# Patient Record
Sex: FEMALE | Race: WHITE | URBAN - METROPOLITAN AREA
[De-identification: names, ages, dates, MRNs, and addresses within clinical notes are randomized per-mention and may not be internally consistent; named-entity substitution may affect disease eponyms.]

---

## 2018-08-07 ENCOUNTER — OUTPATIENT (OUTPATIENT)
Dept: OUTPATIENT SERVICES | Facility: HOSPITAL | Age: 53
LOS: 1 days | Discharge: HOME | End: 2018-08-07

## 2018-08-07 DIAGNOSIS — M06.4 INFLAMMATORY POLYARTHROPATHY: ICD-10-CM

## 2018-08-07 DIAGNOSIS — D50.9 IRON DEFICIENCY ANEMIA, UNSPECIFIED: ICD-10-CM

## 2018-08-07 DIAGNOSIS — M79.1 MYALGIA: ICD-10-CM

## 2019-01-15 ENCOUNTER — OUTPATIENT (OUTPATIENT)
Dept: OUTPATIENT SERVICES | Facility: HOSPITAL | Age: 54
LOS: 1 days | Discharge: HOME | End: 2019-01-15

## 2019-01-15 DIAGNOSIS — M32.9 SYSTEMIC LUPUS ERYTHEMATOSUS, UNSPECIFIED: ICD-10-CM

## 2019-01-15 DIAGNOSIS — M06.9 RHEUMATOID ARTHRITIS, UNSPECIFIED: ICD-10-CM

## 2019-01-15 DIAGNOSIS — M06.4 INFLAMMATORY POLYARTHROPATHY: ICD-10-CM

## 2019-01-15 DIAGNOSIS — D50.8 OTHER IRON DEFICIENCY ANEMIAS: ICD-10-CM

## 2019-01-15 DIAGNOSIS — M32.10 SYSTEMIC LUPUS ERYTHEMATOSUS, ORGAN OR SYSTEM INVOLVEMENT UNSPECIFIED: ICD-10-CM

## 2019-01-15 DIAGNOSIS — E78.2 MIXED HYPERLIPIDEMIA: ICD-10-CM

## 2019-01-15 DIAGNOSIS — M35.00 SJOGREN SYNDROME, UNSPECIFIED: ICD-10-CM

## 2023-01-01 ENCOUNTER — LABORATORY RESULT (OUTPATIENT)
Age: 58
End: 2023-01-01

## 2023-01-01 ENCOUNTER — OUTPATIENT (OUTPATIENT)
Dept: OUTPATIENT SERVICES | Facility: HOSPITAL | Age: 58
LOS: 1 days | End: 2023-01-01
Payer: COMMERCIAL

## 2023-01-01 ENCOUNTER — APPOINTMENT (OUTPATIENT)
Dept: CARDIOLOGY | Facility: CLINIC | Age: 58
End: 2023-01-01

## 2023-01-01 ENCOUNTER — APPOINTMENT (OUTPATIENT)
Dept: RADIATION ONCOLOGY | Facility: HOSPITAL | Age: 58
End: 2023-01-01

## 2023-01-01 ENCOUNTER — NON-APPOINTMENT (OUTPATIENT)
Age: 58
End: 2023-01-01

## 2023-01-01 ENCOUNTER — APPOINTMENT (OUTPATIENT)
Dept: HEMATOLOGY ONCOLOGY | Facility: CLINIC | Age: 58
End: 2023-01-01
Payer: COMMERCIAL

## 2023-01-01 ENCOUNTER — APPOINTMENT (OUTPATIENT)
Dept: INFUSION THERAPY | Facility: CLINIC | Age: 58
End: 2023-01-01
Payer: COMMERCIAL

## 2023-01-01 ENCOUNTER — OUTPATIENT (OUTPATIENT)
Dept: OUTPATIENT SERVICES | Facility: HOSPITAL | Age: 58
LOS: 1 days | End: 2023-01-01

## 2023-01-01 ENCOUNTER — TRANSCRIPTION ENCOUNTER (OUTPATIENT)
Age: 58
End: 2023-01-01

## 2023-01-01 ENCOUNTER — INPATIENT (INPATIENT)
Facility: HOSPITAL | Age: 58
LOS: 28 days | Discharge: NURSING, REHAB OR LONG-TERM CARE | DRG: 640 | End: 2023-08-11
Attending: STUDENT IN AN ORGANIZED HEALTH CARE EDUCATION/TRAINING PROGRAM | Admitting: HOSPITALIST
Payer: COMMERCIAL

## 2023-01-01 ENCOUNTER — APPOINTMENT (OUTPATIENT)
Dept: INFUSION THERAPY | Facility: CLINIC | Age: 58
End: 2023-01-01

## 2023-01-01 ENCOUNTER — APPOINTMENT (OUTPATIENT)
Dept: NEUROSURGERY | Facility: CLINIC | Age: 58
End: 2023-01-01

## 2023-01-01 ENCOUNTER — APPOINTMENT (OUTPATIENT)
Dept: HEMATOLOGY ONCOLOGY | Facility: CLINIC | Age: 58
End: 2023-01-01

## 2023-01-01 VITALS
TEMPERATURE: 97 F | HEART RATE: 128 BPM | SYSTOLIC BLOOD PRESSURE: 122 MMHG | RESPIRATION RATE: 18 BRPM | WEIGHT: 160.06 LBS | DIASTOLIC BLOOD PRESSURE: 93 MMHG | HEIGHT: 64 IN

## 2023-01-01 VITALS
TEMPERATURE: 98 F | DIASTOLIC BLOOD PRESSURE: 78 MMHG | RESPIRATION RATE: 19 BRPM | SYSTOLIC BLOOD PRESSURE: 118 MMHG | HEART RATE: 96 BPM

## 2023-01-01 VITALS
RESPIRATION RATE: 16 BRPM | HEIGHT: 60 IN | SYSTOLIC BLOOD PRESSURE: 127 MMHG | TEMPERATURE: 97.9 F | DIASTOLIC BLOOD PRESSURE: 77 MMHG | HEART RATE: 108 BPM | BODY MASS INDEX: 30.82 KG/M2 | WEIGHT: 157 LBS

## 2023-01-01 VITALS
HEART RATE: 118 BPM | RESPIRATION RATE: 16 BRPM | SYSTOLIC BLOOD PRESSURE: 133 MMHG | OXYGEN SATURATION: 100 % | DIASTOLIC BLOOD PRESSURE: 90 MMHG | TEMPERATURE: 97.9 F

## 2023-01-01 VITALS
TEMPERATURE: 97.9 F | HEART RATE: 100 BPM | OXYGEN SATURATION: 98 % | SYSTOLIC BLOOD PRESSURE: 140 MMHG | RESPIRATION RATE: 18 BRPM | WEIGHT: 164 LBS | BODY MASS INDEX: 29.06 KG/M2 | DIASTOLIC BLOOD PRESSURE: 85 MMHG | HEIGHT: 63 IN

## 2023-01-01 VITALS
HEART RATE: 131 BPM | WEIGHT: 160 LBS | RESPIRATION RATE: 18 BRPM | DIASTOLIC BLOOD PRESSURE: 77 MMHG | SYSTOLIC BLOOD PRESSURE: 128 MMHG | BODY MASS INDEX: 28.35 KG/M2 | HEIGHT: 63 IN | TEMPERATURE: 98.1 F

## 2023-01-01 VITALS
HEART RATE: 112 BPM | SYSTOLIC BLOOD PRESSURE: 136 MMHG | OXYGEN SATURATION: 98 % | TEMPERATURE: 99.1 F | RESPIRATION RATE: 16 BRPM | DIASTOLIC BLOOD PRESSURE: 89 MMHG

## 2023-01-01 VITALS
RESPIRATION RATE: 18 BRPM | OXYGEN SATURATION: 96 % | WEIGHT: 158 LBS | BODY MASS INDEX: 28 KG/M2 | SYSTOLIC BLOOD PRESSURE: 119 MMHG | TEMPERATURE: 98.6 F | DIASTOLIC BLOOD PRESSURE: 80 MMHG | HEART RATE: 120 BPM | HEIGHT: 63 IN

## 2023-01-01 VITALS
HEART RATE: 93 BPM | RESPIRATION RATE: 18 BRPM | TEMPERATURE: 98 F | DIASTOLIC BLOOD PRESSURE: 80 MMHG | SYSTOLIC BLOOD PRESSURE: 123 MMHG

## 2023-01-01 DIAGNOSIS — C79.51 SECONDARY MALIGNANT NEOPLASM OF BONE: ICD-10-CM

## 2023-01-01 DIAGNOSIS — K72.00 ACUTE AND SUBACUTE HEPATIC FAILURE WITHOUT COMA: ICD-10-CM

## 2023-01-01 DIAGNOSIS — D48.5 NEOPLASM OF UNCERTAIN BEHAVIOR OF SKIN: ICD-10-CM

## 2023-01-01 DIAGNOSIS — C50.911 MALIGNANT NEOPLASM OF UNSPECIFIED SITE OF RIGHT FEMALE BREAST: ICD-10-CM

## 2023-01-01 DIAGNOSIS — M84.58XD PATHOLOGICAL FRACTURE IN NEOPLASTIC DISEASE, OTHER SPECIFIED SITE, SUBSEQUENT ENCOUNTER FOR FRACTURE WITH ROUTINE HEALING: ICD-10-CM

## 2023-01-01 DIAGNOSIS — F10.11 ALCOHOL ABUSE, IN REMISSION: ICD-10-CM

## 2023-01-01 DIAGNOSIS — J45.909 UNSPECIFIED ASTHMA, UNCOMPLICATED: ICD-10-CM

## 2023-01-01 DIAGNOSIS — C79.9 SECONDARY MALIGNANT NEOPLASM OF UNSPECIFIED SITE: ICD-10-CM

## 2023-01-01 DIAGNOSIS — K70.30 ALCOHOLIC CIRRHOSIS OF LIVER WITHOUT ASCITES: ICD-10-CM

## 2023-01-01 DIAGNOSIS — G89.3 NEOPLASM RELATED PAIN (ACUTE) (CHRONIC): ICD-10-CM

## 2023-01-01 DIAGNOSIS — E87.1 HYPO-OSMOLALITY AND HYPONATREMIA: ICD-10-CM

## 2023-01-01 DIAGNOSIS — I10 ESSENTIAL (PRIMARY) HYPERTENSION: ICD-10-CM

## 2023-01-01 DIAGNOSIS — K20.80 OTHER ESOPHAGITIS WITHOUT BLEEDING: ICD-10-CM

## 2023-01-01 DIAGNOSIS — B34.8 OTHER VIRAL INFECTIONS OF UNSPECIFIED SITE: ICD-10-CM

## 2023-01-01 DIAGNOSIS — Z48.811 ENCOUNTER FOR SURGICAL AFTERCARE FOLLOWING SURGERY ON THE NERVOUS SYSTEM: ICD-10-CM

## 2023-01-01 DIAGNOSIS — C50.919 MALIGNANT NEOPLASM OF UNSPECIFIED SITE OF UNSPECIFIED FEMALE BREAST: ICD-10-CM

## 2023-01-01 DIAGNOSIS — E87.6 HYPOKALEMIA: ICD-10-CM

## 2023-01-01 DIAGNOSIS — M84.58XA PATHOLOGICAL FRACTURE IN NEOPLASTIC DISEASE, OTHER SPECIFIED SITE, INITIAL ENCOUNTER FOR FRACTURE: ICD-10-CM

## 2023-01-01 DIAGNOSIS — M48.02 SPINAL STENOSIS, CERVICAL REGION: ICD-10-CM

## 2023-01-01 DIAGNOSIS — Z66 DO NOT RESUSCITATE: ICD-10-CM

## 2023-01-01 DIAGNOSIS — R62.7 ADULT FAILURE TO THRIVE: ICD-10-CM

## 2023-01-01 DIAGNOSIS — R13.10 DYSPHAGIA, UNSPECIFIED: ICD-10-CM

## 2023-01-01 DIAGNOSIS — E83.42 HYPOMAGNESEMIA: ICD-10-CM

## 2023-01-01 DIAGNOSIS — E03.9 HYPOTHYROIDISM, UNSPECIFIED: ICD-10-CM

## 2023-01-01 DIAGNOSIS — Z00.00 ENCOUNTER FOR GENERAL ADULT MEDICAL EXAMINATION W/OUT ABNORMAL FINDINGS: ICD-10-CM

## 2023-01-01 DIAGNOSIS — H10.31 UNSPECIFIED ACUTE CONJUNCTIVITIS, RIGHT EYE: ICD-10-CM

## 2023-01-01 DIAGNOSIS — C50.011 MALIGNANT NEOPLASM OF NIPPLE AND AREOLA, RIGHT FEMALE BREAST: ICD-10-CM

## 2023-01-01 DIAGNOSIS — E87.29 OTHER ACIDOSIS: ICD-10-CM

## 2023-01-01 DIAGNOSIS — C79.31 SECONDARY MALIGNANT NEOPLASM OF BRAIN: ICD-10-CM

## 2023-01-01 DIAGNOSIS — E86.0 DEHYDRATION: ICD-10-CM

## 2023-01-01 DIAGNOSIS — J90 PLEURAL EFFUSION, NOT ELSEWHERE CLASSIFIED: ICD-10-CM

## 2023-01-01 DIAGNOSIS — C41.9 SECONDARY MALIGNANT NEOPLASM OF UNSPECIFIED SITE: ICD-10-CM

## 2023-01-01 LAB
25(OH)D3 SERPL-MCNC: 57 NG/ML
A1C WITH ESTIMATED AVERAGE GLUCOSE RESULT: 5.3 % — SIGNIFICANT CHANGE UP (ref 4–5.6)
ALBUMIN SERPL ELPH-MCNC: 2.3 G/DL — LOW (ref 3.5–5.2)
ALBUMIN SERPL ELPH-MCNC: 2.4 G/DL — LOW (ref 3.5–5.2)
ALBUMIN SERPL ELPH-MCNC: 2.5 G/DL — LOW (ref 3.5–5.2)
ALBUMIN SERPL ELPH-MCNC: 2.6 G/DL — LOW (ref 3.5–5.2)
ALBUMIN SERPL ELPH-MCNC: 2.7 G/DL — LOW (ref 3.5–5.2)
ALBUMIN SERPL ELPH-MCNC: 2.8 G/DL — LOW (ref 3.5–5.2)
ALBUMIN SERPL ELPH-MCNC: 2.9 G/DL — LOW (ref 3.5–5.2)
ALBUMIN SERPL ELPH-MCNC: 3 G/DL — LOW (ref 3.5–5.2)
ALBUMIN SERPL ELPH-MCNC: 3.1 G/DL — LOW (ref 3.5–5.2)
ALBUMIN SERPL ELPH-MCNC: 3.4 G/DL — LOW (ref 3.5–5.2)
ALBUMIN SERPL ELPH-MCNC: 3.5 G/DL — SIGNIFICANT CHANGE UP (ref 3.5–5.2)
ALBUMIN SERPL ELPH-MCNC: 3.6 G/DL — SIGNIFICANT CHANGE UP (ref 3.5–5.2)
ALBUMIN SERPL ELPH-MCNC: 4 G/DL
ALBUMIN SERPL ELPH-MCNC: 4.1 G/DL
ALBUMIN SERPL ELPH-MCNC: 4.1 G/DL
ALBUMIN SERPL ELPH-MCNC: 4.2 G/DL
ALBUMIN SERPL ELPH-MCNC: 4.2 G/DL — SIGNIFICANT CHANGE UP (ref 3.5–5.2)
ALBUMIN SERPL ELPH-MCNC: 4.3 G/DL
ALBUMIN SERPL ELPH-MCNC: 4.4 G/DL
ALBUMIN SERPL ELPH-MCNC: 4.6 G/DL
ALP BLD-CCNC: 110 U/L
ALP BLD-CCNC: 248 U/L
ALP BLD-CCNC: 275 U/L
ALP BLD-CCNC: 288 U/L
ALP BLD-CCNC: 72 U/L
ALP BLD-CCNC: 85 U/L
ALP BLD-CCNC: 88 U/L
ALP SERPL-CCNC: 117 U/L — HIGH (ref 30–115)
ALP SERPL-CCNC: 135 U/L — HIGH (ref 30–115)
ALP SERPL-CCNC: 136 U/L — HIGH (ref 30–115)
ALP SERPL-CCNC: 139 U/L — HIGH (ref 30–115)
ALP SERPL-CCNC: 150 U/L — HIGH (ref 30–115)
ALP SERPL-CCNC: 160 U/L — HIGH (ref 30–115)
ALP SERPL-CCNC: 165 U/L — HIGH (ref 30–115)
ALP SERPL-CCNC: 170 U/L — HIGH (ref 30–115)
ALP SERPL-CCNC: 170 U/L — HIGH (ref 30–115)
ALP SERPL-CCNC: 171 U/L — HIGH (ref 30–115)
ALP SERPL-CCNC: 174 U/L — HIGH (ref 30–115)
ALP SERPL-CCNC: 176 U/L — HIGH (ref 30–115)
ALP SERPL-CCNC: 178 U/L — HIGH (ref 30–115)
ALP SERPL-CCNC: 190 U/L — HIGH (ref 30–115)
ALP SERPL-CCNC: 196 U/L — HIGH (ref 30–115)
ALP SERPL-CCNC: 199 U/L — HIGH (ref 30–115)
ALP SERPL-CCNC: 208 U/L — HIGH (ref 30–115)
ALP SERPL-CCNC: 217 U/L — HIGH (ref 30–115)
ALP SERPL-CCNC: 222 U/L — HIGH (ref 30–115)
ALP SERPL-CCNC: 241 U/L — HIGH (ref 30–115)
ALP SERPL-CCNC: 242 U/L — HIGH (ref 30–115)
ALP SERPL-CCNC: 260 U/L — HIGH (ref 30–115)
ALP SERPL-CCNC: 262 U/L — HIGH (ref 30–115)
ALP SERPL-CCNC: 284 U/L — HIGH (ref 30–115)
ALT FLD-CCNC: 1015 U/L — HIGH (ref 0–41)
ALT FLD-CCNC: 104 U/L — HIGH (ref 0–41)
ALT FLD-CCNC: 11 U/L — SIGNIFICANT CHANGE UP (ref 0–41)
ALT FLD-CCNC: 144 U/L — HIGH (ref 0–41)
ALT FLD-CCNC: 18 U/L — SIGNIFICANT CHANGE UP (ref 0–41)
ALT FLD-CCNC: 208 U/L — HIGH (ref 0–41)
ALT FLD-CCNC: 21 U/L — SIGNIFICANT CHANGE UP (ref 0–41)
ALT FLD-CCNC: 214 U/L — HIGH (ref 0–41)
ALT FLD-CCNC: 219 U/L — HIGH (ref 0–41)
ALT FLD-CCNC: 27 U/L — SIGNIFICANT CHANGE UP (ref 0–41)
ALT FLD-CCNC: 311 U/L — HIGH (ref 0–41)
ALT FLD-CCNC: 32 U/L — SIGNIFICANT CHANGE UP (ref 0–41)
ALT FLD-CCNC: 479 U/L — HIGH (ref 0–41)
ALT FLD-CCNC: 495 U/L — HIGH (ref 0–41)
ALT FLD-CCNC: 5 U/L — SIGNIFICANT CHANGE UP (ref 0–41)
ALT FLD-CCNC: 59 U/L — HIGH (ref 0–41)
ALT FLD-CCNC: 652 U/L — HIGH (ref 0–41)
ALT FLD-CCNC: 674 U/L — HIGH (ref 0–41)
ALT FLD-CCNC: 797 U/L — HIGH (ref 0–41)
ALT FLD-CCNC: 99 U/L — HIGH (ref 0–41)
ALT FLD-CCNC: <5 U/L — SIGNIFICANT CHANGE UP (ref 0–41)
ALT SERPL-CCNC: 15 U/L
ALT SERPL-CCNC: 21 U/L
ALT SERPL-CCNC: 26 U/L
ALT SERPL-CCNC: 32 U/L
ALT SERPL-CCNC: 8 U/L
ALT SERPL-CCNC: <5 U/L
ALT SERPL-CCNC: <5 U/L
ANION GAP SERPL CALC-SCNC: 10 MMOL/L — SIGNIFICANT CHANGE UP (ref 7–14)
ANION GAP SERPL CALC-SCNC: 11 MMOL/L
ANION GAP SERPL CALC-SCNC: 11 MMOL/L — SIGNIFICANT CHANGE UP (ref 7–14)
ANION GAP SERPL CALC-SCNC: 12 MMOL/L
ANION GAP SERPL CALC-SCNC: 12 MMOL/L — SIGNIFICANT CHANGE UP (ref 7–14)
ANION GAP SERPL CALC-SCNC: 12 MMOL/L — SIGNIFICANT CHANGE UP (ref 7–14)
ANION GAP SERPL CALC-SCNC: 13 MMOL/L
ANION GAP SERPL CALC-SCNC: 13 MMOL/L
ANION GAP SERPL CALC-SCNC: 13 MMOL/L — SIGNIFICANT CHANGE UP (ref 7–14)
ANION GAP SERPL CALC-SCNC: 13 MMOL/L — SIGNIFICANT CHANGE UP (ref 7–14)
ANION GAP SERPL CALC-SCNC: 14 MMOL/L — SIGNIFICANT CHANGE UP (ref 7–14)
ANION GAP SERPL CALC-SCNC: 14 MMOL/L — SIGNIFICANT CHANGE UP (ref 7–14)
ANION GAP SERPL CALC-SCNC: 15 MMOL/L — HIGH (ref 7–14)
ANION GAP SERPL CALC-SCNC: 15 MMOL/L — HIGH (ref 7–14)
ANION GAP SERPL CALC-SCNC: 17 MMOL/L — HIGH (ref 7–14)
ANION GAP SERPL CALC-SCNC: 17 MMOL/L — HIGH (ref 7–14)
ANION GAP SERPL CALC-SCNC: 18 MMOL/L — HIGH (ref 7–14)
ANION GAP SERPL CALC-SCNC: 18 MMOL/L — HIGH (ref 7–14)
ANION GAP SERPL CALC-SCNC: 25 MMOL/L — HIGH (ref 7–14)
ANION GAP SERPL CALC-SCNC: 26 MMOL/L
ANION GAP SERPL CALC-SCNC: 27 MMOL/L
ANION GAP SERPL CALC-SCNC: 28 MMOL/L — HIGH (ref 7–14)
ANION GAP SERPL CALC-SCNC: 29 MMOL/L
ANION GAP SERPL CALC-SCNC: 8 MMOL/L — SIGNIFICANT CHANGE UP (ref 7–14)
ANION GAP SERPL CALC-SCNC: 9 MMOL/L — SIGNIFICANT CHANGE UP (ref 7–14)
ANISOCYTOSIS BLD QL: SLIGHT — SIGNIFICANT CHANGE UP
APAP SERPL-MCNC: <5 UG/ML — LOW (ref 10–30)
APPEARANCE UR: ABNORMAL
APTT BLD: 33.9 SEC — SIGNIFICANT CHANGE UP (ref 27–39.2)
AST SERPL-CCNC: 105 U/L — HIGH (ref 0–41)
AST SERPL-CCNC: 11 U/L — SIGNIFICANT CHANGE UP (ref 0–41)
AST SERPL-CCNC: 11 U/L — SIGNIFICANT CHANGE UP (ref 0–41)
AST SERPL-CCNC: 12 U/L — SIGNIFICANT CHANGE UP (ref 0–41)
AST SERPL-CCNC: 12 U/L — SIGNIFICANT CHANGE UP (ref 0–41)
AST SERPL-CCNC: 13 U/L
AST SERPL-CCNC: 13 U/L — SIGNIFICANT CHANGE UP (ref 0–41)
AST SERPL-CCNC: 13 U/L — SIGNIFICANT CHANGE UP (ref 0–41)
AST SERPL-CCNC: 14 U/L
AST SERPL-CCNC: 14 U/L — SIGNIFICANT CHANGE UP (ref 0–41)
AST SERPL-CCNC: 1463 U/L — HIGH (ref 0–41)
AST SERPL-CCNC: 16 U/L — SIGNIFICANT CHANGE UP (ref 0–41)
AST SERPL-CCNC: 20 U/L
AST SERPL-CCNC: 23 U/L — SIGNIFICANT CHANGE UP (ref 0–41)
AST SERPL-CCNC: 2405 U/L — HIGH (ref 0–41)
AST SERPL-CCNC: 26 U/L
AST SERPL-CCNC: 27 U/L
AST SERPL-CCNC: 2756 U/L — HIGH (ref 0–41)
AST SERPL-CCNC: 28 U/L
AST SERPL-CCNC: 294 U/L — HIGH (ref 0–41)
AST SERPL-CCNC: 31 U/L — SIGNIFICANT CHANGE UP (ref 0–41)
AST SERPL-CCNC: 336 U/L — HIGH (ref 0–41)
AST SERPL-CCNC: 36 U/L — SIGNIFICANT CHANGE UP (ref 0–41)
AST SERPL-CCNC: 362 U/L — HIGH (ref 0–41)
AST SERPL-CCNC: 47 U/L — HIGH (ref 0–41)
AST SERPL-CCNC: 741 U/L — HIGH (ref 0–41)
AST SERPL-CCNC: 787 U/L — HIGH (ref 0–41)
AST SERPL-CCNC: 788 U/L — HIGH (ref 0–41)
AST SERPL-CCNC: 85 U/L
B-OH-BUTYR SERPL-SCNC: 4.8 MMOL/L — HIGH
B-OH-BUTYR SERPL-SCNC: 8.8 MMOL/L — HIGH
BACTERIA # UR AUTO: NEGATIVE — SIGNIFICANT CHANGE UP
BASE EXCESS BLDV CALC-SCNC: -3.9 MMOL/L — LOW (ref -2–3)
BASOPHILS # BLD AUTO: 0.03 K/UL — SIGNIFICANT CHANGE UP (ref 0–0.2)
BASOPHILS # BLD AUTO: 0.03 K/UL — SIGNIFICANT CHANGE UP (ref 0–0.2)
BASOPHILS # BLD AUTO: 0.04 K/UL — SIGNIFICANT CHANGE UP (ref 0–0.2)
BASOPHILS # BLD AUTO: 0.04 K/UL — SIGNIFICANT CHANGE UP (ref 0–0.2)
BASOPHILS # BLD AUTO: 0.05 K/UL — SIGNIFICANT CHANGE UP (ref 0–0.2)
BASOPHILS # BLD AUTO: 0.06 K/UL — SIGNIFICANT CHANGE UP (ref 0–0.2)
BASOPHILS # BLD AUTO: 0.07 K/UL — SIGNIFICANT CHANGE UP (ref 0–0.2)
BASOPHILS # BLD AUTO: 0.08 K/UL — SIGNIFICANT CHANGE UP (ref 0–0.2)
BASOPHILS # BLD AUTO: 0.09 K/UL — SIGNIFICANT CHANGE UP (ref 0–0.2)
BASOPHILS NFR BLD AUTO: 0.9 % — SIGNIFICANT CHANGE UP (ref 0–1)
BASOPHILS NFR BLD AUTO: 1.1 % — HIGH (ref 0–1)
BASOPHILS NFR BLD AUTO: 1.1 % — HIGH (ref 0–1)
BASOPHILS NFR BLD AUTO: 1.2 % — HIGH (ref 0–1)
BASOPHILS NFR BLD AUTO: 1.2 % — HIGH (ref 0–1)
BASOPHILS NFR BLD AUTO: 1.3 % — HIGH (ref 0–1)
BASOPHILS NFR BLD AUTO: 1.4 % — HIGH (ref 0–1)
BASOPHILS NFR BLD AUTO: 1.5 % — HIGH (ref 0–1)
BASOPHILS NFR BLD AUTO: 1.5 % — HIGH (ref 0–1)
BASOPHILS NFR BLD AUTO: 1.6 % — HIGH (ref 0–1)
BASOPHILS NFR BLD AUTO: 2.1 % — HIGH (ref 0–1)
BILIRUB DIRECT SERPL-MCNC: 0.2 MG/DL — SIGNIFICANT CHANGE UP (ref 0–0.3)
BILIRUB DIRECT SERPL-MCNC: 0.2 MG/DL — SIGNIFICANT CHANGE UP (ref 0–0.3)
BILIRUB INDIRECT FLD-MCNC: 0.2 MG/DL — SIGNIFICANT CHANGE UP (ref 0.2–1.2)
BILIRUB INDIRECT FLD-MCNC: 0.2 MG/DL — SIGNIFICANT CHANGE UP (ref 0.2–1.2)
BILIRUB SERPL-MCNC: 0.3 MG/DL
BILIRUB SERPL-MCNC: 0.3 MG/DL — SIGNIFICANT CHANGE UP (ref 0.2–1.2)
BILIRUB SERPL-MCNC: 0.4 MG/DL
BILIRUB SERPL-MCNC: 0.4 MG/DL — SIGNIFICANT CHANGE UP (ref 0.2–1.2)
BILIRUB SERPL-MCNC: 0.5 MG/DL — SIGNIFICANT CHANGE UP (ref 0.2–1.2)
BILIRUB SERPL-MCNC: 0.6 MG/DL — SIGNIFICANT CHANGE UP (ref 0.2–1.2)
BILIRUB SERPL-MCNC: 0.7 MG/DL
BILIRUB UR-MCNC: NEGATIVE — SIGNIFICANT CHANGE UP
BUN SERPL-MCNC: 11 MG/DL — SIGNIFICANT CHANGE UP (ref 10–20)
BUN SERPL-MCNC: 12 MG/DL
BUN SERPL-MCNC: 12 MG/DL — SIGNIFICANT CHANGE UP (ref 10–20)
BUN SERPL-MCNC: 14 MG/DL
BUN SERPL-MCNC: 16 MG/DL
BUN SERPL-MCNC: 24 MG/DL
BUN SERPL-MCNC: 3 MG/DL — LOW (ref 10–20)
BUN SERPL-MCNC: 4 MG/DL — LOW (ref 10–20)
BUN SERPL-MCNC: 5 MG/DL — LOW (ref 10–20)
BUN SERPL-MCNC: 6 MG/DL — LOW (ref 10–20)
BUN SERPL-MCNC: 7 MG/DL
BUN SERPL-MCNC: 7 MG/DL
BUN SERPL-MCNC: 7 MG/DL — LOW (ref 10–20)
BUN SERPL-MCNC: 8 MG/DL — LOW (ref 10–20)
BUN SERPL-MCNC: <3 MG/DL
BUN SERPL-MCNC: <3 MG/DL — LOW (ref 10–20)
CA-I SERPL-SCNC: 1.07 MMOL/L — LOW (ref 1.15–1.33)
CALCIUM SERPL-MCNC: 7 MG/DL — LOW (ref 8.4–10.5)
CALCIUM SERPL-MCNC: 7.3 MG/DL — LOW (ref 8.4–10.4)
CALCIUM SERPL-MCNC: 7.7 MG/DL — LOW (ref 8.4–10.4)
CALCIUM SERPL-MCNC: 7.7 MG/DL — LOW (ref 8.4–10.5)
CALCIUM SERPL-MCNC: 7.8 MG/DL — LOW (ref 8.4–10.4)
CALCIUM SERPL-MCNC: 7.8 MG/DL — LOW (ref 8.4–10.5)
CALCIUM SERPL-MCNC: 7.9 MG/DL — LOW (ref 8.4–10.5)
CALCIUM SERPL-MCNC: 8 MG/DL — LOW (ref 8.4–10.5)
CALCIUM SERPL-MCNC: 8 MG/DL — LOW (ref 8.4–10.5)
CALCIUM SERPL-MCNC: 8.2 MG/DL — LOW (ref 8.4–10.4)
CALCIUM SERPL-MCNC: 8.2 MG/DL — LOW (ref 8.4–10.5)
CALCIUM SERPL-MCNC: 8.3 MG/DL — LOW (ref 8.4–10.5)
CALCIUM SERPL-MCNC: 8.4 MG/DL — SIGNIFICANT CHANGE UP (ref 8.4–10.4)
CALCIUM SERPL-MCNC: 8.4 MG/DL — SIGNIFICANT CHANGE UP (ref 8.4–10.5)
CALCIUM SERPL-MCNC: 8.5 MG/DL — SIGNIFICANT CHANGE UP (ref 8.4–10.5)
CALCIUM SERPL-MCNC: 8.8 MG/DL — SIGNIFICANT CHANGE UP (ref 8.4–10.5)
CALCIUM SERPL-MCNC: 8.8 MG/DL — SIGNIFICANT CHANGE UP (ref 8.4–10.5)
CALCIUM SERPL-MCNC: 8.9 MG/DL — SIGNIFICANT CHANGE UP (ref 8.4–10.5)
CALCIUM SERPL-MCNC: 9 MG/DL
CALCIUM SERPL-MCNC: 9.2 MG/DL
CALCIUM SERPL-MCNC: 9.3 MG/DL
CALCIUM SERPL-MCNC: 9.4 MG/DL — SIGNIFICANT CHANGE UP (ref 8.4–10.5)
CALCIUM SERPL-MCNC: 9.7 MG/DL
CALCIUM SERPL-MCNC: 9.8 MG/DL
CALCIUM SERPL-MCNC: 9.8 MG/DL
CALCIUM SERPL-MCNC: 9.9 MG/DL
CANCER AG27-29 SERPL-ACNC: 37.2 U/ML
CHLORIDE SERPL-SCNC: 103 MMOL/L — SIGNIFICANT CHANGE UP (ref 98–110)
CHLORIDE SERPL-SCNC: 104 MMOL/L — SIGNIFICANT CHANGE UP (ref 98–110)
CHLORIDE SERPL-SCNC: 104 MMOL/L — SIGNIFICANT CHANGE UP (ref 98–110)
CHLORIDE SERPL-SCNC: 105 MMOL/L
CHLORIDE SERPL-SCNC: 105 MMOL/L
CHLORIDE SERPL-SCNC: 105 MMOL/L — SIGNIFICANT CHANGE UP (ref 98–110)
CHLORIDE SERPL-SCNC: 105 MMOL/L — SIGNIFICANT CHANGE UP (ref 98–110)
CHLORIDE SERPL-SCNC: 106 MMOL/L
CHLORIDE SERPL-SCNC: 106 MMOL/L — SIGNIFICANT CHANGE UP (ref 98–110)
CHLORIDE SERPL-SCNC: 107 MMOL/L
CHLORIDE SERPL-SCNC: 107 MMOL/L — SIGNIFICANT CHANGE UP (ref 98–110)
CHLORIDE SERPL-SCNC: 107 MMOL/L — SIGNIFICANT CHANGE UP (ref 98–110)
CHLORIDE SERPL-SCNC: 108 MMOL/L — SIGNIFICANT CHANGE UP (ref 98–110)
CHLORIDE SERPL-SCNC: 109 MMOL/L — SIGNIFICANT CHANGE UP (ref 98–110)
CHLORIDE SERPL-SCNC: 110 MMOL/L — SIGNIFICANT CHANGE UP (ref 98–110)
CHLORIDE SERPL-SCNC: 111 MMOL/L — HIGH (ref 98–110)
CHLORIDE SERPL-SCNC: 112 MMOL/L — HIGH (ref 98–110)
CHLORIDE SERPL-SCNC: 84 MMOL/L — LOW (ref 98–110)
CHLORIDE SERPL-SCNC: 90 MMOL/L
CHLORIDE SERPL-SCNC: 90 MMOL/L
CHLORIDE SERPL-SCNC: 92 MMOL/L
CHLORIDE SERPL-SCNC: 94 MMOL/L — LOW (ref 98–110)
CHLORIDE SERPL-SCNC: 96 MMOL/L — LOW (ref 98–110)
CK SERPL-CCNC: 28 U/L — SIGNIFICANT CHANGE UP (ref 0–225)
CK SERPL-CCNC: 58 U/L — SIGNIFICANT CHANGE UP (ref 0–225)
CO2 SERPL-SCNC: 15 MMOL/L
CO2 SERPL-SCNC: 15 MMOL/L — LOW (ref 17–32)
CO2 SERPL-SCNC: 16 MMOL/L — LOW (ref 17–32)
CO2 SERPL-SCNC: 16 MMOL/L — LOW (ref 17–32)
CO2 SERPL-SCNC: 17 MMOL/L — SIGNIFICANT CHANGE UP (ref 17–32)
CO2 SERPL-SCNC: 18 MMOL/L
CO2 SERPL-SCNC: 18 MMOL/L
CO2 SERPL-SCNC: 18 MMOL/L — SIGNIFICANT CHANGE UP (ref 17–32)
CO2 SERPL-SCNC: 19 MMOL/L — SIGNIFICANT CHANGE UP (ref 17–32)
CO2 SERPL-SCNC: 20 MMOL/L
CO2 SERPL-SCNC: 20 MMOL/L — SIGNIFICANT CHANGE UP (ref 17–32)
CO2 SERPL-SCNC: 21 MMOL/L
CO2 SERPL-SCNC: 21 MMOL/L — SIGNIFICANT CHANGE UP (ref 17–32)
CO2 SERPL-SCNC: 22 MMOL/L
CO2 SERPL-SCNC: 22 MMOL/L — SIGNIFICANT CHANGE UP (ref 17–32)
CO2 SERPL-SCNC: 23 MMOL/L
CO2 SERPL-SCNC: 23 MMOL/L — SIGNIFICANT CHANGE UP (ref 17–32)
CO2 SERPL-SCNC: 24 MMOL/L — SIGNIFICANT CHANGE UP (ref 17–32)
CO2 SERPL-SCNC: 25 MMOL/L — SIGNIFICANT CHANGE UP (ref 17–32)
CO2 SERPL-SCNC: 26 MMOL/L — SIGNIFICANT CHANGE UP (ref 17–32)
CO2 SERPL-SCNC: 27 MMOL/L — SIGNIFICANT CHANGE UP (ref 17–32)
COLOR SPEC: YELLOW — SIGNIFICANT CHANGE UP
CREAT SERPL-MCNC: 0.5 MG/DL
CREAT SERPL-MCNC: 0.5 MG/DL — LOW (ref 0.7–1.5)
CREAT SERPL-MCNC: 0.6 MG/DL
CREAT SERPL-MCNC: 0.7 MG/DL
CREAT SERPL-MCNC: 0.7 MG/DL
CREAT SERPL-MCNC: 0.7 MG/DL — SIGNIFICANT CHANGE UP (ref 0.7–1.5)
CREAT SERPL-MCNC: 0.7 MG/DL — SIGNIFICANT CHANGE UP (ref 0.7–1.5)
CREAT SERPL-MCNC: 0.8 MG/DL — SIGNIFICANT CHANGE UP (ref 0.7–1.5)
CREAT SERPL-MCNC: 1 MG/DL
CREAT SERPL-MCNC: <0.5 MG/DL — LOW (ref 0.7–1.5)
CULTURE RESULTS: SIGNIFICANT CHANGE UP
D DIMER BLD IA.RAPID-MCNC: 440 NG/ML DDU — HIGH
DIFF PNL FLD: ABNORMAL
EGFR: 100 ML/MIN/1.73M2
EGFR: 100 ML/MIN/1.73M2
EGFR: 100 ML/MIN/1.73M2 — SIGNIFICANT CHANGE UP
EGFR: 100 ML/MIN/1.73M2 — SIGNIFICANT CHANGE UP
EGFR: 103 ML/MIN/1.73M2
EGFR: 108 ML/MIN/1.73M2
EGFR: 109 ML/MIN/1.73M2 — SIGNIFICANT CHANGE UP
EGFR: 115 ML/MIN/1.73M2 — SIGNIFICANT CHANGE UP
EGFR: 123 ML/MIN/1.73M2 — SIGNIFICANT CHANGE UP
EGFR: 65 ML/MIN/1.73M2
EGFR: 85 ML/MIN/1.73M2 — SIGNIFICANT CHANGE UP
EOSINOPHIL # BLD AUTO: 0.04 K/UL — SIGNIFICANT CHANGE UP (ref 0–0.7)
EOSINOPHIL # BLD AUTO: 0.05 K/UL — SIGNIFICANT CHANGE UP (ref 0–0.7)
EOSINOPHIL # BLD AUTO: 0.05 K/UL — SIGNIFICANT CHANGE UP (ref 0–0.7)
EOSINOPHIL # BLD AUTO: 0.06 K/UL — SIGNIFICANT CHANGE UP (ref 0–0.7)
EOSINOPHIL # BLD AUTO: 0.09 K/UL — SIGNIFICANT CHANGE UP (ref 0–0.7)
EOSINOPHIL # BLD AUTO: 0.1 K/UL — SIGNIFICANT CHANGE UP (ref 0–0.7)
EOSINOPHIL # BLD AUTO: 0.1 K/UL — SIGNIFICANT CHANGE UP (ref 0–0.7)
EOSINOPHIL # BLD AUTO: 0.11 K/UL — SIGNIFICANT CHANGE UP (ref 0–0.7)
EOSINOPHIL # BLD AUTO: 0.12 K/UL — SIGNIFICANT CHANGE UP (ref 0–0.7)
EOSINOPHIL # BLD AUTO: 0.14 K/UL — SIGNIFICANT CHANGE UP (ref 0–0.7)
EOSINOPHIL # BLD AUTO: 0.15 K/UL — SIGNIFICANT CHANGE UP (ref 0–0.7)
EOSINOPHIL # BLD AUTO: 0.15 K/UL — SIGNIFICANT CHANGE UP (ref 0–0.7)
EOSINOPHIL # BLD AUTO: 0.34 K/UL — SIGNIFICANT CHANGE UP (ref 0–0.7)
EOSINOPHIL # BLD AUTO: 0.36 K/UL — SIGNIFICANT CHANGE UP (ref 0–0.7)
EOSINOPHIL NFR BLD AUTO: 0.8 % — SIGNIFICANT CHANGE UP (ref 0–8)
EOSINOPHIL NFR BLD AUTO: 1.2 % — SIGNIFICANT CHANGE UP (ref 0–8)
EOSINOPHIL NFR BLD AUTO: 1.3 % — SIGNIFICANT CHANGE UP (ref 0–8)
EOSINOPHIL NFR BLD AUTO: 10.7 % — HIGH (ref 0–8)
EOSINOPHIL NFR BLD AUTO: 11.4 % — HIGH (ref 0–8)
EOSINOPHIL NFR BLD AUTO: 2.1 % — SIGNIFICANT CHANGE UP (ref 0–8)
EOSINOPHIL NFR BLD AUTO: 2.3 % — SIGNIFICANT CHANGE UP (ref 0–8)
EOSINOPHIL NFR BLD AUTO: 2.8 % — SIGNIFICANT CHANGE UP (ref 0–8)
EOSINOPHIL NFR BLD AUTO: 3.7 % — SIGNIFICANT CHANGE UP (ref 0–8)
EOSINOPHIL NFR BLD AUTO: 3.8 % — SIGNIFICANT CHANGE UP (ref 0–8)
EOSINOPHIL NFR BLD AUTO: 3.9 % — SIGNIFICANT CHANGE UP (ref 0–8)
EOSINOPHIL NFR BLD AUTO: 4 % — SIGNIFICANT CHANGE UP (ref 0–8)
EPI CELLS # UR: 3 /HPF — SIGNIFICANT CHANGE UP (ref 0–5)
ESTIMATED AVERAGE GLUCOSE: 105 MG/DL — SIGNIFICANT CHANGE UP (ref 68–114)
FERRITIN SERPL-MCNC: 1048 NG/ML — HIGH (ref 13–330)
FERRITIN SERPL-MCNC: 206 NG/ML
GAS PNL BLDV: 127 MMOL/L — LOW (ref 136–145)
GAS PNL BLDV: SIGNIFICANT CHANGE UP
GIANT PLATELETS BLD QL SMEAR: PRESENT — SIGNIFICANT CHANGE UP
GLUCOSE BLDC GLUCOMTR-MCNC: 101 MG/DL — HIGH (ref 70–99)
GLUCOSE BLDC GLUCOMTR-MCNC: 102 MG/DL — HIGH (ref 70–99)
GLUCOSE BLDC GLUCOMTR-MCNC: 104 MG/DL — HIGH (ref 70–99)
GLUCOSE BLDC GLUCOMTR-MCNC: 105 MG/DL — HIGH (ref 70–99)
GLUCOSE BLDC GLUCOMTR-MCNC: 105 MG/DL — HIGH (ref 70–99)
GLUCOSE BLDC GLUCOMTR-MCNC: 109 MG/DL — HIGH (ref 70–99)
GLUCOSE BLDC GLUCOMTR-MCNC: 111 MG/DL — HIGH (ref 70–99)
GLUCOSE BLDC GLUCOMTR-MCNC: 113 MG/DL — HIGH (ref 70–99)
GLUCOSE BLDC GLUCOMTR-MCNC: 113 MG/DL — HIGH (ref 70–99)
GLUCOSE BLDC GLUCOMTR-MCNC: 114 MG/DL — HIGH (ref 70–99)
GLUCOSE BLDC GLUCOMTR-MCNC: 117 MG/DL — HIGH (ref 70–99)
GLUCOSE BLDC GLUCOMTR-MCNC: 120 MG/DL — HIGH (ref 70–99)
GLUCOSE BLDC GLUCOMTR-MCNC: 120 MG/DL — HIGH (ref 70–99)
GLUCOSE BLDC GLUCOMTR-MCNC: 126 MG/DL — HIGH (ref 70–99)
GLUCOSE BLDC GLUCOMTR-MCNC: 127 MG/DL — HIGH (ref 70–99)
GLUCOSE BLDC GLUCOMTR-MCNC: 127 MG/DL — HIGH (ref 70–99)
GLUCOSE BLDC GLUCOMTR-MCNC: 131 MG/DL — HIGH (ref 70–99)
GLUCOSE BLDC GLUCOMTR-MCNC: 134 MG/DL — HIGH (ref 70–99)
GLUCOSE BLDC GLUCOMTR-MCNC: 151 MG/DL — HIGH (ref 70–99)
GLUCOSE BLDC GLUCOMTR-MCNC: 152 MG/DL — HIGH (ref 70–99)
GLUCOSE BLDC GLUCOMTR-MCNC: 167 MG/DL — HIGH (ref 70–99)
GLUCOSE BLDC GLUCOMTR-MCNC: 77 MG/DL — SIGNIFICANT CHANGE UP (ref 70–99)
GLUCOSE BLDC GLUCOMTR-MCNC: 80 MG/DL — SIGNIFICANT CHANGE UP (ref 70–99)
GLUCOSE BLDC GLUCOMTR-MCNC: 83 MG/DL — SIGNIFICANT CHANGE UP (ref 70–99)
GLUCOSE BLDC GLUCOMTR-MCNC: 85 MG/DL — SIGNIFICANT CHANGE UP (ref 70–99)
GLUCOSE BLDC GLUCOMTR-MCNC: 85 MG/DL — SIGNIFICANT CHANGE UP (ref 70–99)
GLUCOSE BLDC GLUCOMTR-MCNC: 86 MG/DL — SIGNIFICANT CHANGE UP (ref 70–99)
GLUCOSE BLDC GLUCOMTR-MCNC: 87 MG/DL — SIGNIFICANT CHANGE UP (ref 70–99)
GLUCOSE BLDC GLUCOMTR-MCNC: 87 MG/DL — SIGNIFICANT CHANGE UP (ref 70–99)
GLUCOSE BLDC GLUCOMTR-MCNC: 89 MG/DL — SIGNIFICANT CHANGE UP (ref 70–99)
GLUCOSE BLDC GLUCOMTR-MCNC: 90 MG/DL — SIGNIFICANT CHANGE UP (ref 70–99)
GLUCOSE BLDC GLUCOMTR-MCNC: 93 MG/DL — SIGNIFICANT CHANGE UP (ref 70–99)
GLUCOSE BLDC GLUCOMTR-MCNC: 94 MG/DL — SIGNIFICANT CHANGE UP (ref 70–99)
GLUCOSE BLDC GLUCOMTR-MCNC: 94 MG/DL — SIGNIFICANT CHANGE UP (ref 70–99)
GLUCOSE BLDC GLUCOMTR-MCNC: 95 MG/DL — SIGNIFICANT CHANGE UP (ref 70–99)
GLUCOSE BLDC GLUCOMTR-MCNC: 97 MG/DL — SIGNIFICANT CHANGE UP (ref 70–99)
GLUCOSE BLDC GLUCOMTR-MCNC: 98 MG/DL — SIGNIFICANT CHANGE UP (ref 70–99)
GLUCOSE BLDC GLUCOMTR-MCNC: 99 MG/DL — SIGNIFICANT CHANGE UP (ref 70–99)
GLUCOSE BLDC GLUCOMTR-MCNC: 99 MG/DL — SIGNIFICANT CHANGE UP (ref 70–99)
GLUCOSE SERPL-MCNC: 102 MG/DL — HIGH (ref 70–99)
GLUCOSE SERPL-MCNC: 103 MG/DL
GLUCOSE SERPL-MCNC: 103 MG/DL — HIGH (ref 70–99)
GLUCOSE SERPL-MCNC: 104 MG/DL — HIGH (ref 70–99)
GLUCOSE SERPL-MCNC: 105 MG/DL — HIGH (ref 70–99)
GLUCOSE SERPL-MCNC: 106 MG/DL — HIGH (ref 70–99)
GLUCOSE SERPL-MCNC: 107 MG/DL — HIGH (ref 70–99)
GLUCOSE SERPL-MCNC: 109 MG/DL — HIGH (ref 70–99)
GLUCOSE SERPL-MCNC: 112 MG/DL — HIGH (ref 70–99)
GLUCOSE SERPL-MCNC: 112 MG/DL — HIGH (ref 70–99)
GLUCOSE SERPL-MCNC: 113 MG/DL — HIGH (ref 70–99)
GLUCOSE SERPL-MCNC: 113 MG/DL — HIGH (ref 70–99)
GLUCOSE SERPL-MCNC: 114 MG/DL
GLUCOSE SERPL-MCNC: 116 MG/DL — HIGH (ref 70–99)
GLUCOSE SERPL-MCNC: 116 MG/DL — HIGH (ref 70–99)
GLUCOSE SERPL-MCNC: 119 MG/DL — HIGH (ref 70–99)
GLUCOSE SERPL-MCNC: 125 MG/DL
GLUCOSE SERPL-MCNC: 127 MG/DL — HIGH (ref 70–99)
GLUCOSE SERPL-MCNC: 128 MG/DL — HIGH (ref 70–99)
GLUCOSE SERPL-MCNC: 130 MG/DL — HIGH (ref 70–99)
GLUCOSE SERPL-MCNC: 138 MG/DL — HIGH (ref 70–99)
GLUCOSE SERPL-MCNC: 140 MG/DL — HIGH (ref 70–99)
GLUCOSE SERPL-MCNC: 60 MG/DL — LOW (ref 70–99)
GLUCOSE SERPL-MCNC: 75 MG/DL — SIGNIFICANT CHANGE UP (ref 70–99)
GLUCOSE SERPL-MCNC: 79 MG/DL
GLUCOSE SERPL-MCNC: 80 MG/DL — SIGNIFICANT CHANGE UP (ref 70–99)
GLUCOSE SERPL-MCNC: 82 MG/DL
GLUCOSE SERPL-MCNC: 83 MG/DL — SIGNIFICANT CHANGE UP (ref 70–99)
GLUCOSE SERPL-MCNC: 90 MG/DL — SIGNIFICANT CHANGE UP (ref 70–99)
GLUCOSE SERPL-MCNC: 91 MG/DL — SIGNIFICANT CHANGE UP (ref 70–99)
GLUCOSE SERPL-MCNC: 93 MG/DL
GLUCOSE SERPL-MCNC: 93 MG/DL — SIGNIFICANT CHANGE UP (ref 70–99)
GLUCOSE SERPL-MCNC: 98 MG/DL — SIGNIFICANT CHANGE UP (ref 70–99)
GLUCOSE SERPL-MCNC: 99 MG/DL
GLUCOSE SERPL-MCNC: 99 MG/DL — SIGNIFICANT CHANGE UP (ref 70–99)
GLUCOSE SERPL-MCNC: 99 MG/DL — SIGNIFICANT CHANGE UP (ref 70–99)
GLUCOSE UR QL: NEGATIVE — SIGNIFICANT CHANGE UP
HAV IGM SER-ACNC: SIGNIFICANT CHANGE UP
HBV CORE IGM SER-ACNC: SIGNIFICANT CHANGE UP
HBV SURFACE AG SER-ACNC: SIGNIFICANT CHANGE UP
HCG SERPL QL: POSITIVE — SIGNIFICANT CHANGE UP
HCG SERPL-ACNC: 6.3 MIU/ML — SIGNIFICANT CHANGE UP
HCO3 BLDV-SCNC: 19 MMOL/L — LOW (ref 22–29)
HCT VFR BLD CALC: 29.8 % — LOW (ref 37–47)
HCT VFR BLD CALC: 29.9 % — LOW (ref 37–47)
HCT VFR BLD CALC: 30.3 % — LOW (ref 37–47)
HCT VFR BLD CALC: 30.3 % — LOW (ref 37–47)
HCT VFR BLD CALC: 30.7 % — LOW (ref 37–47)
HCT VFR BLD CALC: 30.8 % — LOW (ref 37–47)
HCT VFR BLD CALC: 30.8 % — LOW (ref 37–47)
HCT VFR BLD CALC: 31.3 % — LOW (ref 37–47)
HCT VFR BLD CALC: 31.5 % — LOW (ref 37–47)
HCT VFR BLD CALC: 31.6 % — LOW (ref 37–47)
HCT VFR BLD CALC: 31.7 % — LOW (ref 37–47)
HCT VFR BLD CALC: 32.1 % — LOW (ref 37–47)
HCT VFR BLD CALC: 32.2 %
HCT VFR BLD CALC: 32.4 % — LOW (ref 37–47)
HCT VFR BLD CALC: 32.4 % — LOW (ref 37–47)
HCT VFR BLD CALC: 32.5 % — LOW (ref 37–47)
HCT VFR BLD CALC: 33.2 % — LOW (ref 37–47)
HCT VFR BLD CALC: 33.3 % — LOW (ref 37–47)
HCT VFR BLD CALC: 33.4 % — LOW (ref 37–47)
HCT VFR BLD CALC: 33.4 % — LOW (ref 37–47)
HCT VFR BLD CALC: 33.5 % — LOW (ref 37–47)
HCT VFR BLD CALC: 33.6 % — LOW (ref 37–47)
HCT VFR BLD CALC: 33.9 % — LOW (ref 37–47)
HCT VFR BLD CALC: 34.1 % — LOW (ref 37–47)
HCT VFR BLD CALC: 34.1 % — LOW (ref 37–47)
HCT VFR BLD CALC: 34.2 % — LOW (ref 37–47)
HCT VFR BLD CALC: 34.5 % — LOW (ref 37–47)
HCT VFR BLD CALC: 34.7 %
HCT VFR BLD CALC: 36 % — LOW (ref 37–47)
HCT VFR BLD CALC: 36.1 % — LOW (ref 37–47)
HCT VFR BLD CALC: 40.3 % — SIGNIFICANT CHANGE UP (ref 37–47)
HCT VFR BLD CALC: 40.7 %
HCT VFR BLD CALC: 40.9 %
HCT VFR BLD CALC: 41.1 %
HCT VFR BLD CALC: 42 %
HCT VFR BLDA CALC: 40 % — SIGNIFICANT CHANGE UP (ref 39–51)
HCV AB S/CO SERPL IA: 0.08 S/CO — SIGNIFICANT CHANGE UP (ref 0–0.99)
HCV AB SERPL-IMP: SIGNIFICANT CHANGE UP
HGB BLD CALC-MCNC: 13.2 G/DL — SIGNIFICANT CHANGE UP (ref 12.6–17.4)
HGB BLD-MCNC: 10 G/DL — LOW (ref 12–16)
HGB BLD-MCNC: 10.2 G/DL — LOW (ref 12–16)
HGB BLD-MCNC: 10.3 G/DL — LOW (ref 12–16)
HGB BLD-MCNC: 10.4 G/DL
HGB BLD-MCNC: 10.4 G/DL — LOW (ref 12–16)
HGB BLD-MCNC: 10.4 G/DL — LOW (ref 12–16)
HGB BLD-MCNC: 10.6 G/DL — LOW (ref 12–16)
HGB BLD-MCNC: 10.7 G/DL — LOW (ref 12–16)
HGB BLD-MCNC: 10.8 G/DL — LOW (ref 12–16)
HGB BLD-MCNC: 10.8 G/DL — LOW (ref 12–16)
HGB BLD-MCNC: 10.9 G/DL — LOW (ref 12–16)
HGB BLD-MCNC: 11 G/DL — LOW (ref 12–16)
HGB BLD-MCNC: 11.2 G/DL — LOW (ref 12–16)
HGB BLD-MCNC: 11.2 G/DL — LOW (ref 12–16)
HGB BLD-MCNC: 11.4 G/DL — LOW (ref 12–16)
HGB BLD-MCNC: 11.7 G/DL — LOW (ref 12–16)
HGB BLD-MCNC: 11.8 G/DL — LOW (ref 12–16)
HGB BLD-MCNC: 12.1 G/DL
HGB BLD-MCNC: 13.2 G/DL
HGB BLD-MCNC: 13.2 G/DL
HGB BLD-MCNC: 13.4 G/DL
HGB BLD-MCNC: 13.4 G/DL — SIGNIFICANT CHANGE UP (ref 12–16)
HGB BLD-MCNC: 13.9 G/DL
HGB BLD-MCNC: 9.6 G/DL — LOW (ref 12–16)
HGB BLD-MCNC: 9.7 G/DL — LOW (ref 12–16)
HGB BLD-MCNC: 9.8 G/DL — LOW (ref 12–16)
HPIV3 RNA SPEC QL NAA+PROBE: DETECTED
HYALINE CASTS # UR AUTO: 0 /LPF — SIGNIFICANT CHANGE UP (ref 0–7)
HYPOCHROMIA BLD QL: SLIGHT — SIGNIFICANT CHANGE UP
IMM GRANULOCYTES NFR BLD AUTO: 0.3 % — SIGNIFICANT CHANGE UP (ref 0.1–0.3)
IMM GRANULOCYTES NFR BLD AUTO: 0.5 % — HIGH (ref 0.1–0.3)
IMM GRANULOCYTES NFR BLD AUTO: 0.7 % — HIGH (ref 0.1–0.3)
IMM GRANULOCYTES NFR BLD AUTO: 1.1 % — HIGH (ref 0.1–0.3)
IMM GRANULOCYTES NFR BLD AUTO: 1.2 % — HIGH (ref 0.1–0.3)
IMM GRANULOCYTES NFR BLD AUTO: 1.3 % — HIGH (ref 0.1–0.3)
IMM GRANULOCYTES NFR BLD AUTO: 1.5 % — HIGH (ref 0.1–0.3)
IMM GRANULOCYTES NFR BLD AUTO: 1.8 % — HIGH (ref 0.1–0.3)
IMM GRANULOCYTES NFR BLD AUTO: 1.9 % — HIGH (ref 0.1–0.3)
IMM GRANULOCYTES NFR BLD AUTO: 1.9 % — HIGH (ref 0.1–0.3)
IMM GRANULOCYTES NFR BLD AUTO: 3 % — HIGH (ref 0.1–0.3)
IMM GRANULOCYTES NFR BLD AUTO: 3.1 % — HIGH (ref 0.1–0.3)
IMM GRANULOCYTES NFR BLD AUTO: 3.6 % — HIGH (ref 0.1–0.3)
INR BLD: 1.47 RATIO — HIGH (ref 0.65–1.3)
IRON SATN MFR SERPL: 32 % — SIGNIFICANT CHANGE UP (ref 15–50)
IRON SATN MFR SERPL: 38 UG/DL — SIGNIFICANT CHANGE UP (ref 35–150)
KETONES UR-MCNC: ABNORMAL
LACTATE BLDV-MCNC: 1.7 MMOL/L — SIGNIFICANT CHANGE UP (ref 0.5–2)
LACTATE SERPL-SCNC: 0.9 MMOL/L — SIGNIFICANT CHANGE UP (ref 0.7–2)
LACTATE SERPL-SCNC: 2 MMOL/L — SIGNIFICANT CHANGE UP (ref 0.7–2)
LACTATE SERPL-SCNC: 2.2 MMOL/L — HIGH (ref 0.7–2)
LDH PNL SERPL: 678 IU/L — HIGH (ref 119–226)
LDH1 SERPL-CCNC: 7 % — LOW (ref 17–32)
LDH3 SERPL-CCNC: 12 % — LOW (ref 25–40)
LDH3 SERPL-CCNC: 9 % — LOW (ref 17–27)
LDH4 SERPL-CCNC: 11 % — SIGNIFICANT CHANGE UP (ref 5–13)
LDH5 SERPL-CCNC: 61 % — HIGH (ref 4–20)
LEUKOCYTE ESTERASE UR-ACNC: ABNORMAL
LYMPHOCYTES # BLD AUTO: 0.22 K/UL — LOW (ref 1.2–3.4)
LYMPHOCYTES # BLD AUTO: 0.45 K/UL — LOW (ref 1.2–3.4)
LYMPHOCYTES # BLD AUTO: 0.56 K/UL — LOW (ref 1.2–3.4)
LYMPHOCYTES # BLD AUTO: 0.58 K/UL — LOW (ref 1.2–3.4)
LYMPHOCYTES # BLD AUTO: 0.59 K/UL — LOW (ref 1.2–3.4)
LYMPHOCYTES # BLD AUTO: 0.6 K/UL — LOW (ref 1.2–3.4)
LYMPHOCYTES # BLD AUTO: 0.6 K/UL — LOW (ref 1.2–3.4)
LYMPHOCYTES # BLD AUTO: 0.61 K/UL — LOW (ref 1.2–3.4)
LYMPHOCYTES # BLD AUTO: 0.67 K/UL — LOW (ref 1.2–3.4)
LYMPHOCYTES # BLD AUTO: 0.67 K/UL — LOW (ref 1.2–3.4)
LYMPHOCYTES # BLD AUTO: 0.72 K/UL — LOW (ref 1.2–3.4)
LYMPHOCYTES # BLD AUTO: 0.85 K/UL — LOW (ref 1.2–3.4)
LYMPHOCYTES # BLD AUTO: 12.4 % — LOW (ref 20.5–51.1)
LYMPHOCYTES # BLD AUTO: 13.3 % — LOW (ref 20.5–51.1)
LYMPHOCYTES # BLD AUTO: 13.3 % — LOW (ref 20.5–51.1)
LYMPHOCYTES # BLD AUTO: 13.4 % — LOW (ref 20.5–51.1)
LYMPHOCYTES # BLD AUTO: 14.3 % — LOW (ref 20.5–51.1)
LYMPHOCYTES # BLD AUTO: 14.5 % — LOW (ref 20.5–51.1)
LYMPHOCYTES # BLD AUTO: 15.4 % — LOW (ref 20.5–51.1)
LYMPHOCYTES # BLD AUTO: 16 % — LOW (ref 20.5–51.1)
LYMPHOCYTES # BLD AUTO: 17.7 % — LOW (ref 20.5–51.1)
LYMPHOCYTES # BLD AUTO: 18 % — LOW (ref 20.5–51.1)
LYMPHOCYTES # BLD AUTO: 18.3 % — LOW (ref 20.5–51.1)
LYMPHOCYTES # BLD AUTO: 18.7 % — LOW (ref 20.5–51.1)
LYMPHOCYTES # BLD AUTO: 20.1 % — LOW (ref 20.5–51.1)
LYMPHOCYTES # BLD AUTO: 7.8 % — LOW (ref 20.5–51.1)
MACROCYTES BLD QL: SLIGHT — SIGNIFICANT CHANGE UP
MAGNESIUM SERPL-MCNC: 1.4 MG/DL — LOW (ref 1.8–2.4)
MAGNESIUM SERPL-MCNC: 1.4 MG/DL — LOW (ref 1.8–2.4)
MAGNESIUM SERPL-MCNC: 1.5 MG/DL
MAGNESIUM SERPL-MCNC: 1.5 MG/DL — LOW (ref 1.8–2.4)
MAGNESIUM SERPL-MCNC: 1.6 MG/DL — LOW (ref 1.8–2.4)
MAGNESIUM SERPL-MCNC: 1.7 MG/DL
MAGNESIUM SERPL-MCNC: 1.7 MG/DL — LOW (ref 1.8–2.4)
MAGNESIUM SERPL-MCNC: 1.8 MG/DL — SIGNIFICANT CHANGE UP (ref 1.8–2.4)
MAGNESIUM SERPL-MCNC: 1.8 MG/DL — SIGNIFICANT CHANGE UP (ref 1.8–2.4)
MAGNESIUM SERPL-MCNC: 1.9 MG/DL — SIGNIFICANT CHANGE UP (ref 1.8–2.4)
MAGNESIUM SERPL-MCNC: 2 MG/DL — SIGNIFICANT CHANGE UP (ref 1.8–2.4)
MAGNESIUM SERPL-MCNC: 2.1 MG/DL
MAGNESIUM SERPL-MCNC: 2.1 MG/DL — SIGNIFICANT CHANGE UP (ref 1.8–2.4)
MAGNESIUM SERPL-MCNC: 2.3 MG/DL — SIGNIFICANT CHANGE UP (ref 1.8–2.4)
MAGNESIUM SERPL-MCNC: 2.3 MG/DL — SIGNIFICANT CHANGE UP (ref 1.8–2.4)
MAGNESIUM SERPL-MCNC: 2.4 MG/DL — SIGNIFICANT CHANGE UP (ref 1.8–2.4)
MANUAL SMEAR VERIFICATION: SIGNIFICANT CHANGE UP
MCHC RBC-ENTMCNC: 26.2 PG
MCHC RBC-ENTMCNC: 26.3 PG
MCHC RBC-ENTMCNC: 26.4 PG
MCHC RBC-ENTMCNC: 26.4 PG — LOW (ref 27–31)
MCHC RBC-ENTMCNC: 26.4 PG — LOW (ref 27–31)
MCHC RBC-ENTMCNC: 26.5 PG — LOW (ref 27–31)
MCHC RBC-ENTMCNC: 26.6 PG — LOW (ref 27–31)
MCHC RBC-ENTMCNC: 26.6 PG — LOW (ref 27–31)
MCHC RBC-ENTMCNC: 26.7 PG — LOW (ref 27–31)
MCHC RBC-ENTMCNC: 26.9 PG — LOW (ref 27–31)
MCHC RBC-ENTMCNC: 26.9 PG — LOW (ref 27–31)
MCHC RBC-ENTMCNC: 27.1 PG — SIGNIFICANT CHANGE UP (ref 27–31)
MCHC RBC-ENTMCNC: 27.1 PG — SIGNIFICANT CHANGE UP (ref 27–31)
MCHC RBC-ENTMCNC: 27.2 PG — SIGNIFICANT CHANGE UP (ref 27–31)
MCHC RBC-ENTMCNC: 27.2 PG — SIGNIFICANT CHANGE UP (ref 27–31)
MCHC RBC-ENTMCNC: 27.3 PG — SIGNIFICANT CHANGE UP (ref 27–31)
MCHC RBC-ENTMCNC: 27.4 PG — SIGNIFICANT CHANGE UP (ref 27–31)
MCHC RBC-ENTMCNC: 27.4 PG — SIGNIFICANT CHANGE UP (ref 27–31)
MCHC RBC-ENTMCNC: 27.5 PG — SIGNIFICANT CHANGE UP (ref 27–31)
MCHC RBC-ENTMCNC: 27.6 PG — SIGNIFICANT CHANGE UP (ref 27–31)
MCHC RBC-ENTMCNC: 27.6 PG — SIGNIFICANT CHANGE UP (ref 27–31)
MCHC RBC-ENTMCNC: 27.7 PG — SIGNIFICANT CHANGE UP (ref 27–31)
MCHC RBC-ENTMCNC: 27.8 PG — SIGNIFICANT CHANGE UP (ref 27–31)
MCHC RBC-ENTMCNC: 28.1 PG — SIGNIFICANT CHANGE UP (ref 27–31)
MCHC RBC-ENTMCNC: 28.2 PG — SIGNIFICANT CHANGE UP (ref 27–31)
MCHC RBC-ENTMCNC: 28.3 PG — SIGNIFICANT CHANGE UP (ref 27–31)
MCHC RBC-ENTMCNC: 28.5 PG — SIGNIFICANT CHANGE UP (ref 27–31)
MCHC RBC-ENTMCNC: 28.6 PG — SIGNIFICANT CHANGE UP (ref 27–31)
MCHC RBC-ENTMCNC: 30.5 PG
MCHC RBC-ENTMCNC: 31.7 G/DL — LOW (ref 32–37)
MCHC RBC-ENTMCNC: 31.7 G/DL — LOW (ref 32–37)
MCHC RBC-ENTMCNC: 31.8 G/DL — LOW (ref 32–37)
MCHC RBC-ENTMCNC: 32 G/DL — SIGNIFICANT CHANGE UP (ref 32–37)
MCHC RBC-ENTMCNC: 32.1 G/DL — SIGNIFICANT CHANGE UP (ref 32–37)
MCHC RBC-ENTMCNC: 32.1 G/DL — SIGNIFICANT CHANGE UP (ref 32–37)
MCHC RBC-ENTMCNC: 32.2 G/DL — SIGNIFICANT CHANGE UP (ref 32–37)
MCHC RBC-ENTMCNC: 32.3 G/DL
MCHC RBC-ENTMCNC: 32.3 G/DL
MCHC RBC-ENTMCNC: 32.3 G/DL — SIGNIFICANT CHANGE UP (ref 32–37)
MCHC RBC-ENTMCNC: 32.4 G/DL
MCHC RBC-ENTMCNC: 32.4 G/DL — SIGNIFICANT CHANGE UP (ref 32–37)
MCHC RBC-ENTMCNC: 32.4 G/DL — SIGNIFICANT CHANGE UP (ref 32–37)
MCHC RBC-ENTMCNC: 32.5 G/DL — SIGNIFICANT CHANGE UP (ref 32–37)
MCHC RBC-ENTMCNC: 32.5 PG
MCHC RBC-ENTMCNC: 32.6 G/DL
MCHC RBC-ENTMCNC: 32.6 G/DL — SIGNIFICANT CHANGE UP (ref 32–37)
MCHC RBC-ENTMCNC: 32.7 G/DL — SIGNIFICANT CHANGE UP (ref 32–37)
MCHC RBC-ENTMCNC: 32.7 G/DL — SIGNIFICANT CHANGE UP (ref 32–37)
MCHC RBC-ENTMCNC: 32.8 G/DL — SIGNIFICANT CHANGE UP (ref 32–37)
MCHC RBC-ENTMCNC: 32.9 G/DL — SIGNIFICANT CHANGE UP (ref 32–37)
MCHC RBC-ENTMCNC: 33 G/DL — SIGNIFICANT CHANGE UP (ref 32–37)
MCHC RBC-ENTMCNC: 33 G/DL — SIGNIFICANT CHANGE UP (ref 32–37)
MCHC RBC-ENTMCNC: 33.1 G/DL
MCHC RBC-ENTMCNC: 33.1 G/DL — SIGNIFICANT CHANGE UP (ref 32–37)
MCHC RBC-ENTMCNC: 33.3 G/DL — SIGNIFICANT CHANGE UP (ref 32–37)
MCHC RBC-ENTMCNC: 33.3 G/DL — SIGNIFICANT CHANGE UP (ref 32–37)
MCHC RBC-ENTMCNC: 33.5 G/DL — SIGNIFICANT CHANGE UP (ref 32–37)
MCHC RBC-ENTMCNC: 33.6 PG
MCHC RBC-ENTMCNC: 34.1 G/DL — SIGNIFICANT CHANGE UP (ref 32–37)
MCHC RBC-ENTMCNC: 34.9 G/DL
MCV RBC AUTO: 80.3 FL
MCV RBC AUTO: 80.3 FL — LOW (ref 81–99)
MCV RBC AUTO: 81.3 FL — SIGNIFICANT CHANGE UP (ref 81–99)
MCV RBC AUTO: 81.4 FL
MCV RBC AUTO: 81.5 FL
MCV RBC AUTO: 81.9 FL — SIGNIFICANT CHANGE UP (ref 81–99)
MCV RBC AUTO: 82.1 FL — SIGNIFICANT CHANGE UP (ref 81–99)
MCV RBC AUTO: 82.2 FL — SIGNIFICANT CHANGE UP (ref 81–99)
MCV RBC AUTO: 82.3 FL — SIGNIFICANT CHANGE UP (ref 81–99)
MCV RBC AUTO: 82.4 FL — SIGNIFICANT CHANGE UP (ref 81–99)
MCV RBC AUTO: 82.8 FL — SIGNIFICANT CHANGE UP (ref 81–99)
MCV RBC AUTO: 83.2 FL — SIGNIFICANT CHANGE UP (ref 81–99)
MCV RBC AUTO: 83.3 FL — SIGNIFICANT CHANGE UP (ref 81–99)
MCV RBC AUTO: 83.3 FL — SIGNIFICANT CHANGE UP (ref 81–99)
MCV RBC AUTO: 83.5 FL — SIGNIFICANT CHANGE UP (ref 81–99)
MCV RBC AUTO: 83.7 FL — SIGNIFICANT CHANGE UP (ref 81–99)
MCV RBC AUTO: 83.7 FL — SIGNIFICANT CHANGE UP (ref 81–99)
MCV RBC AUTO: 83.9 FL — SIGNIFICANT CHANGE UP (ref 81–99)
MCV RBC AUTO: 84 FL — SIGNIFICANT CHANGE UP (ref 81–99)
MCV RBC AUTO: 84.2 FL — SIGNIFICANT CHANGE UP (ref 81–99)
MCV RBC AUTO: 84.2 FL — SIGNIFICANT CHANGE UP (ref 81–99)
MCV RBC AUTO: 84.3 FL — SIGNIFICANT CHANGE UP (ref 81–99)
MCV RBC AUTO: 84.3 FL — SIGNIFICANT CHANGE UP (ref 81–99)
MCV RBC AUTO: 84.6 FL — SIGNIFICANT CHANGE UP (ref 81–99)
MCV RBC AUTO: 84.6 FL — SIGNIFICANT CHANGE UP (ref 81–99)
MCV RBC AUTO: 84.7 FL — SIGNIFICANT CHANGE UP (ref 81–99)
MCV RBC AUTO: 84.7 FL — SIGNIFICANT CHANGE UP (ref 81–99)
MCV RBC AUTO: 84.8 FL — SIGNIFICANT CHANGE UP (ref 81–99)
MCV RBC AUTO: 84.9 FL — SIGNIFICANT CHANGE UP (ref 81–99)
MCV RBC AUTO: 84.9 FL — SIGNIFICANT CHANGE UP (ref 81–99)
MCV RBC AUTO: 86.1 FL — SIGNIFICANT CHANGE UP (ref 81–99)
MCV RBC AUTO: 94.4 FL
MCV RBC AUTO: 96.4 FL
MCV RBC AUTO: 98.1 FL
MONOCYTES # BLD AUTO: 0.09 K/UL — LOW (ref 0.1–0.6)
MONOCYTES # BLD AUTO: 0.11 K/UL — SIGNIFICANT CHANGE UP (ref 0.1–0.6)
MONOCYTES # BLD AUTO: 0.25 K/UL — SIGNIFICANT CHANGE UP (ref 0.1–0.6)
MONOCYTES # BLD AUTO: 0.27 K/UL — SIGNIFICANT CHANGE UP (ref 0.1–0.6)
MONOCYTES # BLD AUTO: 0.31 K/UL — SIGNIFICANT CHANGE UP (ref 0.1–0.6)
MONOCYTES # BLD AUTO: 0.36 K/UL — SIGNIFICANT CHANGE UP (ref 0.1–0.6)
MONOCYTES # BLD AUTO: 0.41 K/UL — SIGNIFICANT CHANGE UP (ref 0.1–0.6)
MONOCYTES # BLD AUTO: 0.42 K/UL — SIGNIFICANT CHANGE UP (ref 0.1–0.6)
MONOCYTES # BLD AUTO: 0.44 K/UL — SIGNIFICANT CHANGE UP (ref 0.1–0.6)
MONOCYTES # BLD AUTO: 0.45 K/UL — SIGNIFICANT CHANGE UP (ref 0.1–0.6)
MONOCYTES # BLD AUTO: 0.47 K/UL — SIGNIFICANT CHANGE UP (ref 0.1–0.6)
MONOCYTES # BLD AUTO: 0.49 K/UL — SIGNIFICANT CHANGE UP (ref 0.1–0.6)
MONOCYTES # BLD AUTO: 0.55 K/UL — SIGNIFICANT CHANGE UP (ref 0.1–0.6)
MONOCYTES # BLD AUTO: 0.78 K/UL — HIGH (ref 0.1–0.6)
MONOCYTES NFR BLD AUTO: 10.7 % — HIGH (ref 1.7–9.3)
MONOCYTES NFR BLD AUTO: 11.7 % — HIGH (ref 1.7–9.3)
MONOCYTES NFR BLD AUTO: 11.8 % — HIGH (ref 1.7–9.3)
MONOCYTES NFR BLD AUTO: 12.2 % — HIGH (ref 1.7–9.3)
MONOCYTES NFR BLD AUTO: 12.6 % — HIGH (ref 1.7–9.3)
MONOCYTES NFR BLD AUTO: 12.6 % — HIGH (ref 1.7–9.3)
MONOCYTES NFR BLD AUTO: 14.8 % — HIGH (ref 1.7–9.3)
MONOCYTES NFR BLD AUTO: 2.7 % — SIGNIFICANT CHANGE UP (ref 1.7–9.3)
MONOCYTES NFR BLD AUTO: 3.7 % — SIGNIFICANT CHANGE UP (ref 1.7–9.3)
MONOCYTES NFR BLD AUTO: 7.3 % — SIGNIFICANT CHANGE UP (ref 1.7–9.3)
MONOCYTES NFR BLD AUTO: 7.9 % — SIGNIFICANT CHANGE UP (ref 1.7–9.3)
MONOCYTES NFR BLD AUTO: 8.8 % — SIGNIFICANT CHANGE UP (ref 1.7–9.3)
MONOCYTES NFR BLD AUTO: 8.8 % — SIGNIFICANT CHANGE UP (ref 1.7–9.3)
MONOCYTES NFR BLD AUTO: 9.5 % — HIGH (ref 1.7–9.3)
NEUTROPHILS # BLD AUTO: 1.79 K/UL — SIGNIFICANT CHANGE UP (ref 1.4–6.5)
NEUTROPHILS # BLD AUTO: 1.93 K/UL — SIGNIFICANT CHANGE UP (ref 1.4–6.5)
NEUTROPHILS # BLD AUTO: 2.2 K/UL — SIGNIFICANT CHANGE UP (ref 1.4–6.5)
NEUTROPHILS # BLD AUTO: 2.22 K/UL — SIGNIFICANT CHANGE UP (ref 1.4–6.5)
NEUTROPHILS # BLD AUTO: 2.36 K/UL — SIGNIFICANT CHANGE UP (ref 1.4–6.5)
NEUTROPHILS # BLD AUTO: 2.44 K/UL — SIGNIFICANT CHANGE UP (ref 1.4–6.5)
NEUTROPHILS # BLD AUTO: 2.48 K/UL — SIGNIFICANT CHANGE UP (ref 1.4–6.5)
NEUTROPHILS # BLD AUTO: 2.5 K/UL — SIGNIFICANT CHANGE UP (ref 1.4–6.5)
NEUTROPHILS # BLD AUTO: 2.58 K/UL — SIGNIFICANT CHANGE UP (ref 1.4–6.5)
NEUTROPHILS # BLD AUTO: 2.87 K/UL — SIGNIFICANT CHANGE UP (ref 1.4–6.5)
NEUTROPHILS # BLD AUTO: 3.17 K/UL — SIGNIFICANT CHANGE UP (ref 1.4–6.5)
NEUTROPHILS # BLD AUTO: 3.24 K/UL — SIGNIFICANT CHANGE UP (ref 1.4–6.5)
NEUTROPHILS # BLD AUTO: 3.37 K/UL — SIGNIFICANT CHANGE UP (ref 1.4–6.5)
NEUTROPHILS # BLD AUTO: 4.4 K/UL — SIGNIFICANT CHANGE UP (ref 1.4–6.5)
NEUTROPHILS NFR BLD AUTO: 58.9 % — SIGNIFICANT CHANGE UP (ref 42.2–75.2)
NEUTROPHILS NFR BLD AUTO: 63.2 % — SIGNIFICANT CHANGE UP (ref 42.2–75.2)
NEUTROPHILS NFR BLD AUTO: 63.5 % — SIGNIFICANT CHANGE UP (ref 42.2–75.2)
NEUTROPHILS NFR BLD AUTO: 64.6 % — SIGNIFICANT CHANGE UP (ref 42.2–75.2)
NEUTROPHILS NFR BLD AUTO: 65.7 % — SIGNIFICANT CHANGE UP (ref 42.2–75.2)
NEUTROPHILS NFR BLD AUTO: 66.5 % — SIGNIFICANT CHANGE UP (ref 42.2–75.2)
NEUTROPHILS NFR BLD AUTO: 69.2 % — SIGNIFICANT CHANGE UP (ref 42.2–75.2)
NEUTROPHILS NFR BLD AUTO: 69.4 % — SIGNIFICANT CHANGE UP (ref 42.2–75.2)
NEUTROPHILS NFR BLD AUTO: 69.9 % — SIGNIFICANT CHANGE UP (ref 42.2–75.2)
NEUTROPHILS NFR BLD AUTO: 70.6 % — SIGNIFICANT CHANGE UP (ref 42.2–75.2)
NEUTROPHILS NFR BLD AUTO: 72.2 % — SIGNIFICANT CHANGE UP (ref 42.2–75.2)
NEUTROPHILS NFR BLD AUTO: 72.3 % — SIGNIFICANT CHANGE UP (ref 42.2–75.2)
NEUTROPHILS NFR BLD AUTO: 75.1 % — SIGNIFICANT CHANGE UP (ref 42.2–75.2)
NEUTROPHILS NFR BLD AUTO: 77.7 % — HIGH (ref 42.2–75.2)
NITRITE UR-MCNC: NEGATIVE — SIGNIFICANT CHANGE UP
NRBC # BLD: 0 /100 WBCS — SIGNIFICANT CHANGE UP (ref 0–0)
PCO2 BLDV: 29 MMHG — LOW (ref 39–42)
PH BLDV: 7.43 — SIGNIFICANT CHANGE UP (ref 7.32–7.43)
PH UR: 7 — SIGNIFICANT CHANGE UP (ref 5–8)
PHOSPHATE SERPL-MCNC: 1.6 MG/DL — LOW (ref 2.1–4.9)
PHOSPHATE SERPL-MCNC: 2.1 MG/DL — SIGNIFICANT CHANGE UP (ref 2.1–4.9)
PHOSPHATE SERPL-MCNC: 2.3 MG/DL — SIGNIFICANT CHANGE UP (ref 2.1–4.9)
PHOSPHATE SERPL-MCNC: 2.4 MG/DL — SIGNIFICANT CHANGE UP (ref 2.1–4.9)
PHOSPHATE SERPL-MCNC: 2.8 MG/DL — SIGNIFICANT CHANGE UP (ref 2.1–4.9)
PHOSPHATE SERPL-MCNC: 3 MG/DL — SIGNIFICANT CHANGE UP (ref 2.1–4.9)
PHOSPHATE SERPL-MCNC: 3.2 MG/DL — SIGNIFICANT CHANGE UP (ref 2.1–4.9)
PHOSPHATE SERPL-MCNC: 3.2 MG/DL — SIGNIFICANT CHANGE UP (ref 2.1–4.9)
PHOSPHATE SERPL-MCNC: 3.8 MG/DL — SIGNIFICANT CHANGE UP (ref 2.1–4.9)
PHOSPHATE SERPL-MCNC: 4.1 MG/DL — SIGNIFICANT CHANGE UP (ref 2.1–4.9)
PHOSPHATE SERPL-MCNC: 4.3 MG/DL — SIGNIFICANT CHANGE UP (ref 2.1–4.9)
PHOSPHATE SERPL-MCNC: 4.5 MG/DL — SIGNIFICANT CHANGE UP (ref 2.1–4.9)
PHOSPHATE SERPL-MCNC: 4.6 MG/DL — SIGNIFICANT CHANGE UP (ref 2.1–4.9)
PLAT MORPH BLD: NORMAL — SIGNIFICANT CHANGE UP
PLATELET # BLD AUTO: 113 K/UL — LOW (ref 130–400)
PLATELET # BLD AUTO: 117 K/UL — LOW (ref 130–400)
PLATELET # BLD AUTO: 132 K/UL
PLATELET # BLD AUTO: 138 K/UL
PLATELET # BLD AUTO: 138 K/UL — SIGNIFICANT CHANGE UP (ref 130–400)
PLATELET # BLD AUTO: 139 K/UL — SIGNIFICANT CHANGE UP (ref 130–400)
PLATELET # BLD AUTO: 151 K/UL — SIGNIFICANT CHANGE UP (ref 130–400)
PLATELET # BLD AUTO: 155 K/UL — SIGNIFICANT CHANGE UP (ref 130–400)
PLATELET # BLD AUTO: 157 K/UL — SIGNIFICANT CHANGE UP (ref 130–400)
PLATELET # BLD AUTO: 160 K/UL — SIGNIFICANT CHANGE UP (ref 130–400)
PLATELET # BLD AUTO: 161 K/UL
PLATELET # BLD AUTO: 168 K/UL — SIGNIFICANT CHANGE UP (ref 130–400)
PLATELET # BLD AUTO: 173 K/UL — SIGNIFICANT CHANGE UP (ref 130–400)
PLATELET # BLD AUTO: 174 K/UL — SIGNIFICANT CHANGE UP (ref 130–400)
PLATELET # BLD AUTO: 179 K/UL
PLATELET # BLD AUTO: 180 K/UL — SIGNIFICANT CHANGE UP (ref 130–400)
PLATELET # BLD AUTO: 180 K/UL — SIGNIFICANT CHANGE UP (ref 130–400)
PLATELET # BLD AUTO: 187 K/UL — SIGNIFICANT CHANGE UP (ref 130–400)
PLATELET # BLD AUTO: 188 K/UL — SIGNIFICANT CHANGE UP (ref 130–400)
PLATELET # BLD AUTO: 195 K/UL — SIGNIFICANT CHANGE UP (ref 130–400)
PLATELET # BLD AUTO: 203 K/UL — SIGNIFICANT CHANGE UP (ref 130–400)
PLATELET # BLD AUTO: 209 K/UL — SIGNIFICANT CHANGE UP (ref 130–400)
PLATELET # BLD AUTO: 239 K/UL — SIGNIFICANT CHANGE UP (ref 130–400)
PLATELET # BLD AUTO: 244 K/UL
PLATELET # BLD AUTO: 252 K/UL — SIGNIFICANT CHANGE UP (ref 130–400)
PLATELET # BLD AUTO: 253 K/UL
PLATELET # BLD AUTO: 257 K/UL — SIGNIFICANT CHANGE UP (ref 130–400)
PLATELET # BLD AUTO: 260 K/UL — SIGNIFICANT CHANGE UP (ref 130–400)
PLATELET # BLD AUTO: 261 K/UL — SIGNIFICANT CHANGE UP (ref 130–400)
PLATELET # BLD AUTO: 268 K/UL — SIGNIFICANT CHANGE UP (ref 130–400)
PLATELET # BLD AUTO: 277 K/UL — SIGNIFICANT CHANGE UP (ref 130–400)
PLATELET # BLD AUTO: 278 K/UL — SIGNIFICANT CHANGE UP (ref 130–400)
PLATELET # BLD AUTO: 281 K/UL — SIGNIFICANT CHANGE UP (ref 130–400)
PLATELET # BLD AUTO: 286 K/UL — SIGNIFICANT CHANGE UP (ref 130–400)
PMV BLD: 10 FL — SIGNIFICANT CHANGE UP (ref 7.4–10.4)
PMV BLD: 10.1 FL
PMV BLD: 10.1 FL — SIGNIFICANT CHANGE UP (ref 7.4–10.4)
PMV BLD: 10.2 FL
PMV BLD: 10.2 FL — SIGNIFICANT CHANGE UP (ref 7.4–10.4)
PMV BLD: 10.3 FL — SIGNIFICANT CHANGE UP (ref 7.4–10.4)
PMV BLD: 10.5 FL
PMV BLD: 8.9 FL
PMV BLD: 9.1 FL — SIGNIFICANT CHANGE UP (ref 7.4–10.4)
PMV BLD: 9.2 FL — SIGNIFICANT CHANGE UP (ref 7.4–10.4)
PMV BLD: 9.2 FL — SIGNIFICANT CHANGE UP (ref 7.4–10.4)
PMV BLD: 9.3 FL
PMV BLD: 9.3 FL — SIGNIFICANT CHANGE UP (ref 7.4–10.4)
PMV BLD: 9.3 FL — SIGNIFICANT CHANGE UP (ref 7.4–10.4)
PMV BLD: 9.5 FL — SIGNIFICANT CHANGE UP (ref 7.4–10.4)
PMV BLD: 9.6 FL — SIGNIFICANT CHANGE UP (ref 7.4–10.4)
PMV BLD: 9.6 FL — SIGNIFICANT CHANGE UP (ref 7.4–10.4)
PMV BLD: 9.7 FL — SIGNIFICANT CHANGE UP (ref 7.4–10.4)
PMV BLD: 9.8 FL
PMV BLD: 9.8 FL — SIGNIFICANT CHANGE UP (ref 7.4–10.4)
PMV BLD: 9.9 FL — SIGNIFICANT CHANGE UP (ref 7.4–10.4)
PO2 BLDV: 41 MMHG — SIGNIFICANT CHANGE UP
POLYCHROMASIA BLD QL SMEAR: SIGNIFICANT CHANGE UP
POTASSIUM BLDV-SCNC: 4 MMOL/L — SIGNIFICANT CHANGE UP (ref 3.5–5.1)
POTASSIUM SERPL-MCNC: 2.8 MMOL/L — LOW (ref 3.5–5)
POTASSIUM SERPL-MCNC: 2.8 MMOL/L — LOW (ref 3.5–5)
POTASSIUM SERPL-MCNC: 3 MMOL/L — LOW (ref 3.5–5)
POTASSIUM SERPL-MCNC: 3.2 MMOL/L — LOW (ref 3.5–5)
POTASSIUM SERPL-MCNC: 3.3 MMOL/L — LOW (ref 3.5–5)
POTASSIUM SERPL-MCNC: 3.4 MMOL/L — LOW (ref 3.5–5)
POTASSIUM SERPL-MCNC: 3.5 MMOL/L — SIGNIFICANT CHANGE UP (ref 3.5–5)
POTASSIUM SERPL-MCNC: 3.6 MMOL/L — SIGNIFICANT CHANGE UP (ref 3.5–5)
POTASSIUM SERPL-MCNC: 3.8 MMOL/L — SIGNIFICANT CHANGE UP (ref 3.5–5)
POTASSIUM SERPL-MCNC: 3.9 MMOL/L — SIGNIFICANT CHANGE UP (ref 3.5–5)
POTASSIUM SERPL-MCNC: 4 MMOL/L — SIGNIFICANT CHANGE UP (ref 3.5–5)
POTASSIUM SERPL-MCNC: 4.1 MMOL/L — SIGNIFICANT CHANGE UP (ref 3.5–5)
POTASSIUM SERPL-MCNC: 4.1 MMOL/L — SIGNIFICANT CHANGE UP (ref 3.5–5)
POTASSIUM SERPL-MCNC: 4.2 MMOL/L — SIGNIFICANT CHANGE UP (ref 3.5–5)
POTASSIUM SERPL-SCNC: 2.7 MMOL/L
POTASSIUM SERPL-SCNC: 2.8 MMOL/L — LOW (ref 3.5–5)
POTASSIUM SERPL-SCNC: 2.8 MMOL/L — LOW (ref 3.5–5)
POTASSIUM SERPL-SCNC: 3 MMOL/L — LOW (ref 3.5–5)
POTASSIUM SERPL-SCNC: 3.1 MMOL/L
POTASSIUM SERPL-SCNC: 3.2 MMOL/L — LOW (ref 3.5–5)
POTASSIUM SERPL-SCNC: 3.3 MMOL/L — LOW (ref 3.5–5)
POTASSIUM SERPL-SCNC: 3.4 MMOL/L — LOW (ref 3.5–5)
POTASSIUM SERPL-SCNC: 3.5 MMOL/L
POTASSIUM SERPL-SCNC: 3.5 MMOL/L — SIGNIFICANT CHANGE UP (ref 3.5–5)
POTASSIUM SERPL-SCNC: 3.6 MMOL/L — SIGNIFICANT CHANGE UP (ref 3.5–5)
POTASSIUM SERPL-SCNC: 3.8 MMOL/L — SIGNIFICANT CHANGE UP (ref 3.5–5)
POTASSIUM SERPL-SCNC: 3.9 MMOL/L — SIGNIFICANT CHANGE UP (ref 3.5–5)
POTASSIUM SERPL-SCNC: 4 MMOL/L — SIGNIFICANT CHANGE UP (ref 3.5–5)
POTASSIUM SERPL-SCNC: 4.1 MMOL/L — SIGNIFICANT CHANGE UP (ref 3.5–5)
POTASSIUM SERPL-SCNC: 4.1 MMOL/L — SIGNIFICANT CHANGE UP (ref 3.5–5)
POTASSIUM SERPL-SCNC: 4.2 MMOL/L — SIGNIFICANT CHANGE UP (ref 3.5–5)
POTASSIUM SERPL-SCNC: 4.3 MMOL/L
POTASSIUM SERPL-SCNC: 4.7 MMOL/L
POTASSIUM SERPL-SCNC: 5.2 MMOL/L
POTASSIUM SERPL-SCNC: NORMAL MMOL/L
PROT SERPL-MCNC: 4 G/DL — LOW (ref 6–8)
PROT SERPL-MCNC: 4.1 G/DL — LOW (ref 6–8)
PROT SERPL-MCNC: 4.1 G/DL — LOW (ref 6–8)
PROT SERPL-MCNC: 4.2 G/DL — LOW (ref 6–8)
PROT SERPL-MCNC: 4.3 G/DL — LOW (ref 6–8)
PROT SERPL-MCNC: 4.4 G/DL — LOW (ref 6–8)
PROT SERPL-MCNC: 4.6 G/DL — LOW (ref 6–8)
PROT SERPL-MCNC: 4.7 G/DL — LOW (ref 6–8)
PROT SERPL-MCNC: 4.7 G/DL — LOW (ref 6–8)
PROT SERPL-MCNC: 4.9 G/DL — LOW (ref 6–8)
PROT SERPL-MCNC: 5.1 G/DL — LOW (ref 6–8)
PROT SERPL-MCNC: 5.2 G/DL — LOW (ref 6–8)
PROT SERPL-MCNC: 5.3 G/DL — LOW (ref 6–8)
PROT SERPL-MCNC: 5.5 G/DL — LOW (ref 6–8)
PROT SERPL-MCNC: 5.5 G/DL — LOW (ref 6–8)
PROT SERPL-MCNC: 5.7 G/DL — LOW (ref 6–8)
PROT SERPL-MCNC: 5.9 G/DL
PROT SERPL-MCNC: 6.5 G/DL — SIGNIFICANT CHANGE UP (ref 6–8)
PROT SERPL-MCNC: 6.6 G/DL
PROT SERPL-MCNC: 6.7 G/DL
PROT SERPL-MCNC: 6.9 G/DL
PROT SERPL-MCNC: 7 G/DL
PROT SERPL-MCNC: 7.1 G/DL
PROT SERPL-MCNC: 7.6 G/DL
PROT UR-MCNC: ABNORMAL
PROTHROM AB SERPL-ACNC: 17 SEC — HIGH (ref 9.95–12.87)
RAPID RVP RESULT: DETECTED
RAPID RVP RESULT: SIGNIFICANT CHANGE UP
RBC # BLD: 3.41 M/UL
RBC # BLD: 3.59 M/UL — LOW (ref 4.2–5.4)
RBC # BLD: 3.6 M/UL
RBC # BLD: 3.62 M/UL — LOW (ref 4.2–5.4)
RBC # BLD: 3.63 M/UL — LOW (ref 4.2–5.4)
RBC # BLD: 3.64 M/UL — LOW (ref 4.2–5.4)
RBC # BLD: 3.67 M/UL — LOW (ref 4.2–5.4)
RBC # BLD: 3.69 M/UL — LOW (ref 4.2–5.4)
RBC # BLD: 3.72 M/UL — LOW (ref 4.2–5.4)
RBC # BLD: 3.76 M/UL — LOW (ref 4.2–5.4)
RBC # BLD: 3.76 M/UL — LOW (ref 4.2–5.4)
RBC # BLD: 3.78 M/UL — LOW (ref 4.2–5.4)
RBC # BLD: 3.82 M/UL — LOW (ref 4.2–5.4)
RBC # BLD: 3.83 M/UL — LOW (ref 4.2–5.4)
RBC # BLD: 3.85 M/UL — LOW (ref 4.2–5.4)
RBC # BLD: 3.92 M/UL — LOW (ref 4.2–5.4)
RBC # BLD: 3.92 M/UL — LOW (ref 4.2–5.4)
RBC # BLD: 3.94 M/UL — LOW (ref 4.2–5.4)
RBC # BLD: 3.97 M/UL — LOW (ref 4.2–5.4)
RBC # BLD: 3.98 M/UL — LOW (ref 4.2–5.4)
RBC # BLD: 3.98 M/UL — LOW (ref 4.2–5.4)
RBC # BLD: 3.99 M/UL — LOW (ref 4.2–5.4)
RBC # BLD: 4.02 M/UL — LOW (ref 4.2–5.4)
RBC # BLD: 4.03 M/UL — LOW (ref 4.2–5.4)
RBC # BLD: 4.05 M/UL — LOW (ref 4.2–5.4)
RBC # BLD: 4.15 M/UL — LOW (ref 4.2–5.4)
RBC # BLD: 4.21 M/UL — SIGNIFICANT CHANGE UP (ref 4.2–5.4)
RBC # BLD: 4.26 M/UL — SIGNIFICANT CHANGE UP (ref 4.2–5.4)
RBC # BLD: 4.28 M/UL
RBC # BLD: 4.43 M/UL — SIGNIFICANT CHANGE UP (ref 4.2–5.4)
RBC # BLD: 5 M/UL
RBC # BLD: 5.02 M/UL
RBC # BLD: 5.02 M/UL — SIGNIFICANT CHANGE UP (ref 4.2–5.4)
RBC # BLD: 5.12 M/UL
RBC # FLD: 14.1 %
RBC # FLD: 14.8 %
RBC # FLD: 16 %
RBC # FLD: 16.4 %
RBC # FLD: 17.8 %
RBC # FLD: 18 % — HIGH (ref 11.5–14.5)
RBC # FLD: 18.1 % — HIGH (ref 11.5–14.5)
RBC # FLD: 18.2 % — HIGH (ref 11.5–14.5)
RBC # FLD: 18.3 % — HIGH (ref 11.5–14.5)
RBC # FLD: 18.6 % — HIGH (ref 11.5–14.5)
RBC # FLD: 19 % — HIGH (ref 11.5–14.5)
RBC # FLD: 19.4 % — HIGH (ref 11.5–14.5)
RBC # FLD: 19.8 % — HIGH (ref 11.5–14.5)
RBC # FLD: 20.7 % — HIGH (ref 11.5–14.5)
RBC # FLD: 21.2 % — HIGH (ref 11.5–14.5)
RBC # FLD: 21.3 % — HIGH (ref 11.5–14.5)
RBC # FLD: 21.7 % — HIGH (ref 11.5–14.5)
RBC # FLD: 21.9 % — HIGH (ref 11.5–14.5)
RBC # FLD: 22 % — HIGH (ref 11.5–14.5)
RBC # FLD: 22.6 % — HIGH (ref 11.5–14.5)
RBC # FLD: 23 % — HIGH (ref 11.5–14.5)
RBC # FLD: 23.1 % — HIGH (ref 11.5–14.5)
RBC # FLD: 23.2 % — HIGH (ref 11.5–14.5)
RBC # FLD: 23.2 % — HIGH (ref 11.5–14.5)
RBC # FLD: 23.3 % — HIGH (ref 11.5–14.5)
RBC # FLD: 23.4 % — HIGH (ref 11.5–14.5)
RBC # FLD: 23.6 % — HIGH (ref 11.5–14.5)
RBC # FLD: 23.7 % — HIGH (ref 11.5–14.5)
RBC # FLD: 23.9 % — HIGH (ref 11.5–14.5)
RBC # FLD: 23.9 % — HIGH (ref 11.5–14.5)
RBC # FLD: 24.4 % — HIGH (ref 11.5–14.5)
RBC # FLD: 27.9 %
RBC BLD AUTO: ABNORMAL
RBC CASTS # UR COMP ASSIST: 3 /HPF — SIGNIFICANT CHANGE UP (ref 0–4)
SAO2 % BLDV: 68.8 % — SIGNIFICANT CHANGE UP
SARS-COV-2 RNA SPEC QL NAA+PROBE: SIGNIFICANT CHANGE UP
SMUDGE CELLS # BLD: PRESENT — SIGNIFICANT CHANGE UP
SODIUM SERPL-SCNC: 128 MMOL/L — LOW (ref 135–146)
SODIUM SERPL-SCNC: 134 MMOL/L
SODIUM SERPL-SCNC: 135 MMOL/L
SODIUM SERPL-SCNC: 135 MMOL/L — SIGNIFICANT CHANGE UP (ref 135–146)
SODIUM SERPL-SCNC: 135 MMOL/L — SIGNIFICANT CHANGE UP (ref 135–146)
SODIUM SERPL-SCNC: 136 MMOL/L
SODIUM SERPL-SCNC: 137 MMOL/L — SIGNIFICANT CHANGE UP (ref 135–146)
SODIUM SERPL-SCNC: 138 MMOL/L
SODIUM SERPL-SCNC: 138 MMOL/L — SIGNIFICANT CHANGE UP (ref 135–146)
SODIUM SERPL-SCNC: 139 MMOL/L
SODIUM SERPL-SCNC: 139 MMOL/L — SIGNIFICANT CHANGE UP (ref 135–146)
SODIUM SERPL-SCNC: 140 MMOL/L
SODIUM SERPL-SCNC: 140 MMOL/L — SIGNIFICANT CHANGE UP (ref 135–146)
SODIUM SERPL-SCNC: 141 MMOL/L
SODIUM SERPL-SCNC: 141 MMOL/L — SIGNIFICANT CHANGE UP (ref 135–146)
SODIUM SERPL-SCNC: 142 MMOL/L — SIGNIFICANT CHANGE UP (ref 135–146)
SODIUM SERPL-SCNC: 143 MMOL/L — SIGNIFICANT CHANGE UP (ref 135–146)
SP GR SPEC: 1.01 — SIGNIFICANT CHANGE UP (ref 1.01–1.03)
SPECIMEN SOURCE: SIGNIFICANT CHANGE UP
TIBC SERPL-MCNC: 119 UG/DL — LOW (ref 220–430)
TROPONIN T SERPL-MCNC: <0.01 NG/ML — SIGNIFICANT CHANGE UP
TSH SERPL-MCNC: 1.46 UIU/ML — SIGNIFICANT CHANGE UP (ref 0.27–4.2)
UIBC SERPL-MCNC: 81 UG/DL — LOW (ref 110–370)
UROBILINOGEN FLD QL: SIGNIFICANT CHANGE UP
WBC # BLD: 2.05 K/UL — LOW (ref 4.8–10.8)
WBC # BLD: 2.08 K/UL — LOW (ref 4.8–10.8)
WBC # BLD: 2.24 K/UL — LOW (ref 4.8–10.8)
WBC # BLD: 2.32 K/UL — LOW (ref 4.8–10.8)
WBC # BLD: 2.35 K/UL — LOW (ref 4.8–10.8)
WBC # BLD: 2.68 K/UL — LOW (ref 4.8–10.8)
WBC # BLD: 2.76 K/UL — LOW (ref 4.8–10.8)
WBC # BLD: 2.78 K/UL — LOW (ref 4.8–10.8)
WBC # BLD: 2.81 K/UL — LOW (ref 4.8–10.8)
WBC # BLD: 2.83 K/UL — LOW (ref 4.8–10.8)
WBC # BLD: 2.86 K/UL — LOW (ref 4.8–10.8)
WBC # BLD: 2.99 K/UL — LOW (ref 4.8–10.8)
WBC # BLD: 3.04 K/UL — LOW (ref 4.8–10.8)
WBC # BLD: 3.05 K/UL — LOW (ref 4.8–10.8)
WBC # BLD: 3.17 K/UL — LOW (ref 4.8–10.8)
WBC # BLD: 3.28 K/UL — LOW (ref 4.8–10.8)
WBC # BLD: 3.37 K/UL — LOW (ref 4.8–10.8)
WBC # BLD: 3.73 K/UL — LOW (ref 4.8–10.8)
WBC # BLD: 3.76 K/UL — LOW (ref 4.8–10.8)
WBC # BLD: 3.9 K/UL — LOW (ref 4.8–10.8)
WBC # BLD: 3.93 K/UL — LOW (ref 4.8–10.8)
WBC # BLD: 4.07 K/UL — LOW (ref 4.8–10.8)
WBC # BLD: 4.13 K/UL — LOW (ref 4.8–10.8)
WBC # BLD: 4.13 K/UL — LOW (ref 4.8–10.8)
WBC # BLD: 4.22 K/UL — LOW (ref 4.8–10.8)
WBC # BLD: 4.67 K/UL — LOW (ref 4.8–10.8)
WBC # BLD: 4.67 K/UL — LOW (ref 4.8–10.8)
WBC # BLD: 6.37 K/UL — SIGNIFICANT CHANGE UP (ref 4.8–10.8)
WBC # FLD AUTO: 2.05 K/UL — LOW (ref 4.8–10.8)
WBC # FLD AUTO: 2.08 K/UL — LOW (ref 4.8–10.8)
WBC # FLD AUTO: 2.24 K/UL — LOW (ref 4.8–10.8)
WBC # FLD AUTO: 2.32 K/UL — LOW (ref 4.8–10.8)
WBC # FLD AUTO: 2.35 K/UL — LOW (ref 4.8–10.8)
WBC # FLD AUTO: 2.68 K/UL — LOW (ref 4.8–10.8)
WBC # FLD AUTO: 2.76 K/UL — LOW (ref 4.8–10.8)
WBC # FLD AUTO: 2.78 K/UL — LOW (ref 4.8–10.8)
WBC # FLD AUTO: 2.81 K/UL — LOW (ref 4.8–10.8)
WBC # FLD AUTO: 2.83 K/UL — LOW (ref 4.8–10.8)
WBC # FLD AUTO: 2.86 K/UL — LOW (ref 4.8–10.8)
WBC # FLD AUTO: 2.94 K/UL
WBC # FLD AUTO: 2.97 K/UL
WBC # FLD AUTO: 2.99 K/UL — LOW (ref 4.8–10.8)
WBC # FLD AUTO: 3.04 K/UL — LOW (ref 4.8–10.8)
WBC # FLD AUTO: 3.05 K/UL — LOW (ref 4.8–10.8)
WBC # FLD AUTO: 3.17 K/UL — LOW (ref 4.8–10.8)
WBC # FLD AUTO: 3.28 K/UL — LOW (ref 4.8–10.8)
WBC # FLD AUTO: 3.37 K/UL — LOW (ref 4.8–10.8)
WBC # FLD AUTO: 3.72 K/UL
WBC # FLD AUTO: 3.73 K/UL — LOW (ref 4.8–10.8)
WBC # FLD AUTO: 3.76 K/UL — LOW (ref 4.8–10.8)
WBC # FLD AUTO: 3.9 K/UL — LOW (ref 4.8–10.8)
WBC # FLD AUTO: 3.93 K/UL — LOW (ref 4.8–10.8)
WBC # FLD AUTO: 4 K/UL
WBC # FLD AUTO: 4.07 K/UL — LOW (ref 4.8–10.8)
WBC # FLD AUTO: 4.13 K/UL — LOW (ref 4.8–10.8)
WBC # FLD AUTO: 4.13 K/UL — LOW (ref 4.8–10.8)
WBC # FLD AUTO: 4.22 K/UL — LOW (ref 4.8–10.8)
WBC # FLD AUTO: 4.41 K/UL
WBC # FLD AUTO: 4.67 K/UL — LOW (ref 4.8–10.8)
WBC # FLD AUTO: 4.67 K/UL — LOW (ref 4.8–10.8)
WBC # FLD AUTO: 5.06 K/UL
WBC # FLD AUTO: 6.37 K/UL — SIGNIFICANT CHANGE UP (ref 4.8–10.8)
WBC UR QL: 249 /HPF — HIGH (ref 0–5)

## 2023-01-01 PROCEDURE — 99222 1ST HOSP IP/OBS MODERATE 55: CPT

## 2023-01-01 PROCEDURE — 93010 ELECTROCARDIOGRAM REPORT: CPT

## 2023-01-01 PROCEDURE — 99214 OFFICE O/P EST MOD 30 MIN: CPT

## 2023-01-01 PROCEDURE — 85027 COMPLETE CBC AUTOMATED: CPT

## 2023-01-01 PROCEDURE — 87635 SARS-COV-2 COVID-19 AMP PRB: CPT

## 2023-01-01 PROCEDURE — 77427 RADIATION TX MANAGEMENT X5: CPT

## 2023-01-01 PROCEDURE — 99232 SBSQ HOSP IP/OBS MODERATE 35: CPT

## 2023-01-01 PROCEDURE — 83735 ASSAY OF MAGNESIUM: CPT

## 2023-01-01 PROCEDURE — 93975 VASCULAR STUDY: CPT

## 2023-01-01 PROCEDURE — 80076 HEPATIC FUNCTION PANEL: CPT

## 2023-01-01 PROCEDURE — 99239 HOSP IP/OBS DSCHRG MGMT >30: CPT

## 2023-01-01 PROCEDURE — 93005 ELECTROCARDIOGRAM TRACING: CPT

## 2023-01-01 PROCEDURE — 86300 IMMUNOASSAY TUMOR CA 15-3: CPT

## 2023-01-01 PROCEDURE — 80074 ACUTE HEPATITIS PANEL: CPT

## 2023-01-01 PROCEDURE — 36415 COLL VENOUS BLD VENIPUNCTURE: CPT

## 2023-01-01 PROCEDURE — 99446 NTRPROF PH1/NTRNET/EHR 5-10: CPT

## 2023-01-01 PROCEDURE — 80053 COMPREHEN METABOLIC PANEL: CPT

## 2023-01-01 PROCEDURE — 71275 CT ANGIOGRAPHY CHEST: CPT | Mod: 26

## 2023-01-01 PROCEDURE — 99233 SBSQ HOSP IP/OBS HIGH 50: CPT

## 2023-01-01 PROCEDURE — 77263 THER RADIOLOGY TX PLNG CPLX: CPT

## 2023-01-01 PROCEDURE — 73560 X-RAY EXAM OF KNEE 1 OR 2: CPT | Mod: 26,LT

## 2023-01-01 PROCEDURE — 74176 CT ABD & PELVIS W/O CONTRAST: CPT | Mod: 26

## 2023-01-01 PROCEDURE — 87086 URINE CULTURE/COLONY COUNT: CPT

## 2023-01-01 PROCEDURE — 76705 ECHO EXAM OF ABDOMEN: CPT

## 2023-01-01 PROCEDURE — 82728 ASSAY OF FERRITIN: CPT

## 2023-01-01 PROCEDURE — 77334 RADIATION TREATMENT AID(S): CPT | Mod: 26

## 2023-01-01 PROCEDURE — 85025 COMPLETE CBC W/AUTO DIFF WBC: CPT

## 2023-01-01 PROCEDURE — 77280 THER RAD SIMULAJ FIELD SMPL: CPT

## 2023-01-01 PROCEDURE — 83625 ASSAY OF LDH ENZYMES: CPT

## 2023-01-01 PROCEDURE — 0225U NFCT DS DNA&RNA 21 SARSCOV2: CPT

## 2023-01-01 PROCEDURE — 80048 BASIC METABOLIC PNL TOTAL CA: CPT

## 2023-01-01 PROCEDURE — 85610 PROTHROMBIN TIME: CPT

## 2023-01-01 PROCEDURE — 74176 CT ABD & PELVIS W/O CONTRAST: CPT

## 2023-01-01 PROCEDURE — 99497 ADVNCD CARE PLAN 30 MIN: CPT | Mod: 25

## 2023-01-01 PROCEDURE — 97535 SELF CARE MNGMENT TRAINING: CPT | Mod: GO

## 2023-01-01 PROCEDURE — 82962 GLUCOSE BLOOD TEST: CPT

## 2023-01-01 PROCEDURE — 73560 X-RAY EXAM OF KNEE 1 OR 2: CPT | Mod: LT

## 2023-01-01 PROCEDURE — 83605 ASSAY OF LACTIC ACID: CPT

## 2023-01-01 PROCEDURE — 80307 DRUG TEST PRSMV CHEM ANLYZR: CPT

## 2023-01-01 PROCEDURE — 85379 FIBRIN DEGRADATION QUANT: CPT

## 2023-01-01 PROCEDURE — 83550 IRON BINDING TEST: CPT

## 2023-01-01 PROCEDURE — 85018 HEMOGLOBIN: CPT

## 2023-01-01 PROCEDURE — 77290 THER RAD SIMULAJ FIELD CPLX: CPT | Mod: 26

## 2023-01-01 PROCEDURE — 97116 GAIT TRAINING THERAPY: CPT | Mod: GP

## 2023-01-01 PROCEDURE — 99223 1ST HOSP IP/OBS HIGH 75: CPT

## 2023-01-01 PROCEDURE — 99214 OFFICE O/P EST MOD 30 MIN: CPT | Mod: 25

## 2023-01-01 PROCEDURE — 97530 THERAPEUTIC ACTIVITIES: CPT | Mod: GP

## 2023-01-01 PROCEDURE — 96365 THER/PROPH/DIAG IV INF INIT: CPT

## 2023-01-01 PROCEDURE — 94640 AIRWAY INHALATION TREATMENT: CPT

## 2023-01-01 PROCEDURE — 84484 ASSAY OF TROPONIN QUANT: CPT

## 2023-01-01 PROCEDURE — 70552 MRI BRAIN STEM W/DYE: CPT

## 2023-01-01 PROCEDURE — 82010 KETONE BODYS QUAN: CPT

## 2023-01-01 PROCEDURE — 99231 SBSQ HOSP IP/OBS SF/LOW 25: CPT

## 2023-01-01 PROCEDURE — 77295 3-D RADIOTHERAPY PLAN: CPT | Mod: 26

## 2023-01-01 PROCEDURE — 77280 THER RAD SIMULAJ FIELD SMPL: CPT | Mod: 26

## 2023-01-01 PROCEDURE — 97110 THERAPEUTIC EXERCISES: CPT | Mod: GP

## 2023-01-01 PROCEDURE — 93975 VASCULAR STUDY: CPT | Mod: 26

## 2023-01-01 PROCEDURE — 71045 X-RAY EXAM CHEST 1 VIEW: CPT

## 2023-01-01 PROCEDURE — 70552 MRI BRAIN STEM W/DYE: CPT | Mod: 26

## 2023-01-01 PROCEDURE — 82306 VITAMIN D 25 HYDROXY: CPT

## 2023-01-01 PROCEDURE — 97166 OT EVAL MOD COMPLEX 45 MIN: CPT | Mod: GO

## 2023-01-01 PROCEDURE — 83036 HEMOGLOBIN GLYCOSYLATED A1C: CPT

## 2023-01-01 PROCEDURE — 83615 LACTATE (LD) (LDH) ENZYME: CPT

## 2023-01-01 PROCEDURE — 77334 RADIATION TREATMENT AID(S): CPT

## 2023-01-01 PROCEDURE — 85730 THROMBOPLASTIN TIME PARTIAL: CPT

## 2023-01-01 PROCEDURE — 71045 X-RAY EXAM CHEST 1 VIEW: CPT | Mod: 26

## 2023-01-01 PROCEDURE — 85014 HEMATOCRIT: CPT

## 2023-01-01 PROCEDURE — 84100 ASSAY OF PHOSPHORUS: CPT

## 2023-01-01 PROCEDURE — A9579: CPT

## 2023-01-01 PROCEDURE — 99285 EMERGENCY DEPT VISIT HI MDM: CPT

## 2023-01-01 PROCEDURE — 84443 ASSAY THYROID STIM HORMONE: CPT

## 2023-01-01 PROCEDURE — 93306 TTE W/DOPPLER COMPLETE: CPT | Mod: 26

## 2023-01-01 PROCEDURE — 82550 ASSAY OF CK (CPK): CPT

## 2023-01-01 PROCEDURE — 93306 TTE W/DOPPLER COMPLETE: CPT

## 2023-01-01 PROCEDURE — 83540 ASSAY OF IRON: CPT

## 2023-01-01 PROCEDURE — 76705 ECHO EXAM OF ABDOMEN: CPT | Mod: 26

## 2023-01-01 PROCEDURE — 77412 RADIATION TX DELIVERY LVL 3: CPT

## 2023-01-01 PROCEDURE — 77300 RADIATION THERAPY DOSE PLAN: CPT | Mod: 26

## 2023-01-01 PROCEDURE — 97163 PT EVAL HIGH COMPLEX 45 MIN: CPT | Mod: GP

## 2023-01-01 PROCEDURE — 71275 CT ANGIOGRAPHY CHEST: CPT

## 2023-01-01 RX ORDER — OXYCODONE 5 MG/1
5 TABLET ORAL
Qty: 6 | Refills: 0 | Status: DISCONTINUED | COMMUNITY
Start: 2023-01-01 | End: 2023-01-01

## 2023-01-01 RX ORDER — OXYCODONE 5 MG/1
5 TABLET ORAL
Qty: 100 | Refills: 0 | Status: ACTIVE | COMMUNITY
Start: 2023-01-01 | End: 1900-01-01

## 2023-01-01 RX ORDER — SODIUM CHLORIDE 9 MG/ML
1000 INJECTION, SOLUTION INTRAVENOUS ONCE
Refills: 0 | Status: COMPLETED | OUTPATIENT
Start: 2023-01-01 | End: 2023-01-01

## 2023-01-01 RX ORDER — ZOLEDRONIC ACID 5 MG/100ML
4 INJECTION, SOLUTION INTRAVENOUS ONCE
Refills: 0 | Status: COMPLETED | OUTPATIENT
Start: 2023-01-01 | End: 2023-01-01

## 2023-01-01 RX ORDER — OXYCODONE AND ACETAMINOPHEN 5; 325 MG/1; MG/1
5-325 TABLET ORAL
Qty: 120 | Refills: 0 | Status: ACTIVE | COMMUNITY
Start: 1900-01-01 | End: 1900-01-01

## 2023-01-01 RX ORDER — POTASSIUM CHLORIDE 20 MEQ
20 PACKET (EA) ORAL ONCE
Refills: 0 | Status: COMPLETED | OUTPATIENT
Start: 2023-01-01 | End: 2023-01-01

## 2023-01-01 RX ORDER — POTASSIUM CHLORIDE 20 MEQ
2 PACKET (EA) ORAL
Qty: 14 | Refills: 0
Start: 2023-01-01 | End: 2023-01-01

## 2023-01-01 RX ORDER — DIPHENHYDRAMINE HYDROCHLORIDE AND LIDOCAINE HYDROCHLORIDE AND ALUMINUM HYDROXIDE AND MAGNESIUM HYDRO
KIT 4 TIMES DAILY
Qty: 1 | Refills: 0 | Status: ACTIVE | COMMUNITY
Start: 2023-01-01 | End: 1900-01-01

## 2023-01-01 RX ORDER — POTASSIUM CHLORIDE 20 MEQ
40 PACKET (EA) ORAL ONCE
Refills: 0 | Status: COMPLETED | OUTPATIENT
Start: 2023-01-01 | End: 2023-01-01

## 2023-01-01 RX ORDER — SODIUM CHLORIDE 9 MG/ML
1000 INJECTION, SOLUTION INTRAVENOUS
Refills: 0 | Status: DISCONTINUED | OUTPATIENT
Start: 2023-01-01 | End: 2023-01-01

## 2023-01-01 RX ORDER — CEFTRIAXONE 500 MG/1
1000 INJECTION, POWDER, FOR SOLUTION INTRAMUSCULAR; INTRAVENOUS EVERY 24 HOURS
Refills: 0 | Status: DISCONTINUED | OUTPATIENT
Start: 2023-01-01 | End: 2023-01-01

## 2023-01-01 RX ORDER — MAGNESIUM SULFATE 500 MG/ML
2 VIAL (ML) INJECTION ONCE
Refills: 0 | Status: COMPLETED | OUTPATIENT
Start: 2023-01-01 | End: 2023-01-01

## 2023-01-01 RX ORDER — TRAMADOL HYDROCHLORIDE 50 MG/1
1 TABLET ORAL
Refills: 0 | DISCHARGE

## 2023-01-01 RX ORDER — METHOCARBAMOL 500 MG/1
1000 TABLET, FILM COATED ORAL EVERY 6 HOURS
Refills: 0 | Status: DISCONTINUED | OUTPATIENT
Start: 2023-01-01 | End: 2023-01-01

## 2023-01-01 RX ORDER — THIAMINE MONONITRATE (VIT B1) 100 MG
100 TABLET ORAL ONCE
Refills: 0 | Status: COMPLETED | OUTPATIENT
Start: 2023-01-01 | End: 2023-01-01

## 2023-01-01 RX ORDER — CALCIUM CARBONATE 500(1250)
1 TABLET ORAL
Qty: 0 | Refills: 0 | DISCHARGE
Start: 2023-01-01

## 2023-01-01 RX ORDER — POTASSIUM CHLORIDE 20 MEQ
1 PACKET (EA) ORAL
Qty: 10 | Refills: 0
Start: 2023-01-01 | End: 2023-01-01

## 2023-01-01 RX ORDER — SODIUM CHLORIDE 9 MG/ML
1000 INJECTION, SOLUTION INTRAVENOUS
Refills: 0 | Status: COMPLETED | OUTPATIENT
Start: 2023-01-01 | End: 2023-01-01

## 2023-01-01 RX ORDER — FAMOTIDINE 10 MG/ML
20 INJECTION INTRAVENOUS
Refills: 0 | Status: DISCONTINUED | OUTPATIENT
Start: 2023-01-01 | End: 2023-01-01

## 2023-01-01 RX ORDER — MEGESTROL ACETATE 40 MG/ML
400 SUSPENSION ORAL DAILY
Refills: 0 | Status: DISCONTINUED | OUTPATIENT
Start: 2023-01-01 | End: 2023-01-01

## 2023-01-01 RX ORDER — DEXTROSE 50 % IN WATER 50 %
25 SYRINGE (ML) INTRAVENOUS ONCE
Refills: 0 | Status: DISCONTINUED | OUTPATIENT
Start: 2023-01-01 | End: 2023-01-01

## 2023-01-01 RX ORDER — TRAMADOL HYDROCHLORIDE 50 MG/1
1 TABLET ORAL
Qty: 21 | Refills: 0
Start: 2023-01-01 | End: 2023-01-01

## 2023-01-01 RX ORDER — DEXTROSE 50 % IN WATER 50 %
25 SYRINGE (ML) INTRAVENOUS ONCE
Refills: 0 | Status: COMPLETED | OUTPATIENT
Start: 2023-01-01 | End: 2023-01-01

## 2023-01-01 RX ORDER — LANOLIN ALCOHOL/MO/W.PET/CERES
1 CREAM (GRAM) TOPICAL
Qty: 0 | Refills: 0 | DISCHARGE
Start: 2023-01-01

## 2023-01-01 RX ORDER — PALBOCICLIB 125 MG/1
125 TABLET, FILM COATED ORAL
Qty: 21 | Refills: 2 | Status: DISCONTINUED | COMMUNITY
Start: 2023-01-01 | End: 2023-01-01

## 2023-01-01 RX ORDER — LANOLIN ALCOHOL/MO/W.PET/CERES
5 CREAM (GRAM) TOPICAL ONCE
Refills: 0 | Status: COMPLETED | OUTPATIENT
Start: 2023-01-01 | End: 2023-01-01

## 2023-01-01 RX ORDER — LETROZOLE 2.5 MG/1
2.5 TABLET, FILM COATED ORAL DAILY
Refills: 0 | Status: DISCONTINUED | OUTPATIENT
Start: 2023-01-01 | End: 2023-01-01

## 2023-01-01 RX ORDER — MEGESTROL ACETATE 40 MG/ML
20 SUSPENSION ORAL
Refills: 0 | Status: DISCONTINUED | OUTPATIENT
Start: 2023-01-01 | End: 2023-01-01

## 2023-01-01 RX ORDER — PROCHLORPERAZINE MALEATE 10 MG/1
10 TABLET ORAL
Qty: 40 | Refills: 1 | Status: ACTIVE | COMMUNITY
Start: 2023-01-01

## 2023-01-01 RX ORDER — POTASSIUM CHLORIDE 20 MEQ
20 PACKET (EA) ORAL
Refills: 0 | Status: COMPLETED | OUTPATIENT
Start: 2023-01-01 | End: 2023-01-01

## 2023-01-01 RX ORDER — POTASSIUM CHLORIDE 20 MEQ
40 PACKET (EA) ORAL ONCE
Refills: 0 | Status: DISCONTINUED | OUTPATIENT
Start: 2023-01-01 | End: 2023-01-01

## 2023-01-01 RX ORDER — LEVOTHYROXINE SODIUM 125 MCG
50 TABLET ORAL DAILY
Refills: 0 | Status: DISCONTINUED | OUTPATIENT
Start: 2023-01-01 | End: 2023-01-01

## 2023-01-01 RX ORDER — TIOTROPIUM BROMIDE 18 UG/1
2 CAPSULE ORAL; RESPIRATORY (INHALATION) DAILY
Refills: 0 | Status: DISCONTINUED | OUTPATIENT
Start: 2023-01-01 | End: 2023-01-01

## 2023-01-01 RX ORDER — MAGNESIUM SULFATE 500 MG/ML
2 VIAL (ML) INJECTION
Refills: 0 | Status: COMPLETED | OUTPATIENT
Start: 2023-01-01 | End: 2023-01-01

## 2023-01-01 RX ORDER — MEGESTROL ACETATE 40 MG/ML
20 SUSPENSION ORAL DAILY
Refills: 0 | Status: DISCONTINUED | OUTPATIENT
Start: 2023-01-01 | End: 2023-01-01

## 2023-01-01 RX ORDER — DIPHENHYDRAMINE HCL 50 MG
25 CAPSULE ORAL ONCE
Refills: 0 | Status: COMPLETED | OUTPATIENT
Start: 2023-01-01 | End: 2023-01-01

## 2023-01-01 RX ORDER — METHOCARBAMOL 500 MG/1
2 TABLET, FILM COATED ORAL
Qty: 120 | Refills: 0
Start: 2023-01-01 | End: 2023-01-01

## 2023-01-01 RX ORDER — METHOCARBAMOL 500 MG/1
2 TABLET, FILM COATED ORAL
Qty: 240 | Refills: 0
Start: 2023-01-01 | End: 2023-01-01

## 2023-01-01 RX ORDER — MAGNESIUM OXIDE 400 MG ORAL TABLET 241.3 MG
1 TABLET ORAL
Qty: 10 | Refills: 0
Start: 2023-01-01 | End: 2023-01-01

## 2023-01-01 RX ORDER — POTASSIUM CHLORIDE 1.5 G/1.58G
20 POWDER, FOR SOLUTION ORAL DAILY
Qty: 5 | Refills: 0 | Status: ACTIVE | COMMUNITY
Start: 2023-01-01 | End: 1900-01-01

## 2023-01-01 RX ORDER — MEGESTROL ACETATE 40 MG/ML
40 SUSPENSION ORAL EVERY 6 HOURS
Refills: 0 | Status: DISCONTINUED | OUTPATIENT
Start: 2023-01-01 | End: 2023-01-01

## 2023-01-01 RX ORDER — GABAPENTIN 400 MG/1
1 CAPSULE ORAL
Qty: 90 | Refills: 0
Start: 2023-01-01 | End: 2023-01-01

## 2023-01-01 RX ORDER — SODIUM BICARBONATE 1 MEQ/ML
650 SYRINGE (ML) INTRAVENOUS THREE TIMES A DAY
Refills: 0 | Status: DISCONTINUED | OUTPATIENT
Start: 2023-01-01 | End: 2023-01-01

## 2023-01-01 RX ORDER — IBUPROFEN 200 MG
200 TABLET ORAL EVERY 4 HOURS
Refills: 0 | Status: DISCONTINUED | OUTPATIENT
Start: 2023-01-01 | End: 2023-01-01

## 2023-01-01 RX ORDER — SODIUM CHLORIDE 9 MG/ML
1000 INJECTION INTRAMUSCULAR; INTRAVENOUS; SUBCUTANEOUS ONCE
Refills: 0 | Status: COMPLETED | OUTPATIENT
Start: 2023-01-01 | End: 2023-01-01

## 2023-01-01 RX ORDER — TIOTROPIUM BROMIDE 18 UG/1
2 CAPSULE ORAL; RESPIRATORY (INHALATION)
Qty: 1 | Refills: 0
Start: 2023-01-01

## 2023-01-01 RX ORDER — THIAMINE MONONITRATE (VIT B1) 100 MG
100 TABLET ORAL DAILY
Refills: 0 | Status: DISCONTINUED | OUTPATIENT
Start: 2023-01-01 | End: 2023-01-01

## 2023-01-01 RX ORDER — ONDANSETRON 8 MG/1
8 TABLET ORAL
Qty: 90 | Refills: 1 | Status: ACTIVE | COMMUNITY
Start: 2023-01-01

## 2023-01-01 RX ORDER — IBUPROFEN 200 MG
1 TABLET ORAL
Qty: 0 | Refills: 0 | DISCHARGE
Start: 2023-01-01

## 2023-01-01 RX ORDER — CALCIUM CARBONATE/VITAMIN D3 600MG-5MCG
600-5 TABLET ORAL DAILY
Qty: 90 | Refills: 0 | Status: ACTIVE | COMMUNITY
Start: 2023-01-01 | End: 1900-01-01

## 2023-01-01 RX ORDER — PAMIDRONATE DISODIUM 9 MG/ML
90 INJECTION, SOLUTION INTRAVENOUS ONCE
Refills: 0 | Status: COMPLETED | OUTPATIENT
Start: 2023-01-01 | End: 2023-01-01

## 2023-01-01 RX ORDER — DRONABINOL 2.5 MG
2.5 CAPSULE ORAL
Refills: 0 | Status: DISCONTINUED | OUTPATIENT
Start: 2023-01-01 | End: 2023-01-01

## 2023-01-01 RX ORDER — SUCRALFATE 1 G
1 TABLET ORAL EVERY 6 HOURS
Refills: 0 | Status: DISCONTINUED | OUTPATIENT
Start: 2023-01-01 | End: 2023-01-01

## 2023-01-01 RX ORDER — DEXTROSE 50 % IN WATER 50 %
12.5 SYRINGE (ML) INTRAVENOUS ONCE
Refills: 0 | Status: DISCONTINUED | OUTPATIENT
Start: 2023-01-01 | End: 2023-01-01

## 2023-01-01 RX ORDER — MIRTAZAPINE 45 MG/1
7.5 TABLET, ORALLY DISINTEGRATING ORAL AT BEDTIME
Refills: 0 | Status: DISCONTINUED | OUTPATIENT
Start: 2023-01-01 | End: 2023-01-01

## 2023-01-01 RX ORDER — OXYCODONE AND ACETAMINOPHEN 5; 325 MG/1; MG/1
2 TABLET ORAL
Qty: 120 | Refills: 0
Start: 2023-01-01 | End: 2023-01-01

## 2023-01-01 RX ORDER — OXYCODONE HYDROCHLORIDE 5 MG/1
1 TABLET ORAL
Qty: 60 | Refills: 0
Start: 2023-01-01 | End: 2023-01-01

## 2023-01-01 RX ORDER — CALCIUM CARBONATE 500(1250)
1 TABLET ORAL THREE TIMES A DAY
Refills: 0 | Status: DISCONTINUED | OUTPATIENT
Start: 2023-01-01 | End: 2023-01-01

## 2023-01-01 RX ORDER — MAGNESIUM OXIDE 500 MG
500 TABLET ORAL DAILY
Qty: 7 | Refills: 0 | Status: ACTIVE | COMMUNITY
Start: 2023-01-01 | End: 1900-01-01

## 2023-01-01 RX ORDER — ONDANSETRON 8 MG/1
4 TABLET, FILM COATED ORAL EVERY 8 HOURS
Refills: 0 | Status: DISCONTINUED | OUTPATIENT
Start: 2023-01-01 | End: 2023-01-01

## 2023-01-01 RX ORDER — LETROZOLE 2.5 MG/1
1 TABLET, FILM COATED ORAL
Qty: 30 | Refills: 0
Start: 2023-01-01 | End: 2023-01-01

## 2023-01-01 RX ORDER — GLUCAGON INJECTION, SOLUTION 0.5 MG/.1ML
1 INJECTION, SOLUTION SUBCUTANEOUS ONCE
Refills: 0 | Status: DISCONTINUED | OUTPATIENT
Start: 2023-01-01 | End: 2023-01-01

## 2023-01-01 RX ORDER — RIBOCICLIB 200 MG/1
200 TABLET, FILM COATED ORAL
Qty: 90 | Refills: 0 | Status: DISCONTINUED | COMMUNITY
Start: 2023-01-01 | End: 2023-01-01

## 2023-01-01 RX ORDER — DEXAMETHASONE 0.5 MG/5ML
4 ELIXIR ORAL DAILY
Refills: 0 | Status: COMPLETED | OUTPATIENT
Start: 2023-01-01 | End: 2023-01-01

## 2023-01-01 RX ORDER — LETROZOLE TABLETS 2.5 MG/1
2.5 TABLET, FILM COATED ORAL
Qty: 90 | Refills: 1 | Status: ACTIVE | COMMUNITY
Start: 1900-01-01 | End: 1900-01-01

## 2023-01-01 RX ORDER — FOLIC ACID 0.8 MG
1 TABLET ORAL
Qty: 0 | Refills: 0 | DISCHARGE
Start: 2023-01-01

## 2023-01-01 RX ORDER — OXYCODONE HYDROCHLORIDE 5 MG/1
1 TABLET ORAL
Refills: 0 | DISCHARGE

## 2023-01-01 RX ORDER — LEVOTHYROXINE SODIUM 0.05 MG/1
50 TABLET ORAL
Qty: 30 | Refills: 1 | Status: ACTIVE | COMMUNITY

## 2023-01-01 RX ORDER — ALBUTEROL 90 UG/1
2 AEROSOL, METERED ORAL EVERY 6 HOURS
Refills: 0 | Status: DISCONTINUED | OUTPATIENT
Start: 2023-01-01 | End: 2023-01-01

## 2023-01-01 RX ORDER — LEVOTHYROXINE SODIUM 125 MCG
1 TABLET ORAL
Qty: 30 | Refills: 0
Start: 2023-01-01 | End: 2023-01-01

## 2023-01-01 RX ORDER — LANOLIN ALCOHOL/MO/W.PET/CERES
5 CREAM (GRAM) TOPICAL AT BEDTIME
Refills: 0 | Status: DISCONTINUED | OUTPATIENT
Start: 2023-01-01 | End: 2023-01-01

## 2023-01-01 RX ORDER — SODIUM,POTASSIUM PHOSPHATES 278-250MG
1 POWDER IN PACKET (EA) ORAL THREE TIMES A DAY
Refills: 0 | Status: COMPLETED | OUTPATIENT
Start: 2023-01-01 | End: 2023-01-01

## 2023-01-01 RX ORDER — ENOXAPARIN SODIUM 100 MG/ML
40 INJECTION SUBCUTANEOUS EVERY 24 HOURS
Refills: 0 | Status: DISCONTINUED | OUTPATIENT
Start: 2023-01-01 | End: 2023-01-01

## 2023-01-01 RX ORDER — QUETIAPINE FUMARATE 200 MG/1
25 TABLET, FILM COATED ORAL ONCE
Refills: 0 | Status: COMPLETED | OUTPATIENT
Start: 2023-01-01 | End: 2023-01-01

## 2023-01-01 RX ORDER — FOLIC ACID 0.8 MG
1 TABLET ORAL DAILY
Refills: 0 | Status: DISCONTINUED | OUTPATIENT
Start: 2023-01-01 | End: 2023-01-01

## 2023-01-01 RX ORDER — SUCRALFATE 1 G
1 TABLET ORAL
Qty: 0 | Refills: 0 | DISCHARGE
Start: 2023-01-01

## 2023-01-01 RX ORDER — CHLORHEXIDINE GLUCONATE 213 G/1000ML
1 SOLUTION TOPICAL
Refills: 0 | Status: DISCONTINUED | OUTPATIENT
Start: 2023-01-01 | End: 2023-01-01

## 2023-01-01 RX ORDER — GABAPENTIN 300 MG/1
300 CAPSULE ORAL 3 TIMES DAILY
Qty: 90 | Refills: 1 | Status: ACTIVE | COMMUNITY

## 2023-01-01 RX ORDER — MEGESTROL ACETATE 40 MG/ML
10 SUSPENSION ORAL
Qty: 0 | Refills: 0 | DISCHARGE
Start: 2023-01-01

## 2023-01-01 RX ORDER — DEXTROSE 50 % IN WATER 50 %
15 SYRINGE (ML) INTRAVENOUS ONCE
Refills: 0 | Status: DISCONTINUED | OUTPATIENT
Start: 2023-01-01 | End: 2023-01-01

## 2023-01-01 RX ORDER — MEGESTROL ACETATE 40 MG/ML
200 SUSPENSION ORAL DAILY
Refills: 0 | Status: DISCONTINUED | OUTPATIENT
Start: 2023-01-01 | End: 2023-01-01

## 2023-01-01 RX ORDER — SODIUM CHLORIDE 9 MG/ML
500 INJECTION, SOLUTION INTRAVENOUS ONCE
Refills: 0 | Status: COMPLETED | OUTPATIENT
Start: 2023-01-01 | End: 2023-01-01

## 2023-01-01 RX ORDER — MEGESTROL ACETATE 40 MG/ML
400 SUSPENSION ORAL
Refills: 0 | Status: DISCONTINUED | OUTPATIENT
Start: 2023-01-01 | End: 2023-01-01

## 2023-01-01 RX ORDER — PALBOCICLIB 100 MG/1
1 CAPSULE ORAL
Qty: 0 | Refills: 0 | DISCHARGE
Start: 2023-01-01

## 2023-01-01 RX ORDER — ALBUTEROL 90 UG/1
2 AEROSOL, METERED ORAL
Qty: 1 | Refills: 0
Start: 2023-01-01

## 2023-01-01 RX ORDER — DRONABINOL 2.5 MG
1 CAPSULE ORAL
Qty: 0 | Refills: 0 | DISCHARGE
Start: 2023-01-01

## 2023-01-01 RX ORDER — ACETAMINOPHEN 500 MG
650 TABLET ORAL EVERY 6 HOURS
Refills: 0 | Status: DISCONTINUED | OUTPATIENT
Start: 2023-01-01 | End: 2023-01-01

## 2023-01-01 RX ORDER — LORATADINE 10 MG/1
10 TABLET ORAL DAILY
Refills: 0 | Status: DISCONTINUED | OUTPATIENT
Start: 2023-01-01 | End: 2023-01-01

## 2023-01-01 RX ORDER — PALBOCICLIB 100 MG/1
125 CAPSULE ORAL DAILY
Refills: 0 | Status: DISCONTINUED | OUTPATIENT
Start: 2023-01-01 | End: 2023-01-01

## 2023-01-01 RX ORDER — PALBOCICLIB 125 MG/1
125 CAPSULE ORAL DAILY
Qty: 21 | Refills: 2 | Status: ACTIVE | COMMUNITY
Start: 2023-01-01

## 2023-01-01 RX ORDER — DEXAMETHASONE 0.5 MG/5ML
1 ELIXIR ORAL
Qty: 0 | Refills: 0 | DISCHARGE
Start: 2023-01-01

## 2023-01-01 RX ORDER — NALOXONE HYDROCHLORIDE 4 MG/.1ML
4 SPRAY NASAL
Qty: 1 | Refills: 0
Start: 2023-01-01

## 2023-01-01 RX ADMIN — CHLORHEXIDINE GLUCONATE 1 APPLICATION(S): 213 SOLUTION TOPICAL at 05:44

## 2023-01-01 RX ADMIN — ZOLEDRONIC ACID 4 MILLIGRAM(S): 5 INJECTION, SOLUTION INTRAVENOUS at 14:42

## 2023-01-01 RX ADMIN — Medication 650 MILLIGRAM(S): at 17:42

## 2023-01-01 RX ADMIN — Medication 1 TABLET(S): at 13:12

## 2023-01-01 RX ADMIN — Medication 2.5 MILLIGRAM(S): at 16:04

## 2023-01-01 RX ADMIN — Medication 1 TABLET(S): at 06:19

## 2023-01-01 RX ADMIN — Medication 1 GRAM(S): at 22:11

## 2023-01-01 RX ADMIN — ENOXAPARIN SODIUM 40 MILLIGRAM(S): 100 INJECTION SUBCUTANEOUS at 17:38

## 2023-01-01 RX ADMIN — ALBUTEROL 2 PUFF(S): 90 AEROSOL, METERED ORAL at 08:24

## 2023-01-01 RX ADMIN — FAMOTIDINE 20 MILLIGRAM(S): 10 INJECTION INTRAVENOUS at 18:53

## 2023-01-01 RX ADMIN — LORATADINE 10 MILLIGRAM(S): 10 TABLET ORAL at 12:44

## 2023-01-01 RX ADMIN — Medication 1 GRAM(S): at 17:12

## 2023-01-01 RX ADMIN — PALBOCICLIB 125 MILLIGRAM(S): 100 CAPSULE ORAL at 18:30

## 2023-01-01 RX ADMIN — Medication 50 MILLIEQUIVALENT(S): at 16:27

## 2023-01-01 RX ADMIN — Medication 1 GRAM(S): at 23:31

## 2023-01-01 RX ADMIN — MEGESTROL ACETATE 40 MILLIGRAM(S): 40 SUSPENSION ORAL at 05:44

## 2023-01-01 RX ADMIN — MEGESTROL ACETATE 400 MILLIGRAM(S): 40 SUSPENSION ORAL at 11:41

## 2023-01-01 RX ADMIN — Medication 1 TABLET(S): at 21:11

## 2023-01-01 RX ADMIN — METHOCARBAMOL 1000 MILLIGRAM(S): 500 TABLET, FILM COATED ORAL at 01:07

## 2023-01-01 RX ADMIN — Medication 1 GRAM(S): at 06:31

## 2023-01-01 RX ADMIN — METHOCARBAMOL 1000 MILLIGRAM(S): 500 TABLET, FILM COATED ORAL at 13:45

## 2023-01-01 RX ADMIN — Medication 4 MILLIGRAM(S): at 05:46

## 2023-01-01 RX ADMIN — METHOCARBAMOL 1000 MILLIGRAM(S): 500 TABLET, FILM COATED ORAL at 00:41

## 2023-01-01 RX ADMIN — Medication 1 TABLET(S): at 21:33

## 2023-01-01 RX ADMIN — Medication 1 MILLIGRAM(S): at 12:03

## 2023-01-01 RX ADMIN — METHOCARBAMOL 1000 MILLIGRAM(S): 500 TABLET, FILM COATED ORAL at 22:00

## 2023-01-01 RX ADMIN — Medication 1 GRAM(S): at 12:04

## 2023-01-01 RX ADMIN — Medication 50 MILLIEQUIVALENT(S): at 13:17

## 2023-01-01 RX ADMIN — Medication 1 MILLIGRAM(S): at 11:10

## 2023-01-01 RX ADMIN — PALBOCICLIB 125 MILLIGRAM(S): 100 CAPSULE ORAL at 17:28

## 2023-01-01 RX ADMIN — Medication 2.5 MILLIGRAM(S): at 17:12

## 2023-01-01 RX ADMIN — OXYCODONE HYDROCHLORIDE 10 MILLIGRAM(S): 5 TABLET ORAL at 13:34

## 2023-01-01 RX ADMIN — Medication 1 TABLET(S): at 05:49

## 2023-01-01 RX ADMIN — FAMOTIDINE 20 MILLIGRAM(S): 10 INJECTION INTRAVENOUS at 17:36

## 2023-01-01 RX ADMIN — SODIUM CHLORIDE 100 MILLILITER(S): 9 INJECTION, SOLUTION INTRAVENOUS at 10:54

## 2023-01-01 RX ADMIN — Medication 1 GRAM(S): at 17:27

## 2023-01-01 RX ADMIN — Medication 50 MILLIEQUIVALENT(S): at 11:30

## 2023-01-01 RX ADMIN — ALBUTEROL 2 PUFF(S): 90 AEROSOL, METERED ORAL at 07:48

## 2023-01-01 RX ADMIN — Medication 1 GRAM(S): at 11:36

## 2023-01-01 RX ADMIN — Medication 1 TABLET(S): at 06:16

## 2023-01-01 RX ADMIN — Medication 1 MILLIGRAM(S): at 11:29

## 2023-01-01 RX ADMIN — LORATADINE 10 MILLIGRAM(S): 10 TABLET ORAL at 11:32

## 2023-01-01 RX ADMIN — Medication 1 GRAM(S): at 05:09

## 2023-01-01 RX ADMIN — Medication 25 GRAM(S): at 11:40

## 2023-01-01 RX ADMIN — METHOCARBAMOL 1000 MILLIGRAM(S): 500 TABLET, FILM COATED ORAL at 11:34

## 2023-01-01 RX ADMIN — Medication 100 MILLIGRAM(S): at 22:05

## 2023-01-01 RX ADMIN — METHOCARBAMOL 1000 MILLIGRAM(S): 500 TABLET, FILM COATED ORAL at 17:03

## 2023-01-01 RX ADMIN — Medication 5 MILLIGRAM(S): at 22:33

## 2023-01-01 RX ADMIN — Medication 50 MICROGRAM(S): at 06:19

## 2023-01-01 RX ADMIN — ENOXAPARIN SODIUM 30 MILLIGRAM(S): 100 INJECTION SUBCUTANEOUS at 17:34

## 2023-01-01 RX ADMIN — MEGESTROL ACETATE 400 MILLIGRAM(S): 40 SUSPENSION ORAL at 11:50

## 2023-01-01 RX ADMIN — Medication 1 GRAM(S): at 11:32

## 2023-01-01 RX ADMIN — LORATADINE 10 MILLIGRAM(S): 10 TABLET ORAL at 11:28

## 2023-01-01 RX ADMIN — Medication 200 MILLIGRAM(S): at 10:33

## 2023-01-01 RX ADMIN — METHOCARBAMOL 1000 MILLIGRAM(S): 500 TABLET, FILM COATED ORAL at 05:00

## 2023-01-01 RX ADMIN — PALBOCICLIB 125 MILLIGRAM(S): 100 CAPSULE ORAL at 18:52

## 2023-01-01 RX ADMIN — Medication 1 MILLIGRAM(S): at 11:32

## 2023-01-01 RX ADMIN — Medication 200 MILLIGRAM(S): at 18:43

## 2023-01-01 RX ADMIN — Medication 100 MILLIGRAM(S): at 14:06

## 2023-01-01 RX ADMIN — Medication 200 MILLIGRAM(S): at 22:26

## 2023-01-01 RX ADMIN — Medication 200 MILLIGRAM(S): at 18:57

## 2023-01-01 RX ADMIN — Medication 1 GRAM(S): at 11:44

## 2023-01-01 RX ADMIN — PALBOCICLIB 125 MILLIGRAM(S): 100 CAPSULE ORAL at 20:00

## 2023-01-01 RX ADMIN — Medication 1 GRAM(S): at 06:25

## 2023-01-01 RX ADMIN — SODIUM CHLORIDE 75 MILLILITER(S): 9 INJECTION, SOLUTION INTRAVENOUS at 12:09

## 2023-01-01 RX ADMIN — Medication 1 TABLET(S): at 22:00

## 2023-01-01 RX ADMIN — Medication 1 TABLET(S): at 05:33

## 2023-01-01 RX ADMIN — MEGESTROL ACETATE 40 MILLIGRAM(S): 40 SUSPENSION ORAL at 17:09

## 2023-01-01 RX ADMIN — METHOCARBAMOL 1000 MILLIGRAM(S): 500 TABLET, FILM COATED ORAL at 05:47

## 2023-01-01 RX ADMIN — Medication 4 MILLIGRAM(S): at 05:53

## 2023-01-01 RX ADMIN — MEGESTROL ACETATE 40 MILLIGRAM(S): 40 SUSPENSION ORAL at 17:08

## 2023-01-01 RX ADMIN — Medication 4 MILLIGRAM(S): at 05:38

## 2023-01-01 RX ADMIN — PALBOCICLIB 125 MILLIGRAM(S): 100 CAPSULE ORAL at 17:39

## 2023-01-01 RX ADMIN — TIOTROPIUM BROMIDE 2 PUFF(S): 18 CAPSULE ORAL; RESPIRATORY (INHALATION) at 08:46

## 2023-01-01 RX ADMIN — ENOXAPARIN SODIUM 40 MILLIGRAM(S): 100 INJECTION SUBCUTANEOUS at 17:08

## 2023-01-01 RX ADMIN — TIOTROPIUM BROMIDE 2 PUFF(S): 18 CAPSULE ORAL; RESPIRATORY (INHALATION) at 08:43

## 2023-01-01 RX ADMIN — Medication 5 MILLIGRAM(S): at 21:25

## 2023-01-01 RX ADMIN — Medication 1 GRAM(S): at 17:48

## 2023-01-01 RX ADMIN — Medication 100 MILLIGRAM(S): at 11:49

## 2023-01-01 RX ADMIN — Medication 1 GRAM(S): at 05:58

## 2023-01-01 RX ADMIN — Medication 2.5 MILLIGRAM(S): at 18:52

## 2023-01-01 RX ADMIN — Medication 1 GRAM(S): at 23:07

## 2023-01-01 RX ADMIN — Medication 650 MILLIGRAM(S): at 06:17

## 2023-01-01 RX ADMIN — ZOLEDRONIC ACID 4 MILLIGRAM(S): 5 INJECTION, SOLUTION INTRAVENOUS at 13:40

## 2023-01-01 RX ADMIN — Medication 1 GRAM(S): at 05:53

## 2023-01-01 RX ADMIN — METHOCARBAMOL 1000 MILLIGRAM(S): 500 TABLET, FILM COATED ORAL at 11:28

## 2023-01-01 RX ADMIN — METHOCARBAMOL 1000 MILLIGRAM(S): 500 TABLET, FILM COATED ORAL at 12:50

## 2023-01-01 RX ADMIN — MEGESTROL ACETATE 400 MILLIGRAM(S): 40 SUSPENSION ORAL at 11:19

## 2023-01-01 RX ADMIN — METHOCARBAMOL 1000 MILLIGRAM(S): 500 TABLET, FILM COATED ORAL at 16:03

## 2023-01-01 RX ADMIN — Medication 1 TABLET(S): at 06:30

## 2023-01-01 RX ADMIN — Medication 50 MILLIEQUIVALENT(S): at 16:11

## 2023-01-01 RX ADMIN — SODIUM CHLORIDE 75 MILLILITER(S): 9 INJECTION, SOLUTION INTRAVENOUS at 06:24

## 2023-01-01 RX ADMIN — Medication 1 MILLIGRAM(S): at 11:23

## 2023-01-01 RX ADMIN — OXYCODONE HYDROCHLORIDE 10 MILLIGRAM(S): 5 TABLET ORAL at 03:35

## 2023-01-01 RX ADMIN — Medication 200 MILLIGRAM(S): at 15:15

## 2023-01-01 RX ADMIN — METHOCARBAMOL 1000 MILLIGRAM(S): 500 TABLET, FILM COATED ORAL at 02:24

## 2023-01-01 RX ADMIN — MEGESTROL ACETATE 40 MILLIGRAM(S): 40 SUSPENSION ORAL at 05:29

## 2023-01-01 RX ADMIN — Medication 1 GRAM(S): at 17:34

## 2023-01-01 RX ADMIN — Medication 1 GRAM(S): at 11:17

## 2023-01-01 RX ADMIN — ENOXAPARIN SODIUM 30 MILLIGRAM(S): 100 INJECTION SUBCUTANEOUS at 06:24

## 2023-01-01 RX ADMIN — PALBOCICLIB 125 MILLIGRAM(S): 100 CAPSULE ORAL at 17:37

## 2023-01-01 RX ADMIN — Medication 1 PACKET(S): at 21:20

## 2023-01-01 RX ADMIN — Medication 1 TABLET(S): at 21:25

## 2023-01-01 RX ADMIN — Medication 1 TABLET(S): at 06:25

## 2023-01-01 RX ADMIN — FAMOTIDINE 20 MILLIGRAM(S): 10 INJECTION INTRAVENOUS at 05:38

## 2023-01-01 RX ADMIN — LETROZOLE 2.5 MILLIGRAM(S): 2.5 TABLET, FILM COATED ORAL at 12:09

## 2023-01-01 RX ADMIN — Medication 1 GRAM(S): at 12:45

## 2023-01-01 RX ADMIN — Medication 1 TABLET(S): at 05:25

## 2023-01-01 RX ADMIN — Medication 1 GRAM(S): at 12:16

## 2023-01-01 RX ADMIN — LORATADINE 10 MILLIGRAM(S): 10 TABLET ORAL at 12:03

## 2023-01-01 RX ADMIN — LORATADINE 10 MILLIGRAM(S): 10 TABLET ORAL at 12:00

## 2023-01-01 RX ADMIN — Medication 650 MILLIGRAM(S): at 17:41

## 2023-01-01 RX ADMIN — Medication 1 GRAM(S): at 23:39

## 2023-01-01 RX ADMIN — Medication 50 MICROGRAM(S): at 05:55

## 2023-01-01 RX ADMIN — SODIUM CHLORIDE 75 MILLILITER(S): 9 INJECTION, SOLUTION INTRAVENOUS at 22:10

## 2023-01-01 RX ADMIN — TIOTROPIUM BROMIDE 2 PUFF(S): 18 CAPSULE ORAL; RESPIRATORY (INHALATION) at 08:35

## 2023-01-01 RX ADMIN — METHOCARBAMOL 1000 MILLIGRAM(S): 500 TABLET, FILM COATED ORAL at 06:24

## 2023-01-01 RX ADMIN — Medication 1 MILLIGRAM(S): at 12:10

## 2023-01-01 RX ADMIN — Medication 200 MILLIGRAM(S): at 13:32

## 2023-01-01 RX ADMIN — FAMOTIDINE 20 MILLIGRAM(S): 10 INJECTION INTRAVENOUS at 17:09

## 2023-01-01 RX ADMIN — FAMOTIDINE 20 MILLIGRAM(S): 10 INJECTION INTRAVENOUS at 18:24

## 2023-01-01 RX ADMIN — Medication 200 MILLIGRAM(S): at 02:00

## 2023-01-01 RX ADMIN — Medication 200 MILLIGRAM(S): at 22:00

## 2023-01-01 RX ADMIN — LORATADINE 10 MILLIGRAM(S): 10 TABLET ORAL at 11:19

## 2023-01-01 RX ADMIN — ALBUTEROL 2 PUFF(S): 90 AEROSOL, METERED ORAL at 08:16

## 2023-01-01 RX ADMIN — Medication 200 MILLIGRAM(S): at 02:01

## 2023-01-01 RX ADMIN — FAMOTIDINE 20 MILLIGRAM(S): 10 INJECTION INTRAVENOUS at 17:27

## 2023-01-01 RX ADMIN — Medication 40 MILLIEQUIVALENT(S): at 21:00

## 2023-01-01 RX ADMIN — ENOXAPARIN SODIUM 40 MILLIGRAM(S): 100 INJECTION SUBCUTANEOUS at 17:12

## 2023-01-01 RX ADMIN — Medication 200 MILLIGRAM(S): at 22:17

## 2023-01-01 RX ADMIN — Medication 1 GRAM(S): at 11:29

## 2023-01-01 RX ADMIN — Medication 200 MILLIGRAM(S): at 23:47

## 2023-01-01 RX ADMIN — SODIUM CHLORIDE 100 MILLILITER(S): 9 INJECTION, SOLUTION INTRAVENOUS at 09:28

## 2023-01-01 RX ADMIN — LETROZOLE 2.5 MILLIGRAM(S): 2.5 TABLET, FILM COATED ORAL at 11:21

## 2023-01-01 RX ADMIN — FAMOTIDINE 20 MILLIGRAM(S): 10 INJECTION INTRAVENOUS at 17:07

## 2023-01-01 RX ADMIN — Medication 1 GRAM(S): at 17:03

## 2023-01-01 RX ADMIN — Medication 1 GRAM(S): at 11:53

## 2023-01-01 RX ADMIN — METHOCARBAMOL 1000 MILLIGRAM(S): 500 TABLET, FILM COATED ORAL at 22:09

## 2023-01-01 RX ADMIN — Medication 1 GRAM(S): at 17:17

## 2023-01-01 RX ADMIN — FAMOTIDINE 20 MILLIGRAM(S): 10 INJECTION INTRAVENOUS at 06:28

## 2023-01-01 RX ADMIN — Medication 1 GRAM(S): at 17:33

## 2023-01-01 RX ADMIN — Medication 100 MILLIGRAM(S): at 05:53

## 2023-01-01 RX ADMIN — MEGESTROL ACETATE 40 MILLIGRAM(S): 40 SUSPENSION ORAL at 23:51

## 2023-01-01 RX ADMIN — Medication 200 MILLIGRAM(S): at 23:27

## 2023-01-01 RX ADMIN — Medication 1 GRAM(S): at 11:13

## 2023-01-01 RX ADMIN — FAMOTIDINE 20 MILLIGRAM(S): 10 INJECTION INTRAVENOUS at 05:12

## 2023-01-01 RX ADMIN — LORATADINE 10 MILLIGRAM(S): 10 TABLET ORAL at 11:23

## 2023-01-01 RX ADMIN — Medication 1 TABLET(S): at 05:47

## 2023-01-01 RX ADMIN — FAMOTIDINE 20 MILLIGRAM(S): 10 INJECTION INTRAVENOUS at 17:56

## 2023-01-01 RX ADMIN — FAMOTIDINE 20 MILLIGRAM(S): 10 INJECTION INTRAVENOUS at 06:30

## 2023-01-01 RX ADMIN — Medication 200 MILLIGRAM(S): at 16:01

## 2023-01-01 RX ADMIN — LETROZOLE 2.5 MILLIGRAM(S): 2.5 TABLET, FILM COATED ORAL at 11:42

## 2023-01-01 RX ADMIN — Medication 200 MILLIGRAM(S): at 23:04

## 2023-01-01 RX ADMIN — Medication 1 GRAM(S): at 12:03

## 2023-01-01 RX ADMIN — MEGESTROL ACETATE 40 MILLIGRAM(S): 40 SUSPENSION ORAL at 06:24

## 2023-01-01 RX ADMIN — Medication 650 MILLIGRAM(S): at 12:49

## 2023-01-01 RX ADMIN — Medication 1 TABLET(S): at 21:38

## 2023-01-01 RX ADMIN — OXYCODONE HYDROCHLORIDE 10 MILLIGRAM(S): 5 TABLET ORAL at 13:02

## 2023-01-01 RX ADMIN — Medication 1 TABLET(S): at 05:01

## 2023-01-01 RX ADMIN — METHOCARBAMOL 1000 MILLIGRAM(S): 500 TABLET, FILM COATED ORAL at 11:35

## 2023-01-01 RX ADMIN — Medication 1 DROP(S): at 06:18

## 2023-01-01 RX ADMIN — FAMOTIDINE 20 MILLIGRAM(S): 10 INJECTION INTRAVENOUS at 17:41

## 2023-01-01 RX ADMIN — FAMOTIDINE 20 MILLIGRAM(S): 10 INJECTION INTRAVENOUS at 17:12

## 2023-01-01 RX ADMIN — CHLORHEXIDINE GLUCONATE 1 APPLICATION(S): 213 SOLUTION TOPICAL at 05:48

## 2023-01-01 RX ADMIN — TIOTROPIUM BROMIDE 2 PUFF(S): 18 CAPSULE ORAL; RESPIRATORY (INHALATION) at 11:35

## 2023-01-01 RX ADMIN — Medication 200 MILLIGRAM(S): at 14:17

## 2023-01-01 RX ADMIN — PALBOCICLIB 125 MILLIGRAM(S): 100 CAPSULE ORAL at 17:15

## 2023-01-01 RX ADMIN — ENOXAPARIN SODIUM 40 MILLIGRAM(S): 100 INJECTION SUBCUTANEOUS at 18:53

## 2023-01-01 RX ADMIN — Medication 1 MILLIGRAM(S): at 12:35

## 2023-01-01 RX ADMIN — Medication 200 MILLIGRAM(S): at 11:35

## 2023-01-01 RX ADMIN — METHOCARBAMOL 1000 MILLIGRAM(S): 500 TABLET, FILM COATED ORAL at 05:28

## 2023-01-01 RX ADMIN — GABAPENTIN 300 MILLIGRAM(S): 400 CAPSULE ORAL at 05:47

## 2023-01-01 RX ADMIN — Medication 1 GRAM(S): at 17:10

## 2023-01-01 RX ADMIN — FAMOTIDINE 20 MILLIGRAM(S): 10 INJECTION INTRAVENOUS at 17:35

## 2023-01-01 RX ADMIN — MEGESTROL ACETATE 40 MILLIGRAM(S): 40 SUSPENSION ORAL at 22:49

## 2023-01-01 RX ADMIN — Medication 25 GRAM(S): at 13:28

## 2023-01-01 RX ADMIN — LETROZOLE 2.5 MILLIGRAM(S): 2.5 TABLET, FILM COATED ORAL at 12:01

## 2023-01-01 RX ADMIN — Medication 100 MILLIGRAM(S): at 14:17

## 2023-01-01 RX ADMIN — METHOCARBAMOL 1000 MILLIGRAM(S): 500 TABLET, FILM COATED ORAL at 17:41

## 2023-01-01 RX ADMIN — METHOCARBAMOL 1000 MILLIGRAM(S): 500 TABLET, FILM COATED ORAL at 23:34

## 2023-01-01 RX ADMIN — SODIUM CHLORIDE 100 MILLILITER(S): 9 INJECTION, SOLUTION INTRAVENOUS at 12:25

## 2023-01-01 RX ADMIN — METHOCARBAMOL 1000 MILLIGRAM(S): 500 TABLET, FILM COATED ORAL at 05:58

## 2023-01-01 RX ADMIN — Medication 50 MICROGRAM(S): at 05:38

## 2023-01-01 RX ADMIN — Medication 1 TABLET(S): at 21:42

## 2023-01-01 RX ADMIN — Medication 200 MILLIGRAM(S): at 19:36

## 2023-01-01 RX ADMIN — FAMOTIDINE 20 MILLIGRAM(S): 10 INJECTION INTRAVENOUS at 17:34

## 2023-01-01 RX ADMIN — METHOCARBAMOL 1000 MILLIGRAM(S): 500 TABLET, FILM COATED ORAL at 18:05

## 2023-01-01 RX ADMIN — Medication 25 MILLIGRAM(S): at 12:03

## 2023-01-01 RX ADMIN — Medication 50 MICROGRAM(S): at 06:11

## 2023-01-01 RX ADMIN — Medication 200 MILLIGRAM(S): at 18:44

## 2023-01-01 RX ADMIN — FAMOTIDINE 20 MILLIGRAM(S): 10 INJECTION INTRAVENOUS at 17:10

## 2023-01-01 RX ADMIN — FAMOTIDINE 20 MILLIGRAM(S): 10 INJECTION INTRAVENOUS at 06:25

## 2023-01-01 RX ADMIN — Medication 200 MILLIGRAM(S): at 11:53

## 2023-01-01 RX ADMIN — METHOCARBAMOL 1000 MILLIGRAM(S): 500 TABLET, FILM COATED ORAL at 17:07

## 2023-01-01 RX ADMIN — SODIUM CHLORIDE 75 MILLILITER(S): 9 INJECTION, SOLUTION INTRAVENOUS at 11:54

## 2023-01-01 RX ADMIN — Medication 25 GRAM(S): at 00:00

## 2023-01-01 RX ADMIN — FAMOTIDINE 20 MILLIGRAM(S): 10 INJECTION INTRAVENOUS at 05:58

## 2023-01-01 RX ADMIN — Medication 1 GRAM(S): at 17:13

## 2023-01-01 RX ADMIN — Medication 1 DROP(S): at 06:32

## 2023-01-01 RX ADMIN — FAMOTIDINE 20 MILLIGRAM(S): 10 INJECTION INTRAVENOUS at 17:16

## 2023-01-01 RX ADMIN — LORATADINE 10 MILLIGRAM(S): 10 TABLET ORAL at 11:07

## 2023-01-01 RX ADMIN — ALBUTEROL 2 PUFF(S): 90 AEROSOL, METERED ORAL at 10:45

## 2023-01-01 RX ADMIN — TIOTROPIUM BROMIDE 2 PUFF(S): 18 CAPSULE ORAL; RESPIRATORY (INHALATION) at 07:48

## 2023-01-01 RX ADMIN — Medication 100 MILLIGRAM(S): at 05:04

## 2023-01-01 RX ADMIN — Medication 2.5 MILLIGRAM(S): at 17:48

## 2023-01-01 RX ADMIN — Medication 200 MILLIGRAM(S): at 21:45

## 2023-01-01 RX ADMIN — Medication 1 TABLET(S): at 22:09

## 2023-01-01 RX ADMIN — Medication 200 MILLIGRAM(S): at 11:58

## 2023-01-01 RX ADMIN — Medication 100 MILLIGRAM(S): at 23:12

## 2023-01-01 RX ADMIN — ENOXAPARIN SODIUM 40 MILLIGRAM(S): 100 INJECTION SUBCUTANEOUS at 17:07

## 2023-01-01 RX ADMIN — FAMOTIDINE 20 MILLIGRAM(S): 10 INJECTION INTRAVENOUS at 17:53

## 2023-01-01 RX ADMIN — Medication 1 MILLIGRAM(S): at 12:05

## 2023-01-01 RX ADMIN — FAMOTIDINE 20 MILLIGRAM(S): 10 INJECTION INTRAVENOUS at 05:24

## 2023-01-01 RX ADMIN — Medication 25 GRAM(S): at 16:06

## 2023-01-01 RX ADMIN — Medication 1 TABLET(S): at 13:46

## 2023-01-01 RX ADMIN — Medication 25 GRAM(S): at 12:03

## 2023-01-01 RX ADMIN — METHOCARBAMOL 1000 MILLIGRAM(S): 500 TABLET, FILM COATED ORAL at 05:39

## 2023-01-01 RX ADMIN — LETROZOLE 2.5 MILLIGRAM(S): 2.5 TABLET, FILM COATED ORAL at 12:16

## 2023-01-01 RX ADMIN — METHOCARBAMOL 1000 MILLIGRAM(S): 500 TABLET, FILM COATED ORAL at 18:33

## 2023-01-01 RX ADMIN — GABAPENTIN 300 MILLIGRAM(S): 400 CAPSULE ORAL at 21:22

## 2023-01-01 RX ADMIN — Medication 1 GRAM(S): at 05:39

## 2023-01-01 RX ADMIN — METHOCARBAMOL 1000 MILLIGRAM(S): 500 TABLET, FILM COATED ORAL at 05:12

## 2023-01-01 RX ADMIN — Medication 2.5 MILLIGRAM(S): at 17:36

## 2023-01-01 RX ADMIN — LORATADINE 10 MILLIGRAM(S): 10 TABLET ORAL at 11:49

## 2023-01-01 RX ADMIN — Medication 1 GRAM(S): at 17:53

## 2023-01-01 RX ADMIN — Medication 1 TABLET(S): at 12:19

## 2023-01-01 RX ADMIN — TIOTROPIUM BROMIDE 2 PUFF(S): 18 CAPSULE ORAL; RESPIRATORY (INHALATION) at 10:00

## 2023-01-01 RX ADMIN — METHOCARBAMOL 1000 MILLIGRAM(S): 500 TABLET, FILM COATED ORAL at 05:07

## 2023-01-01 RX ADMIN — Medication 1 GRAM(S): at 11:41

## 2023-01-01 RX ADMIN — Medication 1 GRAM(S): at 00:37

## 2023-01-01 RX ADMIN — CHLORHEXIDINE GLUCONATE 1 APPLICATION(S): 213 SOLUTION TOPICAL at 06:13

## 2023-01-01 RX ADMIN — OXYCODONE HYDROCHLORIDE 10 MILLIGRAM(S): 5 TABLET ORAL at 03:46

## 2023-01-01 RX ADMIN — MEGESTROL ACETATE 40 MILLIGRAM(S): 40 SUSPENSION ORAL at 12:24

## 2023-01-01 RX ADMIN — Medication 1 TABLET(S): at 21:16

## 2023-01-01 RX ADMIN — METHOCARBAMOL 1000 MILLIGRAM(S): 500 TABLET, FILM COATED ORAL at 11:06

## 2023-01-01 RX ADMIN — Medication 100 MILLIGRAM(S): at 05:12

## 2023-01-01 RX ADMIN — MEGESTROL ACETATE 40 MILLIGRAM(S): 40 SUSPENSION ORAL at 05:55

## 2023-01-01 RX ADMIN — MEGESTROL ACETATE 400 MILLIGRAM(S): 40 SUSPENSION ORAL at 17:08

## 2023-01-01 RX ADMIN — Medication 2.5 MILLIGRAM(S): at 08:20

## 2023-01-01 RX ADMIN — Medication 1 DROP(S): at 06:13

## 2023-01-01 RX ADMIN — Medication 100 MILLIGRAM(S): at 11:08

## 2023-01-01 RX ADMIN — Medication 2.5 MILLIGRAM(S): at 11:41

## 2023-01-01 RX ADMIN — Medication 1 PACKET(S): at 05:53

## 2023-01-01 RX ADMIN — CHLORHEXIDINE GLUCONATE 1 APPLICATION(S): 213 SOLUTION TOPICAL at 05:24

## 2023-01-01 RX ADMIN — Medication 200 MILLIGRAM(S): at 22:58

## 2023-01-01 RX ADMIN — Medication 5 MILLIGRAM(S): at 21:27

## 2023-01-01 RX ADMIN — METHOCARBAMOL 1000 MILLIGRAM(S): 500 TABLET, FILM COATED ORAL at 12:36

## 2023-01-01 RX ADMIN — FAMOTIDINE 20 MILLIGRAM(S): 10 INJECTION INTRAVENOUS at 17:14

## 2023-01-01 RX ADMIN — LORATADINE 10 MILLIGRAM(S): 10 TABLET ORAL at 11:41

## 2023-01-01 RX ADMIN — Medication 200 MILLIGRAM(S): at 07:02

## 2023-01-01 RX ADMIN — ENOXAPARIN SODIUM 40 MILLIGRAM(S): 100 INJECTION SUBCUTANEOUS at 17:14

## 2023-01-01 RX ADMIN — Medication 200 MILLIGRAM(S): at 11:00

## 2023-01-01 RX ADMIN — TIOTROPIUM BROMIDE 2 PUFF(S): 18 CAPSULE ORAL; RESPIRATORY (INHALATION) at 10:40

## 2023-01-01 RX ADMIN — METHOCARBAMOL 1000 MILLIGRAM(S): 500 TABLET, FILM COATED ORAL at 11:17

## 2023-01-01 RX ADMIN — Medication 1 GRAM(S): at 22:17

## 2023-01-01 RX ADMIN — MEGESTROL ACETATE 40 MILLIGRAM(S): 40 SUSPENSION ORAL at 12:35

## 2023-01-01 RX ADMIN — Medication 50 MICROGRAM(S): at 05:58

## 2023-01-01 RX ADMIN — PALBOCICLIB 125 MILLIGRAM(S): 100 CAPSULE ORAL at 18:01

## 2023-01-01 RX ADMIN — Medication 25 GRAM(S): at 14:02

## 2023-01-01 RX ADMIN — Medication 650 MILLIGRAM(S): at 18:50

## 2023-01-01 RX ADMIN — SODIUM CHLORIDE 75 MILLILITER(S): 9 INJECTION, SOLUTION INTRAVENOUS at 21:55

## 2023-01-01 RX ADMIN — Medication 200 MILLIGRAM(S): at 07:30

## 2023-01-01 RX ADMIN — Medication 50 MICROGRAM(S): at 05:25

## 2023-01-01 RX ADMIN — Medication 200 MILLIGRAM(S): at 05:55

## 2023-01-01 RX ADMIN — Medication 1 GRAM(S): at 00:36

## 2023-01-01 RX ADMIN — Medication 100 MILLIGRAM(S): at 21:29

## 2023-01-01 RX ADMIN — Medication 1 GRAM(S): at 23:09

## 2023-01-01 RX ADMIN — TIOTROPIUM BROMIDE 2 PUFF(S): 18 CAPSULE ORAL; RESPIRATORY (INHALATION) at 08:11

## 2023-01-01 RX ADMIN — Medication 50 MICROGRAM(S): at 06:25

## 2023-01-01 RX ADMIN — Medication 1 GRAM(S): at 11:24

## 2023-01-01 RX ADMIN — Medication 2.5 MILLIGRAM(S): at 11:26

## 2023-01-01 RX ADMIN — Medication 1 GRAM(S): at 17:37

## 2023-01-01 RX ADMIN — Medication 1 TABLET(S): at 05:37

## 2023-01-01 RX ADMIN — FAMOTIDINE 20 MILLIGRAM(S): 10 INJECTION INTRAVENOUS at 06:11

## 2023-01-01 RX ADMIN — Medication 25 GRAM(S): at 10:36

## 2023-01-01 RX ADMIN — FAMOTIDINE 20 MILLIGRAM(S): 10 INJECTION INTRAVENOUS at 05:25

## 2023-01-01 RX ADMIN — Medication 1 GRAM(S): at 12:24

## 2023-01-01 RX ADMIN — Medication 2.5 MILLIGRAM(S): at 12:03

## 2023-01-01 RX ADMIN — TIOTROPIUM BROMIDE 2 PUFF(S): 18 CAPSULE ORAL; RESPIRATORY (INHALATION) at 09:01

## 2023-01-01 RX ADMIN — Medication 1 MILLIGRAM(S): at 11:08

## 2023-01-01 RX ADMIN — CHLORHEXIDINE GLUCONATE 1 APPLICATION(S): 213 SOLUTION TOPICAL at 05:18

## 2023-01-01 RX ADMIN — SODIUM CHLORIDE 1000 MILLILITER(S): 9 INJECTION INTRAMUSCULAR; INTRAVENOUS; SUBCUTANEOUS at 16:09

## 2023-01-01 RX ADMIN — Medication 50 MICROGRAM(S): at 05:12

## 2023-01-01 RX ADMIN — TIOTROPIUM BROMIDE 2 PUFF(S): 18 CAPSULE ORAL; RESPIRATORY (INHALATION) at 08:01

## 2023-01-01 RX ADMIN — ENOXAPARIN SODIUM 30 MILLIGRAM(S): 100 INJECTION SUBCUTANEOUS at 17:42

## 2023-01-01 RX ADMIN — Medication 100 MILLIGRAM(S): at 12:34

## 2023-01-01 RX ADMIN — Medication 50 MILLIEQUIVALENT(S): at 20:02

## 2023-01-01 RX ADMIN — MEGESTROL ACETATE 40 MILLIGRAM(S): 40 SUSPENSION ORAL at 11:08

## 2023-01-01 RX ADMIN — SODIUM CHLORIDE 100 MILLILITER(S): 9 INJECTION, SOLUTION INTRAVENOUS at 01:14

## 2023-01-01 RX ADMIN — OXYCODONE HYDROCHLORIDE 10 MILLIGRAM(S): 5 TABLET ORAL at 09:02

## 2023-01-01 RX ADMIN — LORATADINE 10 MILLIGRAM(S): 10 TABLET ORAL at 21:40

## 2023-01-01 RX ADMIN — MEGESTROL ACETATE 40 MILLIGRAM(S): 40 SUSPENSION ORAL at 11:36

## 2023-01-01 RX ADMIN — FAMOTIDINE 20 MILLIGRAM(S): 10 INJECTION INTRAVENOUS at 06:13

## 2023-01-01 RX ADMIN — Medication 650 MILLIGRAM(S): at 00:00

## 2023-01-01 RX ADMIN — Medication 1 GRAM(S): at 18:16

## 2023-01-01 RX ADMIN — Medication 50 MICROGRAM(S): at 05:44

## 2023-01-01 RX ADMIN — Medication 1 DROP(S): at 17:34

## 2023-01-01 RX ADMIN — FAMOTIDINE 20 MILLIGRAM(S): 10 INJECTION INTRAVENOUS at 06:19

## 2023-01-01 RX ADMIN — Medication 25 GRAM(S): at 11:17

## 2023-01-01 RX ADMIN — Medication 100 MILLIEQUIVALENT(S): at 20:11

## 2023-01-01 RX ADMIN — Medication 3 MILLIGRAM(S): at 21:40

## 2023-01-01 RX ADMIN — Medication 200 MILLIGRAM(S): at 05:49

## 2023-01-01 RX ADMIN — Medication 1 TABLET(S): at 14:17

## 2023-01-01 RX ADMIN — Medication 1 TABLET(S): at 13:04

## 2023-01-01 RX ADMIN — FAMOTIDINE 20 MILLIGRAM(S): 10 INJECTION INTRAVENOUS at 05:50

## 2023-01-01 RX ADMIN — Medication 4 MILLIGRAM(S): at 05:50

## 2023-01-01 RX ADMIN — Medication 200 MILLIGRAM(S): at 21:17

## 2023-01-01 RX ADMIN — SODIUM CHLORIDE 500 MILLILITER(S): 9 INJECTION, SOLUTION INTRAVENOUS at 18:42

## 2023-01-01 RX ADMIN — Medication 650 MILLIGRAM(S): at 09:59

## 2023-01-01 RX ADMIN — Medication 40 MILLIEQUIVALENT(S): at 13:24

## 2023-01-01 RX ADMIN — LORATADINE 10 MILLIGRAM(S): 10 TABLET ORAL at 12:34

## 2023-01-01 RX ADMIN — Medication 2.5 MILLIGRAM(S): at 11:48

## 2023-01-01 RX ADMIN — CHLORHEXIDINE GLUCONATE 1 APPLICATION(S): 213 SOLUTION TOPICAL at 05:37

## 2023-01-01 RX ADMIN — LETROZOLE 2.5 MILLIGRAM(S): 2.5 TABLET, FILM COATED ORAL at 11:23

## 2023-01-01 RX ADMIN — ENOXAPARIN SODIUM 40 MILLIGRAM(S): 100 INJECTION SUBCUTANEOUS at 17:36

## 2023-01-01 RX ADMIN — SODIUM CHLORIDE 75 MILLILITER(S): 9 INJECTION, SOLUTION INTRAVENOUS at 10:33

## 2023-01-01 RX ADMIN — LETROZOLE 2.5 MILLIGRAM(S): 2.5 TABLET, FILM COATED ORAL at 11:07

## 2023-01-01 RX ADMIN — FAMOTIDINE 20 MILLIGRAM(S): 10 INJECTION INTRAVENOUS at 05:55

## 2023-01-01 RX ADMIN — Medication 1 GRAM(S): at 05:12

## 2023-01-01 RX ADMIN — OXYCODONE HYDROCHLORIDE 10 MILLIGRAM(S): 5 TABLET ORAL at 08:23

## 2023-01-01 RX ADMIN — LORATADINE 10 MILLIGRAM(S): 10 TABLET ORAL at 11:25

## 2023-01-01 RX ADMIN — Medication 650 MILLIGRAM(S): at 05:57

## 2023-01-01 RX ADMIN — Medication 1 GRAM(S): at 05:35

## 2023-01-01 RX ADMIN — METHOCARBAMOL 1000 MILLIGRAM(S): 500 TABLET, FILM COATED ORAL at 00:29

## 2023-01-01 RX ADMIN — MEGESTROL ACETATE 400 MILLIGRAM(S): 40 SUSPENSION ORAL at 10:36

## 2023-01-01 RX ADMIN — Medication 1 GRAM(S): at 11:06

## 2023-01-01 RX ADMIN — MEGESTROL ACETATE 40 MILLIGRAM(S): 40 SUSPENSION ORAL at 06:13

## 2023-01-01 RX ADMIN — METHOCARBAMOL 1000 MILLIGRAM(S): 500 TABLET, FILM COATED ORAL at 03:18

## 2023-01-01 RX ADMIN — SODIUM CHLORIDE 75 MILLILITER(S): 9 INJECTION, SOLUTION INTRAVENOUS at 05:30

## 2023-01-01 RX ADMIN — FAMOTIDINE 20 MILLIGRAM(S): 10 INJECTION INTRAVENOUS at 05:53

## 2023-01-01 RX ADMIN — LORATADINE 10 MILLIGRAM(S): 10 TABLET ORAL at 11:30

## 2023-01-01 RX ADMIN — TIOTROPIUM BROMIDE 2 PUFF(S): 18 CAPSULE ORAL; RESPIRATORY (INHALATION) at 07:50

## 2023-01-01 RX ADMIN — Medication 1 TABLET(S): at 21:29

## 2023-01-01 RX ADMIN — ALBUTEROL 2 PUFF(S): 90 AEROSOL, METERED ORAL at 20:21

## 2023-01-01 RX ADMIN — ENOXAPARIN SODIUM 40 MILLIGRAM(S): 100 INJECTION SUBCUTANEOUS at 17:52

## 2023-01-01 RX ADMIN — Medication 1 MILLIGRAM(S): at 12:45

## 2023-01-01 RX ADMIN — METHOCARBAMOL 1000 MILLIGRAM(S): 500 TABLET, FILM COATED ORAL at 23:23

## 2023-01-01 RX ADMIN — Medication 1 TABLET(S): at 05:54

## 2023-01-01 RX ADMIN — Medication 25 GRAM(S): at 16:02

## 2023-01-01 RX ADMIN — Medication 50 MICROGRAM(S): at 06:23

## 2023-01-01 RX ADMIN — Medication 100 MILLIGRAM(S): at 12:10

## 2023-01-01 RX ADMIN — Medication 1 GRAM(S): at 23:05

## 2023-01-01 RX ADMIN — Medication 1 GRAM(S): at 06:18

## 2023-01-01 RX ADMIN — Medication 5 MILLIGRAM(S): at 21:42

## 2023-01-01 RX ADMIN — Medication 50 MILLIEQUIVALENT(S): at 16:14

## 2023-01-01 RX ADMIN — OXYCODONE HYDROCHLORIDE 10 MILLIGRAM(S): 5 TABLET ORAL at 14:00

## 2023-01-01 RX ADMIN — Medication 1 TABLET(S): at 05:29

## 2023-01-01 RX ADMIN — PALBOCICLIB 125 MILLIGRAM(S): 100 CAPSULE ORAL at 17:40

## 2023-01-01 RX ADMIN — Medication 1 TABLET(S): at 21:28

## 2023-01-01 RX ADMIN — MEGESTROL ACETATE 40 MILLIGRAM(S): 40 SUSPENSION ORAL at 22:00

## 2023-01-01 RX ADMIN — Medication 5 MILLIGRAM(S): at 22:08

## 2023-01-01 RX ADMIN — Medication 1 MILLIGRAM(S): at 11:49

## 2023-01-01 RX ADMIN — FAMOTIDINE 20 MILLIGRAM(S): 10 INJECTION INTRAVENOUS at 05:00

## 2023-01-01 RX ADMIN — SODIUM CHLORIDE 100 MILLILITER(S): 9 INJECTION, SOLUTION INTRAVENOUS at 00:05

## 2023-01-01 RX ADMIN — Medication 40 MILLIEQUIVALENT(S): at 11:40

## 2023-01-01 RX ADMIN — Medication 50 MICROGRAM(S): at 05:53

## 2023-01-01 RX ADMIN — ZOLEDRONIC ACID 4 MILLIGRAM(S): 5 INJECTION, SOLUTION INTRAVENOUS at 15:18

## 2023-01-01 RX ADMIN — Medication 1 GRAM(S): at 05:00

## 2023-01-01 RX ADMIN — Medication 1 TABLET(S): at 14:37

## 2023-01-01 RX ADMIN — METHOCARBAMOL 1000 MILLIGRAM(S): 500 TABLET, FILM COATED ORAL at 22:43

## 2023-01-01 RX ADMIN — Medication 200 MILLIGRAM(S): at 06:31

## 2023-01-01 RX ADMIN — Medication 50 MICROGRAM(S): at 05:36

## 2023-01-01 RX ADMIN — Medication 1 GRAM(S): at 05:33

## 2023-01-01 RX ADMIN — Medication 25 GRAM(S): at 09:35

## 2023-01-01 RX ADMIN — Medication 1 TABLET(S): at 22:05

## 2023-01-01 RX ADMIN — Medication 1 GRAM(S): at 05:24

## 2023-01-01 RX ADMIN — SODIUM CHLORIDE 60 MILLILITER(S): 9 INJECTION, SOLUTION INTRAVENOUS at 06:25

## 2023-01-01 RX ADMIN — ALBUTEROL 2 PUFF(S): 90 AEROSOL, METERED ORAL at 20:23

## 2023-01-01 RX ADMIN — FAMOTIDINE 20 MILLIGRAM(S): 10 INJECTION INTRAVENOUS at 05:11

## 2023-01-01 RX ADMIN — SODIUM CHLORIDE 75 MILLILITER(S): 9 INJECTION, SOLUTION INTRAVENOUS at 06:11

## 2023-01-01 RX ADMIN — GABAPENTIN 300 MILLIGRAM(S): 400 CAPSULE ORAL at 06:23

## 2023-01-01 RX ADMIN — Medication 650 MILLIGRAM(S): at 21:30

## 2023-01-01 RX ADMIN — Medication 200 MILLIGRAM(S): at 04:27

## 2023-01-01 RX ADMIN — OXYCODONE HYDROCHLORIDE 10 MILLIGRAM(S): 5 TABLET ORAL at 09:20

## 2023-01-01 RX ADMIN — Medication 1 TABLET(S): at 05:52

## 2023-01-01 RX ADMIN — Medication 25 GRAM(S): at 12:06

## 2023-01-01 RX ADMIN — ZOLEDRONIC ACID 4 MILLIGRAM(S): 5 INJECTION, SOLUTION INTRAVENOUS at 14:15

## 2023-01-01 RX ADMIN — ENOXAPARIN SODIUM 40 MILLIGRAM(S): 100 INJECTION SUBCUTANEOUS at 18:24

## 2023-01-01 RX ADMIN — Medication 5 MILLIGRAM(S): at 21:16

## 2023-01-01 RX ADMIN — FAMOTIDINE 20 MILLIGRAM(S): 10 INJECTION INTRAVENOUS at 05:08

## 2023-01-01 RX ADMIN — FAMOTIDINE 20 MILLIGRAM(S): 10 INJECTION INTRAVENOUS at 06:31

## 2023-01-01 RX ADMIN — Medication 1200 MILLIGRAM(S): at 10:10

## 2023-01-01 RX ADMIN — Medication 1 MILLIGRAM(S): at 11:21

## 2023-01-01 RX ADMIN — Medication 1 GRAM(S): at 18:53

## 2023-01-01 RX ADMIN — TIOTROPIUM BROMIDE 2 PUFF(S): 18 CAPSULE ORAL; RESPIRATORY (INHALATION) at 09:27

## 2023-01-01 RX ADMIN — FAMOTIDINE 20 MILLIGRAM(S): 10 INJECTION INTRAVENOUS at 06:23

## 2023-01-01 RX ADMIN — PALBOCICLIB 125 MILLIGRAM(S): 100 CAPSULE ORAL at 19:10

## 2023-01-01 RX ADMIN — TIOTROPIUM BROMIDE 2 PUFF(S): 18 CAPSULE ORAL; RESPIRATORY (INHALATION) at 08:16

## 2023-01-01 RX ADMIN — Medication 50 MICROGRAM(S): at 06:27

## 2023-01-01 RX ADMIN — SODIUM CHLORIDE 75 MILLILITER(S): 9 INJECTION, SOLUTION INTRAVENOUS at 12:44

## 2023-01-01 RX ADMIN — TIOTROPIUM BROMIDE 2 PUFF(S): 18 CAPSULE ORAL; RESPIRATORY (INHALATION) at 09:33

## 2023-01-01 RX ADMIN — Medication 650 MILLIGRAM(S): at 02:08

## 2023-01-01 RX ADMIN — MEGESTROL ACETATE 40 MILLIGRAM(S): 40 SUSPENSION ORAL at 00:05

## 2023-01-01 RX ADMIN — Medication 200 MILLIGRAM(S): at 10:04

## 2023-01-01 RX ADMIN — PALBOCICLIB 125 MILLIGRAM(S): 100 CAPSULE ORAL at 17:04

## 2023-01-01 RX ADMIN — Medication 1 GRAM(S): at 17:41

## 2023-01-01 RX ADMIN — METHOCARBAMOL 1000 MILLIGRAM(S): 500 TABLET, FILM COATED ORAL at 18:48

## 2023-01-01 RX ADMIN — ENOXAPARIN SODIUM 40 MILLIGRAM(S): 100 INJECTION SUBCUTANEOUS at 17:39

## 2023-01-01 RX ADMIN — MEGESTROL ACETATE 400 MILLIGRAM(S): 40 SUSPENSION ORAL at 16:26

## 2023-01-01 RX ADMIN — Medication 200 MILLIGRAM(S): at 17:11

## 2023-01-01 RX ADMIN — Medication 1 GRAM(S): at 17:08

## 2023-01-01 RX ADMIN — METHOCARBAMOL 1000 MILLIGRAM(S): 500 TABLET, FILM COATED ORAL at 00:00

## 2023-01-01 RX ADMIN — LETROZOLE 2.5 MILLIGRAM(S): 2.5 TABLET, FILM COATED ORAL at 12:40

## 2023-01-01 RX ADMIN — Medication 1 GRAM(S): at 12:35

## 2023-01-01 RX ADMIN — LORATADINE 10 MILLIGRAM(S): 10 TABLET ORAL at 09:59

## 2023-01-01 RX ADMIN — GABAPENTIN 300 MILLIGRAM(S): 400 CAPSULE ORAL at 12:06

## 2023-01-01 RX ADMIN — Medication 1 TABLET(S): at 15:37

## 2023-01-01 RX ADMIN — Medication 5 MILLIGRAM(S): at 21:41

## 2023-01-01 RX ADMIN — MEGESTROL ACETATE 400 MILLIGRAM(S): 40 SUSPENSION ORAL at 17:40

## 2023-01-01 RX ADMIN — Medication 650 MILLIGRAM(S): at 03:59

## 2023-01-01 RX ADMIN — MEGESTROL ACETATE 40 MILLIGRAM(S): 40 SUSPENSION ORAL at 17:34

## 2023-01-01 RX ADMIN — CHLORHEXIDINE GLUCONATE 1 APPLICATION(S): 213 SOLUTION TOPICAL at 05:25

## 2023-01-01 RX ADMIN — Medication 2.5 MILLIGRAM(S): at 18:15

## 2023-01-01 RX ADMIN — METHOCARBAMOL 1000 MILLIGRAM(S): 500 TABLET, FILM COATED ORAL at 05:52

## 2023-01-01 RX ADMIN — Medication 40 MILLIEQUIVALENT(S): at 20:11

## 2023-01-01 RX ADMIN — Medication 200 MILLIGRAM(S): at 11:26

## 2023-01-01 RX ADMIN — LORATADINE 10 MILLIGRAM(S): 10 TABLET ORAL at 12:16

## 2023-01-01 RX ADMIN — Medication 2.5 MILLIGRAM(S): at 06:31

## 2023-01-01 RX ADMIN — Medication 1 TABLET(S): at 05:08

## 2023-01-01 RX ADMIN — PALBOCICLIB 125 MILLIGRAM(S): 100 CAPSULE ORAL at 18:26

## 2023-01-01 RX ADMIN — Medication 1 GRAM(S): at 23:51

## 2023-01-01 RX ADMIN — OXYCODONE HYDROCHLORIDE 10 MILLIGRAM(S): 5 TABLET ORAL at 08:32

## 2023-01-01 RX ADMIN — Medication 20 MILLIEQUIVALENT(S): at 11:36

## 2023-01-01 RX ADMIN — Medication 1 TABLET(S): at 21:36

## 2023-01-01 RX ADMIN — Medication 1 TABLET(S): at 05:36

## 2023-01-01 RX ADMIN — Medication 1 TABLET(S): at 05:11

## 2023-01-01 RX ADMIN — Medication 25 GRAM(S): at 23:36

## 2023-01-01 RX ADMIN — Medication 200 MILLIGRAM(S): at 15:40

## 2023-01-01 RX ADMIN — MEGESTROL ACETATE 400 MILLIGRAM(S): 40 SUSPENSION ORAL at 11:24

## 2023-01-01 RX ADMIN — Medication 5 MILLIGRAM(S): at 21:33

## 2023-01-01 RX ADMIN — Medication 100 MILLIGRAM(S): at 14:05

## 2023-01-01 RX ADMIN — Medication 200 MILLIGRAM(S): at 20:36

## 2023-01-01 RX ADMIN — Medication 1 MILLIGRAM(S): at 12:25

## 2023-01-01 RX ADMIN — Medication 2.5 MILLIGRAM(S): at 06:26

## 2023-01-01 RX ADMIN — METHOCARBAMOL 1000 MILLIGRAM(S): 500 TABLET, FILM COATED ORAL at 06:28

## 2023-01-01 RX ADMIN — FAMOTIDINE 20 MILLIGRAM(S): 10 INJECTION INTRAVENOUS at 17:42

## 2023-01-01 RX ADMIN — Medication 200 MILLIGRAM(S): at 11:34

## 2023-01-01 RX ADMIN — Medication 200 MILLIGRAM(S): at 17:10

## 2023-01-01 RX ADMIN — GABAPENTIN 300 MILLIGRAM(S): 400 CAPSULE ORAL at 06:30

## 2023-01-01 RX ADMIN — METHOCARBAMOL 1000 MILLIGRAM(S): 500 TABLET, FILM COATED ORAL at 06:03

## 2023-01-01 RX ADMIN — Medication 1 MILLIGRAM(S): at 11:44

## 2023-01-01 RX ADMIN — METHOCARBAMOL 1000 MILLIGRAM(S): 500 TABLET, FILM COATED ORAL at 08:14

## 2023-01-01 RX ADMIN — OXYCODONE HYDROCHLORIDE 10 MILLIGRAM(S): 5 TABLET ORAL at 09:10

## 2023-01-01 RX ADMIN — Medication 1 GRAM(S): at 17:07

## 2023-01-01 RX ADMIN — Medication 200 MILLIGRAM(S): at 12:18

## 2023-01-01 RX ADMIN — Medication 200 MILLIGRAM(S): at 04:00

## 2023-01-01 RX ADMIN — Medication 200 MILLIGRAM(S): at 09:45

## 2023-01-01 RX ADMIN — Medication 1 GRAM(S): at 13:39

## 2023-01-01 RX ADMIN — ENOXAPARIN SODIUM 40 MILLIGRAM(S): 100 INJECTION SUBCUTANEOUS at 17:56

## 2023-01-01 RX ADMIN — Medication 50 MICROGRAM(S): at 06:16

## 2023-01-01 RX ADMIN — Medication 25 GRAM(S): at 12:10

## 2023-01-01 RX ADMIN — FAMOTIDINE 20 MILLIGRAM(S): 10 INJECTION INTRAVENOUS at 18:15

## 2023-01-01 RX ADMIN — TIOTROPIUM BROMIDE 2 PUFF(S): 18 CAPSULE ORAL; RESPIRATORY (INHALATION) at 08:36

## 2023-01-01 RX ADMIN — ENOXAPARIN SODIUM 40 MILLIGRAM(S): 100 INJECTION SUBCUTANEOUS at 17:28

## 2023-01-01 RX ADMIN — Medication 1 GRAM(S): at 11:48

## 2023-01-01 RX ADMIN — Medication 1 GRAM(S): at 11:08

## 2023-01-01 RX ADMIN — Medication 1 TABLET(S): at 22:16

## 2023-01-01 RX ADMIN — LETROZOLE 2.5 MILLIGRAM(S): 2.5 TABLET, FILM COATED ORAL at 11:14

## 2023-01-01 RX ADMIN — MEGESTROL ACETATE 40 MILLIGRAM(S): 40 SUSPENSION ORAL at 12:10

## 2023-01-01 RX ADMIN — Medication 5 MILLIGRAM(S): at 21:11

## 2023-01-01 RX ADMIN — Medication 200 MILLIGRAM(S): at 21:29

## 2023-01-01 RX ADMIN — MEGESTROL ACETATE 40 MILLIGRAM(S): 40 SUSPENSION ORAL at 23:31

## 2023-01-01 RX ADMIN — Medication 200 MILLIGRAM(S): at 10:27

## 2023-01-01 RX ADMIN — Medication 2.5 MILLIGRAM(S): at 13:44

## 2023-01-01 RX ADMIN — Medication 1 GRAM(S): at 06:16

## 2023-01-01 RX ADMIN — MEGESTROL ACETATE 400 MILLIGRAM(S): 40 SUSPENSION ORAL at 11:30

## 2023-01-01 RX ADMIN — METHOCARBAMOL 1000 MILLIGRAM(S): 500 TABLET, FILM COATED ORAL at 06:36

## 2023-01-01 RX ADMIN — Medication 1 TABLET(S): at 13:41

## 2023-01-01 RX ADMIN — ENOXAPARIN SODIUM 40 MILLIGRAM(S): 100 INJECTION SUBCUTANEOUS at 17:27

## 2023-01-01 RX ADMIN — Medication 1 GRAM(S): at 05:47

## 2023-01-01 RX ADMIN — Medication 200 MILLIGRAM(S): at 15:37

## 2023-01-01 RX ADMIN — Medication 50 MILLIEQUIVALENT(S): at 10:36

## 2023-01-01 RX ADMIN — TIOTROPIUM BROMIDE 2 PUFF(S): 18 CAPSULE ORAL; RESPIRATORY (INHALATION) at 08:31

## 2023-01-01 RX ADMIN — ENOXAPARIN SODIUM 40 MILLIGRAM(S): 100 INJECTION SUBCUTANEOUS at 17:03

## 2023-01-01 RX ADMIN — Medication 1 MILLIGRAM(S): at 12:44

## 2023-01-01 RX ADMIN — METHOCARBAMOL 1000 MILLIGRAM(S): 500 TABLET, FILM COATED ORAL at 17:34

## 2023-01-01 RX ADMIN — Medication 2.5 MILLIGRAM(S): at 11:43

## 2023-01-01 RX ADMIN — FAMOTIDINE 20 MILLIGRAM(S): 10 INJECTION INTRAVENOUS at 17:38

## 2023-01-01 RX ADMIN — Medication 1 GRAM(S): at 11:26

## 2023-01-01 RX ADMIN — METHOCARBAMOL 1000 MILLIGRAM(S): 500 TABLET, FILM COATED ORAL at 09:34

## 2023-01-01 RX ADMIN — Medication 50 MILLIEQUIVALENT(S): at 16:06

## 2023-01-01 RX ADMIN — Medication 1 TABLET(S): at 05:24

## 2023-01-01 RX ADMIN — Medication 1 TABLET(S): at 06:12

## 2023-01-01 RX ADMIN — SODIUM CHLORIDE 75 MILLILITER(S): 9 INJECTION, SOLUTION INTRAVENOUS at 21:23

## 2023-01-01 RX ADMIN — FAMOTIDINE 20 MILLIGRAM(S): 10 INJECTION INTRAVENOUS at 17:51

## 2023-01-01 RX ADMIN — Medication 1 GRAM(S): at 05:29

## 2023-01-01 RX ADMIN — TIOTROPIUM BROMIDE 2 PUFF(S): 18 CAPSULE ORAL; RESPIRATORY (INHALATION) at 08:19

## 2023-01-01 RX ADMIN — LETROZOLE 2.5 MILLIGRAM(S): 2.5 TABLET, FILM COATED ORAL at 12:10

## 2023-01-01 RX ADMIN — Medication 50 MILLIEQUIVALENT(S): at 18:03

## 2023-01-01 RX ADMIN — FAMOTIDINE 20 MILLIGRAM(S): 10 INJECTION INTRAVENOUS at 05:44

## 2023-01-01 RX ADMIN — ENOXAPARIN SODIUM 40 MILLIGRAM(S): 100 INJECTION SUBCUTANEOUS at 17:10

## 2023-01-01 RX ADMIN — Medication 200 MILLIGRAM(S): at 18:06

## 2023-01-01 RX ADMIN — METHOCARBAMOL 1000 MILLIGRAM(S): 500 TABLET, FILM COATED ORAL at 12:05

## 2023-01-01 RX ADMIN — Medication 200 MILLIGRAM(S): at 01:02

## 2023-01-01 RX ADMIN — CHLORHEXIDINE GLUCONATE 1 APPLICATION(S): 213 SOLUTION TOPICAL at 06:47

## 2023-01-01 RX ADMIN — Medication 1 GRAM(S): at 22:07

## 2023-01-01 RX ADMIN — Medication 50 MICROGRAM(S): at 05:30

## 2023-01-01 RX ADMIN — Medication 100 MILLIGRAM(S): at 06:26

## 2023-01-01 RX ADMIN — Medication 200 MILLIGRAM(S): at 03:43

## 2023-01-01 RX ADMIN — LETROZOLE 2.5 MILLIGRAM(S): 2.5 TABLET, FILM COATED ORAL at 11:54

## 2023-01-01 RX ADMIN — Medication 1 TABLET(S): at 14:04

## 2023-01-01 RX ADMIN — Medication 200 MILLIGRAM(S): at 03:10

## 2023-01-01 RX ADMIN — MEGESTROL ACETATE 40 MILLIGRAM(S): 40 SUSPENSION ORAL at 17:16

## 2023-01-01 RX ADMIN — METHOCARBAMOL 1000 MILLIGRAM(S): 500 TABLET, FILM COATED ORAL at 23:12

## 2023-01-01 RX ADMIN — METHOCARBAMOL 1000 MILLIGRAM(S): 500 TABLET, FILM COATED ORAL at 05:23

## 2023-01-01 RX ADMIN — Medication 1 GRAM(S): at 23:14

## 2023-01-01 RX ADMIN — METHOCARBAMOL 1000 MILLIGRAM(S): 500 TABLET, FILM COATED ORAL at 22:03

## 2023-01-01 RX ADMIN — Medication 1 GRAM(S): at 05:38

## 2023-01-01 RX ADMIN — Medication 1 GRAM(S): at 00:13

## 2023-01-01 RX ADMIN — Medication 1 TABLET(S): at 13:14

## 2023-01-01 RX ADMIN — Medication 100 MILLIGRAM(S): at 12:45

## 2023-01-01 RX ADMIN — LETROZOLE 2.5 MILLIGRAM(S): 2.5 TABLET, FILM COATED ORAL at 12:45

## 2023-01-01 RX ADMIN — LORATADINE 10 MILLIGRAM(S): 10 TABLET ORAL at 11:10

## 2023-01-01 RX ADMIN — Medication 200 MILLIGRAM(S): at 19:20

## 2023-01-01 RX ADMIN — LETROZOLE 2.5 MILLIGRAM(S): 2.5 TABLET, FILM COATED ORAL at 11:27

## 2023-01-01 RX ADMIN — Medication 1 PACKET(S): at 14:02

## 2023-01-01 RX ADMIN — Medication 25 GRAM(S): at 10:44

## 2023-01-01 RX ADMIN — Medication 50 MICROGRAM(S): at 05:33

## 2023-01-01 RX ADMIN — PALBOCICLIB 125 MILLIGRAM(S): 100 CAPSULE ORAL at 17:26

## 2023-01-01 RX ADMIN — TIOTROPIUM BROMIDE 2 PUFF(S): 18 CAPSULE ORAL; RESPIRATORY (INHALATION) at 08:17

## 2023-01-01 RX ADMIN — SODIUM CHLORIDE 75 MILLILITER(S): 9 INJECTION, SOLUTION INTRAVENOUS at 09:45

## 2023-01-01 RX ADMIN — OXYCODONE HYDROCHLORIDE 10 MILLIGRAM(S): 5 TABLET ORAL at 04:56

## 2023-01-01 RX ADMIN — Medication 1 MILLIGRAM(S): at 11:26

## 2023-01-01 RX ADMIN — SODIUM CHLORIDE 75 MILLILITER(S): 9 INJECTION, SOLUTION INTRAVENOUS at 17:57

## 2023-01-01 RX ADMIN — CHLORHEXIDINE GLUCONATE 1 APPLICATION(S): 213 SOLUTION TOPICAL at 05:09

## 2023-01-01 RX ADMIN — FAMOTIDINE 20 MILLIGRAM(S): 10 INJECTION INTRAVENOUS at 06:16

## 2023-01-01 RX ADMIN — SODIUM CHLORIDE 75 MILLILITER(S): 9 INJECTION, SOLUTION INTRAVENOUS at 21:20

## 2023-01-01 RX ADMIN — LETROZOLE 2.5 MILLIGRAM(S): 2.5 TABLET, FILM COATED ORAL at 11:32

## 2023-01-01 RX ADMIN — Medication 1 GRAM(S): at 05:11

## 2023-01-01 RX ADMIN — ENOXAPARIN SODIUM 40 MILLIGRAM(S): 100 INJECTION SUBCUTANEOUS at 17:13

## 2023-01-01 RX ADMIN — METHOCARBAMOL 1000 MILLIGRAM(S): 500 TABLET, FILM COATED ORAL at 23:09

## 2023-01-01 RX ADMIN — Medication 50 MILLIEQUIVALENT(S): at 09:35

## 2023-01-01 RX ADMIN — Medication 200 MILLIGRAM(S): at 17:54

## 2023-01-01 RX ADMIN — Medication 50 MICROGRAM(S): at 05:08

## 2023-01-01 RX ADMIN — LORATADINE 10 MILLIGRAM(S): 10 TABLET ORAL at 21:23

## 2023-01-01 RX ADMIN — Medication 1 MILLIGRAM(S): at 11:18

## 2023-01-01 RX ADMIN — Medication 1 TABLET(S): at 21:41

## 2023-01-01 RX ADMIN — Medication 4 MILLIGRAM(S): at 05:37

## 2023-01-01 RX ADMIN — Medication 20 MILLIEQUIVALENT(S): at 10:36

## 2023-01-01 RX ADMIN — OXYCODONE HYDROCHLORIDE 10 MILLIGRAM(S): 5 TABLET ORAL at 21:23

## 2023-01-01 RX ADMIN — Medication 1 TABLET(S): at 13:22

## 2023-01-01 RX ADMIN — Medication 5 MILLIGRAM(S): at 21:29

## 2023-01-01 RX ADMIN — MIRTAZAPINE 7.5 MILLIGRAM(S): 45 TABLET, ORALLY DISINTEGRATING ORAL at 22:48

## 2023-01-01 RX ADMIN — SODIUM CHLORIDE 60 MILLILITER(S): 9 INJECTION, SOLUTION INTRAVENOUS at 15:58

## 2023-01-01 RX ADMIN — MEGESTROL ACETATE 40 MILLIGRAM(S): 40 SUSPENSION ORAL at 01:28

## 2023-01-01 RX ADMIN — Medication 1200 MILLIGRAM(S): at 13:45

## 2023-01-01 RX ADMIN — METHOCARBAMOL 1000 MILLIGRAM(S): 500 TABLET, FILM COATED ORAL at 17:36

## 2023-01-01 RX ADMIN — Medication 1 GRAM(S): at 17:28

## 2023-01-01 RX ADMIN — Medication 50 MILLIEQUIVALENT(S): at 11:24

## 2023-01-01 RX ADMIN — Medication 1 GRAM(S): at 05:23

## 2023-01-01 RX ADMIN — Medication 1200 MILLIGRAM(S): at 06:28

## 2023-01-01 RX ADMIN — Medication 200 MILLIGRAM(S): at 16:21

## 2023-01-01 RX ADMIN — Medication 5 MILLIGRAM(S): at 21:58

## 2023-01-01 RX ADMIN — Medication 100 MILLIGRAM(S): at 12:28

## 2023-01-01 RX ADMIN — SODIUM CHLORIDE 75 MILLILITER(S): 9 INJECTION, SOLUTION INTRAVENOUS at 02:23

## 2023-01-01 RX ADMIN — Medication 100 MILLIGRAM(S): at 23:08

## 2023-01-01 RX ADMIN — ALBUTEROL 2 PUFF(S): 90 AEROSOL, METERED ORAL at 13:15

## 2023-01-01 RX ADMIN — Medication 200 MILLIGRAM(S): at 17:28

## 2023-01-01 RX ADMIN — Medication 1 MILLIGRAM(S): at 12:16

## 2023-01-01 RX ADMIN — FAMOTIDINE 20 MILLIGRAM(S): 10 INJECTION INTRAVENOUS at 17:15

## 2023-01-01 RX ADMIN — LETROZOLE 2.5 MILLIGRAM(S): 2.5 TABLET, FILM COATED ORAL at 12:25

## 2023-01-01 RX ADMIN — Medication 1 MILLIGRAM(S): at 12:00

## 2023-01-01 RX ADMIN — ENOXAPARIN SODIUM 40 MILLIGRAM(S): 100 INJECTION SUBCUTANEOUS at 17:43

## 2023-01-01 RX ADMIN — LORATADINE 10 MILLIGRAM(S): 10 TABLET ORAL at 11:44

## 2023-01-01 RX ADMIN — Medication 50 MILLIEQUIVALENT(S): at 21:28

## 2023-01-01 RX ADMIN — Medication 200 MILLIGRAM(S): at 22:06

## 2023-01-01 RX ADMIN — Medication 1 GRAM(S): at 05:55

## 2023-01-01 RX ADMIN — OXYCODONE HYDROCHLORIDE 10 MILLIGRAM(S): 5 TABLET ORAL at 22:00

## 2023-01-01 RX ADMIN — Medication 100 MILLIGRAM(S): at 11:36

## 2023-01-01 RX ADMIN — Medication 650 MILLIGRAM(S): at 06:31

## 2023-01-01 RX ADMIN — Medication 200 MILLIGRAM(S): at 05:12

## 2023-01-01 RX ADMIN — Medication 1 GRAM(S): at 18:24

## 2023-01-01 RX ADMIN — METHOCARBAMOL 1000 MILLIGRAM(S): 500 TABLET, FILM COATED ORAL at 17:12

## 2023-01-01 RX ADMIN — Medication 200 MILLIGRAM(S): at 17:52

## 2023-01-01 RX ADMIN — Medication 200 MILLIGRAM(S): at 22:11

## 2023-01-01 RX ADMIN — PAMIDRONATE DISODIUM 65 MILLIGRAM(S): 9 INJECTION, SOLUTION INTRAVENOUS at 06:39

## 2023-01-01 RX ADMIN — Medication 1 TABLET(S): at 05:12

## 2023-01-01 RX ADMIN — METHOCARBAMOL 1000 MILLIGRAM(S): 500 TABLET, FILM COATED ORAL at 12:16

## 2023-01-01 RX ADMIN — Medication 650 MILLIGRAM(S): at 07:29

## 2023-01-01 RX ADMIN — Medication 1 GRAM(S): at 06:11

## 2023-01-01 RX ADMIN — METHOCARBAMOL 1000 MILLIGRAM(S): 500 TABLET, FILM COATED ORAL at 07:44

## 2023-01-01 RX ADMIN — ENOXAPARIN SODIUM 40 MILLIGRAM(S): 100 INJECTION SUBCUTANEOUS at 17:33

## 2023-01-01 RX ADMIN — METHOCARBAMOL 1000 MILLIGRAM(S): 500 TABLET, FILM COATED ORAL at 13:14

## 2023-01-01 RX ADMIN — LORATADINE 10 MILLIGRAM(S): 10 TABLET ORAL at 12:05

## 2023-01-01 RX ADMIN — TIOTROPIUM BROMIDE 2 PUFF(S): 18 CAPSULE ORAL; RESPIRATORY (INHALATION) at 08:02

## 2023-01-01 RX ADMIN — OXYCODONE HYDROCHLORIDE 10 MILLIGRAM(S): 5 TABLET ORAL at 13:19

## 2023-01-01 RX ADMIN — LORATADINE 10 MILLIGRAM(S): 10 TABLET ORAL at 11:43

## 2023-01-01 RX ADMIN — Medication 5 MILLIGRAM(S): at 21:38

## 2023-01-01 RX ADMIN — Medication 1 TABLET(S): at 05:58

## 2023-01-01 RX ADMIN — LETROZOLE 2.5 MILLIGRAM(S): 2.5 TABLET, FILM COATED ORAL at 12:54

## 2023-01-01 RX ADMIN — FAMOTIDINE 20 MILLIGRAM(S): 10 INJECTION INTRAVENOUS at 05:47

## 2023-01-01 RX ADMIN — Medication 1 TABLET(S): at 13:11

## 2023-01-01 RX ADMIN — METHOCARBAMOL 1000 MILLIGRAM(S): 500 TABLET, FILM COATED ORAL at 18:07

## 2023-01-01 RX ADMIN — Medication 100 MILLIGRAM(S): at 06:31

## 2023-01-01 RX ADMIN — METHOCARBAMOL 1000 MILLIGRAM(S): 500 TABLET, FILM COATED ORAL at 23:10

## 2023-01-01 RX ADMIN — Medication 5 MILLIGRAM(S): at 22:59

## 2023-01-01 RX ADMIN — Medication 1 MILLIGRAM(S): at 11:30

## 2023-01-01 RX ADMIN — Medication 200 MILLIGRAM(S): at 06:49

## 2023-01-01 RX ADMIN — Medication 200 MILLIGRAM(S): at 22:33

## 2023-01-01 RX ADMIN — LETROZOLE 2.5 MILLIGRAM(S): 2.5 TABLET, FILM COATED ORAL at 12:35

## 2023-01-01 RX ADMIN — Medication 1 TABLET(S): at 23:05

## 2023-01-01 RX ADMIN — Medication 2.5 MILLIGRAM(S): at 17:37

## 2023-01-01 RX ADMIN — MEGESTROL ACETATE 40 MILLIGRAM(S): 40 SUSPENSION ORAL at 17:41

## 2023-01-01 RX ADMIN — METHOCARBAMOL 1000 MILLIGRAM(S): 500 TABLET, FILM COATED ORAL at 23:35

## 2023-01-01 RX ADMIN — ALBUTEROL 2 PUFF(S): 90 AEROSOL, METERED ORAL at 13:03

## 2023-01-01 RX ADMIN — Medication 50 MICROGRAM(S): at 06:31

## 2023-01-01 RX ADMIN — Medication 50 MILLIEQUIVALENT(S): at 12:03

## 2023-01-01 RX ADMIN — Medication 200 MILLIGRAM(S): at 02:24

## 2023-01-01 RX ADMIN — Medication 200 MILLIGRAM(S): at 15:28

## 2023-01-01 RX ADMIN — ENOXAPARIN SODIUM 40 MILLIGRAM(S): 100 INJECTION SUBCUTANEOUS at 17:53

## 2023-01-01 RX ADMIN — Medication 1 TABLET(S): at 22:33

## 2023-01-01 RX ADMIN — Medication 1 TABLET(S): at 22:10

## 2023-01-01 RX ADMIN — Medication 25 GRAM(S): at 17:51

## 2023-01-01 RX ADMIN — ENOXAPARIN SODIUM 30 MILLIGRAM(S): 100 INJECTION SUBCUTANEOUS at 05:47

## 2023-01-01 RX ADMIN — Medication 200 MILLIGRAM(S): at 14:37

## 2023-01-01 RX ADMIN — LORATADINE 10 MILLIGRAM(S): 10 TABLET ORAL at 11:36

## 2023-01-01 RX ADMIN — FAMOTIDINE 20 MILLIGRAM(S): 10 INJECTION INTRAVENOUS at 16:56

## 2023-01-01 RX ADMIN — Medication 200 MILLIGRAM(S): at 23:32

## 2023-01-01 RX ADMIN — METHOCARBAMOL 1000 MILLIGRAM(S): 500 TABLET, FILM COATED ORAL at 23:07

## 2023-01-01 RX ADMIN — Medication 1 TABLET(S): at 12:58

## 2023-01-01 RX ADMIN — LETROZOLE 2.5 MILLIGRAM(S): 2.5 TABLET, FILM COATED ORAL at 11:11

## 2023-01-01 RX ADMIN — Medication 50 MICROGRAM(S): at 05:00

## 2023-01-01 RX ADMIN — TIOTROPIUM BROMIDE 2 PUFF(S): 18 CAPSULE ORAL; RESPIRATORY (INHALATION) at 08:24

## 2023-01-01 RX ADMIN — METHOCARBAMOL 1000 MILLIGRAM(S): 500 TABLET, FILM COATED ORAL at 23:58

## 2023-01-01 RX ADMIN — Medication 5 MILLIGRAM(S): at 22:06

## 2023-01-01 RX ADMIN — OXYCODONE HYDROCHLORIDE 10 MILLIGRAM(S): 5 TABLET ORAL at 08:41

## 2023-01-01 RX ADMIN — LETROZOLE 2.5 MILLIGRAM(S): 2.5 TABLET, FILM COATED ORAL at 11:17

## 2023-01-01 RX ADMIN — Medication 25 GRAM(S): at 09:00

## 2023-01-01 RX ADMIN — LORATADINE 10 MILLIGRAM(S): 10 TABLET ORAL at 11:21

## 2023-01-01 RX ADMIN — Medication 200 MILLIGRAM(S): at 06:39

## 2023-01-01 RX ADMIN — LETROZOLE 2.5 MILLIGRAM(S): 2.5 TABLET, FILM COATED ORAL at 12:03

## 2023-01-01 RX ADMIN — Medication 650 MILLIGRAM(S): at 09:27

## 2023-01-01 RX ADMIN — Medication 1 GRAM(S): at 22:49

## 2023-01-01 RX ADMIN — METHOCARBAMOL 1000 MILLIGRAM(S): 500 TABLET, FILM COATED ORAL at 11:59

## 2023-01-01 RX ADMIN — Medication 1 GRAM(S): at 12:40

## 2023-01-01 RX ADMIN — LETROZOLE 2.5 MILLIGRAM(S): 2.5 TABLET, FILM COATED ORAL at 11:37

## 2023-01-01 RX ADMIN — METHOCARBAMOL 1000 MILLIGRAM(S): 500 TABLET, FILM COATED ORAL at 10:10

## 2023-01-01 RX ADMIN — LETROZOLE 2.5 MILLIGRAM(S): 2.5 TABLET, FILM COATED ORAL at 11:31

## 2023-01-01 RX ADMIN — OXYCODONE HYDROCHLORIDE 10 MILLIGRAM(S): 5 TABLET ORAL at 13:49

## 2023-01-01 RX ADMIN — Medication 20 MILLIEQUIVALENT(S): at 11:30

## 2023-01-01 RX ADMIN — Medication 1 TABLET(S): at 13:07

## 2023-01-01 RX ADMIN — Medication 25 GRAM(S): at 16:13

## 2023-01-01 RX ADMIN — ENOXAPARIN SODIUM 40 MILLIGRAM(S): 100 INJECTION SUBCUTANEOUS at 18:15

## 2023-01-01 RX ADMIN — Medication 650 MILLIGRAM(S): at 05:47

## 2023-01-01 RX ADMIN — FAMOTIDINE 20 MILLIGRAM(S): 10 INJECTION INTRAVENOUS at 05:36

## 2023-01-01 RX ADMIN — QUETIAPINE FUMARATE 25 MILLIGRAM(S): 200 TABLET, FILM COATED ORAL at 02:16

## 2023-01-01 RX ADMIN — METHOCARBAMOL 1000 MILLIGRAM(S): 500 TABLET, FILM COATED ORAL at 04:58

## 2023-01-01 RX ADMIN — FAMOTIDINE 20 MILLIGRAM(S): 10 INJECTION INTRAVENOUS at 05:30

## 2023-01-01 RX ADMIN — Medication 3 MILLIGRAM(S): at 21:22

## 2023-01-01 RX ADMIN — LORATADINE 10 MILLIGRAM(S): 10 TABLET ORAL at 11:26

## 2023-01-01 RX ADMIN — METHOCARBAMOL 1000 MILLIGRAM(S): 500 TABLET, FILM COATED ORAL at 18:26

## 2023-01-01 RX ADMIN — METHOCARBAMOL 1000 MILLIGRAM(S): 500 TABLET, FILM COATED ORAL at 14:01

## 2023-01-01 RX ADMIN — ZOLEDRONIC ACID 4 MILLIGRAM(S): 5 INJECTION, SOLUTION INTRAVENOUS at 14:48

## 2023-01-01 RX ADMIN — Medication 1200 MILLIGRAM(S): at 22:08

## 2023-01-01 RX ADMIN — Medication 1 TABLET(S): at 14:02

## 2023-01-01 RX ADMIN — Medication 200 MILLIGRAM(S): at 05:39

## 2023-01-01 RX ADMIN — GABAPENTIN 300 MILLIGRAM(S): 400 CAPSULE ORAL at 13:14

## 2023-01-01 RX ADMIN — LETROZOLE 2.5 MILLIGRAM(S): 2.5 TABLET, FILM COATED ORAL at 11:49

## 2023-01-01 RX ADMIN — FAMOTIDINE 20 MILLIGRAM(S): 10 INJECTION INTRAVENOUS at 05:33

## 2023-01-01 RX ADMIN — Medication 200 MILLIGRAM(S): at 09:40

## 2023-01-01 RX ADMIN — Medication 1 DROP(S): at 17:37

## 2023-01-01 RX ADMIN — TIOTROPIUM BROMIDE 2 PUFF(S): 18 CAPSULE ORAL; RESPIRATORY (INHALATION) at 08:14

## 2023-01-01 RX ADMIN — METHOCARBAMOL 1000 MILLIGRAM(S): 500 TABLET, FILM COATED ORAL at 13:02

## 2023-01-01 RX ADMIN — Medication 50 MILLIEQUIVALENT(S): at 10:44

## 2023-01-01 RX ADMIN — Medication 200 MILLIGRAM(S): at 09:12

## 2023-01-01 RX ADMIN — Medication 650 MILLIGRAM(S): at 11:12

## 2023-01-01 RX ADMIN — Medication 50 MICROGRAM(S): at 05:45

## 2023-01-01 RX ADMIN — LORATADINE 10 MILLIGRAM(S): 10 TABLET ORAL at 12:39

## 2023-01-01 RX ADMIN — FAMOTIDINE 20 MILLIGRAM(S): 10 INJECTION INTRAVENOUS at 17:11

## 2023-01-01 RX ADMIN — Medication 1 TABLET(S): at 05:38

## 2023-01-01 RX ADMIN — Medication 1 MILLIGRAM(S): at 11:43

## 2023-01-01 RX ADMIN — Medication 200 MILLIGRAM(S): at 21:34

## 2023-01-01 RX ADMIN — Medication 1 TABLET(S): at 21:55

## 2023-01-01 RX ADMIN — FAMOTIDINE 20 MILLIGRAM(S): 10 INJECTION INTRAVENOUS at 17:28

## 2023-01-01 RX ADMIN — LORATADINE 10 MILLIGRAM(S): 10 TABLET ORAL at 12:10

## 2023-01-01 RX ADMIN — Medication 25 GRAM(S): at 13:17

## 2023-01-01 RX ADMIN — METHOCARBAMOL 1000 MILLIGRAM(S): 500 TABLET, FILM COATED ORAL at 17:26

## 2023-01-01 RX ADMIN — Medication 1 TABLET(S): at 21:58

## 2023-01-01 RX ADMIN — Medication 50 MICROGRAM(S): at 05:49

## 2023-01-01 RX ADMIN — TIOTROPIUM BROMIDE 2 PUFF(S): 18 CAPSULE ORAL; RESPIRATORY (INHALATION) at 08:57

## 2023-01-01 RX ADMIN — MEGESTROL ACETATE 40 MILLIGRAM(S): 40 SUSPENSION ORAL at 05:54

## 2023-01-01 RX ADMIN — METHOCARBAMOL 1000 MILLIGRAM(S): 500 TABLET, FILM COATED ORAL at 21:11

## 2023-01-01 RX ADMIN — MEGESTROL ACETATE 40 MILLIGRAM(S): 40 SUSPENSION ORAL at 00:37

## 2023-01-01 RX ADMIN — FAMOTIDINE 20 MILLIGRAM(S): 10 INJECTION INTRAVENOUS at 05:45

## 2023-01-01 RX ADMIN — Medication 1200 MILLIGRAM(S): at 10:41

## 2023-01-01 RX ADMIN — OXYCODONE HYDROCHLORIDE 10 MILLIGRAM(S): 5 TABLET ORAL at 21:40

## 2023-01-01 RX ADMIN — METHOCARBAMOL 1000 MILLIGRAM(S): 500 TABLET, FILM COATED ORAL at 12:20

## 2023-01-01 RX ADMIN — ENOXAPARIN SODIUM 40 MILLIGRAM(S): 100 INJECTION SUBCUTANEOUS at 18:06

## 2023-01-01 RX ADMIN — Medication 1 GRAM(S): at 21:16

## 2023-01-01 RX ADMIN — Medication 2.5 MILLIGRAM(S): at 17:28

## 2023-01-01 RX ADMIN — PALBOCICLIB 125 MILLIGRAM(S): 100 CAPSULE ORAL at 17:53

## 2023-01-01 RX ADMIN — Medication 1 TABLET(S): at 17:35

## 2023-01-01 RX ADMIN — LETROZOLE 2.5 MILLIGRAM(S): 2.5 TABLET, FILM COATED ORAL at 11:29

## 2023-01-01 RX ADMIN — LETROZOLE 2.5 MILLIGRAM(S): 2.5 TABLET, FILM COATED ORAL at 11:26

## 2023-01-01 RX ADMIN — FAMOTIDINE 20 MILLIGRAM(S): 10 INJECTION INTRAVENOUS at 18:06

## 2023-01-01 RX ADMIN — GABAPENTIN 300 MILLIGRAM(S): 400 CAPSULE ORAL at 21:40

## 2023-01-01 RX ADMIN — Medication 1 GRAM(S): at 05:50

## 2023-01-01 RX ADMIN — Medication 1 GRAM(S): at 17:56

## 2023-01-01 RX ADMIN — CHLORHEXIDINE GLUCONATE 1 APPLICATION(S): 213 SOLUTION TOPICAL at 05:57

## 2023-01-01 RX ADMIN — Medication 1 GRAM(S): at 16:56

## 2023-01-01 RX ADMIN — Medication 1 TABLET(S): at 21:17

## 2023-01-01 RX ADMIN — Medication 1 MILLIGRAM(S): at 11:40

## 2023-01-01 RX ADMIN — LETROZOLE 2.5 MILLIGRAM(S): 2.5 TABLET, FILM COATED ORAL at 11:43

## 2023-01-01 RX ADMIN — CHLORHEXIDINE GLUCONATE 1 APPLICATION(S): 213 SOLUTION TOPICAL at 06:32

## 2023-01-01 RX ADMIN — Medication 650 MILLIGRAM(S): at 11:10

## 2023-01-01 RX ADMIN — LORATADINE 10 MILLIGRAM(S): 10 TABLET ORAL at 12:24

## 2023-01-01 RX ADMIN — LETROZOLE 2.5 MILLIGRAM(S): 2.5 TABLET, FILM COATED ORAL at 11:41

## 2023-01-01 RX ADMIN — Medication 50 MICROGRAM(S): at 06:13

## 2023-01-01 RX ADMIN — METHOCARBAMOL 1000 MILLIGRAM(S): 500 TABLET, FILM COATED ORAL at 06:31

## 2023-01-01 RX ADMIN — Medication 1 GRAM(S): at 22:00

## 2023-01-01 RX ADMIN — Medication 50 MICROGRAM(S): at 05:11

## 2023-01-01 RX ADMIN — METHOCARBAMOL 1000 MILLIGRAM(S): 500 TABLET, FILM COATED ORAL at 06:26

## 2023-01-01 RX ADMIN — LORATADINE 10 MILLIGRAM(S): 10 TABLET ORAL at 11:53

## 2023-01-01 RX ADMIN — Medication 50 MICROGRAM(S): at 05:50

## 2023-01-01 RX ADMIN — MEGESTROL ACETATE 40 MILLIGRAM(S): 40 SUSPENSION ORAL at 17:27

## 2023-01-01 RX ADMIN — MEGESTROL ACETATE 40 MILLIGRAM(S): 40 SUSPENSION ORAL at 06:28

## 2023-01-01 RX ADMIN — PALBOCICLIB 125 MILLIGRAM(S): 100 CAPSULE ORAL at 17:12

## 2023-01-01 RX ADMIN — Medication 1 MILLIGRAM(S): at 11:53

## 2023-01-01 RX ADMIN — Medication 1 GRAM(S): at 12:06

## 2023-01-01 RX ADMIN — Medication 50 MICROGRAM(S): at 05:47

## 2023-01-01 RX ADMIN — Medication 1200 MILLIGRAM(S): at 23:12

## 2023-01-01 RX ADMIN — Medication 200 MILLIGRAM(S): at 12:23

## 2023-01-01 RX ADMIN — Medication 1 GRAM(S): at 23:12

## 2023-01-01 RX ADMIN — Medication 1 MILLIGRAM(S): at 12:39

## 2023-01-01 RX ADMIN — Medication 650 MILLIGRAM(S): at 21:20

## 2023-01-01 RX ADMIN — LETROZOLE 2.5 MILLIGRAM(S): 2.5 TABLET, FILM COATED ORAL at 13:13

## 2023-01-01 RX ADMIN — SODIUM CHLORIDE 75 MILLILITER(S): 9 INJECTION, SOLUTION INTRAVENOUS at 23:06

## 2023-01-01 RX ADMIN — Medication 1 GRAM(S): at 17:38

## 2023-01-01 RX ADMIN — Medication 1 GRAM(S): at 17:14

## 2023-01-01 RX ADMIN — ENOXAPARIN SODIUM 40 MILLIGRAM(S): 100 INJECTION SUBCUTANEOUS at 16:55

## 2023-01-01 RX ADMIN — Medication 1 GRAM(S): at 12:10

## 2023-01-01 RX ADMIN — Medication 1 GRAM(S): at 04:22

## 2023-01-01 RX ADMIN — PALBOCICLIB 125 MILLIGRAM(S): 100 CAPSULE ORAL at 17:48

## 2023-01-01 RX ADMIN — Medication 1 GRAM(S): at 05:52

## 2023-01-01 RX ADMIN — Medication 1 GRAM(S): at 05:44

## 2023-01-01 RX ADMIN — ENOXAPARIN SODIUM 30 MILLIGRAM(S): 100 INJECTION SUBCUTANEOUS at 06:30

## 2023-01-01 RX ADMIN — Medication 1 GRAM(S): at 06:28

## 2023-01-01 RX ADMIN — METHOCARBAMOL 1000 MILLIGRAM(S): 500 TABLET, FILM COATED ORAL at 09:45

## 2023-01-01 RX ADMIN — TIOTROPIUM BROMIDE 2 PUFF(S): 18 CAPSULE ORAL; RESPIRATORY (INHALATION) at 07:55

## 2023-01-01 RX ADMIN — Medication 1 GRAM(S): at 00:05

## 2023-01-01 RX ADMIN — METHOCARBAMOL 1000 MILLIGRAM(S): 500 TABLET, FILM COATED ORAL at 18:21

## 2023-01-01 RX ADMIN — SODIUM CHLORIDE 1000 MILLILITER(S): 9 INJECTION, SOLUTION INTRAVENOUS at 14:05

## 2023-01-01 RX ADMIN — Medication 200 MILLIGRAM(S): at 11:23

## 2023-01-01 RX ADMIN — MEGESTROL ACETATE 40 MILLIGRAM(S): 40 SUSPENSION ORAL at 12:45

## 2023-01-01 RX ADMIN — METHOCARBAMOL 1000 MILLIGRAM(S): 500 TABLET, FILM COATED ORAL at 23:40

## 2023-01-01 RX ADMIN — Medication 650 MILLIGRAM(S): at 18:20

## 2023-04-20 ENCOUNTER — OUTPATIENT (OUTPATIENT)
Dept: OUTPATIENT SERVICES | Facility: HOSPITAL | Age: 58
LOS: 1 days | End: 2023-04-20
Payer: COMMERCIAL

## 2023-04-20 ENCOUNTER — APPOINTMENT (OUTPATIENT)
Dept: HEMATOLOGY ONCOLOGY | Facility: CLINIC | Age: 58
End: 2023-04-20
Payer: COMMERCIAL

## 2023-04-20 ENCOUNTER — LABORATORY RESULT (OUTPATIENT)
Age: 58
End: 2023-04-20

## 2023-04-20 ENCOUNTER — TRANSCRIPTION ENCOUNTER (OUTPATIENT)
Age: 58
End: 2023-04-20

## 2023-04-20 ENCOUNTER — INPATIENT (INPATIENT)
Facility: HOSPITAL | Age: 58
LOS: 7 days | Discharge: NURSING, REHAB OR LONG-TERM CARE | DRG: 457 | End: 2023-04-28
Attending: NEUROLOGICAL SURGERY | Admitting: INTERNAL MEDICINE
Payer: COMMERCIAL

## 2023-04-20 VITALS
OXYGEN SATURATION: 98 % | DIASTOLIC BLOOD PRESSURE: 100 MMHG | WEIGHT: 195.99 LBS | HEART RATE: 144 BPM | TEMPERATURE: 98 F | SYSTOLIC BLOOD PRESSURE: 183 MMHG | HEIGHT: 64 IN | RESPIRATION RATE: 15 BRPM

## 2023-04-20 VITALS
RESPIRATION RATE: 18 BRPM | BODY MASS INDEX: 34.38 KG/M2 | OXYGEN SATURATION: 99 % | TEMPERATURE: 98.6 F | HEART RATE: 140 BPM | WEIGHT: 194 LBS | DIASTOLIC BLOOD PRESSURE: 114 MMHG | SYSTOLIC BLOOD PRESSURE: 117 MMHG | HEIGHT: 63 IN

## 2023-04-20 DIAGNOSIS — D48.5 NEOPLASM OF UNCERTAIN BEHAVIOR OF SKIN: ICD-10-CM

## 2023-04-20 DIAGNOSIS — M54.2 CERVICALGIA: ICD-10-CM

## 2023-04-20 LAB
ALBUMIN SERPL ELPH-MCNC: 4.8 G/DL — SIGNIFICANT CHANGE UP (ref 3.5–5.2)
ALP SERPL-CCNC: 138 U/L — HIGH (ref 30–115)
ALT FLD-CCNC: 9 U/L — SIGNIFICANT CHANGE UP (ref 0–41)
ANION GAP SERPL CALC-SCNC: 15 MMOL/L — HIGH (ref 7–14)
APTT BLD: 35.9 SEC — SIGNIFICANT CHANGE UP (ref 27–39.2)
AST SERPL-CCNC: 18 U/L — SIGNIFICANT CHANGE UP (ref 0–41)
BASOPHILS # BLD AUTO: 0.07 K/UL — SIGNIFICANT CHANGE UP (ref 0–0.2)
BASOPHILS NFR BLD AUTO: 0.6 % — SIGNIFICANT CHANGE UP (ref 0–1)
BILIRUB SERPL-MCNC: 0.4 MG/DL — SIGNIFICANT CHANGE UP (ref 0.2–1.2)
BLD GP AB SCN SERPL QL: SIGNIFICANT CHANGE UP
BUN SERPL-MCNC: 16 MG/DL — SIGNIFICANT CHANGE UP (ref 10–20)
CALCIUM SERPL-MCNC: 11.4 MG/DL — HIGH (ref 8.4–10.5)
CHLORIDE SERPL-SCNC: 101 MMOL/L — SIGNIFICANT CHANGE UP (ref 98–110)
CO2 SERPL-SCNC: 21 MMOL/L — SIGNIFICANT CHANGE UP (ref 17–32)
CREAT SERPL-MCNC: 0.8 MG/DL — SIGNIFICANT CHANGE UP (ref 0.7–1.5)
EGFR: 85 ML/MIN/1.73M2 — SIGNIFICANT CHANGE UP
EOSINOPHIL # BLD AUTO: 0.04 K/UL — SIGNIFICANT CHANGE UP (ref 0–0.7)
EOSINOPHIL NFR BLD AUTO: 0.4 % — SIGNIFICANT CHANGE UP (ref 0–8)
GLUCOSE SERPL-MCNC: 96 MG/DL — SIGNIFICANT CHANGE UP (ref 70–99)
HCT VFR BLD CALC: 45.2 % — SIGNIFICANT CHANGE UP (ref 37–47)
HGB BLD-MCNC: 14.8 G/DL — SIGNIFICANT CHANGE UP (ref 12–16)
IMM GRANULOCYTES NFR BLD AUTO: 0.5 % — HIGH (ref 0.1–0.3)
INR BLD: 1.03 RATIO — SIGNIFICANT CHANGE UP (ref 0.65–1.3)
LYMPHOCYTES # BLD AUTO: 1.62 K/UL — SIGNIFICANT CHANGE UP (ref 1.2–3.4)
LYMPHOCYTES # BLD AUTO: 15 % — LOW (ref 20.5–51.1)
MCHC RBC-ENTMCNC: 28.4 PG — SIGNIFICANT CHANGE UP (ref 27–31)
MCHC RBC-ENTMCNC: 32.7 G/DL — SIGNIFICANT CHANGE UP (ref 32–37)
MCV RBC AUTO: 86.8 FL — SIGNIFICANT CHANGE UP (ref 81–99)
MONOCYTES # BLD AUTO: 0.53 K/UL — SIGNIFICANT CHANGE UP (ref 0.1–0.6)
MONOCYTES NFR BLD AUTO: 4.9 % — SIGNIFICANT CHANGE UP (ref 1.7–9.3)
NEUTROPHILS # BLD AUTO: 8.46 K/UL — HIGH (ref 1.4–6.5)
NEUTROPHILS NFR BLD AUTO: 78.6 % — HIGH (ref 42.2–75.2)
NRBC # BLD: 0 /100 WBCS — SIGNIFICANT CHANGE UP (ref 0–0)
PLATELET # BLD AUTO: 299 K/UL — SIGNIFICANT CHANGE UP (ref 130–400)
PMV BLD: 9.5 FL — SIGNIFICANT CHANGE UP (ref 7.4–10.4)
POTASSIUM SERPL-MCNC: 4.7 MMOL/L — SIGNIFICANT CHANGE UP (ref 3.5–5)
POTASSIUM SERPL-SCNC: 4.7 MMOL/L — SIGNIFICANT CHANGE UP (ref 3.5–5)
PROT SERPL-MCNC: 7.7 G/DL — SIGNIFICANT CHANGE UP (ref 6–8)
PROTHROM AB SERPL-ACNC: 11.8 SEC — SIGNIFICANT CHANGE UP (ref 9.95–12.87)
RBC # BLD: 5.21 M/UL — SIGNIFICANT CHANGE UP (ref 4.2–5.4)
RBC # FLD: 14.5 % — SIGNIFICANT CHANGE UP (ref 11.5–14.5)
SODIUM SERPL-SCNC: 137 MMOL/L — SIGNIFICANT CHANGE UP (ref 135–146)
WBC # BLD: 10.77 K/UL — SIGNIFICANT CHANGE UP (ref 4.8–10.8)
WBC # FLD AUTO: 10.77 K/UL — SIGNIFICANT CHANGE UP (ref 4.8–10.8)

## 2023-04-20 PROCEDURE — 84443 ASSAY THYROID STIM HORMONE: CPT

## 2023-04-20 PROCEDURE — 99204 OFFICE O/P NEW MOD 45 MIN: CPT

## 2023-04-20 PROCEDURE — 99205 OFFICE O/P NEW HI 60 MIN: CPT

## 2023-04-20 PROCEDURE — 93005 ELECTROCARDIOGRAM TRACING: CPT

## 2023-04-20 PROCEDURE — 84484 ASSAY OF TROPONIN QUANT: CPT

## 2023-04-20 PROCEDURE — 72040 X-RAY EXAM NECK SPINE 2-3 VW: CPT

## 2023-04-20 PROCEDURE — 72147 MRI CHEST SPINE W/DYE: CPT

## 2023-04-20 PROCEDURE — 72125 CT NECK SPINE W/O DYE: CPT | Mod: 26,MA

## 2023-04-20 PROCEDURE — C1713: CPT

## 2023-04-20 PROCEDURE — 86900 BLOOD TYPING SEROLOGIC ABO: CPT

## 2023-04-20 PROCEDURE — C1889: CPT

## 2023-04-20 PROCEDURE — 93970 EXTREMITY STUDY: CPT

## 2023-04-20 PROCEDURE — C9399: CPT

## 2023-04-20 PROCEDURE — C1751: CPT

## 2023-04-20 PROCEDURE — 70450 CT HEAD/BRAIN W/O DYE: CPT

## 2023-04-20 PROCEDURE — 97110 THERAPEUTIC EXERCISES: CPT | Mod: GP

## 2023-04-20 PROCEDURE — 85025 COMPLETE CBC W/AUTO DIFF WBC: CPT

## 2023-04-20 PROCEDURE — 85730 THROMBOPLASTIN TIME PARTIAL: CPT

## 2023-04-20 PROCEDURE — 83605 ASSAY OF LACTIC ACID: CPT

## 2023-04-20 PROCEDURE — 97535 SELF CARE MNGMENT TRAINING: CPT | Mod: GO

## 2023-04-20 PROCEDURE — 83735 ASSAY OF MAGNESIUM: CPT

## 2023-04-20 PROCEDURE — 72142 MRI NECK SPINE W/DYE: CPT

## 2023-04-20 PROCEDURE — 83970 ASSAY OF PARATHORMONE: CPT

## 2023-04-20 PROCEDURE — 85027 COMPLETE CBC AUTOMATED: CPT

## 2023-04-20 PROCEDURE — 84100 ASSAY OF PHOSPHORUS: CPT

## 2023-04-20 PROCEDURE — 82310 ASSAY OF CALCIUM: CPT

## 2023-04-20 PROCEDURE — 99222 1ST HOSP IP/OBS MODERATE 55: CPT

## 2023-04-20 PROCEDURE — 71260 CT THORAX DX C+: CPT

## 2023-04-20 PROCEDURE — 92526 ORAL FUNCTION THERAPY: CPT | Mod: GN

## 2023-04-20 PROCEDURE — 99285 EMERGENCY DEPT VISIT HI MDM: CPT

## 2023-04-20 PROCEDURE — 88360 TUMOR IMMUNOHISTOCHEM/MANUAL: CPT

## 2023-04-20 PROCEDURE — 72125 CT NECK SPINE W/O DYE: CPT

## 2023-04-20 PROCEDURE — 86901 BLOOD TYPING SEROLOGIC RH(D): CPT

## 2023-04-20 PROCEDURE — 88304 TISSUE EXAM BY PATHOLOGIST: CPT

## 2023-04-20 PROCEDURE — 88344 IMHCHEM/IMCYTCHM EA MLT ANTB: CPT

## 2023-04-20 PROCEDURE — 72149 MRI LUMBAR SPINE W/DYE: CPT

## 2023-04-20 PROCEDURE — A9579: CPT

## 2023-04-20 PROCEDURE — 97530 THERAPEUTIC ACTIVITIES: CPT | Mod: GP

## 2023-04-20 PROCEDURE — 86803 HEPATITIS C AB TEST: CPT

## 2023-04-20 PROCEDURE — 92610 EVALUATE SWALLOWING FUNCTION: CPT | Mod: GN

## 2023-04-20 PROCEDURE — 80053 COMPREHEN METABOLIC PANEL: CPT

## 2023-04-20 PROCEDURE — 97116 GAIT TRAINING THERAPY: CPT | Mod: GP

## 2023-04-20 PROCEDURE — 97163 PT EVAL HIGH COMPLEX 45 MIN: CPT | Mod: GP

## 2023-04-20 PROCEDURE — 80048 BASIC METABOLIC PNL TOTAL CA: CPT

## 2023-04-20 PROCEDURE — 36415 COLL VENOUS BLD VENIPUNCTURE: CPT

## 2023-04-20 PROCEDURE — 74177 CT ABD & PELVIS W/CONTRAST: CPT

## 2023-04-20 PROCEDURE — 81025 URINE PREGNANCY TEST: CPT

## 2023-04-20 PROCEDURE — 86850 RBC ANTIBODY SCREEN: CPT

## 2023-04-20 PROCEDURE — 82550 ASSAY OF CK (CPK): CPT

## 2023-04-20 PROCEDURE — 88341 IMHCHEM/IMCYTCHM EA ADD ANTB: CPT

## 2023-04-20 PROCEDURE — 94640 AIRWAY INHALATION TREATMENT: CPT

## 2023-04-20 PROCEDURE — 71045 X-RAY EXAM CHEST 1 VIEW: CPT

## 2023-04-20 PROCEDURE — 82553 CREATINE MB FRACTION: CPT

## 2023-04-20 PROCEDURE — 85610 PROTHROMBIN TIME: CPT

## 2023-04-20 PROCEDURE — 97167 OT EVAL HIGH COMPLEX 60 MIN: CPT | Mod: GO

## 2023-04-20 RX ORDER — LANOLIN ALCOHOL/MO/W.PET/CERES
5 CREAM (GRAM) TOPICAL ONCE
Refills: 0 | Status: COMPLETED | OUTPATIENT
Start: 2023-04-20 | End: 2023-04-20

## 2023-04-20 RX ORDER — LANOLIN ALCOHOL/MO/W.PET/CERES
5 CREAM (GRAM) TOPICAL AT BEDTIME
Refills: 0 | Status: DISCONTINUED | OUTPATIENT
Start: 2023-04-21 | End: 2023-04-24

## 2023-04-20 RX ORDER — LEVOTHYROXINE SODIUM 125 MCG
50 TABLET ORAL DAILY
Refills: 0 | Status: DISCONTINUED | OUTPATIENT
Start: 2023-04-20 | End: 2023-04-24

## 2023-04-20 RX ORDER — SODIUM CHLORIDE 9 MG/ML
1000 INJECTION, SOLUTION INTRAVENOUS
Refills: 0 | Status: DISCONTINUED | OUTPATIENT
Start: 2023-04-20 | End: 2023-04-21

## 2023-04-20 RX ORDER — ACETAMINOPHEN 500 MG
650 TABLET ORAL ONCE
Refills: 0 | Status: COMPLETED | OUTPATIENT
Start: 2023-04-20 | End: 2023-04-20

## 2023-04-20 RX ORDER — ENOXAPARIN SODIUM 100 MG/ML
40 INJECTION SUBCUTANEOUS EVERY 24 HOURS
Refills: 0 | Status: DISCONTINUED | OUTPATIENT
Start: 2023-04-20 | End: 2023-04-24

## 2023-04-20 RX ORDER — ACETAMINOPHEN 500 MG
650 TABLET ORAL EVERY 6 HOURS
Refills: 0 | Status: DISCONTINUED | OUTPATIENT
Start: 2023-04-20 | End: 2023-04-22

## 2023-04-20 RX ADMIN — SODIUM CHLORIDE 65 MILLILITER(S): 9 INJECTION, SOLUTION INTRAVENOUS at 19:55

## 2023-04-20 RX ADMIN — Medication 650 MILLIGRAM(S): at 22:55

## 2023-04-20 RX ADMIN — Medication 650 MILLIGRAM(S): at 22:25

## 2023-04-20 RX ADMIN — Medication 5 MILLIGRAM(S): at 22:24

## 2023-04-20 NOTE — H&P ADULT - NSHPLABSRESULTS_GEN_ALL_CORE
14.8   10.77 )-----------( 299      ( 20 Apr 2023 14:56 )             45.2     04-20    137  |  101  |  16  ----------------------------<  96  4.7   |  21  |  0.8    Ca    11.4<H>      20 Apr 2023 14:56    TPro  7.7  /  Alb  4.8  /  TBili  0.4  /  DBili  x   /  AST  18  /  ALT  9   /  AlkPhos  138<H>  04-20    PT/INR - ( 20 Apr 2023 14:56 )   PT: 11.80 sec;   INR: 1.03 ratio         PTT - ( 20 Apr 2023 14:56 )  PTT:35.9 sec

## 2023-04-20 NOTE — H&P ADULT - ASSESSMENT
57-year-old female with past history of metastatic breast CA presenting with severe neck pain. Pt has been having neck pain since January that has worsened significantly yesterday.  Patient saw her hematologist oncologist Dr. Peña referred the patient to the ED for evaluation.  Patient reports no loss of sensation or motor function, urinary or fecal incontinence.    In the ED:  VS: afebrile, , /101, Spo2 95% on RA   Labs only remarkable for Ca of 11.4       # Severe neck pain 2/2 metastatic lesion in the cervical spine   - CT spine showing Numerous lytic lesions with associated soft tissue throughout the visualized skull base and spine consistent with extensive osseous metastatic disease. Epidural component of tumor is noted from C1-C4. Tumor essentially replaces C1, C2 and C3 and there is pathologic fracture of C2  - Neurosurgery contacted by ED  - pain management      #hypercalcemia     # sinus tachycardia     #breast Ca with mets  Airway patent, Nasal mucosa clear. Mouth with normal mucosa. Throat has no vesicles, no oropharyngeal exudates and uvula is midline. 57-year-old female with PMH of hypothyroidism and ? alcoholic cirrhosis (stopped drinking over 9 yrs ago), presenting for neck pain. Pt started having neck pain almost 4 months ago for which her chiropractor ordered an MRI that was done 1 month ago. Pt was not sure what the read of the MRI was but there was concern about lesions so she went to see Dr. Peña's office for the first time today. Dr. Peña examined her and told her she has breast cancer with metastasis and needs to come to the ED fo further evaluation.       # Severe neck pain 2/2 metastatic lesions in the cervical spine   - CT cervical spine showing Numerous lytic lesions with associated soft tissue throughout the visualized skull base and spine consistent with extensive osseous metastatic disease. Epidural component of tumor is noted from C1-C4. Tumor essentially replaces C1, C2 and C3 and there is pathologic fracture of C2  - Neurosurgery contacted by ED  - pain management: tylenol, NSAIDs (pt does not want anything stronger at this point)  - PT/OT        # newly diagnosed breast Ca with mets  - C/s oncology placed  - will hold off getting further staging images for today as pt cannot lie flat for now 2/2 to pain      #hypercalcemia likely 2/2 malignancy   - Ca 11.4  - gentle hydration with LR @65cc/hr  - PTH ordered     # sinus tachycardia  - HR 120s in ED  - EKG sinus tach       # hypothyrodism   - c/w levothyroxine 50mcg OD  - TSH am  57-year-old female with PMH of hypothyroidism and ? alcoholic cirrhosis (stopped drinking over 9 yrs ago), presenting for neck pain. Pt started having neck pain almost 4 months ago for which her chiropractor ordered an MRI that was done 1 month ago. Pt was not sure what the read of the MRI was but there was concern about lesions so she went to see Dr. Peña's office for the first time today. Dr. Peña examined her and told her she has breast cancer with metastasis and needs to come to the ED fo further evaluation.       # Severe neck pain 2/2 metastatic lesions in the cervical spine   - CT cervical spine showing Numerous lytic lesions with associated soft tissue throughout the visualized skull base and spine consistent with extensive osseous metastatic disease. Epidural component of tumor is noted from C1-C4. Tumor essentially replaces C1, C2 and C3 and there is pathologic fracture of C2  - Will get MRI Cervical, Thoracic, Lumbar, Chest and breast  - Will get CTH to r/o mets to brain  - Will need Biopsy (IR vs neurosurgery)  - pain management: tylenol, NSAIDs (pt does not want anything stronger at this point)  - Neurosurgery contacted by ED  - consider rad-onc consult for neck radiation  - consider oncology pending imaging  - PT/OT      # newly diagnosed breast Ca with mets  - C/s oncology placed  - will hold off getting further staging images for today as pt cannot lie flat for now 2/2 to pain    #hypercalcemia likely 2/2 malignancy   - Ca 11.4  - gentle hydration with LR @65cc/hr > consider to increase to 150cc/hr if there is no improvement  - PTH ordered     # sinus tachycardia  - HR 120s in ED  - EKG sinus tach     # hypothyroidism   - c/w levothyroxine 50mcg OD  - TSH am

## 2023-04-20 NOTE — ED ADULT TRIAGE NOTE - CHIEF COMPLAINT QUOTE
pt sent in from cancer center for persistent neck pain since January. hx of metastatic breast CA requesting stat MRI pt sent in from cancer center for persistent neck pain since January. hx of metastatic breast CA requesting stat MRI. cleared from crit by MD sharma

## 2023-04-20 NOTE — H&P ADULT - NSHPPHYSICALEXAM_GEN_ALL_CORE
GENERAL: NAD, well-nourished; well-developed.  HEAD:  Normocephalic, atraumatic.  EYES: EOMI, PERRL, conjunctiva and sclera clear.  ENT: MMM, no erythema/exudates.  no central spinal tenderness paraspinal neck tenderness  NECK: Supple, no JVD.  CHEST/LUNG: Clear to auscultation bilaterally; no wheezes, rales, or rhonchi.  HEART: Regular rate and rhythm, normal S1 and S2; no murmurs, rubs, or gallops.  ABDOMEN: Soft, nontender, nondistended.  EXTREMITIES:  2+ peripheral pulses; no clubbing, cyanosis, or edema.  PSYCH: Cooperative, appropriate, normal mood and affect.  NEUROLOGY: A&O x 3. Motor 5/5. Sensory intact. No focal neurological deficits. CN II - XII intact. (-) dysmetria, facial droop, pronator drift.  SKIN: Warm and dry. GENERAL: NAD, well-nourished; well-developed.  HEAD:  Normocephalic, atraumatic.  EYES: EOMI, PERRL, conjunctiva and sclera clear.  BREAST examination: R sided lumps and dimpling of the breast   CHEST/LUNG: Clear to auscultation bilaterally; no wheezes, rales, or rhonchi.  HEART: Regular rate and rhythm, normal S1 and S2; no murmurs, rubs, or gallops.  ABDOMEN: Soft, nontender, nondistended.  EXTREMITIES:  2+ peripheral pulses; no clubbing, cyanosis, or edema.  PSYCH: Cooperative, appropriate, normal mood and affect.  NEUROLOGY: A&O x 3. Motor 5/5. Sensory intact. No focal neurological deficits. CN II - XII intact. (-) dysmetria, facial droop, pronator drift.  SKIN: Warm and dry.

## 2023-04-20 NOTE — CONSULT NOTE ADULT - SUBJECTIVE AND OBJECTIVE BOX
NEUROSURGERY CONSULT  JEAN-PAUL REYES   04-20-23 @ 18:46    HPI: 57-year-old female with PMH of hypothyroidism and ? alcoholic cirrhosis (stopped drinking over 9 yrs ago), presenting for neck pain. Pt started having neck pain almost 4 months ago for which her chiropractor ordered an MRI that was done 1 month ago. Pt was not sure what the read of the MRI was but there was concern about lesions so she went to see Dr. Peña's office for the first time today. Dr. Peña examined her and told her she has breast cancer with metastasis and needs to come to the ED fo further evaluation. Pt is a non smoker and has no family history of breast Ca. Denies any significant weight loss, night sweats, or fevers. No reported bloody discharge from her nipples. Upon examining pt' s right breast,  dimpling along with a lump was found. Pt believes that she noticed this change almost over a yr a go, but did not think of it as anything serious. Pt's neck pain has been significantly worsening limiting her ability to move her neck. She only has pain when moving her neck and has full sensation and ROM of her b/l UE. Denies any SOB, chest pain, or palpitations.     Neurosurgery was consulted on a patient with Hx of breast ca with worsening neck pain. States that she has been having neck pain for 4 months, and that she has been having work up from chiropractor Dr. Chavez? who has been doing adjustments on her. Patient states that she did not have relief from neck pain and so chiropractor had ordered an MRI C spine with contrast which showed concern for lesions. Patient states that since 1 month ago patient was instructed to remain in a collar and had followed up with Dr. Peña who stated that she needs to come to the ED for further evaluation. Patient seen and examined at bedside. States that she has pain with ROM, and states that it is worse with movement ex moving out of bed. Patient states that pain is mostly midline at the base of head, with radiation to her paraspinal muscles. Denies any radicular pain to UE & LE. States that she has been sleeping only in a recliner and has since then been developing worsening lower back pain. Denies any ataxia, imbalance, or difficulty with ambulation. Denies any numbness, tingling, paresthesias, weakness, saddle anesthesia, or bowel / bladder incontinence.     RADIOLOGY:   < from: CT Cervical Spine No Cont (04.20.23 @ 13:36) >  IMPRESSION:  1.  Numerous lytic lesions with associated soft tissue throughout the   visualized skull base and spine consistent with extensive osseous   metastatic disease.  2.  Epidural component of tumor is noted from C1-C4.  3.  Tumor essentially replaces C1, C2 and C3 and there is pathologic   fracture of C2.  4.  Findings would be better assessed with contrast-enhanced MRI.   Additionally, direct comparison with the patient's recent outside imaging   studies would be helpful.    PAST MEDICAL & SURGICAL HISTORY:    FAMILY HISTORY:      SOCIAL HISTORY:  Tobacco Use:  EtOH use:   Substance:    Allergies    No Known Allergies    Intolerances        [X] A ten-point review of systems is negative except as noted   [  ] Due to altered mental status/intubation, subjective information were not able to be obtained from the patient. History was obtained, to the extent possible, from review of the chart and collateral sources of information    MEDS:  enoxaparin Injectable 40 milliGRAM(s) SubCutaneous every 24 hours  lactated ringers. 1000 milliLiter(s) IV Continuous <Continuous>  levothyroxine 50 MICROGram(s) Oral daily      PHYSICAL EXAM:  GENERAL: NAD, speaks in full sentences, no signs of respiratory distress  HEAD:  Atraumatic, Normocephalic  NECK: Supple, No JVD  CHEST/LUNG: Clear to auscultation bilaterally; No wheeze; No crackles; No accessory muscles used  HEART: Regular rate and rhythm;   ABDOMEN: Soft, Nontender, Nondistended; Bowel sounds present; No guarding  EXTREMITIES:  2+ Peripheral Pulses, No cyanosis or edema  MSK: Midline cervical tenderness to palpation    NEUROLOGICAL EXAM   Awake, alert, following commands   PERRL, EOMI   ROM of neck limited due to pain   MAEx4  B/L UE 5/5   B/L LE 5/5   SILT   No clonus   No reyes   Reflexes WNL   Mild TTP of cervical spine     Vital Signs Last 24 Hrs  T(C): 37.1 (20 Apr 2023 15:25), Max: 37.1 (20 Apr 2023 15:25)  T(F): 98.7 (20 Apr 2023 15:25), Max: 98.7 (20 Apr 2023 15:25)  HR: 127 (20 Apr 2023 15:25) (127 - 144)  BP: 143/101 (20 Apr 2023 15:25) (143/101 - 183/100)  BP(mean): --  RR: 15 (20 Apr 2023 15:25) (15 - 15)  SpO2: 95% (20 Apr 2023 15:25) (95% - 98%)    Parameters below as of 20 Apr 2023 15:25  Patient On (Oxygen Delivery Method): room air          LABS:                        14.8   10.77 )-----------( 299      ( 20 Apr 2023 14:56 )             45.2     04-20    137  |  101  |  16  ----------------------------<  96  4.7   |  21  |  0.8    Ca    11.4<H>      20 Apr 2023 14:56    TPro  7.7  /  Alb  4.8  /  TBili  0.4  /  DBili  x   /  AST  18  /  ALT  9   /  AlkPhos  138<H>  04-20    PT/INR - ( 20 Apr 2023 14:56 )   PT: 11.80 sec;   INR: 1.03 ratio         PTT - ( 20 Apr 2023 14:56 )  PTT:35.9 sec

## 2023-04-20 NOTE — PATIENT PROFILE ADULT - FALL HARM RISK - HARM RISK INTERVENTIONS

## 2023-04-20 NOTE — ED PROVIDER NOTE - OBJECTIVE STATEMENT
57-year-old female with past history of metastatic breast CA presents with neck pain since January worsening over the past month with severe neck pain started yesterday.  Patient saw her hematologist oncologist Dr. Peña who is concerned for patient's neck pain and sent patient in for evaluation.  Patient reports no loss of sensation or motor function, urinary or fecal incontinence.

## 2023-04-20 NOTE — ED PROVIDER NOTE - PHYSICAL EXAMINATION
PHYSICAL EXAM: I have reviewed current vital signs.  GENERAL: NAD, well-nourished; well-developed.  HEAD:  Normocephalic, atraumatic.  EYES: EOMI, PERRL, conjunctiva and sclera clear.  ENT: MMM, no erythema/exudates.  no central spinal tenderness paraspinal neck tenderness  NECK: Supple, no JVD.  CHEST/LUNG: Clear to auscultation bilaterally; no wheezes, rales, or rhonchi.  HEART: Regular rate and rhythm, normal S1 and S2; no murmurs, rubs, or gallops.  ABDOMEN: Soft, nontender, nondistended.  EXTREMITIES:  2+ peripheral pulses; no clubbing, cyanosis, or edema.  PSYCH: Cooperative, appropriate, normal mood and affect.  NEUROLOGY: A&O x 3. Motor 5/5. Sensory intact. No focal neurological deficits. CN II - XII intact. (-) dysmetria, facial droop, pronator drift.  SKIN: Warm and dry.

## 2023-04-20 NOTE — CONSULT NOTE ADULT - ASSESSMENT
57F with Hx hypothyoridism, alcoholic cirrhosis, breast ca presenting for worsening neck pain with CT C spine showing numerous lytic osseous metastic lesions with epidural C1-C4 tumor     PLAN   - Needs MRI C/T/L spine with sachin if no contraindications   - Needs Hard collar [place] [facility] when OOB   - Oncology / Radonc consults pending MRI ?   - Discussed case with attending

## 2023-04-20 NOTE — ED ADULT NURSE NOTE - OBJECTIVE STATEMENT
Pt c/o neck pain since january, worsening pain x 1 week. Pt states she has hx of cervical stenosis. Pt had MRI done 1 month ago. Pt oncologist requesting repeat MRI. Pt in soft collar- states she wears it whenever she ambulates. Hx R breast CA & Hypothyroid

## 2023-04-20 NOTE — PATIENT PROFILE ADULT - FALL HARM RISK - FALL HARM RISK
Other H Plasty Text: Given the location of the defect, shape of the defect and the proximity to free margins a H-plasty was deemed most appropriate for repair.  Using a sterile surgical marker, the appropriate advancement arms of the H-plasty were drawn incorporating the defect and placing the expected incisions within the relaxed skin tension lines where possible. The area thus outlined was incised deep to adipose tissue with a #15 scalpel blade. The skin margins were undermined to an appropriate distance in all directions utilizing iris scissors.  The opposing advancement arms were then advanced into place in opposite direction and anchored with interrupted buried subcutaneous sutures.

## 2023-04-20 NOTE — ED PROVIDER NOTE - ATTENDING CONTRIBUTION TO CARE
52-year-old female with past medical history of metastatic breast cancer presents the emergency department for worsening neck pain since January.  Patient saw her oncologist Dr. Peña who was concern for pathological fracture and metastasis so sent her to the emergency department for further evaluation and management.  Patient thought that the pain was just from sleeping on it incorrectly.  She is not having any numbness or tingling or weakness.  No bladder or bowel incontinence or retention.    Const: NAD  Eyes: PERRL, no conjunctival injection  HENT:  Neck supple without meningismus   CV: RRR, Warm, well-perfused extremities  RESP: CTA B/L, no tachypnea   GI: soft, non-tender, non-distended  MSK: No gross deformities appreciated  Skin: Warm, dry. No rashes  Neuro: Alert, CNs II-XII grossly intact. Sensation and motor function of extremities grossly intact. equa lstrength in upper and lower extremities.   Psych: Appropriate mood and affect.    ct nack

## 2023-04-20 NOTE — ED PROVIDER NOTE - NS ED ROS FT
Constitutional:  No fevers or chills.  Eyes:  No visual changes, eye pain, or discharge.  ENT:  No hearing changes. No sore throat.  Neck:  (+)  neck pain (+) stiffness.  Cardiac:  No CP or edema.  Resp:  No cough or SOB. No hemoptysis.   GI:  No nausea, vomiting, diarrhea, or abdominal pain.  :  No dysuria, frequency, or hematuria.  MSK:  No myalgias or joint pain/swelling.  Neuro:  No headache, dizziness, or weakness.  Skin:  No skin rash.

## 2023-04-20 NOTE — H&P ADULT - HISTORY OF PRESENT ILLNESS
57-year-old female with past history of metastatic breast CA presenting with severe neck pain. Pt has been having neck pain since January that has worsened significantly yesterday.  Patient saw her oncologist (Dr. Peña) who referred the patient to the ED for evaluation.  Patient reports no loss of sensation or motor function, urinary or fecal incontinence.    In the ED:  VS: afebrile, , /101, Spo2 95% on RA   Labs only remarkable for Ca of 11.4     CT spine:   1.  Numerous lytic lesions with associated soft tissue throughout the visualized skull base and spine consistent with extensive osseous metastatic disease.    2.  Epidural component of tumor is noted from C1-C4.    3.  Tumor essentially replaces C1, C2 and C3 and there is pathologic fracture of C2.    4.  Findings would be better assessed with contrast-enhanced MRI. Additionally, direct comparison with the patient's recent outside imaging studies would be helpful.    Neurosurgery contacted by ED and will evaluate pt    57-year-old female with PMH of hypothyroidism and ? alcoholic cirrhosis (stopped drinking over 9 yrs ago), presenting for neck pain. Pt started having neck pain almost 4 months ago for which her chiropractor ordered an MRI that was done 1 month ago. Pt was not sure what the read of the MRI was but there was concern about lesions so she went to see Dr. Peña's office for the first time today. Dr. Peña examined her and told her she has breast cancer with metastasis and needs to come to the ED fo further evaluation. Pt is a non smoker and has no family history of breast Ca. Denies any significant weight loss, night sweats, or fevers. No reported bloody discharge from her nipples. Upon examining pt' s right breast,  dimpling along with a lump was found. Pt believes that she noticed this change almost over a yr a go, but did not think of it as anything serious. Pt's neck pain has been significantly worsening limiting her ability to move her neck. She only has pain when moving her neck and has full sensation and ROM of her b/l UE. Denies any SOB, chest pain, or palpitations.     In the ED:  VS: afebrile, , /101, Spo2 95% on RA   Labs only remarkable for Ca of 11.4     CT cervical spine:   1.  Numerous lytic lesions with associated soft tissue throughout the visualized skull base and spine consistent with extensive osseous metastatic disease.    2.  Epidural component of tumor is noted from C1-C4.    3.  Tumor essentially replaces C1, C2 and C3 and there is pathologic fracture of C2.    4.  Findings would be better assessed with contrast-enhanced MRI. Additionally, direct comparison with the patient's recent outside imaging studies would be helpful.    Neurosurgery contacted by ED and will evaluate pt

## 2023-04-20 NOTE — ED PROVIDER NOTE - CLINICAL SUMMARY MEDICAL DECISION MAKING FREE TEXT BOX
52-year-old female presents emergency department for neck pain.  Patient had CT scan which demonstrated Numerous lytic lesions with associated soft tissue throughout the visualized skull base and spine consistent with extensive osseous metastatic disease. Epidural component of tumor is noted from C1-C4. Tumor essentially replaces C1, C2 and C3 and there is pathologic fracture of C2.  Neurosurgery was consulted and patient is admitted for further evaluation and management.

## 2023-04-21 DIAGNOSIS — D48.5 NEOPLASM OF UNCERTAIN BEHAVIOR OF SKIN: ICD-10-CM

## 2023-04-21 LAB
ALBUMIN SERPL ELPH-MCNC: 4.2 G/DL — SIGNIFICANT CHANGE UP (ref 3.5–5.2)
ALBUMIN SERPL ELPH-MCNC: 5 G/DL
ALP BLD-CCNC: 146 U/L
ALP SERPL-CCNC: 123 U/L — HIGH (ref 30–115)
ALT FLD-CCNC: 8 U/L — SIGNIFICANT CHANGE UP (ref 0–41)
ALT SERPL-CCNC: 9 U/L
ANION GAP SERPL CALC-SCNC: 15 MMOL/L — HIGH (ref 7–14)
ANION GAP SERPL CALC-SCNC: 18 MMOL/L
APTT BLD: 35.1 SEC
AST SERPL-CCNC: 17 U/L — SIGNIFICANT CHANGE UP (ref 0–41)
AST SERPL-CCNC: 19 U/L
BASOPHILS # BLD AUTO: 0.08 K/UL — SIGNIFICANT CHANGE UP (ref 0–0.2)
BASOPHILS NFR BLD AUTO: 0.8 % — SIGNIFICANT CHANGE UP (ref 0–1)
BILIRUB SERPL-MCNC: 0.4 MG/DL
BILIRUB SERPL-MCNC: 0.5 MG/DL — SIGNIFICANT CHANGE UP (ref 0.2–1.2)
BUN SERPL-MCNC: 15 MG/DL
BUN SERPL-MCNC: 16 MG/DL — SIGNIFICANT CHANGE UP (ref 10–20)
CALCIUM SERPL-MCNC: 10.2 MG/DL — SIGNIFICANT CHANGE UP (ref 8.4–10.5)
CALCIUM SERPL-MCNC: 10.7 MG/DL — HIGH (ref 8.4–10.5)
CALCIUM SERPL-MCNC: 10.8 MG/DL
CEA SERPL-MCNC: 3.7 NG/ML
CHLORIDE SERPL-SCNC: 97 MMOL/L — LOW (ref 98–110)
CHLORIDE SERPL-SCNC: 98 MMOL/L
CO2 SERPL-SCNC: 20 MMOL/L
CO2 SERPL-SCNC: 20 MMOL/L — SIGNIFICANT CHANGE UP (ref 17–32)
CREAT SERPL-MCNC: 0.7 MG/DL — SIGNIFICANT CHANGE UP (ref 0.7–1.5)
CREAT SERPL-MCNC: 0.9 MG/DL
EGFR: 100 ML/MIN/1.73M2 — SIGNIFICANT CHANGE UP
EGFR: 74 ML/MIN/1.73M2
EOSINOPHIL # BLD AUTO: 0.08 K/UL — SIGNIFICANT CHANGE UP (ref 0–0.7)
EOSINOPHIL NFR BLD AUTO: 0.8 % — SIGNIFICANT CHANGE UP (ref 0–8)
GLUCOSE SERPL-MCNC: 120 MG/DL
GLUCOSE SERPL-MCNC: 83 MG/DL — SIGNIFICANT CHANGE UP (ref 70–99)
HCT VFR BLD CALC: 41.4 % — SIGNIFICANT CHANGE UP (ref 37–47)
HCT VFR BLD CALC: 46.4 %
HCV AB S/CO SERPL IA: 0.04 COI — SIGNIFICANT CHANGE UP
HCV AB SERPL-IMP: SIGNIFICANT CHANGE UP
HGB BLD-MCNC: 13.3 G/DL — SIGNIFICANT CHANGE UP (ref 12–16)
HGB BLD-MCNC: 15 G/DL
IMM GRANULOCYTES NFR BLD AUTO: 0.4 % — HIGH (ref 0.1–0.3)
INR PPP: 1.03 RATIO
LYMPHOCYTES # BLD AUTO: 1.7 K/UL — SIGNIFICANT CHANGE UP (ref 1.2–3.4)
LYMPHOCYTES # BLD AUTO: 16.9 % — LOW (ref 20.5–51.1)
MCHC RBC-ENTMCNC: 27.9 PG
MCHC RBC-ENTMCNC: 27.9 PG — SIGNIFICANT CHANGE UP (ref 27–31)
MCHC RBC-ENTMCNC: 32.1 G/DL — SIGNIFICANT CHANGE UP (ref 32–37)
MCHC RBC-ENTMCNC: 32.3 G/DL
MCV RBC AUTO: 86.4 FL
MCV RBC AUTO: 87 FL — SIGNIFICANT CHANGE UP (ref 81–99)
MONOCYTES # BLD AUTO: 0.67 K/UL — HIGH (ref 0.1–0.6)
MONOCYTES NFR BLD AUTO: 6.7 % — SIGNIFICANT CHANGE UP (ref 1.7–9.3)
NEUTROPHILS # BLD AUTO: 7.5 K/UL — HIGH (ref 1.4–6.5)
NEUTROPHILS NFR BLD AUTO: 74.4 % — SIGNIFICANT CHANGE UP (ref 42.2–75.2)
NRBC # BLD: 0 /100 WBCS — SIGNIFICANT CHANGE UP (ref 0–0)
PLATELET # BLD AUTO: 262 K/UL — SIGNIFICANT CHANGE UP (ref 130–400)
PLATELET # BLD AUTO: 331 K/UL
PMV BLD: 9.4 FL
PMV BLD: 9.9 FL — SIGNIFICANT CHANGE UP (ref 7.4–10.4)
POTASSIUM SERPL-MCNC: 3.7 MMOL/L — SIGNIFICANT CHANGE UP (ref 3.5–5)
POTASSIUM SERPL-SCNC: 3.7 MMOL/L — SIGNIFICANT CHANGE UP (ref 3.5–5)
POTASSIUM SERPL-SCNC: 4.1 MMOL/L
PROT SERPL-MCNC: 6.7 G/DL — SIGNIFICANT CHANGE UP (ref 6–8)
PROT SERPL-MCNC: 8.1 G/DL
PT BLD: 11.8 SEC
PTH-INTACT FLD-MCNC: 13 PG/ML — LOW (ref 15–65)
RBC # BLD: 4.76 M/UL — SIGNIFICANT CHANGE UP (ref 4.2–5.4)
RBC # BLD: 5.37 M/UL
RBC # FLD: 14.6 %
RBC # FLD: 14.7 % — HIGH (ref 11.5–14.5)
SODIUM SERPL-SCNC: 132 MMOL/L — LOW (ref 135–146)
SODIUM SERPL-SCNC: 136 MMOL/L
TSH SERPL-MCNC: 1.52 UIU/ML — SIGNIFICANT CHANGE UP (ref 0.27–4.2)
WBC # BLD: 10.07 K/UL — SIGNIFICANT CHANGE UP (ref 4.8–10.8)
WBC # FLD AUTO: 10.07 K/UL — SIGNIFICANT CHANGE UP (ref 4.8–10.8)
WBC # FLD AUTO: 12.85 K/UL

## 2023-04-21 PROCEDURE — 72149 MRI LUMBAR SPINE W/DYE: CPT | Mod: 26

## 2023-04-21 PROCEDURE — 99233 SBSQ HOSP IP/OBS HIGH 50: CPT

## 2023-04-21 PROCEDURE — 99221 1ST HOSP IP/OBS SF/LOW 40: CPT

## 2023-04-21 PROCEDURE — 72142 MRI NECK SPINE W/DYE: CPT | Mod: 26

## 2023-04-21 PROCEDURE — 70450 CT HEAD/BRAIN W/O DYE: CPT | Mod: 26

## 2023-04-21 PROCEDURE — 74177 CT ABD & PELVIS W/CONTRAST: CPT | Mod: 26

## 2023-04-21 PROCEDURE — 71260 CT THORAX DX C+: CPT | Mod: 26

## 2023-04-21 PROCEDURE — 72147 MRI CHEST SPINE W/DYE: CPT | Mod: 26

## 2023-04-21 PROCEDURE — 99223 1ST HOSP IP/OBS HIGH 75: CPT

## 2023-04-21 RX ORDER — SODIUM CHLORIDE 9 MG/ML
1000 INJECTION INTRAMUSCULAR; INTRAVENOUS; SUBCUTANEOUS
Refills: 0 | Status: DISCONTINUED | OUTPATIENT
Start: 2023-04-21 | End: 2023-04-24

## 2023-04-21 RX ORDER — LORATADINE 10 MG/1
10 TABLET ORAL ONCE
Refills: 0 | Status: COMPLETED | OUTPATIENT
Start: 2023-04-21 | End: 2023-04-21

## 2023-04-21 RX ORDER — HYDROMORPHONE HYDROCHLORIDE 2 MG/ML
0.5 INJECTION INTRAMUSCULAR; INTRAVENOUS; SUBCUTANEOUS ONCE
Refills: 0 | Status: DISCONTINUED | OUTPATIENT
Start: 2023-04-21 | End: 2023-04-21

## 2023-04-21 RX ORDER — DIATRIZOATE MEGLUMINE 180 MG/ML
30 INJECTION, SOLUTION INTRAVESICAL ONCE
Refills: 0 | Status: COMPLETED | OUTPATIENT
Start: 2023-04-21 | End: 2023-04-21

## 2023-04-21 RX ORDER — KETOROLAC TROMETHAMINE 30 MG/ML
15 SYRINGE (ML) INJECTION ONCE
Refills: 0 | Status: DISCONTINUED | OUTPATIENT
Start: 2023-04-21 | End: 2023-04-21

## 2023-04-21 RX ADMIN — LORATADINE 10 MILLIGRAM(S): 10 TABLET ORAL at 17:47

## 2023-04-21 RX ADMIN — Medication 650 MILLIGRAM(S): at 17:46

## 2023-04-21 RX ADMIN — Medication 15 MILLIGRAM(S): at 23:05

## 2023-04-21 RX ADMIN — Medication 15 MILLIGRAM(S): at 22:35

## 2023-04-21 RX ADMIN — Medication 650 MILLIGRAM(S): at 18:10

## 2023-04-21 RX ADMIN — DIATRIZOATE MEGLUMINE 30 MILLILITER(S): 180 INJECTION, SOLUTION INTRAVESICAL at 14:33

## 2023-04-21 RX ADMIN — Medication 5 MILLIGRAM(S): at 22:34

## 2023-04-21 RX ADMIN — SODIUM CHLORIDE 65 MILLILITER(S): 9 INJECTION INTRAMUSCULAR; INTRAVENOUS; SUBCUTANEOUS at 10:15

## 2023-04-21 RX ADMIN — Medication 50 MICROGRAM(S): at 05:52

## 2023-04-21 RX ADMIN — Medication 650 MILLIGRAM(S): at 05:55

## 2023-04-21 RX ADMIN — Medication 650 MILLIGRAM(S): at 05:51

## 2023-04-21 NOTE — CONSULT NOTE ADULT - ATTENDING COMMENTS
Patient also seen and examined by myself. I agree with Dr. Watson's (Hem-Onc fellow) note above.   Situation discussed with him and the patient.  All questions answered.

## 2023-04-21 NOTE — PROGRESS NOTE ADULT - SUBJECTIVE AND OBJECTIVE BOX
JEAN-PAUL REEYS 57y Female  MRN#: [id number]     Hospital Day: 1d    CC:  Neck pain    HOSPITAL COURSE:   57F. PMH: hypothyroidism and ? alcoholic cirrhosis (stopped drinking over 9 yrs ago),   Presented 4/20 for neck pain w/ limited movement /2 pain x4 months; Denies ataxia, imbalance, or difficulty with ambulation. Denies any numbness, tingling, paresthesias, weakness, saddle anesthesia, or bowel / bladder incontinence. Saw her chiropractor, MRI was done - reported concern for lesions. Pt referred to Dr. Peña's- who she saw 4/20. Dr. Peña gave dx of breast cancer with metastasis and referred pt to ED.     In the ED: VS: Stable   Labs only remarkable for Ca of 11.4     CT cervical spine:   1.  Numerous lytic lesions throughout skull base and spine consistent with extensive osseous metastatic disease.  2.  Epidural component of tumor is noted from C1-C4.  3.  Tumor essentially replaces C1, C2 and C3 and there is pathologic fracture of C2.  4.  Findings would be better assessed with contrast-enhanced MRI. Additionally, direct comparison with the patient's recent outside imaging studies would be helpful.    Neurosurgery contacted by ED and will evaluate pt     SUBJECTIVE     Overnight events  None    Subjective complaints                                               ----------------------------------------------------------  OBJECTIVE  PAST MEDICAL & SURGICAL HISTORY                                            -----------------------------------------------------------  ALLERGIES:  No Known Allergies                                            ------------------------------------------------------------    HOME MEDICATIONS  Home Medications:  levothyroxine 50 mcg (0.05 mg) oral tablet: 1 orally once a day (20 Apr 2023 17:29)                           MEDICATIONS:  STANDING MEDICATIONS  enoxaparin Injectable 40 milliGRAM(s) SubCutaneous every 24 hours  lactated ringers. 1000 milliLiter(s) IV Continuous <Continuous>  levothyroxine 50 MICROGram(s) Oral daily  melatonin 5 milliGRAM(s) Oral at bedtime    PRN MEDICATIONS  acetaminophen     Tablet .. 650 milliGRAM(s) Oral every 6 hours PRN                                            ------------------------------------------------------------  VITAL SIGNS: Last 24 Hours  T(C): 35.7 (21 Apr 2023 04:46), Max: 37.1 (20 Apr 2023 15:25)  T(F): 96.2 (21 Apr 2023 04:46), Max: 98.7 (20 Apr 2023 15:25)  HR: 111 (21 Apr 2023 04:46) (86 - 144)  BP: 143/84 (21 Apr 2023 04:46) (139/91 - 183/100)  BP(mean): --  RR: 18 (21 Apr 2023 04:46) (15 - 19)  SpO2: 97% (20 Apr 2023 22:01) (95% - 98%)      04-20-23 @ 07:01  -  04-21-23 @ 06:58  --------------------------------------------------------  IN: 650 mL / OUT: 0 mL / NET: 650 mL                                             --------------------------------------------------------------  LABS:                        14.8   10.77 )-----------( 299      ( 20 Apr 2023 14:56 )             45.2     04-20    137  |  101  |  16  ----------------------------<  96  4.7   |  21  |  0.8    Ca    11.4<H>      20 Apr 2023 14:56    TPro  7.7  /  Alb  4.8  /  TBili  0.4  /  DBili  x   /  AST  18  /  ALT  9   /  AlkPhos  138<H>  04-20    PT/INR - ( 20 Apr 2023 14:56 )   PT: 11.80 sec;   INR: 1.03 ratio         PTT - ( 20 Apr 2023 14:56 )  PTT:35.9 sec                                                          -------------------------------------------------------------  RADIOLOGY:                                            --------------------------------------------------------------    PHYSICAL EXAM:                                             --------------------------------------------------------------                 JEAN-PAUL REYES 57y Female  MRN#: [id number]     Hospital Day: 1d    CC:  Neck pain    HOSPITAL COURSE:   57F. PMH: hypothyroidism and ? alcoholic cirrhosis (stopped drinking over 9 yrs ago),   Presented 4/20 for neck pain w/ limited movement /2 pain x4 months; Denies ataxia, imbalance, or difficulty with ambulation. Denies any numbness, tingling, paresthesias, weakness, saddle anesthesia, or bowel / bladder incontinence. Saw her chiropractor, MRI was done - reported concern for lesions. Pt referred to Dr. Peña's- who she saw 4/20. Pt reports Dr. Peña gave dx of breast cancer with metastasis and referred pt to ED.     In the ED: VS: Stable   Labs only remarkable for Ca of 11.4     CT cervical spine:   1.  Numerous lytic lesions throughout skull base and spine consistent with extensive osseous metastatic disease.  2.  Epidural component of tumor is noted from C1-C4.  3.  Tumor essentially replaces C1, C2 and C3 and there is pathologic fracture of C2.  4.  Findings would be better assessed with contrast-enhanced MRI. Additionally, direct comparison with the patient's recent outside imaging studies would be helpful.    Neurosurgery contacted by ED and will evaluate pt     SUBJECTIVE     Overnight events  None    Subjective complaints   Pt evaluated at bedside. Pt reports pain is controlled when sitting still and not moving.                                             ----------------------------------------------------------  OBJECTIVE  PAST MEDICAL & SURGICAL HISTORY                                            -----------------------------------------------------------  ALLERGIES:  No Known Allergies                                            ------------------------------------------------------------    HOME MEDICATIONS  Home Medications:  levothyroxine 50 mcg (0.05 mg) oral tablet: 1 orally once a day (20 Apr 2023 17:29)                           MEDICATIONS:  STANDING MEDICATIONS  enoxaparin Injectable 40 milliGRAM(s) SubCutaneous every 24 hours  lactated ringers. 1000 milliLiter(s) IV Continuous <Continuous>  levothyroxine 50 MICROGram(s) Oral daily  melatonin 5 milliGRAM(s) Oral at bedtime    PRN MEDICATIONS  acetaminophen     Tablet .. 650 milliGRAM(s) Oral every 6 hours PRN                                            ------------------------------------------------------------  VITAL SIGNS: Last 24 Hours  T(C): 35.7 (21 Apr 2023 04:46), Max: 37.1 (20 Apr 2023 15:25)  T(F): 96.2 (21 Apr 2023 04:46), Max: 98.7 (20 Apr 2023 15:25)  HR: 111 (21 Apr 2023 04:46) (86 - 144)  BP: 143/84 (21 Apr 2023 04:46) (139/91 - 183/100)  BP(mean): --  RR: 18 (21 Apr 2023 04:46) (15 - 19)  SpO2: 97% (20 Apr 2023 22:01) (95% - 98%)      04-20-23 @ 07:01  -  04-21-23 @ 06:58  --------------------------------------------------------  IN: 650 mL / OUT: 0 mL / NET: 650 mL                                             --------------------------------------------------------------  LABS:                        14.8   10.77 )-----------( 299      ( 20 Apr 2023 14:56 )             45.2     04-20    137  |  101  |  16  ----------------------------<  96  4.7   |  21  |  0.8    Ca    11.4<H>      20 Apr 2023 14:56    TPro  7.7  /  Alb  4.8  /  TBili  0.4  /  DBili  x   /  AST  18  /  ALT  9   /  AlkPhos  138<H>  04-20    PT/INR - ( 20 Apr 2023 14:56 )   PT: 11.80 sec;   INR: 1.03 ratio         PTT - ( 20 Apr 2023 14:56 )  PTT:35.9 sec                                                          -------------------------------------------------------------  RADIOLOGY:                                            --------------------------------------------------------------    PHYSICAL EXAM:  GEN: NAD  NEURO: Alert & Orientedx4, collar in place, no gross defecit  HEENT: nontraumatic, normocephalic, neck supple  CARD: S1, S2 audible, no S3, regular rate and rhythm, no murmur  PULM: B/L breath sounds, no wheezing, crackles, or rales  ABD: Soft, nondistended, nontender  EXTR: No clubbing, cyanosis, edema.                                               --------------------------------------------------------------                 JEAN-PAUL REYES 57y Female  MRN#: [id number]     Hospital Day: 1d    CC:  Neck pain    HOSPITAL COURSE:   57F. PMH: hypothyroidism and ? alcoholic cirrhosis (stopped drinking over 9 yrs ago),   Presented 4/20 for neck pain w/ limited movement /2 pain x4 months; Denies ataxia, imbalance, or difficulty with ambulation. Denies any numbness, tingling, paresthesias, weakness, saddle anesthesia, or bowel / bladder incontinence. Saw her chiropractor, MRI was done - reported concern for lesions. Pt referred to Dr. Peña's- who she saw 4/20. Pt reports Dr. Peña gave dx of possible breast cancer with metastasis and referred pt to ED.     In the ED: VS: /100, Tachycardic   Labs only remarkable for Ca of 11.4   EKG: Sinus tach    CT cervical spine:   1.  Numerous lytic lesions throughout skull base and spine consistent with extensive osseous metastatic disease.  2.  Epidural component of tumor is noted from C1-C4.  3.  Tumor essentially replaces C1, C2 and C3 and there is pathologic fracture of C2.  4.  Findings would be better assessed with contrast-enhanced MRI. Additionally, direct comparison with the patient's recent outside imaging studies would be helpful.    Neurosurgery contacted by ED and will evaluate pt      SUBJECTIVE     Overnight events  None    Subjective complaints   Pt evaluated at bedside. Pt reports pain is controlled when sitting still and not moving.                                             ----------------------------------------------------------  OBJECTIVE  PAST MEDICAL & SURGICAL HISTORY                                            -----------------------------------------------------------  ALLERGIES:  No Known Allergies                                            ------------------------------------------------------------    HOME MEDICATIONS  Home Medications:  levothyroxine 50 mcg (0.05 mg) oral tablet: 1 orally once a day (20 Apr 2023 17:29)                           MEDICATIONS:  STANDING MEDICATIONS  enoxaparin Injectable 40 milliGRAM(s) SubCutaneous every 24 hours  lactated ringers. 1000 milliLiter(s) IV Continuous <Continuous>  levothyroxine 50 MICROGram(s) Oral daily  melatonin 5 milliGRAM(s) Oral at bedtime    PRN MEDICATIONS  acetaminophen     Tablet .. 650 milliGRAM(s) Oral every 6 hours PRN                                            ------------------------------------------------------------  VITAL SIGNS: Last 24 Hours  T(C): 35.7 (21 Apr 2023 04:46), Max: 37.1 (20 Apr 2023 15:25)  T(F): 96.2 (21 Apr 2023 04:46), Max: 98.7 (20 Apr 2023 15:25)  HR: 111 (21 Apr 2023 04:46) (86 - 144)  BP: 143/84 (21 Apr 2023 04:46) (139/91 - 183/100)  BP(mean): --  RR: 18 (21 Apr 2023 04:46) (15 - 19)  SpO2: 97% (20 Apr 2023 22:01) (95% - 98%)      04-20-23 @ 07:01  -  04-21-23 @ 06:58  --------------------------------------------------------  IN: 650 mL / OUT: 0 mL / NET: 650 mL                                             --------------------------------------------------------------  LABS:                        14.8   10.77 )-----------( 299      ( 20 Apr 2023 14:56 )             45.2     04-20    137  |  101  |  16  ----------------------------<  96  4.7   |  21  |  0.8    Ca    11.4<H>      20 Apr 2023 14:56    TPro  7.7  /  Alb  4.8  /  TBili  0.4  /  DBili  x   /  AST  18  /  ALT  9   /  AlkPhos  138<H>  04-20    PT/INR - ( 20 Apr 2023 14:56 )   PT: 11.80 sec;   INR: 1.03 ratio         PTT - ( 20 Apr 2023 14:56 )  PTT:35.9 sec                                                          -------------------------------------------------------------  RADIOLOGY:                                            --------------------------------------------------------------    PHYSICAL EXAM:  GEN: NAD  NEURO: Alert & Orientedx4, collar in place, no gross defecit  HEENT: nontraumatic, normocephalic, neck supple  CARD: S1, S2 audible, no S3, regular rate and rhythm, no murmur  PULM: B/L breath sounds, no wheezing, crackles, or rales  ABD: Soft, nondistended, nontender  EXTR: No clubbing, cyanosis, edema.                                               --------------------------------------------------------------

## 2023-04-21 NOTE — CONSULT NOTE ADULT - ASSESSMENT
58 yo woman with hx of hyporhyroidism presenting with severe neck pain 2/2 diffuse suspected metastatic bone disease. MRI from 3/21 (per note, imaging or report n/a) showed epidural extension at C2 but without definitive cord compression. CT C spine 4/20 showed pathologic fracture of C2, diffuse lytic disease, and epidural extension from C1-C4. MRI is pending.    She likely will need surgical stabilization in her neck. Please obtain MRI first and discuss possible surgical intervention w/ neurosurgery.  If she is not a surgical candidate and there is signs of cord compression, then it is possible to do RT urgently without tissue biopsy  Otherwise, please obtain tissue diagnosis and complete CT C/A/P first  Can start decadron

## 2023-04-21 NOTE — CONSULT NOTE ADULT - ASSESSMENT
58 yo F with PMH of hypothyroidism and ?alcoholic cirrhosis (stopped drinking over 9 yrs ago) presented to the ED due to neck pain. She saw Dr. Peña (oncologist) on 4/20/23 to establish care for her suspected metastatic bone disease when she had a MRI of neck done on 3/21/23 which showed  diffuse metastatic bone disease most pronounced at C2 with bony expansion causing circumferential narrowing of central canal. In the ED, she was seen by neurosx team and recommended MRI C/T/L spine with sachin, currently pending. Oncology team is consulted for her metastatic disease.     # Diffuse metastatic bone disease on CT   - MRI C/T/L spine with sachin per neurosx   - f/u neurosx team to determine if there will be any neurosx intervention, if not please contact IR for most accessible biopsy   - will ultimately need biopsy result to start any form of treatment   - in the meantime, obtain CT chest/abdomen/pelvis w/ IV contrast for metatstatic workup     Rest of management per primary team. 58 yo F with PMH of hypothyroidism and ?alcoholic cirrhosis (stopped drinking over 9 yrs ago) presented to the ED due to neck pain. She saw Dr. Peña (oncologist) on 4/20/23 to establish care for her suspected metastatic bone disease when she had a MRI of neck done on 3/21/23 which showed  diffuse metastatic bone disease most pronounced at C2 with bony expansion causing circumferential narrowing of central canal. In the ED, she was seen by neurosx team and recommended MRI C/T/L spine with sachin, currently pending. Oncology team is consulted for her metastatic disease.     # Diffuse metastatic bone disease on CT with suspected breast primary   - PE shows R breast with nipple retraction and a firm nodule close proximity to nipple at 3 o'clock   - pt never had mammogram or routine cancer screening   - MRI C/T/L spine with sachin per neurosx   - rad/onc consult appreciated  - f/u neurosx team to determine if there will be any neurosx intervention, if not please contact IR for most accessible biopsy   - possibly consult surgery team for bedside breast biopsy   - will ultimately need biopsy result to start any form of treatment   - in the meantime, obtain CT chest/abdomen/pelvis w/ IV contrast for metastatic workup     Rest of management per primary team. 58 yo F with PMH of hypothyroidism and ?alcoholic cirrhosis (stopped drinking over 9 yrs ago) presented to the ED due to neck pain. She saw Dr. Peña (oncologist) on 4/20/23 to establish care for her suspected metastatic bone disease when she had a MRI of neck done on 3/21/23 which showed  diffuse metastatic bone disease most pronounced at C2 with bony expansion causing circumferential narrowing of central canal. In the ED, she was seen by neurosx team and recommended MRI C/T/L spine with sachin, currently pending. Oncology team is consulted for her metastatic disease.     # Diffuse metastatic bone disease on CT with suspected breast primary   - PE shows R breast with nipple retraction and a firm nodule close proximity to nipple at 3 o'clock   - pt never had mammogram or routine cancer screening   - MRI C/T/L spine with sachin per neurosx   - rad/onc consult appreciated  - f/u neurosx team to determine if there will be any neurosx intervention, if not please contact IR for most accessible biopsy   - possibly consult surgery team as well for bedside breast biopsy   - will ultimately need biopsy result to start any form of treatment   - obtain CT chest/abdomen/pelvis w/ IV contrast for metastatic workup   - obtain MR head w/ and w/o IV contrast and bone scan    Rest of management per primary team.   Will follow up once pathology result is available.  56 yo F with PMH of hypothyroidism and ?alcoholic cirrhosis (stopped drinking over 9 yrs ago) presented to the ED due to neck pain. She saw Dr. Peña (oncologist) on 4/20/23 to establish care for her suspected metastatic bone disease when she had a MRI of neck done on 3/21/23 which showed  diffuse metastatic bone disease most pronounced at C2 with bony expansion causing circumferential narrowing of central canal. In the ED, she was seen by neurosx team and recommended MRI C/T/L spine with sachin, currently pending. Oncology team is consulted for her metastatic disease.     # Diffuse metastatic bone disease on CT with suspected breast primary   - PE shows R breast with nipple retraction and a firm nodule close proximity to nipple at 3 o'clock   - pt never had mammogram or routine cancer screening   - MRI C/T/L spine with sachin appreciated, numerous osseous mets through spines w/ most significant at the C2 and C3 level with evidence of epidural extension  - rad/onc consult appreciated  - f/u neurosx team to determine if there will be any neurosx intervention, if not please contact IR for most accessible biopsy   - possibly also consult surgery team as well for bedside breast biopsy   - will ultimately need biopsy result to start any form of treatment   - obtain CT chest/abdomen/pelvis w/ IV contrast for metastatic workup   - obtain MR head w/ and w/o IV contrast and bone scan    Rest of management per primary team.   Will follow up once pathology result is available.  58 yo F with PMH of hypothyroidism and ?alcoholic cirrhosis (stopped drinking over 9 yrs ago) presented to the ED due to neck pain. She saw Dr. Peña (oncologist) on 4/20/23 to establish care for her suspected metastatic bone disease when she had a MRI of neck done on 3/21/23 which showed  diffuse metastatic bone disease most pronounced at C2 with bony expansion causing circumferential narrowing of central canal. In the ED, she was seen by neurosx team and recommended MRI C/T/L spine with sachin, currently pending. Oncology team is consulted for her metastatic disease.     # Diffuse metastatic bone disease on CT with suspected breast primary   - PE shows R breast with nipple retraction and a firm nodule close proximity to nipple at 3 o'clock   - Pt never had mammogram or routine cancer screening   - MRI C/T/L spine with sachin appreciated, numerous osseous mets through spines w/ most significant at the C2 and C3 level with evidence of epidural extension  - Rad/onc consult appreciated  - f/u neurosx team to determine if there will be any neurosx intervention, if not please contact IR for most accessible biopsy   - Possibly also consult surgery team as well for bedside breast biopsy   - Will ultimately need biopsy result to start any form of treatment   - Obtain CT chest/abdomen/pelvis w/ IV contrast for metastatic workup   - Obtain MR head w/ and w/o IV contrast and bone scan    Rest of management per primary team.   Will follow up once pathology result is available.

## 2023-04-21 NOTE — CONSULT NOTE ADULT - SUBJECTIVE AND OBJECTIVE BOX
HPI:  57-year-old female with PMH of hypothyroidism and ? alcoholic cirrhosis (stopped drinking over 9 yrs ago), presenting for neck pain. Pt started having neck pain almost 4 months ago for which her chiropractor ordered an MRI that was done 1 month ago. Pt was not sure what the read of the MRI was but there was concern about lesions so she went to see Dr. Peña's office for the first time today. Dr. Peña examined her and told her she has breast cancer with metastasis and needs to come to the ED fo further evaluation. Pt is a non smoker and has no family history of breast Ca. Denies any significant weight loss, night sweats, or fevers. No reported bloody discharge from her nipples. Upon examining pt' s right breast,  dimpling along with a lump was found. Pt believes that she noticed this change almost over a yr a go, but did not think of it as anything serious. Pt's neck pain has been significantly worsening limiting her ability to move her neck. She only has pain when moving her neck and has full sensation and ROM of her b/l UE. Denies any SOB, chest pain, or palpitations.     In the ED:  VS: afebrile, , /101, Spo2 95% on RA   Labs only remarkable for Ca of 11.4     CT cervical spine:   1.  Numerous lytic lesions with associated soft tissue throughout the visualized skull base and spine consistent with extensive osseous metastatic disease.    2.  Epidural component of tumor is noted from C1-C4.    3.  Tumor essentially replaces C1, C2 and C3 and there is pathologic fracture of C2.    4.  Findings would be better assessed with contrast-enhanced MRI. Additionally, direct comparison with the patient's recent outside imaging studies would be helpful.    Neurosurgery contacted by ED and will evaluate pt    (20 Apr 2023 16:27)      Allergies    No Known Allergies    Intolerances        ROS: [  ] Fever  [  ] Chills  [  ]Chest Pain [  ] SOB  [  ]Cough [  ] N/V  [  ] Diarrhea [  ]Constipation [  ]Other ROS:  [  ] ROS otherwise negative    PAST MEDICAL & SURGICAL HISTORY:      FAMILY HISTORY:      MEDICATIONS  (STANDING):  enoxaparin Injectable 40 milliGRAM(s) SubCutaneous every 24 hours  levothyroxine 50 MICROGram(s) Oral daily  melatonin 5 milliGRAM(s) Oral at bedtime  sodium chloride 0.9%. 1000 milliLiter(s) (65 mL/Hr) IV Continuous <Continuous>    MEDICATIONS  (PRN):  acetaminophen     Tablet .. 650 milliGRAM(s) Oral every 6 hours PRN Temp greater or equal to 38C (100.4F), Mild Pain (1 - 3)      PHYSICAL EXAM  Vital Signs Last 24 Hrs  T(C): 35.7 (21 Apr 2023 04:46), Max: 37.1 (20 Apr 2023 15:25)  T(F): 96.2 (21 Apr 2023 04:46), Max: 98.7 (20 Apr 2023 15:25)  HR: 111 (21 Apr 2023 04:46) (86 - 144)  BP: 143/84 (21 Apr 2023 04:46) (139/91 - 183/100)  BP(mean): --  RR: 18 (21 Apr 2023 04:46) (15 - 19)  SpO2: 97% (20 Apr 2023 22:01) (95% - 98%)    Parameters below as of 20 Apr 2023 22:01  Patient On (Oxygen Delivery Method): room air        General: Well nourished, well developed, no acute distress  HEENT: NC/AT; EOMI, PERRL, sclera nonicteric; external ears normal; no rhinorrhea or epistaxis; mucous membranes moist; oropharynx clear and without erythema  CV: NR, RR; no appreciable r/m/g  Lungs: CTAB, no increased work of breathing  Abdomen: Bowel sounds present; soft, NTND  MSK: Vertebral spine non-tender to palpation  Neuro: AAOx3; cranial nerves II-XII intact; strength 5/5 in upper and lower extremities; sensation to light touch in tact bilaterally.  Psych: Full affect; mood congruent  Skin: no visible rashes on limited examination    IMAGING/LABS/PATHOLOGY: I have personally reviewed the relevant labs, pathology, and imaging as noted in the HPI.  In addition,                          13.3   10.07 )-----------( 262      ( 21 Apr 2023 06:26 )             41.4     04-21    132<L>  |  97<L>  |  16  ----------------------------<  83  3.7   |  20  |  0.7    Ca    10.2      21 Apr 2023 06:26    TPro  6.7  /  Alb  4.2  /  TBili  0.5  /  DBili  x   /  AST  17  /  ALT  8   /  AlkPhos  123<H>  04-21    PT/INR - ( 20 Apr 2023 14:56 )   PT: 11.80 sec;   INR: 1.03 ratio         PTT - ( 20 Apr 2023 14:56 )  PTT:35.9 sec

## 2023-04-21 NOTE — CONSULT NOTE ADULT - SUBJECTIVE AND OBJECTIVE BOX
Patient is a 57y old  Female who presents with a chief complaint of Neck pain (21 Apr 2023 06:58)      HPI:  57-year-old female with PMH of hypothyroidism and ? alcoholic cirrhosis (stopped drinking over 9 yrs ago), presenting for neck pain. Pt started having neck pain almost 4 months ago for which her chiropractor ordered an MRI that was done 1 month ago. Pt was not sure what the read of the MRI was but there was concern about lesions so she went to see Dr. Peña's office for the first time today. Dr. Peña examined her and told her she has breast cancer with metastasis and needs to come to the ED fo further evaluation. Pt is a non smoker and has no family history of breast Ca. Denies any significant weight loss, night sweats, or fevers. No reported bloody discharge from her nipples. Upon examining pt' s right breast,  dimpling along with a lump was found. Pt believes that she noticed this change almost over a yr a go, but did not think of it as anything serious. Pt's neck pain has been significantly worsening limiting her ability to move her neck. She only has pain when moving her neck and has full sensation and ROM of her b/l UE. Denies any SOB, chest pain, or palpitations.     In the ED:  VS: afebrile, , /101, Spo2 95% on RA   Labs only remarkable for Ca of 11.4     CT cervical spine:   1.  Numerous lytic lesions with associated soft tissue throughout the visualized skull base and spine consistent with extensive osseous metastatic disease.    2.  Epidural component of tumor is noted from C1-C4.    3.  Tumor essentially replaces C1, C2 and C3 and there is pathologic fracture of C2.    4.  Findings would be better assessed with contrast-enhanced MRI. Additionally, direct comparison with the patient's recent outside imaging studies would be helpful.    Neurosurgery contacted by ED and will evaluate pt    (20 Apr 2023 16:27)       ROS:  Negative except for:    PAST MEDICAL & SURGICAL HISTORY:      SOCIAL HISTORY:    FAMILY HISTORY:      MEDICATIONS  (STANDING):  enoxaparin Injectable 40 milliGRAM(s) SubCutaneous every 24 hours  lactated ringers. 1000 milliLiter(s) (65 mL/Hr) IV Continuous <Continuous>  levothyroxine 50 MICROGram(s) Oral daily  melatonin 5 milliGRAM(s) Oral at bedtime    MEDICATIONS  (PRN):  acetaminophen     Tablet .. 650 milliGRAM(s) Oral every 6 hours PRN Temp greater or equal to 38C (100.4F), Mild Pain (1 - 3)    Height (cm): 162.6 (04-20-23 @ 11:37)  Weight (kg): 86.2 (04-20-23 @ 20:57)  BMI (kg/m2): 32.6 (04-20-23 @ 20:57)  BSA (m2): 1.91 (04-20-23 @ 20:57)  Allergies    No Known Allergies    Intolerances        Vital Signs Last 24 Hrs  T(C): 35.7 (21 Apr 2023 04:46), Max: 37.1 (20 Apr 2023 15:25)  T(F): 96.2 (21 Apr 2023 04:46), Max: 98.7 (20 Apr 2023 15:25)  HR: 111 (21 Apr 2023 04:46) (86 - 144)  BP: 143/84 (21 Apr 2023 04:46) (139/91 - 183/100)  BP(mean): --  RR: 18 (21 Apr 2023 04:46) (15 - 19)  SpO2: 97% (20 Apr 2023 22:01) (95% - 98%)    Parameters below as of 20 Apr 2023 22:01  Patient On (Oxygen Delivery Method): room air        PHYSICAL EXAM  General: adult in NAD  HEENT: clear oropharynx, anicteric sclera, pink conjunctiva  Neck: supple  CV: normal S1/S2 with no murmur rubs or gallops  Lungs: positive air movement b/l ant lungs,clear to auscultation, no wheezes, no rales  Abdomen: soft non-tender non-distended, no hepatosplenomegaly  Ext: no clubbing cyanosis or edema  Skin: no rashes and no petechiae  Neuro: alert and oriented X 4, no focal deficits      LABS:                          13.3   10.07 )-----------( 262      ( 21 Apr 2023 06:26 )             41.4         Mean Cell Volume : 87.0 fL  Mean Cell Hemoglobin : 27.9 pg  Mean Cell Hemoglobin Concentration : 32.1 g/dL  Auto Neutrophil # : 7.50 K/uL  Auto Lymphocyte # : 1.70 K/uL  Auto Monocyte # : 0.67 K/uL  Auto Eosinophil # : 0.08 K/uL  Auto Basophil # : 0.08 K/uL  Auto Neutrophil % : 74.4 %  Auto Lymphocyte % : 16.9 %  Auto Monocyte % : 6.7 %  Auto Eosinophil % : 0.8 %  Auto Basophil % : 0.8 %      Serial CBC's  04-21 @ 06:26  Hct-41.4 / Hgb-13.3 / Plat-262 / RBC-4.76 / WBC-10.07  Serial CBC's  04-20 @ 14:56  Hct-45.2 / Hgb-14.8 / Plat-299 / RBC-5.21 / WBC-10.77      04-21    132<L>  |  97<L>  |  16  ----------------------------<  83  3.7   |  20  |  0.7    Ca    10.2      21 Apr 2023 06:26    TPro  6.7  /  Alb  4.2  /  TBili  0.5  /  DBili  x   /  AST  17  /  ALT  8   /  AlkPhos  123<H>  04-21      PT/INR - ( 20 Apr 2023 14:56 )   PT: 11.80 sec;   INR: 1.03 ratio         PTT - ( 20 Apr 2023 14:56 )  PTT:35.9 sec                BLOOD SMEAR INTERPRETATION:       RADIOLOGY & ADDITIONAL STUDIES:    < from: CT Cervical Spine No Cont (04.20.23 @ 13:36) >  IMPRESSION:  1.  Numerous lytic lesions with associated soft tissue throughout the   visualized skull base and spine consistent with extensive osseous   metastatic disease.  2.  Epidural component of tumor is noted from C1-C4.  3.  Tumor essentially replaces C1, C2 and C3 and there is pathologic   fracture of C2.  4.  Findings would be better assessed with contrast-enhanced MRI.   Additionally, direct comparison with the patient's recent outside imaging   studies would be helpful.  < end of copied text >   Patient is a 57y old  Female who presents with a chief complaint of Neck pain (21 Apr 2023 06:58)      HPI:  57-year-old female with PMH of hypothyroidism and ? alcoholic cirrhosis (stopped drinking over 9 yrs ago), presenting for neck pain. Pt started having neck pain almost 4 months ago for which her chiropractor ordered an MRI that was done 1 month ago. Pt was not sure what the read of the MRI was but there was concern about lesions so she went to see Dr. Peña's office for the first time today. Dr. Peña examined her and told her she has breast cancer with metastasis and needs to come to the ED fo further evaluation. Pt is a non smoker and has no family history of breast Ca. Denies any significant weight loss, night sweats, or fevers. No reported bloody discharge from her nipples. Upon examining pt' s right breast,  dimpling along with a lump was found. Pt believes that she noticed this change almost over a yr a go, but did not think of it as anything serious. Pt's neck pain has been significantly worsening limiting her ability to move her neck. She only has pain when moving her neck and has full sensation and ROM of her b/l UE. Denies any SOB, chest pain, or palpitations.     In the ED:  VS: afebrile, , /101, Spo2 95% on RA   Labs only remarkable for Ca of 11.4     CT cervical spine:   1.  Numerous lytic lesions with associated soft tissue throughout the visualized skull base and spine consistent with extensive osseous metastatic disease.    2.  Epidural component of tumor is noted from C1-C4.    3.  Tumor essentially replaces C1, C2 and C3 and there is pathologic fracture of C2.    4.  Findings would be better assessed with contrast-enhanced MRI. Additionally, direct comparison with the patient's recent outside imaging studies would be helpful.    Neurosurgery contacted by ED and will evaluate pt    (20 Apr 2023 16:27)       ROS:  Negative except for: above.    PAST MEDICAL & SURGICAL HISTORY:  Hypothyroidism  ?alcoholic cirrhosis (stopped drinking 9 years ago)    SOCIAL HISTORY:  Denies tobacco use.  Quitted alcohol use 9 years ago.    FAMILY HISTORY:  Denies family of cancer.    MEDICATIONS  (STANDING):  enoxaparin Injectable 40 milliGRAM(s) SubCutaneous every 24 hours  lactated ringers. 1000 milliLiter(s) (65 mL/Hr) IV Continuous <Continuous>  levothyroxine 50 MICROGram(s) Oral daily  melatonin 5 milliGRAM(s) Oral at bedtime    MEDICATIONS  (PRN):  acetaminophen     Tablet .. 650 milliGRAM(s) Oral every 6 hours PRN Temp greater or equal to 38C (100.4F), Mild Pain (1 - 3)    Height (cm): 162.6 (04-20-23 @ 11:37)  Weight (kg): 86.2 (04-20-23 @ 20:57)  BMI (kg/m2): 32.6 (04-20-23 @ 20:57)  BSA (m2): 1.91 (04-20-23 @ 20:57)  Allergies    No Known Allergies    Intolerances        Vital Signs Last 24 Hrs  T(C): 35.7 (21 Apr 2023 04:46), Max: 37.1 (20 Apr 2023 15:25)  T(F): 96.2 (21 Apr 2023 04:46), Max: 98.7 (20 Apr 2023 15:25)  HR: 111 (21 Apr 2023 04:46) (86 - 144)  BP: 143/84 (21 Apr 2023 04:46) (139/91 - 183/100)  BP(mean): --  RR: 18 (21 Apr 2023 04:46) (15 - 19)  SpO2: 97% (20 Apr 2023 22:01) (95% - 98%)    Parameters below as of 20 Apr 2023 22:01  Patient On (Oxygen Delivery Method): room air        PHYSICAL EXAM  General: adult in NAD  HEENT: clear oropharynx, anicteric sclera, pink conjunctiva  Neck: supple  CV: normal S1/S2 with no murmur rubs or gallops  Lungs: positive air movement b/l ant lungs,clear to auscultation, no wheezes, no rales  Breasts: R breast noted with nipple retraction and firm nodule close proximity to nipple.   Abdomen: soft non-tender non-distended, no hepatosplenomegaly  Ext: no clubbing cyanosis or edema  Skin: no rashes and no petechiae  Neuro: alert and oriented X 4, no focal deficits      LABS:                          13.3   10.07 )-----------( 262      ( 21 Apr 2023 06:26 )             41.4         Mean Cell Volume : 87.0 fL  Mean Cell Hemoglobin : 27.9 pg  Mean Cell Hemoglobin Concentration : 32.1 g/dL  Auto Neutrophil # : 7.50 K/uL  Auto Lymphocyte # : 1.70 K/uL  Auto Monocyte # : 0.67 K/uL  Auto Eosinophil # : 0.08 K/uL  Auto Basophil # : 0.08 K/uL  Auto Neutrophil % : 74.4 %  Auto Lymphocyte % : 16.9 %  Auto Monocyte % : 6.7 %  Auto Eosinophil % : 0.8 %  Auto Basophil % : 0.8 %      Serial CBC's  04-21 @ 06:26  Hct-41.4 / Hgb-13.3 / Plat-262 / RBC-4.76 / WBC-10.07  Serial CBC's  04-20 @ 14:56  Hct-45.2 / Hgb-14.8 / Plat-299 / RBC-5.21 / WBC-10.77      04-21    132<L>  |  97<L>  |  16  ----------------------------<  83  3.7   |  20  |  0.7    Ca    10.2      21 Apr 2023 06:26    TPro  6.7  /  Alb  4.2  /  TBili  0.5  /  DBili  x   /  AST  17  /  ALT  8   /  AlkPhos  123<H>  04-21      PT/INR - ( 20 Apr 2023 14:56 )   PT: 11.80 sec;   INR: 1.03 ratio         PTT - ( 20 Apr 2023 14:56 )  PTT:35.9 sec                BLOOD SMEAR INTERPRETATION:       RADIOLOGY & ADDITIONAL STUDIES:    < from: CT Cervical Spine No Cont (04.20.23 @ 13:36) >  IMPRESSION:  1.  Numerous lytic lesions with associated soft tissue throughout the   visualized skull base and spine consistent with extensive osseous   metastatic disease.  2.  Epidural component of tumor is noted from C1-C4.  3.  Tumor essentially replaces C1, C2 and C3 and there is pathologic   fracture of C2.  4.  Findings would be better assessed with contrast-enhanced MRI.   Additionally, direct comparison with the patient's recent outside imaging   studies would be helpful.  < end of copied text >   Patient is a 57y old  Female who presents with a chief complaint of Neck pain (21 Apr 2023 06:58)      HPI:  57-year-old female with PMH of hypothyroidism and ? alcoholic cirrhosis (stopped drinking over 9 yrs ago), presenting for neck pain. Pt started having neck pain almost 4 months ago for which her chiropractor ordered an MRI that was done 1 month ago. Pt was not sure what the read of the MRI was but there was concern about lesions so she went to see Dr. Peña's office for the first time today. Dr. Peña examined her and told her she has breast cancer with metastasis and needs to come to the ED fo further evaluation. Pt is a non smoker and has no family history of breast Ca. Denies any significant weight loss, night sweats, or fevers. No reported bloody discharge from her nipples. Upon examining pt' s right breast,  dimpling along with a lump was found. Pt believes that she noticed this change almost over a yr a go, but did not think of it as anything serious. Pt's neck pain has been significantly worsening limiting her ability to move her neck. She only has pain when moving her neck and has full sensation and ROM of her b/l UE. Denies any SOB, chest pain, or palpitations.     In the ED:  VS: afebrile, , /101, Spo2 95% on RA   Labs only remarkable for Ca of 11.4     CT cervical spine:   1.  Numerous lytic lesions with associated soft tissue throughout the visualized skull base and spine consistent with extensive osseous metastatic disease.    2.  Epidural component of tumor is noted from C1-C4.    3.  Tumor essentially replaces C1, C2 and C3 and there is pathologic fracture of C2.    4.  Findings would be better assessed with contrast-enhanced MRI. Additionally, direct comparison with the patient's recent outside imaging studies would be helpful.    Neurosurgery contacted by ED and will evaluate pt    (20 Apr 2023 16:27)       ROS:  Negative except for: above.    PAST MEDICAL & SURGICAL HISTORY:  Hypothyroidism  ?alcoholic cirrhosis (stopped drinking 9 years ago)    SOCIAL HISTORY:  Denies tobacco use.  Quitted alcohol use 9 years ago.    FAMILY HISTORY:  Denies family of cancer.    MEDICATIONS  (STANDING):  enoxaparin Injectable 40 milliGRAM(s) SubCutaneous every 24 hours  lactated ringers. 1000 milliLiter(s) (65 mL/Hr) IV Continuous <Continuous>  levothyroxine 50 MICROGram(s) Oral daily  melatonin 5 milliGRAM(s) Oral at bedtime    MEDICATIONS  (PRN):  acetaminophen     Tablet .. 650 milliGRAM(s) Oral every 6 hours PRN Temp greater or equal to 38C (100.4F), Mild Pain (1 - 3)    Height (cm): 162.6 (04-20-23 @ 11:37)  Weight (kg): 86.2 (04-20-23 @ 20:57)  BMI (kg/m2): 32.6 (04-20-23 @ 20:57)  BSA (m2): 1.91 (04-20-23 @ 20:57)  Allergies    No Known Allergies    Intolerances        Vital Signs Last 24 Hrs  T(C): 35.7 (21 Apr 2023 04:46), Max: 37.1 (20 Apr 2023 15:25)  T(F): 96.2 (21 Apr 2023 04:46), Max: 98.7 (20 Apr 2023 15:25)  HR: 111 (21 Apr 2023 04:46) (86 - 144)  BP: 143/84 (21 Apr 2023 04:46) (139/91 - 183/100)  BP(mean): --  RR: 18 (21 Apr 2023 04:46) (15 - 19)  SpO2: 97% (20 Apr 2023 22:01) (95% - 98%)    Parameters below as of 20 Apr 2023 22:01  Patient On (Oxygen Delivery Method): room air        PHYSICAL EXAM  General: adult in NAD  HEENT: clear oropharynx, anicteric sclera, pink conjunctiva  Neck: supple  CV: normal S1/S2 with no murmur rubs or gallops  Lungs: positive air movement b/l ant lungs,clear to auscultation, no wheezes, no rales  Breasts: R breast noted with nipple retraction and firm nodule close proximity to nipple.   Abdomen: soft non-tender non-distended, no hepatosplenomegaly  Ext: no clubbing cyanosis or edema  Skin: no rashes and no petechiae  Neuro: alert and oriented X 4, no focal deficits      LABS:                          13.3   10.07 )-----------( 262      ( 21 Apr 2023 06:26 )             41.4         Mean Cell Volume : 87.0 fL  Mean Cell Hemoglobin : 27.9 pg  Mean Cell Hemoglobin Concentration : 32.1 g/dL  Auto Neutrophil # : 7.50 K/uL  Auto Lymphocyte # : 1.70 K/uL  Auto Monocyte # : 0.67 K/uL  Auto Eosinophil # : 0.08 K/uL  Auto Basophil # : 0.08 K/uL  Auto Neutrophil % : 74.4 %  Auto Lymphocyte % : 16.9 %  Auto Monocyte % : 6.7 %  Auto Eosinophil % : 0.8 %  Auto Basophil % : 0.8 %      Serial CBC's  04-21 @ 06:26  Hct-41.4 / Hgb-13.3 / Plat-262 / RBC-4.76 / WBC-10.07  Serial CBC's  04-20 @ 14:56  Hct-45.2 / Hgb-14.8 / Plat-299 / RBC-5.21 / WBC-10.77      04-21    132<L>  |  97<L>  |  16  ----------------------------<  83  3.7   |  20  |  0.7    Ca    10.2      21 Apr 2023 06:26    TPro  6.7  /  Alb  4.2  /  TBili  0.5  /  DBili  x   /  AST  17  /  ALT  8   /  AlkPhos  123<H>  04-21      PT/INR - ( 20 Apr 2023 14:56 )   PT: 11.80 sec;   INR: 1.03 ratio         PTT - ( 20 Apr 2023 14:56 )  PTT:35.9 sec                BLOOD SMEAR INTERPRETATION:       RADIOLOGY & ADDITIONAL STUDIES:    < from: CT Cervical Spine No Cont (04.20.23 @ 13:36) >  IMPRESSION:  1.  Numerous lytic lesions with associated soft tissue throughout the   visualized skull base and spine consistent with extensive osseous   metastatic disease.  2.  Epidural component of tumor is noted from C1-C4.  3.  Tumor essentially replaces C1, C2 and C3 and there is pathologic   fracture of C2.  4.  Findings would be better assessed with contrast-enhanced MRI.   Additionally, direct comparison with the patient's recent outside imaging   studies would be helpful.  < end of copied text >      < from: MR Cervical Spine w/ IV Cont (04.21.23 @ 12:48) >  IMPRESSION:  CERVICAL SPINE:  Numerous enhancing osseous metastatic lesions are noted throughout the   cervical spine most significant at the C2 and C3 level with evidence of   epidural extension causing severe spinal stenosis and mass effect upon   the spinal cord at C2-C3.  THORACIC SPINE:  Numerous enhancing osseous metastatic lesions with malignant compression   deformity noted of the T8 vertebral body. No evidence of extraosseous   extension.  LUMBAR SPINE:  Numerous enhancing osseous metastatic lesions without extraosseous   extension or compression deformity.  --- End of Report ---    < end of copied text >   Patient is a 57y old  Female who presents with a chief complaint of Neck pain (21 Apr 2023 06:58)      HPI:  57-year-old female with PMH of hypothyroidism and ? alcoholic cirrhosis (stopped drinking over 9 yrs ago), presenting for neck pain.   Pt started having neck pain almost 4 months ago for which her chiropractor ordered an MRI that was done 1 month ago. Pt was not sure what the read of the MRI was but there was concern about lesions so she went to see Dr. Peña's (Hem-Onc) office for the first time today. Dr. Peña examined her and told her she had breast cancer with metastases and needed to go to the ED for further evaluation. Pt is a non smoker and has no family history of breast Ca. Denies any significant weight loss, night sweats, or fevers. No reported bloody discharge from her nipples. Upon examining pt' s right breast,  dimpling along with a lump was found. Pt believes that she noticed this change almost over a yr a go, but did not think of it as anything serious. Pt's neck pain has been significantly worsening limiting her ability to move her neck. She only has pain when moving her neck and has full sensation and ROM of her b/l UE. Denies any SOB, chest pain, or palpitations.     In the ED:  VS: afebrile, , /101, Spo2 95% on RA   Labs only remarkable for Ca of 11.4     CT cervical spine:   1.  Numerous lytic lesions with associated soft tissue throughout the visualized skull base and spine consistent with extensive osseous metastatic disease.    2.  Epidural component of tumor is noted from C1-C4.    3.  Tumor essentially replaces C1, C2 and C3 and there is pathologic fracture of C2.    4.  Findings would be better assessed with contrast-enhanced MRI. Additionally, direct comparison with the patient's recent outside imaging studies would be helpful.    Neurosurgery contacted by ED and will evaluate pt    (20 Apr 2023 16:27)       ROS:  Negative except for: above.    PAST MEDICAL & SURGICAL HISTORY:  Hypothyroidism  ?alcoholic cirrhosis (stopped drinking 9 years ago)    SOCIAL HISTORY:  Denies tobacco use.  Quitted alcohol use 9 years ago.    FAMILY HISTORY:  Denies family of cancer.    MEDICATIONS  (STANDING):  enoxaparin Injectable 40 milliGRAM(s) SubCutaneous every 24 hours  lactated ringers. 1000 milliLiter(s) (65 mL/Hr) IV Continuous <Continuous>  levothyroxine 50 MICROGram(s) Oral daily  melatonin 5 milliGRAM(s) Oral at bedtime    MEDICATIONS  (PRN):  acetaminophen     Tablet .. 650 milliGRAM(s) Oral every 6 hours PRN Temp greater or equal to 38C (100.4F), Mild Pain (1 - 3)    Height (cm): 162.6 (04-20-23 @ 11:37)  Weight (kg): 86.2 (04-20-23 @ 20:57)  BMI (kg/m2): 32.6 (04-20-23 @ 20:57)  BSA (m2): 1.91 (04-20-23 @ 20:57)  Allergies    No Known Allergies    Intolerances        Vital Signs Last 24 Hrs  T(C): 35.7 (21 Apr 2023 04:46), Max: 37.1 (20 Apr 2023 15:25)  T(F): 96.2 (21 Apr 2023 04:46), Max: 98.7 (20 Apr 2023 15:25)  HR: 111 (21 Apr 2023 04:46) (86 - 144)  BP: 143/84 (21 Apr 2023 04:46) (139/91 - 183/100)  BP(mean): --  RR: 18 (21 Apr 2023 04:46) (15 - 19)  SpO2: 97% (20 Apr 2023 22:01) (95% - 98%)    Parameters below as of 20 Apr 2023 22:01  Patient On (Oxygen Delivery Method): room air        PHYSICAL EXAM  General: adult in NAD  HEENT: clear oropharynx, anicteric sclera, pink conjunctiva  Neck: supple  CV: normal S1/S2 with no murmur rubs or gallops  Lungs: positive air movement b/l ant, clear to auscultation, no wheezes, no rales  Breasts: R breast noted with nipple retraction and firm nodule close proximity to nipple.   Abdomen: soft non-tender non-distended, no hepatosplenomegaly  Ext: no clubbing cyanosis or edema  Skin: no rashes and no petechiae  Neuro: alert and oriented X 4, no focal deficits      LABS:                         13.3   10.07 )-----------( 262      ( 21 Apr 2023 06:26 )             41.4         Mean Cell Volume : 87.0 fL  Mean Cell Hemoglobin : 27.9 pg  Mean Cell Hemoglobin Concentration : 32.1 g/dL  Auto Neutrophil # : 7.50 K/uL  Auto Lymphocyte # : 1.70 K/uL  Auto Monocyte # : 0.67 K/uL  Auto Eosinophil # : 0.08 K/uL  Auto Basophil # : 0.08 K/uL  Auto Neutrophil % : 74.4 %  Auto Lymphocyte % : 16.9 %  Auto Monocyte % : 6.7 %  Auto Eosinophil % : 0.8 %  Auto Basophil % : 0.8 %      Serial CBC's  04-21 @ 06:26  Hct-41.4 / Hgb-13.3 / Plat-262 / RBC-4.76 / WBC-10.07  Serial CBC's  04-20 @ 14:56  Hct-45.2 / Hgb-14.8 / Plat-299 / RBC-5.21 / WBC-10.77      04-21    132<L>  |  97<L>  |  16  ----------------------------<  83  3.7   |  20  |  0.7    Ca    10.2      21 Apr 2023 06:26    TPro  6.7  /  Alb  4.2  /  TBili  0.5  /  DBili  x   /  AST  17  /  ALT  8   /  AlkPhos  123<H>  04-21      PT/INR - ( 20 Apr 2023 14:56 )   PT: 11.80 sec;   INR: 1.03 ratio         PTT - ( 20 Apr 2023 14:56 )  PTT:35.9 sec                BLOOD SMEAR INTERPRETATION:       RADIOLOGY & ADDITIONAL STUDIES:    < from: CT Cervical Spine No Cont (04.20.23 @ 13:36) >  IMPRESSION:  1.  Numerous lytic lesions with associated soft tissue throughout the   visualized skull base and spine consistent with extensive osseous   metastatic disease.  2.  Epidural component of tumor is noted from C1-C4.  3.  Tumor essentially replaces C1, C2 and C3 and there is pathologic   fracture of C2.  4.  Findings would be better assessed with contrast-enhanced MRI.   Additionally, direct comparison with the patient's recent outside imaging   studies would be helpful.  < end of copied text >      < from: MR Cervical Spine w/ IV Cont (04.21.23 @ 12:48) >  IMPRESSION:  CERVICAL SPINE:  Numerous enhancing osseous metastatic lesions are noted throughout the   cervical spine most significant at the C2 and C3 level with evidence of   epidural extension causing severe spinal stenosis and mass effect upon   the spinal cord at C2-C3.  THORACIC SPINE:  Numerous enhancing osseous metastatic lesions with malignant compression   deformity noted of the T8 vertebral body. No evidence of extraosseous   extension.  LUMBAR SPINE:  Numerous enhancing osseous metastatic lesions without extraosseous   extension or compression deformity.  --- End of Report ---    < end of copied text >

## 2023-04-22 LAB — CANCER AG27-29 SERPL-ACNC: 55.5 U/ML

## 2023-04-22 PROCEDURE — 99232 SBSQ HOSP IP/OBS MODERATE 35: CPT

## 2023-04-22 RX ORDER — ACETAMINOPHEN 500 MG
1000 TABLET ORAL EVERY 8 HOURS
Refills: 0 | Status: DISCONTINUED | OUTPATIENT
Start: 2023-04-22 | End: 2023-04-24

## 2023-04-22 RX ORDER — LORATADINE 10 MG/1
10 TABLET ORAL DAILY
Refills: 0 | Status: DISCONTINUED | OUTPATIENT
Start: 2023-04-22 | End: 2023-04-24

## 2023-04-22 RX ORDER — KETOROLAC TROMETHAMINE 30 MG/ML
15 SYRINGE (ML) INJECTION ONCE
Refills: 0 | Status: DISCONTINUED | OUTPATIENT
Start: 2023-04-22 | End: 2023-04-22

## 2023-04-22 RX ORDER — TRAMADOL HYDROCHLORIDE 50 MG/1
25 TABLET ORAL
Refills: 0 | Status: DISCONTINUED | OUTPATIENT
Start: 2023-04-22 | End: 2023-04-23

## 2023-04-22 RX ORDER — MORPHINE SULFATE 50 MG/1
2 CAPSULE, EXTENDED RELEASE ORAL EVERY 4 HOURS
Refills: 0 | Status: DISCONTINUED | OUTPATIENT
Start: 2023-04-22 | End: 2023-04-23

## 2023-04-22 RX ORDER — CYCLOBENZAPRINE HYDROCHLORIDE 10 MG/1
5 TABLET, FILM COATED ORAL THREE TIMES A DAY
Refills: 0 | Status: DISCONTINUED | OUTPATIENT
Start: 2023-04-22 | End: 2023-04-24

## 2023-04-22 RX ORDER — ONDANSETRON 8 MG/1
4 TABLET, FILM COATED ORAL THREE TIMES A DAY
Refills: 0 | Status: DISCONTINUED | OUTPATIENT
Start: 2023-04-22 | End: 2023-04-24

## 2023-04-22 RX ADMIN — Medication 1000 MILLIGRAM(S): at 16:36

## 2023-04-22 RX ADMIN — Medication 1000 MILLIGRAM(S): at 21:35

## 2023-04-22 RX ADMIN — CYCLOBENZAPRINE HYDROCHLORIDE 5 MILLIGRAM(S): 10 TABLET, FILM COATED ORAL at 21:36

## 2023-04-22 RX ADMIN — LORATADINE 10 MILLIGRAM(S): 10 TABLET ORAL at 12:14

## 2023-04-22 RX ADMIN — ENOXAPARIN SODIUM 40 MILLIGRAM(S): 100 INJECTION SUBCUTANEOUS at 06:18

## 2023-04-22 RX ADMIN — Medication 50 MICROGRAM(S): at 06:14

## 2023-04-22 RX ADMIN — TRAMADOL HYDROCHLORIDE 25 MILLIGRAM(S): 50 TABLET ORAL at 12:18

## 2023-04-22 RX ADMIN — CYCLOBENZAPRINE HYDROCHLORIDE 5 MILLIGRAM(S): 10 TABLET, FILM COATED ORAL at 16:36

## 2023-04-22 RX ADMIN — SODIUM CHLORIDE 65 MILLILITER(S): 9 INJECTION INTRAMUSCULAR; INTRAVENOUS; SUBCUTANEOUS at 12:17

## 2023-04-22 RX ADMIN — Medication 1000 MILLIGRAM(S): at 17:06

## 2023-04-22 RX ADMIN — Medication 1000 MILLIGRAM(S): at 22:00

## 2023-04-22 RX ADMIN — Medication 15 MILLIGRAM(S): at 06:16

## 2023-04-22 RX ADMIN — Medication 5 MILLIGRAM(S): at 21:36

## 2023-04-22 RX ADMIN — TRAMADOL HYDROCHLORIDE 25 MILLIGRAM(S): 50 TABLET ORAL at 12:48

## 2023-04-22 NOTE — PROGRESS NOTE ADULT - SUBJECTIVE AND OBJECTIVE BOX
JEAN-PAUL REYES 57y Female  MRN#: [id number]     Hospital Day: 2d    CC:  Neck pain    HOSPITAL COURSE:   57F. PMH: hypothyroidism and ? alcoholic cirrhosis (stopped drinking over 9 yrs ago),   Presented 4/20 for neck pain w/ limited movement /2 pain x4 months; Denies ataxia, imbalance, or difficulty with ambulation. Denies any numbness, tingling, paresthesias, weakness, saddle anesthesia, or bowel / bladder incontinence. Saw her chiropractor, MRI was done - reported concern for lesions. Pt referred to Dr. Peña's- who she saw 4/20. Pt reports Dr. Peña gave dx of possible breast cancer with metastasis and referred pt to ED.     In the ED: VS: /100, Tachycardic   Labs only remarkable for Ca of 11.4   EKG: Sinus tach    CT cervical spine:   1.  Numerous lytic lesions throughout skull base and spine consistent with extensive osseous metastatic disease.  2.  Epidural component of tumor is noted from C1-C4.  3.  Tumor essentially replaces C1, C2 and C3 and there is pathologic fracture of C2.  4.  Findings would be better assessed with contrast-enhanced MRI. Additionally, direct comparison with the patient's recent outside imaging studies would be helpful.    SUBJECTIVE     Overnight events  None    Subjective complaints                                               ----------------------------------------------------------  OBJECTIVE  PAST MEDICAL & SURGICAL HISTORY                                            -----------------------------------------------------------  ALLERGIES:  No Known Allergies                                            ------------------------------------------------------------    HOME MEDICATIONS  Home Medications:  levothyroxine 50 mcg (0.05 mg) oral tablet: 1 orally once a day (20 Apr 2023 17:29)                           MEDICATIONS:  STANDING MEDICATIONS  enoxaparin Injectable 40 milliGRAM(s) SubCutaneous every 24 hours  levothyroxine 50 MICROGram(s) Oral daily  melatonin 5 milliGRAM(s) Oral at bedtime  sodium chloride 0.9%. 1000 milliLiter(s) IV Continuous <Continuous>    PRN MEDICATIONS  acetaminophen     Tablet .. 650 milliGRAM(s) Oral every 6 hours PRN                                            ------------------------------------------------------------  VITAL SIGNS: Last 24 Hours  T(C): 36.4 (22 Apr 2023 04:33), Max: 36.4 (22 Apr 2023 04:33)  T(F): 97.5 (22 Apr 2023 04:33), Max: 97.5 (22 Apr 2023 04:33)  HR: 99 (22 Apr 2023 04:33) (99 - 105)  BP: 182/99 (22 Apr 2023 04:33) (139/82 - 182/99)  BP(mean): --  RR: 18 (22 Apr 2023 04:33) (16 - 18)  SpO2: 99% (21 Apr 2023 20:50) (99% - 99%)      04-21-23 @ 07:01  -  04-22-23 @ 07:00  --------------------------------------------------------  IN: 1080 mL / OUT: 500 mL / NET: 580 mL                                             --------------------------------------------------------------  LABS:                        13.3   10.07 )-----------( 262      ( 21 Apr 2023 06:26 )             41.4     04-21    132<L>  |  97<L>  |  16  ----------------------------<  83  3.7   |  20  |  0.7    Ca    10.2      21 Apr 2023 06:26    TPro  6.7  /  Alb  4.2  /  TBili  0.5  /  DBili  x   /  AST  17  /  ALT  8   /  AlkPhos  123<H>  04-21    PT/INR - ( 20 Apr 2023 14:56 )   PT: 11.80 sec;   INR: 1.03 ratio         PTT - ( 20 Apr 2023 14:56 )  PTT:35.9 sec                                                          -------------------------------------------------------------  RADIOLOGY:                                            --------------------------------------------------------------    PHYSICAL EXAM:                                             --------------------------------------------------------------                 JEAN-PAUL REYES 57y Female  MRN#: [id number]     Hospital Day: 2d    CC:  Neck pain    HOSPITAL COURSE:   57F. PMH: hypothyroidism and ? alcoholic cirrhosis (stopped drinking over 9 yrs ago),   Presented 4/20 for neck pain w/ limited movement /2 pain x4 months; Denies ataxia, imbalance, or difficulty with ambulation. Denies any numbness, tingling, paresthesias, weakness, saddle anesthesia, or bowel / bladder incontinence. Saw her chiropractor, MRI was done - reported concern for lesions. Pt referred to Dr. Peña's- who she saw 4/20. Pt reports Dr. Peña gave dx of possible breast cancer with metastasis and referred pt to ED.     In the ED: VS: /100, Tachycardic   Labs only remarkable for Ca of 11.4   EKG: Sinus tach    CT cervical spine:   1.  Numerous lytic lesions throughout skull base and spine consistent with extensive osseous metastatic disease.  2.  Epidural component of tumor is noted from C1-C4.  3.  Tumor essentially replaces C1, C2 and C3 and there is pathologic fracture of C2.  4.  Findings would be better assessed with contrast-enhanced MRI. Additionally, direct comparison with the patient's recent outside imaging studies would be helpful.    SUBJECTIVE     Overnight events  None    Subjective complaints   Pt evaluated at bedside. C/o neck pain after the scans, still does not wish for opiate medications, reports toradol is starting to kick in.                                             ----------------------------------------------------------  OBJECTIVE  PAST MEDICAL & SURGICAL HISTORY                                            -----------------------------------------------------------  ALLERGIES:  No Known Allergies                                            ------------------------------------------------------------    HOME MEDICATIONS  Home Medications:  levothyroxine 50 mcg (0.05 mg) oral tablet: 1 orally once a day (20 Apr 2023 17:29)                           MEDICATIONS:  STANDING MEDICATIONS  enoxaparin Injectable 40 milliGRAM(s) SubCutaneous every 24 hours  levothyroxine 50 MICROGram(s) Oral daily  melatonin 5 milliGRAM(s) Oral at bedtime  sodium chloride 0.9%. 1000 milliLiter(s) IV Continuous <Continuous>    PRN MEDICATIONS  acetaminophen     Tablet .. 650 milliGRAM(s) Oral every 6 hours PRN                                            ------------------------------------------------------------  VITAL SIGNS: Last 24 Hours  T(C): 36.4 (22 Apr 2023 04:33), Max: 36.4 (22 Apr 2023 04:33)  T(F): 97.5 (22 Apr 2023 04:33), Max: 97.5 (22 Apr 2023 04:33)  HR: 99 (22 Apr 2023 04:33) (99 - 105)  BP: 182/99 (22 Apr 2023 04:33) (139/82 - 182/99)  BP(mean): --  RR: 18 (22 Apr 2023 04:33) (16 - 18)  SpO2: 99% (21 Apr 2023 20:50) (99% - 99%)      04-21-23 @ 07:01  -  04-22-23 @ 07:00  --------------------------------------------------------  IN: 1080 mL / OUT: 500 mL / NET: 580 mL                                             --------------------------------------------------------------  LABS:                        13.3   10.07 )-----------( 262      ( 21 Apr 2023 06:26 )             41.4     04-21    132<L>  |  97<L>  |  16  ----------------------------<  83  3.7   |  20  |  0.7    Ca    10.2      21 Apr 2023 06:26    TPro  6.7  /  Alb  4.2  /  TBili  0.5  /  DBili  x   /  AST  17  /  ALT  8   /  AlkPhos  123<H>  04-21    PT/INR - ( 20 Apr 2023 14:56 )   PT: 11.80 sec;   INR: 1.03 ratio         PTT - ( 20 Apr 2023 14:56 )  PTT:35.9 sec                                                          -------------------------------------------------------------  RADIOLOGY:                                            --------------------------------------------------------------    PHYSICAL EXAM:  GEN: NAD  NEURO: Alert & Orientedx4, towel in place around neck while seated, no gross defecit  HEENT: nontraumatic, normocephalic, neck supple  CARD: S1, S2 audible, no S3, regular rate and rhythm, no murmur  PULM: B/L breath sounds, no wheezing, crackles, or rales  ABD: Soft, nondistended, nontender  EXTR: No clubbing, cyanosis, edema.                                             --------------------------------------------------------------

## 2023-04-22 NOTE — PROGRESS NOTE ADULT - ATTENDING COMMENTS
57F. PMH: hypothyroidism and ? alcoholic cirrhosis (stopped drinking over 9 yrs ago),   Presented 4/20 for neck pain w/ limited movement /2 pain x4 months; Denies ataxia, imbalance, or difficulty with ambulation. Denies any numbness, tingling, paresthesias, weakness, saddle anesthesia, or bowel / bladder incontinence. Saw her chiropractor, MRI was done - reported concern for lesions. Pt referred to Dr. Peña's- who she saw 4/20. Pt reports Dr. Peña gave dx of possible breast cancer with metastasis and referred pt to ED.     Impression:  Neck pain secondary to metasatic lesions (likely primary is breast but not confirmed)  - nuerosurgery eval appreciated- patient to go to the OR on monday for biopsy and surgery  to wear soft collar when OOB  pain control, start morphine 2mg q4h prn  -oncology follow up    Hypercalcemia secondary to malignancy  DC LR( has calcium in the solution), use NS @65cc/hr  Fu PTH level    hypothyroid  -cw synthroid  tsh wnl
discussed with resident    plan reviewed    plan outlined-consults appreciated    clinical plan evolving

## 2023-04-22 NOTE — PROGRESS NOTE ADULT - ASSESSMENT
57F. PMH: hypothyroidism and ? alcoholic cirrhosis (stopped drinking over 9 yrs ago),   Presented 4/20 for neck pain w/ limited movement /2 pain x4 months; Denies ataxia, imbalance, or difficulty with ambulation. Denies any numbness, tingling, paresthesias, weakness, saddle anesthesia, or bowel / bladder incontinence. Saw her chiropractor, MRI was done - reported concern for lesions. Pt referred to Dr. Peña's- who she saw 4/20. Pt reports Dr. Peña gave dx of possible breast cancer with metastasis and referred pt to ED.     #Severe neck pain 2/2 likely metastatic lesions in the cervical spine  #Suspected breast cancer - no biopsy to confirm    *PE shows R breast with nipple retraction and a firm nodule close proximity to nipple at 3 o'clock   *pt never had mammogram or routine cancer screening   - CT cervical spine (4/20) Numerous lytic lesions in skull base and spine. Tumor essentially replaces C1, C2 and C3; C2 pathologic fracture  - MR Cervical, Thoracic, Lumbar (04.21.23 @ 12:48): CERVICAL SPINE: metastatic lesions; C2-C3 epidural extension causing severe spinal stenosis and mass effect ; THORACIC SPINE: metastatic lesions with malignant compression T8 vertebral body deformity; LUMBAR SPINE: metastatic lesions  - Neurosurgery contacted by ED: imaging as above, Needs Hard collar [place] when OOB;   - Oncology Consulted: CT scans as below; will need biopsy before additional tx plan recs   - Radon consulted: She likely will need surgical stabilization in her neck. Please obtain MRI first and discuss possible surgical intervention w/ neurosurgery. If she is not a surgical candidate and there is signs of cord compression, then it is possible to do RT urgently without tissue biopsy. Otherwise, please obtain tissue diagnosis and complete CT C/A/P first; Can start decadron  - MR Head w/ and w/o IV Contrast (requested by [facility])  - F/u CTAP w/ Con; Chest w/ Con; Head NonCon; (requested by [facility])  - Biopsy - will f/u neurosx team to determine if there will be any neurosx intervention, if not will contact IR for most accessible biopsy  - Biopsy - possibly consult surgery team for bedside breast biopsy   - PT/OT      #hypercalcemia likely 2/2 malignancy   - Ca 11.4  - gentle hydration with LR @65cc/hr   - PTH (4/21): Received     # sinus tachycardia  #Hypertensive urgency - resolved  - HR 120s in ED  - EKG sinus tach     # hypothyroidism   - TSH (4/21) WNL   - c/w levothyroxine 50mcg OD    #Misc  - Diet: Regular  - DVT: Lovenox  - Dispo: Medicine   57F. PMH: hypothyroidism and ? alcoholic cirrhosis (stopped drinking over 9 yrs ago),   Presented 4/20 for neck pain w/ limited movement /2 pain x4 months; Denies ataxia, imbalance, or difficulty with ambulation. Denies any numbness, tingling, paresthesias, weakness, saddle anesthesia, or bowel / bladder incontinence. Saw her chiropractor, MRI was done - reported concern for lesions. Pt referred to Dr. Peña's- who she saw 4/20. Pt reports Dr. ePña gave dx of possible breast cancer with metastasis and referred pt to ED.     #Severe neck pain 2/2 likely metastatic lesions in the cervical spine  #Suspected breast cancer - no biopsy to confirm    *PE shows R breast with nipple retraction and a firm nodule close proximity to nipple at 3 o'clock   *pt never had mammogram or routine cancer screening   - CT cervical spine (4/20) Numerous lytic lesions in skull base and spine. Tumor essentially replaces C1, C2 and C3; C2 pathologic fracture  - CTH (4/21): No acute changes; recommend MR Head w&w/o contrast to r/o mets  - CT Chest/AP (4/21): < from: CT Chest w/ IV Cont (04.21.23 @ 17:06) > Right breast mass (~2 x 2.8cm) with nipple retraction. Multiple enlarged mediastinal lymph. Spinal lesions better seen on MR  - MR Cervical, Thoracic, Lumbar (04.21.23 @ 12:48): CERVICAL SPINE: metastatic lesions; C2-C3 epidural extension causing severe spinal stenosis and mass effect ; THORACIC SPINE: metastatic lesions with malignant compression T8 vertebral body deformity; LUMBAR SPINE: metastatic lesions  - Neurosurgery Eval'd 4/22: Needs Hard collar [place] when OOB; Stabilization surgery; no steroids at this time; no PT until after stabilization  - Oncology Consulted: CT scans as below; will need biopsy before additional tx plan recs   - Radonc consulted: She likely will need surgical stabilization in her neck. Please obtain MRI first and discuss possible surgical intervention w/ neurosurgery. If she is not a surgical candidate and there is signs of cord compression, then it is possible to do RT urgently without tissue biopsy. Otherwise, please obtain tissue diagnosis and complete CT C/A/P first; Can start decadron  - MR Head w/ and w/o IV Contrast (requested by [facility])  - Planned for stabilization surgery pt NSG 4/24 w/ biopsy  - Biopsy - possibly consult surgery team for bedside breast biopsy   - No PT until after stabilization surgery  - C/w pain medications + zofran    #Hypercalcemia likely 2/2 malignancy  #Elevated AlkPhos likely 2/2 malignancy  - Ca 11.4 -> (4/21) 10.7; PTH (4/21): 13L   - Alk Phos 138 -> (4/21) 123  - C/w gentle hydration with LR @65cc/hr     #Sinus tachycardia likely 2/ dehydration and pain  #Hypertensive urgency likely 2/ pain  - HR 120s in ED  - EKG sinus tach     # hypothyroidism   - TSH (4/21) WNL   - c/w levothyroxine 50mcg OD    #Misc  - Diet: Regular  - DVT: Lovenox  - Dispo: Medicine

## 2023-04-23 LAB
ALBUMIN SERPL ELPH-MCNC: 4 G/DL — SIGNIFICANT CHANGE UP (ref 3.5–5.2)
ALP SERPL-CCNC: 116 U/L — HIGH (ref 30–115)
ALT FLD-CCNC: 21 U/L — SIGNIFICANT CHANGE UP (ref 0–41)
ANION GAP SERPL CALC-SCNC: 13 MMOL/L — SIGNIFICANT CHANGE UP (ref 7–14)
APTT BLD: 30.4 SEC — SIGNIFICANT CHANGE UP (ref 27–39.2)
AST SERPL-CCNC: 35 U/L — SIGNIFICANT CHANGE UP (ref 0–41)
BASOPHILS # BLD AUTO: 0.07 K/UL — SIGNIFICANT CHANGE UP (ref 0–0.2)
BASOPHILS NFR BLD AUTO: 1.3 % — HIGH (ref 0–1)
BILIRUB SERPL-MCNC: 0.5 MG/DL — SIGNIFICANT CHANGE UP (ref 0.2–1.2)
BLD GP AB SCN SERPL QL: SIGNIFICANT CHANGE UP
BUN SERPL-MCNC: 9 MG/DL — LOW (ref 10–20)
CALCIUM SERPL-MCNC: 9.4 MG/DL — SIGNIFICANT CHANGE UP (ref 8.4–10.5)
CHLORIDE SERPL-SCNC: 101 MMOL/L — SIGNIFICANT CHANGE UP (ref 98–110)
CO2 SERPL-SCNC: 25 MMOL/L — SIGNIFICANT CHANGE UP (ref 17–32)
CREAT SERPL-MCNC: 0.7 MG/DL — SIGNIFICANT CHANGE UP (ref 0.7–1.5)
EGFR: 100 ML/MIN/1.73M2 — SIGNIFICANT CHANGE UP
EOSINOPHIL # BLD AUTO: 0.17 K/UL — SIGNIFICANT CHANGE UP (ref 0–0.7)
EOSINOPHIL NFR BLD AUTO: 3.1 % — SIGNIFICANT CHANGE UP (ref 0–8)
GLUCOSE SERPL-MCNC: 95 MG/DL — SIGNIFICANT CHANGE UP (ref 70–99)
HCG UR QL: NEGATIVE — SIGNIFICANT CHANGE UP
HCT VFR BLD CALC: 38.7 % — SIGNIFICANT CHANGE UP (ref 37–47)
HGB BLD-MCNC: 12.2 G/DL — SIGNIFICANT CHANGE UP (ref 12–16)
IMM GRANULOCYTES NFR BLD AUTO: 0.4 % — HIGH (ref 0.1–0.3)
INR BLD: 1.04 RATIO — SIGNIFICANT CHANGE UP (ref 0.65–1.3)
LYMPHOCYTES # BLD AUTO: 1.23 K/UL — SIGNIFICANT CHANGE UP (ref 1.2–3.4)
LYMPHOCYTES # BLD AUTO: 22.5 % — SIGNIFICANT CHANGE UP (ref 20.5–51.1)
MAGNESIUM SERPL-MCNC: 1.9 MG/DL — SIGNIFICANT CHANGE UP (ref 1.8–2.4)
MCHC RBC-ENTMCNC: 28.2 PG — SIGNIFICANT CHANGE UP (ref 27–31)
MCHC RBC-ENTMCNC: 31.5 G/DL — LOW (ref 32–37)
MCV RBC AUTO: 89.4 FL — SIGNIFICANT CHANGE UP (ref 81–99)
MONOCYTES # BLD AUTO: 0.5 K/UL — SIGNIFICANT CHANGE UP (ref 0.1–0.6)
MONOCYTES NFR BLD AUTO: 9.2 % — SIGNIFICANT CHANGE UP (ref 1.7–9.3)
NEUTROPHILS # BLD AUTO: 3.47 K/UL — SIGNIFICANT CHANGE UP (ref 1.4–6.5)
NEUTROPHILS NFR BLD AUTO: 63.5 % — SIGNIFICANT CHANGE UP (ref 42.2–75.2)
NRBC # BLD: 0 /100 WBCS — SIGNIFICANT CHANGE UP (ref 0–0)
PLATELET # BLD AUTO: 195 K/UL — SIGNIFICANT CHANGE UP (ref 130–400)
PMV BLD: 9.9 FL — SIGNIFICANT CHANGE UP (ref 7.4–10.4)
POTASSIUM SERPL-MCNC: 3.6 MMOL/L — SIGNIFICANT CHANGE UP (ref 3.5–5)
POTASSIUM SERPL-SCNC: 3.6 MMOL/L — SIGNIFICANT CHANGE UP (ref 3.5–5)
PROT SERPL-MCNC: 6.5 G/DL — SIGNIFICANT CHANGE UP (ref 6–8)
PROTHROM AB SERPL-ACNC: 11.9 SEC — SIGNIFICANT CHANGE UP (ref 9.95–12.87)
RBC # BLD: 4.33 M/UL — SIGNIFICANT CHANGE UP (ref 4.2–5.4)
RBC # FLD: 14.7 % — HIGH (ref 11.5–14.5)
SODIUM SERPL-SCNC: 139 MMOL/L — SIGNIFICANT CHANGE UP (ref 135–146)
WBC # BLD: 5.46 K/UL — SIGNIFICANT CHANGE UP (ref 4.8–10.8)
WBC # FLD AUTO: 5.46 K/UL — SIGNIFICANT CHANGE UP (ref 4.8–10.8)

## 2023-04-23 PROCEDURE — 99232 SBSQ HOSP IP/OBS MODERATE 35: CPT

## 2023-04-23 RX ORDER — SODIUM CHLORIDE 9 MG/ML
1000 INJECTION INTRAMUSCULAR; INTRAVENOUS; SUBCUTANEOUS
Refills: 0 | Status: DISCONTINUED | OUTPATIENT
Start: 2023-04-23 | End: 2023-04-24

## 2023-04-23 RX ORDER — HYDROMORPHONE HYDROCHLORIDE 2 MG/ML
1 INJECTION INTRAMUSCULAR; INTRAVENOUS; SUBCUTANEOUS EVERY 6 HOURS
Refills: 0 | Status: DISCONTINUED | OUTPATIENT
Start: 2023-04-23 | End: 2023-04-23

## 2023-04-23 RX ORDER — MORPHINE SULFATE 50 MG/1
5 CAPSULE, EXTENDED RELEASE ORAL EVERY 6 HOURS
Refills: 0 | Status: DISCONTINUED | OUTPATIENT
Start: 2023-04-23 | End: 2023-04-23

## 2023-04-23 RX ORDER — HYDROMORPHONE HYDROCHLORIDE 2 MG/ML
4 INJECTION INTRAMUSCULAR; INTRAVENOUS; SUBCUTANEOUS EVERY 6 HOURS
Refills: 0 | Status: DISCONTINUED | OUTPATIENT
Start: 2023-04-23 | End: 2023-04-24

## 2023-04-23 RX ORDER — MORPHINE SULFATE 50 MG/1
4 CAPSULE, EXTENDED RELEASE ORAL EVERY 6 HOURS
Refills: 0 | Status: DISCONTINUED | OUTPATIENT
Start: 2023-04-23 | End: 2023-04-24

## 2023-04-23 RX ORDER — HYDROMORPHONE HYDROCHLORIDE 2 MG/ML
0.5 INJECTION INTRAMUSCULAR; INTRAVENOUS; SUBCUTANEOUS EVERY 6 HOURS
Refills: 0 | Status: DISCONTINUED | OUTPATIENT
Start: 2023-04-23 | End: 2023-04-23

## 2023-04-23 RX ORDER — MORPHINE SULFATE 50 MG/1
3 CAPSULE, EXTENDED RELEASE ORAL EVERY 6 HOURS
Refills: 0 | Status: DISCONTINUED | OUTPATIENT
Start: 2023-04-23 | End: 2023-04-24

## 2023-04-23 RX ADMIN — HYDROMORPHONE HYDROCHLORIDE 0.5 MILLIGRAM(S): 2 INJECTION INTRAMUSCULAR; INTRAVENOUS; SUBCUTANEOUS at 17:33

## 2023-04-23 RX ADMIN — TRAMADOL HYDROCHLORIDE 25 MILLIGRAM(S): 50 TABLET ORAL at 02:56

## 2023-04-23 RX ADMIN — TRAMADOL HYDROCHLORIDE 25 MILLIGRAM(S): 50 TABLET ORAL at 04:39

## 2023-04-23 RX ADMIN — SODIUM CHLORIDE 75 MILLILITER(S): 9 INJECTION INTRAMUSCULAR; INTRAVENOUS; SUBCUTANEOUS at 23:34

## 2023-04-23 RX ADMIN — Medication 1000 MILLIGRAM(S): at 21:26

## 2023-04-23 RX ADMIN — Medication 1000 MILLIGRAM(S): at 13:23

## 2023-04-23 RX ADMIN — Medication 50 MICROGRAM(S): at 05:49

## 2023-04-23 RX ADMIN — MORPHINE SULFATE 4 MILLIGRAM(S): 50 CAPSULE, EXTENDED RELEASE ORAL at 23:11

## 2023-04-23 RX ADMIN — MORPHINE SULFATE 2 MILLIGRAM(S): 50 CAPSULE, EXTENDED RELEASE ORAL at 10:19

## 2023-04-23 RX ADMIN — SODIUM CHLORIDE 65 MILLILITER(S): 9 INJECTION INTRAMUSCULAR; INTRAVENOUS; SUBCUTANEOUS at 02:50

## 2023-04-23 RX ADMIN — MORPHINE SULFATE 2 MILLIGRAM(S): 50 CAPSULE, EXTENDED RELEASE ORAL at 05:50

## 2023-04-23 RX ADMIN — CYCLOBENZAPRINE HYDROCHLORIDE 5 MILLIGRAM(S): 10 TABLET, FILM COATED ORAL at 13:22

## 2023-04-23 RX ADMIN — Medication 1000 MILLIGRAM(S): at 13:53

## 2023-04-23 RX ADMIN — ONDANSETRON 4 MILLIGRAM(S): 8 TABLET, FILM COATED ORAL at 18:56

## 2023-04-23 RX ADMIN — Medication 5 MILLIGRAM(S): at 21:24

## 2023-04-23 RX ADMIN — CYCLOBENZAPRINE HYDROCHLORIDE 5 MILLIGRAM(S): 10 TABLET, FILM COATED ORAL at 05:49

## 2023-04-23 RX ADMIN — LORATADINE 10 MILLIGRAM(S): 10 TABLET ORAL at 11:55

## 2023-04-23 RX ADMIN — ONDANSETRON 4 MILLIGRAM(S): 8 TABLET, FILM COATED ORAL at 00:20

## 2023-04-23 RX ADMIN — Medication 1000 MILLIGRAM(S): at 21:39

## 2023-04-23 RX ADMIN — Medication 1000 MILLIGRAM(S): at 05:50

## 2023-04-23 RX ADMIN — CYCLOBENZAPRINE HYDROCHLORIDE 5 MILLIGRAM(S): 10 TABLET, FILM COATED ORAL at 21:24

## 2023-04-23 RX ADMIN — MORPHINE SULFATE 2 MILLIGRAM(S): 50 CAPSULE, EXTENDED RELEASE ORAL at 10:34

## 2023-04-23 RX ADMIN — MORPHINE SULFATE 2 MILLIGRAM(S): 50 CAPSULE, EXTENDED RELEASE ORAL at 00:20

## 2023-04-23 RX ADMIN — MORPHINE SULFATE 2 MILLIGRAM(S): 50 CAPSULE, EXTENDED RELEASE ORAL at 01:00

## 2023-04-23 NOTE — ASSESSMENT
[FreeTextEntry1] : 57 year old female with right breast mass, diffuse bone metastasis ,severe neck pain due  epidural extension at C2 . no definite cord compression or radiculopathy . \par Discussed at length with patient and recommended urgent work up with repeat MRI cervical spine with and without contrast , neurosurgical evaluation , radiation ? steroids ?\par will need biopsy for definitive diagnosis . metastatic work up .

## 2023-04-23 NOTE — HISTORY OF PRESENT ILLNESS
[de-identified] : 57 year old female referred by Dr Suarez for suspected metastatic bone disease. PMH of alcohol abuse , cirrhosis ? sober for 9 years. Presented with worsening neck and lower back pain . MRI ( 3/21/2023 ) shows diffuse metastatic bone  disease most pronounced at C2 with bony expansion causing circumferential narrowing of central canal .\par pain is only partially relieved with tylenol and advil . unable to move,  rotate her neck or lie flat . essentially confined to recliner. She denies weakness, numbness or sphincteric symptoms . She notes right breast lesion for 3 years , increased in size recently .

## 2023-04-23 NOTE — PROGRESS NOTE ADULT - SUBJECTIVE AND OBJECTIVE BOX
Reviewed Imaging and had a lengthy discussion with the patient.    New diagnosis of breast CA, no tissue diagnosis as of yet.  MRI/CT shows metastasis to the spine, multiple vertebrae.  However, C1-2 is completely eroded, unstable spine.    I discussed at length risks benefits of occiput to T2 instrumentation and fusion with biopsy of lesion.  Stabilization is necessary prior to treatment.  She understood the risks and agreed to proceed with Dr. Ortega tomorrow.

## 2023-04-23 NOTE — PROGRESS NOTE ADULT - ASSESSMENT
#Severe neck pain 2/2 likely metastatic lesions in the cervical spine  #Suspected breast cancer - no biopsy to confirm    *PE shows R breast with nipple retraction and a firm nodule close proximity to nipple at 3 o'clock   *pt never had mammogram or routine cancer screening   - CT cervical spine () Numerous lytic lesions in skull base and spine. Tumor essentially replaces C1, C2 and C3; C2 pathologic fracture  - CTH (): No acute changes; recommend MR Head w&w/o contrast to r/o mets  - CT Chest/AP (): < from: CT Chest w/ IV Cont (23 @ 17:06) > Right breast mass (~2 x 2.8cm) with nipple retraction. Multiple enlarged mediastinal lymph. Spinal lesions better seen on MR  - MR Cervical, Thoracic, Lumbar (23 @ 12:48): CERVICAL SPINE: metastatic lesions; C2-C3 epidural extension causing severe spinal stenosis and mass effect ; THORACIC SPINE: metastatic lesions with malignant compression T8 vertebral body deformity; LUMBAR SPINE: metastatic lesions  - Neurosurgery Eval'd : Needs Hard collar [place] J when OOB; Stabilization surgery; no steroids at this time; no PT until after stabilization  - Oncology Consulted: CT scans as below; will need biopsy before additional tx plan recs   - Rad onc consulted: likely will need surgical stabilization in her neck. Please obtain MRI first and discuss possible surgical intervention w/ neurosurgery. If she is not a surgical candidate and there is signs of cord compression, then it is possible to do RT urgently without tissue biopsy. Otherwise, please obtain tissue diagnosis and complete CT C/A/P first; -c/w decadron  - MR Head w/ and w/o IV Contrast noted  - Planned for stabilization surgery pt NSG  w/ biopsy. NPO after MN  - consult surgery team for bedside breast biopsy   - No PT until after stabilization surgery  - C/w pain medications + zofran    #Hypercalcemia likely 2/2 malignancy  #Elevated AlkPhos likely 2/2 malignancy  - Ca 11.4 -> () 10.7; PTH (): 13L   - Alk Phos 138 -> () 123  - C/w gentle hydration    #Sinus tachycardia likely 2/ dehydration and pain  #Hypertensive urgency likely 2/ pain  - HR 120s in ED  - EKG sinus tach     # hypothyroidism   - TSH () WNL   - c/w levothyroxine 50mcg OD    - Diet: Regular  - DVT: held for bx    Total time spent to complete patient's bedside assessment, review medical chart, discuss medical plan of care with covering medical team was more than 50 minutes with >50% of time spent face to face with patient, discussion with patient/family and/or coordination of care    Date seen by [email or web address] #Severe neck pain 2/2 likely metastatic lesions in the cervical spine  #Suspected breast cancer - no biopsy to confirm    *PE shows R breast with nipple retraction and a firm nodule close proximity to nipple at 3 o'clock   *pt never had mammogram or routine cancer screening   - CT cervical spine () Numerous lytic lesions in skull base and spine. Tumor essentially replaces C1, C2 and C3; C2 pathologic fracture  - CTH (): No acute changes; recommend MR Head w&w/o contrast to r/o mets  - CT Chest/AP (): < from: CT Chest w/ IV Cont (23 @ 17:06) > Right breast mass (~2 x 2.8cm) with nipple retraction. Multiple enlarged mediastinal lymph. Spinal lesions better seen on MR  - MR Cervical, Thoracic, Lumbar (23 @ 12:48): CERVICAL SPINE: metastatic lesions; C2-C3 epidural extension causing severe spinal stenosis and mass effect ; THORACIC SPINE: metastatic lesions with malignant compression T8 vertebral body deformity; LUMBAR SPINE: metastatic lesions  - Neurosurgery Eval'd : Needs Hard collar [place] J when OOB; Stabilization surgery; no steroids at this time; no PT until after stabilization  - Oncology Consulted: CT scans as below; will need biopsy before additional tx plan recs   - Rad onc consulted: likely will need surgical stabilization in her neck. Please obtain MRI first and discuss possible surgical intervention w/ neurosurgery. If she is not a surgical candidate and there is signs of cord compression, then it is possible to do RT urgently without tissue biopsy. Otherwise, please obtain tissue diagnosis and complete CT C/A/P first; -c/w decadron  - MR Head w/ and w/o IV Contrast noted  - Planned for stabilization surgery pt NSG  w/ biopsy. NPO after MN  - consult surgery team for bedside breast biopsy   - No PT until after stabilization surgery  - C/w pain medications + zofran  -patient is moderate risk for a relatively moderate risk procedure.     #Hypercalcemia likely 2/2 malignancy  #Elevated AlkPhos likely 2/2 malignancy  - Ca 11.4 -> () 10.7; PTH (): 13L   - Alk Phos 138 -> () 123  - C/w gentle hydration    #Sinus tachycardia likely 2/ dehydration and pain  #Hypertensive urgency likely 2/ pain  - HR 120s in ED  - EKG sinus tach     # hypothyroidism   - TSH () WNL   - c/w levothyroxine 50mcg OD    - Diet: Regular  - DVT: held for bx    Total time spent to complete patient's bedside assessment, review medical chart, discuss medical plan of care with covering medical team was more than 50 minutes with >50% of time spent face to face with patient, discussion with patient/family and/or coordination of care    Date seen by [email or web address]

## 2023-04-23 NOTE — PHYSICAL EXAM
[Normal] : grossly intact [de-identified] : well developed ,constantly  holding her head up and in moderate discomfort .  [de-identified] : limited ROM .  [de-identified] : large central right breast mass with skin and nipple retraction , 5 - 6 cm  [de-identified] : no focal deficits.

## 2023-04-23 NOTE — PROGRESS NOTE ADULT - SUBJECTIVE AND OBJECTIVE BOX
SUBJECTIVE:    Patient is a 57y old Female who presents with a chief complaint of Neck pain (22 Apr 2023 07:27)    Currently admitted to medicine with the primary diagnosis of Neck pain       Today is hospital day 3d. This morning she is resting comfortably in bed and reports no new issues or overnight events.     PAST MEDICAL & SURGICAL HISTORY      ALLERGIES:  No Known Allergies    MEDICATIONS:  STANDING MEDICATIONS  acetaminophen     Tablet .. 1000 milliGRAM(s) Oral every 8 hours  cyclobenzaprine 5 milliGRAM(s) Oral three times a day  enoxaparin Injectable 40 milliGRAM(s) SubCutaneous every 24 hours  levothyroxine 50 MICROGram(s) Oral daily  loratadine 10 milliGRAM(s) Oral daily  melatonin 5 milliGRAM(s) Oral at bedtime  sodium chloride 0.9%. 1000 milliLiter(s) IV Continuous <Continuous>    PRN MEDICATIONS  morphine  - Injectable 2 milliGRAM(s) IV Push every 4 hours PRN  ondansetron Injectable 4 milliGRAM(s) IV Push three times a day PRN  traMADol 25 milliGRAM(s) Oral four times a day PRN    VITALS:   T(F): 97.8  HR: 99  BP: 150/72  RR: 19  SpO2: --    LABS:                        12.2   5.46  )-----------( 195      ( 23 Apr 2023 06:20 )             38.7     04-23    139  |  101  |  9<L>  ----------------------------<  95  3.6   |  25  |  0.7    Ca    9.4      23 Apr 2023 06:20  Mg     1.9     04-23    TPro  6.5  /  Alb  4.0  /  TBili  0.5  /  DBili  x   /  AST  35  /  ALT  21  /  AlkPhos  116<H>  04-23    PT/INR - ( 23 Apr 2023 06:20 )   PT: 11.90 sec;   INR: 1.04 ratio         PTT - ( 23 Apr 2023 06:20 )  PTT:30.4 sec    RADIOLOGY:  < from: CT Abdomen and Pelvis w/ Oral Cont and w/ IV Cont (04.21.23 @ 17:06) >  IMPRESSION:    CHEST:    Right breast mass measuring approximately 2 x 2.8 cm with nipple   retraction.    Multiple enlarged mediastinal lymph nodes including enlarged lower   paratracheal lymph nodes measuring up to 1.6 cm.    Multiple mixed lytic and sclerotic sternal and rib lesions. Sclerotic T8   vertebral body lesion.    Additional thoracic spine lesions, better visualized on recent MRI.    ABDOMEN/PELVIS:    Mixed lytic and sclerotic lesion of the L5 vertebral body. Additional   lumbar spine lesions, better visualized on recent MRI.    < end of copied text >      PHYSICAL EXAM:  GENERAL: NAD  HEAD:  NCAT  EYES: EOMI, PERRL, conjunctiva clear  ENMT: Moist mucous membranes  NECK: Supple, No JVD  NERVOUS SYSTEM: AAOX4  CHEST/LUNG: +bs  HEART: +s1s2 RRR  ABDOMEN: soft, NT/ND (+) bs  EXTREMITIES:  2+ Peripheral Pulses, No c/c/e  SKIN: No rashes or lesions

## 2023-04-23 NOTE — REASON FOR VISIT
[Initial Consultation] : an initial consultation [Spouse] : spouse [Family Member] : family member [[email or web address]] : metastatic bone disease.

## 2023-04-23 NOTE — PROGRESS NOTE ADULT - SUBJECTIVE AND OBJECTIVE BOX
Surgery: Occiput to T2 instrumentation and fusion, biopsy of C2 lesion    No Known Allergies      OVERNIGHT EVENTS: Pt c/o increased pain to neck.  Pain medications ordered.     T(C): 36.6 (04-22-23 @ 20:18), Max: 36.6 (04-22-23 @ 20:18)  HR: 99 (04-22-23 @ 20:18) (99 - 99)  BP: 150/72 (04-22-23 @ 20:18) (150/72 - 150/72)  RR: 19 (04-22-23 @ 20:18) (19 - 19)  SpO2: --  Wt(kg): --        04-23    139  |  101  |  9<L>  ----------------------------<  95  3.6   |  25  |  0.7    Ca    9.4      23 Apr 2023 06:20  Mg     1.9     04-23    TPro  6.5  /  Alb  4.0  /  TBili  0.5  /  DBili  x   /  AST  35  /  ALT  21  /  AlkPhos  116<H>  04-23    CBC Full  -  ( 23 Apr 2023 06:20 )  WBC Count : 5.46 K/uL  RBC Count : 4.33 M/uL  Hemoglobin : 12.2 g/dL  Hematocrit : 38.7 %  Platelet Count - Automated : 195 K/uL  Mean Cell Volume : 89.4 fL  Mean Cell Hemoglobin : 28.2 pg  Mean Cell Hemoglobin Concentration : 31.5 g/dL  Auto Neutrophil # : 3.47 K/uL  Auto Lymphocyte # : 1.23 K/uL  Auto Monocyte # : 0.50 K/uL  Auto Eosinophil # : 0.17 K/uL  Auto Basophil # : 0.07 K/uL  Auto Neutrophil % : 63.5 %  Auto Lymphocyte % : 22.5 %  Auto Monocyte % : 9.2 %  Auto Eosinophil % : 3.1 %  Auto Basophil % : 1.3 %    PT/INR - ( 23 Apr 2023 06:20 )   PT: 11.90 sec;   INR: 1.04 ratio         PTT - ( 23 Apr 2023 06:20 )  PTT:30.4 sec    Pregnancy test:   Type & Screen (in past 72hrs): Antibody Screen: NEG (04.23.23 @ 06:20) ABO RH Interpretation: A POS (04.23.23 @ 06:20)     CXR: < from: CT Chest w/ IV Cont (04.21.23 @ 17:06) >  CHEST:    Right breast mass measuring approximately 2 x 2.8 cm with nipple   retraction.    Multiple enlarged mediastinal lymph nodes including enlarged lower   paratracheal lymph nodes measuring up to 1.6 cm.    Multiple mixed lytic and sclerotic sternal and rib lesions. Sclerotic T8   vertebral body lesion.    Additional thoracic spine lesions, better visualized on recent MRI.    < end of copied text >    EKG: < from: 12 Lead ECG (04.20.23 @ 11:42) >    Diagnosis Line Sinus tachycardia  Right ventricular hypertrophy with repolarization abnormality  Cannot rule out Inferior infarct , age undetermined  T wave abnormality, consider lateral ischemia  Abnormal ECG    < end of copied text >    ECHO:   Medical Clearances: pending  Other Clearances:     Last dose of antiplatelet/anticoagulation drug: Lovenox SQ last dose 4.23.23    Implanted Devices (pacemaker, drug pump...etc):  []YES   [] NO                  If yes --> EPS consulted to interrogate/adjust device:               Assessment:  OR tomorrow    Plan:  NPO p MN  Consent to be obtained in AM  Urine pregnancy pending  Medical risk stratification pending  pain control    Consent: Signed by patient vs HCP                   NAME/NUMBER of HCP:      Surgery: Occiput to T2 instrumentation and fusion, biopsy of C2 lesion    No Known Allergies      OVERNIGHT EVENTS: Pt c/o increased pain to neck.  Pain medications ordered.     T(C): 36.6 (04-22-23 @ 20:18), Max: 36.6 (04-22-23 @ 20:18)  HR: 99 (04-22-23 @ 20:18) (99 - 99)  BP: 150/72 (04-22-23 @ 20:18) (150/72 - 150/72)  RR: 19 (04-22-23 @ 20:18) (19 - 19)  SpO2: --  Wt(kg): --        04-23    139  |  101  |  9<L>  ----------------------------<  95  3.6   |  25  |  0.7    Ca    9.4      23 Apr 2023 06:20  Mg     1.9     04-23    TPro  6.5  /  Alb  4.0  /  TBili  0.5  /  DBili  x   /  AST  35  /  ALT  21  /  AlkPhos  116<H>  04-23    CBC Full  -  ( 23 Apr 2023 06:20 )  WBC Count : 5.46 K/uL  RBC Count : 4.33 M/uL  Hemoglobin : 12.2 g/dL  Hematocrit : 38.7 %  Platelet Count - Automated : 195 K/uL  Mean Cell Volume : 89.4 fL  Mean Cell Hemoglobin : 28.2 pg  Mean Cell Hemoglobin Concentration : 31.5 g/dL  Auto Neutrophil # : 3.47 K/uL  Auto Lymphocyte # : 1.23 K/uL  Auto Monocyte # : 0.50 K/uL  Auto Eosinophil # : 0.17 K/uL  Auto Basophil # : 0.07 K/uL  Auto Neutrophil % : 63.5 %  Auto Lymphocyte % : 22.5 %  Auto Monocyte % : 9.2 %  Auto Eosinophil % : 3.1 %  Auto Basophil % : 1.3 %    PT/INR - ( 23 Apr 2023 06:20 )   PT: 11.90 sec;   INR: 1.04 ratio         PTT - ( 23 Apr 2023 06:20 )  PTT:30.4 sec    Pregnancy test: 4.23 Upreg negative    Type & Screen (in past 72hrs): Antibody Screen: NEG (04.23.23 @ 06:20) ABO RH Interpretation: A POS (04.23.23 @ 06:20)     CXR: < from: CT Chest w/ IV Cont (04.21.23 @ 17:06) >  CHEST:    Right breast mass measuring approximately 2 x 2.8 cm with nipple   retraction.    Multiple enlarged mediastinal lymph nodes including enlarged lower   paratracheal lymph nodes measuring up to 1.6 cm.    Multiple mixed lytic and sclerotic sternal and rib lesions. Sclerotic T8   vertebral body lesion.    Additional thoracic spine lesions, better visualized on recent MRI.    < end of copied text >    EKG: < from: 12 Lead ECG (04.20.23 @ 11:42) >    Diagnosis Line Sinus tachycardia  Right ventricular hypertrophy with repolarization abnormality  Cannot rule out Inferior infarct , age undetermined  T wave abnormality, consider lateral ischemia  Abnormal ECG    < end of copied text >      Medical Clearances: pending  Other Clearances:     Last dose of antiplatelet/anticoagulation drug: Lovenox SQ last dose 4.23.23    Implanted Devices (pacemaker, drug pump...etc):  []YES   [] NO                  If yes --> EPS consulted to interrogate/adjust device:               Assessment:  OR tomorrow    Plan:  NPO p MN  Consent to be obtained in AM  Urine pregnancy neg  Medical risk stratification pending  pain control    Consent: Signed by patient vs HCP                   NAME/NUMBER of HCP:

## 2023-04-24 ENCOUNTER — RESULT REVIEW (OUTPATIENT)
Age: 58
End: 2023-04-24

## 2023-04-24 LAB
ALBUMIN SERPL ELPH-MCNC: 4 G/DL — SIGNIFICANT CHANGE UP (ref 3.5–5.2)
ALBUMIN SERPL ELPH-MCNC: 4.4 G/DL — SIGNIFICANT CHANGE UP (ref 3.5–5.2)
ALP SERPL-CCNC: 130 U/L — HIGH (ref 30–115)
ALP SERPL-CCNC: 140 U/L — HIGH (ref 30–115)
ALT FLD-CCNC: 30 U/L — SIGNIFICANT CHANGE UP (ref 0–41)
ALT FLD-CCNC: 35 U/L — SIGNIFICANT CHANGE UP (ref 0–41)
ANION GAP SERPL CALC-SCNC: 13 MMOL/L — SIGNIFICANT CHANGE UP (ref 7–14)
ANION GAP SERPL CALC-SCNC: 14 MMOL/L — SIGNIFICANT CHANGE UP (ref 7–14)
APTT BLD: 29 SEC — SIGNIFICANT CHANGE UP (ref 27–39.2)
AST SERPL-CCNC: 41 U/L — SIGNIFICANT CHANGE UP (ref 0–41)
AST SERPL-CCNC: 45 U/L — HIGH (ref 0–41)
BASOPHILS # BLD AUTO: 0.07 K/UL — SIGNIFICANT CHANGE UP (ref 0–0.2)
BASOPHILS NFR BLD AUTO: 0.9 % — SIGNIFICANT CHANGE UP (ref 0–1)
BILIRUB SERPL-MCNC: 0.4 MG/DL — SIGNIFICANT CHANGE UP (ref 0.2–1.2)
BILIRUB SERPL-MCNC: 0.5 MG/DL — SIGNIFICANT CHANGE UP (ref 0.2–1.2)
BUN SERPL-MCNC: 6 MG/DL — LOW (ref 10–20)
BUN SERPL-MCNC: 6 MG/DL — LOW (ref 10–20)
CALCIUM SERPL-MCNC: 9.6 MG/DL — SIGNIFICANT CHANGE UP (ref 8.4–10.5)
CALCIUM SERPL-MCNC: 9.7 MG/DL — SIGNIFICANT CHANGE UP (ref 8.4–10.5)
CHLORIDE SERPL-SCNC: 103 MMOL/L — SIGNIFICANT CHANGE UP (ref 98–110)
CHLORIDE SERPL-SCNC: 105 MMOL/L — SIGNIFICANT CHANGE UP (ref 98–110)
CK MB CFR SERPL CALC: 4.5 NG/ML — SIGNIFICANT CHANGE UP (ref 0.6–6.3)
CK SERPL-CCNC: 198 U/L — SIGNIFICANT CHANGE UP (ref 0–225)
CO2 SERPL-SCNC: 23 MMOL/L — SIGNIFICANT CHANGE UP (ref 17–32)
CO2 SERPL-SCNC: 24 MMOL/L — SIGNIFICANT CHANGE UP (ref 17–32)
CREAT SERPL-MCNC: 0.6 MG/DL — LOW (ref 0.7–1.5)
CREAT SERPL-MCNC: 0.6 MG/DL — LOW (ref 0.7–1.5)
EGFR: 104 ML/MIN/1.73M2 — SIGNIFICANT CHANGE UP
EGFR: 104 ML/MIN/1.73M2 — SIGNIFICANT CHANGE UP
EOSINOPHIL # BLD AUTO: 0.24 K/UL — SIGNIFICANT CHANGE UP (ref 0–0.7)
EOSINOPHIL NFR BLD AUTO: 3.1 % — SIGNIFICANT CHANGE UP (ref 0–8)
GLUCOSE SERPL-MCNC: 115 MG/DL — HIGH (ref 70–99)
GLUCOSE SERPL-MCNC: 152 MG/DL — HIGH (ref 70–99)
HCT VFR BLD CALC: 37.3 % — SIGNIFICANT CHANGE UP (ref 37–47)
HCT VFR BLD CALC: 40.6 % — SIGNIFICANT CHANGE UP (ref 37–47)
HGB BLD-MCNC: 12 G/DL — SIGNIFICANT CHANGE UP (ref 12–16)
HGB BLD-MCNC: 13.1 G/DL — SIGNIFICANT CHANGE UP (ref 12–16)
IMM GRANULOCYTES NFR BLD AUTO: 0.6 % — HIGH (ref 0.1–0.3)
INR BLD: 1.08 RATIO — SIGNIFICANT CHANGE UP (ref 0.65–1.3)
LACTATE SERPL-SCNC: 0.7 MMOL/L — SIGNIFICANT CHANGE UP (ref 0.7–2)
LYMPHOCYTES # BLD AUTO: 1.68 K/UL — SIGNIFICANT CHANGE UP (ref 1.2–3.4)
LYMPHOCYTES # BLD AUTO: 21.6 % — SIGNIFICANT CHANGE UP (ref 20.5–51.1)
MAGNESIUM SERPL-MCNC: 1.6 MG/DL — LOW (ref 1.8–2.4)
MAGNESIUM SERPL-MCNC: 1.8 MG/DL — SIGNIFICANT CHANGE UP (ref 1.8–2.4)
MCHC RBC-ENTMCNC: 28.5 PG — SIGNIFICANT CHANGE UP (ref 27–31)
MCHC RBC-ENTMCNC: 28.5 PG — SIGNIFICANT CHANGE UP (ref 27–31)
MCHC RBC-ENTMCNC: 32.2 G/DL — SIGNIFICANT CHANGE UP (ref 32–37)
MCHC RBC-ENTMCNC: 32.3 G/DL — SIGNIFICANT CHANGE UP (ref 32–37)
MCV RBC AUTO: 88.5 FL — SIGNIFICANT CHANGE UP (ref 81–99)
MCV RBC AUTO: 88.6 FL — SIGNIFICANT CHANGE UP (ref 81–99)
MONOCYTES # BLD AUTO: 0.53 K/UL — SIGNIFICANT CHANGE UP (ref 0.1–0.6)
MONOCYTES NFR BLD AUTO: 6.8 % — SIGNIFICANT CHANGE UP (ref 1.7–9.3)
NEUTROPHILS # BLD AUTO: 5.2 K/UL — SIGNIFICANT CHANGE UP (ref 1.4–6.5)
NEUTROPHILS NFR BLD AUTO: 67 % — SIGNIFICANT CHANGE UP (ref 42.2–75.2)
NRBC # BLD: 0 /100 WBCS — SIGNIFICANT CHANGE UP (ref 0–0)
NRBC # BLD: 0 /100 WBCS — SIGNIFICANT CHANGE UP (ref 0–0)
PHOSPHATE SERPL-MCNC: 3.8 MG/DL — SIGNIFICANT CHANGE UP (ref 2.1–4.9)
PLATELET # BLD AUTO: 195 K/UL — SIGNIFICANT CHANGE UP (ref 130–400)
PLATELET # BLD AUTO: 224 K/UL — SIGNIFICANT CHANGE UP (ref 130–400)
PMV BLD: 9.5 FL — SIGNIFICANT CHANGE UP (ref 7.4–10.4)
PMV BLD: 9.7 FL — SIGNIFICANT CHANGE UP (ref 7.4–10.4)
POTASSIUM SERPL-MCNC: 3.7 MMOL/L — SIGNIFICANT CHANGE UP (ref 3.5–5)
POTASSIUM SERPL-MCNC: 4.1 MMOL/L — SIGNIFICANT CHANGE UP (ref 3.5–5)
POTASSIUM SERPL-SCNC: 3.7 MMOL/L — SIGNIFICANT CHANGE UP (ref 3.5–5)
POTASSIUM SERPL-SCNC: 4.1 MMOL/L — SIGNIFICANT CHANGE UP (ref 3.5–5)
PROT SERPL-MCNC: 6.4 G/DL — SIGNIFICANT CHANGE UP (ref 6–8)
PROT SERPL-MCNC: 6.9 G/DL — SIGNIFICANT CHANGE UP (ref 6–8)
PROTHROM AB SERPL-ACNC: 12.4 SEC — SIGNIFICANT CHANGE UP (ref 9.95–12.87)
RBC # BLD: 4.21 M/UL — SIGNIFICANT CHANGE UP (ref 4.2–5.4)
RBC # BLD: 4.59 M/UL — SIGNIFICANT CHANGE UP (ref 4.2–5.4)
RBC # FLD: 14.5 % — SIGNIFICANT CHANGE UP (ref 11.5–14.5)
RBC # FLD: 14.6 % — HIGH (ref 11.5–14.5)
SODIUM SERPL-SCNC: 141 MMOL/L — SIGNIFICANT CHANGE UP (ref 135–146)
SODIUM SERPL-SCNC: 141 MMOL/L — SIGNIFICANT CHANGE UP (ref 135–146)
TROPONIN T SERPL-MCNC: <0.01 NG/ML — SIGNIFICANT CHANGE UP
TROPONIN T SERPL-MCNC: <0.01 NG/ML — SIGNIFICANT CHANGE UP
WBC # BLD: 10.01 K/UL — SIGNIFICANT CHANGE UP (ref 4.8–10.8)
WBC # BLD: 7.77 K/UL — SIGNIFICANT CHANGE UP (ref 4.8–10.8)
WBC # FLD AUTO: 10.01 K/UL — SIGNIFICANT CHANGE UP (ref 4.8–10.8)
WBC # FLD AUTO: 7.77 K/UL — SIGNIFICANT CHANGE UP (ref 4.8–10.8)

## 2023-04-24 PROCEDURE — 99291 CRITICAL CARE FIRST HOUR: CPT

## 2023-04-24 PROCEDURE — 72125 CT NECK SPINE W/O DYE: CPT | Mod: 26

## 2023-04-24 PROCEDURE — 22590 ARTHRD PST TQ CRANIOCERVICAL: CPT | Mod: 62

## 2023-04-24 PROCEDURE — 22843 INSERT SPINE FIXATION DEVICE: CPT

## 2023-04-24 PROCEDURE — 71045 X-RAY EXAM CHEST 1 VIEW: CPT | Mod: 26

## 2023-04-24 PROCEDURE — 99232 SBSQ HOSP IP/OBS MODERATE 35: CPT

## 2023-04-24 PROCEDURE — 88360 TUMOR IMMUNOHISTOCHEM/MANUAL: CPT | Mod: 26,59

## 2023-04-24 PROCEDURE — 22843 INSERT SPINE FIXATION DEVICE: CPT | Mod: 82

## 2023-04-24 PROCEDURE — 22326 TREAT NECK SPINE FRACTURE: CPT | Mod: 62

## 2023-04-24 PROCEDURE — 22600 ARTHRD PST TQ 1NTRSPC CRV: CPT | Mod: 62

## 2023-04-24 PROCEDURE — 22614 ARTHRD PST TQ 1NTRSPC EA ADD: CPT | Mod: 62

## 2023-04-24 PROCEDURE — 88304 TISSUE EXAM BY PATHOLOGIST: CPT | Mod: 26

## 2023-04-24 PROCEDURE — 88341 IMHCHEM/IMCYTCHM EA ADD ANTB: CPT | Mod: 26

## 2023-04-24 PROCEDURE — 88342 IMHCHEM/IMCYTCHM 1ST ANTB: CPT | Mod: 26

## 2023-04-24 PROCEDURE — 93010 ELECTROCARDIOGRAM REPORT: CPT

## 2023-04-24 RX ORDER — MORPHINE SULFATE 50 MG/1
30 CAPSULE, EXTENDED RELEASE ORAL
Refills: 0 | Status: DISCONTINUED | OUTPATIENT
Start: 2023-04-24 | End: 2023-04-27

## 2023-04-24 RX ORDER — ONDANSETRON 8 MG/1
4 TABLET, FILM COATED ORAL EVERY 6 HOURS
Refills: 0 | Status: DISCONTINUED | OUTPATIENT
Start: 2023-04-24 | End: 2023-04-28

## 2023-04-24 RX ORDER — CEFAZOLIN SODIUM 1 G
1000 VIAL (EA) INJECTION EVERY 8 HOURS
Refills: 0 | Status: DISCONTINUED | OUTPATIENT
Start: 2023-04-24 | End: 2023-04-28

## 2023-04-24 RX ORDER — DIAZEPAM 5 MG
2 TABLET ORAL ONCE
Refills: 0 | Status: DISCONTINUED | OUTPATIENT
Start: 2023-04-24 | End: 2023-04-24

## 2023-04-24 RX ORDER — SODIUM CHLORIDE 9 MG/ML
1000 INJECTION, SOLUTION INTRAVENOUS
Refills: 0 | Status: DISCONTINUED | OUTPATIENT
Start: 2023-04-24 | End: 2023-04-24

## 2023-04-24 RX ORDER — LABETALOL HCL 100 MG
10 TABLET ORAL ONCE
Refills: 0 | Status: COMPLETED | OUTPATIENT
Start: 2023-04-24 | End: 2023-04-24

## 2023-04-24 RX ORDER — HYDROMORPHONE HYDROCHLORIDE 2 MG/ML
0.5 INJECTION INTRAMUSCULAR; INTRAVENOUS; SUBCUTANEOUS
Refills: 0 | Status: DISCONTINUED | OUTPATIENT
Start: 2023-04-24 | End: 2023-04-24

## 2023-04-24 RX ORDER — MAGNESIUM OXIDE 400 MG ORAL TABLET 241.3 MG
400 TABLET ORAL ONCE
Refills: 0 | Status: COMPLETED | OUTPATIENT
Start: 2023-04-24 | End: 2023-04-26

## 2023-04-24 RX ORDER — NICARDIPINE HYDROCHLORIDE 30 MG/1
5 CAPSULE, EXTENDED RELEASE ORAL
Qty: 40 | Refills: 0 | Status: DISCONTINUED | OUTPATIENT
Start: 2023-04-24 | End: 2023-04-26

## 2023-04-24 RX ORDER — FUROSEMIDE 40 MG
10 TABLET ORAL ONCE
Refills: 0 | Status: COMPLETED | OUTPATIENT
Start: 2023-04-24 | End: 2023-04-24

## 2023-04-24 RX ORDER — FAMOTIDINE 10 MG/ML
20 INJECTION INTRAVENOUS EVERY 12 HOURS
Refills: 0 | Status: DISCONTINUED | OUTPATIENT
Start: 2023-04-24 | End: 2023-04-28

## 2023-04-24 RX ORDER — HYDRALAZINE HCL 50 MG
5 TABLET ORAL
Refills: 0 | Status: DISCONTINUED | OUTPATIENT
Start: 2023-04-24 | End: 2023-04-28

## 2023-04-24 RX ORDER — IPRATROPIUM/ALBUTEROL SULFATE 18-103MCG
3 AEROSOL WITH ADAPTER (GRAM) INHALATION ONCE
Refills: 0 | Status: COMPLETED | OUTPATIENT
Start: 2023-04-24 | End: 2023-04-24

## 2023-04-24 RX ORDER — HYDROMORPHONE HYDROCHLORIDE 2 MG/ML
30 INJECTION INTRAMUSCULAR; INTRAVENOUS; SUBCUTANEOUS
Refills: 0 | Status: DISCONTINUED | OUTPATIENT
Start: 2023-04-24 | End: 2023-04-25

## 2023-04-24 RX ORDER — MORPHINE SULFATE 50 MG/1
3 CAPSULE, EXTENDED RELEASE ORAL ONCE
Refills: 0 | Status: DISCONTINUED | OUTPATIENT
Start: 2023-04-24 | End: 2023-04-24

## 2023-04-24 RX ORDER — METHOCARBAMOL 500 MG/1
750 TABLET, FILM COATED ORAL EVERY 8 HOURS
Refills: 0 | Status: DISCONTINUED | OUTPATIENT
Start: 2023-04-24 | End: 2023-04-28

## 2023-04-24 RX ORDER — METOCLOPRAMIDE HCL 10 MG
10 TABLET ORAL ONCE
Refills: 0 | Status: DISCONTINUED | OUTPATIENT
Start: 2023-04-24 | End: 2023-04-24

## 2023-04-24 RX ORDER — LEVOTHYROXINE SODIUM 125 MCG
50 TABLET ORAL DAILY
Refills: 0 | Status: DISCONTINUED | OUTPATIENT
Start: 2023-04-24 | End: 2023-04-28

## 2023-04-24 RX ORDER — SODIUM CHLORIDE 9 MG/ML
1000 INJECTION INTRAMUSCULAR; INTRAVENOUS; SUBCUTANEOUS
Refills: 0 | Status: DISCONTINUED | OUTPATIENT
Start: 2023-04-24 | End: 2023-04-24

## 2023-04-24 RX ORDER — NALOXONE HYDROCHLORIDE 4 MG/.1ML
0.1 SPRAY NASAL
Refills: 0 | Status: DISCONTINUED | OUTPATIENT
Start: 2023-04-24 | End: 2023-04-28

## 2023-04-24 RX ADMIN — SODIUM CHLORIDE 75 MILLILITER(S): 9 INJECTION INTRAMUSCULAR; INTRAVENOUS; SUBCUTANEOUS at 12:59

## 2023-04-24 RX ADMIN — MORPHINE SULFATE 4 MILLIGRAM(S): 50 CAPSULE, EXTENDED RELEASE ORAL at 06:23

## 2023-04-24 RX ADMIN — Medication 10 MILLIGRAM(S): at 15:42

## 2023-04-24 RX ADMIN — METHOCARBAMOL 750 MILLIGRAM(S): 500 TABLET, FILM COATED ORAL at 23:19

## 2023-04-24 RX ADMIN — Medication 10 MILLIGRAM(S): at 13:30

## 2023-04-24 RX ADMIN — MORPHINE SULFATE 3 MILLIGRAM(S): 50 CAPSULE, EXTENDED RELEASE ORAL at 01:59

## 2023-04-24 RX ADMIN — MORPHINE SULFATE 3 MILLIGRAM(S): 50 CAPSULE, EXTENDED RELEASE ORAL at 13:30

## 2023-04-24 RX ADMIN — MORPHINE SULFATE 4 MILLIGRAM(S): 50 CAPSULE, EXTENDED RELEASE ORAL at 00:00

## 2023-04-24 RX ADMIN — CYCLOBENZAPRINE HYDROCHLORIDE 5 MILLIGRAM(S): 10 TABLET, FILM COATED ORAL at 05:28

## 2023-04-24 RX ADMIN — FAMOTIDINE 20 MILLIGRAM(S): 10 INJECTION INTRAVENOUS at 18:28

## 2023-04-24 RX ADMIN — Medication 100 MILLIGRAM(S): at 22:37

## 2023-04-24 RX ADMIN — MORPHINE SULFATE 4 MILLIGRAM(S): 50 CAPSULE, EXTENDED RELEASE ORAL at 05:28

## 2023-04-24 RX ADMIN — MORPHINE SULFATE 30 MILLILITER(S): 50 CAPSULE, EXTENDED RELEASE ORAL at 16:06

## 2023-04-24 RX ADMIN — Medication 50 MICROGRAM(S): at 05:28

## 2023-04-24 RX ADMIN — Medication 2 MILLIGRAM(S): at 14:58

## 2023-04-24 RX ADMIN — NICARDIPINE HYDROCHLORIDE 25 MG/HR: 30 CAPSULE, EXTENDED RELEASE ORAL at 22:36

## 2023-04-24 RX ADMIN — MORPHINE SULFATE 3 MILLIGRAM(S): 50 CAPSULE, EXTENDED RELEASE ORAL at 13:45

## 2023-04-24 RX ADMIN — Medication 3 MILLILITER(S): at 15:30

## 2023-04-24 RX ADMIN — NICARDIPINE HYDROCHLORIDE 25 MG/HR: 30 CAPSULE, EXTENDED RELEASE ORAL at 13:59

## 2023-04-24 RX ADMIN — ONDANSETRON 4 MILLIGRAM(S): 8 TABLET, FILM COATED ORAL at 06:08

## 2023-04-24 RX ADMIN — Medication 100 MILLIGRAM(S): at 13:59

## 2023-04-24 RX ADMIN — MORPHINE SULFATE 3 MILLIGRAM(S): 50 CAPSULE, EXTENDED RELEASE ORAL at 02:30

## 2023-04-24 RX ADMIN — Medication 10 MILLIGRAM(S): at 12:45

## 2023-04-24 RX ADMIN — METHOCARBAMOL 750 MILLIGRAM(S): 500 TABLET, FILM COATED ORAL at 15:44

## 2023-04-24 NOTE — PROGRESS NOTE ADULT - SUBJECTIVE AND OBJECTIVE BOX
HOSPITALIST PROGRESS NOTE     Interval Events:      4.24.23  C spine fusion per Neurosurgery today  stable for surgery      REVIEW OF SYSTEMS:  Negative except as noted above.     VITALS:  T(F): 97.7 (04-24 @ 12:30), Max: 98.4 (04-24 @ 06:47)  HR: 109 (04-24 @ 16:20) (80 - 112)  BP: 173/70 (04-24 @ 14:15) (146/84 - 242/115)  RR: 20 (04-24 @ 16:20) (17 - 25)  SpO2: 95% (04-24 @ 16:05) (91% - 97%)    HEIGHT/WEIGHT/BMI:   Height (cm): 162.6 (04-24)  Weight (kg): 86.2 (04-24)  BMI (kg/m2): 32.6 (04-24)    PHYSICAL EXAM:   PHYSICAL EXAM:  GENERAL: NAD, lying in bed comfortably  NERVOUS SYSTEM:  Alert & Oriented X3, speech clear. No deficits   HEAD:  Atraumatic, Normocephalic  EYES: EOMI, conjunctiva and sclera clear  ENT: Moist mucous membranes  NECK: Supple, No JVD  CHEST/LUNG: Clear to auscultation bilaterally; Unlabored respirations  HEART: Regular rate and rhythm  ABDOMEN: Bowel sounds present; Soft, Nontender, Nondistended  EXTREMITIES: No clubbing, cyanosis, or edema  MSK: FROM all 4 extremities, full and equal strength  SKIN: No rashes or lesions    I/Os:     04-23-23 @ 07:01  -  04-24-23 @ 07:00  --------------------------------------------------------  IN:    sodium chloride 0.9%: 520 mL    sodium chloride 0.9%: 525 mL  Total IN: 1045 mL    OUT:  Total OUT: 0 mL    Total NET: 1045 mL        MEDICATIONS:   ceFAZolin   IVPB 1000 milliGRAM(s) IV Intermittent every 8 hours  famotidine    Tablet 20 milliGRAM(s) Oral every 12 hours  hydrALAZINE Injectable 5 milliGRAM(s) IV Push every 1 hour PRN  HYDROmorphone PCA (1 mG/mL) 30 milliLiter(s) PCA Continuous PCA Continuous  levothyroxine 50 MICROGram(s) Oral daily  magnesium oxide 400 milliGRAM(s) Oral once  methocarbamol 750 milliGRAM(s) Oral every 8 hours  metoclopramide Injectable 10 milliGRAM(s) IV Push once PRN  morphine PCA (5 mG/mL) 30 milliLiter(s) PCA Continuous PCA Continuous  naloxone Injectable 0.1 milliGRAM(s) IV Push every 3 minutes PRN  niCARdipine Infusion 5 mG/Hr (25 mL/Hr) IV Continuous <Continuous>  ondansetron Injectable 4 milliGRAM(s) IV Push every 6 hours PRN  ondansetron Injectable 4 milliGRAM(s) IV Push every 6 hours PRN    LABS:                         12.0   10.01 )-----------( 195      ( 24 Apr 2023 13:28 )             37.3     141  |  105  |  6<L>  ----------------------------<  152<H>          (04-24-23 @ 13:28)  4.1   |  23  |  0.6<L>    Ca    9.6          (04-24-23 @ 13:28)  Phos  3.8         (04-24-23 @ 13:28)  Mg     1.6         (04-24-23 @ 13:28)    TPro  6.4  /  Alb  4.0  /  TBili  0.4  /  DBili  x   /  AST  41  /  ALT  30  /  AlkPhos  130<H>       04-24-23 @ 13:28      Blood Glucose (Past 24 hours):    DIET:   Diet, Clear Liquid (04-24-23 @ 13:05) [Active]        RADIOLOGY:

## 2023-04-24 NOTE — CONSULT NOTE ADULT - SUBJECTIVE AND OBJECTIVE BOX
SUMMARY:    57F. PMH: hypothyroidism and ? alcoholic cirrhosis (stopped drinking over 9 yrs ago),   Presented 4/20 for neck pain w/ limited movement /2 pain x4 months; Denies ataxia, imbalance, or difficulty with ambulation. Denies any numbness, tingling, paresthesias, weakness, saddle anesthesia, or bowel / bladder incontinence. Saw her chiropractor, MRI was done - reported concern for lesions. Pt referred to Dr. Peña's- who she saw 4/20. Pt reports Dr. Peña gave dx of possible breast cancer with metastasis and referred pt to ED.     In the ED: VS: /100, Tachycardic   Labs only remarkable for Ca of 11.4   EKG: Sinus tach    CT cervical spine:   1.  Numerous lytic lesions throughout skull base and spine consistent with extensive osseous metastatic disease.  2.  Epidural component of tumor is noted from C1-C4.  3.  Tumor essentially replaces C1, C2 and C3 and there is pathologic fracture of C2.  4.  Findings would be better assessed with contrast-enhanced MRI. Additionally, direct comparison with the patient's recent outside imaging studies would be helpful.      ADMISSION SCORES:   GCS: 15    SEDATION SCORES:  RASS: CAM-ICU:     REVIEW OF SYSTEMS:    VITALS: [] Reviewed    IMAGING/DATA: [] Reviewed    IVF FLUIDS/MEDICATIONS: [] Reviewed    ALLERGIES: Allergies    No Known Allergies    Intolerances    hydromorphone (Faint; Nausea)      DEVICES:   [] Restraints [] PIVs [] ET tube [] central line [] PICC [] arterial line [] escamilla [] NGT/OGT [] EVD [] LD [] RENALDO/HMV [] LiCOX [] ICP monitor [] Trach [] PEG [] Chest Tube [] other:    EXAMINATION:  General: No acute distress  HEENT: Anicteric sclerae  Cardiac: [id number]  Lungs: Clear  Abdomen: Soft, non-tender, +BS  Extremities: No c/c/e  Skin/Incision Site: Clean, dry and intact  Neurologic: Awake, alert, fully oriented, follows commands, PERRL, VFFtc, EOMI, face symmetric, tongue midline, no drift, full strength      ICU Vital Signs Last 24 Hrs  T(C): 36.5 (24 Apr 2023 12:30), Max: 36.9 (24 Apr 2023 06:47)  T(F): 97.7 (24 Apr 2023 12:30), Max: 98.4 (24 Apr 2023 06:47)  HR: 104 (24 Apr 2023 12:45) (98 - 112)  BP: 183/91 (24 Apr 2023 07:12) (143/82 - 183/91)  BP(mean): 112 (24 Apr 2023 07:12) (112 - 112)  ABP: 138/71 (24 Apr 2023 14:15) (138/71 - 219/111)  ABP(mean): --  RR: 25 (24 Apr 2023 12:45) (17 - 25)  SpO2: 93% (24 Apr 2023 12:45) (93% - 97%)      04-23-23 @ 07:01  -  04-24-23 @ 07:00  --------------------------------------------------------  IN: 1045 mL / OUT: 0 mL / NET: 1045 mL    04-24-23 @ 07:01  -  04-24-23 @ 14:32  --------------------------------------------------------  IN: 275 mL / OUT: 1450 mL / NET: -1175 mL      ceFAZolin   IVPB 1000 milliGRAM(s) IV Intermittent every 8 hours  diazepam  Injectable 2 milliGRAM(s) IV Push once  famotidine    Tablet 20 milliGRAM(s) Oral every 12 hours  hydrALAZINE Injectable 5 milliGRAM(s) IV Push every 1 hour PRN  HYDROmorphone PCA (1 mG/mL) 30 milliLiter(s) PCA Continuous PCA Continuous  levothyroxine 50 MICROGram(s) Oral daily  methocarbamol 750 milliGRAM(s) Oral every 8 hours  metoclopramide Injectable 10 milliGRAM(s) IV Push once PRN  morphine PCA (5 mG/mL) 30 milliLiter(s) PCA Continuous PCA Continuous  naloxone Injectable 0.1 milliGRAM(s) IV Push every 3 minutes PRN  niCARdipine Infusion 5 mG/Hr (25 mL/Hr) IV Continuous <Continuous>  ondansetron Injectable 4 milliGRAM(s) IV Push every 6 hours PRN  ondansetron Injectable 4 milliGRAM(s) IV Push every 6 hours PRN  sodium chloride 0.9%. 1000 milliLiter(s) (75 mL/Hr) IV Continuous <Continuous>      LABS:  Na: 141 (04-24 @ 06:15), 139 (04-23 @ 06:20)  K: 3.7 (04-24 @ 06:15), 3.6 (04-23 @ 06:20)  Cl: 103 (04-24 @ 06:15), 101 (04-23 @ 06:20)  CO2: 24 (04-24 @ 06:15), 25 (04-23 @ 06:20)  BUN: 6 (04-24 @ 06:15), 9 (04-23 @ 06:20)  Cr: 0.6 (04-24 @ 06:15), 0.7 (04-23 @ 06:20)  Glu: 115(04-24 @ 06:15), 95(04-23 @ 06:20)    Hgb: 12.0 (04-24 @ 13:28), 13.1 (04-24 @ 06:15), 12.2 (04-23 @ 06:20)  Hct: 37.3 (04-24 @ 13:28), 40.6 (04-24 @ 06:15), 38.7 (04-23 @ 06:20)  WBC: 10.01 (04-24 @ 13:28), 7.77 (04-24 @ 06:15), 5.46 (04-23 @ 06:20)  Plt: 195 (04-24 @ 13:28), 224 (04-24 @ 06:15), 195 (04-23 @ 06:20)    INR: 1.08 04-24-23 @ 13:28, 1.04 04-23-23 @ 06:20  PTT: 29.0 04-24-23 @ 13:28, 30.4 04-23-23 @ 06:20      LIVER FUNCTIONS - ( 24 Apr 2023 06:15 )  Alb: 4.4 g/dL / Pro: 6.9 g/dL / ALK PHOS: 140 U/L / ALT: 35 U/L / AST: 45 U/L / GGT: x                SUMMARY:    57F. PMH: hypothyroidism and ? alcoholic cirrhosis (stopped drinking over 9 yrs ago),   Presented 4/20 for neck pain w/ limited movement /2 pain x4 months; Denies ataxia, imbalance, or difficulty with ambulation. Denies any numbness, tingling, paresthesias, weakness, saddle anesthesia, or bowel / bladder incontinence. Saw her chiropractor, MRI was done - reported concern for lesions. Pt referred to Dr. Peña's- who she saw 4/20. Pt reports Dr. Peña gave dx of possible breast cancer with metastasis and referred pt to ED.     In the ED: VS: /100, Tachycardic   Labs only remarkable for Ca of 11.4   EKG: Sinus tach    CT cervical spine:   1.  Numerous lytic lesions throughout skull base and spine consistent with extensive osseous metastatic disease.  2.  Epidural component of tumor is noted from C1-C4.  3.  Tumor essentially replaces C1, C2 and C3 and there is pathologic fracture of C2.  4.  Findings would be better assessed with contrast-enhanced MRI. Additionally, direct comparison with the patient's recent outside imaging studies would be helpful.      ADMISSION SCORES:   GCS: 15    SEDATION SCORES:  RASS: CAM-ICU:     REVIEW OF SYSTEMS:    VITALS: [X] Reviewed  ICU Vital Signs Last 24 Hrs  T(C): 36.5 (24 Apr 2023 12:30), Max: 36.9 (24 Apr 2023 06:47)  T(F): 97.7 (24 Apr 2023 12:30), Max: 98.4 (24 Apr 2023 06:47)  HR: 109 (24 Apr 2023 16:20) (80 - 112)  BP: 173/70 (24 Apr 2023 14:15) (143/82 - 242/115)  BP(mean): 112 (24 Apr 2023 07:12) (112 - 112)  ABP: 144/75 (24 Apr 2023 16:20) (134/69 - 219/111)  ABP(mean): 100 (24 Apr 2023 16:20) (97 - 100)  RR: 20 (24 Apr 2023 16:20) (17 - 25)  SpO2: 95% (24 Apr 2023 16:05) (91% - 97%)    O2 Parameters below as of 24 Apr 2023 16:20    O2 Flow (L/min): 15  O2 Concentration (%): 40    IVF FLUIDS/MEDICATIONS: [] Reviewed      23 Apr 2023 07:01  -  24 Apr 2023 07:00  --------------------------------------------------------  IN:    sodium chloride 0.9%: 520 mL    sodium chloride 0.9%: 525 mL  Total IN: 1045 mL    OUT:  Total OUT: 0 mL    Total NET: 1045 mL    MEDICATIONS  (STANDING):  ceFAZolin   IVPB 1000 milliGRAM(s) IV Intermittent every 8 hours  famotidine    Tablet 20 milliGRAM(s) Oral every 12 hours  HYDROmorphone PCA (1 mG/mL) 30 milliLiter(s) PCA Continuous PCA Continuous  levothyroxine 50 MICROGram(s) Oral daily  magnesium oxide 400 milliGRAM(s) Oral once  methocarbamol 750 milliGRAM(s) Oral every 8 hours  morphine PCA (5 mG/mL) 30 milliLiter(s) PCA Continuous PCA Continuous  niCARdipine Infusion 5 mG/Hr (25 mL/Hr) IV Continuous <Continuous>    MEDICATIONS  (PRN):  hydrALAZINE Injectable 5 milliGRAM(s) IV Push every 1 hour PRN sbp > 160  naloxone Injectable 0.1 milliGRAM(s) IV Push every 3 minutes PRN For ANY of the following changes in patient status:  A. RR LESS THAN 10 breaths per minute, B. Oxygen saturation LESS THAN 90%, C. Sedation score of 6  ondansetron Injectable 4 milliGRAM(s) IV Push every 6 hours PRN Nausea and/or Vomiting  ondansetron Injectable 4 milliGRAM(s) IV Push every 6 hours PRN Nausea        EXAMINATION:  General: No acute distress  HEENT: Anicteric sclerae  Cardiac: [id number]  Lungs: Clear  Abdomen: Soft, non-tender, +BS  Extremities: No c/c/e  Skin/Incision Site: Clean, dry and intact  Neurologic: Awake, alert, fully oriented, follows commands, PERRL, VFFtc, EOMI, face symmetric, tongue midline, no drift, full strength            LABS:  Na: 141 (04-24 @ 06:15), 139 (04-23 @ 06:20)  K: 3.7 (04-24 @ 06:15), 3.6 (04-23 @ 06:20)  Cl: 103 (04-24 @ 06:15), 101 (04-23 @ 06:20)  CO2: 24 (04-24 @ 06:15), 25 (04-23 @ 06:20)  BUN: 6 (04-24 @ 06:15), 9 (04-23 @ 06:20)  Cr: 0.6 (04-24 @ 06:15), 0.7 (04-23 @ 06:20)  Glu: 115(04-24 @ 06:15), 95(04-23 @ 06:20)    Hgb: 12.0 (04-24 @ 13:28), 13.1 (04-24 @ 06:15), 12.2 (04-23 @ 06:20)  Hct: 37.3 (04-24 @ 13:28), 40.6 (04-24 @ 06:15), 38.7 (04-23 @ 06:20)  WBC: 10.01 (04-24 @ 13:28), 7.77 (04-24 @ 06:15), 5.46 (04-23 @ 06:20)  Plt: 195 (04-24 @ 13:28), 224 (04-24 @ 06:15), 195 (04-23 @ 06:20)    INR: 1.08 04-24-23 @ 13:28, 1.04 04-23-23 @ 06:20  PTT: 29.0 04-24-23 @ 13:28, 30.4 04-23-23 @ 06:20      LIVER FUNCTIONS - ( 24 Apr 2023 06:15 )  Alb: 4.4 g/dL / Pro: 6.9 g/dL / ALK PHOS: 140 U/L / ALT: 35 U/L / AST: 45 U/L / GGT: x                SUMMARY:    57F. PMH: hypothyroidism and ? alcoholic cirrhosis (stopped drinking over 9 yrs ago),   Presented 4/20 for neck pain w/ limited movement /2 pain x4 months; Denies ataxia, imbalance, or difficulty with ambulation. Denies any numbness, tingling, paresthesias, weakness, saddle anesthesia, or bowel / bladder incontinence. Saw her chiropractor, MRI was done - reported concern for lesions. Pt referred to Dr. Peña's- who she saw 4/20. Pt reports Dr. Peña gave dx of possible breast cancer with metastasis and referred pt to ED.     In the ED: VS: /100, Tachycardic   Labs only remarkable for Ca of 11.4   EKG: Sinus tach    CT cervical spine:   1.  Numerous lytic lesions throughout skull base and spine consistent with extensive osseous metastatic disease.  2.  Epidural component of tumor is noted from C1-C4.  3.  Tumor essentially replaces C1, C2 and C3 and there is pathologic fracture of C2.  4.  Findings would be better assessed with contrast-enhanced MRI. Additionally, direct comparison with the patient's recent outside imaging studies would be helpful.      ADMISSION SCORES:   GCS: 15    SEDATION SCORES:  RASS: CAM-ICU:     REVIEW OF SYSTEMS:    VITALS: [X] Reviewed  ICU Vital Signs Last 24 Hrs  T(C): 36.5 (24 Apr 2023 12:30), Max: 36.9 (24 Apr 2023 06:47)  T(F): 97.7 (24 Apr 2023 12:30), Max: 98.4 (24 Apr 2023 06:47)  HR: 109 (24 Apr 2023 16:20) (80 - 112)  BP: 173/70 (24 Apr 2023 14:15) (143/82 - 242/115)  BP(mean): 112 (24 Apr 2023 07:12) (112 - 112)  ABP: 144/75 (24 Apr 2023 16:20) (134/69 - 219/111)  ABP(mean): 100 (24 Apr 2023 16:20) (97 - 100)  RR: 20 (24 Apr 2023 16:20) (17 - 25)  SpO2: 95% (24 Apr 2023 16:05) (91% - 97%)    O2 Parameters below as of 24 Apr 2023 16:20    O2 Flow (L/min): 15  O2 Concentration (%): 40    IVF FLUIDS/MEDICATIONS: [] Reviewed      23 Apr 2023 07:01  -  24 Apr 2023 07:00  --------------------------------------------------------  IN:    sodium chloride 0.9%: 520 mL    sodium chloride 0.9%: 525 mL  Total IN: 1045 mL    OUT:  Total OUT: 0 mL    Total NET: 1045 mL    MEDICATIONS  (STANDING):  ceFAZolin   IVPB 1000 milliGRAM(s) IV Intermittent every 8 hours  famotidine    Tablet 20 milliGRAM(s) Oral every 12 hours  HYDROmorphone PCA (1 mG/mL) 30 milliLiter(s) PCA Continuous PCA Continuous  levothyroxine 50 MICROGram(s) Oral daily  magnesium oxide 400 milliGRAM(s) Oral once  methocarbamol 750 milliGRAM(s) Oral every 8 hours  morphine PCA (5 mG/mL) 30 milliLiter(s) PCA Continuous PCA Continuous  niCARdipine Infusion 5 mG/Hr (25 mL/Hr) IV Continuous <Continuous>    MEDICATIONS  (PRN):  hydrALAZINE Injectable 5 milliGRAM(s) IV Push every 1 hour PRN sbp > 160  naloxone Injectable 0.1 milliGRAM(s) IV Push every 3 minutes PRN For ANY of the following changes in patient status:  A. RR LESS THAN 10 breaths per minute, B. Oxygen saturation LESS THAN 90%, C. Sedation score of 6  ondansetron Injectable 4 milliGRAM(s) IV Push every 6 hours PRN Nausea and/or Vomiting  ondansetron Injectable 4 milliGRAM(s) IV Push every 6 hours PRN Nausea    LABS:  Na: 141 (04-24 @ 06:15), 139 (04-23 @ 06:20)  K: 3.7 (04-24 @ 06:15), 3.6 (04-23 @ 06:20)  Cl: 103 (04-24 @ 06:15), 101 (04-23 @ 06:20)  CO2: 24 (04-24 @ 06:15), 25 (04-23 @ 06:20)  BUN: 6 (04-24 @ 06:15), 9 (04-23 @ 06:20)  Cr: 0.6 (04-24 @ 06:15), 0.7 (04-23 @ 06:20)  Glu: 115(04-24 @ 06:15), 95(04-23 @ 06:20)    Hgb: 12.0 (04-24 @ 13:28), 13.1 (04-24 @ 06:15), 12.2 (04-23 @ 06:20)  Hct: 37.3 (04-24 @ 13:28), 40.6 (04-24 @ 06:15), 38.7 (04-23 @ 06:20)  WBC: 10.01 (04-24 @ 13:28), 7.77 (04-24 @ 06:15), 5.46 (04-23 @ 06:20)  Plt: 195 (04-24 @ 13:28), 224 (04-24 @ 06:15), 195 (04-23 @ 06:20)    INR: 1.08 04-24-23 @ 13:28, 1.04 04-23-23 @ 06:20  PTT: 29.0 04-24-23 @ 13:28, 30.4 04-23-23 @ 06:20      LIVER FUNCTIONS - ( 24 Apr 2023 06:15 )  Alb: 4.4 g/dL / Pro: 6.9 g/dL / ALK PHOS: 140 U/L / ALT: 35 U/L / AST: 45 U/L / GGT: x          EXAMINATION:  General: No acute distress  HEENT: Anicteric sclerae  Cardiac: [id number]  Lungs: Clear  Abdomen: Soft, non-tender, +BS  Extremities: No c/c/e  Skin/Incision Site: Clean, dry and intact  Neurologic: Awake, alert, fully oriented, follows commands, PERRL, VFFtc, EOMI, face symmetric, tongue midline, no drift, full strength

## 2023-04-24 NOTE — CONSULT NOTE ADULT - ASSESSMENT
Patient is a 57 year-old female POD #0 s/p Occiput-T2 instrumentation and fusion with ORIF of C2. Patient was seen and examined at bedside in PACU. Patient lying in bed with nonrebreather in place A&Ox3 and following all commands. Patient hypertensive immediately post-op and placed on cardene gtt.     Plan:    Neuro:  - q 1 general neuro checks   - CT Cervical Spine   - PCA Pump for pain  - C Collar when out of bed    - PT/ OT     CV:  - BP < 160  - EKG     Pulm:  - Aspiration precautions  - Incentive spirometer     GI:  - Keep on clear liquids until seen by speech and swallow   - Pepcid 20 mg BID     :  - Strict I & O   - Wong Catheter in place   - NS 75 ml/hr     Heme:  - SCD   - Chemical dvt ppx on hold due to operation   - Oncology Consulted previously: CT scans as below; will need biopsy before additional tx plan recs     Endo:  - Synthroid 50 mcg daily   - Thyroid Stimulating Hormone, Serum: 1.52 uIU/mL (04.21.23 @ 06:26)    ID:  - Ancef 1 g q 8 hours     Full Code     Neuro ICU     Lines: PIV x 2, Left radial [place]

## 2023-04-24 NOTE — CONSULT NOTE ADULT - SUBJECTIVE AND OBJECTIVE BOX
HPI:  57-year-old female with PMH of hypothyroidism and ? alcoholic cirrhosis (stopped drinking over 9 yrs ago), presenting for neck pain. Pt started having neck pain almost 4 months ago for which her chiropractor ordered an MRI that was done 1 month ago. Pt was not sure what the read of the MRI was but there was concern about lesions so she went to see Dr. Peña's office for the first time today. Dr. Peña examined her and told her she has breast cancer with metastasis and needs to come to the ED fo further evaluation. Pt is a non smoker and has no family history of breast Ca. Denies any significant weight loss, night sweats, or fevers. No reported bloody discharge from her nipples. Upon examining pt' s right breast,  dimpling along with a lump was found. Pt believes that she noticed this change almost over a yr a go, but did not think of it as anything serious. Pt's neck pain has been significantly worsening limiting her ability to move her neck. She only has pain when moving her neck and has full sensation and ROM of her b/l UE. Denies any SOB, chest pain, or palpitations.     In the ED:  VS: afebrile, , /101, Spo2 95% on RA   Labs only remarkable for Ca of 11.4     CT cervical spine:   1.  Numerous lytic lesions with associated soft tissue throughout the visualized skull base and spine consistent with extensive osseous metastatic disease.    2.  Epidural component of tumor is noted from C1-C4.    3.  Tumor essentially replaces C1, C2 and C3 and there is pathologic fracture of C2.    4.  Findings would be better assessed with contrast-enhanced MRI. Additionally, direct comparison with the patient's recent outside imaging studies would be helpful.    Neurosurgery contacted by ED and will evaluate pt        S/p Occiput-T2 instrumentation and fusion with ORIF of C2 on 4/24/23       Medical charts / labs / imaging studies reviewed       PAST MEDICAL & SURGICAL HISTORY:      Hospital Course:    TODAY'S SUBJECTIVE & REVIEW OF SYMPTOMS:     Constitutional WNL   Cardio WNL   Resp WNL   GI WNL  Heme WNL  Endo WNL  Skin WNL  MSK neck pain  Neuro WNL  Cognitive WNL  Psych WNL      MEDICATIONS  (STANDING):  ceFAZolin   IVPB 1000 milliGRAM(s) IV Intermittent every 8 hours  famotidine    Tablet 20 milliGRAM(s) Oral every 12 hours  HYDROmorphone PCA (1 mG/mL) 30 milliLiter(s) PCA Continuous PCA Continuous  levothyroxine 50 MICROGram(s) Oral daily  magnesium oxide 400 milliGRAM(s) Oral once  methocarbamol 750 milliGRAM(s) Oral every 8 hours  morphine PCA (5 mG/mL) 30 milliLiter(s) PCA Continuous PCA Continuous  niCARdipine Infusion 5 mG/Hr (25 mL/Hr) IV Continuous <Continuous>    MEDICATIONS  (PRN):  hydrALAZINE Injectable 5 milliGRAM(s) IV Push every 1 hour PRN sbp > 160  metoclopramide Injectable 10 milliGRAM(s) IV Push once PRN Nausea and/or Vomiting  naloxone Injectable 0.1 milliGRAM(s) IV Push every 3 minutes PRN For ANY of the following changes in patient status:  A. RR LESS THAN 10 breaths per minute, B. Oxygen saturation LESS THAN 90%, C. Sedation score of 6  ondansetron Injectable 4 milliGRAM(s) IV Push every 6 hours PRN Nausea  ondansetron Injectable 4 milliGRAM(s) IV Push every 6 hours PRN Nausea and/or Vomiting      FAMILY HISTORY:      Allergies    No Known Allergies    Intolerances    hydromorphone (Faint; Nausea)      SOCIAL HISTORY:    [  ] Etoh  [  ] Smoking  [  ] Substance abuse     Home Environment:  [   ] Home Alone  [ x  ] Lives with Family  [   ] Home Health Aid    Dwelling:  [   ] Apartment  [  x ] Private House  [   ] Adult Home  [   ] Skilled Nursing Facility      [   ] Short Term  [   ] Long Term  [ x  ] Stairs       Elevator [   ]    FUNCTIONAL STATUS PTA: (Check all that apply)  Ambulation: [ x   ]Independent    [   ] Dependent     [   ] Non-Ambulatory  Assistive Device: [   ] SA Cane  [   ]  Q Cane  [   ] Walker  [   ]  Wheelchair  ADL : [  x ] Independent  [    ]  Dependent       Vital Signs Last 24 Hrs  T(C): 36.5 (24 Apr 2023 12:30), Max: 36.9 (24 Apr 2023 06:47)  T(F): 97.7 (24 Apr 2023 12:30), Max: 98.4 (24 Apr 2023 06:47)  HR: 109 (24 Apr 2023 16:20) (80 - 112)  BP: 173/70 (24 Apr 2023 14:15) (143/82 - 242/115)  BP(mean): 112 (24 Apr 2023 07:12) (112 - 112)  RR: 20 (24 Apr 2023 16:20) (17 - 25)  SpO2: 95% (24 Apr 2023 16:05) (91% - 97%)    Parameters below as of 24 Apr 2023 16:20    O2 Flow (L/min): 15  O2 Concentration (%): 40      PHYSICAL EXAM: Awake & Alert  GENERAL: NAD  HEAD:  Normocephalic  CHEST/LUNG: Clear   HEART: S1S2+  ABDOMEN: Soft, Nontender  EXTREMITIES:  no calf tenderness    NERVOUS SYSTEM:  Cranial Nerves 2-12 intact [   ] Abnormal  [   ]  ROM: WFL all extremities [  x ]  Abnormal [   ]  Motor Strength: WFL all extremities  [  x ]  Abnormal [   ]  Sensation: intact to light touch [ x  ] Abnormal [   ]    FUNCTIONAL STATUS:  Bed Mobility: Independent [   ]  Supervision [   ]  Needs Assistance [x   ]  N/A [   ]  Transfers: Independent [   ]  Supervision [   ]  Needs Assistance [   ]  N/A [   ]   Ambulation: Independent [   ]  Supervision [   ]  Needs Assistance [   ]  N/A [   ]  ADL: Independent [   ] Requires Assistance [   ] N/A [   ]      LABS:                        12.0   10.01 )-----------( 195      ( 24 Apr 2023 13:28 )             37.3     04-24    141  |  105  |  6<L>  ----------------------------<  152<H>  4.1   |  23  |  0.6<L>    Ca    9.6      24 Apr 2023 13:28  Phos  3.8     04-24  Mg     1.6     04-24    TPro  6.4  /  Alb  4.0  /  TBili  0.4  /  DBili  x   /  AST  41  /  ALT  30  /  AlkPhos  130<H>  04-24    PT/INR - ( 24 Apr 2023 13:28 )   PT: 12.40 sec;   INR: 1.08 ratio         PTT - ( 24 Apr 2023 13:28 )  PTT:29.0 sec      RADIOLOGY & ADDITIONAL STUDIES:

## 2023-04-24 NOTE — BRIEF OPERATIVE NOTE - NSICDXBRIEFPROCEDURE_GEN_ALL_CORE_FT
PROCEDURES:  Fusion, occipitocervical junction 24-Apr-2023 12:38:03  Jasbir Ortega  Posterior cervical fusion with instrumentation 24-Apr-2023 12:39:33  Jasbir Ortega  
PROCEDURES:  Fusion, occipitocervical junction 24-Apr-2023 12:38:03  Jasbir Ortega  Posterior cervical fusion with instrumentation 24-Apr-2023 12:39:33  Jasbir Ortega

## 2023-04-24 NOTE — PROGRESS NOTE ADULT - ASSESSMENT
57F. PMH: hypothyroidism and ? alcoholic cirrhosis (stopped drinking over 9 yrs ago),   Presented 4/20 for neck pain w/ limited movement /2 pain x4 months; Denies ataxia, imbalance, or difficulty with ambulation. Denies any numbness, tingling, paresthesias, weakness, saddle anesthesia, or bowel / bladder incontinence. Saw her chiropractor, MRI was done - reported concern for lesions. Pt referred to Dr. Peña's- who she saw 4/20. Pt reports Dr. Peña gave dx of possible breast cancer with metastasis and referred pt to ED.     #Severe neck pain 2/2 likely metastatic lesions in the cervical spine  #Suspected breast cancer - no biopsy to confirm    *PE shows R breast with nipple retraction and a firm nodule close proximity to nipple at 3 o'clock   *pt never had mammogram or routine cancer screening   - CT cervical spine (4/20) Numerous lytic lesions in skull base and spine. Tumor essentially replaces C1, C2 and C3; C2 pathologic fracture  - CTH (4/21): No acute changes; recommend MR Head w&w/o contrast to r/o mets  - CT Chest/AP (4/21): < from: CT Chest w/ IV Cont (04.21.23 @ 17:06) > Right breast mass (~2 x 2.8cm) with nipple retraction. Multiple enlarged mediastinal lymph. Spinal lesions better seen on MR  - MR Cervical, Thoracic, Lumbar (04.21.23 @ 12:48): CERVICAL SPINE: metastatic lesions; C2-C3 epidural extension causing severe spinal stenosis and mass effect ; THORACIC SPINE: metastatic lesions with malignant compression T8 vertebral body deformity; LUMBAR SPINE: metastatic lesions  - Neurosurgery Eval'd 4/22: Needs Hard collar [place] J when OOB; Stabilization surgery; no steroids at this time; no PT until after stabilization  - Oncology Consulted: CT scans as below; will need biopsy before additional tx plan recs   - Radonc consulted: She likely will need surgical stabilization in her neck. Please obtain MRI first and discuss possible surgical intervention w/ neurosurgery. If she is not a surgical candidate and there is signs of cord compression, then it is possible to do RT urgently without tissue biopsy. Otherwise, please obtain tissue diagnosis and complete CT C/A/P first; Can start decadron  - MR Head w/ and w/o IV Contrast (requested by [facility])  - Planned for stabilization surgery pt NSG 4/24 w/ biopsy  - Biopsy - possibly consult surgery team for bedside breast biopsy   - No PT until after stabilization surgery  - C/w pain medications + zofran    #Hypercalcemia likely 2/2 malignancy  #Elevated AlkPhos likely 2/2 malignancy  - Ca 11.4 -> (4/21) 10.7; PTH (4/21): 13L   - Alk Phos 138 -> (4/21) 123  - C/w gentle hydration with LR @65cc/hr     #Sinus tachycardia likely 2/ dehydration and pain  #Hypertensive urgency likely 2/ pain  - HR 120s in ED  - EKG sinus tach     # hypothyroidism   - TSH (4/21) WNL   - c/w levothyroxine 50mcg OD    #Misc  - Diet: Regular  - DVT: Lovenox  - Dispo: Medicine

## 2023-04-24 NOTE — PROGRESS NOTE ADULT - ASSESSMENT
Assessment and Plan:   · Assessment	  57F. PMH: hypothyroidism and ? alcoholic cirrhosis (stopped drinking over 9 yrs ago),   Presented 4/20 for neck pain w/ limited movement /2 pain x4 months; Denies ataxia, imbalance, or difficulty with ambulation. Denies any numbness, tingling, paresthesias, weakness, saddle anesthesia, or bowel / bladder incontinence. Saw her chiropractor, MRI was done - reported concern for lesions. Pt referred to Dr. Peña's- who she saw 4/20. Pt reports Dr. Peña gave dx of possible breast cancer with metastasis and referred pt to ED.     #Severe neck pain 2/2 likely metastatic lesions in the cervical spine  #Suspected breast cancer - no biopsy to confirm    *PE shows R breast with nipple retraction and a firm nodule close proximity to nipple at 3 o'clock   *pt never had mammogram or routine cancer screening   - CT cervical spine (4/20) Numerous lytic lesions in skull base and spine. Tumor essentially replaces C1, C2 and C3; C2 pathologic fracture  - CTH (4/21): No acute changes; recommend MR Head w&w/o contrast to r/o mets  - CT Chest/AP (4/21): < from: CT Chest w/ IV Cont (04.21.23 @ 17:06) > Right breast mass (~2 x 2.8cm) with nipple retraction. Multiple enlarged mediastinal lymph. Spinal lesions better seen on MR  - MR Cervical, Thoracic, Lumbar (04.21.23 @ 12:48): CERVICAL SPINE: metastatic lesions; C2-C3 epidural extension causing severe spinal stenosis and mass effect ; THORACIC SPINE: metastatic lesions with malignant compression T8 vertebral body deformity; LUMBAR SPINE: metastatic lesions  - Neurosurgery Eval'd 4/22: Needs Hard collar [place] J when OOB; Stabilization surgery; no steroids at this time; no PT until after stabilization  - Oncology Consulted: CT scans as below; will need biopsy before additional tx plan recs   - Radonc consulted: She likely will need surgical stabilization in her neck. Please obtain MRI first and discuss possible surgical intervention w/ neurosurgery. If she is not a surgical candidate and there is signs of cord compression, then it is possible to do RT urgently without tissue biopsy. Otherwise, please obtain tissue diagnosis and complete CT C/A/P first; Can start decadron  - MR Head w/ and w/o IV Contrast (requested by [facility])  - Planned for stabilization surgery pt NSG 4/24 w/ biopsy  - Biopsy - possibly consult surgery team for bedside breast biopsy   - No PT until after stabilization surgery  - C/w pain medications + zofran    #Hypercalcemia likely 2/2 malignancy  #Elevated AlkPhos likely 2/2 malignancy  - Ca 11.4 -> (4/21) 10.7; PTH (4/21): 13L   - Alk Phos 138 -> (4/21) 123  - C/w gentle hydration with LR @65cc/hr     #Sinus tachycardia likely 2/ dehydration and pain  #Hypertensive urgency likely 2/ pain  - HR 120s in ED  - EKG sinus tach     # hypothyroidism   - TSH (4/21) WNL   - c/w levothyroxine 50mcg OD    DVT ppx cont

## 2023-04-24 NOTE — PROGRESS NOTE ADULT - SUBJECTIVE AND OBJECTIVE BOX
NEUROSURGERY POST OP NOTE:    Patient is a 57 year-old female POD #0 s/p Occiput-T2 instrumentation and fusion with ORIF of C2. Patient was seen and examined at bedside in PACU. Patient lying in bed with nonrebreather in place A&Ox3 and following all commands. Patient hypertensive immediately post-op and placed on cardene gtt by neuro ICU team. Patient claims her pain is well controlled at this time and denies radiculopathy, weakness, paresthesias, headaches, nausea, vomiting at this time.            Vital Signs Last 24 Hrs  T(C): 36.5 (24 Apr 2023 12:30), Max: 36.9 (24 Apr 2023 06:47)  T(F): 97.7 (24 Apr 2023 12:30), Max: 98.4 (24 Apr 2023 06:47)  HR: 104 (24 Apr 2023 12:45) (98 - 112)  BP: 183/91 (24 Apr 2023 07:12) (143/82 - 183/91)  BP(mean): 112 (24 Apr 2023 07:12) (112 - 112)  RR: 25 (24 Apr 2023 12:45) (17 - 25)  SpO2: 93% (24 Apr 2023 12:45) (93% - 97%)      04-23-23 @ 07:01  -  04-24-23 @ 07:00  --------------------------------------------------------  IN: 1045 mL / OUT: 0 mL / NET: 1045 mL    04-24-23 @ 07:01  -  04-24-23 @ 14:11  --------------------------------------------------------  IN: 0 mL / OUT: 1100 mL / NET: -1100 mL        ceFAZolin   IVPB 1000 milliGRAM(s) IV Intermittent every 8 hours  diazepam  Injectable 2 milliGRAM(s) IV Push once  famotidine    Tablet 20 milliGRAM(s) Oral every 12 hours  hydrALAZINE Injectable 5 milliGRAM(s) IV Push every 1 hour PRN  HYDROmorphone PCA (1 mG/mL) 30 milliLiter(s) PCA Continuous PCA Continuous  labetalol Injectable 10 milliGRAM(s) IV Push once  levothyroxine 50 MICROGram(s) Oral daily  methocarbamol 750 milliGRAM(s) Oral every 8 hours  metoclopramide Injectable 10 milliGRAM(s) IV Push once PRN  naloxone Injectable 0.1 milliGRAM(s) IV Push every 3 minutes PRN  niCARdipine Infusion 5 mG/Hr IV Continuous <Continuous>  ondansetron Injectable 4 milliGRAM(s) IV Push every 6 hours PRN  ondansetron Injectable 4 milliGRAM(s) IV Push every 6 hours PRN  sodium chloride 0.9%. 1000 milliLiter(s) IV Continuous <Continuous>      Physical Exam:  General: Lying in bed, following all commands  AAOX3. Verbal function intact  Facial motions symmetric  EOMI  Motor: MAEx4  5/5 strength in b/l UE's and LE's  Hoffmans: Negative b/l  Sensation: intact to touch in all extremities  Wound: Incision clean, dry, and intact with mild bloody stainage to dressing  Drains: Hemovac intact, draining serosanguineous fluid        Assessment/Plan:  57 year-old female POD #0 s/p Occiput-T2 instrumentation and fusion with ORIF of C2.  -PRN analgesia via PCA  -PT/OT/Rehab  -Please monitor hemovac and record outputs q shift  -DVT ppx, b/l sequentials  -Encourage incentive spirometry  -Hard collar when OOB  -S&S eval  -BP management  -Care per neuro ICU  -D/w attending

## 2023-04-24 NOTE — BRIEF OPERATIVE NOTE - NSICDXBRIEFPOSTOP_GEN_ALL_CORE_FT
POST-OP DIAGNOSIS:  [place]-occipital instability 24-Apr-2023 12:40:12  Jasbir Ortega  Metastasis to spinal column 24-Apr-2023 12:40:18  Jasbir Ortega  
POST-OP DIAGNOSIS:  Metastasis to spinal column 24-Apr-2023 12:40:18  Jasbir Ortega  [place]-occipital instability 24-Apr-2023 12:40:12  Jasbir Ortega

## 2023-04-24 NOTE — PROGRESS NOTE ADULT - SUBJECTIVE AND OBJECTIVE BOX
JEAN-PAUL REYES 57y Female  MRN#: [id number]     Hospital Day: 4d    CC:  Neck pain    HOSPITAL COURSE:   57F. PMH: hypothyroidism and ? alcoholic cirrhosis (stopped drinking over 9 yrs ago),   Presented 4/20 for neck pain w/ limited movement /2 pain x4 months; Denies ataxia, imbalance, or difficulty with ambulation. Denies any numbness, tingling, paresthesias, weakness, saddle anesthesia, or bowel / bladder incontinence. Saw her chiropractor, MRI was done - reported concern for lesions. Pt referred to Dr. Peña's- who she saw 4/20. Pt reports Dr. Peña gave dx of possible breast cancer with metastasis and referred pt to ED.     In the ED: VS: /100, Tachycardic   Labs only remarkable for Ca of 11.4   EKG: Sinus tach    CT cervical spine:   1.  Numerous lytic lesions throughout skull base and spine consistent with extensive osseous metastatic disease.  2.  Epidural component of tumor is noted from C1-C4.  3.  Tumor essentially replaces C1, C2 and C3 and there is pathologic fracture of C2.  4.  Findings would be better assessed with contrast-enhanced MRI. Additionally, direct comparison with the patient's recent outside imaging studies would be helpful.    SUBJECTIVE     Overnight events  None    Subjective complaints                                               ----------------------------------------------------------  OBJECTIVE  PAST MEDICAL & SURGICAL HISTORY                                            -----------------------------------------------------------  ALLERGIES:  No Known Allergies                                            ------------------------------------------------------------    HOME MEDICATIONS  Home Medications:  Claritin 10 mg oral tablet: 1 orally once a day (22 Apr 2023 11:32)  levothyroxine 50 mcg (0.05 mg) oral tablet: 1 orally once a day (20 Apr 2023 17:29)                           MEDICATIONS:  STANDING MEDICATIONS  acetaminophen     Tablet .. 1000 milliGRAM(s) Oral every 8 hours  cyclobenzaprine 5 milliGRAM(s) Oral three times a day  enoxaparin Injectable 40 milliGRAM(s) SubCutaneous every 24 hours  levothyroxine 50 MICROGram(s) Oral daily  loratadine 10 milliGRAM(s) Oral daily  melatonin 5 milliGRAM(s) Oral at bedtime  sodium chloride 0.9%. 1000 milliLiter(s) IV Continuous <Continuous>  sodium chloride 0.9%. 1000 milliLiter(s) IV Continuous <Continuous>    PRN MEDICATIONS  HYDROmorphone   Tablet 4 milliGRAM(s) Oral every 6 hours PRN  morphine  - Injectable 3 milliGRAM(s) IV Push every 6 hours PRN  morphine  - Injectable 4 milliGRAM(s) IV Push every 6 hours PRN  ondansetron Injectable 4 milliGRAM(s) IV Push three times a day PRN                                            ------------------------------------------------------------  VITAL SIGNS: Last 24 Hours  T(C): 36.9 (24 Apr 2023 07:12), Max: 36.9 (24 Apr 2023 06:47)  T(F): 98.4 (24 Apr 2023 06:47), Max: 98.4 (24 Apr 2023 06:47)  HR: 98 (24 Apr 2023 07:12) (98 - 100)  BP: 183/91 (24 Apr 2023 07:12) (143/82 - 183/91)  BP(mean): 112 (24 Apr 2023 07:12) (112 - 112)  RR: 17 (24 Apr 2023 07:12) (17 - 18)  SpO2: 94% (24 Apr 2023 07:12) (94% - 94%)      04-23-23 @ 07:01  -  04-24-23 @ 07:00  --------------------------------------------------------  IN: 1045 mL / OUT: 0 mL / NET: 1045 mL                                             --------------------------------------------------------------  LABS:                        13.1   7.77  )-----------( 224      ( 24 Apr 2023 06:15 )             40.6     04-23    139  |  101  |  9<L>  ----------------------------<  95  3.6   |  25  |  0.7    Ca    9.4      23 Apr 2023 06:20  Mg     1.9     04-23    TPro  6.5  /  Alb  4.0  /  TBili  0.5  /  DBili  x   /  AST  35  /  ALT  21  /  AlkPhos  116<H>  04-23    PT/INR - ( 23 Apr 2023 06:20 )   PT: 11.90 sec;   INR: 1.04 ratio         PTT - ( 23 Apr 2023 06:20 )  PTT:30.4 sec                                                          -------------------------------------------------------------  RADIOLOGY:                                            --------------------------------------------------------------    PHYSICAL EXAM:                                             --------------------------------------------------------------                 JEAN-PAUL REYES 57y Female  MRN#: [id number]     Hospital Day: 5d    CC:  Neck pain    HOSPITAL COURSE:   57F. PMH: hypothyroidism and ? alcoholic cirrhosis (stopped drinking over 9 yrs ago),   Presented 4/20 for neck pain w/ limited movement /2 pain x4 months; Denies ataxia, imbalance, or difficulty with ambulation. Denies any numbness, tingling, paresthesias, weakness, saddle anesthesia, or bowel / bladder incontinence. Saw her chiropractor, MRI was done - reported concern for lesions. Pt referred to Dr. Peña's- who she saw 4/20. Pt reports Dr. Peña gave dx of possible breast cancer with metastasis and referred pt to ED.     In the ED: VS: /100, Tachycardic   Labs only remarkable for Ca of 11.4   EKG: Sinus tach    CT cervical spine:   1.  Numerous lytic lesions throughout skull base and spine consistent with extensive osseous metastatic disease.  2.  Epidural component of tumor is noted from C1-C4.  3.  Tumor essentially replaces C1, C2 and C3 and there is pathologic fracture of C2.  4.  Findings would be better assessed with contrast-enhanced MRI. Additionally, direct comparison with the patient's recent outside imaging studies would be helpful.    SUBJECTIVE     Overnight events  None    Subjective complaints                                               ----------------------------------------------------------  OBJECTIVE  PAST MEDICAL & SURGICAL HISTORY                                            -----------------------------------------------------------  ALLERGIES:  No Known Allergies                                            ------------------------------------------------------------    HOME MEDICATIONS  Home Medications:  Claritin 10 mg oral tablet: 1 orally once a day (22 Apr 2023 11:32)  levothyroxine 50 mcg (0.05 mg) oral tablet: 1 orally once a day (20 Apr 2023 17:29)                           MEDICATIONS:  STANDING MEDICATIONS  acetaminophen     Tablet .. 1000 milliGRAM(s) Oral every 8 hours  cyclobenzaprine 5 milliGRAM(s) Oral three times a day  enoxaparin Injectable 40 milliGRAM(s) SubCutaneous every 24 hours  levothyroxine 50 MICROGram(s) Oral daily  loratadine 10 milliGRAM(s) Oral daily  melatonin 5 milliGRAM(s) Oral at bedtime  sodium chloride 0.9%. 1000 milliLiter(s) IV Continuous <Continuous>  sodium chloride 0.9%. 1000 milliLiter(s) IV Continuous <Continuous>    PRN MEDICATIONS  HYDROmorphone   Tablet 4 milliGRAM(s) Oral every 6 hours PRN  morphine  - Injectable 3 milliGRAM(s) IV Push every 6 hours PRN  morphine  - Injectable 4 milliGRAM(s) IV Push every 6 hours PRN  ondansetron Injectable 4 milliGRAM(s) IV Push three times a day PRN                                            ------------------------------------------------------------  VITAL SIGNS: Last 24 Hours  T(C): 36.9 (24 Apr 2023 07:12), Max: 36.9 (24 Apr 2023 06:47)  T(F): 98.4 (24 Apr 2023 06:47), Max: 98.4 (24 Apr 2023 06:47)  HR: 98 (24 Apr 2023 07:12) (98 - 100)  BP: 183/91 (24 Apr 2023 07:12) (143/82 - 183/91)  BP(mean): 112 (24 Apr 2023 07:12) (112 - 112)  RR: 17 (24 Apr 2023 07:12) (17 - 18)  SpO2: 94% (24 Apr 2023 07:12) (94% - 94%)      04-23-23 @ 07:01  -  04-24-23 @ 07:00  --------------------------------------------------------  IN: 1045 mL / OUT: 0 mL / NET: 1045 mL                                             --------------------------------------------------------------  LABS:                        13.1   7.77  )-----------( 224      ( 24 Apr 2023 06:15 )             40.6     04-23    139  |  101  |  9<L>  ----------------------------<  95  3.6   |  25  |  0.7    Ca    9.4      23 Apr 2023 06:20  Mg     1.9     04-23    TPro  6.5  /  Alb  4.0  /  TBili  0.5  /  DBili  x   /  AST  35  /  ALT  21  /  AlkPhos  116<H>  04-23    PT/INR - ( 23 Apr 2023 06:20 )   PT: 11.90 sec;   INR: 1.04 ratio         PTT - ( 23 Apr 2023 06:20 )  PTT:30.4 sec                                                          -------------------------------------------------------------  RADIOLOGY:  < from: CT Abdomen and Pelvis w/ Oral Cont and w/ IV Cont (04.21.23 @ 17:06) >  IMPRESSION:    CHEST:    Right breast mass measuring approximately 2 x 2.8 cm with nipple   retraction.    Multiple enlarged mediastinal lymph nodes including enlarged lower   paratracheal lymph nodes measuring up to 1.6 cm.    Multiple mixed lytic and sclerotic sternal and rib lesions. Sclerotic T8   vertebral body lesion.    Additional thoracic spine lesions, better visualized on recent MRI.    ABDOMEN/PELVIS:    Mixed lytic and sclerotic lesion of the L5 vertebral body. Additional   lumbar spine lesions, better visualized on recent MRI.    < end of copied text >                                            --------------------------------------------------------------    PHYSICAL EXAM:    GENERAL: NAD  HEAD:  NCAT  EYES: EOMI, PERRL, conjunctiva clear  ENMT: Moist mucous membranes  NECK: Supple, No JVD  NERVOUS SYSTEM: AAOX4  CHEST/LUNG: +bs  HEART: +s1s2 RRR  ABDOMEN: soft, NT/ND (+) bs  EXTREMITIES:  2+ Peripheral Pulses, No c/c/e  SKIN: No rashes or lesions                                         --------------------------------------------------------------  Assessment and Plan:   · Assessment	  #Severe neck pain 2/2 likely metastatic lesions in the cervical spine  #Suspected breast cancer - no biopsy to confirm    *PE shows R breast with nipple retraction and a firm nodule close proximity to nipple at 3 o'clock   *pt never had mammogram or routine cancer screening   - CT cervical spine (4/20) Numerous lytic lesions in skull base and spine. Tumor essentially replaces C1, C2 and C3; C2 pathologic fracture  - CTH (4/21): No acute changes; recommend MR Head w&w/o contrast to r/o mets  - CT Chest/AP (4/21): < from: CT Chest w/ IV Cont (04.21.23 @ 17:06) > Right breast mass (~2 x 2.8cm) with nipple retraction. Multiple enlarged mediastinal lymph. Spinal lesions better seen on MR  - MR Cervical, Thoracic, Lumbar (04.21.23 @ 12:48): CERVICAL SPINE: metastatic lesions; C2-C3 epidural extension causing severe spinal stenosis and mass effect ; THORACIC SPINE: metastatic lesions with malignant compression T8 vertebral body deformity; LUMBAR SPINE: metastatic lesions  - Neurosurgery Eval'd 4/22: Needs Hard collar [place] when OOB; Stabilization surgery; no steroids at this time; no PT until after stabilization  - Oncology Consulted: CT scans as below; will need biopsy before additional tx plan recs   - Rad onc consulted: likely will need surgical stabilization in her neck. Please obtain MRI first and discuss possible surgical intervention w/ neurosurgery. If she is not a surgical candidate and there is signs of cord compression, then it is possible to do RT urgently without tissue biopsy. Otherwise, please obtain tissue diagnosis and complete CT C/A/P first; -c/w decadron  - MR Head w/ and w/o IV Contrast noted  - Planned for stabilization surgery pt NSG 4/24 w/ biopsy. NPO after MN  - consult surgery team for bedside breast biopsy   - No PT until after stabilization surgery  - C/w pain medications + zofran  -patient is moderate risk for a relatively moderate risk procedure.     #Hypercalcemia likely 2/2 malignancy  #Elevated AlkPhos likely 2/2 malignancy  - Ca 11.4 -> (4/21) 10.7; PTH (4/21): 13L   - Alk Phos 138 -> (4/21) 123  - C/w gentle hydration    #Sinus tachycardia likely 2/ dehydration and pain  #Hypertensive urgency likely 2/ pain  - HR 120s in ED  - EKG sinus tach     # hypothyroidism   - TSH (4/21) WNL   - c/w levothyroxine 50mcg OD    - Diet: Regular  - DVT: held for bx

## 2023-04-24 NOTE — BRIEF OPERATIVE NOTE - NSICDXBRIEFPREOP_GEN_ALL_CORE_FT
PRE-OP DIAGNOSIS:  [place]-occipital instability 24-Apr-2023 12:39:53  Jasbir Ortega  Metastasis to spinal column 24-Apr-2023 12:40:08  Jasbir Ortega  
PRE-OP DIAGNOSIS:  Atlanto-occipital instability 24-Apr-2023 12:39:53  Jasbir Ortega  Metastasis to spinal column 24-Apr-2023 12:40:08  Jasbir Ortega

## 2023-04-24 NOTE — BRIEF OPERATIVE NOTE - OPERATION/FINDINGS
O-T2 posterior instrumentation and fusion, open reduction/internal fixation of C2 body
O-T2 posterior instrumentation and fusion, open reduction/internal fixation of C2 body

## 2023-04-24 NOTE — CONSULT NOTE ADULT - ASSESSMENT
IMPRESSION: Rehab of metastatic cancer to the cervical spine / [place]-occipital instability, S/P O-T2 posterior instrumentation and fusion, open reduction/internal fixation of C2 body /                         Hypothyroidism     PRECAUTIONS: [   ] Cardiac  [   ] Respiratory  [   ] Seizures [   ] Contact Isolation  [   ] Droplet Isolation  [   ] Other    Weight Bearing Status:     RECOMMENDATION: Neck collar                                   pain management     Out of Bed to Chair     DVT/Decubiti Prophylaxis    REHAB PLAN:     [  x  ] Bedside P/T 3-5 times a week   [   x ]   Bedside O/T  2-3 times a week             [    ] Speech Therapy               [    ]  No Rehab Therapy Indicated   Conditioning/ROM                                    ADL  Bed Mobility                                               Conditioning/ROM  Transfers                                                     Bed Mobility  Sitting /Standing Balance                         Transfers                                        Gait Training                                               Sitting/Standing Balance  Stair Training [   ]Applicable                    Home equipment Eval                                                                        Splinting  [   ] Only      GOALS:   ADL   [ x   ]   Independent                    Transfers  [ x   ] Independent                          Ambulation  [  x  ] Independent     [   x  ] With device                            [    ]  CG                                                         [    ]  CG                                                                  [    ] CG                            [    ] Min A                                                   [    ] Min A                                                              [    ] Min  A          DISCHARGE PLAN:   [    ]  Good candidate for Intensive Rehabilitation/Hospital based                                             Will tolerate 3hrs Intensive Rehab Daily                                       [     ]  Short Term Rehab in Skilled Nursing Facility                                       [     ]  Home with Outpatient or  services                                         [    x ]  Possible Candidate for Intensive Hospital based Rehab

## 2023-04-24 NOTE — CHART NOTE - NSCHARTNOTEFT_GEN_A_CORE
PACU ANESTHESIA ADMISSION NOTE      Procedure: Fusion, occipitocervical junction      Post op diagnosis:      ____  Intubated  TV:______       Rate: ______      FiO2: ______    __x__  Patent Airway    __x__  Full return of protective reflexes    __x__  Full recovery from anesthesia / back to baseline status    Vitals:  T(C): 36.9 (04-24-23 @ 07:12), Max: 36.9 (04-24-23 @ 06:47)  HR: 98 (04-24-23 @ 07:12) (98 - 100)  BP: 183/91 (04-24-23 @ 07:12) (143/82 - 183/91)  RR: 17 (04-24-23 @ 07:12) (17 - 18)  SpO2: 94% (04-24-23 @ 07:12) (94% - 94%)    Mental Status:  __x__ Awake   ___x__ Alert   _____ Drowsy   _____ Sedated    Nausea/Vomiting:  __x__ NO  ______Yes,   See Post - Op Orders          Pain Scale (0-10):  _____    Treatment: ____ None    __x__ See Post - Op/PCA Orders    Post - Operative Fluids:   ____ Oral   __x__ See Post - Op Orders    Plan: Discharge:   ____Home       _____Floor     ___x__Critical Care    _____  Other:_________________    Comments: Patient had smooth intraoperative event, no anesthesia complication.  PACU Vital signs: HR:  100          BP:    180    /  100        RR:   17          O2 Sat:   95    %     Temp 97.7

## 2023-04-24 NOTE — BRIEF OPERATIVE NOTE - SPECIMENS
Patient is calling for results of UA from Monday.    Please call.  Okay to leave message.  
Bone lesion
Bone biopsy

## 2023-04-24 NOTE — CONSULT NOTE ADULT - CRITICAL CARE ATTENDING COMMENT
POD #0 S/P Occiput-T2 instrumentation and fusion with ORIF of C2. ; Metastatic lung Ca    Neuro   Neuro checks q 1 hr  PT/OT  PCA for pain control  Robaxin for musculoskeletal spasm   Incentive spirometer  OOB  Goal - < 160  Resume Synthroid- home med

## 2023-04-25 DIAGNOSIS — C79.9 SECONDARY MALIGNANT NEOPLASM OF UNSPECIFIED SITE: ICD-10-CM

## 2023-04-25 DIAGNOSIS — Z51.5 ENCOUNTER FOR PALLIATIVE CARE: ICD-10-CM

## 2023-04-25 DIAGNOSIS — Z71.89 OTHER SPECIFIED COUNSELING: ICD-10-CM

## 2023-04-25 DIAGNOSIS — R52 PAIN, UNSPECIFIED: ICD-10-CM

## 2023-04-25 LAB
ALBUMIN SERPL ELPH-MCNC: 3.9 G/DL — SIGNIFICANT CHANGE UP (ref 3.5–5.2)
ALP SERPL-CCNC: 126 U/L — HIGH (ref 30–115)
ALT FLD-CCNC: 27 U/L — SIGNIFICANT CHANGE UP (ref 0–41)
ANION GAP SERPL CALC-SCNC: 14 MMOL/L — SIGNIFICANT CHANGE UP (ref 7–14)
AST SERPL-CCNC: 35 U/L — SIGNIFICANT CHANGE UP (ref 0–41)
BILIRUB SERPL-MCNC: 0.4 MG/DL — SIGNIFICANT CHANGE UP (ref 0.2–1.2)
BUN SERPL-MCNC: 9 MG/DL — LOW (ref 10–20)
CALCIUM SERPL-MCNC: 9.7 MG/DL — SIGNIFICANT CHANGE UP (ref 8.4–10.4)
CHLORIDE SERPL-SCNC: 99 MMOL/L — SIGNIFICANT CHANGE UP (ref 98–110)
CO2 SERPL-SCNC: 28 MMOL/L — SIGNIFICANT CHANGE UP (ref 17–32)
CREAT SERPL-MCNC: 0.5 MG/DL — LOW (ref 0.7–1.5)
EGFR: 109 ML/MIN/1.73M2 — SIGNIFICANT CHANGE UP
GLUCOSE SERPL-MCNC: 142 MG/DL — HIGH (ref 70–99)
HCT VFR BLD CALC: 35.4 % — LOW (ref 37–47)
HGB BLD-MCNC: 11.8 G/DL — LOW (ref 12–16)
MAGNESIUM SERPL-MCNC: 1.5 MG/DL — LOW (ref 1.8–2.4)
MCHC RBC-ENTMCNC: 28.5 PG — SIGNIFICANT CHANGE UP (ref 27–31)
MCHC RBC-ENTMCNC: 33.3 G/DL — SIGNIFICANT CHANGE UP (ref 32–37)
MCV RBC AUTO: 85.5 FL — SIGNIFICANT CHANGE UP (ref 81–99)
NRBC # BLD: 0 /100 WBCS — SIGNIFICANT CHANGE UP (ref 0–0)
PHOSPHATE SERPL-MCNC: 3.9 MG/DL — SIGNIFICANT CHANGE UP (ref 2.1–4.9)
PLATELET # BLD AUTO: 214 K/UL — SIGNIFICANT CHANGE UP (ref 130–400)
PMV BLD: 9.6 FL — SIGNIFICANT CHANGE UP (ref 7.4–10.4)
POTASSIUM SERPL-MCNC: 3.7 MMOL/L — SIGNIFICANT CHANGE UP (ref 3.5–5)
POTASSIUM SERPL-SCNC: 3.7 MMOL/L — SIGNIFICANT CHANGE UP (ref 3.5–5)
PROT SERPL-MCNC: 6.5 G/DL — SIGNIFICANT CHANGE UP (ref 6–8)
RBC # BLD: 4.14 M/UL — LOW (ref 4.2–5.4)
RBC # FLD: 14.3 % — SIGNIFICANT CHANGE UP (ref 11.5–14.5)
SODIUM SERPL-SCNC: 141 MMOL/L — SIGNIFICANT CHANGE UP (ref 135–146)
WBC # BLD: 13.62 K/UL — HIGH (ref 4.8–10.8)
WBC # FLD AUTO: 13.62 K/UL — HIGH (ref 4.8–10.8)

## 2023-04-25 PROCEDURE — 93970 EXTREMITY STUDY: CPT | Mod: 26

## 2023-04-25 PROCEDURE — 99291 CRITICAL CARE FIRST HOUR: CPT

## 2023-04-25 PROCEDURE — 99223 1ST HOSP IP/OBS HIGH 75: CPT

## 2023-04-25 RX ORDER — POTASSIUM CHLORIDE 20 MEQ
40 PACKET (EA) ORAL ONCE
Refills: 0 | Status: COMPLETED | OUTPATIENT
Start: 2023-04-25 | End: 2023-04-25

## 2023-04-25 RX ORDER — POLYETHYLENE GLYCOL 3350 17 G/17G
17 POWDER, FOR SOLUTION ORAL ONCE
Refills: 0 | Status: DISCONTINUED | OUTPATIENT
Start: 2023-04-25 | End: 2023-04-25

## 2023-04-25 RX ORDER — SENNA PLUS 8.6 MG/1
2 TABLET ORAL AT BEDTIME
Refills: 0 | Status: DISCONTINUED | OUTPATIENT
Start: 2023-04-25 | End: 2023-04-28

## 2023-04-25 RX ORDER — ENOXAPARIN SODIUM 100 MG/ML
30 INJECTION SUBCUTANEOUS EVERY 12 HOURS
Refills: 0 | Status: DISCONTINUED | OUTPATIENT
Start: 2023-04-25 | End: 2023-04-28

## 2023-04-25 RX ORDER — CHLORHEXIDINE GLUCONATE 213 G/1000ML
1 SOLUTION TOPICAL
Refills: 0 | Status: DISCONTINUED | OUTPATIENT
Start: 2023-04-25 | End: 2023-04-28

## 2023-04-25 RX ORDER — MORPHINE SULFATE 50 MG/1
2 CAPSULE, EXTENDED RELEASE ORAL
Refills: 0 | Status: DISCONTINUED | OUTPATIENT
Start: 2023-04-25 | End: 2023-04-25

## 2023-04-25 RX ORDER — POLYETHYLENE GLYCOL 3350 17 G/17G
17 POWDER, FOR SOLUTION ORAL DAILY
Refills: 0 | Status: DISCONTINUED | OUTPATIENT
Start: 2023-04-25 | End: 2023-04-28

## 2023-04-25 RX ORDER — MAGNESIUM SULFATE 500 MG/ML
2 VIAL (ML) INJECTION
Refills: 0 | Status: COMPLETED | OUTPATIENT
Start: 2023-04-25 | End: 2023-04-25

## 2023-04-25 RX ORDER — ACETAMINOPHEN 500 MG
650 TABLET ORAL EVERY 6 HOURS
Refills: 0 | Status: DISCONTINUED | OUTPATIENT
Start: 2023-04-25 | End: 2023-04-28

## 2023-04-25 RX ORDER — LABETALOL HCL 100 MG
100 TABLET ORAL EVERY 8 HOURS
Refills: 0 | Status: DISCONTINUED | OUTPATIENT
Start: 2023-04-25 | End: 2023-04-28

## 2023-04-25 RX ORDER — ENOXAPARIN SODIUM 100 MG/ML
30 INJECTION SUBCUTANEOUS EVERY 12 HOURS
Refills: 0 | Status: DISCONTINUED | OUTPATIENT
Start: 2023-04-25 | End: 2023-04-25

## 2023-04-25 RX ADMIN — Medication 50 MICROGRAM(S): at 05:54

## 2023-04-25 RX ADMIN — FAMOTIDINE 20 MILLIGRAM(S): 10 INJECTION INTRAVENOUS at 05:54

## 2023-04-25 RX ADMIN — METHOCARBAMOL 750 MILLIGRAM(S): 500 TABLET, FILM COATED ORAL at 14:07

## 2023-04-25 RX ADMIN — Medication 100 MILLIGRAM(S): at 14:07

## 2023-04-25 RX ADMIN — Medication 25 GRAM(S): at 12:23

## 2023-04-25 RX ADMIN — CHLORHEXIDINE GLUCONATE 1 APPLICATION(S): 213 SOLUTION TOPICAL at 07:08

## 2023-04-25 RX ADMIN — NICARDIPINE HYDROCHLORIDE 25 MG/HR: 30 CAPSULE, EXTENDED RELEASE ORAL at 02:46

## 2023-04-25 RX ADMIN — ENOXAPARIN SODIUM 30 MILLIGRAM(S): 100 INJECTION SUBCUTANEOUS at 10:31

## 2023-04-25 RX ADMIN — Medication 40 MILLIEQUIVALENT(S): at 07:01

## 2023-04-25 RX ADMIN — ENOXAPARIN SODIUM 30 MILLIGRAM(S): 100 INJECTION SUBCUTANEOUS at 21:13

## 2023-04-25 RX ADMIN — MORPHINE SULFATE 30 MILLILITER(S): 50 CAPSULE, EXTENDED RELEASE ORAL at 22:36

## 2023-04-25 RX ADMIN — Medication 1 DROP(S): at 21:13

## 2023-04-25 RX ADMIN — POLYETHYLENE GLYCOL 3350 17 GRAM(S): 17 POWDER, FOR SOLUTION ORAL at 12:25

## 2023-04-25 RX ADMIN — FAMOTIDINE 20 MILLIGRAM(S): 10 INJECTION INTRAVENOUS at 18:20

## 2023-04-25 RX ADMIN — Medication 100 MILLIGRAM(S): at 05:54

## 2023-04-25 RX ADMIN — Medication 100 MILLIGRAM(S): at 21:12

## 2023-04-25 RX ADMIN — METHOCARBAMOL 750 MILLIGRAM(S): 500 TABLET, FILM COATED ORAL at 21:12

## 2023-04-25 RX ADMIN — Medication 25 GRAM(S): at 08:35

## 2023-04-25 RX ADMIN — METHOCARBAMOL 750 MILLIGRAM(S): 500 TABLET, FILM COATED ORAL at 05:54

## 2023-04-25 RX ADMIN — Medication 25 GRAM(S): at 10:25

## 2023-04-25 NOTE — CONSULT NOTE ADULT - ASSESSMENT
57F with PMH of hypothyroidism, possible EtOH cirrhosis, past EtOH use, here with neck pain, and found to have numerous skull and spinal lytic lesions on CT head. Patient had occiput-T2 fusion and ORIF of C2, and admitted to NCCU for neurochecks, and placed on PCA pump for pain. Oncology consulted, possible breast primary, but would need biopsy. Is also on synthroid for hypothyroidism. Palliative care consulted for GOC. Patient is full code.

## 2023-04-25 NOTE — PROGRESS NOTE ADULT - ASSESSMENT
57 F Untreated breast ca with mets to spine POD#1 s/p Occiput to T2 instrumentation and Fusion, ORIF of C2     PLAN   - Can continue PCA for pain control   - PT/OT/Rehab   - Hard collar when OOB and for transfers   - Pain management consult when PCA off   - DC [place]   - Ok for chemical DVT ppx   - Appreciate NCC care   - Ok to downgrade when patient is medically stable   - Discussed case with attending

## 2023-04-25 NOTE — OCCUPATIONAL THERAPY INITIAL EVALUATION ADULT - ADL RETRAINING, OT EVAL
Patient will perform upper body dressing with minimal assistance by discharge. ; Patient will perform lower body dressing with moderate assistance with use of appropriate adaptive equipment as needed by discharge.

## 2023-04-25 NOTE — PHYSICAL THERAPY INITIAL EVALUATION ADULT - GENERAL OBSERVATIONS, REHAB EVAL
9:50-10:30. chart reviewed. Pt received semi-thornton at B/S, alert, oriented, able to follow multi-step instructions and agreeable to PT evaluation.  + IV, + A-line, + PCA pump, + foly, + monitoring, + hemovac, + [place] collar at B/S. denies pain or discomfort, O2 2 L via NC, SPO2 on RA 89-90%. VSS, NAD

## 2023-04-25 NOTE — CHART NOTE - NSCHARTNOTEFT_GEN_A_CORE
NCC Transfer Note     Transfer to 4C- under Neuro Surgery   Signout given to:       57 year-old female POD #1 s/p Occiput-T2 instrumentation and fusion with ORIF of C2. Patient lying in bed with nasal cannula, A&Ox3 and following all commands. Patient hypertensive immediately post-op and placed on cardene gtt. Currently off of Nicardipine gtt.         Plan:    POD #1 - S/P occiput to T2 fusion with instrumentation:   - q 4 general neuro checks, q 8 when on the floor   - MRI +/- contrast    - PCA Pump for pain  - Hard C Collar when out of bed    - PT/ OT   - COPT score - Tylenol IV prn    #HTN:  - - <150  - EKG- Sinus tachy  - Labetalol 100mg PO q 8   - Hold orders for SBP < 110 or HR < 65     #Hypoxia- resolved:  - Wean FiO2 to maintain sats > 92 %   - Humidified O2   - Aspiration precautions  - Incentive spirometer  10 x q 1 hr while awake     #Caner  - Oncology Consulted previously: CT scans as below; will need biopsy before additional tx plan recs     #Chemical dvt ppx - Lovenox 30mg q 12  - Check anti Xa - 4 hrs after Lovenox- tonight at 2 am [ ]     #Hypothyroidism:  - Synthroid 50 mcg daily   - Thyroid Stimulating Hormone, Serum: 1.52 uIU/mL (04.21.23 @ 06:26)    MISC:  - DASH DIET     Full Code         Attending comments: POD #0 S/P Occiput-T2 instrumentation and fusion with ORIF of C2. ; Metastatic lung Ca    Neuro   Neuro checks q 1 hr  PT/OT  PCA for pain control  Robaxin for musculoskeletal spasm   Incentive spirometer  OOB  Goal - < 160  Resume Synthroid- home med. NCC Transfer Note     Transfer to 4C- under Neuro Surgery   Signout given to:       57 year-old female POD #1 s/p Occiput-T2 instrumentation and fusion with ORIF of C2. Patient lying in bed with nasal cannula, A&Ox3 and following all commands. Patient hypertensive immediately post-op and placed on cardene gtt. Currently off of Nicardipine gtt.         Plan:    POD #1 - S/P occiput to T2 fusion with instrumentation:   - q 4 general neuro checks, q 8 when on the floor   - MRI +/- contrast    - PCA Pump for pain  - Hard C Collar when out of bed    - PT/ OT   - COPT score - Tylenol IV prn    #HTN:  - - <160  - EKG- Sinus tachy  - Labetalol 100mg PO q 8   - Hold orders for SBP < 110 or HR < 65     #Hypoxia- resolved:  - Wean FiO2 to maintain sats > 92 %   - Humidified O2   - Aspiration precautions  - Incentive spirometer  10 x q 1 hr while awake     #Caner  - Oncology Consulted previously: CT scans as below; will need biopsy before additional tx plan recs     #Chemical dvt ppx - Lovenox 30mg q 12  - Check anti Xa - 4 hrs after Lovenox- tonight at 2 am [ ]     #Hypothyroidism:  - Synthroid 50 mcg daily   - Thyroid Stimulating Hormone, Serum: 1.52 uIU/mL (04.21.23 @ 06:26)    MISC:  - DASH DIET       Full Code     For Follow up:  [ ] Check anti Xa - 4 hrs after Lovenox- tonight at 2 am [ ]   [ ] Hem/Onc follow up   [ ] Pain control   [ ] Monitor BP   [ ] PT/OT NCC Transfer Note     Transfer to 4C- under Neuro Surgery   Signout given to: Tanya GRIER      57 year-old female POD #1 s/p Occiput-T2 instrumentation and fusion with ORIF of C2. Patient lying in bed with nasal cannula, A&Ox3 and following all commands. Patient hypertensive immediately post-op and placed on cardene gtt. Currently off of Nicardipine gtt.         Plan:    POD #1 - S/P occiput to T2 fusion with instrumentation:   - q 4 general neuro checks, q 8 when on the floor   - MRI +/- contrast    - PCA Pump for pain  - Hard C Collar when out of bed    - PT/ OT   - COPT score - Tylenol IV prn    #HTN:  - - <160  - EKG- Sinus tachy  - Labetalol 100mg PO q 8   - Hold orders for SBP < 110 or HR < 65     #Hypoxia- resolved:  - Wean FiO2 to maintain sats > 92 %   - Humidified O2   - Aspiration precautions  - Incentive spirometer  10 x q 1 hr while awake     #Caner  - Oncology Consulted previously: CT scans as below; will need biopsy before additional tx plan recs     #Chemical dvt ppx - Lovenox 30mg q 12  - Check anti Xa - 4 hrs after Lovenox- tonight at 2 am [ ]     #Hypothyroidism:  - Synthroid 50 mcg daily   - Thyroid Stimulating Hormone, Serum: 1.52 uIU/mL (04.21.23 @ 06:26)    MISC:  - DASH DIET       Full Code     For Follow up:  [ ] Check anti Xa - 4 hrs after Lovenox- tonight at 2 am [ ]   [ ] Hem/Onc follow up   [ ] Pain control   [ ] Monitor BP   [ ] PT/OT

## 2023-04-25 NOTE — PHYSICAL THERAPY INITIAL EVALUATION ADULT - PERTINENT HX OF CURRENT PROBLEM, REHAB EVAL
57 year-old female POD #0 s/p Occiput-T2 instrumentation and fusion with ORIF of C2. Patient was seen and examined at bedside in PACU. Patient lying in bed with nonrebreather in place A&Ox3 and following all commands. Patient hypertensive immediately post-op and placed on cardene gtt.

## 2023-04-25 NOTE — PROGRESS NOTE ADULT - SUBJECTIVE AND OBJECTIVE BOX
SUMMARY:    57F. PMH: hypothyroidism and ? alcoholic cirrhosis (stopped drinking over 9 yrs ago),   Presented 4/20 for neck pain w/ limited movement /2 pain x4 months; Denies ataxia, imbalance, or difficulty with ambulation. Denies any numbness, tingling, paresthesias, weakness, saddle anesthesia, or bowel / bladder incontinence. Saw her chiropractor, MRI was done - reported concern for lesions. Pt referred to Dr. Peña's- who she saw 4/20. Pt reports Dr. Peña gave dx of possible breast cancer with metastasis and referred pt to ED.     In the ED: VS: /100, Tachycardic   Labs only remarkable for Ca of 11.4   EKG: Sinus tach    CT cervical spine:   1.  Numerous lytic lesions throughout skull base and spine consistent with extensive osseous metastatic disease.  2.  Epidural component of tumor is noted from C1-C4.  3.  Tumor essentially replaces C1, C2 and C3 and there is pathologic fracture of C2.  4.  Findings would be better assessed with contrast-enhanced MRI. Additionally, direct comparison with the patient's recent outside imaging studies would be helpful.      ADMISSION SCORES:   GCS: 15    SEDATION SCORES:  RASS: CAM-ICU:     REVIEW OF SYSTEMS: Patient endorses neck pain and stiffness, resolved with Dilaudid PCA pump; denies headache, nausea/vomiting, blurred vision, double vision, or dizziness. Otherwise, 10-point ROS is negative.       VITALS: [X] Reviewed  Vital Signs Last 24 Hrs  T(C): 36.2 (25 Apr 2023 04:00), Max: 36.9 (24 Apr 2023 06:47)  T(F): 97.2 (25 Apr 2023 04:00), Max: 98.4 (24 Apr 2023 06:47)  HR: 116 (25 Apr 2023 05:15) (80 - 124)  BP: 159/67 (24 Apr 2023 14:45) (146/84 - 242/115)  BP(mean): 112 (24 Apr 2023 07:12) (112 - 112)  RR: 45 (25 Apr 2023 05:15) (17 - 49)  SpO2: 97% (25 Apr 2023 05:15) (91% - 98%)    Parameters below as of 25 Apr 2023 05:00  Patient On (Oxygen Delivery Method): mask, Venturi    O2 Concentration (%): 40  CAPILLARY BLOOD GLUCOSE            LABS:  PT/INR - ( 24 Apr 2023 13:28 )   PT: 12.40 sec;   INR: 1.08 ratio         PTT - ( 24 Apr 2023 13:28 )  PTT:29.0 sec  04-24    141  |  105  |  6<L>  ----------------------------<  152<H>  4.1   |  23  |  0.6<L>    Ca    9.6      24 Apr 2023 13:28  Phos  3.8     04-24  Mg     1.6     04-24    TPro  6.4  /  Alb  4.0  /  TBili  0.4  /  DBili  x   /  AST  41  /  ALT  30  /  AlkPhos  130<H>  04-24                          11.8   13.62 )-----------( 214      ( 25 Apr 2023 04:40 )             35.4         IMAGING/DATA: [X] Reviewed    IVF FLUIDS/MEDICATIONS: [X] Reviewed  MEDICATIONS  (STANDING):  ceFAZolin   IVPB 1000 milliGRAM(s) IV Intermittent every 8 hours  famotidine    Tablet 20 milliGRAM(s) Oral every 12 hours  HYDROmorphone PCA (1 mG/mL) 30 milliLiter(s) PCA Continuous PCA Continuous  levothyroxine 50 MICROGram(s) Oral daily  magnesium oxide 400 milliGRAM(s) Oral once  methocarbamol 750 milliGRAM(s) Oral every 8 hours  morphine PCA (5 mG/mL) 30 milliLiter(s) PCA Continuous PCA Continuous  niCARdipine Infusion 5 mG/Hr (25 mL/Hr) IV Continuous <Continuous>    MEDICATIONS  (PRN):  hydrALAZINE Injectable 5 milliGRAM(s) IV Push every 1 hour PRN sbp > 160  naloxone Injectable 0.1 milliGRAM(s) IV Push every 3 minutes PRN For ANY of the following changes in patient status:  A. RR LESS THAN 10 breaths per minute, B. Oxygen saturation LESS THAN 90%, C. Sedation score of 6  ondansetron Injectable 4 milliGRAM(s) IV Push every 6 hours PRN Nausea and/or Vomiting  ondansetron Injectable 4 milliGRAM(s) IV Push every 6 hours PRN Nausea    I&O's Summary    23 Apr 2023 07:01  -  24 Apr 2023 07:00  --------------------------------------------------------  IN: 1045 mL / OUT: 0 mL / NET: 1045 mL    24 Apr 2023 07:01  -  25 Apr 2023 05:52  --------------------------------------------------------  IN: 895 mL / OUT: 3860 mL / NET: -2965 mL          EXAMINATION:  General: No acute distress  HEENT: Anicteric sclerae  Cardiac: [id number]  Lungs: Clear  Abdomen: Soft, non-tender, +BS  Extremities: No c/c/e  Skin/Incision Site: Clean, dry and intact  Neurologic: Awake, alert, fully oriented, follows commands, PERRL, VFFtc, EOMI, face symmetric, tongue midline, no drift, full strength                     SUMMARY:    57F. PMH: hypothyroidism and ? alcoholic cirrhosis (stopped drinking over 9 yrs ago),   Presented 4/20 for neck pain w/ limited movement /2 pain x4 months; Denies ataxia, imbalance, or difficulty with ambulation. Denies any numbness, tingling, paresthesias, weakness, saddle anesthesia, or bowel / bladder incontinence. Saw her chiropractor, MRI was done - reported concern for lesions. Pt referred to Dr. Peña's- who she saw 4/20. Pt reports Dr. Peña gave dx of possible breast cancer with metastasis and referred pt to ED.     In the ED: VS: /100, Tachycardic   Labs only remarkable for Ca of 11.4   EKG: Sinus tach    CT cervical spine:   1.  Numerous lytic lesions throughout skull base and spine consistent with extensive osseous metastatic disease.  2.  Epidural component of tumor is noted from C1-C4.  3.  Tumor essentially replaces C1, C2 and C3 and there is pathologic fracture of C2.  4.  Findings would be better assessed with contrast-enhanced MRI. Additionally, direct comparison with the patient's recent outside imaging studies would be helpful.      ADMISSION SCORES:   GCS: 15    SEDATION SCORES:  RASS: CAM-ICU:     REVIEW OF SYSTEMS: Patient endorses neck pain and stiffness, resolved with Dilaudid PCA pump; denies headache, nausea/vomiting, blurred vision, double vision, or dizziness. Otherwise, 10-point ROS is negative.       POD #1 - S/P occiput to T2 fusion with instrumentation       VITALS: [X] Reviewed  Vital Signs Last 24 Hrs  T(C): 36.2 (25 Apr 2023 04:00), Max: 36.9 (24 Apr 2023 06:47)  T(F): 97.2 (25 Apr 2023 04:00), Max: 98.4 (24 Apr 2023 06:47)  HR: 116 (25 Apr 2023 05:15) (80 - 124)  BP: 159/67 (24 Apr 2023 14:45) (146/84 - 242/115)  BP(mean): 112 (24 Apr 2023 07:12) (112 - 112)  RR:  20 (25 Apr 2023 05:15) (17 - 49)  SpO2: 97% (25 Apr 2023 05:15) (91% - 98%)    Parameters below as of 25 Apr 2023 05:00  Patient On (Oxygen Delivery Method): mask, Venturi    O2 Concentration (%): 40  CAPILLARY BLOOD GLUCOSE      24 Apr 2023 07:01  -  25 Apr 2023 07:00  --------------------------------------------------------  IN:    IV PiggyBack: 150 mL    NiCARdipine: 470 mL    Oral Fluid: 200 mL    sodium chloride 0.9%: 225 mL  Total IN: 1045 mL    OUT:    Drain (mL): 185 mL    Indwelling Catheter - Urethral (mL): 3160 mL    Voided (mL): 1100 mL  Total OUT: 4445 mL    Total NET: -3400 mL              LABS:  PT/INR - ( 24 Apr 2023 13:28 )   PT: 12.40 sec;   INR: 1.08 ratio         PTT - ( 24 Apr 2023 13:28 )  PTT:29.0 sec  04-24  MEDICATIONS  (STANDING):  ceFAZolin   IVPB 1000 milliGRAM(s) IV Intermittent every 8 hours  chlorhexidine 2% Cloths 1 Application(s) Topical <User Schedule>  famotidine    Tablet 20 milliGRAM(s) Oral every 12 hours  levothyroxine 50 MICROGram(s) Oral daily  magnesium oxide 400 milliGRAM(s) Oral once  magnesium sulfate  IVPB 2 Gram(s) IV Intermittent every 2 hours  methocarbamol 750 milliGRAM(s) Oral every 8 hours  morphine PCA (5 mG/mL) 30 milliLiter(s) PCA Continuous PCA Continuous  niCARdipine Infusion 5 mG/Hr (25 mL/Hr) IV Continuous     MEDICATIONS  (PRN):  hydrALAZINE Injectable 5 milliGRAM(s) IV Push every 1 hour PRN sbp > 160  naloxone Injectable 0.1 milliGRAM(s) IV Push every 3 minutes PRN For ANY of the following changes in patient status:  A. RR LESS THAN 10 breaths per minute, B. Oxygen saturation LESS THAN 90%, C. Sedation score of 6  ondansetron Injectable 4 milliGRAM(s) IV Push every 6 hours PRN Nausea  ondansetron Injectable 4 milliGRAM(s) IV Push every 6 hours PRN Nausea and/or Vomiting    141  |  105  |  6<L>  ----------------------------<  152<H>  4.1   |  23  |  0.6<L>    Ca    9.6      24 Apr 2023 13:28  Phos  3.8     04-24  Mg     1.6     04-24    TPro  6.4  /  Alb  4.0  /  TBili  0.4  /  DBili  x   /  AST  41  /  ALT  30  /  AlkPhos  130<H>  04-24                          11.8   13.62 )-----------( 214      ( 25 Apr 2023 04:40 )             35.4      Xray Chest 1 View AP/PA (04.24.23 @ 13:46) >    Impression:    Bibasilar opacities. Low lung volumes leads to magnification of the   pulmonary interstitium.       CT Abdomen and Pelvis w/ Oral Cont and w/ IV Cont (04.21.23 @ 17:06) >    IMPRESSION:    CHEST:    Right breast mass measuring approximately 2 x 2.8 cm with nipple   retraction.    Multiple enlarged mediastinal lymph nodes including enlarged lower   paratracheal lymph nodes measuring up to 1.6 cm.    Multiple mixed lytic and sclerotic sternal and rib lesions. Sclerotic T8   vertebral body lesion.    Additional thoracic spine lesions, better visualized on recent MRI.    ABDOMEN/PELVIS:    Mixed lytic and sclerotic lesion of the L5 vertebral body. Additional   lumbar spine lesions, better visualized on recent MRI.    CT Head No Cont (04.21.23 @ 16:58) >  IMPRESSION:  Numerous osseous lytic lesions consistent with metastases.    No intracranial hemorrhage, mass effect or midline shift. Pleasenote   evaluation for intracranial metastatic disease is limited without   intravenous contrast. Contrast enhanced brain MRI is recommended for   further evaluation if not clinically contraindicated.    : MR Cervical Spine w/ IV Cont (04.21.23 @ 12:48) >    IMPRESSION:    CERVICAL SPINE:  Numerous enhancing osseous metastatic lesions are noted throughout the   cervical spine most significant at the C2 and C3 level with evidence of   epidural extension causing severe spinal stenosis and mass effect upon   the spinal cord at C2-C3.    THORACIC SPINE:  Numerous enhancing osseous metastatic lesions with malignant compression   deformity noted of the T8 vertebral body. No evidence of extraosseous   extension.    LUMBAR SPINE:  Numerous enhancing osseous metastatic lesions without extraosseous   extension or compression deformity.    < end of copied text >            IVF FLUIDS/MEDICATIONS: [X] Reviewed  MEDICATIONS  (STANDING):  ceFAZolin   IVPB 1000 milliGRAM(s) IV Intermittent every 8 hours  famotidine    Tablet 20 milliGRAM(s) Oral every 12 hours  HYDROmorphone PCA (1 mG/mL) 30 milliLiter(s) PCA Continuous PCA Continuous  levothyroxine 50 MICROGram(s) Oral daily  magnesium oxide 400 milliGRAM(s) Oral once  methocarbamol 750 milliGRAM(s) Oral every 8 hours  morphine PCA (5 mG/mL) 30 milliLiter(s) PCA Continuous PCA Continuous  niCARdipine Infusion 5 mG/Hr (25 mL/Hr) IV Continuous <Continuous>    MEDICATIONS  (PRN):  hydrALAZINE Injectable 5 milliGRAM(s) IV Push every 1 hour PRN sbp > 160  naloxone Injectable 0.1 milliGRAM(s) IV Push every 3 minutes PRN For ANY of the following changes in patient status:  A. RR LESS THAN 10 breaths per minute, B. Oxygen saturation LESS THAN 90%, C. Sedation score of 6  ondansetron Injectable 4 milliGRAM(s) IV Push every 6 hours PRN Nausea and/or Vomiting  ondansetron Injectable 4 milliGRAM(s) IV Push every 6 hours PRN Nausea    I&O's Summary    23 Apr 2023 07:01  -  24 Apr 2023 07:00  --------------------------------------------------------  IN: 1045 mL / OUT: 0 mL / NET: 1045 mL    24 Apr 2023 07:01  -  25 Apr 2023 05:52  --------------------------------------------------------  IN: 895 mL / OUT: 3860 mL / NET: -2965 mL          EXAMINATION:  General: No acute distress  HEENT: Anicteric sclerae  Cardiac: [id number]  Lungs: Clear  Abdomen: Soft, non-tender, +BS  Extremities: No c/c/e  Skin/Incision Site: Clean, dry and intact  Neurologic: Awake, alert, fully oriented, follows commands, PERRL, VFFtc, EOMI, face symmetric, tongue midline, no drift, full strength

## 2023-04-25 NOTE — PHARMACOTHERAPY INTERVENTION NOTE - COMMENTS
evaluate morphine 2mg IV q3h prn-pt on morphine PCA pump, recommended d/c prn morphine  -hydromorphone PCA order active-d/c

## 2023-04-25 NOTE — OCCUPATIONAL THERAPY INITIAL EVALUATION ADULT - IMPAIRED TRANSFERS: SIT/STAND, REHAB EVAL
"Subjective:       Boone Hilario is a 73 y.o. male status post TURP on 8/29/17 here for post-op follow-up. He was discharged with cronin due to pre-op retention (on CIC); denies problems or concerns related to the cronin catheter.     Objective:   Vitals: /75 (BP Location: Left arm, Patient Position: Sitting, BP Method: Medium (Automatic))   Pulse 81   Ht 5' 11" (1.803 m)   Wt 74.8 kg (165 lb)   BMI 23.01 kg/m²      Physical Exam   General:  alert, appears stated age and cooperative   : Cronin in place with clear yellow urine      Pathology  Prostate Chips: BPH, NEM    Voiding Trial  Voiding Trial (Fill/Pull/Void): 200cc of sterile saline was instilled into the bladder through the previously placed cronin catheter.  The cronin balloon was then deflated and the cronin removed.  The patient subsequently voided 200cc, consistent with successful voiding trial.  The cronin was not replaced.     Assessment and Plan:   1 week s/p TURP, doing well.    Continue Rapaflo for now  FU 1 month        "
impaired balance/decreased ROM/decreased strength

## 2023-04-25 NOTE — OCCUPATIONAL THERAPY INITIAL EVALUATION ADULT - GENERAL OBSERVATIONS, REHAB EVAL
Pt received semi thornton in bed in NAD, +PCA pump, +IV drip, +tele, +BP cuff, +pulse oxi, sister present in room, left seated in b/s chair with b/l LE elevated, RN aware, vitals stable

## 2023-04-25 NOTE — OCCUPATIONAL THERAPY INITIAL EVALUATION ADULT - SITTING BALANCE: DYNAMIC
fair minus High Dose Vitamin A Counseling: Side effects reviewed, pt to contact office should one occur.

## 2023-04-25 NOTE — OCCUPATIONAL THERAPY INITIAL EVALUATION ADULT - PERTINENT HX OF CURRENT PROBLEM, REHAB EVAL
57 year-old female POD #0 s/p Occiput-T2 instrumentation and fusion with ORIF of C2. Patient was seen and examined at bedside in PACU. Patient lying in bed with nonrebreather in place A&Ox3 and following all commands. Patient hypertensive immediately post-op and placed on cardene gtt.   POD #1 - S/P occiput to T2 fusion with instrumentation

## 2023-04-25 NOTE — CONSULT NOTE ADULT - PROBLEM SELECTOR RECOMMENDATION 4
- will follow  ______________  Hu Valverde MD  Palliative Medicine  [facility]   [facility]  [phone]

## 2023-04-25 NOTE — CONSULT NOTE ADULT - SUBJECTIVE AND OBJECTIVE BOX
HPI: 57F with PMH of hypothyroidism, possible EtOH cirrhosis, past EtOH use, here with neck pain, and found to have numerous skull and spinal lytic lesions on CT head. Patient had occiput-T2 fusion and ORIF of C2, and admitted to NCCU for neurochecks, and placed on PCA pump for pain. Oncology consulted, possible breast primary, but would need biopsy. Is also on synthroid for hypothyroidism. Palliative care consulted for GOC. Patient is full code.    Endo:  - Synthroid 50 mcg daily   - Thyroid Stimulating Hormone, Serum: 1.52 uIU/mL (04.21.23 @ 06:26)    ID:  - Completed Ancef  kira operative     Full Code     Neuro ICU     Lines: PIV x 2, Left radial [place]              Attestation Statements:    Attestation Statements:  Patient is critically ill, requiring critical care services.     Attending: I have personally and independently provided 45 minutes of critical care services.  This excludes any time spent on separate procedures or teaching.     Attending comments: POD #0 S/P Occiput-T2 instrumentation and fusion with ORIF of C2. ; Metastatic lung Ca    Neuro   Neuro checks q 1 hr  PT/OT  PCA for pain control  Robaxin for musculoskeletal spasm   Incentive spirometer  OOB  Goal - < 160  Resume Synthroid- home med.      PERTINENT PM/SXH:       FAMILY HISTORY:    ITEMS NOT CHECKED ARE NOT PRESENT    SOCIAL HISTORY:   Significant other/partner[ ]  Children[ ]  Orthodox/Spirituality:  Substance hx:  [ ]   Tobacco hx:  [ ]   Alcohol hx: [ ]   Living Situation: [ ]Home  [ ]Long term care  [ ]Rehab [ ]Other  Home Services: [ ] HHA [ ] Visting RN [ ] Hospice  Occupation:  Home Opioid hx:  [ ] Y [ ] N [ ] I-Stop Reference No:    ADVANCE DIRECTIVES:    [ ] Full Code [ ] DNR  MOLST  [ ]  Living Will  [ ]   DECISION MAKER(s):  [ ] Health Care Proxy(s)  [ ] Surrogate(s)  [ ] Guardian           Name(s): Phone Number(s):    BASELINE (I)ADL(s) (prior to admission):  [place]: [ ]Total  [ ] Moderate [ ]Dependent  Palliative Performance Status Version 2:         %    http://npcrc.org/files/news/palliative_performance_scale_ppsv2.pdf    Allergies    No Known Allergies    Intolerances    hydromorphone (Faint; Nausea)  MEDICATIONS  (STANDING):  ceFAZolin   IVPB 1000 milliGRAM(s) IV Intermittent every 8 hours  chlorhexidine 2% Cloths 1 Application(s) Topical <User Schedule>  enoxaparin Injectable 30 milliGRAM(s) SubCutaneous every 12 hours  famotidine    Tablet 20 milliGRAM(s) Oral every 12 hours  labetalol 100 milliGRAM(s) Oral every 8 hours  levothyroxine 50 MICROGram(s) Oral daily  magnesium oxide 400 milliGRAM(s) Oral once  magnesium sulfate  IVPB 2 Gram(s) IV Intermittent every 2 hours  methocarbamol 750 milliGRAM(s) Oral every 8 hours  morphine PCA (5 mG/mL) 30 milliLiter(s) PCA Continuous PCA Continuous  niCARdipine Infusion 5 mG/Hr (25 mL/Hr) IV Continuous <Continuous>  polyethylene glycol 3350 17 Gram(s) Oral daily  senna 2 Tablet(s) Oral at bedtime    MEDICATIONS  (PRN):  acetaminophen     Tablet .. 650 milliGRAM(s) Oral every 6 hours PRN Mild Pain (1 - 3)  hydrALAZINE Injectable 5 milliGRAM(s) IV Push every 1 hour PRN sbp > 160  naloxone Injectable 0.1 milliGRAM(s) IV Push every 3 minutes PRN For ANY of the following changes in patient status:  A. RR LESS THAN 10 breaths per minute, B. Oxygen saturation LESS THAN 90%, C. Sedation score of 6  ondansetron Injectable 4 milliGRAM(s) IV Push every 6 hours PRN Nausea  ondansetron Injectable 4 milliGRAM(s) IV Push every 6 hours PRN Nausea and/or Vomiting    PRESENT SYMPTOMS: [ ]Unable to obtain due to poor mentation   Source if other than patient:  [ ]Family   [ ]Team     Pain: [ ]yes [ ]no  QOL impact -   Location -                    Aggravating factors -  Quality -  Radiation -  Timing-  Severity (0-10 scale):  Minimal acceptable level (0-10 scale):     CPOT:    https://www.sccm.org/getattachment/[email or web address]-5038e0962g5z/Critical-Care-Pain-Observation-Tool-(CPOT)    PAIN AD Score:   http://geriatrictoolkit.[place]/cog/painad.pdf (press ctrl +  left click to view)    Dyspnea:                           [ ]None[ ]Mild [ ]Moderate [ ]Severe     Respiratory Distress Observation Scale (RDOS):   A score of 0 to 2 signifies little or no respiratory distress, 3 signifies mild distress, scores 4 to 6 indicate moderate distress, and scores greater than 7 signify severe distress  https://www.[facility].ca/sites/default/files/PDFS/465487-zdavchgbqhi-cshouatq-shanfuhzziw-dmzyz.pdf    Anxiety:                             [ ]None[ ]Mild [ ]Moderate [ ]Severe   Fatigue:                             [ ]None[ ]Mild [ ]Moderate [ ]Severe   Nausea:                             [ ]None[ ]Mild [ ]Moderate [ ]Severe   Loss of appetite:              [ ]None[ ]Mild [ ]Moderate [ ]Severe   Constipation:                    [ ]None[ ]Mild [ ]Moderate [ ]Severe    Other Symptoms:  [ ]All other review of systems negative     Palliative Performance Status Version 2:         %    http://[facility].org/files/news/palliative_performance_scale_ppsv2.pdf  PHYSICAL EXAM:  Vital Signs Last 24 Hrs  T(C): 36.4 (25 Apr 2023 08:00), Max: 36.6 (24 Apr 2023 20:00)  T(F): 97.6 (25 Apr 2023 08:00), Max: 97.9 (24 Apr 2023 20:00)  HR: 135 (25 Apr 2023 10:15) (80 - 135)  BP: 159/67 (24 Apr 2023 14:45) (159/67 - 242/115)  BP(mean): --  RR: 27 (25 Apr 2023 10:00) (17 - 49)  SpO2: 94% (25 Apr 2023 10:00) (91% - 98%)    Parameters below as of 25 Apr 2023 10:00  Patient On (Oxygen Delivery Method): nasal cannula  O2 Flow (L/min): 2   I&O's Summary    24 Apr 2023 07:01  -  25 Apr 2023 07:00  --------------------------------------------------------  IN: 1045 mL / OUT: 4445 mL / NET: -3400 mL    25 Apr 2023 07:01  -  25 Apr 2023 10:59  --------------------------------------------------------  IN: 32.5 mL / OUT: 50 mL / NET: -17.5 mL        GENERAL:  [X ] No acute distress [ ]Lethargic  [ ]Unarousable  [ ]Verbal  [ ]Non-Verbal [ ]Cachexia    BEHAVIORAL/PSYCH:  [ ]Alert and Oriented x  [ ] Anxiety [ ] Delirium [ ] Agitation [X ] Calm   EYES: [ ] No scleral icterus [ ] Scleral icterus [ ] Closed  ENMT:  [ ]Dry mouth  [ ]No external oral lesions [ ] No external ear or nose lesions  CARDIOVASCULAR:  [ ]Regular [ ]Irregular [ ]Tachy [ ]Not Tachy  [ ]Ahmet [ ] Edema [ ] No edema  PULMONARY:  [ ]Tachypnea  [ ]Audible excessive secretions [X ] No labored breathing [ ] labored breathing  GASTROINTESTINAL: [ ]Soft  [ ]Distended  [ X]Not distended [ ]Non tender [ ]Tender  MUSCULOSKELETAL: [ ]No clubbing [ ] clubbing  [ ] No cyanosis [ ] cyanosis  NEUROLOGIC: [ ]No focal deficits  [ ]Follows commands  [ ]Does not follow commands  [ ]Cognitive impairment  [ ]Dysphagia  [ ]Dysarthria  [ ]Paresis   SKIN: [ ] Jaundiced [X ] Non-jaundiced [ ]Rash [ ]No Rash [ ] Warm [ ] Dry  MISC/LINES: [ ] ET tube [ ] Trach [ ]NGT/OGT [ ]PEG [ ]Wong    CRITICAL CARE:  [ ] Shock Present  [ ]Septic [ ]Cardiogenic [ ]Neurologic [ ]Hypovolemic  [ ]  Vasopressors [ ]  Inotropes   [ ]Respiratory failure present [ ]Mechanical ventilation [ ]Non-invasive ventilatory support [ ]High flow  [ ]Acute  [ ]Chronic [ ]Hypoxic  [ ]Hypercarbic [ ]Other  [ ]Other organ failure     LABS: reviewed by me                        11.8   13.62 )-----------( 214      ( 25 Apr 2023 04:40 )             35.4   04-25    141  |  99  |  9<L>  ----------------------------<  142<H>  3.7   |  28  |  0.5<L>    Ca    9.7      25 Apr 2023 04:40  Phos  3.9     04-25  Mg     1.5     04-25    TPro  6.5  /  Alb  3.9  /  TBili  0.4  /  DBili  x   /  AST  35  /  ALT  27  /  AlkPhos  126<H>  04-25  PT/INR - ( 24 Apr 2023 13:28 )   PT: 12.40 sec;   INR: 1.08 ratio         PTT - ( 24 Apr 2023 13:28 )  PTT:29.0 sec      RADIOLOGY & ADDITIONAL STUDIES: reviewed by me    PROTEIN CALORIE MALNUTRITION PRESENT: [ ]mild [ ]moderate [ ]severe [ ]underweight [ ]morbid obesity  https://www.andeal.org/vault/2440/web/files/ONC/Table_Clinical%20Characteristics%20to%20Document%20Malnutrition-White%20JV%20et%20al%202012.pdf    Height (cm): 162.6 (04-24-23 @ 17:00)  Weight (kg): 99.1 (04-24-23 @ 17:00)  BMI (kg/m2): 37.5 (04-24-23 @ 17:00)    [ ]PPSV2 < or = to 30% [ ]significant weight loss  [ ]poor nutritional intake  [ ]anasarca      [ ]Artificial Nutrition      REFERRALS:   [ ]Chaplaincy  [ ]Hospice  [ ]Child Life  [ ]Social Work  [ ]Case management [ ]Holistic Therapy     Palliative care education provided to patient and/or family    Goals of Care Document:     ______________  Hu Valverde MD  Palliative Medicine  [facility]   of Geriatric and Palliative Medicine  [phone]   HPI: 57F with PMH of hypothyroidism, possible EtOH cirrhosis, past EtOH use, here with neck pain, and found to have numerous skull and spinal lytic lesions on CT head. Patient had occiput-T2 fusion and ORIF of C2, and admitted to NCCU for neurochecks, and placed on PCA pump for pain. Oncology consulted, possible breast primary, but would need biopsy. Is also on synthroid for hypothyroidism. Palliative care consulted for GOC. Patient is full code.    PERTINENT PM/SXH: as above      FAMILY HISTORY: Denies family history of cancer.      ITEMS NOT CHECKED ARE NOT PRESENT    SOCIAL HISTORY:   Significant other/partner[X ]  Children[ ]  Mandaen/Spirituality:  Substance hx:  [ ]   Tobacco hx:  [ ]   Alcohol hx: [ ]   Living Situation: [ ]Home  [ ]Long term care  [ ]Rehab [ ]Other  Home Services: [ ] HHA [ ] Visting RN [ ] Hospice  Occupation:  Home Opioid hx:  [ ] Y [ ] N [ ] I-Stop Reference No:    ADVANCE DIRECTIVES:    [ ] Full Code [ ] DNR  MOLST  [ ]  Living Will  [ ]   DECISION MAKER(s):  [ ] Health Care Proxy(s)  [ ] Surrogate(s)  [ ] Guardian           Name(s): Phone Number(s): spouse Pankaj     BASELINE (I)ADL(s) (prior to admission):  [place]: [ ]Total  [ ] Moderate [ ]Dependent  Palliative Performance Status Version 2:         %    http://npcrc.org/files/news/palliative_performance_scale_ppsv2.pdf    Allergies    No Known Allergies    Intolerances    hydromorphone (Faint; Nausea)    MEDICATIONS  (STANDING):  ceFAZolin   IVPB 1000 milliGRAM(s) IV Intermittent every 8 hours  chlorhexidine 2% Cloths 1 Application(s) Topical <User Schedule>  enoxaparin Injectable 30 milliGRAM(s) SubCutaneous every 12 hours  famotidine    Tablet 20 milliGRAM(s) Oral every 12 hours  labetalol 100 milliGRAM(s) Oral every 8 hours  levothyroxine 50 MICROGram(s) Oral daily  magnesium oxide 400 milliGRAM(s) Oral once  magnesium sulfate  IVPB 2 Gram(s) IV Intermittent every 2 hours  methocarbamol 750 milliGRAM(s) Oral every 8 hours  morphine PCA (5 mG/mL) 30 milliLiter(s) PCA Continuous PCA Continuous  niCARdipine Infusion 5 mG/Hr (25 mL/Hr) IV Continuous <Continuous>  polyethylene glycol 3350 17 Gram(s) Oral daily  senna 2 Tablet(s) Oral at bedtime    MEDICATIONS  (PRN):  acetaminophen     Tablet .. 650 milliGRAM(s) Oral every 6 hours PRN Mild Pain (1 - 3)  hydrALAZINE Injectable 5 milliGRAM(s) IV Push every 1 hour PRN sbp > 160  naloxone Injectable 0.1 milliGRAM(s) IV Push every 3 minutes PRN For ANY of the following changes in patient status:  A. RR LESS THAN 10 breaths per minute, B. Oxygen saturation LESS THAN 90%, C. Sedation score of 6  ondansetron Injectable 4 milliGRAM(s) IV Push every 6 hours PRN Nausea  ondansetron Injectable 4 milliGRAM(s) IV Push every 6 hours PRN Nausea and/or Vomiting    PRESENT SYMPTOMS: [ ]Unable to obtain due to poor mentation   Source if other than patient:  [ ]Family   [ ]Team     Pain: [ ]yes [ X]no  QOL impact -   Location -                    Aggravating factors -  Quality -  Radiation -  Timing-  Severity (0-10 scale):  Minimal acceptable level (0-10 scale):     CPOT:    https://www.[facility].org/getattachment/[email or web address]/Critical-Care-Pain-Observation-Tool-(CPOT)    PAIN AD Score:   http://geriatrictoolkit.[facility]/cog/painad.pdf (press ctrl +  left click to view)    Dyspnea:                           [ ]None[ ]Mild [ ]Moderate [ ]Severe     Respiratory Distress Observation Scale (RDOS):   A score of 0 to 2 signifies little or no respiratory distress, 3 signifies mild distress, scores 4 to 6 indicate moderate distress, and scores greater than 7 signify severe distress  https://www.[facility].ca/sites/default/files/PDFS/479130-eqvxruwwayg-jphciyij-xunburqzdqw-jyhid.pdf    Anxiety:                             [ ]None[ ]Mild [ ]Moderate [ ]Severe   Fatigue:                             [ ]None[ ]Mild [ ]Moderate [ ]Severe   Nausea:                             [ ]None[ ]Mild [ ]Moderate [ ]Severe   Loss of appetite:              [ ]None[ ]Mild [ ]Moderate [ ]Severe   Constipation:                    [ ]None[ ]Mild [ ]Moderate [ ]Severe    Other Symptoms:  [X ]All other review of systems negative     Palliative Performance Status Version 2:         %    http://npcrc.org/files/news/palliative_performance_scale_ppsv2.pdf  PHYSICAL EXAM:  Vital Signs Last 24 Hrs  T(C): 36.4 (25 Apr 2023 08:00), Max: 36.6 (24 Apr 2023 20:00)  T(F): 97.6 (25 Apr 2023 08:00), Max: 97.9 (24 Apr 2023 20:00)  HR: 135 (25 Apr 2023 10:15) (80 - 135)  BP: 159/67 (24 Apr 2023 14:45) (159/67 - 242/115)  BP(mean): --  RR: 27 (25 Apr 2023 10:00) (17 - 49)  SpO2: 94% (25 Apr 2023 10:00) (91% - 98%)    Parameters below as of 25 Apr 2023 10:00  Patient On (Oxygen Delivery Method): nasal cannula  O2 Flow (L/min): 2   I&O's Summary    24 Apr 2023 07:01  -  25 Apr 2023 07:00  --------------------------------------------------------  IN: 1045 mL / OUT: 4445 mL / NET: -3400 mL    25 Apr 2023 07:01  -  25 Apr 2023 10:59  --------------------------------------------------------  IN: 32.5 mL / OUT: 50 mL / NET: -17.5 mL        GENERAL:  [X ] No acute distress [ ]Lethargic  [ ]Unarousable  [ ]Verbal  [ ]Non-Verbal [ ]Cachexia    BEHAVIORAL/PSYCH:  [ ]Alert and Oriented x  [ ] Anxiety [ ] Delirium [ ] Agitation [X ] Calm   EYES: [ ] No scleral icterus [ ] Scleral icterus [ ] Closed  ENMT:  [ ]Dry mouth  [ ]No external oral lesions [ ] No external ear or nose lesions  CARDIOVASCULAR:  [ ]Regular [ ]Irregular [ ]Tachy [ ]Not Tachy  [ ]Ahmet [ ] Edema [ ] No edema  PULMONARY:  [ ]Tachypnea  [ ]Audible excessive secretions [X ] No labored breathing [ ] labored breathing  GASTROINTESTINAL: [ ]Soft  [ ]Distended  [ X]Not distended [ ]Non tender [ ]Tender  MUSCULOSKELETAL: [ ]No clubbing [ ] clubbing  [ ] No cyanosis [ ] cyanosis  NEUROLOGIC: [ ]No focal deficits  [ ]Follows commands  [ ]Does not follow commands  [ ]Cognitive impairment  [ ]Dysphagia  [ ]Dysarthria  [ ]Paresis   SKIN: [ ] Jaundiced [X ] Non-jaundiced [ ]Rash [ ]No Rash [ ] Warm [ ] Dry  MISC/LINES: [ ] ET tube [ ] Trach [ ]NGT/OGT [ ]PEG [ ]Wong    CRITICAL CARE:  [ ] Shock Present  [ ]Septic [ ]Cardiogenic [ ]Neurologic [ ]Hypovolemic  [ ]  Vasopressors [ ]  Inotropes   [ ]Respiratory failure present [ ]Mechanical ventilation [ ]Non-invasive ventilatory support [ ]High flow  [ ]Acute  [ ]Chronic [ ]Hypoxic  [ ]Hypercarbic [ ]Other  [ ]Other organ failure     LABS: reviewed by me                        11.8   13.62 )-----------( 214      ( 25 Apr 2023 04:40 )             35.4   04-25    141  |  99  |  9<L>  ----------------------------<  142<H>  3.7   |  28  |  0.5<L>    Ca    9.7      25 Apr 2023 04:40  Phos  3.9     04-25  Mg     1.5     04-25    TPro  6.5  /  Alb  3.9  /  TBili  0.4  /  DBili  x   /  AST  35  /  ALT  27  /  AlkPhos  126<H>  04-25  PT/INR - ( 24 Apr 2023 13:28 )   PT: 12.40 sec;   INR: 1.08 ratio         PTT - ( 24 Apr 2023 13:28 )  PTT:29.0 sec      RADIOLOGY & ADDITIONAL STUDIES: reviewed by me    PROTEIN CALORIE MALNUTRITION PRESENT: [ ]mild [ ]moderate [ ]severe [ ]underweight [ ]morbid obesity  https://www.andeal.org/vault/2440/web/files/ONC/Table_Clinical%20Characteristics%20to%20Document%20Malnutrition-White%20JV%20et%20al%202012.pdf    Height (cm): 162.6 (04-24-23 @ 17:00)  Weight (kg): 99.1 (04-24-23 @ 17:00)  BMI (kg/m2): 37.5 (04-24-23 @ 17:00)    [ ]PPSV2 < or = to 30% [ ]significant weight loss  [ ]poor nutritional intake  [ ]anasarca      [ ]Artificial Nutrition      REFERRALS:   [ ]Chaplaincy  [ ]Hospice  [ ]Child Life  [ ]Social Work  [ ]Case management [ ]Holistic Therapy     Palliative care education provided to patient and/or family    Goals of Care Document:     ______________  Hu Valverde MD  Palliative Medicine  [facility]   of Geriatric and Palliative Medicine  [phone]

## 2023-04-25 NOTE — PROGRESS NOTE ADULT - SUBJECTIVE AND OBJECTIVE BOX
NEUROSURGERY NOTE  JEAN-PAUL REYES   04-25-23 @ 14:16    PAST 24HR EVENTS:  POD#1 s/p Occiput to T2 instrumentation and Fusion, ORIF of C2   Patient seen and examined at bedside, states that she continues to have neck pain + PCA + escamilla  Was OOB walking with PT from bed to chair. Patient denies any radicular pain, weakness, or sensory deficits,     HPI: 57y Female     PHYSICAL EXAM:  Vital Signs Last 24 Hrs  T(C): 36.4 (25 Apr 2023 08:00), Max: 36.6 (24 Apr 2023 20:00)  T(F): 97.6 (25 Apr 2023 08:00), Max: 97.9 (24 Apr 2023 20:00)  HR: 114 (25 Apr 2023 13:45) (98 - 135)  BP: 159/67 (24 Apr 2023 14:45) (159/67 - 179/74)  BP(mean): --  RR: 28 (25 Apr 2023 13:45) (19 - 49)  SpO2: 89% (25 Apr 2023 13:45) (88% - 98%)    Parameters below as of 25 Apr 2023 12:45  Patient On (Oxygen Delivery Method): room air      I&O's Detail    24 Apr 2023 07:01  -  25 Apr 2023 07:00  --------------------------------------------------------  IN:    IV PiggyBack: 150 mL    NiCARdipine: 470 mL    Oral Fluid: 200 mL    sodium chloride 0.9%: 225 mL  Total IN: 1045 mL    OUT:    Drain (mL): 185 mL    Indwelling Catheter - Urethral (mL): 3160 mL    Voided (mL): 1100 mL  Total OUT: 4445 mL    Total NET: -3400 mL      25 Apr 2023 07:01  -  25 Apr 2023 14:16  --------------------------------------------------------  IN:    NiCARdipine: 42.5 mL  Total IN: 42.5 mL    OUT:    Indwelling Catheter - Urethral (mL): 200 mL  Total OUT: 200 mL    Total NET: -157.5 mL        I&O's Summary    24 Apr 2023 07:01  -  25 Apr 2023 07:00  --------------------------------------------------------  IN: 1045 mL / OUT: 4445 mL / NET: -3400 mL    25 Apr 2023 07:01  -  25 Apr 2023 14:16  --------------------------------------------------------  IN: 42.5 mL / OUT: 200 mL / NET: -157.5 mL    Awake, alert, following commands   PERRL, EOMI   MAEx4 with good strength   B/L UE & LE 5/5   SILT   No reyes   No clonus   Incision: bloody dressing, changed and intact   + HMV in place    MEDS:   acetaminophen     Tablet .. 650 milliGRAM(s) Oral every 6 hours PRN  ceFAZolin   IVPB 1000 milliGRAM(s) IV Intermittent every 8 hours  chlorhexidine 2% Cloths 1 Application(s) Topical <User Schedule>  enoxaparin Injectable 30 milliGRAM(s) SubCutaneous every 12 hours  famotidine    Tablet 20 milliGRAM(s) Oral every 12 hours  hydrALAZINE Injectable 5 milliGRAM(s) IV Push every 1 hour PRN  labetalol 100 milliGRAM(s) Oral every 8 hours  levothyroxine 50 MICROGram(s) Oral daily  magnesium oxide 400 milliGRAM(s) Oral once  methocarbamol 750 milliGRAM(s) Oral every 8 hours  morphine PCA (5 mG/mL) 30 milliLiter(s) PCA Continuous PCA Continuous  naloxone Injectable 0.1 milliGRAM(s) IV Push every 3 minutes PRN  niCARdipine Infusion 5 mG/Hr IV Continuous <Continuous>  ondansetron Injectable 4 milliGRAM(s) IV Push every 6 hours PRN  ondansetron Injectable 4 milliGRAM(s) IV Push every 6 hours PRN  polyethylene glycol 3350 17 Gram(s) Oral daily  senna 2 Tablet(s) Oral at bedtime      LABS:                        11.8   13.62 )-----------( 214      ( 25 Apr 2023 04:40 )             35.4     04-25    141  |  99  |  9<L>  ----------------------------<  142<H>  3.7   |  28  |  0.5<L>    Ca    9.7      25 Apr 2023 04:40  Phos  3.9     04-25  Mg     1.5     04-25    TPro  6.5  /  Alb  3.9  /  TBili  0.4  /  DBili  x   /  AST  35  /  ALT  27  /  AlkPhos  126<H>  04-25    PT/INR - ( 24 Apr 2023 13:28 )   PT: 12.40 sec;   INR: 1.08 ratio         PTT - ( 24 Apr 2023 13:28 )  PTT:29.0 sec    RADIOLOGY:   < from: CT Cervical Spine No Cont (04.24.23 @ 21:42) >  IMPRESSION:  Since prior examinationthere has been occipital spinal fusion extending   to T2 with expected postsurgical changes.    Redemonstration of numerous lytic infiltrative metastatic lesions with   multiple malignant compression fractures most significant of the C2 and   C3 vertebral bodies    < end of copied text >

## 2023-04-25 NOTE — PHYSICAL THERAPY INITIAL EVALUATION ADULT - ADDITIONAL COMMENTS
Pt resides with family in 2nd floor apt using 8 steps to enter and a flight of stairs to access. Pt used to be independent with overall functional mobility, able to ambulate using No AD.

## 2023-04-25 NOTE — PHYSICAL THERAPY INITIAL EVALUATION ADULT - GAIT TRAINING, PT EVAL
Pt will ambulate using RW or least restrictive AD for 150 ft with supervision by discharge to facilitate return to PLOF. Pt will negotiate 12 steps using 1 HR under supervision

## 2023-04-25 NOTE — OCCUPATIONAL THERAPY INITIAL EVALUATION ADULT - LEVEL OF INDEPENDENCE: SUPINE/SIT, REHAB EVAL
utilized bed in chair mode for OOB to chair transfer for pain management and pt comfort will assess supine to sit in future

## 2023-04-25 NOTE — OCCUPATIONAL THERAPY INITIAL EVALUATION ADULT - PRECAUTIONS/LIMITATIONS, REHAB EVAL
hard collar OOB with transfers, as per neurosurgery OK to remove once seated in b/s chair with pt preferred towel as support/cardiac precautions/fall precautions/spinal precautions

## 2023-04-25 NOTE — CONSULT NOTE ADULT - PROBLEM SELECTOR RECOMMENDATION 9
Full consult to follow shortly. Please call [phone] with any acute concerns. - patient is on PCA for post-op pain management, using sparingly (1 use/4 hour block)  - c/w PCA for now  - will help manage pain after PCA off

## 2023-04-25 NOTE — PHARMACOTHERAPY INTERVENTION NOTE - COMMENTS
-enoxaparin 30mg sc q12h-routine start 1800, d/w neuro crit team, will evaluate  -Miralax 17g po x1-will change to daily -enoxaparin 30mg sc q12h-routine start 1800, d/w neuro crit team, will evaluate  --start enoxaparin @ 1100   -Miralax 17g po x1-will change to daily

## 2023-04-25 NOTE — PROGRESS NOTE ADULT - ASSESSMENT
Patient is a 57 year-old female POD #0 s/p Occiput-T2 instrumentation and fusion with ORIF of C2. Patient was seen and examined at bedside in PACU. Patient lying in bed with nonrebreather in place A&Ox3 and following all commands. Patient hypertensive immediately post-op and placed on cardene gtt.     Plan:    Neuro:  - q 1 general neuro checks   - CT Cervical Spine   - PCA Pump for pain  - C Collar when out of bed    - PT/ OT     CV:  - BP < 160  - EKG     Pulm:  - Aspiration precautions  - Incentive spirometer     GI:  - Keep on clear liquids until seen by speech and swallow   - Pepcid 20 mg BID     :  - Strict I & O   - Wong Catheter in place   - NS 75 ml/hr     Heme:  - SCD   - Chemical dvt ppx on hold due to operation   - Oncology Consulted previously: CT scans as below; will need biopsy before additional tx plan recs     Endo:  - Synthroid 50 mcg daily   - Thyroid Stimulating Hormone, Serum: 1.52 uIU/mL (04.21.23 @ 06:26)    ID:  - Ancef 1 g q 8 hours     Full Code     Neuro ICU     Lines: PIV x 2, Left radial Savage         Patient is a 57 year-old female POD #0 s/p Occiput-T2 instrumentation and fusion with ORIF of C2. Patient was seen and examined at bedside in PACU. Patient lying in bed with nonrebreather in place A&Ox3 and following all commands. Patient hypertensive immediately post-op and placed on cardene gtt.   POD #1 - S/P occiput to T2 fusion with instrumentation  Plan:    Neuro:  - q 4 general neuro checks   - CT Cervical Spine  - MRI +/- contrast    - PCA Pump for pain  - C Collar when out of bed    - PT/ OT   - COPT score - Tylenol IV prn  ; Morphine 2mg Iv q 3 prn pain     CV:  - - <150  - EKG- Sinus tachy  - Labetalol 100mg PO q 8   - Hold orders for SBP < 110 or HR < 65     Pulm:  Wean FiO2 to maintain sats > 92 %   - Humidified O2   - Aspiration precautions  - Incentive spirometer  10 x q 1 hr while awake     GI:  - DAsh DIET   - Pepcid 20 mg BID  - mIRALAX Q 12 ; sENNA, mom Q 8 PRN CONIPATION AND DUCOLAX SUPP PRN      Renal  - Strict I & O   - D/C escamilla - TOV in 6 hr then bladder scan if no voiding   - NS 75 ml/hr - IVL if tolerating po     Heme:  - SCD   - Chemical dvt ppx - Lovenox 30mg q 12  - Check anti Xa - 4 hrs after Lovenox .    - Oncology Consulted previously: CT scans as below; will need biopsy before additional tx plan recs     Endo:  - Synthroid 50 mcg daily   - Thyroid Stimulating Hormone, Serum: 1.52 uIU/mL (04.21.23 @ 06:26)    ID:  - Completed Ancef  kira operative     Full Code     Neuro ICU     Lines: PIV x 2, Left radial Sofie

## 2023-04-26 LAB
ALBUMIN SERPL ELPH-MCNC: 3.7 G/DL — SIGNIFICANT CHANGE UP (ref 3.5–5.2)
ALP SERPL-CCNC: 115 U/L — SIGNIFICANT CHANGE UP (ref 30–115)
ALT FLD-CCNC: 19 U/L — SIGNIFICANT CHANGE UP (ref 0–41)
ANION GAP SERPL CALC-SCNC: 12 MMOL/L — SIGNIFICANT CHANGE UP (ref 7–14)
AST SERPL-CCNC: 29 U/L — SIGNIFICANT CHANGE UP (ref 0–41)
BILIRUB SERPL-MCNC: 0.5 MG/DL — SIGNIFICANT CHANGE UP (ref 0.2–1.2)
BUN SERPL-MCNC: 16 MG/DL — SIGNIFICANT CHANGE UP (ref 10–20)
CALCIUM SERPL-MCNC: 9 MG/DL — SIGNIFICANT CHANGE UP (ref 8.4–10.5)
CHLORIDE SERPL-SCNC: 99 MMOL/L — SIGNIFICANT CHANGE UP (ref 98–110)
CO2 SERPL-SCNC: 28 MMOL/L — SIGNIFICANT CHANGE UP (ref 17–32)
CREAT SERPL-MCNC: 0.7 MG/DL — SIGNIFICANT CHANGE UP (ref 0.7–1.5)
EGFR: 100 ML/MIN/1.73M2 — SIGNIFICANT CHANGE UP
GLUCOSE SERPL-MCNC: 106 MG/DL — HIGH (ref 70–99)
HCT VFR BLD CALC: 34 % — LOW (ref 37–47)
HGB BLD-MCNC: 11.1 G/DL — LOW (ref 12–16)
MAGNESIUM SERPL-MCNC: 2.1 MG/DL — SIGNIFICANT CHANGE UP (ref 1.8–2.4)
MCHC RBC-ENTMCNC: 28.2 PG — SIGNIFICANT CHANGE UP (ref 27–31)
MCHC RBC-ENTMCNC: 32.6 G/DL — SIGNIFICANT CHANGE UP (ref 32–37)
MCV RBC AUTO: 86.5 FL — SIGNIFICANT CHANGE UP (ref 81–99)
NRBC # BLD: 0 /100 WBCS — SIGNIFICANT CHANGE UP (ref 0–0)
PHOSPHATE SERPL-MCNC: 3.4 MG/DL — SIGNIFICANT CHANGE UP (ref 2.1–4.9)
PLATELET # BLD AUTO: 187 K/UL — SIGNIFICANT CHANGE UP (ref 130–400)
PMV BLD: 9.4 FL — SIGNIFICANT CHANGE UP (ref 7.4–10.4)
POTASSIUM SERPL-MCNC: 4.2 MMOL/L — SIGNIFICANT CHANGE UP (ref 3.5–5)
POTASSIUM SERPL-SCNC: 4.2 MMOL/L — SIGNIFICANT CHANGE UP (ref 3.5–5)
PROT SERPL-MCNC: 5.9 G/DL — LOW (ref 6–8)
RBC # BLD: 3.93 M/UL — LOW (ref 4.2–5.4)
RBC # FLD: 14.6 % — HIGH (ref 11.5–14.5)
SODIUM SERPL-SCNC: 139 MMOL/L — SIGNIFICANT CHANGE UP (ref 135–146)
WBC # BLD: 12.63 K/UL — HIGH (ref 4.8–10.8)
WBC # FLD AUTO: 12.63 K/UL — HIGH (ref 4.8–10.8)

## 2023-04-26 PROCEDURE — 99232 SBSQ HOSP IP/OBS MODERATE 35: CPT

## 2023-04-26 PROCEDURE — 99233 SBSQ HOSP IP/OBS HIGH 50: CPT

## 2023-04-26 RX ORDER — LORATADINE 10 MG/1
10 TABLET ORAL AT BEDTIME
Refills: 0 | Status: DISCONTINUED | OUTPATIENT
Start: 2023-04-26 | End: 2023-04-28

## 2023-04-26 RX ORDER — POLYMYXIN B SULF/TRIMETHOPRIM 10000-1/ML
1 DROPS OPHTHALMIC (EYE)
Refills: 0 | Status: DISCONTINUED | OUTPATIENT
Start: 2023-04-26 | End: 2023-04-28

## 2023-04-26 RX ADMIN — FAMOTIDINE 20 MILLIGRAM(S): 10 INJECTION INTRAVENOUS at 17:40

## 2023-04-26 RX ADMIN — SENNA PLUS 2 TABLET(S): 8.6 TABLET ORAL at 21:37

## 2023-04-26 RX ADMIN — Medication 100 MILLIGRAM(S): at 15:00

## 2023-04-26 RX ADMIN — ENOXAPARIN SODIUM 30 MILLIGRAM(S): 100 INJECTION SUBCUTANEOUS at 12:52

## 2023-04-26 RX ADMIN — Medication 100 MILLIGRAM(S): at 15:01

## 2023-04-26 RX ADMIN — Medication 100 MILLIGRAM(S): at 21:38

## 2023-04-26 RX ADMIN — POLYETHYLENE GLYCOL 3350 17 GRAM(S): 17 POWDER, FOR SOLUTION ORAL at 12:53

## 2023-04-26 RX ADMIN — Medication 1 DROP(S): at 17:41

## 2023-04-26 RX ADMIN — Medication 100 MILLIGRAM(S): at 21:37

## 2023-04-26 RX ADMIN — CHLORHEXIDINE GLUCONATE 1 APPLICATION(S): 213 SOLUTION TOPICAL at 05:34

## 2023-04-26 RX ADMIN — METHOCARBAMOL 750 MILLIGRAM(S): 500 TABLET, FILM COATED ORAL at 05:28

## 2023-04-26 RX ADMIN — Medication 100 MILLIGRAM(S): at 05:29

## 2023-04-26 RX ADMIN — Medication 100 MILLIGRAM(S): at 05:28

## 2023-04-26 RX ADMIN — MAGNESIUM OXIDE 400 MG ORAL TABLET 400 MILLIGRAM(S): 241.3 TABLET ORAL at 05:29

## 2023-04-26 RX ADMIN — Medication 50 MICROGRAM(S): at 05:28

## 2023-04-26 RX ADMIN — FAMOTIDINE 20 MILLIGRAM(S): 10 INJECTION INTRAVENOUS at 05:28

## 2023-04-26 RX ADMIN — ENOXAPARIN SODIUM 30 MILLIGRAM(S): 100 INJECTION SUBCUTANEOUS at 21:38

## 2023-04-26 RX ADMIN — METHOCARBAMOL 750 MILLIGRAM(S): 500 TABLET, FILM COATED ORAL at 21:37

## 2023-04-26 RX ADMIN — Medication 1 DROP(S): at 23:54

## 2023-04-26 RX ADMIN — Medication 1 DROP(S): at 17:58

## 2023-04-26 RX ADMIN — Medication 1 DROP(S): at 05:30

## 2023-04-26 RX ADMIN — METHOCARBAMOL 750 MILLIGRAM(S): 500 TABLET, FILM COATED ORAL at 15:00

## 2023-04-26 RX ADMIN — LORATADINE 10 MILLIGRAM(S): 10 TABLET ORAL at 21:50

## 2023-04-26 NOTE — SWALLOW BEDSIDE ASSESSMENT ADULT - SWALLOW EVAL: DIAGNOSIS
+toleration of thin, puree and easy to chew solids w/o overt s/s of penetration/aspiration
ulises-pharyngeal swallow is wfl for thin, puree and easy to chew solids, reported some globus sensation w/ solids which clears w/ an effortful swallow and a liquid wash.
PO trials are not appropriate at this time 2' high oxygen requirements via Venti-mask.

## 2023-04-26 NOTE — PROGRESS NOTE ADULT - ASSESSMENT
Imp: rehab of metastatic cancer to the cervical spine / [place]-occipital instability, S/P O-T2 posterior instrumentation and fusion, open reduction/internal fixation of C2 body / hypothyroidism            Prior to hospitalization she was ambulating independent and independent in self-care. Post op she needs assist with all ADLs and ambulate short distance with walker          with assist. Pt can tolerate 3hr / day PT/OT and medically necessary. She is motivated. She needs acute inpatient rehab to restore functions for safe return home. Pt also          need further oncology w/u to find out primary cancer including tissue biopsy / cancer treatment. She also needs physiatrist to monitor her medical status and functional         progress during her rehab stay. She warrants acute inpatient rehab.    Plan: continue bedside therapy           f/u oncology         Good 4a acute inpatient rehab candidate once medically cleared

## 2023-04-26 NOTE — PROGRESS NOTE ADULT - SUBJECTIVE AND OBJECTIVE BOX
Patient is a 57y old  Female who presents with a chief complaint of Neck pain (26 Apr 2023 13:18)      OVERNIGHT EVENTS: remained stable,     SUBJECTIVE / INTERVAL HPI: Patient seen and examined at bedside.     VITAL SIGNS:  Vital Signs Last 24 Hrs  T(C): 36.3 (26 Apr 2023 13:15), Max: 37.4 (25 Apr 2023 23:30)  T(F): 97.3 (26 Apr 2023 13:15), Max: 99.3 (25 Apr 2023 23:30)  HR: 109 (26 Apr 2023 14:45) (97 - 109)  BP: 138/74 (26 Apr 2023 14:45) (118/68 - 153/70)  BP(mean): --  RR: 18 (26 Apr 2023 13:15) (18 - 49)  SpO2: 97% (26 Apr 2023 13:15) (89% - 98%)    Parameters below as of 26 Apr 2023 13:15  Patient On (Oxygen Delivery Method): room air        PHYSICAL EXAM:    General: WDWN  HEENT: NC/AT; PERRL, rt conjunctiva redness   Neck: supple  Cardiovascular: +S1/S2; RRR  Respiratory: CTA b/l; no W/R/R  Gastrointestinal: soft, NT/ND; +BSx4  Extremities: WWP; 2+ peripheral pulses; no edema   Neurological: AAOx3; no focal deficits    MEDICATIONS:  MEDICATIONS  (STANDING):  artificial  tears Solution 1 Drop(s) Both EYES two times a day  ceFAZolin   IVPB 1000 milliGRAM(s) IV Intermittent every 8 hours  chlorhexidine 2% Cloths 1 Application(s) Topical <User Schedule>  enoxaparin Injectable 30 milliGRAM(s) SubCutaneous every 12 hours  famotidine    Tablet 20 milliGRAM(s) Oral every 12 hours  labetalol 100 milliGRAM(s) Oral every 8 hours  levothyroxine 50 MICROGram(s) Oral daily  methocarbamol 750 milliGRAM(s) Oral every 8 hours  morphine PCA (5 mG/mL) 30 milliLiter(s) PCA Continuous PCA Continuous  niCARdipine Infusion 5 mG/Hr (25 mL/Hr) IV Continuous <Continuous>  polyethylene glycol 3350 17 Gram(s) Oral daily  senna 2 Tablet(s) Oral at bedtime  trimethoprim/polymyxin Solution 1 Drop(s) Right EYE four times a day    MEDICATIONS  (PRN):  acetaminophen     Tablet .. 650 milliGRAM(s) Oral every 6 hours PRN Mild Pain (1 - 3)  hydrALAZINE Injectable 5 milliGRAM(s) IV Push every 1 hour PRN sbp > 160  naloxone Injectable 0.1 milliGRAM(s) IV Push every 3 minutes PRN For ANY of the following changes in patient status:  A. RR LESS THAN 10 breaths per minute, B. Oxygen saturation LESS THAN 90%, C. Sedation score of 6  ondansetron Injectable 4 milliGRAM(s) IV Push every 6 hours PRN Nausea and/or Vomiting  ondansetron Injectable 4 milliGRAM(s) IV Push every 6 hours PRN Nausea      ALLERGIES:  Allergies    No Known Allergies    Intolerances    hydromorphone (Faint; Nausea)      LABS:                        11.1   12.63 )-----------( 187      ( 26 Apr 2023 07:44 )             34.0     04-26    139  |  99  |  16  ----------------------------<  106<H>  4.2   |  28  |  0.7    Ca    9.0      26 Apr 2023 07:44  Phos  3.4     04-26  Mg     2.1     04-26    TPro  5.9<L>  /  Alb  3.7  /  TBili  0.5  /  DBili  x   /  AST  29  /  ALT  19  /  AlkPhos  115  04-26        CAPILLARY BLOOD GLUCOSE          RADIOLOGY & ADDITIONAL TESTS: Reviewed.    < from: [facility] Duplex Lower Ext Vein Scan, Jah (04.25.23 @ 11:38) >  IMPRESSION:  No evidence of deep venous thrombosis in either lower extremity.    --- End of Report ---    < end of copied text >

## 2023-04-26 NOTE — SWALLOW BEDSIDE ASSESSMENT ADULT - SLP GENERAL OBSERVATIONS
Pt received in bed awake and alert reportedly tolerating current diet w/ coughing, min globus sensation w/ solids
A&Ox3 and following all commands, non-rebreather in place.
Pt received in bed awake and alert, no c-collar in place in bed, +toleration of 45' angle bed tilt

## 2023-04-26 NOTE — SWALLOW BEDSIDE ASSESSMENT ADULT - SLP PERTINENT HISTORY OF CURRENT PROBLEM
57 year-old female with PMH: hypothyroidism and ? alcoholic cirrhosis. Presented 4/20 for neck pain w/ limited movement x4 months. Out-patient MRI completed with possible breast cancer with metastasis and referred to ED. CT chest indicates Right LL atelectasis/scarring. CTH revealed numerous lesions consistent with metastases. MRI cervical spine revealed numerous metastatic lesions throughout the cervical spine most significant at C2 and C3 levels. Pt. is s/p Occiput-T2 instrumentation and fusion with ORIF of C2.

## 2023-04-26 NOTE — PROGRESS NOTE ADULT - ASSESSMENT
57F with PMH of hypothyroidism, possible EtOH cirrhosis, past EtOH use, here with neck pain, and found to have numerous skull and spinal lytic lesions on CT head. Patient had occiput-T2 fusion and ORIF of C2, and admitted to NCCU for neurochecks, and placed on PCA pump for pain. Oncology consulted, possible breast primary, but would need biopsy. Is also on synthroid for hypothyroidism. Palliative care consulted for GOC. Patient is full code.    Spoke with patient about post-op pain. She reports that the morphine helps but not she is still uncomfortable. Explained 6 minute lockout and how she can continue to press the delivery button if she is not feeling relief after about 10 minutes. Encouraged her to use what she needs for now to be comfortable. Her goal for now is to gp to rehab and get home. She will follow up outpatient for treatment options. She is interested in 4 A rehab if it is offered.      MEDD (morphine equivalent daily dose):  18 doses of 1 mg IV morphine = 54 MEDD    Education about palliative care provided to patient/family.  See Recs below.    Please call [phone] with questions or concerns 24/7.   We will continue to follow.

## 2023-04-26 NOTE — SWALLOW BEDSIDE ASSESSMENT ADULT - NS ASR SWALLOW FINDINGS DISCUS
RN and MD made aware./Physician/Nursing
Nursing/Patient
sister at b/s/Physician/Nursing/Patient/Family

## 2023-04-26 NOTE — SWALLOW BEDSIDE ASSESSMENT ADULT - NS SPL SWALLOW CLINIC TRIAL FT
ulises-pharyngeal swallow is wfl for thin, puree and easy to chew solids, reported some globus sensation w/ solids which clears w/ an effortful swallow and a liquid wash.
+toleration of thin, puree and easy to chew solids w/o overt s/s of penetration/aspiration

## 2023-04-26 NOTE — PROGRESS NOTE ADULT - SUBJECTIVE AND OBJECTIVE BOX
HPI:    HPI: 57F with PMH of hypothyroidism, possible EtOH cirrhosis, past EtOH use, here with neck pain, and found to have numerous skull and spinal lytic lesions on CT head. Patient had occiput-T2 fusion and ORIF of C2, and admitted to NCCU for neurochecks, and placed on PCA pump for pain. Oncology consulted, possible breast primary, but would need biopsy. Is also on synthroid for hypothyroidism. Palliative care consulted for GOC. Patient is full code.    Interval History  - patient reports pain "is always there" but is unclear about how to use PCA pump  - daughter is at bedside  - patient complaining about Rt eye redness- someone came to see it today and prescribed eye drops     ADVANCE DIRECTIVES:    [X ] Full Code [ ] DNR  MOLST  [ ]  Living Will  [ ]   DECISION MAKER(s): Patient   [ ] Health Care Proxy(s)  [X ] Surrogate(s)  [ ] Guardian           Name(s): Phone Number(s): Spouse- Pankaj    BASELINE (I)ADL(s) (prior to admission):  [place]: [X ]Total  [ ] Moderate [ ]Dependent  Palliative Performance Status Version 2:    100     %    http://npcrc.org/files/news/palliative_performance_scale_[email or web address]    Allergies    No Known Allergies    Intolerances    hydromorphone (Faint; Nausea)  MEDICATIONS  (STANDING):  artificial  tears Solution 1 Drop(s) Both EYES two times a day  ceFAZolin   IVPB 1000 milliGRAM(s) IV Intermittent every 8 hours  chlorhexidine 2% Cloths 1 Application(s) Topical <User Schedule>  enoxaparin Injectable 30 milliGRAM(s) SubCutaneous every 12 hours  famotidine    Tablet 20 milliGRAM(s) Oral every 12 hours  labetalol 100 milliGRAM(s) Oral every 8 hours  levothyroxine 50 MICROGram(s) Oral daily  methocarbamol 750 milliGRAM(s) Oral every 8 hours  morphine PCA (5 mG/mL) 30 milliLiter(s) PCA Continuous PCA Continuous  niCARdipine Infusion 5 mG/Hr (25 mL/Hr) IV Continuous <Continuous>  polyethylene glycol 3350 17 Gram(s) Oral daily  senna 2 Tablet(s) Oral at bedtime  trimethoprim/polymyxin Solution 1 Drop(s) Right EYE four times a day    MEDICATIONS  (PRN):  acetaminophen     Tablet .. 650 milliGRAM(s) Oral every 6 hours PRN Mild Pain (1 - 3)  hydrALAZINE Injectable 5 milliGRAM(s) IV Push every 1 hour PRN sbp > 160  naloxone Injectable 0.1 milliGRAM(s) IV Push every 3 minutes PRN For ANY of the following changes in patient status:  A. RR LESS THAN 10 breaths per minute, B. Oxygen saturation LESS THAN 90%, C. Sedation score of 6  ondansetron Injectable 4 milliGRAM(s) IV Push every 6 hours PRN Nausea and/or Vomiting  ondansetron Injectable 4 milliGRAM(s) IV Push every 6 hours PRN Nausea    PRESENT SYMPTOMS:   Pain: [X ]yes [ ]no  QOL impact -  moderate  Location -                   neck  Aggravating factors - movement  Quality -   Radiation - none  Timing-constant  Severity (0-10 scale): unable to rate   Minimal acceptable level (0-10 scale):     Dyspnea:                           [X ]None[ ]Mild [ ]Moderate [ ]Severe     Respiratory Distress Observation Scale (RDOS):   A score of 0 to 2 signifies little or no respiratory distress, 3 signifies mild distress, scores 4 to 6 indicate moderate distress, and scores greater than 7 signify severe distress  [email or web address]    Anxiety:                             [X ]None[ ]Mild [ ]Moderate [ ]Severe   Fatigue:                             [X ]None[ ]Mild [ ]Moderate [ ]Severe   Nausea:                             [X ]None[ ]Mild [ ]Moderate [ ]Severe   Loss of appetite:              [X ]None[ ]Mild [ ]Moderate [ ]Severe   Constipation:                    [X ]None[ ]Mild [ ]Moderate [ ]Severe    Other Symptoms:  [X ]All other review of systems negative     Palliative Performance Status Version 2:       60  %    http://npcrc.org/files/news/palliative_performance_scale_[email or web address].pdf    PHYSICAL EXAM:  Vital Signs Last 24 Hrs  T(C): 36.3 (26 Apr 2023 13:15), Max: 37.4 (25 Apr 2023 23:30)  T(F): 97.3 (26 Apr 2023 13:15), Max: 99.3 (25 Apr 2023 23:30)  HR: 109 (26 Apr 2023 14:45) (97 - 109)  BP: 138/74 (26 Apr 2023 14:45) (118/68 - 153/70)  RR: 18 (26 Apr 2023 13:15) (18 - 49)  SpO2: 97% (26 Apr 2023 13:15) (89% - 98%)    GENERAL:  [X ] No acute distress [ ]Lethargic  [ ]Unarousable  [X ]Verbal  [ ]Non-Verbal [ ]Cachexia    BEHAVIORAL/PSYCH:  [ ]Alert and Oriented x  [ ] Anxiety [ ] Delirium [ ] Agitation [ X] Calm   EYES: [ X] No scleral icterus [ ] Scleral icterus [ ] Closed  ENMT:  [ ]Dry mouth  [X ]No external oral lesions [ ] No external ear or nose lesions  CARDIOVASCULAR:  [ ]Regular [ ]Irregular [ ]Tachy [ ]Not Tachy  [ ]Ahmet [ ] Edema X[ ] No edema  PULMONARY:  [ ]Tachypnea  [ ]Audible excessive secretions [ X] No labored breathing [ ] labored breathing  GASTROINTESTINAL: [ ]Soft  [ ]Distended  [ X]Not distended [ ]Non tender [ ]Tender  MUSCULOSKELETAL: [ X]No clubbing [ ] clubbing  [ ] No cyanosis [ ] cyanosis  NEUROLOGIC: [ ]No focal deficits  [ X]Follows commands  [ ]Does not follow commands  [ ]Cognitive impairment  [ ]Dysphagia  [ ]Dysarthria  [ ]Paresis   SKIN: [ ] Jaundiced [X ] Non-jaundiced [ ]Rash [X ]No Rash [ ] Warm [ ] Dry  MISC/LINES: [ ] ET tube [ ] Trach [ ]NGT/OGT [ ]PEG [ ]Wong        LABS: reviewed by me                        11.1   12.63 )-----------( 187      ( 26 Apr 2023 07:44 )             34.0   04-26    139  |  99  |  16  ----------------------------<  106<H>  4.2   |  28  |  0.7    Ca    9.0      26 Apr 2023 07:44  Phos  3.4     04-26  Mg     2.1     04-26    TPro  5.9<L>  /  Alb  3.7  /  TBili  0.5  /  DBili  x   /  AST  29  /  ALT  19  /  AlkPhos  115  04-26        RADIOLOGY & ADDITIONAL STUDIES: reviewed by me    EKG: reviewed by me    < from: 12 Lead ECG (04.24.23 @ 14:06) >    Systolic  mmHg    Diastolic BP 69 mmHg    Ventricular Rate 96 BPM    Atrial Rate 96 BPM    P-R Interval 136 ms    QRS Duration 84 ms    Q-T Interval 382 ms    QTC Calculation(Bazett) 482 ms    P Axis 29 degrees    R Axis 65 degrees    T Axis 5 degrees    Diagnosis Line Normal sinus rhythm  Anterior infarct , age undetermined  Abnormal ECG    Confirmed by LUCRECIA BARRIGA MD ([id number]) on 4/25/2023 6:54:50 AM    < end of copied text >      < from: CT Cervical Spine No Cont (04.24.23 @ 21:42) >    IMPRESSION:  Since prior examinationthere has been occipital spinal fusion extending   to T2 with expected postsurgical changes.    Redemonstration of numerous lytic infiltrative metastatic lesions with   multiple malignant compression fractures most significant of the C2 and   C3 vertebral bodies    --- End of Report ---    < end of copied text >        Patient discussed with primary medical team MD  Palliative care education provided to patient and/or family    Goals of Care Document:   - did not address

## 2023-04-26 NOTE — PROGRESS NOTE ADULT - SUBJECTIVE AND OBJECTIVE BOX
Patient is a 57y old  Female who presents with a chief complaint of Neck pain       HPI:  57-year-old female with PMH of hypothyroidism and ? alcoholic cirrhosis (stopped drinking over 9 yrs ago), presenting for neck pain. Pt started having neck pain almost 4 months ago for which her chiropractor ordered an MRI that was done 1 month ago. Pt was not sure what the read of the MRI was but there was concern about lesions so she went to see Dr. Peña's office for the first time today. Dr. Peña examined her and told her she has breast cancer with metastasis and needs to come to the ED fo further evaluation. Pt is a non smoker and has no family history of breast Ca. Denies any significant weight loss, night sweats, or fevers. No reported bloody discharge from her nipples. Upon examining pt' s right breast,  dimpling along with a lump was found. Pt believes that she noticed this change almost over a yr a go, but did not think of it as anything serious. Pt's neck pain has been significantly worsening limiting her ability to move her neck. She only has pain when moving her neck and has full sensation and ROM of her b/l UE. Denies any SOB, chest pain, or palpitations.     In the ED:  VS: afebrile, , /101, Spo2 95% on RA   Labs only remarkable for Ca of 11.4     CT cervical spine:   1.  Numerous lytic lesions with associated soft tissue throughout the visualized skull base and spine consistent with extensive osseous metastatic disease.    2.  Epidural component of tumor is noted from C1-C4.    3.  Tumor essentially replaces C1, C2 and C3 and there is pathologic fracture of C2.    4.  Findings would be better assessed with contrast-enhanced MRI. Additionally, direct comparison with the patient's recent outside imaging studies would be helpful.    Neurosurgery contacted by ED and will evaluate pt       CT cervical spine:   1.  Numerous lytic lesions with associated soft tissue throughout the visualized skull base and spine consistent with extensive osseous metastatic disease.    2.  Epidural component of tumor is noted from C1-C4.    3.  Tumor essentially replaces C1, C2 and C3 and there is pathologic fracture of C2.    4.  Findings would be better assessed with contrast-enhanced MRI. Additionally, direct comparison with the patient's recent outside imaging studies would be helpful.    Neurosurgery contacted by ED and will evaluate pt        S/p Occiput-T2 instrumentation and fusion with ORIF of C2 on 4/24/23       Medical charts / labs / imaging studies / PT and OT notes  reviewed       PHYSICAL EXAM    Vital Signs Last 24 Hrs  T(C): 36.3 (26 Apr 2023 13:15), Max: 37.4 (25 Apr 2023 23:30)  T(F): 97.3 (26 Apr 2023 13:15), Max: 99.3 (25 Apr 2023 23:30)  HR: 104 (26 Apr 2023 13:15) (97 - 114)  BP: 118/68 (26 Apr 2023 13:15) (118/68 - 153/70)  BP(mean): --  RR: 18 (26 Apr 2023 13:15) (18 - 49)  SpO2: 97% (26 Apr 2023 13:15) (88% - 98%)    Parameters below as of 26 Apr 2023 03:30  Patient On (Oxygen Delivery Method): nasal cannula  O2 Flow (L/min): 2      Constitutional - NAD  Chest - CTA  Cardiovascular - S1S2+  Abdomen -  Soft  Extremities -  No calf tenderness   Function : bed mobility and transfer min A                ambulate 10'RW CG assist / upper body dressing mod A and lower body dressing dependent     acetaminophen     Tablet .. 650 milliGRAM(s) Oral every 6 hours PRN  artificial  tears Solution 1 Drop(s) Both EYES two times a day  ceFAZolin   IVPB 1000 milliGRAM(s) IV Intermittent every 8 hours  chlorhexidine 2% Cloths 1 Application(s) Topical <User Schedule>  enoxaparin Injectable 30 milliGRAM(s) SubCutaneous every 12 hours  famotidine    Tablet 20 milliGRAM(s) Oral every 12 hours  hydrALAZINE Injectable 5 milliGRAM(s) IV Push every 1 hour PRN  labetalol 100 milliGRAM(s) Oral every 8 hours  levothyroxine 50 MICROGram(s) Oral daily  methocarbamol 750 milliGRAM(s) Oral every 8 hours  morphine PCA (5 mG/mL) 30 milliLiter(s) PCA Continuous PCA Continuous  naloxone Injectable 0.1 milliGRAM(s) IV Push every 3 minutes PRN  niCARdipine Infusion 5 mG/Hr IV Continuous <Continuous>  ondansetron Injectable 4 milliGRAM(s) IV Push every 6 hours PRN  ondansetron Injectable 4 milliGRAM(s) IV Push every 6 hours PRN  polyethylene glycol 3350 17 Gram(s) Oral daily  senna 2 Tablet(s) Oral at bedtime      RECENT LABS/IMAGING                        11.1   12.63 )-----------( 187      ( 26 Apr 2023 07:44 )             34.0     04-26    139  |  99  |  16  ----------------------------<  106<H>  4.2   |  28  |  0.7    Ca    9.0      26 Apr 2023 07:44  Phos  3.4     04-26  Mg     2.1     04-26    TPro  5.9<L>  /  Alb  3.7  /  TBili  0.5  /  DBili  x   /  AST  29  /  ALT  19  /  AlkPhos  115  04-26    PT/INR - ( 24 Apr 2023 13:28 )   PT: 12.40 sec;   INR: 1.08 ratio         PTT - ( 24 Apr 2023 13:28 )  PTT:29.0 sec

## 2023-04-26 NOTE — PROGRESS NOTE ADULT - SUBJECTIVE AND OBJECTIVE BOX
POD#2    S/P Occiput to T2 instrumentation and Fusion, ORIF of C2     Pt seen and examined at bedside. Pt c/o incisional pain. Denies upper extremity radiculopathy, parasthesias.    Vital Signs Last 24 Hrs  T(C): 36.6 (26 Apr 2023 17:30), Max: 37.4 (25 Apr 2023 23:30)  T(F): 97.8 (26 Apr 2023 17:30), Max: 99.3 (25 Apr 2023 23:30)  HR: 99 (26 Apr 2023 17:30) (97 - 109)  BP: 119/72 (26 Apr 2023 17:30) (118/68 - 153/70)  BP(mean): --  RR: 18 (26 Apr 2023 17:30) (18 - 49)  SpO2: 94% (26 Apr 2023 17:30) (89% - 98%)    Parameters below as of 26 Apr 2023 17:30  Patient On (Oxygen Delivery Method): nasal cannula    I&O's Detail    25 Apr 2023 07:01  -  26 Apr 2023 07:00  --------------------------------------------------------  IN:    IV PiggyBack: 200 mL    NiCARdipine: 42.5 mL  Total IN: 242.5 mL    OUT:    Drain (mL): 88 mL    Indwelling Catheter - Urethral (mL): 720 mL    Voided (mL): 0 mL  Total OUT: 808 mL    Total NET: -565.5 mL      26 Apr 2023 07:01  -  26 Apr 2023 17:08  --------------------------------------------------------  IN:  Total IN: 0 mL    OUT:    Drain (mL): 5 mL  Total OUT: 5 mL    Total NET: -5 mL    I&O's Summary    25 Apr 2023 07:01  -  26 Apr 2023 07:00  --------------------------------------------------------  IN: 242.5 mL / OUT: 808 mL / NET: -565.5 mL    26 Apr 2023 07:01  -  26 Apr 2023 17:08  --------------------------------------------------------  IN: 0 mL / OUT: 5 mL / NET: -5 mL        REVIEW OF SYSTEMS    [X] A ten-point review of systems was otherwise negative except as noted.  [ ] Due to altered mental status/intubation, subjective information were not able to be obtained from the patient. History was obtained, to the extent possible, from review of the chart and collateral sources of information.      PHYSICAL EXAM:  Neurological:  Strength 5/5 B/L UE/LE  Sensation equal and intact to light touch  no hoffmans  BB1+ B/L  Dressing + Bloody d/c  Drain in place        LABS:                        11.1   12.63 )-----------( 187      ( 26 Apr 2023 07:44 )             34.0     04-26    139  |  99  |  16  ----------------------------<  106<H>  4.2   |  28  |  0.7    Ca    9.0      26 Apr 2023 07:44  Phos  3.4     04-26  Mg     2.1     04-26    TPro  5.9<L>  /  Alb  3.7  /  TBili  0.5  /  DBili  x   /  AST  29  /  ALT  19  /  AlkPhos  115  04-26    Allergies    No Known Allergies    Intolerances    hydromorphone (Faint; Nausea)    MEDICATIONS:  Antibiotics:  ceFAZolin   IVPB 1000 milliGRAM(s) IV Intermittent every 8 hours    Neuro:  acetaminophen     Tablet .. 650 milliGRAM(s) Oral every 6 hours PRN  methocarbamol 750 milliGRAM(s) Oral every 8 hours  morphine PCA (5 mG/mL) 30 milliLiter(s) PCA Continuous PCA Continuous  ondansetron Injectable 4 milliGRAM(s) IV Push every 6 hours PRN  ondansetron Injectable 4 milliGRAM(s) IV Push every 6 hours PRN    ASSESSMENT:  57y Female s/p    Neck pain    Handoff    MEWS Score    Atlanto-occipital instability    Metastasis to spinal column    Atlanto-occipital instability    Metastasis to spinal column    Neck pain    Pain    Metastatic cancer    Advanced care planning/counseling discussion    Encounter for palliative care    Fusion, occipitocervical junction    Posterior cervical fusion with instrumentation    SENT OVER [facility] MRI NEEDED    Metastatic breast cancer    [email or web address]        PLAN:  Continue PCA  Pain Management Consult  PT/OT  [place] J collar  Hospitalist for medical management

## 2023-04-26 NOTE — PROGRESS NOTE ADULT - PROBLEM SELECTOR PLAN 1
continue PCA pump as recommended by surgery team  cancer related and post op pain   educated patient on use of PCA pump

## 2023-04-27 DIAGNOSIS — G89.3 NEOPLASM RELATED PAIN (ACUTE) (CHRONIC): ICD-10-CM

## 2023-04-27 LAB
ANION GAP SERPL CALC-SCNC: 9 MMOL/L — SIGNIFICANT CHANGE UP (ref 7–14)
BUN SERPL-MCNC: 12 MG/DL — SIGNIFICANT CHANGE UP (ref 10–20)
CALCIUM SERPL-MCNC: 9.1 MG/DL — SIGNIFICANT CHANGE UP (ref 8.4–10.4)
CHLORIDE SERPL-SCNC: 98 MMOL/L — SIGNIFICANT CHANGE UP (ref 98–110)
CO2 SERPL-SCNC: 30 MMOL/L — SIGNIFICANT CHANGE UP (ref 17–32)
CREAT SERPL-MCNC: 0.5 MG/DL — LOW (ref 0.7–1.5)
EGFR: 109 ML/MIN/1.73M2 — SIGNIFICANT CHANGE UP
GLUCOSE SERPL-MCNC: 105 MG/DL — HIGH (ref 70–99)
HCT VFR BLD CALC: 32.5 % — LOW (ref 37–47)
HGB BLD-MCNC: 10.5 G/DL — LOW (ref 12–16)
MAGNESIUM SERPL-MCNC: 1.9 MG/DL — SIGNIFICANT CHANGE UP (ref 1.8–2.4)
MCHC RBC-ENTMCNC: 28.4 PG — SIGNIFICANT CHANGE UP (ref 27–31)
MCHC RBC-ENTMCNC: 32.3 G/DL — SIGNIFICANT CHANGE UP (ref 32–37)
MCV RBC AUTO: 87.8 FL — SIGNIFICANT CHANGE UP (ref 81–99)
NRBC # BLD: 0 /100 WBCS — SIGNIFICANT CHANGE UP (ref 0–0)
PHOSPHATE SERPL-MCNC: 3.4 MG/DL — SIGNIFICANT CHANGE UP (ref 2.1–4.9)
PLATELET # BLD AUTO: 171 K/UL — SIGNIFICANT CHANGE UP (ref 130–400)
PMV BLD: 9.7 FL — SIGNIFICANT CHANGE UP (ref 7.4–10.4)
POTASSIUM SERPL-MCNC: 4.1 MMOL/L — SIGNIFICANT CHANGE UP (ref 3.5–5)
POTASSIUM SERPL-SCNC: 4.1 MMOL/L — SIGNIFICANT CHANGE UP (ref 3.5–5)
RBC # BLD: 3.7 M/UL — LOW (ref 4.2–5.4)
RBC # FLD: 14.5 % — SIGNIFICANT CHANGE UP (ref 11.5–14.5)
SODIUM SERPL-SCNC: 137 MMOL/L — SIGNIFICANT CHANGE UP (ref 135–146)
WBC # BLD: 10.71 K/UL — SIGNIFICANT CHANGE UP (ref 4.8–10.8)
WBC # FLD AUTO: 10.71 K/UL — SIGNIFICANT CHANGE UP (ref 4.8–10.8)

## 2023-04-27 PROCEDURE — 99223 1ST HOSP IP/OBS HIGH 75: CPT

## 2023-04-27 PROCEDURE — 99232 SBSQ HOSP IP/OBS MODERATE 35: CPT

## 2023-04-27 RX ORDER — LANOLIN ALCOHOL/MO/W.PET/CERES
3 CREAM (GRAM) TOPICAL ONCE
Refills: 0 | Status: COMPLETED | OUTPATIENT
Start: 2023-04-27 | End: 2023-04-27

## 2023-04-27 RX ORDER — LANOLIN ALCOHOL/MO/W.PET/CERES
3 CREAM (GRAM) TOPICAL AT BEDTIME
Refills: 0 | Status: DISCONTINUED | OUTPATIENT
Start: 2023-04-27 | End: 2023-04-28

## 2023-04-27 RX ADMIN — Medication 3 MILLIGRAM(S): at 01:25

## 2023-04-27 RX ADMIN — Medication 3 MILLIGRAM(S): at 21:30

## 2023-04-27 RX ADMIN — ENOXAPARIN SODIUM 30 MILLIGRAM(S): 100 INJECTION SUBCUTANEOUS at 09:46

## 2023-04-27 RX ADMIN — ENOXAPARIN SODIUM 30 MILLIGRAM(S): 100 INJECTION SUBCUTANEOUS at 21:29

## 2023-04-27 RX ADMIN — Medication 1 DROP(S): at 11:56

## 2023-04-27 RX ADMIN — Medication 1 DROP(S): at 05:37

## 2023-04-27 RX ADMIN — Medication 100 MILLIGRAM(S): at 21:29

## 2023-04-27 RX ADMIN — POLYETHYLENE GLYCOL 3350 17 GRAM(S): 17 POWDER, FOR SOLUTION ORAL at 11:56

## 2023-04-27 RX ADMIN — SENNA PLUS 2 TABLET(S): 8.6 TABLET ORAL at 21:30

## 2023-04-27 RX ADMIN — FAMOTIDINE 20 MILLIGRAM(S): 10 INJECTION INTRAVENOUS at 18:16

## 2023-04-27 RX ADMIN — Medication 100 MILLIGRAM(S): at 05:36

## 2023-04-27 RX ADMIN — FAMOTIDINE 20 MILLIGRAM(S): 10 INJECTION INTRAVENOUS at 05:36

## 2023-04-27 RX ADMIN — METHOCARBAMOL 750 MILLIGRAM(S): 500 TABLET, FILM COATED ORAL at 21:30

## 2023-04-27 RX ADMIN — Medication 1 DROP(S): at 18:17

## 2023-04-27 RX ADMIN — Medication 100 MILLIGRAM(S): at 21:30

## 2023-04-27 RX ADMIN — LORATADINE 10 MILLIGRAM(S): 10 TABLET ORAL at 21:30

## 2023-04-27 RX ADMIN — Medication 100 MILLIGRAM(S): at 14:09

## 2023-04-27 RX ADMIN — METHOCARBAMOL 750 MILLIGRAM(S): 500 TABLET, FILM COATED ORAL at 14:09

## 2023-04-27 RX ADMIN — Medication 50 MICROGRAM(S): at 05:36

## 2023-04-27 RX ADMIN — Medication 1 DROP(S): at 05:50

## 2023-04-27 RX ADMIN — Medication 1 DROP(S): at 18:16

## 2023-04-27 RX ADMIN — METHOCARBAMOL 750 MILLIGRAM(S): 500 TABLET, FILM COATED ORAL at 05:36

## 2023-04-27 NOTE — CONSULT NOTE ADULT - SUBJECTIVE AND OBJECTIVE BOX
Pain Medicine Consult Note    History of Present Illness  Patient is a 58 y/o woman with history of hypothyroidism, EtOH cirrhosis, EtOH dependence, and recently diagnosed Stage 4 metastatic breast cancer who was admitted on 4/20/2023 for neck pain and underwent an occiput to T2 posterior instrumentation and fusion with ORIF of C2 body on 4/24/2023 with Live Rao and Shannon. The patient states that she has been having pain in the neck since January of 2023. She received treatment from a chiropractor and took acetaminophen and naproxen OTC for pain without much relief. During her workup, it was discovered that she had an epidural tumor from C1-C4 and thus was told to come to the hospital. She denies any previous history of chronic opioid therapy. At this point, the patient endorses an intermittent burning pain over the left posterior neck that radiates to the left suprascapular region. No numbness or weakness in the upper extremities.     Current Inpatient Medication Regimen:  acetaminophen     Tablet .. 650 milliGRAM(s) Oral every 6 hours PRN  artificial  tears Solution 1 Drop(s) Both EYES two times a day  ceFAZolin   IVPB 1000 milliGRAM(s) IV Intermittent every 8 hours  chlorhexidine 2% Cloths 1 Application(s) Topical <User Schedule>  enoxaparin Injectable 30 milliGRAM(s) SubCutaneous every 12 hours  famotidine    Tablet 20 milliGRAM(s) Oral every 12 hours  hydrALAZINE Injectable 5 milliGRAM(s) IV Push every 1 hour PRN  labetalol 100 milliGRAM(s) Oral every 8 hours  levothyroxine 50 MICROGram(s) Oral daily  loratadine 10 milliGRAM(s) Oral at bedtime  melatonin 3 milliGRAM(s) Oral at bedtime  methocarbamol 750 milliGRAM(s) Oral every 8 hours  naloxone Injectable 0.1 milliGRAM(s) IV Push every 3 minutes PRN  ondansetron Injectable 4 milliGRAM(s) IV Push every 6 hours PRN  ondansetron Injectable 4 milliGRAM(s) IV Push every 6 hours PRN  oxycodone    5 mG/acetaminophen 325 mG 2 Tablet(s) Oral every 4 hours PRN  polyethylene glycol 3350 17 Gram(s) Oral daily  senna 2 Tablet(s) Oral at bedtime  trimethoprim/polymyxin Solution 1 Drop(s) Right EYE four times a day      Home Analgesic Regimen:  acetaminophen  naproxen OTC prn    Allergies:  No Known Allergies  hydromorphone (Faint; Nausea)      Past Medical History:  Atlanto-occipital instability  Metastatic breast cancer  EtOH Cirrhosis  Hypothyroidism  History of EtOH dependence    Past Surgical History:  Denies    Family History:  Skin cancer (mother)  CAD (father)    Social History:  Tobacco - denies  EtOH - used to drink heavily, quit in 2014  Drugs - denies      Review of Systems:  General: no fevers or chills  Eyes: no diplopia or blurred vision  ENT: no rhinorrhea  CV: no chest pain  Resp: no cough or dyspnea  GI: no abdominal pain, constipation, or diarrhea  : no urinary incontinence or dysuria  Neuro: no focal weakness or numbness in upper extremities    Physical Exam:  T(C): 37 (04-27-23 @ 12:07), Max: 37.6 (04-26-23 @ 20:14)  HR: 99 (04-27-23 @ 12:07) (86 - 109)  BP: 139/67 (04-27-23 @ 12:07) (119/72 - 139/67)  RR: 18 (04-27-23 @ 12:07) (17 - 18)  SpO2: 97% (04-27-23 @ 12:07) (90% - 97%)  Gen: NAD  Eyes: no glasses or scleral icterus  Head: Normocephalic / Atraumatic  CV: no JVD  Lungs: nonlabored breathing  Abdomen: nondistended, soft  : no escamilla catheter in place  Neuro: AOx3, Cranial nerves intact  Extremities: full ROM in upper/lower extremities  Psych: normal affect      Labs:  CBC  10.71 K/uL [4.80 - 10.80] > 10.5 g/dL<L> [12.0 - 16.0] / 32.5 %<L> [37.0 - 47.0] < 171 K/uL [130 - 400]      BMP  137 mmol/L [135 - 146] | 98 mmol/L [98 - 110] | 12 mg/dL [10 - 20]  4.1 mmol/L [3.5 - 5.0] | 30 mmol/L [17 - 32] | 0.5 mg/dL<L> [0.7 - 1.5]    105 mg/dL<H> [70 - 99]        Imaging Studies:  MRI Cervical/Thoracic/Lumbar Spine (4/21/2023)  FINDINGS:    CERVICAL SPINE:  There are numerous enhancing vertebral body lesions throughout the   cervical spine with epidural extension at the level of C2 and C3. There   is associated severe spinal stenosis with mass effect upon the spinal   cord at the level of C2-C3, series 5 image 9. There is enhancing soft   tissue noted involving the right neural foramina surrounding the right   vertebral artery, series 25 image 17. There is loss of vertebral body   height involving C2 and C3.    There are multiple additional enhancing vertebral lesions without   associated epidural extension. There are enhancing lesions noted of the   clivus as well as the occipital bone.    At C4-C5 there is disc osteophyte complex as well as bilateral uncinate   hypertrophy resulting in severe left neuroforaminal narrowing as well as   moderate spinal stenosis.      THORACIC SPINE:  VERTEBRAL BODIES/ALIGNMENT: There are numerous enhancing osseous lesions   throughout the thoracic spine. There is compression fracture deformity   noted of the T8 vertebral body. There is no bony retropulsion..    SPINAL CORD: There is no abnormal spinal cord signal or enhancement.    SPINAL CANAL:  No intradural or extradural defects are seen.    INTERVERTEBRAL DISCS:  The disc spaces are well-maintained..    MISCELLANEOUS:  None.      LUMBAR SPINE:  VERTEBRAL BODIES/ALIGNMENT:  Numerous enhancing lesions throughout the   lumbar vertebrae as well as the sacrum and iliac bones. There is near   complete infiltration of the L5 and S1 vertebral bodies. There is no   evidence of vertebral compression or extraosseous extension..    CONUS/CAUDA EQUINA: There is no abnormal signal or enhancement involving   the conus or cauda equina.    INTERVERTEBRAL DISCS: The disc space are well-maintained. There is no   spinal canal or neuroforaminal narrowing at any level.    MISCELLANEOUS:  None.      IMPRESSION:    CERVICAL SPINE:  Numerous enhancing osseous metastatic lesions are noted throughout the   cervical spine most significant at the C2 and C3 level with evidence of   epidural extension causing severe spinal stenosis and mass effect upon   the spinal cord at C2-C3.    THORACIC SPINE:  Numerous enhancing osseous metastatic lesions with malignant compression   deformity noted of the T8 vertebral body. No evidence of extraosseous   extension.    LUMBAR SPINE:  Numerous enhancing osseous metastatic lesions without extraosseous   extension or compression deformity.    CT Cervical Spine (4/24/2023)  FINDINGS:    Since prior examination there has been occipital spinal fusion extending   to the level of T2 with bilateral screws and paraspinal rods. Significant   streak artifact from hardware limits evaluation of the surrounding   structures. The hardware is intact. There is no evidence of fluid   collection.    There are numerous lytic lesions noted throughout the cervical spine with   complete infiltration of the C1, C2 and C3 vertebral bodies with   significant height loss of the C2 and C3 vertebrae. Infiltrative lesion   is noted involving the clivus as well as the occipital and right temporal   calvarium.    /The visualized lung apices are within normal limits.    Miscellaneous:  None.    IMPRESSION:  Since prior examination there has been occipital spinal fusion extending   to T2 with expected postsurgical changes.    Redemonstration of numerous lytic infiltrative metastatic lesions with   multiple malignant compression fractures most significant of the C2 and   C3 vertebral bodies      Opioid Risk Assessment Tool                                                                         Female       Male  Family History  Alcohol                                                              1                3  Illegal drugs                                                       2                3  Rx drugs                                                            4                4    Personal History   Alcohol                                                              3                3  Illegal drugs                                                       4                4  Rx drugs                                                            5                5    Age between 16—45 years                                1                1  History of preadolescent sexual abuse               3                0    Psychological disease  ADD, OCD, bipolar, schizophrenia                      2                2  Depression                                                       1                1    Total Score                                                      0              __    0 - 3 = low risk for future opioid abuse  4 - 7 = moderate risk for future opioid abuse  8+ = high risk for future opioid abuse

## 2023-04-27 NOTE — CONSULT NOTE ADULT - CONSULT REASON
Admission
acute postoperative neck pain
neck pain
Cervical Osseous mets
metastatic breast cancer
Bone mets
GOC, advanced illness

## 2023-04-27 NOTE — PROGRESS NOTE ADULT - SUBJECTIVE AND OBJECTIVE BOX
Pt seen and examined at bedside and appear in NAD, s/p Occiput to T2 instrumentation and Fusion, ORIF of C2 POD#3, still complaints of incisional pain, HMV in place and 8cc over last shift.      Vital Signs Last 24 Hrs  T(C): 36.7 (27 Apr 2023 08:00), Max: 37.6 (26 Apr 2023 20:14)  T(F): 98 (27 Apr 2023 08:00), Max: 99.6 (26 Apr 2023 20:14)  HR: 86 (27 Apr 2023 08:00) (86 - 109)  BP: 124/64 (27 Apr 2023 08:00) (118/68 - 138/82)  BP(mean): --  RR: 17 (27 Apr 2023 08:00) (17 - 18)  SpO2: 96% (27 Apr 2023 08:00) (94% - 97%)    Parameters below as of 26 Apr 2023 20:14  Patient On (Oxygen Delivery Method): room air        MEDICATIONS  (STANDING):  artificial  tears Solution 1 Drop(s) Both EYES two times a day  ceFAZolin   IVPB 1000 milliGRAM(s) IV Intermittent every 8 hours  chlorhexidine 2% Cloths 1 Application(s) Topical <User Schedule>  enoxaparin Injectable 30 milliGRAM(s) SubCutaneous every 12 hours  famotidine    Tablet 20 milliGRAM(s) Oral every 12 hours  labetalol 100 milliGRAM(s) Oral every 8 hours  levothyroxine 50 MICROGram(s) Oral daily  loratadine 10 milliGRAM(s) Oral at bedtime  melatonin 3 milliGRAM(s) Oral at bedtime  methocarbamol 750 milliGRAM(s) Oral every 8 hours  polyethylene glycol 3350 17 Gram(s) Oral daily  senna 2 Tablet(s) Oral at bedtime  trimethoprim/polymyxin Solution 1 Drop(s) Right EYE four times a day    MEDICATIONS  (PRN):  acetaminophen     Tablet .. 650 milliGRAM(s) Oral every 6 hours PRN Mild Pain (1 - 3)  hydrALAZINE Injectable 5 milliGRAM(s) IV Push every 1 hour PRN sbp > 160  naloxone Injectable 0.1 milliGRAM(s) IV Push every 3 minutes PRN For ANY of the following changes in patient status:  A. RR LESS THAN 10 breaths per minute, B. Oxygen saturation LESS THAN 90%, C. Sedation score of 6  ondansetron Injectable 4 milliGRAM(s) IV Push every 6 hours PRN Nausea and/or Vomiting  ondansetron Injectable 4 milliGRAM(s) IV Push every 6 hours PRN Nausea      Exam:    awake, alert and follows simple commands              cervical wound site dry and clean, HMV in place               moves all 4s with 5/5 strength in all muscle groups              sensation intact and symmetrical                               10.5   10.71 )-----------( 171      ( 27 Apr 2023 05:47 )             32.5       04-27    137  |  98  |  12  ----------------------------<  105<H>  4.1   |  30  |  0.5<L>    Ca    9.1      27 Apr 2023 05:47  Phos  3.4     04-27  Mg     1.9     04-27    TPro  5.9<L>  /  Alb  3.7  /  TBili  0.5  /  DBili  x   /  AST  29  /  ALT  19  /  AlkPhos  115  04-26

## 2023-04-27 NOTE — CHART NOTE - NSCHARTNOTEFT_GEN_A_CORE
Met w/ patient and explained to her that she will need radiation to the C spine (no sooner than 2 weeks after surgery). Anticipate giving 20Gy/5fx. Side effects include fatigue, pain, and odynophagia explained. Will follow up in 1 week and schedule simulation. Patient agrees to plan. Contact information was given

## 2023-04-27 NOTE — PROGRESS NOTE ADULT - ASSESSMENT
S/P Occiput to T2 instrumentation and Fusion, ORIF of C2    - d/c PCA and percocet PRN for pain  - will d/c HMV today  - continue cervical collar  - PT/OT/rehab

## 2023-04-27 NOTE — PROGRESS NOTE ADULT - ASSESSMENT
a 57F with PMH hypothyroidism, suspected alcoholic cirrhosis (stopped drinking over 9 yrs ago), Presented 4/20 for neck pain w/ limited movement /2 pain x4 months; Denies ataxia, imbalance, or difficulty with ambulation. Denies any numbness, tingling, paresthesias, weakness, saddle anesthesia, or bowel / bladder incontinence. Saw her chiropractor, MRI was done - reported concern for lesions. Pt referred to Dr. Peña's- who she saw 4/20. Pt reports Dr. Peña gave dx of possible breast cancer with metastasis and referred pt to ED.     #Severe neck pain 2/2 likely metastatic lesions at Cervical, Thoracic, Lumbar with multiple metastatic lesions confirmed w MRI   #s/p Occiput-T2 instrumentation and fusion with ORIF of C2  #Suspected breast cancer - no biopsy to confirm    *PE shows R breast with nipple retraction and a firm nodule close proximity to nipple at 3 o'clock   *pt never had mammogram or routine cancer screening   - CT Chest/AP  Right breast mass,  Multiple enlarged mediastinal lymph.   - multiple bony lesion throughout the body  - Neurosurgery Eval'd 4/22: Needs Hard collar [place] J when OOB; Stabilization surgery; no steroids at this time; no PT until after stabilization  - Oncology Consulted: will need biopsy before additional tx plan recs, also MRI brain w contrast to r/u Mets   - Radonc consulted: She likely will need surgical stabilization in her neck. Please obtain MRI first and discuss possible surgical intervention w/ neurosurgery. If she is not a surgical candidate and there is signs of cord compression, then it is possible to do RT urgently without tissue biopsy. Otherwise, please obtain tissue diagnosis and complete CT C/A/P first; Can start decadron.   - Biopsy - possibly consult surgery team for bedside breast biopsy   - following with Dr. Peña outpatient  - plans per NeuroSx   - pt/ot/rehab- patient going to [facility]    #Hypercalcemia likely 2/2 malignancy  #Elevated AlkPhos likely 2/2 malignancy  - resolved s/p IVF     #Sinus tachycardia likely 2/2 dehydration and pain  #Hypertensive urgency likely 2/2  pain    #Rt eye redness likely conjunctivitis   - c/w Polytrim     # hypothyroidism   - TSH (4/21) WNL   - c/w levothyroxine 50mcg OD    DVT ppx     as per neurosurgery patient pending 4A for rehab    a 57F with PMH hypothyroidism, suspected alcoholic cirrhosis (stopped drinking over 9 yrs ago), Presented 4/20 for neck pain w/ limited movement /2 pain x4 months; Denies ataxia, imbalance, or difficulty with ambulation. Denies any numbness, tingling, paresthesias, weakness, saddle anesthesia, or bowel / bladder incontinence. Saw her chiropractor, MRI was done - reported concern for lesions. Pt referred to Dr. Peña's- who she saw 4/20. Pt reports Dr. Peña gave dx of possible breast cancer with metastasis and referred pt to ED.     #Severe neck pain 2/2 likely metastatic lesions at Cervical, Thoracic, Lumbar with multiple metastatic lesions confirmed w MRI   #s/p Occiput-T2 instrumentation and fusion with ORIF of C2  #Suspected breast cancer - no biopsy to confirm    *PE shows R breast with nipple retraction and a firm nodule close proximity to nipple at 3 o'clock   *pt never had mammogram or routine cancer screening   - CT Chest/AP  Right breast mass,  Multiple enlarged mediastinal lymph.   - multiple bony lesion throughout the body  - Neurosurgery Eval'd 4/22: Needs Hard collar [place] when OOB; Stabilization surgery; no steroids at this time; no PT until after stabilization  - Oncology Consulted: will need biopsy before additional tx plan recs, also MRI brain w contrast to r/u Mets   - Radonc consulted: She likely will need surgical stabilization in her neck. Please obtain MRI first and discuss possible surgical intervention w/ neurosurgery. If she is not a surgical candidate and there is signs of cord compression, then it is possible to do RT urgently without tissue biopsy. Otherwise, please obtain tissue diagnosis and complete CT C/A/P first; Can start decadron.   - Biopsy - possibly consult surgery team for bedside breast biopsy   - following with Dr. Peña outpatient  - plans per NeuroSx   - pt/ot/rehab- patient going to [facility]  -pain management     #Hypercalcemia likely 2/2 malignancy  #Elevated AlkPhos likely 2/2 malignancy  - resolved s/p IVF     #Sinus tachycardia likely 2/2 dehydration and pain  #Hypertensive urgency likely 2/2  pain    #Rt eye redness likely conjunctivitis   - c/w Polytrim     # hypothyroidism   - TSH (4/21) WNL   - c/w levothyroxine 50mcg OD    DVT ppx     as per neurosurgery patient pending 4[facility] for rehab

## 2023-04-27 NOTE — CHART NOTE - NSCHARTNOTEFT_GEN_A_CORE
Palliative following for GOC and high LACE.   Pain mgmt consulted for post-op pain mgmt.   Patient is focused on recovering from surgery and starting treatment.   Will sign off as goals are clear.   Recall at X [phone] PRN.

## 2023-04-27 NOTE — PROGRESS NOTE ADULT - SUBJECTIVE AND OBJECTIVE BOX
JEAN-PAUL REYES 57y Female  MRN#: [id number]   Hospital Day: 7d    SUBJECTIVE  Patient is a 57y old Female who presents with a chief complaint of Neck pain (27 Apr 2023 10:02)  Currently admitted to medicine with the primary diagnosis of Neck pain      INTERVAL HPI AND OVERNIGHT EVENTS:  Patient was examined and seen at bedside. This morning she is resting comfortably in bed and reports no issues or overnight events.  denies chest pain , sob, n/v/d/c, hematuria or bloody stool.     REVIEW OF SYMPTOMS:  10 point ROS negative except as above.  OBJECTIVE  PAST MEDICAL & SURGICAL HISTORY    ALLERGIES:  No Known Allergies    MEDICATIONS:  STANDING MEDICATIONS  artificial  tears Solution 1 Drop(s) Both EYES two times a day  ceFAZolin   IVPB 1000 milliGRAM(s) IV Intermittent every 8 hours  chlorhexidine 2% Cloths 1 Application(s) Topical <User Schedule>  enoxaparin Injectable 30 milliGRAM(s) SubCutaneous every 12 hours  famotidine    Tablet 20 milliGRAM(s) Oral every 12 hours  labetalol 100 milliGRAM(s) Oral every 8 hours  levothyroxine 50 MICROGram(s) Oral daily  loratadine 10 milliGRAM(s) Oral at bedtime  melatonin 3 milliGRAM(s) Oral at bedtime  methocarbamol 750 milliGRAM(s) Oral every 8 hours  polyethylene glycol 3350 17 Gram(s) Oral daily  senna 2 Tablet(s) Oral at bedtime  trimethoprim/polymyxin Solution 1 Drop(s) Right EYE four times a day    PRN MEDICATIONS  acetaminophen     Tablet .. 650 milliGRAM(s) Oral every 6 hours PRN  hydrALAZINE Injectable 5 milliGRAM(s) IV Push every 1 hour PRN  naloxone Injectable 0.1 milliGRAM(s) IV Push every 3 minutes PRN  ondansetron Injectable 4 milliGRAM(s) IV Push every 6 hours PRN  ondansetron Injectable 4 milliGRAM(s) IV Push every 6 hours PRN  oxycodone    5 mG/acetaminophen 325 mG 2 Tablet(s) Oral every 4 hours PRN      VITAL SIGNS: Last 24 Hours  T(C): 37 (27 Apr 2023 12:07), Max: 37.6 (26 Apr 2023 20:14)  T(F): 98.6 (27 Apr 2023 12:07), Max: 99.6 (26 Apr 2023 20:14)  HR: 99 (27 Apr 2023 12:07) (86 - 109)  BP: 139/67 (27 Apr 2023 12:07) (118/68 - 139/67)  BP(mean): --  RR: 18 (27 Apr 2023 12:07) (17 - 18)  SpO2: 97% (27 Apr 2023 12:07) (90% - 97%)    PHYSICAL EXAM:  GENERAL: NAD,  HEENT : NC/AT,   NECK: Supple, No JVD  CHEST/LUNG: Clear to auscultation bilaterally;   HEART: Regular rate and rhythm; No murmurs, rubs, or gallops  ABDOMEN: Soft, Nontender, Nondistended; Bowel sounds present  EXTREMITIES:  no edema  NEURO:  aox 3  SKIN: No rashes or lesions    LABS:                        10.5   10.71 )-----------( 171      ( 27 Apr 2023 05:47 )             32.5     04-27    137  |  98  |  12  ----------------------------<  105<H>  4.1   |  30  |  0.5<L>    Ca    9.1      27 Apr 2023 05:47  Phos  3.4     04-27  Mg     1.9     04-27    TPro  5.9<L>  /  Alb  3.7  /  TBili  0.5  /  DBili  x   /  AST  29  /  ALT  19  /  AlkPhos  115  04-26    RADIOLOGY:  < from: CT Cervical Spine No Cont (04.20.23 @ 13:36) >    IMPRESSION:    1.  Numerous lytic lesions with associated soft tissue throughout the   visualized skull base and spine consistent with extensive osseous   metastatic disease.    2.  Epidural component of tumor is noted from C1-C4.    3.  Tumor essentially replaces C1, C2 and C3 and there is pathologic   fracture of C2.    4.  Findings would be better assessed with contrast-enhanced MRI.   Additionally, direct comparison with the patient's recent outside imaging   studies would be helpful.      < end of copied text >  < from: MR Lumbar Spine w/ IV Cont (04.21.23 @ 12:48) >  IMPRESSION:    CERVICAL SPINE:  Numerous enhancing osseous metastatic lesions are noted throughout the   cervical spine most significant at the C2 and C3 level with evidence of   epidural extension causing severe spinal stenosis and mass effect upon   the spinal cord at C2-C3.    THORACIC SPINE:  Numerous enhancing osseous metastatic lesions with malignant compression   deformity noted of the T8 vertebral body. No evidence of extraosseous   extension.    LUMBAR SPINE:  Numerous enhancing osseous metastatic lesions without extraosseous   extension or compression deformity.    < end of copied text >  < from: CT Head No Cont (04.21.23 @ 16:58) >  IMPRESSION:  Numerous osseous lytic lesions consistent with metastases.    No intracranial hemorrhage, mass effect or midline shift. Pleasenote   evaluation for intracranial metastatic disease is limited without   intravenous contrast. Contrast enhanced brain MRI is recommended for   further evaluation if not clinically contraindicated.    < end of copied text >  < from: CT Chest w/ IV Cont (04.21.23 @ 17:06) >  IMPRESSION:    CHEST:    Right breast mass measuring approximately 2 x 2.8 cm with nipple   retraction.    Multiple enlarged mediastinal lymph nodes including enlarged lower   paratracheal lymph nodes measuring up to 1.6 cm.    Multiple mixed lytic and sclerotic sternal and rib lesions. Sclerotic T8   vertebral body lesion.    Additional thoracic spine lesions, better visualized on recent MRI.    ABDOMEN/PELVIS:    Mixed lytic and sclerotic lesion of the L5 vertebral body. Additional   lumbar spine lesions, better visualized on recent MRI.      < end of copied text >  < from: Xray Chest 1 View AP/PA (04.24.23 @ 13:46) >    Impression:    Bibasilar opacities. Low lung volumes leads to magnification of the   pulmonary interstitium.    < end of copied text >  < from: CT Cervical Spine No Cont (04.24.23 @ 21:42) >    IMPRESSION:  Since prior examinationthere has been occipital spinal fusion extending   to T2 with expected postsurgical changes.    Redemonstration of numerous lytic infiltrative metastatic lesions with   multiple malignant compression fractures most significant of the C2 and   C3 vertebral bodies    < end of copied text >  < from: [facility] Duplex Lower Ext Vein Scan, Bilat (04.25.23 @ 11:38) >  FINDINGS:    RIGHT:  Normal compressibility of the RIGHT common femoral, femoral and popliteal   veins.  Doppler examination shows normal spontaneous and phasic flow.  No RIGHT calf vein thrombosis is detected.    LEFT:  Normal compressibility of the LEFT common femoral, femoral and popliteal   veins.  Doppler examination shows normal spontaneous and phasic flow.  No LEFT calf vein thrombosis is detected.    IMPRESSION:  No evidence of deep venous thrombosis in either lower extremity.      < end of copied text >     JEAN-PAUL REYES 57y Female  MRN#: [id number]   Hospital Day: 7d    SUBJECTIVE  Patient is a 57y old Female who presents with a chief complaint of Neck pain (27 Apr 2023 10:02)  Currently admitted to medicine with the primary diagnosis of Neck pain      INTERVAL HPI AND OVERNIGHT EVENTS:  Patient was examined and seen at bedside. This morning she is resting comfortably in bed and reports no issues or overnight events.  pain controlled with pain medication,   pain management consult     OBJECTIVE  PAST MEDICAL & SURGICAL HISTORY    ALLERGIES:  No Known Allergies    MEDICATIONS:  STANDING MEDICATIONS  artificial  tears Solution 1 Drop(s) Both EYES two times a day  ceFAZolin   IVPB 1000 milliGRAM(s) IV Intermittent every 8 hours  chlorhexidine 2% Cloths 1 Application(s) Topical <User Schedule>  enoxaparin Injectable 30 milliGRAM(s) SubCutaneous every 12 hours  famotidine    Tablet 20 milliGRAM(s) Oral every 12 hours  labetalol 100 milliGRAM(s) Oral every 8 hours  levothyroxine 50 MICROGram(s) Oral daily  loratadine 10 milliGRAM(s) Oral at bedtime  melatonin 3 milliGRAM(s) Oral at bedtime  methocarbamol 750 milliGRAM(s) Oral every 8 hours  polyethylene glycol 3350 17 Gram(s) Oral daily  senna 2 Tablet(s) Oral at bedtime  trimethoprim/polymyxin Solution 1 Drop(s) Right EYE four times a day    PRN MEDICATIONS  acetaminophen     Tablet .. 650 milliGRAM(s) Oral every 6 hours PRN  hydrALAZINE Injectable 5 milliGRAM(s) IV Push every 1 hour PRN  naloxone Injectable 0.1 milliGRAM(s) IV Push every 3 minutes PRN  ondansetron Injectable 4 milliGRAM(s) IV Push every 6 hours PRN  ondansetron Injectable 4 milliGRAM(s) IV Push every 6 hours PRN  oxycodone    5 mG/acetaminophen 325 mG 2 Tablet(s) Oral every 4 hours PRN      VITAL SIGNS: Last 24 Hours  T(C): 37 (27 Apr 2023 12:07), Max: 37.6 (26 Apr 2023 20:14)  T(F): 98.6 (27 Apr 2023 12:07), Max: 99.6 (26 Apr 2023 20:14)  HR: 99 (27 Apr 2023 12:07) (86 - 109)  BP: 139/67 (27 Apr 2023 12:07) (118/68 - 139/67)  BP(mean): --  RR: 18 (27 Apr 2023 12:07) (17 - 18)  SpO2: 97% (27 Apr 2023 12:07) (90% - 97%)    PHYSICAL EXAM:  GENERAL: NAD,  HEENT : NC/AT,   NECK: Supple, No JVD  CHEST/LUNG: Clear to auscultation bilaterally;   HEART: Regular rate and rhythm; No murmurs  ABDOMEN: Soft, Nontender, Nondistended; Bowel sounds present  EXTREMITIES:  no edema  NEURO:  aox 3    LABS:                        10.5   10.71 )-----------( 171      ( 27 Apr 2023 05:47 )             32.5     04-27    137  |  98  |  12  ----------------------------<  105<H>  4.1   |  30  |  0.5<L>    Ca    9.1      27 Apr 2023 05:47  Phos  3.4     04-27  Mg     1.9     04-27    TPro  5.9<L>  /  Alb  3.7  /  TBili  0.5  /  DBili  x   /  AST  29  /  ALT  19  /  AlkPhos  115  04-26    RADIOLOGY:  < from: CT Cervical Spine No Cont (04.20.23 @ 13:36) >    IMPRESSION:    1.  Numerous lytic lesions with associated soft tissue throughout the   visualized skull base and spine consistent with extensive osseous   metastatic disease.    2.  Epidural component of tumor is noted from C1-C4.    3.  Tumor essentially replaces C1, C2 and C3 and there is pathologic   fracture of C2.    4.  Findings would be better assessed with contrast-enhanced MRI.   Additionally, direct comparison with the patient's recent outside imaging   studies would be helpful.      < end of copied text >  < from: MR Lumbar Spine w/ IV Cont (04.21.23 @ 12:48) >  IMPRESSION:    CERVICAL SPINE:  Numerous enhancing osseous metastatic lesions are noted throughout the   cervical spine most significant at the C2 and C3 level with evidence of   epidural extension causing severe spinal stenosis and mass effect upon   the spinal cord at C2-C3.    THORACIC SPINE:  Numerous enhancing osseous metastatic lesions with malignant compression   deformity noted of the T8 vertebral body. No evidence of extraosseous   extension.    LUMBAR SPINE:  Numerous enhancing osseous metastatic lesions without extraosseous   extension or compression deformity.    < end of copied text >  < from: CT Head No Cont (04.21.23 @ 16:58) >  IMPRESSION:  Numerous osseous lytic lesions consistent with metastases.    No intracranial hemorrhage, mass effect or midline shift. Pleasenote   evaluation for intracranial metastatic disease is limited without   intravenous contrast. Contrast enhanced brain MRI is recommended for   further evaluation if not clinically contraindicated.    < end of copied text >  < from: CT Chest w/ IV Cont (04.21.23 @ 17:06) >  IMPRESSION:    CHEST:    Right breast mass measuring approximately 2 x 2.8 cm with nipple   retraction.    Multiple enlarged mediastinal lymph nodes including enlarged lower   paratracheal lymph nodes measuring up to 1.6 cm.    Multiple mixed lytic and sclerotic sternal and rib lesions. Sclerotic T8   vertebral body lesion.    Additional thoracic spine lesions, better visualized on recent MRI.    ABDOMEN/PELVIS:    Mixed lytic and sclerotic lesion of the L5 vertebral body. Additional   lumbar spine lesions, better visualized on recent MRI.      < end of copied text >  < from: Xray Chest 1 View AP/PA (04.24.23 @ 13:46) >    Impression:    Bibasilar opacities. Low lung volumes leads to magnification of the   pulmonary interstitium.    < end of copied text >  < from: CT Cervical Spine No Cont (04.24.23 @ 21:42) >    IMPRESSION:  Since prior examinationthere has been occipital spinal fusion extending   to T2 with expected postsurgical changes.    Redemonstration of numerous lytic infiltrative metastatic lesions with   multiple malignant compression fractures most significant of the C2 and   C3 vertebral bodies    < end of copied text >  < from: [facility] Duplex Lower Ext Vein Scan, Bilat (04.25.23 @ 11:38) >  FINDINGS:    RIGHT:  Normal compressibility of the RIGHT common femoral, femoral and popliteal   veins.  Doppler examination shows normal spontaneous and phasic flow.  No RIGHT calf vein thrombosis is detected.    LEFT:  Normal compressibility of the LEFT common femoral, femoral and popliteal   veins.  Doppler examination shows normal spontaneous and phasic flow.  No LEFT calf vein thrombosis is detected.    IMPRESSION:  No evidence of deep venous thrombosis in either lower extremity.      < end of copied text >

## 2023-04-27 NOTE — CONSULT NOTE ADULT - CONSULT REQUESTED DATE/TIME
20-Apr-2023 18:45
21-Apr-2023 08:29
24-Apr-2023 14:31
24-Apr-2023 16:39
21-Apr-2023 09:51
26-Apr-2023 12:47
25-Apr-2023

## 2023-04-27 NOTE — CONSULT NOTE ADULT - ASSESSMENT
Patient is a 56 y/o woman with history of hypothyroidism, EtOH cirrhosis, EtOH dependence, and recently diagnosed Stage 4 metastatic breast cancer who was admitted on 4/20/2023 for neck pain and underwent an occiput to T2 posterior instrumentation and fusion with ORIF of C2 body on 4/24/2023 with iLve Rao and Shannon.

## 2023-04-27 NOTE — CONSULT NOTE ADULT - PROBLEM SELECTOR RECOMMENDATION 9
No history of chronic opioid therapy. Low risk for opioid abuse per ORT.  1) Stop morphine PCA  2) Start oxycodone IR 10mg Q4h prn  3) Order morphine IV 1mg Q6h prn  4) Start gabapentin 300mg Q8h standing  5) Continue methocarbamol 750mg Q8h standing  6) Continue miralax, senna

## 2023-04-28 ENCOUNTER — INPATIENT (INPATIENT)
Facility: HOSPITAL | Age: 58
LOS: 13 days | Discharge: HOME HEALTH | DRG: 949 | End: 2023-05-12
Attending: PHYSICAL MEDICINE & REHABILITATION | Admitting: PHYSICAL MEDICINE & REHABILITATION
Payer: COMMERCIAL

## 2023-04-28 ENCOUNTER — TRANSCRIPTION ENCOUNTER (OUTPATIENT)
Age: 58
End: 2023-04-28

## 2023-04-28 VITALS — HEIGHT: 64.02 IN | WEIGHT: 203.49 LBS

## 2023-04-28 VITALS — OXYGEN SATURATION: 95 % | RESPIRATION RATE: 18 BRPM

## 2023-04-28 DIAGNOSIS — C72.0 MALIGNANT NEOPLASM OF SPINAL CORD: ICD-10-CM

## 2023-04-28 PROCEDURE — 77290 THER RAD SIMULAJ FIELD CPLX: CPT

## 2023-04-28 PROCEDURE — 92610 EVALUATE SWALLOWING FUNCTION: CPT | Mod: GN

## 2023-04-28 PROCEDURE — 36415 COLL VENOUS BLD VENIPUNCTURE: CPT

## 2023-04-28 PROCEDURE — 93005 ELECTROCARDIOGRAM TRACING: CPT

## 2023-04-28 PROCEDURE — 83735 ASSAY OF MAGNESIUM: CPT

## 2023-04-28 PROCEDURE — 92507 TX SP LANG VOICE COMM INDIV: CPT | Mod: GN

## 2023-04-28 PROCEDURE — 94640 AIRWAY INHALATION TREATMENT: CPT

## 2023-04-28 PROCEDURE — 99221 1ST HOSP IP/OBS SF/LOW 40: CPT

## 2023-04-28 PROCEDURE — 90791 PSYCH DIAGNOSTIC EVALUATION: CPT

## 2023-04-28 PROCEDURE — 97166 OT EVAL MOD COMPLEX 45 MIN: CPT | Mod: GO

## 2023-04-28 PROCEDURE — 93010 ELECTROCARDIOGRAM REPORT: CPT

## 2023-04-28 PROCEDURE — 92526 ORAL FUNCTION THERAPY: CPT | Mod: GN

## 2023-04-28 PROCEDURE — 77295 3-D RADIOTHERAPY PLAN: CPT

## 2023-04-28 PROCEDURE — 77334 RADIATION TREATMENT AID(S): CPT

## 2023-04-28 PROCEDURE — 97112 NEUROMUSCULAR REEDUCATION: CPT | Mod: GP

## 2023-04-28 PROCEDURE — 97110 THERAPEUTIC EXERCISES: CPT | Mod: GO

## 2023-04-28 PROCEDURE — 90834 PSYTX W PT 45 MINUTES: CPT

## 2023-04-28 PROCEDURE — 77300 RADIATION THERAPY DOSE PLAN: CPT

## 2023-04-28 PROCEDURE — 85025 COMPLETE CBC W/AUTO DIFF WBC: CPT

## 2023-04-28 PROCEDURE — 80053 COMPREHEN METABOLIC PANEL: CPT

## 2023-04-28 PROCEDURE — 97535 SELF CARE MNGMENT TRAINING: CPT | Mod: GO

## 2023-04-28 PROCEDURE — 78582 LUNG VENTILAT&PERFUS IMAGING: CPT

## 2023-04-28 PROCEDURE — 90837 PSYTX W PT 60 MINUTES: CPT

## 2023-04-28 PROCEDURE — 71045 X-RAY EXAM CHEST 1 VIEW: CPT

## 2023-04-28 PROCEDURE — 97530 THERAPEUTIC ACTIVITIES: CPT | Mod: GO

## 2023-04-28 PROCEDURE — 97162 PT EVAL MOD COMPLEX 30 MIN: CPT | Mod: GP

## 2023-04-28 PROCEDURE — A9567: CPT

## 2023-04-28 PROCEDURE — A9540: CPT

## 2023-04-28 PROCEDURE — 97116 GAIT TRAINING THERAPY: CPT | Mod: GP

## 2023-04-28 RX ORDER — METHOCARBAMOL 500 MG/1
750 TABLET, FILM COATED ORAL EVERY 8 HOURS
Refills: 0 | Status: DISCONTINUED | OUTPATIENT
Start: 2023-04-28 | End: 2023-05-01

## 2023-04-28 RX ORDER — OXYCODONE HYDROCHLORIDE 5 MG/1
10 TABLET ORAL EVERY 4 HOURS
Refills: 0 | Status: DISCONTINUED | OUTPATIENT
Start: 2023-04-28 | End: 2023-04-28

## 2023-04-28 RX ORDER — NALOXONE HYDROCHLORIDE 4 MG/.1ML
0.1 SPRAY NASAL
Refills: 0 | Status: DISCONTINUED | OUTPATIENT
Start: 2023-04-28 | End: 2023-01-01

## 2023-04-28 RX ORDER — OXYCODONE HYDROCHLORIDE 5 MG/1
10 TABLET ORAL EVERY 4 HOURS
Refills: 0 | Status: DISCONTINUED | OUTPATIENT
Start: 2023-04-28 | End: 2023-05-05

## 2023-04-28 RX ORDER — LABETALOL HCL 100 MG
100 TABLET ORAL EVERY 8 HOURS
Refills: 0 | Status: DISCONTINUED | OUTPATIENT
Start: 2023-04-28 | End: 2023-05-01

## 2023-04-28 RX ORDER — SENNA PLUS 8.6 MG/1
2 TABLET ORAL
Qty: 0 | Refills: 0 | DISCHARGE
Start: 2023-04-28

## 2023-04-28 RX ORDER — FAMOTIDINE 10 MG/ML
20 INJECTION INTRAVENOUS EVERY 12 HOURS
Refills: 0 | Status: DISCONTINUED | OUTPATIENT
Start: 2023-04-28 | End: 2023-01-01

## 2023-04-28 RX ORDER — ONDANSETRON 8 MG/1
4 TABLET, FILM COATED ORAL EVERY 6 HOURS
Refills: 0 | Status: DISCONTINUED | OUTPATIENT
Start: 2023-04-28 | End: 2023-01-01

## 2023-04-28 RX ORDER — POLYETHYLENE GLYCOL 3350 17 G/17G
17 POWDER, FOR SOLUTION ORAL
Qty: 0 | Refills: 0 | DISCHARGE
Start: 2023-04-28

## 2023-04-28 RX ORDER — CHLORHEXIDINE GLUCONATE 213 G/1000ML
1 SOLUTION TOPICAL
Refills: 0 | Status: DISCONTINUED | OUTPATIENT
Start: 2023-04-28 | End: 2023-01-01

## 2023-04-28 RX ORDER — LEVOTHYROXINE SODIUM 125 MCG
50 TABLET ORAL DAILY
Refills: 0 | Status: DISCONTINUED | OUTPATIENT
Start: 2023-04-28 | End: 2023-01-01

## 2023-04-28 RX ORDER — GABAPENTIN 400 MG/1
300 CAPSULE ORAL EVERY 8 HOURS
Refills: 0 | Status: DISCONTINUED | OUTPATIENT
Start: 2023-04-28 | End: 2023-04-28

## 2023-04-28 RX ORDER — METHOCARBAMOL 500 MG/1
1 TABLET, FILM COATED ORAL
Qty: 0 | Refills: 0 | DISCHARGE
Start: 2023-04-28

## 2023-04-28 RX ORDER — ENOXAPARIN SODIUM 100 MG/ML
30 INJECTION SUBCUTANEOUS EVERY 12 HOURS
Refills: 0 | Status: DISCONTINUED | OUTPATIENT
Start: 2023-04-28 | End: 2023-01-01

## 2023-04-28 RX ORDER — OXYCODONE HYDROCHLORIDE 5 MG/1
1 TABLET ORAL
Qty: 0 | Refills: 0 | DISCHARGE
Start: 2023-04-28

## 2023-04-28 RX ORDER — LORATADINE 10 MG/1
1 TABLET ORAL
Qty: 0 | Refills: 0 | DISCHARGE
Start: 2023-04-28

## 2023-04-28 RX ORDER — LORATADINE 10 MG/1
10 TABLET ORAL AT BEDTIME
Refills: 0 | Status: DISCONTINUED | OUTPATIENT
Start: 2023-04-28 | End: 2023-01-01

## 2023-04-28 RX ORDER — POLYMYXIN B SULF/TRIMETHOPRIM 10000-1/ML
1 DROPS OPHTHALMIC (EYE)
Qty: 0 | Refills: 0 | DISCHARGE
Start: 2023-04-28

## 2023-04-28 RX ORDER — ENOXAPARIN SODIUM 100 MG/ML
30 INJECTION SUBCUTANEOUS
Qty: 0 | Refills: 0 | DISCHARGE
Start: 2023-04-28

## 2023-04-28 RX ORDER — FAMOTIDINE 10 MG/ML
1 INJECTION INTRAVENOUS
Qty: 0 | Refills: 0 | DISCHARGE
Start: 2023-04-28

## 2023-04-28 RX ORDER — MORPHINE SULFATE 50 MG/1
1 CAPSULE, EXTENDED RELEASE ORAL
Qty: 0 | Refills: 0 | DISCHARGE
Start: 2023-04-28

## 2023-04-28 RX ORDER — LEVOTHYROXINE SODIUM 125 MCG
1 TABLET ORAL
Qty: 0 | Refills: 0 | DISCHARGE
Start: 2023-04-28

## 2023-04-28 RX ORDER — MORPHINE SULFATE 50 MG/1
1 CAPSULE, EXTENDED RELEASE ORAL EVERY 6 HOURS
Refills: 0 | Status: DISCONTINUED | OUTPATIENT
Start: 2023-04-28 | End: 2023-04-28

## 2023-04-28 RX ORDER — LANOLIN ALCOHOL/MO/W.PET/CERES
1 CREAM (GRAM) TOPICAL
Qty: 0 | Refills: 0 | DISCHARGE
Start: 2023-04-28

## 2023-04-28 RX ORDER — LABETALOL HCL 100 MG
1 TABLET ORAL
Qty: 0 | Refills: 0 | DISCHARGE
Start: 2023-04-28

## 2023-04-28 RX ORDER — LANOLIN ALCOHOL/MO/W.PET/CERES
3 CREAM (GRAM) TOPICAL AT BEDTIME
Refills: 0 | Status: DISCONTINUED | OUTPATIENT
Start: 2023-04-28 | End: 2023-01-01

## 2023-04-28 RX ORDER — NALOXONE HYDROCHLORIDE 4 MG/.1ML
0.1 SPRAY NASAL
Qty: 0 | Refills: 0 | DISCHARGE
Start: 2023-04-28

## 2023-04-28 RX ORDER — LEVOTHYROXINE SODIUM 125 MCG
1 TABLET ORAL
Refills: 0 | DISCHARGE

## 2023-04-28 RX ORDER — ACETAMINOPHEN 500 MG
2 TABLET ORAL
Qty: 0 | Refills: 0 | DISCHARGE
Start: 2023-04-28

## 2023-04-28 RX ORDER — GABAPENTIN 400 MG/1
1 CAPSULE ORAL
Qty: 0 | Refills: 0 | DISCHARGE
Start: 2023-04-28

## 2023-04-28 RX ORDER — MORPHINE SULFATE 50 MG/1
2 CAPSULE, EXTENDED RELEASE ORAL EVERY 6 HOURS
Refills: 0 | Status: DISCONTINUED | OUTPATIENT
Start: 2023-04-28 | End: 2023-04-28

## 2023-04-28 RX ORDER — CHLORHEXIDINE GLUCONATE 213 G/1000ML
0 SOLUTION TOPICAL
Qty: 0 | Refills: 0 | DISCHARGE
Start: 2023-04-28

## 2023-04-28 RX ORDER — ACETAMINOPHEN 500 MG
650 TABLET ORAL EVERY 6 HOURS
Refills: 0 | Status: DISCONTINUED | OUTPATIENT
Start: 2023-04-28 | End: 2023-04-28

## 2023-04-28 RX ORDER — SENNA PLUS 8.6 MG/1
2 TABLET ORAL AT BEDTIME
Refills: 0 | Status: DISCONTINUED | OUTPATIENT
Start: 2023-04-28 | End: 2023-01-01

## 2023-04-28 RX ORDER — GABAPENTIN 400 MG/1
300 CAPSULE ORAL EVERY 8 HOURS
Refills: 0 | Status: DISCONTINUED | OUTPATIENT
Start: 2023-04-28 | End: 2023-01-01

## 2023-04-28 RX ORDER — POLYMYXIN B SULF/TRIMETHOPRIM 10000-1/ML
1 DROPS OPHTHALMIC (EYE)
Refills: 0 | Status: COMPLETED | OUTPATIENT
Start: 2023-04-28 | End: 2023-05-03

## 2023-04-28 RX ORDER — ACETAMINOPHEN 500 MG
650 TABLET ORAL EVERY 6 HOURS
Refills: 0 | Status: DISCONTINUED | OUTPATIENT
Start: 2023-04-28 | End: 2023-01-01

## 2023-04-28 RX ORDER — LORATADINE 10 MG/1
1 TABLET ORAL
Refills: 0 | DISCHARGE

## 2023-04-28 RX ORDER — ONDANSETRON 8 MG/1
4 TABLET, FILM COATED ORAL
Qty: 0 | Refills: 0 | DISCHARGE
Start: 2023-04-28

## 2023-04-28 RX ORDER — POLYETHYLENE GLYCOL 3350 17 G/17G
17 POWDER, FOR SOLUTION ORAL DAILY
Refills: 0 | Status: DISCONTINUED | OUTPATIENT
Start: 2023-04-28 | End: 2023-01-01

## 2023-04-28 RX ADMIN — OXYCODONE HYDROCHLORIDE 10 MILLIGRAM(S): 5 TABLET ORAL at 16:23

## 2023-04-28 RX ADMIN — POLYETHYLENE GLYCOL 3350 17 GRAM(S): 17 POWDER, FOR SOLUTION ORAL at 11:45

## 2023-04-28 RX ADMIN — Medication 100 MILLIGRAM(S): at 14:56

## 2023-04-28 RX ADMIN — OXYCODONE HYDROCHLORIDE 10 MILLIGRAM(S): 5 TABLET ORAL at 11:43

## 2023-04-28 RX ADMIN — Medication 5 MILLIGRAM(S): at 21:11

## 2023-04-28 RX ADMIN — FAMOTIDINE 20 MILLIGRAM(S): 10 INJECTION INTRAVENOUS at 05:17

## 2023-04-28 RX ADMIN — Medication 100 MILLIGRAM(S): at 05:17

## 2023-04-28 RX ADMIN — GABAPENTIN 300 MILLIGRAM(S): 400 CAPSULE ORAL at 14:12

## 2023-04-28 RX ADMIN — Medication 100 MILLIGRAM(S): at 14:11

## 2023-04-28 RX ADMIN — LORATADINE 10 MILLIGRAM(S): 10 TABLET ORAL at 21:11

## 2023-04-28 RX ADMIN — METHOCARBAMOL 750 MILLIGRAM(S): 500 TABLET, FILM COATED ORAL at 21:11

## 2023-04-28 RX ADMIN — OXYCODONE HYDROCHLORIDE 10 MILLIGRAM(S): 5 TABLET ORAL at 21:11

## 2023-04-28 RX ADMIN — METHOCARBAMOL 750 MILLIGRAM(S): 500 TABLET, FILM COATED ORAL at 05:16

## 2023-04-28 RX ADMIN — Medication 100 MILLIGRAM(S): at 21:12

## 2023-04-28 RX ADMIN — Medication 50 MICROGRAM(S): at 05:17

## 2023-04-28 RX ADMIN — OXYCODONE HYDROCHLORIDE 10 MILLIGRAM(S): 5 TABLET ORAL at 16:58

## 2023-04-28 RX ADMIN — Medication 1 DROP(S): at 11:45

## 2023-04-28 RX ADMIN — ENOXAPARIN SODIUM 30 MILLIGRAM(S): 100 INJECTION SUBCUTANEOUS at 09:37

## 2023-04-28 RX ADMIN — OXYCODONE HYDROCHLORIDE 10 MILLIGRAM(S): 5 TABLET ORAL at 12:30

## 2023-04-28 RX ADMIN — OXYCODONE HYDROCHLORIDE 10 MILLIGRAM(S): 5 TABLET ORAL at 21:45

## 2023-04-28 RX ADMIN — Medication 1 DROP(S): at 17:20

## 2023-04-28 RX ADMIN — Medication 1 DROP(S): at 17:19

## 2023-04-28 RX ADMIN — Medication 3 MILLIGRAM(S): at 21:11

## 2023-04-28 RX ADMIN — Medication 1 DROP(S): at 05:22

## 2023-04-28 RX ADMIN — Medication 100 MILLIGRAM(S): at 05:16

## 2023-04-28 RX ADMIN — FAMOTIDINE 20 MILLIGRAM(S): 10 INJECTION INTRAVENOUS at 17:19

## 2023-04-28 RX ADMIN — METHOCARBAMOL 750 MILLIGRAM(S): 500 TABLET, FILM COATED ORAL at 14:11

## 2023-04-28 RX ADMIN — GABAPENTIN 300 MILLIGRAM(S): 400 CAPSULE ORAL at 21:11

## 2023-04-28 NOTE — DISCHARGE NOTE PROVIDER - CARE PROVIDER_API CALL
Jasbir Ortega)  Surgery  Neurosurgery  [address] [place] [address], [address]  [place] [address]  Phone: [phone]  Fax: [phone]  Follow Up Time: 2 weeks

## 2023-04-28 NOTE — DISCHARGE NOTE PROVIDER - NSDCCPCAREPLAN_GEN_ALL_CORE_FT
PRINCIPAL DISCHARGE DIAGNOSIS  Diagnosis: Neck pain  Assessment and Plan of Treatment:       SECONDARY DISCHARGE DIAGNOSES  Diagnosis: Metastatic breast cancer  Assessment and Plan of Treatment:

## 2023-04-28 NOTE — PATIENT PROFILE ADULT - FALL HARM RISK - HARM RISK INTERVENTIONS

## 2023-04-28 NOTE — DISCHARGE NOTE PROVIDER - NSDCCPTREATMENT_GEN_ALL_CORE_FT
PRINCIPAL PROCEDURE  Procedure: Fusion, occipitocervical junction  Findings and Treatment:       SECONDARY PROCEDURE  Procedure: Posterior cervical fusion with instrumentation  Findings and Treatment:

## 2023-04-28 NOTE — H&P ADULT - ASSESSMENT
57F with PMH hypothyroidism, suspected alcoholic cirrhosis (stopped drinking over 9 yrs ago), Presented 4/20 for neck pain w/ limited movement /2 pain x4 months. Admitted for management of suspected breast CA with metastases to the spine now s/p Occiput-T2 instrumentation and fusion with ORIF of C2.    Prior to current illness and functional decline, patient was independent with ADLs and ambulation. Patient now requires assistance with ambulation and ADLs 2/2 neck/back pain, gait dysfunction, and deconditioning. Patient also with medical comorbidities of hypothyroidism, sinus tachycardia, hypertensive urgency and h/o cirrhosis, requiring medication management and oversight by Physiatry team and nursing. May also need specialist consulting from Neurosurgery. Patient is a complex medical case with mixed Neurosurgical, oncological, and medical issues. There are significant comorbidities which warrants acute rehab hospitalization. To return home it is in the patient’s best interest to have acute inpatient rehabilitative services to undergo intensive interdisciplinary therapy. Furthermore, the patient is motivated and is able to tolerate up to 3 hours of daily therapy for 5-6 days a week for a total of 15 hours a week. Evaluated by Physiatry and deemed to be a good candidate for admission to  for acute inpatient rehab. Admitted to Acute Rehab on 4/28/23.     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   - MRI spine: Numerous enhancing osseous metastatic lesions are noted throughout the cervical/thoracic/lumbar spine (most significant at the C2 and C3 level with evidence of  epidural extension causing severe spinal stenosis and mass effect). Malignant compression  deformity noted of the T8 vertebral body   - Neurosurgery Eval 4/22: Needs Hard collar [place] J when OOB  - Oncology Consulted: will need biopsy before additional tx plan recs, also MRI brain w contrast to r/u Mets   - Radonc consulted 4/27: will need radiation to the C spine (no sooner than 2 weeks after surgery)  -Hemovac discontinued 4/27  -Per Neurosurgery: continue [place] J cervical collar when OOB   -Pain medications: previously evaluated by pain management. Gabapentin 300mg PO q8h, Tylenol 650mg q6h PRN mild pain, Oxycodone 5mg PO q4h PRN moderate pain, Oxycodone 10mg q6h PRN severe pain   -following with Dr. Peña outpatient      #Hypercalcemia likely 2/2 malignancy  #Elevated AlkPhos likely 2/2 malignancy  - resolved s/p IVF   -Will monitor BMP and trend electrolytes     #Sinus tachycardia likely 2/2 dehydration and pain  #Hypertensive urgency likely 2/2  pain  -resolved   - C/w labetalol 100 q8h  - Monitor vitals and adjust antihypertensives accordingly     #Rt eye redness likely conjunctivitis   - c/w Polytrim     # hypothyroidism   - TSH (4/21) WNL   - c/w levothyroxine 50mcg OD    #Maintenance   - Pain control: as above  - GI/Bowel Mgmt: no issues at this time, monitor for BMs.   - Bladder management: voiding freely, no issues at this time.   - Skin: monitor cervical surgical wound, Neurosurgery f/u as needed   - FEN: Hypercalcemia resolved as above. Monitor electrolytes and fluid status.   - Diet: DASH/TLC easy to chew diet     #Precautions / PROPHYLAXIS:    - Falls  - Ortho: Weight bearing status: WBAT    - DVT prophylaxis: Lovenox 30 SQ daily         MEDICAL PROGNOSIS: GOOD            REHAB POTENTIAL: GOOD             ESTIMATED DISPOSITION: HOME WITH HOME CARE       [ x ]  The goals of the IRF admission were discussed with the patient and or family member, who agreed             ELOS:  [  x   ] 7-14    [    ]  14-21    [    ]  Other    THERAPY ORDERS and INITIAL INDIVIDUALIZED PLAN OF CARE:  This initial individualized interdisciplinary plan of care, which was established by me (the attending physiatrist), is based on elements from the post admission evaluation. The interdisciplinary therapy program is to be at least 3 hrs a day, at least 5 days per week from from physical, occupational and/ or speech therapies as ordered by me below.      [ x  ] P.T. 90 mins. /day at least 5 out of 7 days:  [  x ] superficial  modalities prn, [ x  ] A/AAROM, [ x  ] PREs, [ x  ] transfer training,            [ x  ] progressive ambulation, [x   ] stairs                                               [ x  ] O.T. 90 mins. /day at least 5 out of 7 days::  [ x  ] modalities prn,  [ x  ]A/AAROM, [ x  ] PREs, [  x ] functional transfer training, [ x  ] ADL training           [   ] cognitive/ perceptual eval and training, [   ] splint eval                                                  [ x ] S.L.P:  [ x ] speech eval, [ x ] swallow eval     [ x ] Neuropsychology eval     [ x  ] Individualized rec. therapy      RATIONALE FOR INPATIENT ADMISSION - Patient demonstrates the following: (check all that apply)  [X] Medically appropriate for acute rehabilitation admission. Requires interdisiplinary therapy consisting of at least PT and OT, at least 3 hrs. a day at least 5 days a week  [X] Has attainable rehab goals with an appropriate initial discharge plan  [X] Has rehabilitation potential (expected to make a significant improvement within a reasonable period of time)  [X] Requires close medical management by a rehab physician, rehab nursing care,  and comprehensive interdisciplinary team (including PT, OT)    [X] Requires evaluation by a physiatrist at least 3 days a week to evaluate and manage and coordinate rehab and medical problems    Prior to CVA, patient was independent with ADLs and ambulation without an assistive device and was working full time. Patient now requires mod-max assistance with ambulation and ADLs 2/2 left brunilda, ataxia, gait dysfunction, and deconditioning. Therefore, there is a significant functional decline from baseline. Patient also with medical comorbidities of HTN, requiring medication management and monitoring of vitals by Physiatry team and nursing. Alcohol abuse also requiring oversight by Physiatry and nursing. May also need specialist consulting from Neurology for patient’s CVA. Patient is a complex medical case with mixed orthopedic, medical and neurologic issues. There are significant comorbidities which warrants acute rehab hospitalization. To return home it is in the patient’s best interest to have acute inpatient rehabilitative services to undergo intensive interdisciplinary therapy. Of note, patient lives alone and would have difficulty performing ADLs and iADLs individually. Furthermore, the patient is motivated and is able to tolerate up to 3 hours of daily therapy for 5-6 days a week for a total of 15 hours a week. Evaluated by Physiatry and deemed to be a good candidate for admission to  for acute inpatient rehab. Admitted to Acute Rehab on 4/5/23.   57F with PMH hypothyroidism, suspected alcoholic cirrhosis (stopped drinking over 9 yrs ago), Presented 4/20 for neck pain w/ limited movement /2 pain x4 months. Admitted for management of suspected breast CA with metastases to the spine now s/p Occiput-T2 instrumentation and fusion with ORIF of C2.    Prior to current illness and functional decline, patient was independent with ADLs and ambulation. Patient now requires assistance with ambulation and ADLs 2/2 neck/back pain, gait dysfunction, and deconditioning. Patient also with medical comorbidities of hypothyroidism, sinus tachycardia, hypertensive urgency and h/o cirrhosis, requiring medication management and oversight by Physiatry team and nursing. May also need specialist consulting from Neurosurgery. Patient is a complex medical case with mixed Neurosurgical, oncological, and medical issues. There are significant comorbidities which warrants acute rehab hospitalization. To return home it is in the patient’s best interest to have acute inpatient rehabilitative services to undergo intensive interdisciplinary therapy. Furthermore, the patient is motivated and is able to tolerate up to 3 hours of daily therapy for 5-6 days a week for a total of 15 hours a week. Evaluated by Physiatry and deemed to be a good candidate for admission to  for acute inpatient rehab. Admitted to Acute Rehab on 4/28/23.     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   - MRI spine: Numerous enhancing osseous metastatic lesions are noted throughout the cervical/thoracic/lumbar spine (most significant at the C2 and C3 level with evidence of  epidural extension causing severe spinal stenosis and mass effect). Malignant compression  deformity noted of the T8 vertebral body   - Neurosurgery Eval 4/22: Needs Hard collar [place] J when OOB  - Oncology Consulted: will need biopsy before additional tx plan recs, also MRI brain w contrast to r/u Mets   - Radonc consulted 4/27: will need radiation to the C spine (no sooner than 2 weeks after surgery)  -Hemovac discontinued 4/27  -Per Neurosurgery: continue [place] J cervical collar when OOB   -Pain medications: previously evaluated by pain management. Gabapentin 300mg PO q8h, Tylenol 650mg q6h PRN mild pain, Oxycodone 5mg PO q4h PRN moderate pain, Oxycodone 10mg q6h PRN severe pain   -following with Dr. Peña outpatient    #Dysphagia  - mild, since surgery  - will get swallow SLP eval    #Hypercalcemia likely 2/2 malignancy  #Elevated AlkPhos likely 2/2 malignancy  - resolved s/p IVF   -Will monitor BMP and trend electrolytes     #Sinus tachycardia likely 2/2 dehydration and pain  #Hypertensive urgency likely 2/2  pain  -resolved   - C/w labetalol 100 q8h  - Monitor vitals and adjust antihypertensives accordingly     #Rt eye redness likely conjunctivitis   - c/w Polytrim     # hypothyroidism   - TSH (4/21) WNL   - c/w levothyroxine 50mcg OD    #Maintenance   - Pain control: as above  - GI/Bowel Mgmt: no issues at this time, monitor for BMs.   - Bladder management: voiding freely, no issues at this time.   - Skin: monitor cervical surgical wound, Neurosurgery f/u as needed   - FEN: Hypercalcemia resolved as above. Monitor electrolytes and fluid status.   - Diet: DASH/TLC easy to chew diet     #Precautions / PROPHYLAXIS:    - Falls  - Ortho: Weight bearing status: WBAT    - DVT prophylaxis: Lovenox 30 SQ daily         MEDICAL PROGNOSIS: GOOD            REHAB POTENTIAL: GOOD             ESTIMATED DISPOSITION: HOME WITH HOME CARE       [ x ]  The goals of the IRF admission were discussed with the patient and family member, who agreed             ELOS:  [  x   ] 7-14    [    ]  14-21    [    ]  Other    THERAPY ORDERS and INITIAL INDIVIDUALIZED PLAN OF CARE:  This initial individualized interdisciplinary plan of care, which was established by me (the attending physiatrist), is based on elements from the post admission evaluation. The interdisciplinary therapy program is to be at least 3 hrs a day, at least 5 days per week from from physical, occupational and/ or speech therapies as ordered by me below.      [ x  ] P.T. 90 mins. /day at least 5 out of 7 days:  [  x ] superficial  modalities prn, [ x  ] A/AAROM, [ x  ] PREs, [ x  ] transfer training,            [ x  ] progressive ambulation, [x   ] stairs                                               [ x  ] O.T. 90 mins. /day at least 5 out of 7 days::  [ x  ] modalities prn,  [ x  ]A/AAROM, [ x  ] PREs, [  x ] functional transfer training, [ x  ] ADL training           [   ] cognitive/ perceptual eval and training, [   ] splint eval                                                  [ x ] S.L.P:  [ x ] speech eval, [ x ] swallow eval     [  x ] Neuropsychology eval     [ x  ] Individualized rec. therapy      RATIONALE FOR INPATIENT ADMISSION - Patient demonstrates the following: (check all that apply)  [X] Medically appropriate for acute rehabilitation admission. Requires interdisiplinary therapy consisting of at least PT and OT, at least 3 hrs. a day at least 5 days a week  [X] Has attainable rehab goals with an appropriate initial discharge plan  [X] Has rehabilitation potential (expected to make a significant improvement within a reasonable period of time)  [X] Requires close medical management by a rehab physician, rehab nursing care,  and comprehensive interdisciplinary team (including PT, OT)    [X] Requires evaluation by a physiatrist at least 3 days a week to evaluate and manage and coordinate rehab and medical problems    Prior to CVA, patient was independent with ADLs and ambulation without an assistive device and was working full time. Patient now requires mod-max assistance with ambulation and ADLs 2/2 left brunilda, ataxia, gait dysfunction, and deconditioning. Therefore, there is a significant functional decline from baseline. Patient also with medical comorbidities of HTN, requiring medication management and monitoring of vitals by Physiatry team and nursing. Alcohol abuse also requiring oversight by Physiatry and nursing. May also need specialist consulting from Neurology for patient’s CVA. Patient is a complex medical case with mixed orthopedic, medical and neurologic issues. There are significant comorbidities which warrants acute rehab hospitalization. To return home it is in the patient’s best interest to have acute inpatient rehabilitative services to undergo intensive interdisciplinary therapy. Of note, patient lives alone and would have difficulty performing ADLs and iADLs individually. Furthermore, the patient is motivated and is able to tolerate up to 3 hours of daily therapy for 5-6 days a week for a total of 15 hours a week. Evaluated by Physiatry and deemed to be a good candidate for admission to  for acute inpatient rehab. Admitted to Acute Rehab on 4/5/23.

## 2023-04-28 NOTE — H&P ADULT - ATTENDING COMMENTS
patient examined and discussed with resident.  Patient with neck pain for months, recently walking with cane and seeing a chiropractor, admitted for recent worsening of pain and found to have extensive bone mets to her skull base and spine. She underwent Fusion, occipitocervical junction 24-Apr-2023 12:38:03  Jasbir Ortega  Posterior cervical fusion with instrumentation 24-Apr-2023 12:39:33  Jasbir Ortega.  She had severe post op pain. She is now tolerating bedside therapies but requires mod assist to transfer, min assist to ambulate with a rolling waker and dependent in dressing. She also describes new dysphagia and will be seen by Speech and Swallow SLP.  She requires acute interdisciplinary rehab including PT and OT to maximize function for safe d/c home in a reasonable time. The patient can tolerate and benefit from 3 hrs daily therapy and requires Physiatry f/u at least 3 days a week to monitor and treat her pain to allow her full participation in rehab.     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   - MRI spine: Numerous enhancing osseous metastatic lesions are noted throughout the cervical/thoracic/lumbar spine (most significant at the C2 and C3 level with evidence of  epidural extension causing severe spinal stenosis and mass effect). Malignant compression  deformity noted of the T8 vertebral body   - Neurosurgery Eval 4/22: Needs Hard collar [place] J when OOB  - Oncology Consulted: will need biopsy before additional tx plan recs, also MRI brain w contrast to r/u Mets   - Radonc consulted 4/27: will need radiation to the C spine (no sooner than 2 weeks after surgery)  -Hemovac discontinued 4/27  -Per Neurosurgery: continue [place] J cervical collar when OOB   -Pain medications: previously evaluated by pain management. Gabapentin 300mg PO q8h, Tylenol 650mg q6h PRN mild pain, Oxycodone 5mg PO q4h PRN moderate pain, Oxycodone 10mg q6h PRN severe pain   -following with Dr. Peña outpatient    #Dysphagia  - mild, since surgery  - will get swallow SLP eval    #Hypercalcemia likely 2/2 malignancy  #Elevated AlkPhos likely 2/2 malignancy  - resolved s/p IVF   -Will monitor BMP and trend electrolytes     #Sinus tachycardia likely 2/2 dehydration and pain  #Hypertensive urgency likely 2/2  pain  -resolved   - C/w labetalol 100 q8h  - Monitor vitals and adjust antihypertensives accordingly     #Rt eye redness likely conjunctivitis   - c/w Polytrim     # hypothyroidism   - TSH (4/21) WNL   - c/w levothyroxine 50mcg OD

## 2023-04-28 NOTE — H&P ADULT - NSHPREVIEWOFSYSTEMS_GEN_ALL_CORE
Constitutional:    [  ] WNL           [   ] insomnia   [   ] tired  [ x ] recent weight loss and poor appetite - reporting depressed mood at home   Cardio:             [ x ] WNL           [   ] CP   [   ] SANCHEZ   [   ] palpitations              Resp:                [ x ] WNL           [   ] SOB   [   ] cough   [   ] wheezing  GI:                    [ x ] WNL           [   ] constipation   [   ] diarrhea   [   ] abdominal pain   [   ] nausea   [   ] emesis    :                   [ x ] WNL           [   ] SILVA  [   ] dysuria   [   ] difficulty voiding            Endo:                [ x ] WNL          [   ] polyuria   [   ] temperature intolerance                Skin:                  [  ] WNL          [   ] pain   [ x  ] Surgical incision along cervical spine c/d/i. [   ] rash  MSK:                 [  ] WNL          [   ] muscle pain   [ x  ] midline neck and low back pain  [   ] muscle tenderness   [   ] swelling  Neuro:              [  ] WNL          [   ] HA   [   ] change in vision   [   ] tremor   [   ] weakness   [  x  ]dysphagia             Cognitive:          [ x ] WNL          [   ]confusion     Psych:                [ x ] WNL          [   ] hallucinations   [   ]agitation   [   ] delusion   [   ]depression Constitutional:    [  ] WNL           [   ] insomnia   [   ] tired  [ x ] recent weight loss and poor appetite - reporting depressed mood at home   Cardio:             [ x ] WNL           [   ] CP   [   ] SANCHEZ   [   ] palpitations         Resp:                [ x ] WNL           [   ] SOB   [   ] cough   [   ] wheezing  GI:                    [ x ] WNL           [   ] constipation   [   ] diarrhea   [   ] abdominal pain   [   ] nausea   [   ] emesis    :                   [ x ] WNL           [   ] SILVA  [   ] dysuria   [   ] difficulty voiding            Endo:                [ x ] WNL          [   ] polyuria   [   ] temperature intolerance                Skin:                  [  ] WNL          [   ] pain   [ x  ] Surgical incision along cervical spine c/d/i. [   ] rash  MSK:                 [  ] WNL          [   ] muscle pain   [ x  ] midline neck and low back pain  [   ] muscle tenderness   [   ] swelling  Neuro:              [  ] WNL          [   ] HA   [   ] change in vision   [   ] tremor   [   ] weakness   [  x  ]dysphagia             Cognitive:          [ x ] WNL          [   ]confusion     Psych:                [ x ] WNL          [   ] hallucinations   [   ]agitation   [   ] delusion   [   ]depression

## 2023-04-28 NOTE — H&P ADULT - NSICDXFAMILYHX_GEN_ALL_CORE_FT
FAMILY HISTORY:  Father  Still living? Unknown  FHx: arrhythmia, Age at diagnosis: Age Unknown    Mother  Still living? Unknown  FHx: melanoma, Age at diagnosis: Age Unknown

## 2023-04-28 NOTE — DISCHARGE NOTE PROVIDER - HOSPITAL COURSE
57-year-old female with PMH of hypothyroidism and ? alcoholic cirrhosis (stopped drinking over 9 yrs ago), presenting for neck pain. Pt started having neck pain almost 4 months ago for which her chiropractor ordered an MRI that was done 1 month ago. Pt was not sure what the read of the MRI was but there was concern about lesions so she went to see Dr. Peña's office for the first time today. Dr. Peña examined her and told her she has breast cancer with metastasis and needs to come to the ED fo further evaluation. Pt is a non smoker and has no family history of breast Ca. Denies any significant weight loss, night sweats, or fevers. No reported bloody discharge from her nipples. Upon examining pt' s right breast,  dimpling along with a lump was found. Pt believes that she noticed this change almost over a yr a go, but did not think of it as anything serious. Pt's neck pain has been significantly worsening limiting her ability to move her neck. She only has pain when moving her neck and has full sensation and ROM of her b/l UE. Denies any SOB, chest pain, or palpitations.     Neurosurgery was consulted on a patient with Hx of breast ca with worsening neck pain. States that she has been having neck pain for 4 months, and that she has been having work up from chiropractor Dr. Chavez? who has been doing adjustments on her. Patient states that she did not have relief from neck pain and so chiropractor had ordered an MRI C spine with contrast which showed concern for lesions. Patient states that since 1 month ago patient was instructed to remain in a collar and had followed up with Dr. Peña who stated that she needs to come to the ED for further evaluation. Patient seen and examined at bedside. States that she has pain with ROM, and states that it is worse with movement ex moving out of bed. Patient states that pain is mostly midline at the base of head, with radiation to her paraspinal muscles. Denies any radicular pain to UE & LE. States that she has been sleeping only in a recliner and has since then been developing worsening lower back pain. Denies any ataxia, imbalance, or difficulty with ambulation. Denies any numbness, tingling, paresthesias, weakness, saddle anesthesia, or bowel / bladder incontinence. < from: CT Cervical Spine No Cont (04.20.23 @ 13:36) >    1.  Numerous lytic lesions with associated soft tissue throughout the   visualized skull base and spine consistent with extensive osseous   metastatic disease.    2.  Epidural component of tumor is noted from C1-C4.    3.  Tumor essentially replaces C1, C2 and C3 and there is pathologic   fracture of C2.    < end of copied text >    < from: MR Cervical Spine w/ IV Cont (04.21.23 @ 12:48) >    CERVICAL SPINE:  Numerous enhancing osseous metastatic lesions are noted throughout the   cervical spine most significant at the C2 and C3 level with evidence of   epidural extension causing severe spinal stenosis and mass effect upon   the spinal cord at C2-C3.    THORACIC SPINE:  Numerous enhancing osseous metastatic lesions with malignant compression   deformity noted of the T8 vertebral body. No evidence of extraosseous   extension.    LUMBAR SPINE:  Numerous enhancing osseous metastatic lesions without extraosseous   extension or compression deformity.    < end of copied text >    < from: CT Head No Cont (04.21.23 @ 16:58) >    Numerous osseous lytic lesions consistent with metastases.    No intracranial hemorrhage, mass effect or midline shift. Pleasenote   evaluation for intracranial metastatic disease is limited without   intravenous contrast. Contrast enhanced brain MRI is recommended for   further evaluation if not clinically contraindicated.    < end of copied text >    < from: CT Chest w/ IV Cont (04.21.23 @ 17:06) >    CHEST:    Right breast mass measuring approximately 2 x 2.8 cm with nipple   retraction.    Multiple enlarged mediastinal lymph nodes including enlarged lower   paratracheal lymph nodes measuring up to 1.6 cm.    Multiple mixed lytic and sclerotic sternal and rib lesions. Sclerotic T8   vertebral body lesion.    Additional thoracic spine lesions, better visualized on recent MRI.    ABDOMEN/PELVIS:    Mixed lytic and sclerotic lesion of the L5 vertebral body. Additional   lumbar spine lesions, better visualized on recent MRI.    < end of copied text >    On 4.24.23 she was taken to the operating room where she underwent an Occiput to T2 instrumentation and Fusion, ORIF of C2. She had a hemovac drain placed intraop which was removed on POD#3. She was on a PCA pump postop which was removed on POD#3. She remained neurologically stable postop. She worked with physical therapy. On 4.28.23 she was discharged to inpatient rehab.

## 2023-04-28 NOTE — PROGRESS NOTE ADULT - SUBJECTIVE AND OBJECTIVE BOX
POD # 4    S/P Occiput to T2 instrumentation and Fusion, ORIF of C2       pt seen and examined at bedside pt is alert , has some c/o incisional discomfort     Vital Signs Last 24 Hrs  T(C): 36.6 (28 Apr 2023 09:15), Max: 37.1 (27 Apr 2023 17:02)  T(F): 97.8 (28 Apr 2023 09:15), Max: 98.8 (28 Apr 2023 00:20)  HR: 81 (28 Apr 2023 09:15) (81 - 103)  BP: 119/60 (28 Apr 2023 09:15) (108/57 - 139/67)  BP(mean): --  RR: 18 (28 Apr 2023 09:15) (18 - 19)  SpO2: 96% (28 Apr 2023 09:15) (95% - 97%)    Parameters below as of 27 Apr 2023 20:41  Patient On (Oxygen Delivery Method): room air        PHYSICAL EXAM:    Alert, Follows commands   A&OX3, PERRL   MAEX4   MS bilateral UE's 5/5         bilateral LE's 5/5   Incision clean dry intact           MEDICATIONS:  Antibiotics:  ceFAZolin   IVPB 1000 milliGRAM(s) IV Intermittent every 8 hours    Neuro:  acetaminophen     Tablet .. 650 milliGRAM(s) Oral every 6 hours PRN  gabapentin 300 milliGRAM(s) Oral every 8 hours  melatonin 3 milliGRAM(s) Oral at bedtime  methocarbamol 750 milliGRAM(s) Oral every 8 hours  morphine  - Injectable 1 milliGRAM(s) IV Push every 6 hours PRN  ondansetron Injectable 4 milliGRAM(s) IV Push every 6 hours PRN  ondansetron Injectable 4 milliGRAM(s) IV Push every 6 hours PRN  oxyCODONE    IR 10 milliGRAM(s) Oral every 4 hours PRN    Anticoagulation:  enoxaparin Injectable 30 milliGRAM(s) SubCutaneous every 12 hours    OTHER:  artificial  tears Solution 1 Drop(s) Both EYES two times a day  bisacodyl 5 milliGRAM(s) Oral at bedtime  chlorhexidine 2% Cloths 1 Application(s) Topical <User Schedule>  famotidine    Tablet 20 milliGRAM(s) Oral every 12 hours  hydrALAZINE Injectable 5 milliGRAM(s) IV Push every 1 hour PRN  labetalol 100 milliGRAM(s) Oral every 8 hours  levothyroxine 50 MICROGram(s) Oral daily  loratadine 10 milliGRAM(s) Oral at bedtime  naloxone Injectable 0.1 milliGRAM(s) IV Push every 3 minutes PRN  polyethylene glycol 3350 17 Gram(s) Oral daily  senna 2 Tablet(s) Oral at bedtime  trimethoprim/polymyxin Solution 1 Drop(s) Right EYE four times a day    IVF:        LABS:                        10.5   10.71 )-----------( 171      ( 27 Apr 2023 05:47 )             32.5     04-27    137  |  98  |  12  ----------------------------<  105<H>  4.1   |  30  |  0.5<L>    Ca    9.1      27 Apr 2023 05:47  Phos  3.4     04-27  Mg     1.9     04-27          A/p              S/P Occiput to T2 instrumentation and Fusion, ORIF of C2                   Continue DVT prophylaxis                   PT/OT/Rehab                   await 4A authorization

## 2023-04-28 NOTE — H&P ADULT - NSHPSOCIALHISTORY_GEN_ALL_CORE
SHx: history of alcohol use disorder - quit 9 years ago, denies smoking and illicit drug use SHx: history of alcohol use disorder - quit 9 years ago, denies smoking and illicit drug use.  Prior level of function: independent with ambulation and ADLs without assistive device although had been using straight cane for a few months and the last 2 weeks prior to presentation reports being essentially chairbound due to neck/back pain and difficulty with mobility.   Living situation: Pt resides with  in 2nd floor apt using 8 steps to enter and a flight of stairs to access.

## 2023-04-28 NOTE — PROGRESS NOTE ADULT - SUBJECTIVE AND OBJECTIVE BOX
JEAN-PAUL REYES 57y Female  MRN#: [id number]   Hospital Day: 7d    SUBJECTIVE  Patient is a 57y old Female who presents with a chief complaint of Neck pain (27 Apr 2023 10:02)  Currently admitted to medicine with the primary diagnosis of Neck pain      INTERVAL HPI AND OVERNIGHT EVENTS:  Patient was examined and seen at bedside. This morning she is resting comfortably in bed and reports no issues or overnight events.  pain controlled with pain medication,   pain management consult     OBJECTIVE  PAST MEDICAL & SURGICAL HISTORY    ALLERGIES:  No Known Allergies    MEDICATIONS:  STANDING MEDICATIONS  artificial  tears Solution 1 Drop(s) Both EYES two times a day  ceFAZolin   IVPB 1000 milliGRAM(s) IV Intermittent every 8 hours  chlorhexidine 2% Cloths 1 Application(s) Topical <User Schedule>  enoxaparin Injectable 30 milliGRAM(s) SubCutaneous every 12 hours  famotidine    Tablet 20 milliGRAM(s) Oral every 12 hours  labetalol 100 milliGRAM(s) Oral every 8 hours  levothyroxine 50 MICROGram(s) Oral daily  loratadine 10 milliGRAM(s) Oral at bedtime  melatonin 3 milliGRAM(s) Oral at bedtime  methocarbamol 750 milliGRAM(s) Oral every 8 hours  polyethylene glycol 3350 17 Gram(s) Oral daily  senna 2 Tablet(s) Oral at bedtime  trimethoprim/polymyxin Solution 1 Drop(s) Right EYE four times a day    PRN MEDICATIONS  acetaminophen     Tablet .. 650 milliGRAM(s) Oral every 6 hours PRN  hydrALAZINE Injectable 5 milliGRAM(s) IV Push every 1 hour PRN  naloxone Injectable 0.1 milliGRAM(s) IV Push every 3 minutes PRN  ondansetron Injectable 4 milliGRAM(s) IV Push every 6 hours PRN  ondansetron Injectable 4 milliGRAM(s) IV Push every 6 hours PRN  oxycodone    5 mG/acetaminophen 325 mG 2 Tablet(s) Oral every 4 hours PRN      VITAL SIGNS: Last 24 Hours  T(C): 36.6 (28 Apr 2023 13:15), Max: 37.1 (27 Apr 2023 17:02)  T(F): 97.8 (28 Apr 2023 13:15), Max: 98.8 (28 Apr 2023 00:20)  HR: 96 (28 Apr 2023 13:15) (81 - 103)  BP: 119/64 (28 Apr 2023 13:15) (108/57 - 124/64)  BP(mean): --  RR: 18 (28 Apr 2023 09:15) (18 - 19)  SpO2: 96% (28 Apr 2023 09:15) (95% - 97%)    Parameters below as of 27 Apr 2023 20:41  Patient On (Oxygen Delivery Method): room air    PHYSICAL EXAM:  GENERAL: NAD,  HEENT : NC/AT,  NECK: Supple, No JVD  CHEST/LUNG: Clear to auscultation bilaterally;   HEART: Regular rate and rhythm; No murmurs  ABDOMEN: Soft, Nontender, Nondistended; Bowel sounds present  EXTREMITIES:  no edema  NEURO:  aox 3    LABS:                        10.5   10.71 )-----------( 171      ( 27 Apr 2023 05:47 )             32.5     04-27    137  |  98  |  12  ----------------------------<  105<H>  4.1   |  30  |  0.5<L>    Ca    9.1      27 Apr 2023 05:47  Phos  3.4     04-27  Mg     1.9     04-27    TPro  5.9<L>  /  Alb  3.7  /  TBili  0.5  /  DBili  x   /  AST  29  /  ALT  19  /  AlkPhos  115  04-26    RADIOLOGY:  < from: CT Cervical Spine No Cont (04.20.23 @ 13:36) >    IMPRESSION:    1.  Numerous lytic lesions with associated soft tissue throughout the   visualized skull base and spine consistent with extensive osseous   metastatic disease.    2.  Epidural component of tumor is noted from C1-C4.    3.  Tumor essentially replaces C1, C2 and C3 and there is pathologic   fracture of C2.    4.  Findings would be better assessed with contrast-enhanced MRI.   Additionally, direct comparison with the patient's recent outside imaging   studies would be helpful.      < end of copied text >  < from: MR Lumbar Spine w/ IV Cont (04.21.23 @ 12:48) >  IMPRESSION:    CERVICAL SPINE:  Numerous enhancing osseous metastatic lesions are noted throughout the   cervical spine most significant at the C2 and C3 level with evidence of   epidural extension causing severe spinal stenosis and mass effect upon   the spinal cord at C2-C3.    THORACIC SPINE:  Numerous enhancing osseous metastatic lesions with malignant compression   deformity noted of the T8 vertebral body. No evidence of extraosseous   extension.    LUMBAR SPINE:  Numerous enhancing osseous metastatic lesions without extraosseous   extension or compression deformity.    < end of copied text >  < from: CT Head No Cont (04.21.23 @ 16:58) >  IMPRESSION:  Numerous osseous lytic lesions consistent with metastases.    No intracranial hemorrhage, mass effect or midline shift. Pleasenote   evaluation for intracranial metastatic disease is limited without   intravenous contrast. Contrast enhanced brain MRI is recommended for   further evaluation if not clinically contraindicated.    < end of copied text >  < from: CT Chest w/ IV Cont (04.21.23 @ 17:06) >  IMPRESSION:    CHEST:    Right breast mass measuring approximately 2 x 2.8 cm with nipple   retraction.    Multiple enlarged mediastinal lymph nodes including enlarged lower   paratracheal lymph nodes measuring up to 1.6 cm.    Multiple mixed lytic and sclerotic sternal and rib lesions. Sclerotic T8   vertebral body lesion.    Additional thoracic spine lesions, better visualized on recent MRI.    ABDOMEN/PELVIS:    Mixed lytic and sclerotic lesion of the L5 vertebral body. Additional   lumbar spine lesions, better visualized on recent MRI.      < end of copied text >  < from: Xray Chest 1 View AP/PA (04.24.23 @ 13:46) >    Impression:    Bibasilar opacities. Low lung volumes leads to magnification of the   pulmonary interstitium.    < end of copied text >  < from: CT Cervical Spine No Cont (04.24.23 @ 21:42) >    IMPRESSION:  Since prior examinationthere has been occipital spinal fusion extending   to T2 with expected postsurgical changes.    Redemonstration of numerous lytic infiltrative metastatic lesions with   multiple malignant compression fractures most significant of the C2 and   C3 vertebral bodies    < end of copied text >  < from: [facility] Duplex Lower Ext Vein Scan, Bilat (04.25.23 @ 11:38) >  FINDINGS:    RIGHT:  Normal compressibility of the RIGHT common femoral, femoral and popliteal   veins.  Doppler examination shows normal spontaneous and phasic flow.  No RIGHT calf vein thrombosis is detected.    LEFT:  Normal compressibility of the LEFT common femoral, femoral and popliteal   veins.  Doppler examination shows normal spontaneous and phasic flow.  No LEFT calf vein thrombosis is detected.    IMPRESSION:  No evidence of deep venous thrombosis in either lower extremity.      < end of copied text >

## 2023-04-28 NOTE — DISCHARGE NOTE NURSING/CASE MANAGEMENT/SOCIAL WORK - PATIENT PORTAL LINK FT
You can access the FollowMyHealth Patient Portal offered by [facility] by registering at the following website: [email or web address]. By joining [employer]’s FollowMyHealth portal, you will also be able to view your health information using other applications (apps) compatible with our system.

## 2023-04-28 NOTE — H&P ADULT - HISTORY OF PRESENT ILLNESS
57-year-old female with PMH of hypothyroidism and ? alcoholic cirrhosis (stopped drinking over 9 yrs ago), presenting for neck pain on 4/20. Pt started having neck pain almost 4 months ago for which her chiropractor ordered an MRI that was done 1 month ago. Pt was not sure what the read of the MRI was but there was concern about lesions so she went to see Dr. Peña's office for the first time. Dr. Peña examined her and told her she has breast cancer with metastasis and needs to come to the ED for further evaluation. Pt is a non smoker and has no family history of breast Ca. Denies any significant weight loss, night sweats, or fevers. No reported bloody discharge from her nipples. Upon examining pt's right breast, dimpling with a lump was found. Pt believes that she noticed this change almost over a yr a go, but did not think of it was anything serious. Pt's neck pain has been significantly worsening limiting her ability to move her neck. She only has pain when moving her neck and has full sensation and ROM of her b/l UE. Denies any SOB, chest pain, or palpitations. States that she has pain with ROM, and states that it is worse with movement ex moving out of bed. Patient states that pain is mostly midline at the base of head, with radiation to her paraspinal muscles. Denies any radicular pain to UE & LE. States that she has been sleeping only in a recliner and has since then been developing worsening lower back pain. Denies any ataxia, imbalance, or difficulty with ambulation. Denies any numbness, tingling, paresthesias, weakness, saddle anesthesia, or bowel / bladder incontinence. Neurosurgery was consulted. On 4.24.23 she was taken to the operating room where she underwent an Occiput to T2 instrumentation and Fusion, ORIF of C2. She had a hemovac drain placed intraop which was removed on POD#3. She was on a PCA pump postop which was removed on POD#3. She remained neurologically stable postop.      Patient is tolerating bedside PT, OT, and SLP. The patient was evaluated by the PM&R team once medically stable. The patient was found to have functional limitations in terms of muscle strength, endurance, physical mobility, ability to carry out activities of daily living including: self-care, transfers, and ambulation. Participating with bedside therapeutic exercises and cognitive retraining. The patient is motivated and is able to tolerate up to 3 hours of daily therapy for 5-6 days a week for a total of 15 hours a week. Evaluated by Physiatry and deemed to be a good candidate for admission to  for acute inpatient rehab. Admitted to Acute Rehab on 4/28/23.    PMHx/PSHx: as above  SHx: history of alcohol use disorder - quit 9 years ago, denies smoking and illicit drug use   FHx: non-contributory   Allergies: NKDA  Prior level of function: independent with ambulation and ADLs without assistive device although had been using straight cane for a few months and the last 2 weeks prior to presentation reports being essentially chairbound due to neck/back pain and difficulty with mobility.   Living situation: Pt resides with  in 2nd floor apt using 8 steps to enter and a flight of stairs to access.   Current level of function:  SLP 4/26: Some complaints of globus w/ solids Recommend: continue easy to chew w/ thin  PT 4/28: max assist for bed mobility, mod assist for transfers, min assist for ambulation 40ft with RW  OT 4/27: dependent for lower body dressing

## 2023-04-28 NOTE — PATIENT PROFILE ADULT - FUNCTIONAL ASSESSMENT - BASIC MOBILITY 6.
1-calculated by average/Not able to assess (calculate score using [facility] averaging method)

## 2023-04-28 NOTE — H&P ADULT - NSHPPHYSICALEXAM_GEN_ALL_CORE
Vital Signs Last 24 Hrs  T(C): 36.6 (28 Apr 2023 13:15), Max: 37.1 (27 Apr 2023 17:02)  T(F): 97.8 (28 Apr 2023 13:15), Max: 98.8 (28 Apr 2023 00:20)  HR: 92 (28 Apr 2023 14:15) (81 - 96)  BP: 116/70 (28 Apr 2023 14:15) (108/57 - 121/64)  BP(mean): --  RR: 18 (28 Apr 2023 09:15) (18 - 18)  SpO2: 96% (28 Apr 2023 09:15) (95% - 96%)      General:[ x ] NAD, Resting Comfortable,   [   ] other:                                HEENT: [ x ] NC/AT, EOMI, PERRL , Normal Conjunctivae,   [   ] other:  Cardio: [ x ] RRR, no murmer,   [   ] other:                              Pulm: [ x ] No Respiratory Distress,  Lungs CTAB,   [   ] other:                       Abdomen: [ x ]ND/NT, Soft,   [   ] other:    : [ x ] NO SILVA CATHETER, [   ] SILVA CATHETER- no meatal tear, no discharge, [   ] other:                                            MSK: [  ] No joint swelling, Full ROM,   [ x  ] other:  Surgical incision along cervical spine c/d/i.                                      Ext: [ x ]No C/C/E, No calf tenderness,   [   ]other:    Skin: [  ]intact,   [ x  ] other: Surgical incision along cervical spine c/d/i.                                                                  Neurological Examination:  Cognitive: [  x  ] AAO x 3,   [    ]  other: A&Ox3. Oriented to person, place and time. Follows 2 step commands, language components intact. No aphasia.   Attention:  [  x  ] intact,   [    ]  other:  intact spelling and simple calculations                Memory: [  x  ] intact,    [    ]  other: intact registration and recall    Mood/Affect: [  x  ] wnl,    [    ]  other:                                                                             Communication: [  x  ]Fluent, no dysarthria, following commands:  [    ] other:   CN II - XII:  [  x  ] intact,  [    ] other:                                                                                        Motor:   RIGHT UE: [  ] WNL,  [ x  ] other: 3-/5 shoulder abduction limited by neck/back pain, 4/5 elbow flexion/extension, 5/5 hand   LEFT    UE: [  ] WNL,  [  x ] other: 3-/5 shoulder abduction limited by neck/back pain, 4/5 elbow flexion/extension, 5/5 hand   RIGHT LE: [ x ] WNL,  [   ] other: 5/5 hip flexion, knee flexion/extension, ankle dorsiflexion/plantarflexion  LEFT    LE: [ x ] WNL,  [   ] other: 5/5 hip flexion, knee flexion/extension, ankle dorsiflexion/plantarflexion    Tone: [  x  ] wnl,   [    ]  other:  DTRs: [ x  ]symmetric, [   ] other:  Coordination:   [ x  ] intact,   [    ] other:                                                                           Sensory: [ x  ] Intact to light touch,   [    ] other:    Clonus negative Vital Signs Last 24 Hrs  T(C): 36.6 (28 Apr 2023 13:15), Max: 37.1 (27 Apr 2023 17:02)  T(F): 97.8 (28 Apr 2023 13:15), Max: 98.8 (28 Apr 2023 00:20)  HR: 92 (28 Apr 2023 14:15) (81 - 96)  BP: 116/70 (28 Apr 2023 14:15) (108/57 - 121/64)  BP(mean): --  RR: 18 (28 Apr 2023 09:15) (18 - 18)  SpO2: 96% (28 Apr 2023 09:15) (95% - 96%)    General:[ x ] NAD, Resting Comfortable,   [   ] other:                                HEENT: [ x ] NC/AT, EOMI, PERRL , Normal Conjunctivae,   [   ] other:  Cardio: [ x ] RRR, no murmer,   [   ] other:                              Pulm: [ x ] No Respiratory Distress,  Lungs CTAB,   [   ] other:                       Abdomen: [ x ]ND/NT, Soft,   [   ] other:    : [ x ] NO SILVA CATHETER, [   ] SILVA CATHETER- no meatal tear, no discharge, [   ] other:                                            MSK: [  ] No joint swelling, Full ROM,   [ x  ] other:  Surgical incision along cervical spine c/d/i.                                      Ext: [ x ]No C/C/E, No calf tenderness,   [   ]other:    Skin: [  ]intact,   [ x  ] other: Surgical incision along cervical spine c/d/i.                                                                  Neurological Examination:  Cognitive: [  x  ] AAO x 3,   [    ]  other: A&Ox3. Oriented to person, place and time. Follows 2 step commands, language components intact. No aphasia.   Attention:  [  x  ] intact,   [    ]  other:  intact spelling and simple calculations                Memory: [  x  ] intact,    [    ]  other: intact registration and recall    Mood/Affect: [  x  ] wnl,    [    ]  other:                                                                             Communication: [  x  ]Fluent, no dysarthria, following commands:  [    ] other:   CN II - XII:  [  x  ] intact,  [    ] other:                                                                                        Motor:   RIGHT UE: [  ] WNL,  [ x  ] other: 3-/5 shoulder abduction limited by neck/back pain, 4/5 elbow flexion/extension, 5/5 hand   LEFT    UE: [  ] WNL,  [  x ] other: 3-/5 shoulder abduction limited by neck/back pain, 4/5 elbow flexion/extension, 5/5 hand   RIGHT LE: [ x ] WNL,  [   ] other: 5/5 hip flexion, knee flexion/extension, ankle dorsiflexion/plantarflexion  LEFT    LE: [ x ] WNL,  [   ] other: 5/5 hip flexion, knee flexion/extension, ankle dorsiflexion/plantarflexion    Tone: [  x  ] wnl,   [    ]  other:  DTRs: [ x  ]symmetric, [   ] other:  Coordination:   [ x  ] intact,   [    ] other:                                                                           Sensory: [ x  ] Intact to light touch,   [    ] other:    Clonus negative

## 2023-04-28 NOTE — H&P ADULT - NSHPLABSRESULTS_GEN_ALL_CORE
LABS                           10.5   10.71 )-----------( 171      ( 27 Apr 2023 05:47 )             32.5     04-27    137  |  98  |  12  ----------------------------<  105<H>  4.1   |  30  |  0.5<L>    Ca    9.1      27 Apr 2023 05:47  Phos  3.4     04-27  Mg     1.9     04-27          IMAGING   CT Cervical Spine No Cont (04.20.23 @ 13:36) >  1.  Numerous lytic lesions with associated soft tissue throughout the  visualized skull base and spine consistent with extensive osseous  metastatic disease.  2.  Epidural component of tumor is noted from C1-C4.  3.  Tumor essentially replaces C1, C2 and C3 and there is pathologic   fracture of C2.    MR Cervical Spine w/ IV Cont (04.21.23 @ 12:48) >  CERVICAL SPINE:  Numerous enhancing osseous metastatic lesions are noted throughout the  cervical spine most significant at the C2 and C3 level with evidence of  epidural extension causing severe spinal stenosis and mass effect upon the spinal cord at C2-C3.  THORACIC SPINE:  Numerous enhancing osseous metastatic lesions with malignant compression  deformity noted of the T8 vertebral body. No evidence of extraosseous extension.  LUMBAR SPINE:  Numerous enhancing osseous metastatic lesions without extraosseous extension or compression deformity.    CT Head No Cont (04.21.23 @ 16:58) >  Numerous osseous lytic lesions consistent with metastases.No intracranial hemorrhage, mass effect or midline shift. Please note evaluation for intracranial metastatic disease is limited without intravenous contrast. Contrast enhanced brain MRI is recommended for further evaluation if not clinically contraindicated.    CT Chest w/ IV Cont (04.21.23 @ 17:06) >    CHEST:  Right breast mass measuring approximately 2 x 2.8 cm with nipple retraction.  Multiple enlarged mediastinal lymph nodes including enlarged lower paratracheal lymph nodes measuring up to 1.6 cm. Multiple mixed lytic and sclerotic sternal and rib lesions. Sclerotic T8   vertebral body lesion. Additional thoracic spine lesions, better visualized on recent MRI.    ABDOMEN/PELVIS:  Mixed lytic and sclerotic lesion of the L5 vertebral body. Additional lumbar spine lesions, better visualized on recent MRI.

## 2023-04-28 NOTE — DISCHARGE NOTE PROVIDER - NSDCMRMEDTOKEN_GEN_ALL_CORE_FT
acetaminophen 325 mg oral tablet: 2 tab(s) orally every 6 hours As needed Mild Pain (1 - 3)  bisacodyl 5 mg oral delayed release tablet: 1 tab(s) orally once a day (at bedtime)  chlorhexidine 2% topical pad: 1 Apply topically to affected area  enoxaparin: 30 milligram(s) subcutaneously every 12 hours  famotidine 20 mg oral tablet: 1 tab(s) orally every 12 hours  gabapentin 300 mg oral capsule: 1 cap(s) orally every 8 hours  labetalol 100 mg oral tablet: 1 tab(s) orally every 8 hours  levothyroxine 50 mcg (0.05 mg) oral tablet: 1 tab(s) orally once a day  loratadine 10 mg oral tablet: 1 tab(s) orally once a day (at bedtime)  melatonin 3 mg oral tablet: 1 tab(s) orally once a day (at bedtime)  methocarbamol 750 mg oral tablet: 1 tab(s) orally every 8 hours  morphine: 1 milligram(s) intravenous every 6 hours as needed for  severe pain  naloxone: 0.1 milligram(s) injectable every 3 minutes as needed for overdose  ocular lubricant ophthalmic solution: 1 drop(s) to each affected eye 2 times a day  ondansetron 2 mg/mL injectable solution: 4 milligram(s) injectable every 6 hours as needed for N/V  oxyCODONE 10 mg oral tablet: 1 tab(s) orally every 4 hours As needed Moderate Pain (4 - 6)  polyethylene glycol 3350 oral powder for reconstitution: 17 gram(s) orally once a day  polymyxin B-trimethoprim 10,000 units-1 mg/mL ophthalmic solution: 1 drop(s) to each affected eye 4 times a day  senna leaf extract oral tablet: 2 tab(s) orally once a day (at bedtime)

## 2023-04-28 NOTE — H&P ADULT - REASON FOR ADMISSION
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

## 2023-04-28 NOTE — DISCHARGE NOTE NURSING/CASE MANAGEMENT/SOCIAL WORK - NSDCPEFALRISK_GEN_ALL_CORE
For information on Fall & Injury Prevention, visit: [email or web address] OR  [email or web address] OR  https://www.cdc.gov/steadi/patient.html

## 2023-04-28 NOTE — PROGRESS NOTE ADULT - REASON FOR ADMISSION
Neck pain

## 2023-04-28 NOTE — PROGRESS NOTE ADULT - PROVIDER SPECIALTY LIST ADULT
Hospitalist
Neurosurgery
Internal Medicine
Internal Medicine
Neurosurgery
Neurosurgery
Hospitalist
Internal Medicine
Neurosurgery
Palliative Care
Physiatry
Neurosurgery
NSICU

## 2023-04-28 NOTE — PROGRESS NOTE ADULT - ASSESSMENT
a 57F with PMH hypothyroidism, suspected alcoholic cirrhosis (stopped drinking over 9 yrs ago), Presented 4/20 for neck pain w/ limited movement /2 pain x4 months; Denies ataxia, imbalance, or difficulty with ambulation. Denies any numbness, tingling, paresthesias, weakness, saddle anesthesia, or bowel / bladder incontinence. Saw her chiropractor, MRI was done - reported concern for lesions. Pt referred to Dr. Peña's- who she saw 4/20. Pt reports Dr. Peña gave dx of possible breast cancer with metastasis and referred pt to ED.     #Severe neck pain 2/2 likely metastatic lesions at Cervical, Thoracic, Lumbar with multiple metastatic lesions confirmed w MRI   #s/p Occiput-T2 instrumentation and fusion with ORIF of C2  #Suspected breast cancer - no biopsy to confirm    *PE shows R breast with nipple retraction and a firm nodule close proximity to nipple at 3 o'clock   *pt never had mammogram or routine cancer screening   - CT Chest/AP  Right breast mass,  Multiple enlarged mediastinal lymph.   - multiple bony lesion throughout the body  - Neurosurgery Eval'd 4/22: Needs Hard collar [place] when OOB; Stabilization surgery; no steroids at this time; no PT until after stabilization  - Oncology Consulted: will need biopsy before additional tx plan recs, also MRI brain w contrast to r/u Mets   - Radonc consulted: She likely will need surgical stabilization in her neck. Please obtain MRI first and discuss possible surgical intervention w/ neurosurgery. If she is not a surgical candidate and there is signs of cord compression, then it is possible to do RT urgently without tissue biopsy. Otherwise, please obtain tissue diagnosis and complete CT C/A/P first; Can start decadron.   - Biopsy - possibly consult surgery team for bedside breast biopsy   - following with Dr. Peña outpatient  - plans per NeuroSx   - pt/ot/rehab- patient going to 4A  -pain management     #Hypercalcemia likely 2/2 malignancy  #Elevated AlkPhos likely 2/2 malignancy  - resolved s/p IVF     #Sinus tachycardia likely 2/2 dehydration and pain  #Hypertensive urgency likely 2/2  pain    #Rt eye redness likely conjunctivitis   - c/w Polytrim     # hypothyroidism   - TSH (4/21) WNL   - c/w levothyroxine 50mcg OD    DVT ppx     as per neurosurgery patient pending [facility] for rehab

## 2023-04-29 LAB
ALBUMIN SERPL ELPH-MCNC: 3.3 G/DL — LOW (ref 3.5–5.2)
ALP SERPL-CCNC: 215 U/L — HIGH (ref 30–115)
ALT FLD-CCNC: 17 U/L — SIGNIFICANT CHANGE UP (ref 0–41)
ANION GAP SERPL CALC-SCNC: 16 MMOL/L — HIGH (ref 7–14)
AST SERPL-CCNC: 30 U/L — SIGNIFICANT CHANGE UP (ref 0–41)
BASOPHILS # BLD AUTO: 0.07 K/UL — SIGNIFICANT CHANGE UP (ref 0–0.2)
BASOPHILS NFR BLD AUTO: 0.7 % — SIGNIFICANT CHANGE UP (ref 0–1)
BILIRUB SERPL-MCNC: 0.5 MG/DL — SIGNIFICANT CHANGE UP (ref 0.2–1.2)
BUN SERPL-MCNC: 12 MG/DL — SIGNIFICANT CHANGE UP (ref 10–20)
CALCIUM SERPL-MCNC: 9 MG/DL — SIGNIFICANT CHANGE UP (ref 8.4–10.5)
CHLORIDE SERPL-SCNC: 95 MMOL/L — LOW (ref 98–110)
CO2 SERPL-SCNC: 26 MMOL/L — SIGNIFICANT CHANGE UP (ref 17–32)
CREAT SERPL-MCNC: 0.6 MG/DL — LOW (ref 0.7–1.5)
EGFR: 104 ML/MIN/1.73M2 — SIGNIFICANT CHANGE UP
EOSINOPHIL # BLD AUTO: 0.17 K/UL — SIGNIFICANT CHANGE UP (ref 0–0.7)
EOSINOPHIL NFR BLD AUTO: 1.6 % — SIGNIFICANT CHANGE UP (ref 0–8)
GLUCOSE SERPL-MCNC: 77 MG/DL — SIGNIFICANT CHANGE UP (ref 70–99)
HCT VFR BLD CALC: 31.6 % — LOW (ref 37–47)
HGB BLD-MCNC: 10.1 G/DL — LOW (ref 12–16)
IMM GRANULOCYTES NFR BLD AUTO: 0.8 % — HIGH (ref 0.1–0.3)
LYMPHOCYTES # BLD AUTO: 1.41 K/UL — SIGNIFICANT CHANGE UP (ref 1.2–3.4)
LYMPHOCYTES # BLD AUTO: 13.3 % — LOW (ref 20.5–51.1)
MAGNESIUM SERPL-MCNC: 1.7 MG/DL — LOW (ref 1.8–2.4)
MCHC RBC-ENTMCNC: 28 PG — SIGNIFICANT CHANGE UP (ref 27–31)
MCHC RBC-ENTMCNC: 32 G/DL — SIGNIFICANT CHANGE UP (ref 32–37)
MCV RBC AUTO: 87.5 FL — SIGNIFICANT CHANGE UP (ref 81–99)
MONOCYTES # BLD AUTO: 1.05 K/UL — HIGH (ref 0.1–0.6)
MONOCYTES NFR BLD AUTO: 9.9 % — HIGH (ref 1.7–9.3)
NEUTROPHILS # BLD AUTO: 7.84 K/UL — HIGH (ref 1.4–6.5)
NEUTROPHILS NFR BLD AUTO: 73.7 % — SIGNIFICANT CHANGE UP (ref 42.2–75.2)
NRBC # BLD: 0 /100 WBCS — SIGNIFICANT CHANGE UP (ref 0–0)
PLATELET # BLD AUTO: 257 K/UL — SIGNIFICANT CHANGE UP (ref 130–400)
PMV BLD: 9.9 FL — SIGNIFICANT CHANGE UP (ref 7.4–10.4)
POTASSIUM SERPL-MCNC: 4 MMOL/L — SIGNIFICANT CHANGE UP (ref 3.5–5)
POTASSIUM SERPL-SCNC: 4 MMOL/L — SIGNIFICANT CHANGE UP (ref 3.5–5)
PROT SERPL-MCNC: 5.6 G/DL — LOW (ref 6–8)
RBC # BLD: 3.61 M/UL — LOW (ref 4.2–5.4)
RBC # FLD: 13.9 % — SIGNIFICANT CHANGE UP (ref 11.5–14.5)
SODIUM SERPL-SCNC: 137 MMOL/L — SIGNIFICANT CHANGE UP (ref 135–146)
WBC # BLD: 10.62 K/UL — SIGNIFICANT CHANGE UP (ref 4.8–10.8)
WBC # FLD AUTO: 10.62 K/UL — SIGNIFICANT CHANGE UP (ref 4.8–10.8)

## 2023-04-29 RX ADMIN — OXYCODONE HYDROCHLORIDE 10 MILLIGRAM(S): 5 TABLET ORAL at 18:50

## 2023-04-29 RX ADMIN — Medication 1 DROP(S): at 05:22

## 2023-04-29 RX ADMIN — GABAPENTIN 300 MILLIGRAM(S): 400 CAPSULE ORAL at 05:09

## 2023-04-29 RX ADMIN — OXYCODONE HYDROCHLORIDE 10 MILLIGRAM(S): 5 TABLET ORAL at 21:57

## 2023-04-29 RX ADMIN — FAMOTIDINE 20 MILLIGRAM(S): 10 INJECTION INTRAVENOUS at 17:46

## 2023-04-29 RX ADMIN — GABAPENTIN 300 MILLIGRAM(S): 400 CAPSULE ORAL at 14:12

## 2023-04-29 RX ADMIN — LORATADINE 10 MILLIGRAM(S): 10 TABLET ORAL at 21:16

## 2023-04-29 RX ADMIN — Medication 650 MILLIGRAM(S): at 14:14

## 2023-04-29 RX ADMIN — Medication 1 DROP(S): at 21:17

## 2023-04-29 RX ADMIN — OXYCODONE HYDROCHLORIDE 10 MILLIGRAM(S): 5 TABLET ORAL at 14:16

## 2023-04-29 RX ADMIN — METHOCARBAMOL 750 MILLIGRAM(S): 500 TABLET, FILM COATED ORAL at 21:17

## 2023-04-29 RX ADMIN — Medication 1 DROP(S): at 12:35

## 2023-04-29 RX ADMIN — Medication 650 MILLIGRAM(S): at 12:31

## 2023-04-29 RX ADMIN — POLYETHYLENE GLYCOL 3350 17 GRAM(S): 17 POWDER, FOR SOLUTION ORAL at 12:35

## 2023-04-29 RX ADMIN — METHOCARBAMOL 750 MILLIGRAM(S): 500 TABLET, FILM COATED ORAL at 05:10

## 2023-04-29 RX ADMIN — OXYCODONE HYDROCHLORIDE 10 MILLIGRAM(S): 5 TABLET ORAL at 17:45

## 2023-04-29 RX ADMIN — Medication 1 DROP(S): at 06:19

## 2023-04-29 RX ADMIN — Medication 100 MILLIGRAM(S): at 05:09

## 2023-04-29 RX ADMIN — OXYCODONE HYDROCHLORIDE 10 MILLIGRAM(S): 5 TABLET ORAL at 05:15

## 2023-04-29 RX ADMIN — OXYCODONE HYDROCHLORIDE 10 MILLIGRAM(S): 5 TABLET ORAL at 09:16

## 2023-04-29 RX ADMIN — ENOXAPARIN SODIUM 30 MILLIGRAM(S): 100 INJECTION SUBCUTANEOUS at 17:46

## 2023-04-29 RX ADMIN — ENOXAPARIN SODIUM 30 MILLIGRAM(S): 100 INJECTION SUBCUTANEOUS at 05:09

## 2023-04-29 RX ADMIN — GABAPENTIN 300 MILLIGRAM(S): 400 CAPSULE ORAL at 21:17

## 2023-04-29 RX ADMIN — Medication 100 MILLIGRAM(S): at 14:12

## 2023-04-29 RX ADMIN — Medication 50 MICROGRAM(S): at 05:09

## 2023-04-29 RX ADMIN — Medication 3 MILLIGRAM(S): at 21:16

## 2023-04-29 RX ADMIN — OXYCODONE HYDROCHLORIDE 10 MILLIGRAM(S): 5 TABLET ORAL at 04:40

## 2023-04-29 RX ADMIN — Medication 1 DROP(S): at 17:37

## 2023-04-29 RX ADMIN — Medication 1 DROP(S): at 17:57

## 2023-04-29 RX ADMIN — FAMOTIDINE 20 MILLIGRAM(S): 10 INJECTION INTRAVENOUS at 05:09

## 2023-04-29 RX ADMIN — Medication 100 MILLIGRAM(S): at 21:16

## 2023-04-29 RX ADMIN — CHLORHEXIDINE GLUCONATE 1 APPLICATION(S): 213 SOLUTION TOPICAL at 05:10

## 2023-04-29 RX ADMIN — METHOCARBAMOL 750 MILLIGRAM(S): 500 TABLET, FILM COATED ORAL at 14:13

## 2023-04-29 NOTE — PROGRESS NOTE ADULT - ATTENDING COMMENTS
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 Pt seen and examined with the resident. The treatment plan discussed. Agree with the above.

## 2023-04-29 NOTE — PROGRESS NOTE ADULT - SUBJECTIVE AND OBJECTIVE BOX
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 alcoholic cirrhosis (stopped drinking over 9 yrs ago), presenting for neck pain on 4/20. Pt started having neck pain almost 4 months ago for which her chiropractor ordered an MRI that was done 1 month ago. Pt was not sure what the read of the MRI was but there was concern about lesions so she went to see Dr. Peña's office for the first time. Dr. Peña examined her and told her she has breast cancer with metastasis and needs to come to the ED for further evaluation. Pt is a non smoker and has no family history of breast Ca. Denies any significant weight loss, night sweats, or fevers. No reported bloody discharge from her nipples. Upon examining pt's right breast, dimpling with a lump was found. Pt believes that she noticed this change almost over a yr a go, but did not think of it was anything serious. Pt's neck pain has been significantly worsening limiting her ability to move her neck. She only has pain when moving her neck and has full sensation and ROM of her b/l UE. Denies any SOB, chest pain, or palpitations. States that she has pain with ROM, and states that it is worse with movement ex moving out of bed. Patient states that pain is mostly midline at the base of head, with radiation to her paraspinal muscles. Denies any radicular pain to UE & LE. States that she has been sleeping only in a recliner and has since then been developing worsening lower back pain. Denies any ataxia, imbalance, or difficulty with ambulation. Denies any numbness, tingling, paresthesias, weakness, saddle anesthesia, or bowel / bladder incontinence. Neurosurgery was consulted. On 4.24.23 she was taken to the operating room where she underwent an Occiput to T2 instrumentation and Fusion, ORIF of C2. She had a hemovac drain placed intraop which was removed on POD#3. She was on a PCA pump postop which was removed on POD#3. She remained neurologically stable postop.      Patient is tolerating bedside PT, OT, and SLP. The patient was evaluated by the PM&R team once medically stable. The patient was found to have functional limitations in terms of muscle strength, endurance, physical mobility, ability to carry out activities of daily living including: self-care, transfers, and ambulation. Participating with bedside therapeutic exercises and cognitive retraining. The patient is motivated and is able to tolerate up to 3 hours of daily therapy for 5-6 days a week for a total of 15 hours a week. Evaluated by Physiatry and deemed to be a good candidate for admission to  for acute inpatient rehab. Admitted to Acute Rehab on 4/28/23.    PMHx/PSHx: as above  SHx: history of alcohol use disorder - quit 9 years ago, denies smoking and illicit drug use   FHx: non-contributory   Allergies: NKDA  Prior level of function: independent with ambulation and ADLs without assistive device although had been using straight cane for a few months and the last 2 weeks prior to presentation reports being essentially chairbound due to neck/back pain and difficulty with mobility.   Living situation: Pt resides with  in 2nd floor apt using 8 steps to enter and a flight of stairs to access.   Current level of function:  SLP 4/26: Some complaints of globus w/ solids Recommend: continue easy to chew w/ thin  PT 4/28: max assist for bed mobility, mod assist for transfers, min assist for ambulation 40ft with RW  OT 4/27: dependent for lower body dressing      (28 Apr 2023 14:46)      I examined the patient and reviewed the chart. There have been no significant changes since my history and physical except where documented below.    TODAY'S SUBJECTIVE & REVIEW OF SYMPTOMS:  No acute events. Patient feeling well. No major complaints. Starting therapies.   Having regular bowel movements. Voiding freely without issues.   Pain controlled. Original plan was for hard cervical collar when OOB but collars available do not fit so soft collar to be worn when OOB - confirmed with Neurosurgery team.         Review of Systems: Constitutional:    [  ] WNL           [   ] insomnia   [   ] tired  [ x ] recent weight loss and poor appetite - reporting depressed mood at home   Cardio:             [ x ] WNL           [   ] CP   [   ] SANCHEZ   [   ] palpitations         Resp:                [ x ] WNL           [   ] SOB   [   ] cough   [   ] wheezing  GI:                    [ x ] WNL           [   ] constipation   [   ] diarrhea   [   ] abdominal pain   [   ] nausea   [   ] emesis    :                   [ x ] WNL           [   ] SILVA  [   ] dysuria   [   ] difficulty voiding            Endo:                [ x ] WNL          [   ] polyuria   [   ] temperature intolerance                Skin:                  [  ] WNL          [   ] pain   [ x  ] Surgical incision along cervical spine c/d/i. [   ] rash  MSK:                 [  ] WNL          [   ] muscle pain   [ x  ] midline neck and low back pain  [   ] muscle tenderness   [   ] swelling  Neuro:              [  ] WNL          [   ] HA   [   ] change in vision   [   ] tremor   [   ] weakness   [  x  ]dysphagia             Cognitive:          [ x ] WNL          [   ]confusion     Psych:                [ x ] WNL          [   ] hallucinations   [   ]agitation   [   ] delusion   [   ]depression    PHYSICAL EXAM    Vital Signs Last 24 Hrs  T(C): 37.2 (29 Apr 2023 05:58), Max: 37.2 (29 Apr 2023 05:58)  T(F): 98.9 (29 Apr 2023 05:58), Max: 98.9 (29 Apr 2023 05:58)  HR: 102 (29 Apr 2023 05:58) (87 - 107)  BP: 117/80 (29 Apr 2023 05:58) (104/60 - 152/62)  BP(mean): --  RR: 18 (29 Apr 2023 05:58) (18 - 20)  SpO2: 95% (28 Apr 2023 17:15) (95% - 95%)    General:[ x ] NAD, Resting Comfortable,   [   ] other:                                HEENT: [ x ] NC/AT, EOMI, PERRL , Normal Conjunctivae,   [   ] other:  Cardio: [ x ] RRR, no murmer,   [   ] other:                              Pulm: [ x ] No Respiratory Distress,  Lungs CTAB,   [   ] other:                       Abdomen: [ x ]ND/NT, Soft,   [   ] other:    : [ x ] NO SILVA CATHETER, [   ] SILVA CATHETER- no meatal tear, no discharge, [   ] other:                                            MSK: [  ] No joint swelling, Full ROM,   [ x  ] other:  Surgical incision along cervical spine c/d/i.                                      Ext: [ x ]No C/C/E, No calf tenderness,   [   ]other:    Skin: [  ]intact,   [ x  ] other: Surgical incision along cervical spine c/d/i.                                                                  Neurological Examination:  Cognitive: [  x  ] AAO x 3,   [    ]  other: A&Ox3. Oriented to person, place and time. Follows 2 step commands, language components intact. No aphasia.   Attention:  [  x  ] intact,   [    ]  other:  intact spelling and simple calculations                Memory: [  x  ] intact,    [    ]  other: intact registration and recall    Mood/Affect: [  x  ] wnl,    [    ]  other:                                                                             Communication: [  x  ]Fluent, no dysarthria, following commands:  [    ] other:   CN II - XII:  [  x  ] intact,  [    ] other:                                                                                        Motor:   RIGHT UE: [  ] WNL,  [ x  ] other: 3-/5 shoulder abduction limited by neck/back pain, 4/5 elbow flexion/extension, 5/5 hand   LEFT    UE: [  ] WNL,  [  x ] other: 3-/5 shoulder abduction limited by neck/back pain, 4/5 elbow flexion/extension, 5/5 hand   RIGHT LE: [ x ] WNL,  [   ] other: 5/5 hip flexion, knee flexion/extension, ankle dorsiflexion/plantarflexion  LEFT    LE: [ x ] WNL,  [   ] other: 5/5 hip flexion, knee flexion/extension, ankle dorsiflexion/plantarflexion    Tone: [  x  ] wnl,   [    ]  other:  DTRs: [ x  ]symmetric, [   ] other:  Coordination:   [ x  ] intact,   [    ] other:                                                                           Sensory: [ x  ] Intact to light touch,   [    ] other:    Clonus negative    MEDICATIONS  (STANDING):  artificial  tears Solution 1 Drop(s) Both EYES two times a day  bisacodyl 5 milliGRAM(s) Oral at bedtime  chlorhexidine 2% Cloths 1 Application(s) Topical <User Schedule>  enoxaparin Injectable 30 milliGRAM(s) SubCutaneous every 12 hours  famotidine    Tablet 20 milliGRAM(s) Oral every 12 hours  gabapentin 300 milliGRAM(s) Oral every 8 hours  labetalol 100 milliGRAM(s) Oral every 8 hours  levothyroxine 50 MICROGram(s) Oral daily  loratadine 10 milliGRAM(s) Oral at bedtime  melatonin 3 milliGRAM(s) Oral at bedtime  methocarbamol 750 milliGRAM(s) Oral every 8 hours  polyethylene glycol 3350 17 Gram(s) Oral daily  senna 2 Tablet(s) Oral at bedtime  trimethoprim/polymyxin Solution 1 Drop(s) Right EYE four times a day    MEDICATIONS  (PRN):  acetaminophen     Tablet .. 650 milliGRAM(s) Oral every 6 hours PRN Mild Pain (1 - 3)  naloxone Injectable 0.1 milliGRAM(s) IV Push every 3 minutes PRN For ANY of the following changes in patient status:  A. RR LESS THAN 10 breaths per minute, B. Oxygen saturation LESS THAN 90%, C. Sedation score of 6  ondansetron Injectable 4 milliGRAM(s) IV Push every 6 hours PRN Nausea and/or Vomiting  oxyCODONE    IR 10 milliGRAM(s) Oral every 4 hours PRN Moderate Pain (4 - 6)        RECENT LABS/IMAGING                        10.1   10.62 )-----------( 257      ( 29 Apr 2023 07:00 )             31.6     04-29    137  |  95<L>  |  12  ----------------------------<  77  4.0   |  26  |  0.6<L>    Ca    9.0      29 Apr 2023 07:00  Mg     1.7     04-29    TPro  5.6<L>  /  Alb  3.3<L>  /  TBili  0.5  /  DBili  x   /  AST  30  /  ALT  17  /  AlkPhos  215<H>  04-29

## 2023-04-29 NOTE — PROGRESS NOTE ADULT - ASSESSMENT
57F with PMH hypothyroidism, suspected alcoholic cirrhosis (stopped drinking over 9 yrs ago), Presented 4/20 for neck pain w/ limited movement /2 pain x4 months. Admitted for management of suspected breast CA with metastases to the spine now s/p Occiput-T2 instrumentation and fusion with ORIF of C2.    Prior to current illness and functional decline, patient was independent with ADLs and ambulation. Patient now requires assistance with ambulation and ADLs 2/2 neck/back pain, gait dysfunction, and deconditioning. Patient also with medical comorbidities of hypothyroidism, sinus tachycardia, hypertensive urgency and h/o cirrhosis, requiring medication management and oversight by Physiatry team and nursing. May also need specialist consulting from Neurosurgery. Patient is a complex medical case with mixed Neurosurgical, oncological, and medical issues. There are significant comorbidities which warrants acute rehab hospitalization. To return home it is in the patient’s best interest to have acute inpatient rehabilitative services to undergo intensive interdisciplinary therapy. Furthermore, the patient is motivated and is able to tolerate up to 3 hours of daily therapy for 5-6 days a week for a total of 15 hours a week. Evaluated by Physiatry and deemed to be a good candidate for admission to  for acute inpatient rehab. Admitted to Acute Rehab on 4/28/23.     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   - MRI spine: Numerous enhancing osseous metastatic lesions are noted throughout the cervical/thoracic/lumbar spine (most significant at the C2 and C3 level with evidence of  epidural extension causing severe spinal stenosis and mass effect). Malignant compression  deformity noted of the T8 vertebral body   - Neurosurgery Eval 4/22: Needs Hard collar [place] J when OOB  - Oncology Consulted: will need biopsy before additional tx plan recs, also MRI brain w contrast to r/u Mets   - Radonc consulted 4/27: will need radiation to the C spine (no sooner than 2 weeks after surgery)  -Hemovac discontinued 4/27  -Per Neurosurgery: plan was for [place] cervical collar when OOB but collars available do not fit so soft collar to be worn when OOB - confirmed with Neurosurgery team.   -Pain medications: previously evaluated by pain management. Gabapentin 300mg PO q8h, Tylenol 650mg q6h PRN mild pain, Oxycodone 10mg PO q4h PRN moderate pain  -following with Dr. Peña outpatient    #Dysphagia  - mild, since surgery  - will get swallow SLP eval    #Hypercalcemia likely 2/2 malignancy  #Elevated AlkPhos likely 2/2 malignancy  - resolved s/p IVF   -Will monitor BMP and trend electrolytes     #Sinus tachycardia likely 2/2 dehydration and pain  #Hypertensive urgency likely 2/2  pain  -resolved   - C/w labetalol 100 q8h  - Monitor vitals and adjust antihypertensives accordingly     #Rt eye redness likely conjunctivitis   - c/w Polytrim     # hypothyroidism   - TSH (4/21) WNL   - c/w levothyroxine 50mcg OD    #Maintenance   - Pain control: as above  - GI/Bowel Mgmt: no issues at this time, monitor for BMs.   - Bladder management: voiding freely, no issues at this time.   - Skin: monitor cervical surgical wound, Neurosurgery f/u as needed   - FEN: Hypercalcemia resolved as above. Monitor electrolytes and fluid status.   - Diet: DASH/TLC easy to chew diet     #Precautions / PROPHYLAXIS:    - Falls  - Ortho: Weight bearing status: WBAT    - DVT prophylaxis: Lovenox 30 SQ daily         MEDICAL PROGNOSIS: GOOD            REHAB POTENTIAL: GOOD             ESTIMATED DISPOSITION: HOME WITH HOME CARE       [ x ]  The goals of the IRF admission were discussed with the patient and family member, who agreed             ELOS:  [  x   ] 7-14    [    ]  14-21    [    ]  Other    THERAPY ORDERS and INITIAL INDIVIDUALIZED PLAN OF CARE:  This initial individualized interdisciplinary plan of care, which was established by me (the attending physiatrist), is based on elements from the post admission evaluation. The interdisciplinary therapy program is to be at least 3 hrs a day, at least 5 days per week from from physical, occupational and/ or speech therapies as ordered by me below.      [ x  ] P.T. 90 mins. /day at least 5 out of 7 days:  [  x ] superficial  modalities prn, [ x  ] A/AAROM, [ x  ] PREs, [ x  ] transfer training,            [ x  ] progressive ambulation, [x   ] stairs                                               [ x  ] O.T. 90 mins. /day at least 5 out of 7 days::  [ x  ] modalities prn,  [ x  ]A/AAROM, [ x  ] PREs, [  x ] functional transfer training, [ x  ] ADL training           [   ] cognitive/ perceptual eval and training, [   ] splint eval                                                  [ x ] S.L.P:  [ x ] speech eval, [ x ] swallow eval     [  x ] Neuropsychology eval     [ x  ] Individualized rec. therapy      RATIONALE FOR INPATIENT ADMISSION - Patient demonstrates the following: (check all that apply)  [X] Medically appropriate for acute rehabilitation admission. Requires interdisiplinary therapy consisting of at least PT and OT, at least 3 hrs. a day at least 5 days a week  [X] Has attainable rehab goals with an appropriate initial discharge plan  [X] Has rehabilitation potential (expected to make a significant improvement within a reasonable period of time)  [X] Requires close medical management by a rehab physician, rehab nursing care,  and comprehensive interdisciplinary team (including PT, OT)    [X] Requires evaluation by a physiatrist at least 3 days a week to evaluate and manage and coordinate rehab and medical problems    Prior to CVA, patient was independent with ADLs and ambulation without an assistive device and was working full time. Patient now requires mod-max assistance with ambulation and ADLs 2/2 left brunilda, ataxia, gait dysfunction, and deconditioning. Therefore, there is a significant functional decline from baseline. Patient also with medical comorbidities of HTN, requiring medication management and monitoring of vitals by Physiatry team and nursing. Alcohol abuse also requiring oversight by Physiatry and nursing. May also need specialist consulting from Neurology for patient’s CVA. Patient is a complex medical case with mixed orthopedic, medical and neurologic issues. There are significant comorbidities which warrants acute rehab hospitalization. To return home it is in the patient’s best interest to have acute inpatient rehabilitative services to undergo intensive interdisciplinary therapy. Of note, patient lives alone and would have difficulty performing ADLs and iADLs individually. Furthermore, the patient is motivated and is able to tolerate up to 3 hours of daily therapy for 5-6 days a week for a total of 15 hours a week. Evaluated by Physiatry and deemed to be a good candidate for admission to  for acute inpatient rehab. Admitted to Acute Rehab on 4/5/23.

## 2023-04-30 LAB
BILIRUB SERPL-MCNC: 0.4 MG/DL — SIGNIFICANT CHANGE UP (ref 0.2–1.2)
CREAT SERPL-MCNC: 0.5 MG/DL — LOW (ref 0.7–1.5)
EGFR: 109 ML/MIN/1.73M2 — SIGNIFICANT CHANGE UP
INR BLD: 1.12 RATIO — SIGNIFICANT CHANGE UP (ref 0.65–1.3)
MELD SCORE WITH DIALYSIS: 21 POINTS — SIGNIFICANT CHANGE UP
MELD SCORE WITHOUT DIALYSIS: 8 POINTS — SIGNIFICANT CHANGE UP
PROTHROM AB SERPL-ACNC: 12.8 SEC — SIGNIFICANT CHANGE UP (ref 9.95–12.87)
SODIUM SERPL-SCNC: 137 MMOL/L — SIGNIFICANT CHANGE UP (ref 135–146)

## 2023-04-30 RX ADMIN — GABAPENTIN 300 MILLIGRAM(S): 400 CAPSULE ORAL at 13:46

## 2023-04-30 RX ADMIN — Medication 1 DROP(S): at 17:29

## 2023-04-30 RX ADMIN — OXYCODONE HYDROCHLORIDE 10 MILLIGRAM(S): 5 TABLET ORAL at 10:25

## 2023-04-30 RX ADMIN — Medication 1 DROP(S): at 06:23

## 2023-04-30 RX ADMIN — Medication 650 MILLIGRAM(S): at 23:14

## 2023-04-30 RX ADMIN — Medication 100 MILLIGRAM(S): at 21:29

## 2023-04-30 RX ADMIN — Medication 3 MILLIGRAM(S): at 21:29

## 2023-04-30 RX ADMIN — OXYCODONE HYDROCHLORIDE 10 MILLIGRAM(S): 5 TABLET ORAL at 08:03

## 2023-04-30 RX ADMIN — Medication 1 DROP(S): at 21:30

## 2023-04-30 RX ADMIN — METHOCARBAMOL 750 MILLIGRAM(S): 500 TABLET, FILM COATED ORAL at 13:46

## 2023-04-30 RX ADMIN — Medication 650 MILLIGRAM(S): at 12:19

## 2023-04-30 RX ADMIN — METHOCARBAMOL 750 MILLIGRAM(S): 500 TABLET, FILM COATED ORAL at 06:20

## 2023-04-30 RX ADMIN — OXYCODONE HYDROCHLORIDE 10 MILLIGRAM(S): 5 TABLET ORAL at 17:29

## 2023-04-30 RX ADMIN — CHLORHEXIDINE GLUCONATE 1 APPLICATION(S): 213 SOLUTION TOPICAL at 06:20

## 2023-04-30 RX ADMIN — OXYCODONE HYDROCHLORIDE 10 MILLIGRAM(S): 5 TABLET ORAL at 06:24

## 2023-04-30 RX ADMIN — FAMOTIDINE 20 MILLIGRAM(S): 10 INJECTION INTRAVENOUS at 06:21

## 2023-04-30 RX ADMIN — METHOCARBAMOL 750 MILLIGRAM(S): 500 TABLET, FILM COATED ORAL at 21:30

## 2023-04-30 RX ADMIN — Medication 50 MICROGRAM(S): at 06:21

## 2023-04-30 RX ADMIN — Medication 100 MILLIGRAM(S): at 13:46

## 2023-04-30 RX ADMIN — FAMOTIDINE 20 MILLIGRAM(S): 10 INJECTION INTRAVENOUS at 17:28

## 2023-04-30 RX ADMIN — Medication 1 DROP(S): at 06:22

## 2023-04-30 RX ADMIN — GABAPENTIN 300 MILLIGRAM(S): 400 CAPSULE ORAL at 21:30

## 2023-04-30 RX ADMIN — ENOXAPARIN SODIUM 30 MILLIGRAM(S): 100 INJECTION SUBCUTANEOUS at 17:27

## 2023-04-30 RX ADMIN — GABAPENTIN 300 MILLIGRAM(S): 400 CAPSULE ORAL at 06:21

## 2023-04-30 RX ADMIN — OXYCODONE HYDROCHLORIDE 10 MILLIGRAM(S): 5 TABLET ORAL at 02:03

## 2023-04-30 RX ADMIN — LORATADINE 10 MILLIGRAM(S): 10 TABLET ORAL at 21:29

## 2023-04-30 RX ADMIN — OXYCODONE HYDROCHLORIDE 10 MILLIGRAM(S): 5 TABLET ORAL at 20:19

## 2023-04-30 RX ADMIN — Medication 100 MILLIGRAM(S): at 06:21

## 2023-04-30 RX ADMIN — ENOXAPARIN SODIUM 30 MILLIGRAM(S): 100 INJECTION SUBCUTANEOUS at 06:20

## 2023-04-30 RX ADMIN — OXYCODONE HYDROCHLORIDE 10 MILLIGRAM(S): 5 TABLET ORAL at 21:30

## 2023-04-30 RX ADMIN — Medication 650 MILLIGRAM(S): at 10:27

## 2023-04-30 RX ADMIN — Medication 1 DROP(S): at 12:26

## 2023-04-30 RX ADMIN — OXYCODONE HYDROCHLORIDE 10 MILLIGRAM(S): 5 TABLET ORAL at 14:20

## 2023-04-30 NOTE — PROGRESS NOTE ADULT - ASSESSMENT
57F with PMH hypothyroidism, suspected alcoholic cirrhosis (stopped drinking over 9 yrs ago), Presented 4/20 for neck pain w/ limited movement /2 pain x4 months. Admitted for management of suspected breast CA with metastases to the spine now s/p Occiput-T2 instrumentation and fusion with ORIF of C2.    Prior to current illness and functional decline, patient was independent with ADLs and ambulation. Patient now requires assistance with ambulation and ADLs 2/2 neck/back pain, gait dysfunction, and deconditioning. Patient also with medical comorbidities of hypothyroidism, sinus tachycardia, hypertensive urgency and h/o cirrhosis, requiring medication management and oversight by Physiatry team and nursing. May also need specialist consulting from Neurosurgery. Patient is a complex medical case with mixed Neurosurgical, oncological, and medical issues. There are significant comorbidities which warrants acute rehab hospitalization. To return home it is in the patient’s best interest to have acute inpatient rehabilitative services to undergo intensive interdisciplinary therapy. Furthermore, the patient is motivated and is able to tolerate up to 3 hours of daily therapy for 5-6 days a week for a total of 15 hours a week. Evaluated by Physiatry and deemed to be a good candidate for admission to  for acute inpatient rehab. Admitted to Acute Rehab on 4/28/23.     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   - MRI spine: Numerous enhancing osseous metastatic lesions are noted throughout the cervical/thoracic/lumbar spine (most significant at the C2 and C3 level with evidence of  epidural extension causing severe spinal stenosis and mass effect). Malignant compression  deformity noted of the T8 vertebral body   - Neurosurgery Eval 4/22: Needs Hard collar [place] J when OOB  - Oncology Consulted: will need biopsy before additional tx plan recs, also MRI brain w contrast to r/u Mets   - Radonc consulted 4/27: will need radiation to the C spine (no sooner than 2 weeks after surgery)  -Hemovac discontinued 4/27  -Per Neurosurgery: plan was for [place] cervical collar when OOB but collars available do not fit so soft collar to be worn when OOB - confirmed with Neurosurgery team.   -Pain medications: previously evaluated by pain management. Gabapentin 300mg PO q8h, Tylenol 650mg q6h PRN mild pain, Oxycodone 10mg PO q4h PRN moderate pain  -following with Dr. Peña outpatient    #Dysphagia  - mild, since surgery  - will get swallow SLP eval    #Hypercalcemia likely 2/2 malignancy  #Elevated AlkPhos likely 2/2 malignancy  - resolved s/p IVF   -Will monitor BMP and trend electrolytes     #Sinus tachycardia likely 2/2 dehydration and pain  #Hypertensive urgency likely 2/2  pain  -resolved   - C/w labetalol 100 q8h  - Monitor vitals and adjust antihypertensives accordingly     #Rt eye redness likely conjunctivitis   - c/w Polytrim     # hypothyroidism   - TSH (4/21) WNL   - c/w levothyroxine 50mcg OD    #Maintenance   - Pain control: as above  - GI/Bowel Mgmt: no issues at this time, monitor for BMs.   - Bladder management: voiding freely, no issues at this time.   - Skin: monitor cervical surgical wound, Neurosurgery f/u as needed   - FEN: Hypercalcemia resolved as above. Monitor electrolytes and fluid status.   - Diet: DASH/TLC easy to chew diet     #Precautions / PROPHYLAXIS:    - Falls  - Ortho: Weight bearing status: WBAT    - DVT prophylaxis: Lovenox 30 SQ daily

## 2023-04-30 NOTE — PROGRESS NOTE ADULT - ATTENDING COMMENTS
I reviewed the chart and examined the patient with the resident and we discussed the findings and treatment plan.  The patient is tolerating the rehab program well. I agree with the findings and treatment plan above, which I modified as indicated. The patient requires 3 hrs a day of acute inpatient rehab. No new complaints. Spoke with NSGY PA. They confirmed that patient may wear soft collar out of bed. Neck pain controlled. VSS. Labs reviewed. Continue full rehab program.    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   - MRI spine: Numerous enhancing osseous metastatic lesions are noted throughout the cervical/thoracic/lumbar spine (most significant at the C2 and C3 level with evidence of  epidural extension causing severe spinal stenosis and mass effect). Malignant compression  deformity noted of the T8 vertebral body   - Neurosurgery Eval 4/22: Needs Hard collar [place] J when OOB  - Oncology Consulted: will need biopsy before additional tx plan recs, also MRI brain w contrast to r/u Mets   - Radonc consulted 4/27: will need radiation to the C spine (no sooner than 2 weeks after surgery)  -Hemovac discontinued 4/27  -Per Neurosurgery: plan was for [place] cervical collar when OOB but collars available do not fit so soft collar to be worn when OOB - confirmed with Neurosurgery team.   -Pain medications: previously evaluated by pain management. Gabapentin 300mg PO q8h, Tylenol 650mg q6h PRN mild pain, Oxycodone 10mg PO q4h PRN moderate pain  -following with Dr. Peña outpatient    #Dysphagia  - mild, since surgery  - will get swallow SLP eval    #Hypercalcemia likely 2/2 malignancy  #Elevated AlkPhos likely 2/2 malignancy  - resolved s/p IVF   -Will monitor BMP and trend electrolytes     #Sinus tachycardia likely 2/2 dehydration and pain  #Hypertensive urgency likely 2/2  pain  -resolved   - C/w labetalol 100 q8h  - Monitor vitals and adjust antihypertensives accordingly     #Rt eye redness likely conjunctivitis   - c/w Polytrim     # hypothyroidism   - TSH (4/21) WNL   - c/w levothyroxine 50mcg OD    #Maintenance   - Pain control: as above  - GI/Bowel Mgmt: no issues at this time, monitor for BMs.   - Bladder management: voiding freely, no issues at this time.   - Skin: monitor cervical surgical wound, Neurosurgery f/u as needed   - FEN: Hypercalcemia resolved as above. Monitor electrolytes and fluid status.   - Diet: DASH/TLC easy to chew diet     #Precautions / PROPHYLAXIS:    - Falls  - Ortho: Weight bearing status: WBAT    - DVT prophylaxis: Lovenox 30 SQ daily

## 2023-04-30 NOTE — PROGRESS NOTE ADULT - SUBJECTIVE AND OBJECTIVE BOX
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 alcoholic cirrhosis (stopped drinking over 9 yrs ago), presenting for neck pain on 4/20. Pt started having neck pain almost 4 months ago for which her chiropractor ordered an MRI that was done 1 month ago. Pt was not sure what the read of the MRI was but there was concern about lesions so she went to see Dr. Peña's office for the first time. Dr. Peña examined her and told her she has breast cancer with metastasis and needs to come to the ED for further evaluation. Pt is a non smoker and has no family history of breast Ca. Denies any significant weight loss, night sweats, or fevers. No reported bloody discharge from her nipples. Upon examining pt's right breast, dimpling with a lump was found. Pt believes that she noticed this change almost over a yr a go, but did not think of it was anything serious. Pt's neck pain has been significantly worsening limiting her ability to move her neck. She only has pain when moving her neck and has full sensation and ROM of her b/l UE. Denies any SOB, chest pain, or palpitations. States that she has pain with ROM, and states that it is worse with movement ex moving out of bed. Patient states that pain is mostly midline at the base of head, with radiation to her paraspinal muscles. Denies any radicular pain to UE & LE. States that she has been sleeping only in a recliner and has since then been developing worsening lower back pain. Denies any ataxia, imbalance, or difficulty with ambulation. Denies any numbness, tingling, paresthesias, weakness, saddle anesthesia, or bowel / bladder incontinence. Neurosurgery was consulted. On 4.24.23 she was taken to the operating room where she underwent an Occiput to T2 instrumentation and Fusion, ORIF of C2. She had a hemovac drain placed intraop which was removed on POD#3. She was on a PCA pump postop which was removed on POD#3. She remained neurologically stable postop.      Patient is tolerating bedside PT, OT, and SLP. The patient was evaluated by the PM&R team once medically stable. The patient was found to have functional limitations in terms of muscle strength, endurance, physical mobility, ability to carry out activities of daily living including: self-care, transfers, and ambulation. Participating with bedside therapeutic exercises and cognitive retraining. The patient is motivated and is able to tolerate up to 3 hours of daily therapy for 5-6 days a week for a total of 15 hours a week. Evaluated by Physiatry and deemed to be a good candidate for admission to  for acute inpatient rehab. Admitted to Acute Rehab on 4/28/23.    PMHx/PSHx: as above  SHx: history of alcohol use disorder - quit 9 years ago, denies smoking and illicit drug use   FHx: non-contributory   Allergies: NKDA  Prior level of function: independent with ambulation and ADLs without assistive device although had been using straight cane for a few months and the last 2 weeks prior to presentation reports being essentially chairbound due to neck/back pain and difficulty with mobility.   Living situation: Pt resides with  in 2nd floor apt using 8 steps to enter and a flight of stairs to access.   Current level of function:  SLP 4/26: Some complaints of globus w/ solids Recommend: continue easy to chew w/ thin  PT 4/28: max assist for bed mobility, mod assist for transfers, min assist for ambulation 40ft with RW  OT 4/27: dependent for lower body dressing    (28 Apr 2023 14:46)      TODAY'S SUBJECTIVE & REVIEW OF SYMPTOMS:  Patient was seen and examined at bedside. In NAD  No acute events reported overnight   Endorsed her incision site is sore but denies any other acute complaints or concerns  Pain controlled.   Stated she only had 1 BM in 3 days. Voiding freely without issues.   As per chart review; original plan was for hard cervical collar when OOB but collars available do not fit so soft collar to be worn when OOB - confirmed with Neurosurgery team.   vitals reviewed       Review of Systems: Constitutional:    [  ] WNL           [   ] insomnia   [   ] tired  [ x ] recent weight loss and poor appetite - reporting depressed mood at home   Cardio:             [ x ] WNL           [   ] CP   [   ] SANCHEZ   [   ] palpitations         Resp:                [ x ] WNL           [   ] SOB   [   ] cough   [   ] wheezing  GI:                    [ x ] WNL           [   ] constipation   [   ] diarrhea   [   ] abdominal pain   [   ] nausea   [   ] emesis    :                   [ x ] WNL           [   ] SILVA  [   ] dysuria   [   ] difficulty voiding            Endo:                [ x ] WNL          [   ] polyuria   [   ] temperature intolerance                Skin:                  [  ] WNL          [   ] pain   [ x  ] Surgical incision along cervical spine c/d/i. [   ] rash  MSK:                 [  ] WNL          [   ] muscle pain   [ x  ] midline neck and low back pain  [   ] muscle tenderness   [   ] swelling  Neuro:              [  ] WNL          [   ] HA   [   ] change in vision   [   ] tremor   [   ] weakness   [  x  ]dysphagia             Cognitive:          [ x ] WNL          [   ]confusion     Psych:                [ x ] WNL          [   ] hallucinations   [   ]agitation   [   ] delusion   [   ]depression    PHYSICAL EXAM    Vital Signs Last 24 Hrs  T(C): 37.1 (30 Apr 2023 05:57), Max: 37.6 (29 Apr 2023 22:06)  T(F): 98.8 (30 Apr 2023 05:57), Max: 99.7 (29 Apr 2023 22:06)  HR: 104 (30 Apr 2023 05:57) (99 - 104)  BP: 143/65 (30 Apr 2023 05:57) (93/51 - 143/65)  RR: 18 (30 Apr 2023 05:57) (18 - 18)    General:[ x ] NAD, Resting Comfortable,   [   ] other:                                HEENT: [ x ] NC/AT, EOMI, PERRL , Normal Conjunctivae,   [   ] other:  Cardio: [ x ] RRR, no murmer,   [   ] other:                              Pulm: [ x ] No Respiratory Distress,  Lungs CTAB,   [   ] other:                       Abdomen: [ x ]ND/NT, Soft,   [   ] other:    : [ x ] NO SILVA CATHETER, [   ] SILVA CATHETER- no meatal tear, no discharge, [   ] other:                                            MSK: [  ] No joint swelling, Full ROM,   [ x  ] other:  Surgical incision along cervical spine c/d/i.                                      Ext: [ x ]No C/C/E, No calf tenderness,   [   ]other:    Skin: [  ]intact,   [ x  ] other: Surgical incision along cervical spine c/d/i.                                                                  Neurological Examination:  Cognitive: [  x  ] AAO x 3,   [    ]  other: A&Ox3. Oriented to person, place and time. Follows 2 step commands, language components intact. No aphasia.   Attention:  [  x  ] intact,   [    ]  other:  intact spelling and simple calculations                Memory: [  x  ] intact,    [    ]  other: intact registration and recall    Mood/Affect: [  x  ] wnl,    [    ]  other:                                                                             Communication: [  x  ]Fluent, no dysarthria, following commands:  [    ] other:   CN II - XII:  [  x  ] intact,  [    ] other:                                                                                        Motor:   RIGHT UE: [  ] WNL,  [ x  ] other: 3-/5 shoulder abduction limited by neck/back pain, 4/5 elbow flexion/extension, 5/5 hand   LEFT    UE: [  ] WNL,  [  x ] other: 3-/5 shoulder abduction limited by neck/back pain, 4/5 elbow flexion/extension, 5/5 hand   RIGHT LE: [ x ] WNL,  [   ] other: 5/5 hip flexion, knee flexion/extension, ankle dorsiflexion/plantarflexion  LEFT    LE: [ x ] WNL,  [   ] other: 5/5 hip flexion, knee flexion/extension, ankle dorsiflexion/plantarflexion    Tone: [  x  ] wnl,   [    ]  other:  DTRs: [ x  ]symmetric, [   ] other:  Coordination:   [ x  ] intact,   [    ] other:                                                                           Sensory: [ x  ] Intact to light touch,   [    ] other:    Clonus negative    MEDICATIONS  (STANDING):  artificial  tears Solution 1 Drop(s) Both EYES two times a day  bisacodyl 5 milliGRAM(s) Oral at bedtime  chlorhexidine 2% Cloths 1 Application(s) Topical <User Schedule>  enoxaparin Injectable 30 milliGRAM(s) SubCutaneous every 12 hours  famotidine    Tablet 20 milliGRAM(s) Oral every 12 hours  gabapentin 300 milliGRAM(s) Oral every 8 hours  labetalol 100 milliGRAM(s) Oral every 8 hours  levothyroxine 50 MICROGram(s) Oral daily  loratadine 10 milliGRAM(s) Oral at bedtime  melatonin 3 milliGRAM(s) Oral at bedtime  methocarbamol 750 milliGRAM(s) Oral every 8 hours  polyethylene glycol 3350 17 Gram(s) Oral daily  senna 2 Tablet(s) Oral at bedtime  trimethoprim/polymyxin Solution 1 Drop(s) Right EYE four times a day    MEDICATIONS  (PRN):  acetaminophen     Tablet .. 650 milliGRAM(s) Oral every 6 hours PRN Mild Pain (1 - 3)  naloxone Injectable 0.1 milliGRAM(s) IV Push every 3 minutes PRN For ANY of the following changes in patient status:  A. RR LESS THAN 10 breaths per minute, B. Oxygen saturation LESS THAN 90%, C. Sedation score of 6  ondansetron Injectable 4 milliGRAM(s) IV Push every 6 hours PRN Nausea and/or Vomiting  oxyCODONE    IR 10 milliGRAM(s) Oral every 4 hours PRN Moderate Pain (4 - 6)    RECENT LABS/IMAGING                          10.1   10.62 )-----------( 257      ( 29 Apr 2023 07:00 )             31.6     04-29    137  |  95<L>  |  12  ----------------------------<  77  4.0   |  26  |  0.6<L>    Ca    9.0      29 Apr 2023 07:00  Mg     1.7     04-29    TPro  5.6<L>  /  Alb  3.3<L>  /  TBili  0.5  /  DBili  x   /  AST  30  /  ALT  17  /  AlkPhos  215<H>  04-29       Patient is a 57y old  Female who presents with a chief complaint of 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 alcoholic cirrhosis (stopped drinking over 9 yrs ago), presenting for neck pain on 4/20. Pt started having neck pain almost 4 months ago for which her chiropractor ordered an MRI that was done 1 month ago. Pt was not sure what the read of the MRI was but there was concern about lesions so she went to see Dr. Peña's office for the first time. Dr. Peña examined her and told her she has breast cancer with metastasis and needs to come to the ED for further evaluation. Pt is a non smoker and has no family history of breast Ca. Denies any significant weight loss, night sweats, or fevers. No reported bloody discharge from her nipples. Upon examining pt's right breast, dimpling with a lump was found. Pt believes that she noticed this change almost over a yr a go, but did not think of it was anything serious. Pt's neck pain has been significantly worsening limiting her ability to move her neck. She only has pain when moving her neck and has full sensation and ROM of her b/l UE. Denies any SOB, chest pain, or palpitations. States that she has pain with ROM, and states that it is worse with movement ex moving out of bed. Patient states that pain is mostly midline at the base of head, with radiation to her paraspinal muscles. Denies any radicular pain to UE & LE. States that she has been sleeping only in a recliner and has since then been developing worsening lower back pain. Denies any ataxia, imbalance, or difficulty with ambulation. Denies any numbness, tingling, paresthesias, weakness, saddle anesthesia, or bowel / bladder incontinence. Neurosurgery was consulted. On 4.24.23 she was taken to the operating room where she underwent an Occiput to T2 instrumentation and Fusion, ORIF of C2. She had a hemovac drain placed intraop which was removed on POD#3. She was on a PCA pump postop which was removed on POD#3. She remained neurologically stable postop.      Patient is tolerating bedside PT, OT, and SLP. The patient was evaluated by the PM&R team once medically stable. The patient was found to have functional limitations in terms of muscle strength, endurance, physical mobility, ability to carry out activities of daily living including: self-care, transfers, and ambulation. Participating with bedside therapeutic exercises and cognitive retraining. The patient is motivated and is able to tolerate up to 3 hours of daily therapy for 5-6 days a week for a total of 15 hours a week. Evaluated by Physiatry and deemed to be a good candidate for admission to  for acute inpatient rehab. Admitted to Acute Rehab on 4/28/23.    PMHx/PSHx: as above  SHx: history of alcohol use disorder - quit 9 years ago, denies smoking and illicit drug use   FHx: non-contributory   Allergies: NKDA  Prior level of function: independent with ambulation and ADLs without assistive device although had been using straight cane for a few months and the last 2 weeks prior to presentation reports being essentially chairbound due to neck/back pain and difficulty with mobility.   Living situation: Pt resides with  in 2nd floor apt using 8 steps to enter and a flight of stairs to access.   Current level of function:  SLP 4/26: Some complaints of globus w/ solids Recommend: continue easy to chew w/ thin  PT 4/28: max assist for bed mobility, mod assist for transfers, min assist for ambulation 40ft with RW  OT 4/27: dependent for lower body dressing    (28 Apr 2023 14:46)      TODAY'S SUBJECTIVE & REVIEW OF SYMPTOMS:  Patient was seen and examined at bedside. In NAD  No acute events reported overnight   Endorsed her incision site is sore but denies any other acute complaints or concerns  Pain controlled.   Stated she only had 1 BM in 3 days. Voiding freely without issues.   Neurosurgery team confirmed pt can wear soft collar   vitals reviewed       Review of Systems: Constitutional:    [  ] WNL           [   ] insomnia   [   ] tired  [ x ] recent weight loss and poor appetite - reporting depressed mood at home   Cardio:             [ x ] WNL           [   ] CP   [   ] SANCHEZ   [   ] palpitations         Resp:                [ x ] WNL           [   ] SOB   [   ] cough   [   ] wheezing  GI:                    [ x ] WNL           [   ] constipation   [   ] diarrhea   [   ] abdominal pain   [   ] nausea   [   ] emesis    :                   [ x ] WNL           [   ] SILVA  [   ] dysuria   [   ] difficulty voiding            Endo:                [ x ] WNL          [   ] polyuria   [   ] temperature intolerance                Skin:                  [  ] WNL          [   ] pain   [ x  ] Surgical incision along cervical spine c/d/i. [   ] rash  MSK:                 [  ] WNL          [   ] muscle pain   [ x  ] midline neck and low back pain  [   ] muscle tenderness   [   ] swelling  Neuro:              [  ] WNL          [   ] HA   [   ] change in vision   [   ] tremor   [   ] weakness   [  x  ]dysphagia             Cognitive:          [ x ] WNL          [   ]confusion     Psych:                [ x ] WNL          [   ] hallucinations   [   ]agitation   [   ] delusion   [   ]depression    PHYSICAL EXAM    Vital Signs Last 24 Hrs  T(C): 37.1 (30 Apr 2023 05:57), Max: 37.6 (29 Apr 2023 22:06)  T(F): 98.8 (30 Apr 2023 05:57), Max: 99.7 (29 Apr 2023 22:06)  HR: 104 (30 Apr 2023 05:57) (99 - 104)  BP: 143/65 (30 Apr 2023 05:57) (93/51 - 143/65)  RR: 18 (30 Apr 2023 05:57) (18 - 18)    General:[ x ] NAD, Resting Comfortable,   [   ] other:                                HEENT: [ x ] NC/AT, EOMI, PERRL , Normal Conjunctivae,   [   ] other:  Cardio: [ x ] RRR, no murmer,   [   ] other:                              Pulm: [ x ] No Respiratory Distress,  Lungs CTAB,   [   ] other:                       Abdomen: [ x ]ND/NT, Soft,   [   ] other:    : [ x ] NO SILVA CATHETER, [   ] SILVA CATHETER- no meatal tear, no discharge, [   ] other:                                            MSK: [  ] No joint swelling, Full ROM,   [ x  ] other:  Surgical incision along cervical spine c/d/i.                                      Ext: [ x ]No C/C/E, No calf tenderness,   [   ]other:    Skin: [  ]intact,   [ x  ] other: Surgical incision along cervical spine c/d/i.                                                                  Neurological Examination:  Cognitive: [  x  ] AAO x 3,   [    ]  other: A&Ox3. Oriented to person, place and time. Follows 2 step commands, language components intact. No aphasia.   Attention:  [  x  ] intact,   [    ]  other:  intact spelling and simple calculations                Memory: [  x  ] intact,    [    ]  other: intact registration and recall    Mood/Affect: [  x  ] wnl,    [    ]  other:                                                                             Communication: [  x  ]Fluent, no dysarthria, following commands:  [    ] other:   CN II - XII:  [  x  ] intact,  [    ] other:                                                                                        Motor:   RIGHT UE: [  ] WNL,  [ x  ] other: 3-/5 shoulder abduction limited by neck/back pain, 4/5 elbow flexion/extension, 5/5 hand   LEFT    UE: [  ] WNL,  [  x ] other: 3-/5 shoulder abduction limited by neck/back pain, 4/5 elbow flexion/extension, 5/5 hand   RIGHT LE: [ x ] WNL,  [   ] other: 5/5 hip flexion, knee flexion/extension, ankle dorsiflexion/plantarflexion  LEFT    LE: [ x ] WNL,  [   ] other: 5/5 hip flexion, knee flexion/extension, ankle dorsiflexion/plantarflexion    Tone: [  x  ] wnl,   [    ]  other:  DTRs: [ x  ]symmetric, [   ] other:  Coordination:   [ x  ] intact,   [    ] other:                                                                           Sensory: [ x  ] Intact to light touch,   [    ] other:    Clonus negative    MEDICATIONS  (STANDING):  artificial  tears Solution 1 Drop(s) Both EYES two times a day  bisacodyl 5 milliGRAM(s) Oral at bedtime  chlorhexidine 2% Cloths 1 Application(s) Topical <User Schedule>  enoxaparin Injectable 30 milliGRAM(s) SubCutaneous every 12 hours  famotidine    Tablet 20 milliGRAM(s) Oral every 12 hours  gabapentin 300 milliGRAM(s) Oral every 8 hours  labetalol 100 milliGRAM(s) Oral every 8 hours  levothyroxine 50 MICROGram(s) Oral daily  loratadine 10 milliGRAM(s) Oral at bedtime  melatonin 3 milliGRAM(s) Oral at bedtime  methocarbamol 750 milliGRAM(s) Oral every 8 hours  polyethylene glycol 3350 17 Gram(s) Oral daily  senna 2 Tablet(s) Oral at bedtime  trimethoprim/polymyxin Solution 1 Drop(s) Right EYE four times a day    MEDICATIONS  (PRN):  acetaminophen     Tablet .. 650 milliGRAM(s) Oral every 6 hours PRN Mild Pain (1 - 3)  naloxone Injectable 0.1 milliGRAM(s) IV Push every 3 minutes PRN For ANY of the following changes in patient status:  A. RR LESS THAN 10 breaths per minute, B. Oxygen saturation LESS THAN 90%, C. Sedation score of 6  ondansetron Injectable 4 milliGRAM(s) IV Push every 6 hours PRN Nausea and/or Vomiting  oxyCODONE    IR 10 milliGRAM(s) Oral every 4 hours PRN Moderate Pain (4 - 6)    RECENT LABS/IMAGING                          10.1   10.62 )-----------( 257      ( 29 Apr 2023 07:00 )             31.6     04-29    137  |  95<L>  |  12  ----------------------------<  77  4.0   |  26  |  0.6<L>    Ca    9.0      29 Apr 2023 07:00  Mg     1.7     04-29    TPro  5.6<L>  /  Alb  3.3<L>  /  TBili  0.5  /  DBili  x   /  AST  30  /  ALT  17  /  AlkPhos  215<H>  04-29

## 2023-05-01 LAB
ALBUMIN SERPL ELPH-MCNC: 3.3 G/DL — LOW (ref 3.5–5.2)
ALP SERPL-CCNC: 227 U/L — HIGH (ref 30–115)
ALT FLD-CCNC: 14 U/L — SIGNIFICANT CHANGE UP (ref 0–41)
ANION GAP SERPL CALC-SCNC: 12 MMOL/L — SIGNIFICANT CHANGE UP (ref 7–14)
AST SERPL-CCNC: 26 U/L — SIGNIFICANT CHANGE UP (ref 0–41)
BASOPHILS # BLD AUTO: 0.07 K/UL — SIGNIFICANT CHANGE UP (ref 0–0.2)
BASOPHILS NFR BLD AUTO: 0.7 % — SIGNIFICANT CHANGE UP (ref 0–1)
BILIRUB SERPL-MCNC: 0.4 MG/DL — SIGNIFICANT CHANGE UP (ref 0.2–1.2)
BUN SERPL-MCNC: 15 MG/DL — SIGNIFICANT CHANGE UP (ref 10–20)
CALCIUM SERPL-MCNC: 9.6 MG/DL — SIGNIFICANT CHANGE UP (ref 8.4–10.5)
CHLORIDE SERPL-SCNC: 99 MMOL/L — SIGNIFICANT CHANGE UP (ref 98–110)
CO2 SERPL-SCNC: 30 MMOL/L — SIGNIFICANT CHANGE UP (ref 17–32)
CREAT SERPL-MCNC: 0.5 MG/DL — LOW (ref 0.7–1.5)
EGFR: 109 ML/MIN/1.73M2 — SIGNIFICANT CHANGE UP
EOSINOPHIL # BLD AUTO: 0.27 K/UL — SIGNIFICANT CHANGE UP (ref 0–0.7)
EOSINOPHIL NFR BLD AUTO: 2.6 % — SIGNIFICANT CHANGE UP (ref 0–8)
GLUCOSE SERPL-MCNC: 94 MG/DL — SIGNIFICANT CHANGE UP (ref 70–99)
HCT VFR BLD CALC: 31.2 % — LOW (ref 37–47)
HGB BLD-MCNC: 10.1 G/DL — LOW (ref 12–16)
IMM GRANULOCYTES NFR BLD AUTO: 2.4 % — HIGH (ref 0.1–0.3)
LYMPHOCYTES # BLD AUTO: 1.34 K/UL — SIGNIFICANT CHANGE UP (ref 1.2–3.4)
LYMPHOCYTES # BLD AUTO: 13.1 % — LOW (ref 20.5–51.1)
MAGNESIUM SERPL-MCNC: 1.9 MG/DL — SIGNIFICANT CHANGE UP (ref 1.8–2.4)
MCHC RBC-ENTMCNC: 28.5 PG — SIGNIFICANT CHANGE UP (ref 27–31)
MCHC RBC-ENTMCNC: 32.4 G/DL — SIGNIFICANT CHANGE UP (ref 32–37)
MCV RBC AUTO: 87.9 FL — SIGNIFICANT CHANGE UP (ref 81–99)
MONOCYTES # BLD AUTO: 1.08 K/UL — HIGH (ref 0.1–0.6)
MONOCYTES NFR BLD AUTO: 10.6 % — HIGH (ref 1.7–9.3)
NEUTROPHILS # BLD AUTO: 7.21 K/UL — HIGH (ref 1.4–6.5)
NEUTROPHILS NFR BLD AUTO: 70.6 % — SIGNIFICANT CHANGE UP (ref 42.2–75.2)
NRBC # BLD: 0 /100 WBCS — SIGNIFICANT CHANGE UP (ref 0–0)
PLATELET # BLD AUTO: 277 K/UL — SIGNIFICANT CHANGE UP (ref 130–400)
PMV BLD: 9.5 FL — SIGNIFICANT CHANGE UP (ref 7.4–10.4)
POTASSIUM SERPL-MCNC: 3.8 MMOL/L — SIGNIFICANT CHANGE UP (ref 3.5–5)
POTASSIUM SERPL-SCNC: 3.8 MMOL/L — SIGNIFICANT CHANGE UP (ref 3.5–5)
PROT SERPL-MCNC: 5.8 G/DL — LOW (ref 6–8)
RBC # BLD: 3.55 M/UL — LOW (ref 4.2–5.4)
RBC # FLD: 14 % — SIGNIFICANT CHANGE UP (ref 11.5–14.5)
SODIUM SERPL-SCNC: 141 MMOL/L — SIGNIFICANT CHANGE UP (ref 135–146)
SURGICAL PATHOLOGY STUDY: SIGNIFICANT CHANGE UP
WBC # BLD: 10.21 K/UL — SIGNIFICANT CHANGE UP (ref 4.8–10.8)
WBC # FLD AUTO: 10.21 K/UL — SIGNIFICANT CHANGE UP (ref 4.8–10.8)

## 2023-05-01 PROCEDURE — 71045 X-RAY EXAM CHEST 1 VIEW: CPT | Mod: 26

## 2023-05-01 PROCEDURE — 78582 LUNG VENTILAT&PERFUS IMAGING: CPT | Mod: 26

## 2023-05-01 RX ORDER — METHOCARBAMOL 500 MG/1
1000 TABLET, FILM COATED ORAL EVERY 6 HOURS
Refills: 0 | Status: DISCONTINUED | OUTPATIENT
Start: 2023-05-01 | End: 2023-01-01

## 2023-05-01 RX ORDER — LABETALOL HCL 100 MG
100 TABLET ORAL EVERY 8 HOURS
Refills: 0 | Status: DISCONTINUED | OUTPATIENT
Start: 2023-05-01 | End: 2023-05-03

## 2023-05-01 RX ADMIN — METHOCARBAMOL 1000 MILLIGRAM(S): 500 TABLET, FILM COATED ORAL at 18:55

## 2023-05-01 RX ADMIN — OXYCODONE HYDROCHLORIDE 10 MILLIGRAM(S): 5 TABLET ORAL at 00:52

## 2023-05-01 RX ADMIN — METHOCARBAMOL 750 MILLIGRAM(S): 500 TABLET, FILM COATED ORAL at 05:59

## 2023-05-01 RX ADMIN — OXYCODONE HYDROCHLORIDE 10 MILLIGRAM(S): 5 TABLET ORAL at 08:22

## 2023-05-01 RX ADMIN — FAMOTIDINE 20 MILLIGRAM(S): 10 INJECTION INTRAVENOUS at 18:55

## 2023-05-01 RX ADMIN — Medication 1 DROP(S): at 18:56

## 2023-05-01 RX ADMIN — ENOXAPARIN SODIUM 30 MILLIGRAM(S): 100 INJECTION SUBCUTANEOUS at 05:59

## 2023-05-01 RX ADMIN — GABAPENTIN 300 MILLIGRAM(S): 400 CAPSULE ORAL at 05:59

## 2023-05-01 RX ADMIN — Medication 1 DROP(S): at 06:00

## 2023-05-01 RX ADMIN — ENOXAPARIN SODIUM 30 MILLIGRAM(S): 100 INJECTION SUBCUTANEOUS at 18:55

## 2023-05-01 RX ADMIN — Medication 50 MICROGRAM(S): at 05:59

## 2023-05-01 RX ADMIN — SENNA PLUS 2 TABLET(S): 8.6 TABLET ORAL at 21:29

## 2023-05-01 RX ADMIN — Medication 5 MILLIGRAM(S): at 21:29

## 2023-05-01 RX ADMIN — Medication 650 MILLIGRAM(S): at 12:28

## 2023-05-01 RX ADMIN — OXYCODONE HYDROCHLORIDE 10 MILLIGRAM(S): 5 TABLET ORAL at 13:31

## 2023-05-01 RX ADMIN — Medication 1 DROP(S): at 11:55

## 2023-05-01 RX ADMIN — Medication 100 MILLIGRAM(S): at 21:32

## 2023-05-01 RX ADMIN — Medication 650 MILLIGRAM(S): at 11:58

## 2023-05-01 RX ADMIN — Medication 1 DROP(S): at 05:58

## 2023-05-01 RX ADMIN — OXYCODONE HYDROCHLORIDE 10 MILLIGRAM(S): 5 TABLET ORAL at 08:52

## 2023-05-01 RX ADMIN — CHLORHEXIDINE GLUCONATE 1 APPLICATION(S): 213 SOLUTION TOPICAL at 05:56

## 2023-05-01 RX ADMIN — GABAPENTIN 300 MILLIGRAM(S): 400 CAPSULE ORAL at 13:04

## 2023-05-01 RX ADMIN — OXYCODONE HYDROCHLORIDE 10 MILLIGRAM(S): 5 TABLET ORAL at 13:04

## 2023-05-01 RX ADMIN — Medication 100 MILLIGRAM(S): at 05:59

## 2023-05-01 RX ADMIN — LORATADINE 10 MILLIGRAM(S): 10 TABLET ORAL at 21:30

## 2023-05-01 RX ADMIN — OXYCODONE HYDROCHLORIDE 10 MILLIGRAM(S): 5 TABLET ORAL at 19:07

## 2023-05-01 RX ADMIN — Medication 3 MILLIGRAM(S): at 21:30

## 2023-05-01 RX ADMIN — METHOCARBAMOL 1000 MILLIGRAM(S): 500 TABLET, FILM COATED ORAL at 11:55

## 2023-05-01 RX ADMIN — GABAPENTIN 300 MILLIGRAM(S): 400 CAPSULE ORAL at 21:30

## 2023-05-01 RX ADMIN — FAMOTIDINE 20 MILLIGRAM(S): 10 INJECTION INTRAVENOUS at 05:59

## 2023-05-01 NOTE — PROGRESS NOTE ADULT - SUBJECTIVE AND OBJECTIVE BOX
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 alcoholic cirrhosis (stopped drinking over 9 yrs ago), presenting for neck pain on 4/20. Pt started having neck pain almost 4 months ago for which her chiropractor ordered an MRI that was done 1 month ago. Pt was not sure what the read of the MRI was but there was concern about lesions so she went to see Dr. Peña's office for the first time. Dr. Peña examined her and told her she has breast cancer with metastasis and needs to come to the ED for further evaluation. Pt is a non smoker and has no family history of breast Ca. Denies any significant weight loss, night sweats, or fevers. No reported bloody discharge from her nipples. Upon examining pt's right breast, dimpling with a lump was found. Pt believes that she noticed this change almost over a yr a go, but did not think of it was anything serious. Pt's neck pain has been significantly worsening limiting her ability to move her neck. She only has pain when moving her neck and has full sensation and ROM of her b/l UE. Denies any SOB, chest pain, or palpitations. States that she has pain with ROM, and states that it is worse with movement ex moving out of bed. Patient states that pain is mostly midline at the base of head, with radiation to her paraspinal muscles. Denies any radicular pain to UE & LE. States that she has been sleeping only in a recliner and has since then been developing worsening lower back pain. Denies any ataxia, imbalance, or difficulty with ambulation. Denies any numbness, tingling, paresthesias, weakness, saddle anesthesia, or bowel / bladder incontinence. Neurosurgery was consulted. On 4.24.23 she was taken to the operating room where she underwent an Occiput to T2 instrumentation and Fusion, ORIF of C2. She had a hemovac drain placed intraop which was removed on POD#3. She was on a PCA pump postop which was removed on POD#3. She remained neurologically stable postop.      Patient is tolerating bedside PT, OT, and SLP. The patient was evaluated by the PM&R team once medically stable. The patient was found to have functional limitations in terms of muscle strength, endurance, physical mobility, ability to carry out activities of daily living including: self-care, transfers, and ambulation. Participating with bedside therapeutic exercises and cognitive retraining. The patient is motivated and is able to tolerate up to 3 hours of daily therapy for 5-6 days a week for a total of 15 hours a week. Evaluated by Physiatry and deemed to be a good candidate for admission to  for acute inpatient rehab. Admitted to Acute Rehab on 4/28/23.    PMHx/PSHx: as above  SHx: history of alcohol use disorder - quit 9 years ago, denies smoking and illicit drug use   FHx: non-contributory   Allergies: NKDA  Prior level of function: independent with ambulation and ADLs without assistive device although had been using straight cane for a few months and the last 2 weeks prior to presentation reports being essentially chair bound due to neck/back pain and difficulty with mobility.   Living situation: Pt resides with  in 2nd floor apt using 8 steps to enter and a flight of stairs to access.   Current level of function:  SLP 4/26: Some complaints of globus w/ solids Recommend: continue easy to chew w/ thin  PT 4/28: max assist for bed mobility, mod assist for transfers, min assist for ambulation 40ft with RW  OT 4/27: dependent for lower body dressing    (28 Apr 2023 14:46)      TODAY'S SUBJECTIVE & REVIEW OF SYMPTOMS:  Patient was seen and examined at bedside. In NAD  No acute events reported overnight   Endorsed her incision site is sore. Patient states she has been having increased muscle spasm of lower back since last night, and endorsed sitting for a prolonged period yesterday. Will consider muscle relaxant augmentation. Pt also noted on 1L O2, O2 sat at bedside ~93%.  Pain controlled.   Stated she has had regular BM. Voiding freely without issues.   Neurosurgery team confirmed pt can wear soft collar   vitals reviewed       Review of Systems: Constitutional:    [  ] WNL           [   ] insomnia   [   ] tired  [ x ] recent weight loss and poor appetite - reporting depressed mood at home   Cardio:             [ x ] WNL           [   ] CP   [   ] SANCHEZ   [   ] palpitations         Resp:                [ x ] WNL           [   ] SOB   [   ] cough   [   ] wheezing  GI:                    [ x ] WNL           [   ] constipation   [   ] diarrhea   [   ] abdominal pain   [   ] nausea   [   ] emesis    :                   [ x ] WNL           [   ] SILVA  [   ] dysuria   [   ] difficulty voiding            Endo:                [ x ] WNL          [   ] polyuria   [   ] temperature intolerance                Skin:                  [  ] WNL          [   ] pain   [ x  ] Surgical incision along cervical spine c/d/i. [   ] rash  MSK:                 [  ] WNL          [   ] muscle pain   [ x  ] midline neck and low back pain  [   ] muscle tenderness   [   ] swelling  Neuro:              [  ] WNL          [   ] HA   [   ] change in vision   [   ] tremor   [   ] weakness   [  x  ]dysphagia             Cognitive:          [ x ] WNL          [   ]confusion     Psych:                [ x ] WNL          [   ] hallucinations   [   ]agitation   [   ] delusion   [   ]depression    PHYSICAL EXAM    Vital Signs (24 Hrs):  T(C): 36.3 (05-01-23 @ 05:30), Max: 36.5 (04-30-23 @ 14:25)  HR: 90 (05-01-23 @ 05:30) (85 - 110)  BP: 112/57 (05-01-23 @ 05:30) (110/72 - 113/58)  RR: 18 (05-01-23 @ 05:30) (18 - 18)      I&O's Summary    30 Apr 2023 07:01  -  01 May 2023 07:00  --------------------------------------------------------  IN: 0 mL / OUT: 950 mL / NET: -950 mL        General:[ x ] NAD, Resting Comfortable,   [   ] other:                                HEENT: [ x ] NC/AT, EOMI, PERRL , Normal Conjunctivae,   [   ] other:  Cardio: [ x ] RRR, no murmer,   [   ] other:                              Pulm: [ x ] No Respiratory Distress,  Lungs CTAB,   [   ] other:                       Abdomen: [ x ]ND/NT, Soft,   [   ] other:    : [ x ] NO SILVA CATHETER, [   ] SILVA CATHETER- no meatal tear, no discharge, [   ] other:                                            MSK: [  ] No joint swelling, Full ROM,   [ x  ] other:  Surgical incision along cervical spine c/d/i.                                      Ext: [ x ]No C/C/E, No calf tenderness,   [   ]other:    Skin: [  ]intact,   [ x  ] other: Surgical incision along cervical spine c/d/i.                                                                  Neurological Examination:  Cognitive: [  x  ] AAO x 3,   [    ]  other: A&Ox3. Oriented to person, place and time. Follows 2 step commands, language components intact. No aphasia.   Attention:  [  x  ] intact,   [    ]  other:  intact spelling and simple calculations                Memory: [  x  ] intact,    [    ]  other: intact registration and recall    Mood/Affect: [  x  ] wnl,    [    ]  other:                                                                             Communication: [  x  ]Fluent, no dysarthria, following commands:  [    ] other:   CN II - XII:  [  x  ] intact,  [    ] other:                                                                                        Motor:   RIGHT UE: [  ] WNL,  [ x  ] other: 3-/5 shoulder abduction limited by neck/back pain, 4/5 elbow flexion/extension, 5/5 hand   LEFT    UE: [  ] WNL,  [  x ] other: 3-/5 shoulder abduction limited by neck/back pain, 4/5 elbow flexion/extension, 5/5 hand   RIGHT LE: [ x ] WNL,  [   ] other: 5/5 hip flexion, knee flexion/extension, ankle dorsiflexion/plantarflexion  LEFT    LE: [ x ] WNL,  [   ] other: 5/5 hip flexion, knee flexion/extension, ankle dorsiflexion/plantarflexion    Tone: [  x  ] wnl,   [    ]  other:  DTRs: [ x  ]symmetric, [   ] other:  Coordination:   [ x  ] intact,   [    ] other:                                                                           Sensory: [ x  ] Intact to light touch,   [    ] other:    Clonus negative    MEDICATIONS  (STANDING):  artificial  tears Solution 1 Drop(s) Both EYES two times a day  bisacodyl 5 milliGRAM(s) Oral at bedtime  chlorhexidine 2% Cloths 1 Application(s) Topical <User Schedule>  enoxaparin Injectable 30 milliGRAM(s) SubCutaneous every 12 hours  famotidine    Tablet 20 milliGRAM(s) Oral every 12 hours  gabapentin 300 milliGRAM(s) Oral every 8 hours  labetalol 100 milliGRAM(s) Oral every 8 hours  levothyroxine 50 MICROGram(s) Oral daily  loratadine 10 milliGRAM(s) Oral at bedtime  melatonin 3 milliGRAM(s) Oral at bedtime  methocarbamol 750 milliGRAM(s) Oral every 8 hours  polyethylene glycol 3350 17 Gram(s) Oral daily  senna 2 Tablet(s) Oral at bedtime  trimethoprim/polymyxin Solution 1 Drop(s) Right EYE four times a day    MEDICATIONS  (PRN):  acetaminophen     Tablet .. 650 milliGRAM(s) Oral every 6 hours PRN Mild Pain (1 - 3)  naloxone Injectable 0.1 milliGRAM(s) IV Push every 3 minutes PRN For ANY of the following changes in patient status:  A. RR LESS THAN 10 breaths per minute, B. Oxygen saturation LESS THAN 90%, C. Sedation score of 6  ondansetron Injectable 4 milliGRAM(s) IV Push every 6 hours PRN Nausea and/or Vomiting  oxyCODONE    IR 10 milliGRAM(s) Oral every 4 hours PRN Moderate Pain (4 - 6)        RECENT LABS/IMAGING                                     10.1   10.21 )-----------( 277      ( 01 May 2023 06:25 )             31.2     05-01    141  |  99  |  15  ----------------------------<  94  3.8   |  30  |  0.5<L>    Ca    9.6      01 May 2023 06:25  Mg     1.9     05-01    TPro  5.8<L>  /  Alb  3.3<L>  /  TBili  0.4  /  DBili  x   /  AST  26  /  ALT  14  /  AlkPhos  227<H>  05-01    PT/INR - ( 30 Apr 2023 07:56 )   PT: 12.80 sec;   INR: 1.12 ratio

## 2023-05-01 NOTE — PROGRESS NOTE ADULT - ASSESSMENT
57F with PMH hypothyroidism, suspected alcoholic cirrhosis (stopped drinking over 9 yrs ago), Presented 4/20 for neck pain w/ limited movement /2 pain x4 months. Admitted for management of suspected breast CA with metastases to the spine now s/p Occiput-T2 instrumentation and fusion with ORIF of C2.    Prior to current illness and functional decline, patient was independent with ADLs and ambulation. Patient now requires assistance with ambulation and ADLs 2/2 neck/back pain, gait dysfunction, and deconditioning. Patient also with medical comorbidities of hypothyroidism, sinus tachycardia, hypertensive urgency and h/o cirrhosis, requiring medication management and oversight by Physiatry team and nursing. May also need specialist consulting from Neurosurgery. Patient is a complex medical case with mixed Neurosurgical, oncological, and medical issues. There are significant comorbidities which warrants acute rehab hospitalization. To return home it is in the patient’s best interest to have acute inpatient rehabilitative services to undergo intensive interdisciplinary therapy. Furthermore, the patient is motivated and is able to tolerate up to 3 hours of daily therapy for 5-6 days a week for a total of 15 hours a week. Evaluated by Physiatry and deemed to be a good candidate for admission to  for acute inpatient rehab. Admitted to Acute Rehab on 4/28/23.     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   - MRI spine: Numerous enhancing osseous metastatic lesions are noted throughout the cervical/thoracic/lumbar spine (most significant at the C2 and C3 level with evidence of  epidural extension causing severe spinal stenosis and mass effect). Malignant compression  deformity noted of the T8 vertebral body   - Neurosurgery Eval 4/22: Needs Hard collar Duchesne J when OOB  - Oncology Consulted: will need biopsy before additional tx plan recs, also MRI brain w contrast to r/u Mets   - Radonc consulted 4/27: will need radiation to the C spine (no sooner than 2 weeks after surgery)  -Hemovac discontinued 4/27  -Per Neurosurgery: plan was for Duchesne J cervical collar when OOB but collars available do not fit so soft collar to be worn when OOB - confirmed with Neurosurgery team.   -Pain medications: previously evaluated by pain management. Gabapentin 300mg PO q8h, Tylenol 650mg q6h PRN mild pain, Oxycodone 10mg PO q4h PRN moderate pain  -following with Dr. Peña outpatient  - CLOF: dependent BM, maxA transfers, maxA RW 10ft    #Dysphagia  - mild, since surgery  - will get swallow SLP eval    #Hypercalcemia likely 2/2 malignancy  #Elevated AlkPhos likely 2/2 malignancy  - resolved s/p IVF   -Will monitor BMP and trend electrolytes     #Sinus tachycardia likely 2/2 dehydration and pain  #Hypertensive urgency likely 2/2  pain  -resolved   - C/w labetalol 100 q8h  - Monitor vitals and adjust antihypertensives accordingly     #Rt eye redness likely conjunctivitis   - c/w Polytrim     # hypothyroidism   - TSH (4/21) WNL   - c/w levothyroxine 50mcg OD    #Maintenance   - Pain control: as above  - GI/Bowel Mgmt: no issues at this time, monitor for BMs.   - Bladder management: voiding freely, no issues at this time.   - Skin: monitor cervical surgical wound, Neurosurgery f/u as needed   - FEN: Hypercalcemia resolved as above. Monitor electrolytes and fluid status.   - Diet: DASH/TLC easy to chew diet     #Precautions / PROPHYLAXIS:    - Falls  - Ortho: Weight bearing status: WBAT    - DVT prophylaxis: Lovenox 30 SQ daily          57F with PMH hypothyroidism, suspected alcoholic cirrhosis (stopped drinking over 9 yrs ago), Presented 4/20 for neck pain w/ limited movement /2 pain x4 months. Admitted for management of suspected breast CA with metastases to the spine now s/p Occiput-T2 instrumentation and fusion with ORIF of C2.    Prior to current illness and functional decline, patient was independent with ADLs and ambulation. Patient now requires assistance with ambulation and ADLs 2/2 neck/back pain, gait dysfunction, and deconditioning. Patient also with medical comorbidities of hypothyroidism, sinus tachycardia, hypertensive urgency and h/o cirrhosis, requiring medication management and oversight by Physiatry team and nursing. May also need specialist consulting from Neurosurgery. Patient is a complex medical case with mixed Neurosurgical, oncological, and medical issues. There are significant comorbidities which warrants acute rehab hospitalization. To return home it is in the patient’s best interest to have acute inpatient rehabilitative services to undergo intensive interdisciplinary therapy. Furthermore, the patient is motivated and is able to tolerate up to 3 hours of daily therapy for 5-6 days a week for a total of 15 hours a week. Evaluated by Physiatry and deemed to be a good candidate for admission to  for acute inpatient rehab. Admitted to Acute Rehab on 4/28/23.     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   - MRI spine: Numerous enhancing osseous metastatic lesions are noted throughout the cervical/thoracic/lumbar spine (most significant at the C2 and C3 level with evidence of  epidural extension causing severe spinal stenosis and mass effect). Malignant compression  deformity noted of the T8 vertebral body   - Neurosurgery Eval 4/22: Needs Hard collar Leake J when OOB  - Oncology Consulted: will need biopsy before additional tx plan recs, also MRI brain w contrast to r/u Mets   - Radonc consulted 4/27: will need radiation to the C spine (no sooner than 2 weeks after surgery)  -Hemovac discontinued 4/27  -Per Neurosurgery: plan was for Leake J cervical collar when OOB but collars available do not fit so soft collar to be worn when OOB - confirmed with Neurosurgery team.   -Pain medications: previously evaluated by pain management. Gabapentin 300mg PO q8h, Tylenol 650mg q6h PRN mild pain, Oxycodone 10mg PO q4h PRN moderate pain  -following with Dr. Peña outpatient  - CLOF: dependent BM, maxA transfers, maxA RW 10ft    #Dysphagia  - mild, since surgery  - swallow SLP eval requested    #Hypercalcemia likely 2/2 malignancy  #Elevated AlkPhos likely 2/2 malignancy  - resolved s/p IVF   -Will monitor BMP and trend electrolytes     #Sinus tachycardia likely 2/2 dehydration and pain  #Hypertensive urgency likely 2/2  pain  -resolved   - BP systolic 5/1 100- 113. Will place hold parameters on Labetalol 100 q8h and monitor    #Rt eye redness likely conjunctivitis   - c/w Polytrim     # hypothyroidism   - TSH (4/21) WNL   - c/w levothyroxine 50mcg OD    #Maintenance   - Pain control: as above  - GI/Bowel Mgmt: no issues at this time, monitor for BMs.   - Bladder management: voiding freely, no issues at this time.   - Skin: monitor cervical surgical wound, Neurosurgery f/u as needed   - FEN: Hypercalcemia resolved as above. Monitor electrolytes and fluid status.   - Diet: DASH/TLC easy to chew diet     #Precautions / PROPHYLAXIS:    - Falls  - Ortho: Weight bearing status: WBAT    - DVT prophylaxis: Lovenox 30 SQ daily

## 2023-05-01 NOTE — PROGRESS NOTE ADULT - ATTENDING COMMENTS
I reviewed the chart and examined the patient with the resident and we discussed the findings and treatment plan.  The patient is tolerating the rehab program well. I agree with the findings and treatment plan above, which I modified as indicated. The patient requires 3 hrs a day of acute inpatient rehab. Syst BP running low, 100-113. Asymptomatic. Will place hold parameters on Labetalol and wean. C/o severe paralumbar muscle spasms this morning, which she has not had. Will increase Robaxin to 1000mg q 6hrs and continue other pain regimen. For RT to start 2 weeks post-op. Continue full rehab program.    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   - MRI spine: Numerous enhancing osseous metastatic lesions are noted throughout the cervical/thoracic/lumbar spine (most significant at the C2 and C3 level with evidence of  epidural extension causing severe spinal stenosis and mass effect). Malignant compression  deformity noted of the T8 vertebral body   - Neurosurgery Eval 4/22: Needs Hard collar Samish J when OOB  - Oncology Consulted: will need biopsy before additional tx plan recs, also MRI brain w contrast to r/u Mets   - Radonc consulted 4/27: will need radiation to the C spine (no sooner than 2 weeks after surgery)  -Hemovac discontinued 4/27  -Per Neurosurgery: plan was for Samish J cervical collar when OOB but collars available do not fit so soft collar to be worn when OOB - confirmed with Neurosurgery team.   -Pain medications: previously evaluated by pain management. Gabapentin 300mg PO q8h, Tylenol 650mg q6h PRN mild pain, Oxycodone 10mg PO q4h PRN moderate pain  -following with Dr. Peña outpatient  - CLOF: dependent BM, maxA transfers, maxA RW 10ft    #Dysphagia  - mild, since surgery  - swallow SLP eval requested    #Hypercalcemia likely 2/2 malignancy  #Elevated AlkPhos likely 2/2 malignancy  - resolved s/p IVF   -Will monitor BMP and trend electrolytes     #Sinus tachycardia likely 2/2 dehydration and pain  #Hypertensive urgency likely 2/2  pain  -resolved   - BP systolic 5/1 100- 113. Will place hold parameters on Labetalol 100 q8h and monitor    #Rt eye redness likely conjunctivitis   - c/w Polytrim     # hypothyroidism   - TSH (4/21) WNL   - c/w levothyroxine 50mcg OD

## 2023-05-02 PROBLEM — E03.9 HYPOTHYROIDISM, UNSPECIFIED: Chronic | Status: ACTIVE | Noted: 2023-04-28

## 2023-05-02 PROBLEM — Z87.19 PERSONAL HISTORY OF OTHER DISEASES OF THE DIGESTIVE SYSTEM: Chronic | Status: ACTIVE | Noted: 2023-04-28

## 2023-05-02 LAB
ALBUMIN SERPL ELPH-MCNC: 3.1 G/DL — LOW (ref 3.5–5.2)
ALP SERPL-CCNC: 212 U/L — HIGH (ref 30–115)
ALT FLD-CCNC: 13 U/L — SIGNIFICANT CHANGE UP (ref 0–41)
ANION GAP SERPL CALC-SCNC: 8 MMOL/L — SIGNIFICANT CHANGE UP (ref 7–14)
AST SERPL-CCNC: 23 U/L — SIGNIFICANT CHANGE UP (ref 0–41)
BILIRUB SERPL-MCNC: 0.6 MG/DL — SIGNIFICANT CHANGE UP (ref 0.2–1.2)
BUN SERPL-MCNC: 10 MG/DL — SIGNIFICANT CHANGE UP (ref 10–20)
CALCIUM SERPL-MCNC: 9.2 MG/DL — SIGNIFICANT CHANGE UP (ref 8.4–10.5)
CHLORIDE SERPL-SCNC: 96 MMOL/L — LOW (ref 98–110)
CO2 SERPL-SCNC: 31 MMOL/L — SIGNIFICANT CHANGE UP (ref 17–32)
CREAT SERPL-MCNC: 0.5 MG/DL — LOW (ref 0.7–1.5)
EGFR: 109 ML/MIN/1.73M2 — SIGNIFICANT CHANGE UP
GLUCOSE SERPL-MCNC: 111 MG/DL — HIGH (ref 70–99)
MAGNESIUM SERPL-MCNC: 2.1 MG/DL — SIGNIFICANT CHANGE UP (ref 1.8–2.4)
POTASSIUM SERPL-MCNC: 4.2 MMOL/L — SIGNIFICANT CHANGE UP (ref 3.5–5)
POTASSIUM SERPL-SCNC: 4.2 MMOL/L — SIGNIFICANT CHANGE UP (ref 3.5–5)
PROT SERPL-MCNC: 5.5 G/DL — LOW (ref 6–8)
SODIUM SERPL-SCNC: 135 MMOL/L — SIGNIFICANT CHANGE UP (ref 135–146)

## 2023-05-02 RX ADMIN — METHOCARBAMOL 1000 MILLIGRAM(S): 500 TABLET, FILM COATED ORAL at 23:51

## 2023-05-02 RX ADMIN — GABAPENTIN 300 MILLIGRAM(S): 400 CAPSULE ORAL at 13:12

## 2023-05-02 RX ADMIN — Medication 1 DROP(S): at 05:19

## 2023-05-02 RX ADMIN — GABAPENTIN 300 MILLIGRAM(S): 400 CAPSULE ORAL at 05:20

## 2023-05-02 RX ADMIN — ENOXAPARIN SODIUM 30 MILLIGRAM(S): 100 INJECTION SUBCUTANEOUS at 05:22

## 2023-05-02 RX ADMIN — FAMOTIDINE 20 MILLIGRAM(S): 10 INJECTION INTRAVENOUS at 05:20

## 2023-05-02 RX ADMIN — Medication 50 MICROGRAM(S): at 05:19

## 2023-05-02 RX ADMIN — Medication 650 MILLIGRAM(S): at 08:49

## 2023-05-02 RX ADMIN — Medication 1 DROP(S): at 23:50

## 2023-05-02 RX ADMIN — Medication 100 MILLIGRAM(S): at 05:20

## 2023-05-02 RX ADMIN — OXYCODONE HYDROCHLORIDE 10 MILLIGRAM(S): 5 TABLET ORAL at 18:00

## 2023-05-02 RX ADMIN — Medication 650 MILLIGRAM(S): at 00:00

## 2023-05-02 RX ADMIN — ENOXAPARIN SODIUM 30 MILLIGRAM(S): 100 INJECTION SUBCUTANEOUS at 17:15

## 2023-05-02 RX ADMIN — LORATADINE 10 MILLIGRAM(S): 10 TABLET ORAL at 21:47

## 2023-05-02 RX ADMIN — OXYCODONE HYDROCHLORIDE 10 MILLIGRAM(S): 5 TABLET ORAL at 23:39

## 2023-05-02 RX ADMIN — OXYCODONE HYDROCHLORIDE 10 MILLIGRAM(S): 5 TABLET ORAL at 17:25

## 2023-05-02 RX ADMIN — GABAPENTIN 300 MILLIGRAM(S): 400 CAPSULE ORAL at 21:46

## 2023-05-02 RX ADMIN — OXYCODONE HYDROCHLORIDE 10 MILLIGRAM(S): 5 TABLET ORAL at 14:00

## 2023-05-02 RX ADMIN — Medication 3 MILLIGRAM(S): at 21:47

## 2023-05-02 RX ADMIN — Medication 1 DROP(S): at 00:14

## 2023-05-02 RX ADMIN — Medication 1 DROP(S): at 17:22

## 2023-05-02 RX ADMIN — FAMOTIDINE 20 MILLIGRAM(S): 10 INJECTION INTRAVENOUS at 17:15

## 2023-05-02 RX ADMIN — POLYETHYLENE GLYCOL 3350 17 GRAM(S): 17 POWDER, FOR SOLUTION ORAL at 11:35

## 2023-05-02 RX ADMIN — METHOCARBAMOL 1000 MILLIGRAM(S): 500 TABLET, FILM COATED ORAL at 05:20

## 2023-05-02 RX ADMIN — METHOCARBAMOL 1000 MILLIGRAM(S): 500 TABLET, FILM COATED ORAL at 11:35

## 2023-05-02 RX ADMIN — OXYCODONE HYDROCHLORIDE 10 MILLIGRAM(S): 5 TABLET ORAL at 05:18

## 2023-05-02 RX ADMIN — Medication 650 MILLIGRAM(S): at 16:33

## 2023-05-02 RX ADMIN — CHLORHEXIDINE GLUCONATE 1 APPLICATION(S): 213 SOLUTION TOPICAL at 05:21

## 2023-05-02 RX ADMIN — Medication 1 DROP(S): at 11:35

## 2023-05-02 RX ADMIN — METHOCARBAMOL 1000 MILLIGRAM(S): 500 TABLET, FILM COATED ORAL at 17:15

## 2023-05-02 RX ADMIN — OXYCODONE HYDROCHLORIDE 10 MILLIGRAM(S): 5 TABLET ORAL at 13:12

## 2023-05-02 RX ADMIN — METHOCARBAMOL 1000 MILLIGRAM(S): 500 TABLET, FILM COATED ORAL at 00:14

## 2023-05-02 RX ADMIN — Medication 1 DROP(S): at 17:16

## 2023-05-02 RX ADMIN — Medication 650 MILLIGRAM(S): at 15:52

## 2023-05-02 RX ADMIN — Medication 100 MILLIGRAM(S): at 21:47

## 2023-05-02 NOTE — PROGRESS NOTE ADULT - SUBJECTIVE AND OBJECTIVE BOX
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 alcoholic cirrhosis (stopped drinking over 9 yrs ago), presenting for neck pain on 4/20. Pt started having neck pain almost 4 months ago for which her chiropractor ordered an MRI that was done 1 month ago. Pt was not sure what the read of the MRI was but there was concern about lesions so she went to see Dr. Peña's office for the first time. Dr. Peña examined her and told her she has breast cancer with metastasis and needs to come to the ED for further evaluation. Pt is a non smoker and has no family history of breast Ca. Denies any significant weight loss, night sweats, or fevers. No reported bloody discharge from her nipples. Upon examining pt's right breast, dimpling with a lump was found. Pt believes that she noticed this change almost over a yr a go, but did not think of it was anything serious. Pt's neck pain has been significantly worsening limiting her ability to move her neck. She only has pain when moving her neck and has full sensation and ROM of her b/l UE. Denies any SOB, chest pain, or palpitations. States that she has pain with ROM, and states that it is worse with movement ex moving out of bed. Patient states that pain is mostly midline at the base of head, with radiation to her paraspinal muscles. Denies any radicular pain to UE & LE. States that she has been sleeping only in a recliner and has since then been developing worsening lower back pain. Denies any ataxia, imbalance, or difficulty with ambulation. Denies any numbness, tingling, paresthesias, weakness, saddle anesthesia, or bowel / bladder incontinence. Neurosurgery was consulted. On 4.24.23 she was taken to the operating room where she underwent an Occiput to T2 instrumentation and Fusion, ORIF of C2. She had a hemovac drain placed intraop which was removed on POD#3. She was on a PCA pump postop which was removed on POD#3. She remained neurologically stable postop.      Patient is tolerating bedside PT, OT, and SLP. The patient was evaluated by the PM&R team once medically stable. The patient was found to have functional limitations in terms of muscle strength, endurance, physical mobility, ability to carry out activities of daily living including: self-care, transfers, and ambulation. Participating with bedside therapeutic exercises and cognitive retraining. The patient is motivated and is able to tolerate up to 3 hours of daily therapy for 5-6 days a week for a total of 15 hours a week. Evaluated by Physiatry and deemed to be a good candidate for admission to  for acute inpatient rehab. Admitted to Acute Rehab on 4/28/23.    PMHx/PSHx: as above  SHx: history of alcohol use disorder - quit 9 years ago, denies smoking and illicit drug use   FHx: non-contributory   Allergies: NKDA  Prior level of function: independent with ambulation and ADLs without assistive device although had been using straight cane for a few months and the last 2 weeks prior to presentation reports being essentially chair bound due to neck/back pain and difficulty with mobility.   Living situation: Pt resides with  in 2nd floor apt using 8 steps to enter and a flight of stairs to access.   Current level of function:  SLP 4/26: Some complaints of globus w/ solids Recommend: continue easy to chew w/ thin  PT 4/28: max assist for bed mobility, mod assist for transfers, min assist for ambulation 40ft with RW  OT 4/27: dependent for lower body dressing    (28 Apr 2023 14:46)      TODAY'S SUBJECTIVE & REVIEW OF SYMPTOMS:  Patient was seen and examined at bedside. In NAD  No acute events reported overnight   With increase of robaxin yesterday, pt reports significant improvement in her low back spasms. Pt also noted on 1-1.5L O2, O2 sat at bedside ~96%. Pt encouraged to utilize IS  Pain controlled.     CXR negative, V/Q scan with low probability for PE    Stated she has had regular BM. Voiding freely without issues.   Neurosurgery team confirmed pt can wear soft collar   vitals reviewed       Review of Systems: Constitutional:    [  ] WNL           [   ] insomnia   [   ] tired  [ x ] recent weight loss and poor appetite - reporting depressed mood at home   Cardio:             [ x ] WNL           [   ] CP   [   ] SANCHEZ   [   ] palpitations         Resp:                [ x ] WNL           [   ] SOB   [   ] cough   [   ] wheezing  GI:                    [ x ] WNL           [   ] constipation   [   ] diarrhea   [   ] abdominal pain   [   ] nausea   [   ] emesis    :                   [ x ] WNL           [   ] SILVA  [   ] dysuria   [   ] difficulty voiding            Endo:                [ x ] WNL          [   ] polyuria   [   ] temperature intolerance                Skin:                  [  ] WNL          [   ] pain   [ x  ] Surgical incision along cervical spine c/d/i. [   ] rash  MSK:                 [  ] WNL          [   ] muscle pain   [ x  ] midline neck and low back pain  [   ] muscle tenderness   [   ] swelling  Neuro:              [  ] WNL          [   ] HA   [   ] change in vision   [   ] tremor   [   ] weakness   [  x  ]dysphagia             Cognitive:          [ x ] WNL          [   ]confusion     Psych:                [ x ] WNL          [   ] hallucinations   [   ]agitation   [   ] delusion   [   ]depression    PHYSICAL EXAM    Vital Signs (24 Hrs):  T(C): 36.9 (05-02-23 @ 05:36), Max: 37.1 (05-01-23 @ 20:32)  HR: 89 (05-02-23 @ 05:36) (85 - 104)  BP: 138/69 (05-02-23 @ 05:36) (105/59 - 138/69)  RR: 20 (05-02-23 @ 05:36) (18 - 20)  SpO2: 97% (05-01-23 @ 16:00) (97% - 97%)    Daily     I&O's Summary    01 May 2023 07:01  -  02 May 2023 07:00  --------------------------------------------------------  IN: 120 mL / OUT: 1525 mL / NET: -1405 mL        I&O's Summary    30 Apr 2023 07:01  -  01 May 2023 07:00  --------------------------------------------------------  IN: 0 mL / OUT: 950 mL / NET: -950 mL        General:[ x ] NAD, Resting Comfortable,   [   ] other:                                HEENT: [ x ] NC/AT, EOMI, PERRL , Normal Conjunctivae,   [   ] other:  Cardio: [ x ] RRR, no murmer,   [   ] other:                              Pulm: [ x ] No Respiratory Distress,  Lungs CTAB,   [   ] other:                       Abdomen: [ x ]ND/NT, Soft,   [   ] other:    : [ x ] NO SILVA CATHETER, [   ] SILVA CATHETER- no meatal tear, no discharge, [   ] other:                                            MSK: [  ] No joint swelling, Full ROM,   [ x  ] other:  Surgical incision along cervical spine c/d/i.                                      Ext: [ x ]No C/C/E, No calf tenderness,   [   ]other:    Skin: [  ]intact,   [ x  ] other: Surgical incision along cervical spine c/d/i.                                                                  Neurological Examination:  Cognitive: [  x  ] AAO x 3,   [    ]  other: A&Ox3. Oriented to person, place and time. Follows 2 step commands, language components intact. No aphasia.   Attention:  [  x  ] intact,   [    ]  other:  intact spelling and simple calculations                Memory: [  x  ] intact,    [    ]  other: intact registration and recall    Mood/Affect: [  x  ] wnl,    [    ]  other:                                                                             Communication: [  x  ]Fluent, no dysarthria, following commands:  [    ] other:   CN II - XII:  [  x  ] intact,  [    ] other:                                                                                        Motor:   RIGHT UE: [  ] WNL,  [ x  ] other: 3-/5 shoulder abduction limited by neck/back pain, 4/5 elbow flexion/extension, 5/5 hand   LEFT    UE: [  ] WNL,  [  x ] other: 3-/5 shoulder abduction limited by neck/back pain, 4/5 elbow flexion/extension, 5/5 hand   RIGHT LE: [ x ] WNL,  [   ] other: 5/5 hip flexion, knee flexion/extension, ankle dorsiflexion/plantarflexion  LEFT    LE: [ x ] WNL,  [   ] other: 5/5 hip flexion, knee flexion/extension, ankle dorsiflexion/plantarflexion    Tone: [  x  ] wnl,   [    ]  other:  DTRs: [ x  ]symmetric, [   ] other:  Coordination:   [ x  ] intact,   [    ] other:                                                                           Sensory: [ x  ] Intact to light touch,   [    ] other:    Clonus negative    CLOF: dependent BM, maxA transfers, maxA RW 10ft    MEDICATIONS  (STANDING):  artificial  tears Solution 1 Drop(s) Both EYES two times a day  bisacodyl 5 milliGRAM(s) Oral at bedtime  chlorhexidine 2% Cloths 1 Application(s) Topical <User Schedule>  enoxaparin Injectable 30 milliGRAM(s) SubCutaneous every 12 hours  famotidine    Tablet 20 milliGRAM(s) Oral every 12 hours  gabapentin 300 milliGRAM(s) Oral every 8 hours  labetalol 100 milliGRAM(s) Oral every 8 hours  levothyroxine 50 MICROGram(s) Oral daily  loratadine 10 milliGRAM(s) Oral at bedtime  melatonin 3 milliGRAM(s) Oral at bedtime  methocarbamol 1000 milliGRAM(s) Oral every 6 hours  polyethylene glycol 3350 17 Gram(s) Oral daily  senna 2 Tablet(s) Oral at bedtime  trimethoprim/polymyxin Solution 1 Drop(s) Right EYE four times a day    MEDICATIONS  (PRN):  acetaminophen     Tablet .. 650 milliGRAM(s) Oral every 6 hours PRN Mild Pain (1 - 3)  naloxone Injectable 0.1 milliGRAM(s) IV Push every 3 minutes PRN For ANY of the following changes in patient status:  A. RR LESS THAN 10 breaths per minute, B. Oxygen saturation LESS THAN 90%, C. Sedation score of 6  ondansetron Injectable 4 milliGRAM(s) IV Push every 6 hours PRN Nausea and/or Vomiting  oxyCODONE   IR 10 milliGRAM(s) Oral every 4 hours PRN Moderate Pain (4 - 6)        RECENT LABS/IMAGING                                   10.1   10.21 )-----------( 277      ( 01 May 2023 06:25 )             31.2     05-02    135  |  96<L>  |  10  ----------------------------<  111<H>  4.2   |  31  |  0.5<L>    Ca    9.2      02 May 2023 06:37  Mg     2.1     05-02    TPro  5.5<L>  /  Alb  3.1<L>  /  TBili  0.6  /  DBili  x   /  AST  23  /  ALT  13  /  AlkPhos  212<H>  05-02           Patient is a 57y old  Female who presents with a chief complaint of 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 alcoholic cirrhosis (stopped drinking over 9 yrs ago), presenting for neck pain on 4/20. Pt started having neck pain almost 4 months ago for which her chiropractor ordered an MRI that was done 1 month ago. Pt was not sure what the read of the MRI was but there was concern about lesions so she went to see Dr. Peña's office for the first time. Dr. Peña examined her and told her she has breast cancer with metastasis and needs to come to the ED for further evaluation. Pt is a non smoker and has no family history of breast Ca. Denies any significant weight loss, night sweats, or fevers. No reported bloody discharge from her nipples. Upon examining pt's right breast, dimpling with a lump was found. Pt believes that she noticed this change almost over a yr a go, but did not think of it was anything serious. Pt's neck pain has been significantly worsening limiting her ability to move her neck. She only has pain when moving her neck and has full sensation and ROM of her b/l UE. Denies any SOB, chest pain, or palpitations. States that she has pain with ROM, and states that it is worse with movement ex moving out of bed. Patient states that pain is mostly midline at the base of head, with radiation to her paraspinal muscles. Denies any radicular pain to UE & LE. States that she has been sleeping only in a recliner and has since then been developing worsening lower back pain. Denies any ataxia, imbalance, or difficulty with ambulation. Denies any numbness, tingling, paresthesias, weakness, saddle anesthesia, or bowel / bladder incontinence. Neurosurgery was consulted. On 4.24.23 she was taken to the operating room where she underwent an Occiput to T2 instrumentation and Fusion, ORIF of C2. She had a hemovac drain placed intraop which was removed on POD#3. She was on a PCA pump postop which was removed on POD#3. She remained neurologically stable postop.      Patient is tolerating bedside PT, OT, and SLP. The patient was evaluated by the PM&R team once medically stable. The patient was found to have functional limitations in terms of muscle strength, endurance, physical mobility, ability to carry out activities of daily living including: self-care, transfers, and ambulation. Participating with bedside therapeutic exercises and cognitive retraining. The patient is motivated and is able to tolerate up to 3 hours of daily therapy for 5-6 days a week for a total of 15 hours a week. Evaluated by Physiatry and deemed to be a good candidate for admission to  for acute inpatient rehab. Admitted to Acute Rehab on 4/28/23.    PMHx/PSHx: as above  SHx: history of alcohol use disorder - quit 9 years ago, denies smoking and illicit drug use   FHx: non-contributory   Allergies: NKDA  Prior level of function: independent with ambulation and ADLs without assistive device although had been using straight cane for a few months and the last 2 weeks prior to presentation reports being essentially chair bound due to neck/back pain and difficulty with mobility.   Living situation: Pt resides with  in 2nd floor apt using 8 steps to enter and a flight of stairs to access.   Current level of function:  SLP 4/26: Some complaints of globus w/ solids Recommend: continue easy to chew w/ thin  PT 4/28: max assist for bed mobility, mod assist for transfers, min assist for ambulation 40ft with RW  OT 4/27: dependent for lower body dressing    (28 Apr 2023 14:46)      TODAY'S SUBJECTIVE & REVIEW OF SYMPTOMS:  Patient was seen and examined at bedside. In NAD  No acute events reported overnight   Increased of Robaxin yesterday, pt reports significant improvement in her low back spasms. Pt also noted on 1-1.5L O2, O2 sat at bedside ~96%. Pt encouraged to utilize IS  Pain controlled.     CXR negative, V/Q scan with low probability for PE    Stated she has had regular BM. Voiding freely without issues.   Neurosurgery team confirmed pt can wear soft collar   vitals reviewed       Review of Systems:  Constitutional:    [  ] WNL           [   ] insomnia   [   ] tired  [ x ] recent weight loss and poor appetite - reporting depressed mood at home   Cardio:             [ x ] WNL           [   ] CP   [   ] SANCHEZ   [   ] palpitations         Resp:                [ x ] WNL           [   ] SOB   [   ] cough   [   ] wheezing  GI:                    [ x ] WNL           [   ] constipation   [   ] diarrhea   [   ] abdominal pain   [   ] nausea   [   ] emesis    :                   [ x ] WNL           [   ] SILVA  [   ] dysuria   [   ] difficulty voiding            Endo:                [ x ] WNL          [   ] polyuria   [   ] temperature intolerance                Skin:                  [  ] WNL          [   ] pain   [ x  ] Surgical incision along cervical spine c/d/i. [   ] rash  MSK:                 [  ] WNL          [   ] muscle pain   [ x  ] midline neck and low back pain  [   ] muscle tenderness   [   ] swelling  Neuro:              [  ] WNL          [   ] HA   [   ] change in vision   [   ] tremor   [   ] weakness   [  x  ]dysphagia             Cognitive:          [ x ] WNL          [   ]confusion     Psych:                [ x ] WNL          [   ] hallucinations   [   ]agitation   [   ] delusion   [   ]depression    PHYSICAL EXAM    Vital Signs (24 Hrs):  T(C): 36.9 (05-02-23 @ 05:36), Max: 37.1 (05-01-23 @ 20:32)  HR: 89 (05-02-23 @ 05:36) (85 - 104)  BP: 138/69 (05-02-23 @ 05:36) (105/59 - 138/69)  RR: 20 (05-02-23 @ 05:36) (18 - 20)  SpO2: 97% (05-01-23 @ 16:00) (97% - 97%)    Daily     I&O's Summary    01 May 2023 07:01  -  02 May 2023 07:00  --------------------------------------------------------  IN: 120 mL / OUT: 1525 mL / NET: -1405 mL        I&O's Summary    30 Apr 2023 07:01  -  01 May 2023 07:00  --------------------------------------------------------  IN: 0 mL / OUT: 950 mL / NET: -950 mL        General:[ x ] NAD, Resting Comfortable,   [   ] other:                                HEENT: [ x ] NC/AT, EOMI, PERRL , Normal Conjunctivae,   [   ] other:  Cardio: [ x ] RRR, no murmer,   [   ] other:                              Pulm: [ x ] No Respiratory Distress,  Lungs CTAB,   [   ] other:                       Abdomen: [ x ]ND/NT, Soft,   [   ] other:    : [ x ] NO SILVA CATHETER, [   ] SILVA CATHETER- no meatal tear, no discharge, [   ] other:                                            MSK: [  ] No joint swelling, Full ROM,   [ x  ] other:  Surgical incision along cervical spine c/d/i.                                      Ext: [ x ]No C/C/E, No calf tenderness,   [   ]other:    Skin: [  ]intact,   [ x  ] other: Surgical incision along cervical spine c/d/i.                                                                  Neurological Examination:  Cognitive: [  x  ] AAO x 3,   [    ]  other: A&Ox3. Oriented to person, place and time. Follows 2 step commands, language components intact. No aphasia.   Attention:  [  x  ] intact,   [    ]  other:  intact spelling and simple calculations                Memory: [  x  ] intact,    [    ]  other: intact registration and recall    Mood/Affect: [  x  ] wnl,    [    ]  other:                                                                             Communication: [  x  ]Fluent, no dysarthria, following commands:  [    ] other:   CN II - XII:  [  x  ] intact,  [    ] other:                                                                                        Motor:   RIGHT UE: [  ] WNL,  [ x  ] other: 3-/5 shoulder abduction limited by neck/back pain, 4/5 elbow flexion/extension, 5/5 hand   LEFT    UE: [  ] WNL,  [  x ] other: 3-/5 shoulder abduction limited by neck/back pain, 4/5 elbow flexion/extension, 5/5 hand   RIGHT LE: [ x ] WNL,  [   ] other: 5/5 hip flexion, knee flexion/extension, ankle dorsiflexion/plantarflexion  LEFT    LE: [ x ] WNL,  [   ] other: 5/5 hip flexion, knee flexion/extension, ankle dorsiflexion/plantarflexion    Tone: [  x  ] wnl,   [    ]  other:  DTRs: [ x  ]symmetric, [   ] other:  Coordination:   [ x  ] intact,   [    ] other:                                                                           Sensory: [ x  ] Intact to light touch,   [    ] other:    Clonus negative    CLOF: dependent BM, maxA transfers, maxA RW 10ft    MEDICATIONS  (STANDING):  artificial  tears Solution 1 Drop(s) Both EYES two times a day  bisacodyl 5 milliGRAM(s) Oral at bedtime  chlorhexidine 2% Cloths 1 Application(s) Topical <User Schedule>  enoxaparin Injectable 30 milliGRAM(s) SubCutaneous every 12 hours  famotidine    Tablet 20 milliGRAM(s) Oral every 12 hours  gabapentin 300 milliGRAM(s) Oral every 8 hours  labetalol 100 milliGRAM(s) Oral every 8 hours  levothyroxine 50 MICROGram(s) Oral daily  loratadine 10 milliGRAM(s) Oral at bedtime  melatonin 3 milliGRAM(s) Oral at bedtime  methocarbamol 1000 milliGRAM(s) Oral every 6 hours  polyethylene glycol 3350 17 Gram(s) Oral daily  senna 2 Tablet(s) Oral at bedtime  trimethoprim/polymyxin Solution 1 Drop(s) Right EYE four times a day    MEDICATIONS  (PRN):  acetaminophen     Tablet .. 650 milliGRAM(s) Oral every 6 hours PRN Mild Pain (1 - 3)  naloxone Injectable 0.1 milliGRAM(s) IV Push every 3 minutes PRN For ANY of the following changes in patient status:  A. RR LESS THAN 10 breaths per minute, B. Oxygen saturation LESS THAN 90%, C. Sedation score of 6  ondansetron Injectable 4 milliGRAM(s) IV Push every 6 hours PRN Nausea and/or Vomiting  oxyCODONE   IR 10 milliGRAM(s) Oral every 4 hours PRN Moderate Pain (4 - 6)        RECENT LABS/IMAGING                                   10.1   10.21 )-----------( 277      ( 01 May 2023 06:25 )             31.2     05-02    135  |  96<L>  |  10  ----------------------------<  111<H>  4.2   |  31  |  0.5<L>    Ca    9.2      02 May 2023 06:37  Mg     2.1     05-02    TPro  5.5<L>  /  Alb  3.1<L>  /  TBili  0.6  /  DBili  x   /  AST  23  /  ALT  13  /  AlkPhos  212<H>  05-02

## 2023-05-02 NOTE — PROGRESS NOTE ADULT - ASSESSMENT
Neuropsychology Mood Initial Evaluation   Overall the patient appears to demonstrate typical mood reactions in response to acute changes. Patient noted experiencing fear of the unknown and a sense of cognitive rumination in response to receiving news of her cancer diagnosis. However, following her surgery and receiving more concrete and tangential steps regarding the recovery process, she mentioned experiencing a sense relief. She denied any symptoms related to depression and panic, and appeared to continually demonstrate typical responses to acute changes in environment and health status. Based on the evaluation patient demonstrate the ability to mediate and mange emotions, behaviorally and cognitively. For example, patient expressed a strong social support system, and the ability to cognitive restructure emotions positively.     Team will follow up with patient to monitor mood and provide support positive coping.

## 2023-05-02 NOTE — PROGRESS NOTE ADULT - ASSESSMENT
57F with PMH hypothyroidism, suspected alcoholic cirrhosis (stopped drinking over 9 yrs ago), Presented 4/20 for neck pain w/ limited movement /2 pain x4 months. Admitted for management of suspected breast CA with metastases to the spine now s/p Occiput-T2 instrumentation and fusion with ORIF of C2.    Prior to current illness and functional decline, patient was independent with ADLs and ambulation. Patient now requires assistance with ambulation and ADLs 2/2 neck/back pain, gait dysfunction, and deconditioning. Patient also with medical comorbidities of hypothyroidism, sinus tachycardia, hypertensive urgency and h/o cirrhosis, requiring medication management and oversight by Physiatry team and nursing. May also need specialist consulting from Neurosurgery. Patient is a complex medical case with mixed Neurosurgical, oncological, and medical issues. There are significant comorbidities which warrants acute rehab hospitalization. To return home it is in the patient’s best interest to have acute inpatient rehabilitative services to undergo intensive interdisciplinary therapy. Furthermore, the patient is motivated and is able to tolerate up to 3 hours of daily therapy for 5-6 days a week for a total of 15 hours a week. Evaluated by Physiatry and deemed to be a good candidate for admission to  for acute inpatient rehab. Admitted to Acute Rehab on 4/28/23.     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   - MRI spine: Numerous enhancing osseous metastatic lesions are noted throughout the cervical/thoracic/lumbar spine (most significant at the C2 and C3 level with evidence of  epidural extension causing severe spinal stenosis and mass effect). Malignant compression  deformity noted of the T8 vertebral body   - Neurosurgery Eval 4/22: Needs Hard collar [place] J when OOB  - Oncology Consulted: will need biopsy before additional tx plan recs, also MRI brain w contrast to r/u Mets   - Radonc consulted 4/27: will need radiation to the C spine (no sooner than 2 weeks after surgery)  -Hemovac discontinued 4/27  -Per Neurosurgery: plan was for [place] cervical collar when OOB but collars available do not fit so soft collar to be worn when OOB - confirmed with Neurosurgery team.   -Pain medications: previously evaluated by pain management. Gabapentin 300mg PO q8h, Tylenol 650mg q6h PRN mild pain, Oxycodone 10mg PO q4h PRN moderate pain  -following with Dr. Peña outpatient      #Dysphagia  - mild, since surgery  - swallow SLP eval requested    #Hypercalcemia likely 2/2 malignancy  #Elevated AlkPhos likely 2/2 malignancy  - resolved s/p IVF   -Will monitor BMP and trend electrolytes     #Sinus tachycardia likely 2/2 dehydration and pain  #Hypertensive urgency likely 2/2  pain  -resolved   - BP systolic 5/1 100- 113.   - c/w Labetalol 100 q8h with hold parameters (SBP<120) and monitor    #Rt eye redness likely conjunctivitis   - c/w Polytrim     # hypothyroidism   - TSH (4/21) WNL   - c/w levothyroxine 50mcg OD    #Maintenance   - Pain control: as above  - GI/Bowel Mgmt: no issues at this time, monitor for BMs.   - Bladder management: voiding freely, no issues at this time.   - Skin: monitor cervical surgical wound, Neurosurgery f/u as needed   - FEN: Hypercalcemia resolved as above. Monitor electrolytes and fluid status.   - Diet: DASH/TLC easy to chew diet     #Precautions / PROPHYLAXIS:    - Falls  - Ortho: Weight bearing status: WBAT    - DVT prophylaxis: Lovenox 30 SQ daily          57F with PMH hypothyroidism, suspected alcoholic cirrhosis (stopped drinking over 9 yrs ago), Presented 4/20 for neck pain w/ limited movement /2 pain x4 months. Admitted for management of suspected breast CA with metastases to the spine now s/p Occiput-T2 instrumentation and fusion with ORIF of C2.    Prior to current illness and functional decline, patient was independent with ADLs and ambulation. Patient now requires assistance with ambulation and ADLs 2/2 neck/back pain, gait dysfunction, and deconditioning. Patient also with medical comorbidities of hypothyroidism, sinus tachycardia, hypertensive urgency and h/o cirrhosis, requiring medication management and oversight by Physiatry team and nursing. May also need specialist consulting from Neurosurgery. Patient is a complex medical case with mixed Neurosurgical, oncological, and medical issues. There are significant comorbidities which warrants acute rehab hospitalization. To return home it is in the patient’s best interest to have acute inpatient rehabilitative services to undergo intensive interdisciplinary therapy. Furthermore, the patient is motivated and is able to tolerate up to 3 hours of daily therapy for 5-6 days a week for a total of 15 hours a week. Evaluated by Physiatry and deemed to be a good candidate for admission to  for acute inpatient rehab. Admitted to Acute Rehab on 4/28/23.     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   - MRI spine: Numerous enhancing osseous metastatic lesions are noted throughout the cervical/thoracic/lumbar spine (most significant at the C2 and C3 level with evidence of  epidural extension causing severe spinal stenosis and mass effect). Malignant compression  deformity noted of the T8 vertebral body   - Neurosurgery Eval 4/22: Needs Hard collar [place] J when OOB  - Oncology Consulted: will need biopsy before additional tx plan recs, also MRI brain w contrast to r/u Mets   - Radonc consulted 4/27: will need radiation to the C spine (no sooner than 2 weeks after surgery)  -Hemovac discontinued 4/27  -Per Neurosurgery: plan was for [place] cervical collar when OOB but collars available do not fit so soft collar to be worn when OOB - confirmed with Neurosurgery team.   -Pain medications: previously evaluated by pain management. Gabapentin 300mg PO q8h, Tylenol 650mg q6h PRN mild pain, Oxycodone 10mg PO q4h PRN moderate pain  -following with Dr. Peña outpatient      #Dysphagia  - mild, since surgery  - swallow SLP eval requested    #Hypercalcemia likely 2/2 malignancy  #Elevated AlkPhos likely 2/2 malignancy  - resolved s/p IVF   -Will monitor BMP and trend electrolytes     #Sinus tachycardia likely 2/2 dehydration and pain  #Hypertensive urgency likely 2/2  pain  -resolved   - BP systolic running low  - c/w Labetalol 100 q8h with hold parameters (SBP<120) and monitor    #Rt eye redness likely conjunctivitis   - c/w Polytrim     # hypothyroidism   - TSH (4/21) WNL   - c/w levothyroxine 50mcg OD    #Maintenance   - Pain control: as above  - GI/Bowel Mgmt: no issues at this time, monitor for BMs.   - Bladder management: voiding freely, no issues at this time.   - Skin: monitor cervical surgical wound, Neurosurgery f/u as needed   - FEN: Hypercalcemia resolved as above. Monitor electrolytes and fluid status.   - Diet: DASH/TLC easy to chew diet     #Precautions / PROPHYLAXIS:    - Falls  - Ortho: Weight bearing status: WBAT    - DVT prophylaxis: Lovenox 30 SQ daily

## 2023-05-02 NOTE — PROGRESS NOTE ADULT - ATTENDING COMMENTS
I reviewed the chart and examined the patient with the resident and we discussed the findings and treatment plan.  The patient is tolerating the rehab program well. I agree with the findings and treatment plan above, which I modified as indicated. The patient requires 3 hrs a day of acute inpatient rehab. No new complaints. Alert and no distress. POx 95% on 1.5 liters. Chest x-ray with basilar detelectasis. V/Q scan low probability. Feeling well. Wean O2. Low back spasm resolved with increased Robaxin dose. Continue full rehab program.    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   - MRI spine: Numerous enhancing osseous metastatic lesions are noted throughout the cervical/thoracic/lumbar spine (most significant at the C2 and C3 level with evidence of  epidural extension causing severe spinal stenosis and mass effect). Malignant compression  deformity noted of the T8 vertebral body   - Neurosurgery Eval 4/22: Needs Hard collar [place] J when OOB  - Oncology Consulted: will need biopsy before additional tx plan recs, also MRI brain w contrast to r/u Mets   - Radonc consulted 4/27: will need radiation to the C spine (no sooner than 2 weeks after surgery)  -Hemovac discontinued 4/27  -Per Neurosurgery: plan was for [place] cervical collar when OOB but collars available do not fit so soft collar to be worn when OOB - confirmed with Neurosurgery team.   -Pain medications: previously evaluated by pain management. Gabapentin 300mg PO q8h, Tylenol 650mg q6h PRN mild pain, Oxycodone 10mg PO q4h PRN moderate pain  -following with Dr. Peña outpatient      #Dysphagia  - mild, since surgery  - swallow SLP eval requested    #Hypercalcemia likely 2/2 malignancy  #Elevated AlkPhos likely 2/2 malignancy  - resolved s/p IVF   -Will monitor BMP and trend electrolytes     #Sinus tachycardia likely 2/2 dehydration and pain  #Hypertensive urgency likely 2/2  pain  -resolved   - BP systolic running low  - c/w Labetalol 100 q8h with hold parameters (SBP<120) and monitor    #Rt eye redness likely conjunctivitis   - c/w Polytrim     # hypothyroidism   - TSH (4/21) WNL   - c/w levothyroxine 50mcg OD    #Maintenance   - Pain control: as above  - GI/Bowel Mgmt: no issues at this time, monitor for BMs.   - Bladder management: voiding freely, no issues at this time.   - Skin: monitor cervical surgical wound, Neurosurgery f/u as needed   - FEN: Hypercalcemia resolved as above. Monitor electrolytes and fluid status.   - Diet: DASH/TLC easy to chew diet     #Precautions / PROPHYLAXIS:    - Falls  - Ortho: Weight bearing status: WBAT    - DVT prophylaxis: Lovenox 30 SQ daily

## 2023-05-03 LAB
ALBUMIN SERPL ELPH-MCNC: 3.1 G/DL — LOW (ref 3.5–5.2)
ALP SERPL-CCNC: 228 U/L — HIGH (ref 30–115)
ALT FLD-CCNC: 13 U/L — SIGNIFICANT CHANGE UP (ref 0–41)
ANION GAP SERPL CALC-SCNC: 7 MMOL/L — SIGNIFICANT CHANGE UP (ref 7–14)
AST SERPL-CCNC: 26 U/L — SIGNIFICANT CHANGE UP (ref 0–41)
BILIRUB SERPL-MCNC: 0.4 MG/DL — SIGNIFICANT CHANGE UP (ref 0.2–1.2)
BUN SERPL-MCNC: 9 MG/DL — LOW (ref 10–20)
CALCIUM SERPL-MCNC: 9.8 MG/DL — SIGNIFICANT CHANGE UP (ref 8.4–10.4)
CHLORIDE SERPL-SCNC: 98 MMOL/L — SIGNIFICANT CHANGE UP (ref 98–110)
CO2 SERPL-SCNC: 31 MMOL/L — SIGNIFICANT CHANGE UP (ref 17–32)
CREAT SERPL-MCNC: 0.5 MG/DL — LOW (ref 0.7–1.5)
EGFR: 109 ML/MIN/1.73M2 — SIGNIFICANT CHANGE UP
GLUCOSE SERPL-MCNC: 110 MG/DL — HIGH (ref 70–99)
MAGNESIUM SERPL-MCNC: 1.8 MG/DL — SIGNIFICANT CHANGE UP (ref 1.8–2.4)
POTASSIUM SERPL-MCNC: 4.3 MMOL/L — SIGNIFICANT CHANGE UP (ref 3.5–5)
POTASSIUM SERPL-SCNC: 4.3 MMOL/L — SIGNIFICANT CHANGE UP (ref 3.5–5)
PROT SERPL-MCNC: 5.5 G/DL — LOW (ref 6–8)
SODIUM SERPL-SCNC: 136 MMOL/L — SIGNIFICANT CHANGE UP (ref 135–146)

## 2023-05-03 RX ORDER — LABETALOL HCL 100 MG
100 TABLET ORAL EVERY 12 HOURS
Refills: 0 | Status: DISCONTINUED | OUTPATIENT
Start: 2023-05-03 | End: 2023-05-04

## 2023-05-03 RX ADMIN — CHLORHEXIDINE GLUCONATE 1 APPLICATION(S): 213 SOLUTION TOPICAL at 05:23

## 2023-05-03 RX ADMIN — GABAPENTIN 300 MILLIGRAM(S): 400 CAPSULE ORAL at 13:26

## 2023-05-03 RX ADMIN — Medication 1 DROP(S): at 17:55

## 2023-05-03 RX ADMIN — OXYCODONE HYDROCHLORIDE 10 MILLIGRAM(S): 5 TABLET ORAL at 10:16

## 2023-05-03 RX ADMIN — ENOXAPARIN SODIUM 30 MILLIGRAM(S): 100 INJECTION SUBCUTANEOUS at 17:41

## 2023-05-03 RX ADMIN — Medication 5 MILLIGRAM(S): at 21:25

## 2023-05-03 RX ADMIN — FAMOTIDINE 20 MILLIGRAM(S): 10 INJECTION INTRAVENOUS at 05:19

## 2023-05-03 RX ADMIN — Medication 650 MILLIGRAM(S): at 06:15

## 2023-05-03 RX ADMIN — Medication 1 DROP(S): at 05:16

## 2023-05-03 RX ADMIN — METHOCARBAMOL 1000 MILLIGRAM(S): 500 TABLET, FILM COATED ORAL at 05:17

## 2023-05-03 RX ADMIN — GABAPENTIN 300 MILLIGRAM(S): 400 CAPSULE ORAL at 21:25

## 2023-05-03 RX ADMIN — Medication 650 MILLIGRAM(S): at 20:28

## 2023-05-03 RX ADMIN — Medication 3 MILLIGRAM(S): at 21:25

## 2023-05-03 RX ADMIN — FAMOTIDINE 20 MILLIGRAM(S): 10 INJECTION INTRAVENOUS at 17:41

## 2023-05-03 RX ADMIN — METHOCARBAMOL 1000 MILLIGRAM(S): 500 TABLET, FILM COATED ORAL at 17:40

## 2023-05-03 RX ADMIN — OXYCODONE HYDROCHLORIDE 10 MILLIGRAM(S): 5 TABLET ORAL at 17:56

## 2023-05-03 RX ADMIN — Medication 1 DROP(S): at 12:41

## 2023-05-03 RX ADMIN — LORATADINE 10 MILLIGRAM(S): 10 TABLET ORAL at 21:25

## 2023-05-03 RX ADMIN — OXYCODONE HYDROCHLORIDE 10 MILLIGRAM(S): 5 TABLET ORAL at 17:45

## 2023-05-03 RX ADMIN — OXYCODONE HYDROCHLORIDE 10 MILLIGRAM(S): 5 TABLET ORAL at 09:08

## 2023-05-03 RX ADMIN — OXYCODONE HYDROCHLORIDE 10 MILLIGRAM(S): 5 TABLET ORAL at 00:10

## 2023-05-03 RX ADMIN — METHOCARBAMOL 1000 MILLIGRAM(S): 500 TABLET, FILM COATED ORAL at 12:41

## 2023-05-03 RX ADMIN — ENOXAPARIN SODIUM 30 MILLIGRAM(S): 100 INJECTION SUBCUTANEOUS at 05:18

## 2023-05-03 RX ADMIN — Medication 650 MILLIGRAM(S): at 14:17

## 2023-05-03 RX ADMIN — Medication 100 MILLIGRAM(S): at 05:17

## 2023-05-03 RX ADMIN — Medication 50 MICROGRAM(S): at 05:19

## 2023-05-03 RX ADMIN — Medication 1 DROP(S): at 17:41

## 2023-05-03 RX ADMIN — Medication 1 DROP(S): at 05:19

## 2023-05-03 RX ADMIN — OXYCODONE HYDROCHLORIDE 10 MILLIGRAM(S): 5 TABLET ORAL at 14:17

## 2023-05-03 RX ADMIN — GABAPENTIN 300 MILLIGRAM(S): 400 CAPSULE ORAL at 05:18

## 2023-05-03 RX ADMIN — Medication 650 MILLIGRAM(S): at 12:40

## 2023-05-03 RX ADMIN — Medication 650 MILLIGRAM(S): at 21:28

## 2023-05-03 RX ADMIN — Medication 650 MILLIGRAM(S): at 05:46

## 2023-05-03 RX ADMIN — OXYCODONE HYDROCHLORIDE 10 MILLIGRAM(S): 5 TABLET ORAL at 13:25

## 2023-05-03 NOTE — PROGRESS NOTE ADULT - SUBJECTIVE AND OBJECTIVE BOX
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 alcoholic cirrhosis (stopped drinking over 9 yrs ago), presenting for neck pain on 4/20. Pt started having neck pain almost 4 months ago for which her chiropractor ordered an MRI that was done 1 month ago. Pt was not sure what the read of the MRI was but there was concern about lesions so she went to see Dr. Peña's office for the first time. Dr. Peña examined her and told her she has breast cancer with metastasis and needs to come to the ED for further evaluation. Pt is a non smoker and has no family history of breast Ca. Denies any significant weight loss, night sweats, or fevers. No reported bloody discharge from her nipples. Upon examining pt's right breast, dimpling with a lump was found. Pt believes that she noticed this change almost over a yr a go, but did not think of it was anything serious. Pt's neck pain has been significantly worsening limiting her ability to move her neck. She only has pain when moving her neck and has full sensation and ROM of her b/l UE. Denies any SOB, chest pain, or palpitations. States that she has pain with ROM, and states that it is worse with movement ex moving out of bed. Patient states that pain is mostly midline at the base of head, with radiation to her paraspinal muscles. Denies any radicular pain to UE & LE. States that she has been sleeping only in a recliner and has since then been developing worsening lower back pain. Denies any ataxia, imbalance, or difficulty with ambulation. Denies any numbness, tingling, paresthesias, weakness, saddle anesthesia, or bowel / bladder incontinence. Neurosurgery was consulted. On 4.24.23 she was taken to the operating room where she underwent an Occiput to T2 instrumentation and Fusion, ORIF of C2. She had a hemovac drain placed intraop which was removed on POD#3. She was on a PCA pump postop which was removed on POD#3. She remained neurologically stable postop.      Patient is tolerating bedside PT, OT, and SLP. The patient was evaluated by the PM&R team once medically stable. The patient was found to have functional limitations in terms of muscle strength, endurance, physical mobility, ability to carry out activities of daily living including: self-care, transfers, and ambulation. Participating with bedside therapeutic exercises and cognitive retraining. The patient is motivated and is able to tolerate up to 3 hours of daily therapy for 5-6 days a week for a total of 15 hours a week. Evaluated by Physiatry and deemed to be a good candidate for admission to  for acute inpatient rehab. Admitted to Acute Rehab on 4/28/23.    PMHx/PSHx: as above  SHx: history of alcohol use disorder - quit 9 years ago, denies smoking and illicit drug use   FHx: non-contributory   Allergies: NKDA  Prior level of function: independent with ambulation and ADLs without assistive device although had been using straight cane for a few months and the last 2 weeks prior to presentation reports being essentially chair bound due to neck/back pain and difficulty with mobility.   Living situation: Pt resides with  in 2nd floor apt using 8 steps to enter and a flight of stairs to access.   Current level of function:  SLP 4/26: Some complaints of globus w/ solids Recommend: continue easy to chew w/ thin  PT 4/28: max assist for bed mobility, mod assist for transfers, min assist for ambulation 40ft with RW  OT 4/27: dependent for lower body dressing    (28 Apr 2023 14:46)      TODAY'S SUBJECTIVE & REVIEW OF SYMPTOMS:  Patient was seen and examined at bedside. In NAD  No acute events reported overnight   Pt continues to report continued improvement in her low back spasms. Pt also noted on L O2, O2 sat ~95%. Pt encouraged to utilize IS  Overall pain controlled.     CXR negative, V/Q scan with low probability for PE    Stated she has had regular BM. Voiding freely without issues.   Neurosurgery team confirmed pt can wear soft collar   vitals reviewed       Review of Systems:  Constitutional:    [  ] WNL           [   ] insomnia   [   ] tired  [ x ] recent weight loss and poor appetite - reporting depressed mood at home   Cardio:             [ x ] WNL           [   ] CP   [   ] SANCHEZ   [   ] palpitations         Resp:                [ x ] WNL           [   ] SOB   [   ] cough   [   ] wheezing  GI:                    [ x ] WNL           [   ] constipation   [   ] diarrhea   [   ] abdominal pain   [   ] nausea   [   ] emesis    :                   [ x ] WNL           [   ] SILVA  [   ] dysuria   [   ] difficulty voiding            Endo:                [ x ] WNL          [   ] polyuria   [   ] temperature intolerance                Skin:                  [  ] WNL          [   ] pain   [ x  ] Surgical incision along cervical spine c/d/i. [   ] rash  MSK:                 [  ] WNL          [   ] muscle pain   [ x  ] midline neck and low back pain  [   ] muscle tenderness   [   ] swelling  Neuro:              [  ] WNL          [   ] HA   [   ] change in vision   [   ] tremor   [   ] weakness   [  x  ]dysphagia             Cognitive:          [ x ] WNL          [   ]confusion     Psych:                [ x ] WNL          [   ] hallucinations   [   ]agitation   [   ] delusion   [   ]depression    PHYSICAL EXAM    Vital Signs (24 Hrs):  T(C): 36.9 (05-02-23 @ 05:36), Max: 37.1 (05-01-23 @ 20:32)  HR: 89 (05-02-23 @ 05:36) (85 - 104)  BP: 138/69 (05-02-23 @ 05:36) (105/59 - 138/69)  RR: 20 (05-02-23 @ 05:36) (18 - 20)  SpO2: 97% (05-01-23 @ 16:00) (97% - 97%)    Daily     I&O's Summary    01 May 2023 07:01  -  02 May 2023 07:00  --------------------------------------------------------  IN: 120 mL / OUT: 1525 mL / NET: -1405 mL        I&O's Summary    30 Apr 2023 07:01  -  01 May 2023 07:00  --------------------------------------------------------  IN: 0 mL / OUT: 950 mL / NET: -950 mL        General:[ x ] NAD, Resting Comfortable,   [   ] other:                                HEENT: [ x ] NC/AT, EOMI, PERRL , Normal Conjunctivae,   [   ] other:  Cardio: [ x ] RRR, no murmer,   [   ] other:                              Pulm: [ x ] No Respiratory Distress,  Lungs CTAB,   [   ] other:                       Abdomen: [ x ]ND/NT, Soft,   [   ] other:    : [ x ] NO SILVA CATHETER, [   ] SILVA CATHETER- no meatal tear, no discharge, [   ] other:                                            MSK: [  ] No joint swelling, Full ROM,   [ x  ] other:  Surgical incision along cervical spine c/d/i.                                      Ext: [ x ]No C/C/E, No calf tenderness,   [   ]other:    Skin: [  ]intact,   [ x  ] other: Surgical incision along cervical spine c/d/i.                                                                  Neurological Examination:  Cognitive: [  x  ] AAO x 3,   [    ]  other: A&Ox3. Oriented to person, place and time. Follows 2 step commands, language components intact. No aphasia.   Attention:  [  x  ] intact,   [    ]  other:  intact spelling and simple calculations                Memory: [  x  ] intact,    [    ]  other: intact registration and recall    Mood/Affect: [  x  ] wnl,    [    ]  other:                                                                             Communication: [  x  ]Fluent, no dysarthria, following commands:  [    ] other:   CN II - XII:  [  x  ] intact,  [    ] other:                                                                                        Motor:   RIGHT UE: [  ] WNL,  [ x  ] other: 3-/5 shoulder abduction limited by neck/back pain, 4/5 elbow flexion/extension, 5/5 hand   LEFT    UE: [  ] WNL,  [  x ] other: 3-/5 shoulder abduction limited by neck/back pain, 4/5 elbow flexion/extension, 5/5 hand   RIGHT LE: [ x ] WNL,  [   ] other: 5/5 hip flexion, knee flexion/extension, ankle dorsiflexion/plantarflexion  LEFT    LE: [ x ] WNL,  [   ] other: 5/5 hip flexion, knee flexion/extension, ankle dorsiflexion/plantarflexion    Tone: [  x  ] wnl,   [    ]  other:  DTRs: [ x  ]symmetric, [   ] other:  Coordination:   [ x  ] intact,   [    ] other:                                                                           Sensory: [ x  ] Intact to light touch,   [    ] other:    Clonus negative    CLOF: dependent BM, maxA transfers, maxA RW 10ft    MEDICATIONS  (STANDING):  artificial  tears Solution 1 Drop(s) Both EYES two times a day  bisacodyl 5 milliGRAM(s) Oral at bedtime  chlorhexidine 2% Cloths 1 Application(s) Topical <User Schedule>  enoxaparin Injectable 30 milliGRAM(s) SubCutaneous every 12 hours  famotidine    Tablet 20 milliGRAM(s) Oral every 12 hours  gabapentin 300 milliGRAM(s) Oral every 8 hours  labetalol 100 milliGRAM(s) Oral every 8 hours  levothyroxine 50 MICROGram(s) Oral daily  loratadine 10 milliGRAM(s) Oral at bedtime  melatonin 3 milliGRAM(s) Oral at bedtime  methocarbamol 1000 milliGRAM(s) Oral every 6 hours  polyethylene glycol 3350 17 Gram(s) Oral daily  senna 2 Tablet(s) Oral at bedtime  trimethoprim/polymyxin Solution 1 Drop(s) Right EYE four times a day    MEDICATIONS  (PRN):  acetaminophen     Tablet .. 650 milliGRAM(s) Oral every 6 hours PRN Mild Pain (1 - 3)  naloxone Injectable 0.1 milliGRAM(s) IV Push every 3 minutes PRN For ANY of the following changes in patient status:  A. RR LESS THAN 10 breaths per minute, B. Oxygen saturation LESS THAN 90%, C. Sedation score of 6  ondansetron Injectable 4 milliGRAM(s) IV Push every 6 hours PRN Nausea and/or Vomiting  oxyCODONE   IR 10 milliGRAM(s) Oral every 4 hours PRN Moderate Pain (4 - 6)        RECENT LABS/IMAGING                                   10.1   10.21 )-----------( 277      ( 01 May 2023 06:25 )             31.2     05-02    135  |  96<L>  |  10  ----------------------------<  111<H>  4.2   |  31  |  0.5<L>    Ca    9.2      02 May 2023 06:37  Mg     2.1     05-02    TPro  5.5<L>  /  Alb  3.1<L>  /  TBili  0.6  /  DBili  x   /  AST  23  /  ALT  13  /  AlkPhos  212<H>  05-02           Patient is a 57y old  Female who presents with a chief complaint of 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 alcoholic cirrhosis (stopped drinking over 9 yrs ago), presenting for neck pain on 4/20. Pt started having neck pain almost 4 months ago for which her chiropractor ordered an MRI that was done 1 month ago. Pt was not sure what the read of the MRI was but there was concern about lesions so she went to see Dr. Peña's office for the first time. Dr. Peña examined her and told her she has breast cancer with metastasis and needs to come to the ED for further evaluation. Pt is a non smoker and has no family history of breast Ca. Denies any significant weight loss, night sweats, or fevers. No reported bloody discharge from her nipples. Upon examining pt's right breast, dimpling with a lump was found. Pt believes that she noticed this change almost over a yr a go, but did not think of it was anything serious. Pt's neck pain has been significantly worsening limiting her ability to move her neck. She only has pain when moving her neck and has full sensation and ROM of her b/l UE. Denies any SOB, chest pain, or palpitations. States that she has pain with ROM, and states that it is worse with movement ex moving out of bed. Patient states that pain is mostly midline at the base of head, with radiation to her paraspinal muscles. Denies any radicular pain to UE & LE. States that she has been sleeping only in a recliner and has since then been developing worsening lower back pain. Denies any ataxia, imbalance, or difficulty with ambulation. Denies any numbness, tingling, paresthesias, weakness, saddle anesthesia, or bowel / bladder incontinence. Neurosurgery was consulted. On 4.24.23 she was taken to the operating room where she underwent an Occiput to T2 instrumentation and Fusion, ORIF of C2. She had a hemovac drain placed intraop which was removed on POD#3. She was on a PCA pump postop which was removed on POD#3. She remained neurologically stable postop.      Patient is tolerating bedside PT, OT, and SLP. The patient was evaluated by the PM&R team once medically stable. The patient was found to have functional limitations in terms of muscle strength, endurance, physical mobility, ability to carry out activities of daily living including: self-care, transfers, and ambulation. Participating with bedside therapeutic exercises and cognitive retraining. The patient is motivated and is able to tolerate up to 3 hours of daily therapy for 5-6 days a week for a total of 15 hours a week. Evaluated by Physiatry and deemed to be a good candidate for admission to  for acute inpatient rehab. Admitted to Acute Rehab on 4/28/23.    PMHx/PSHx: as above  SHx: history of alcohol use disorder - quit 9 years ago, denies smoking and illicit drug use   FHx: non-contributory   Allergies: NKDA  Prior level of function: independent with ambulation and ADLs without assistive device although had been using straight cane for a few months and the last 2 weeks prior to presentation reports being essentially chair bound due to neck/back pain and difficulty with mobility.   Living situation: Pt resides with  in 2nd floor apt using 8 steps to enter and a flight of stairs to access.   Current level of function:  SLP 4/26: Some complaints of globus w/ solids Recommend: continue easy to chew w/ thin  PT 4/28: max assist for bed mobility, mod assist for transfers, min assist for ambulation 40ft with RW  OT 4/27: dependent for lower body dressing    (28 Apr 2023 14:46)      TODAY'S SUBJECTIVE & REVIEW OF SYMPTOMS:  Patient was seen and examined at bedside. In NAD  No acute events reported overnight   Pt continues to report continued improvement in her low back spasms. Pt also noted on L O2, O2 sat ~95% this AM. Pt encouraged to utilize IS  Overall pain controlled.       Stated she has had regular BM. Voiding freely without issues.   Neurosurgery team confirmed pt can wear soft collar   vitals reviewed       Review of Systems:  Constitutional:    [  ] WNL           [   ] insomnia   [   ] tired  [ x ] recent weight loss and poor appetite - reporting depressed mood at home   Cardio:             [ x ] WNL           [   ] CP   [   ] SANCHEZ   [   ] palpitations         Resp:                [ x ] WNL           [   ] SOB   [   ] cough   [   ] wheezing  GI:                    [ x ] WNL           [   ] constipation   [   ] diarrhea   [   ] abdominal pain   [   ] nausea   [   ] emesis    :                   [ x ] WNL           [   ] SILVA  [   ] dysuria   [   ] difficulty voiding            Endo:                [ x ] WNL          [   ] polyuria   [   ] temperature intolerance                Skin:                  [  ] WNL          [   ] pain   [ x  ] Surgical incision along cervical spine c/d/i. [   ] rash  MSK:                 [  ] WNL          [   ] muscle pain   [ x  ] midline neck and low back pain  [   ] muscle tenderness   [   ] swelling  Neuro:              [  ] WNL          [   ] HA   [   ] change in vision   [   ] tremor   [   ] weakness   [  x  ]dysphagia             Cognitive:          [ x ] WNL          [   ]confusion     Psych:                [ x ] WNL          [   ] hallucinations   [   ]agitation   [   ] delusion   [   ]depression    PHYSICAL EXAM    Vital Signs (24 Hrs):  T(C): 36.6 (05-03-23 @ 04:48), Max: 37 (05-02-23 @ 12:33)  HR: 82 (05-03-23 @ 04:48) (82 - 97)  BP: 141/83 (05-03-23 @ 04:48) (114/56 - 141/83)  RR: 18 (05-03-23 @ 04:48) (18 - 20)  SpO2: 95% (05-03-23 @ 04:48) (95% - 95%)    Daily     I&O's Summary          I&O's Summary    30 Apr 2023 07:01  -  01 May 2023 07:00  --------------------------------------------------------  IN: 0 mL / OUT: 950 mL / NET: -950 mL        General:[ x ] NAD, Resting Comfortable,   [   ] other:                                HEENT: [ x ] NC/AT, EOMI, PERRL , Normal Conjunctivae,   [   ] other:  Cardio: [ x ] RRR, no murmer,   [   ] other:                              Pulm: [ x ] No Respiratory Distress,  Lungs CTAB,   [   ] other:                       Abdomen: [ x ]ND/NT, Soft,   [   ] other:    : [ x ] NO SILVA CATHETER, [   ] SILVA CATHETER- no meatal tear, no discharge, [   ] other:                                            MSK: [  ] No joint swelling, Full ROM,   [ x  ] other:  Surgical incision along cervical spine c/d/i.                                      Ext: [ x ]No C/C/E, No calf tenderness,   [   ]other:    Skin: [  ]intact,   [ x  ] other: Surgical incision along cervical spine c/d/i.                                                                  Neurological Examination:  Cognitive: [  x  ] AAO x 3,   [    ]  other: A&Ox3. Oriented to person, place and time. Follows 2 step commands, language components intact. No aphasia.   Attention:  [  x  ] intact,   [    ]  other:  intact spelling and simple calculations                Memory: [  x  ] intact,    [    ]  other: intact registration and recall    Mood/Affect: [  x  ] wnl,    [    ]  other:                                                                             Communication: [  x  ]Fluent, no dysarthria, following commands:  [    ] other:   CN II - XII:  [  x  ] intact,  [    ] other:                                                                                        Motor:   RIGHT UE: [  ] WNL,  [ x  ] other: 3-/5 shoulder abduction limited by neck/back pain, 4/5 elbow flexion/extension, 5/5 hand   LEFT    UE: [  ] WNL,  [  x ] other: 3-/5 shoulder abduction limited by neck/back pain, 4/5 elbow flexion/extension, 5/5 hand   RIGHT LE: [ x ] WNL,  [   ] other: 5/5 hip flexion, knee flexion/extension, ankle dorsiflexion/plantarflexion  LEFT    LE: [ x ] WNL,  [   ] other: 5/5 hip flexion, knee flexion/extension, ankle dorsiflexion/plantarflexion    Tone: [  x  ] wnl,   [    ]  other:  DTRs: [ x  ]symmetric, [   ] other:  Coordination:   [ x  ] intact,   [    ] other:                                                                           Sensory: [ x  ] Intact to light touch,   [    ] other:    Clonus negative    CLOF: dependent BM, maxA transfers, maxA RW 10ft    MEDICATIONS  (STANDING):  artificial  tears Solution 1 Drop(s) Both EYES two times a day  bisacodyl 5 milliGRAM(s) Oral at bedtime  chlorhexidine 2% Cloths 1 Application(s) Topical <User Schedule>  enoxaparin Injectable 30 milliGRAM(s) SubCutaneous every 12 hours  famotidine    Tablet 20 milliGRAM(s) Oral every 12 hours  gabapentin 300 milliGRAM(s) Oral every 8 hours  labetalol 100 milliGRAM(s) Oral every 8 hours  levothyroxine 50 MICROGram(s) Oral daily  loratadine 10 milliGRAM(s) Oral at bedtime  melatonin 3 milliGRAM(s) Oral at bedtime  methocarbamol 1000 milliGRAM(s) Oral every 6 hours  polyethylene glycol 3350 17 Gram(s) Oral daily  senna 2 Tablet(s) Oral at bedtime  trimethoprim/polymyxin Solution 1 Drop(s) Right EYE four times a day    MEDICATIONS  (PRN):  acetaminophen     Tablet .. 650 milliGRAM(s) Oral every 6 hours PRN Mild Pain (1 - 3)  naloxone Injectable 0.1 milliGRAM(s) IV Push every 3 minutes PRN For ANY of the following changes in patient status:  A. RR LESS THAN 10 breaths per minute, B. Oxygen saturation LESS THAN 90%, C. 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 alcoholic cirrhosis (stopped drinking over 9 yrs ago), presenting for neck pain on 4/20. Pt started having neck pain almost 4 months ago for which her chiropractor ordered an MRI that was done 1 month ago. Pt was not sure what the read of the MRI was but there was concern about lesions so she went to see Dr. Peña's office for the first time. Dr. Peña examined her and told her she has breast cancer with metastasis and needs to come to the ED for further evaluation. Pt is a non smoker and has no family history of breast Ca. Denies any significant weight loss, night sweats, or fevers. No reported bloody discharge from her nipples. Upon examining pt's right breast, dimpling with a lump was found. Pt believes that she noticed this change almost over a yr a go, but did not think of it was anything serious. Pt's neck pain has been significantly worsening limiting her ability to move her neck. She only has pain when moving her neck and has full sensation and ROM of her b/l UE. Denies any SOB, chest pain, or palpitations. States that she has pain with ROM, and states that it is worse with movement ex moving out of bed. Patient states that pain is mostly midline at the base of head, with radiation to her paraspinal muscles. Denies any radicular pain to UE & LE. States that she has been sleeping only in a recliner and has since then been developing worsening lower back pain. Denies any ataxia, imbalance, or difficulty with ambulation. Denies any numbness, tingling, paresthesias, weakness, saddle anesthesia, or bowel / bladder incontinence. Neurosurgery was consulted. On 4.24.23 she was taken to the operating room where she underwent an Occiput to T2 instrumentation and Fusion, ORIF of C2. She had a hemovac drain placed intraop which was removed on POD#3. She was on a PCA pump postop which was removed on POD#3. She remained neurologically stable postop.      Patient is tolerating bedside PT, OT, and SLP. The patient was evaluated by the PM&R team once medically stable. The patient was found to have functional limitations in terms of muscle strength, endurance, physical mobility, ability to carry out activities of daily living including: self-care, transfers, and ambulation. Participating with bedside therapeutic exercises and cognitive retraining. The patient is motivated and is able to tolerate up to 3 hours of daily therapy for 5-6 days a week for a total of 15 hours a week. Evaluated by Physiatry and deemed to be a good candidate for admission to  for acute inpatient rehab. Admitted to Acute Rehab on 4/28/23.    PMHx/PSHx: as above  SHx: history of alcohol use disorder - quit 9 years ago, denies smoking and illicit drug use   FHx: non-contributory   Allergies: NKDA  Prior level of function: independent with ambulation and ADLs without assistive device although had been using straight cane for a few months and the last 2 weeks prior to presentation reports being essentially chair bound due to neck/back pain and difficulty with mobility.   Living situation: Pt resides with  in 2nd floor apt using 8 steps to enter and a flight of stairs to access.   Current level of function:  SLP 4/26: Some complaints of globus w/ solids Recommend: continue easy to chew w/ thin  PT 4/28: max assist for bed mobility, mod assist for transfers, min assist for ambulation 40ft with RW  OT 4/27: dependent for lower body dressing    (28 Apr 2023 14:46)      TODAY'S SUBJECTIVE & REVIEW OF SYMPTOMS:  Patient was seen and examined at bedside. In NAD  No acute events reported overnight   Pt continues to report continued improvement in her low back spasms. Pt also noted on L O2, O2 sat ~95% this AM. Pt encouraged to utilize IS  Overall pain controlled.       Stated she has had regular BM. Voiding freely without issues.   Neurosurgery team confirmed pt can wear soft collar   vitals reviewed       Review of Systems:  Constitutional:    [  ] WNL           [   ] insomnia   [   ] tired  [ x ] recent weight loss and poor appetite - reporting depressed mood at home   Cardio:             [ x ] WNL           [   ] CP   [   ] SANCHEZ   [   ] palpitations         Resp:                [ x ] WNL           [   ] SOB   [   ] cough   [   ] wheezing  GI:                    [ x ] WNL           [   ] constipation   [   ] diarrhea   [   ] abdominal pain   [   ] nausea   [   ] emesis    :                   [ x ] WNL           [   ] SILVA  [   ] dysuria   [   ] difficulty voiding            Endo:                [ x ] WNL          [   ] polyuria   [   ] temperature intolerance                Skin:                  [  ] WNL          [   ] pain   [ x  ] Surgical incision along cervical spine c/d/i. [   ] rash  MSK:                 [  ] WNL          [   ] muscle pain   [ x  ] midline neck and low back pain  [   ] muscle tenderness   [   ] swelling  Neuro:              [  ] WNL          [   ] HA   [   ] change in vision   [   ] tremor   [   ] weakness   [  x  ]dysphagia             Cognitive:          [ x ] WNL          [   ]confusion     Psych:                [ x ] WNL          [   ] hallucinations   [   ]agitation   [   ] delusion   [   ]depression    PHYSICAL EXAM    Vital Signs (24 Hrs):  T(C): 36.6 (05-03-23 @ 04:48), Max: 37 (05-02-23 @ 12:33)  HR: 82 (05-03-23 @ 04:48) (82 - 97)  BP: 141/83 (05-03-23 @ 04:48) (114/56 - 141/83)  RR: 18 (05-03-23 @ 04:48) (18 - 20)  SpO2: 95% (05-03-23 @ 04:48) (95% - 95%)    General:[ x ] NAD, Resting Comfortable,   [   ] other:                                HEENT: [ x ] NC/AT, EOMI, PERRL , Normal Conjunctivae,   [   ] other:  Cardio: [ x ] RRR, no murmer,   [   ] other:                              Pulm: [ x ] No Respiratory Distress,  Lungs CTAB,   [   ] other:                       Abdomen: [ x ]ND/NT, Soft,   [   ] other:    : [ x ] NO SILVA CATHETER, [   ] SILVA CATHETER- no meatal tear, no discharge, [   ] other:                                            MSK: [  ] No joint swelling, Full ROM,   [ x  ] other:  Surgical incision along cervical spine c/d/i.                                      Ext: [ x ]No C/C/E, No calf tenderness,   [   ]other:    Skin: [  ]intact,   [ x  ] other: Surgical incision along cervical spine c/d/i.                                                                  Neurological Examination:  Cognitive: [  x  ] AAO x 3,   [    ]  other: A&Ox3. Oriented to person, place and time. Follows 2 step commands, language components intact. No aphasia.   Attention:  [  x  ] intact,   [    ]  other:  intact spelling and simple calculations                Memory: [  x  ] intact,    [    ]  other: intact registration and recall    Mood/Affect: [  x  ] wnl,    [    ]  other:                                                                             Communication: [  x  ]Fluent, no dysarthria, following commands:  [    ] other:   CN II - XII:  [  x  ] intact,  [    ] other:                                                                                        Motor:   RIGHT UE: [  ] WNL,  [ x  ] other: 3-/5 shoulder abduction limited by neck/back pain, 4/5 elbow flexion/extension, 5/5 hand   LEFT    UE: [  ] WNL,  [  x ] other: 3-/5 shoulder abduction limited by neck/back pain, 4/5 elbow flexion/extension, 5/5 hand   RIGHT LE: [ x ] WNL,  [   ] other: 5/5 hip flexion, knee flexion/extension, ankle dorsiflexion/plantarflexion  LEFT    LE: [ x ] WNL,  [   ] other: 5/5 hip flexion, knee flexion/extension, ankle dorsiflexion/plantarflexion    Tone: [  x  ] wnl,   [    ]  other:  DTRs: [ x  ]symmetric, [   ] other:  Coordination:   [ x  ] intact,   [    ] other:                                                                           Sensory: [ x  ] Intact to light touch,   [    ] other:    Clonus negative    CLOF: dependent BM, maxA transfers, maxA RW 10ft    MEDICATIONS  (STANDING):  artificial  tears Solution 1 Drop(s) Both EYES two times a day  bisacodyl 5 milliGRAM(s) Oral at bedtime  chlorhexidine 2% Cloths 1 Application(s) Topical <User Schedule>  enoxaparin Injectable 30 milliGRAM(s) SubCutaneous every 12 hours  famotidine    Tablet 20 milliGRAM(s) Oral every 12 hours  gabapentin 300 milliGRAM(s) Oral every 8 hours  labetalol 100 milliGRAM(s) Oral every 8 hours  levothyroxine 50 MICROGram(s) Oral daily  loratadine 10 milliGRAM(s) Oral at bedtime  melatonin 3 milliGRAM(s) Oral at bedtime  methocarbamol 1000 milliGRAM(s) Oral every 6 hours  polyethylene glycol 3350 17 Gram(s) Oral daily  senna 2 Tablet(s) Oral at bedtime  trimethoprim/polymyxin Solution 1 Drop(s) Right EYE four times a day    MEDICATIONS  (PRN):  acetaminophen     Tablet .. 650 milliGRAM(s) Oral every 6 hours PRN Mild Pain (1 - 3)  naloxone Injectable 0.1 milliGRAM(s) IV Push every 3 minutes PRN For ANY of the following changes in patient status:  A. RR LESS THAN 10 breaths per minute, B. Oxygen saturation LESS THAN 90%, C. Sedation score of 6  ondansetron Injectable 4 milliGRAM(s) IV Push every 6 hours PRN Nausea and/or Vomiting  oxyCODONE    IR 10 milliGRAM(s) Oral every 4 hours PRN Moderate Pain (4 - 6)          RECENT LABS/IMAGING                        05-03    136  |  98  |  9<L>  ----------------------------<  110<H>  4.3   |  31  |  0.5<L>    Ca    9.8      03 May 2023 07:52  Mg     1.8     05-03    TPro  5.5<L>  /  Alb  3.1<L>  /  TBili  0.4  /  DBili  x   /  AST  26  /  ALT  13  /  AlkPhos  228<H>  05-03

## 2023-05-03 NOTE — PROGRESS NOTE ADULT - ATTENDING COMMENTS
I reviewed the chart and examined the patient with the resident and we discussed the findings and treatment plan.  The patient is tolerating the rehab program well. I agree with the findings and treatment plan above, which I modified as indicated. The patient requires 3 hrs a day of acute inpatient rehab. No new complaints. Alert. Vitals stable, labs reviewed and stable. Fair pain control. Eating well w/o difficulty. Incisions C&D. Mod/ max assist for mobility. Continue full rehab program. Heme/onc and RT f/u next week.    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   - MRI spine: Numerous enhancing osseous metastatic lesions are noted throughout the cervical/thoracic/lumbar spine (most significant at the C2 and C3 level with evidence of  epidural extension causing severe spinal stenosis and mass effect). Malignant compression  deformity noted of the T8 vertebral body   - Neurosurgery Eval 4/22: Needs Hard collar [place] J when OOB  - Oncology Consulted: will need biopsy before additional tx plan recs, also MRI brain w contrast to r/u Mets   - Radonc consulted 4/27: will need radiation to the C spine (no sooner than 2 weeks after surgery)  -Hemovac discontinued 4/27  -Per Neurosurgery: plan was for [place] J cervical collar when OOB but collars available do not fit so soft collar to be worn when OOB - confirmed with Neurosurgery team.   -Pain medications: previously evaluated by pain management. Gabapentin 300mg PO q8h, Tylenol 650mg q6h PRN mild pain, Oxycodone 10mg PO q4h PRN moderate pain, robaxin 1000mg q6hr  -following with Dr. Peña outpatient    #Dysphagia  - mild, since surgery  - swallow SLP eval requested    #Hypoxemia; mild and improving  - CXR negative, PE r/o on V/Q low probability of PE  On 1L satting in mid 90s this AM  - wean O2 today as tolerated  - monitor O2 sat on RA    #Hypercalcemia likely 2/2 malignancy  #Elevated AlkPhos likely 2/2 malignancy  - resolved s/p IVF   -Will monitor BMP and trend electrolytes     #Sinus tachycardia likely 2/2 dehydration and pain  #Hypertensive urgency likely 2/2  pain  - resolved   - BP systolic running low  - c/w Labetalol 100 q8h with hold parameters (SBP<120) and monitor    #Rt eye redness likely conjunctivitis   - c/w Polytrim     # hypothyroidism   - TSH (4/21) WNL   - c/w levothyroxine 50mcg OD    #Maintenance   - Pain control: as above  - GI/Bowel Mgmt: no issues at this time, monitor for BMs.   - Bladder management: voiding freely, no issues at this time.   - Skin: monitor cervical surgical wound, Neurosurgery f/u as needed   - FEN: Hypercalcemia resolved as above. Monitor electrolytes and fluid status.   - Diet: DASH/TLC easy to chew diet

## 2023-05-03 NOTE — PROGRESS NOTE ADULT - ASSESSMENT
57F with PMH hypothyroidism, suspected alcoholic cirrhosis (stopped drinking over 9 yrs ago), Presented 4/20 for neck pain w/ limited movement /2 pain x4 months. Admitted for management of suspected breast CA with metastases to the spine now s/p Occiput-T2 instrumentation and fusion with ORIF of C2.    Prior to current illness and functional decline, patient was independent with ADLs and ambulation. Patient now requires assistance with ambulation and ADLs 2/2 neck/back pain, gait dysfunction, and deconditioning. Patient also with medical comorbidities of hypothyroidism, sinus tachycardia, hypertensive urgency and h/o cirrhosis, requiring medication management and oversight by Physiatry team and nursing. May also need specialist consulting from Neurosurgery. Patient is a complex medical case with mixed Neurosurgical, oncological, and medical issues. There are significant comorbidities which warrants acute rehab hospitalization. To return home it is in the patient’s best interest to have acute inpatient rehabilitative services to undergo intensive interdisciplinary therapy. Furthermore, the patient is motivated and is able to tolerate up to 3 hours of daily therapy for 5-6 days a week for a total of 15 hours a week. Evaluated by Physiatry and deemed to be a good candidate for admission to  for acute inpatient rehab. Admitted to Acute Rehab on 4/28/23.     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   - MRI spine: Numerous enhancing osseous metastatic lesions are noted throughout the cervical/thoracic/lumbar spine (most significant at the C2 and C3 level with evidence of  epidural extension causing severe spinal stenosis and mass effect). Malignant compression  deformity noted of the T8 vertebral body   - Neurosurgery Eval 4/22: Needs Hard collar [place] J when OOB  - Oncology Consulted: will need biopsy before additional tx plan recs, also MRI brain w contrast to r/u Mets   - Radonc consulted 4/27: will need radiation to the C spine (no sooner than 2 weeks after surgery)  -Hemovac discontinued 4/27  -Per Neurosurgery: plan was for [place] J cervical collar when OOB but collars available do not fit so soft collar to be worn when OOB - confirmed with Neurosurgery team.   -Pain medications: previously evaluated by pain management. Gabapentin 300mg PO q8h, Tylenol 650mg q6h PRN mild pain, Oxycodone 10mg PO q4h PRN moderate pain  -following with Dr. Peña outpatient      #Dysphagia  - mild, since surgery  - swallow SLP eval requested    #Hypercalcemia likely 2/2 malignancy  #Elevated AlkPhos likely 2/2 malignancy  - resolved s/p IVF   -Will monitor BMP and trend electrolytes     #Sinus tachycardia likely 2/2 dehydration and pain  #Hypertensive urgency likely 2/2  pain  -resolved   - BP systolic running low  - c/w Labetalol 100 q8h with hold parameters (SBP<120) and monitor    #Rt eye redness likely conjunctivitis   - c/w Polytrim     # hypothyroidism   - TSH (4/21) WNL   - c/w levothyroxine 50mcg OD    #Maintenance   - Pain control: as above  - GI/Bowel Mgmt: no issues at this time, monitor for BMs.   - Bladder management: voiding freely, no issues at this time.   - Skin: monitor cervical surgical wound, Neurosurgery f/u as needed   - FEN: Hypercalcemia resolved as above. Monitor electrolytes and fluid status.   - Diet: DASH/TLC easy to chew diet     #Precautions / PROPHYLAXIS:    - Falls  - Ortho: Weight bearing status: WBAT    - DVT prophylaxis: Lovenox 30 SQ daily          57F with PMH hypothyroidism, suspected alcoholic cirrhosis (stopped drinking over 9 yrs ago), Presented 4/20 for neck pain w/ limited movement /2 pain x4 months. Admitted for management of suspected breast CA with metastases to the spine now s/p Occiput-T2 instrumentation and fusion with ORIF of C2.    Prior to current illness and functional decline, patient was independent with ADLs and ambulation. Patient now requires assistance with ambulation and ADLs 2/2 neck/back pain, gait dysfunction, and deconditioning. Patient also with medical comorbidities of hypothyroidism, sinus tachycardia, hypertensive urgency and h/o cirrhosis, requiring medication management and oversight by Physiatry team and nursing. May also need specialist consulting from Neurosurgery. Patient is a complex medical case with mixed Neurosurgical, oncological, and medical issues. There are significant comorbidities which warrants acute rehab hospitalization. To return home it is in the patient’s best interest to have acute inpatient rehabilitative services to undergo intensive interdisciplinary therapy. Furthermore, the patient is motivated and is able to tolerate up to 3 hours of daily therapy for 5-6 days a week for a total of 15 hours a week. Evaluated by Physiatry and deemed to be a good candidate for admission to  for acute inpatient rehab. Admitted to Acute Rehab on 4/28/23.     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   - MRI spine: Numerous enhancing osseous metastatic lesions are noted throughout the cervical/thoracic/lumbar spine (most significant at the C2 and C3 level with evidence of  epidural extension causing severe spinal stenosis and mass effect). Malignant compression  deformity noted of the T8 vertebral body   - Neurosurgery Eval 4/22: Needs Hard collar [place] J when OOB  - Oncology Consulted: will need biopsy before additional tx plan recs, also MRI brain w contrast to r/u Mets   - Radonc consulted 4/27: will need radiation to the C spine (no sooner than 2 weeks after surgery)  -Hemovac discontinued 4/27  -Per Neurosurgery: plan was for [place] J cervical collar when OOB but collars available do not fit so soft collar to be worn when OOB - confirmed with Neurosurgery team.   -Pain medications: previously evaluated by pain management. Gabapentin 300mg PO q8h, Tylenol 650mg q6h PRN mild pain, Oxycodone 10mg PO q4h PRN moderate pain, robaxin 1000mg q6hr  -following with Dr. Peña outpatient      #Dysphagia  - mild, since surgery  - swallow SLP eval requested    #Hypercalcemia likely 2/2 malignancy  #Elevated AlkPhos likely 2/2 malignancy  - resolved s/p IVF   -Will monitor BMP and trend electrolytes     #Sinus tachycardia likely 2/2 dehydration and pain  #Hypertensive urgency likely 2/2  pain  -resolved   - BP systolic running low  - c/w Labetalol 100 q8h with hold parameters (SBP<120) and monitor    #Rt eye redness likely conjunctivitis   - c/w Polytrim     # hypothyroidism   - TSH (4/21) WNL   - c/w levothyroxine 50mcg OD    #Maintenance   - Pain control: as above  - GI/Bowel Mgmt: no issues at this time, monitor for BMs.   - Bladder management: voiding freely, no issues at this time.   - Skin: monitor cervical surgical wound, Neurosurgery f/u as needed   - FEN: Hypercalcemia resolved as above. Monitor electrolytes and fluid status.   - Diet: DASH/TLC easy to chew diet     #Precautions / PROPHYLAXIS:    - Falls  - Ortho: Weight bearing status: WBAT    - DVT prophylaxis: Lovenox 30 SQ daily          57F with PMH hypothyroidism, suspected alcoholic cirrhosis (stopped drinking over 9 yrs ago), Presented 4/20 for neck pain w/ limited movement /2 pain x4 months. Admitted for management of suspected breast CA with metastases to the spine now s/p Occiput-T2 instrumentation and fusion with ORIF of C2.    Prior to current illness and functional decline, patient was independent with ADLs and ambulation. Patient now requires assistance with ambulation and ADLs 2/2 neck/back pain, gait dysfunction, and deconditioning. Patient also with medical comorbidities of hypothyroidism, sinus tachycardia, hypertensive urgency and h/o cirrhosis, requiring medication management and oversight by Physiatry team and nursing. May also need specialist consulting from Neurosurgery. Patient is a complex medical case with mixed Neurosurgical, oncological, and medical issues. There are significant comorbidities which warrants acute rehab hospitalization. To return home it is in the patient’s best interest to have acute inpatient rehabilitative services to undergo intensive interdisciplinary therapy. Furthermore, the patient is motivated and is able to tolerate up to 3 hours of daily therapy for 5-6 days a week for a total of 15 hours a week. Evaluated by Physiatry and deemed to be a good candidate for admission to  for acute inpatient rehab. Admitted to Acute Rehab on 4/28/23.     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   - MRI spine: Numerous enhancing osseous metastatic lesions are noted throughout the cervical/thoracic/lumbar spine (most significant at the C2 and C3 level with evidence of  epidural extension causing severe spinal stenosis and mass effect). Malignant compression  deformity noted of the T8 vertebral body   - Neurosurgery Eval 4/22: Needs Hard collar [place] J when OOB  - Oncology Consulted: will need biopsy before additional tx plan recs, also MRI brain w contrast to r/u Mets   - Radonc consulted 4/27: will need radiation to the C spine (no sooner than 2 weeks after surgery)  -Hemovac discontinued 4/27  -Per Neurosurgery: plan was for Rowan J cervical collar when OOB but collars available do not fit so soft collar to be worn when OOB - confirmed with Neurosurgery team.   -Pain medications: previously evaluated by pain management. Gabapentin 300mg PO q8h, Tylenol 650mg q6h PRN mild pain, Oxycodone 10mg PO q4h PRN moderate pain, robaxin 1000mg q6hr  -following with Dr. Peña outpatient      #Dysphagia  - mild, since surgery  - swallow SLP eval requested    #Hypoxemia; mild and improving  - CXR negative, PE r/o on V/Q low probability of PE  On 1L satting in mid 90s this AM  - wean O2 today as tolerated  - monitor O2 sat on RA    #Hypercalcemia likely 2/2 malignancy  #Elevated AlkPhos likely 2/2 malignancy  - resolved s/p IVF   -Will monitor BMP and trend electrolytes     #Sinus tachycardia likely 2/2 dehydration and pain  #Hypertensive urgency likely 2/2  pain  - resolved   - BP systolic running low  - c/w Labetalol 100 q8h with hold parameters (SBP<120) and monitor    #Rt eye redness likely conjunctivitis   - c/w Polytrim     # hypothyroidism   - TSH (4/21) WNL   - c/w levothyroxine 50mcg OD    #Maintenance   - Pain control: as above  - GI/Bowel Mgmt: no issues at this time, monitor for BMs.   - Bladder management: voiding freely, no issues at this time.   - Skin: monitor cervical surgical wound, Neurosurgery f/u as needed   - FEN: Hypercalcemia resolved as above. Monitor electrolytes and fluid status.   - Diet: DASH/TLC easy to chew diet     #Precautions / PROPHYLAXIS:    - Falls  - Ortho: Weight bearing status: WBAT    - DVT prophylaxis: Lovenox 30 SQ daily

## 2023-05-04 LAB
ALBUMIN SERPL ELPH-MCNC: 3.2 G/DL — LOW (ref 3.5–5.2)
ALP SERPL-CCNC: 246 U/L — HIGH (ref 30–115)
ALT FLD-CCNC: 13 U/L — SIGNIFICANT CHANGE UP (ref 0–41)
ANION GAP SERPL CALC-SCNC: 11 MMOL/L — SIGNIFICANT CHANGE UP (ref 7–14)
AST SERPL-CCNC: 30 U/L — SIGNIFICANT CHANGE UP (ref 0–41)
BILIRUB SERPL-MCNC: 0.4 MG/DL — SIGNIFICANT CHANGE UP (ref 0.2–1.2)
BUN SERPL-MCNC: 7 MG/DL — LOW (ref 10–20)
CALCIUM SERPL-MCNC: 9.9 MG/DL — SIGNIFICANT CHANGE UP (ref 8.4–10.4)
CHLORIDE SERPL-SCNC: 99 MMOL/L — SIGNIFICANT CHANGE UP (ref 98–110)
CO2 SERPL-SCNC: 29 MMOL/L — SIGNIFICANT CHANGE UP (ref 17–32)
CREAT SERPL-MCNC: 0.5 MG/DL — LOW (ref 0.7–1.5)
EGFR: 109 ML/MIN/1.73M2 — SIGNIFICANT CHANGE UP
GLUCOSE SERPL-MCNC: 106 MG/DL — HIGH (ref 70–99)
MAGNESIUM SERPL-MCNC: 1.9 MG/DL — SIGNIFICANT CHANGE UP (ref 1.8–2.4)
POTASSIUM SERPL-MCNC: 4.3 MMOL/L — SIGNIFICANT CHANGE UP (ref 3.5–5)
POTASSIUM SERPL-SCNC: 4.3 MMOL/L — SIGNIFICANT CHANGE UP (ref 3.5–5)
PROT SERPL-MCNC: 5.7 G/DL — LOW (ref 6–8)
SODIUM SERPL-SCNC: 139 MMOL/L — SIGNIFICANT CHANGE UP (ref 135–146)

## 2023-05-04 RX ORDER — ALBUTEROL 90 UG/1
2 AEROSOL, METERED ORAL EVERY 6 HOURS
Refills: 0 | Status: DISCONTINUED | OUTPATIENT
Start: 2023-05-04 | End: 2023-01-01

## 2023-05-04 RX ORDER — TIOTROPIUM BROMIDE 18 UG/1
2 CAPSULE ORAL; RESPIRATORY (INHALATION) DAILY
Refills: 0 | Status: DISCONTINUED | OUTPATIENT
Start: 2023-05-04 | End: 2023-01-01

## 2023-05-04 RX ORDER — OXYCODONE HYDROCHLORIDE 5 MG/1
10 TABLET ORAL EVERY 4 HOURS
Refills: 0 | Status: DISCONTINUED | OUTPATIENT
Start: 2023-05-06 | End: 2023-01-01

## 2023-05-04 RX ADMIN — FAMOTIDINE 20 MILLIGRAM(S): 10 INJECTION INTRAVENOUS at 17:35

## 2023-05-04 RX ADMIN — METHOCARBAMOL 1000 MILLIGRAM(S): 500 TABLET, FILM COATED ORAL at 05:25

## 2023-05-04 RX ADMIN — TIOTROPIUM BROMIDE 2 PUFF(S): 18 CAPSULE ORAL; RESPIRATORY (INHALATION) at 19:59

## 2023-05-04 RX ADMIN — Medication 1 DROP(S): at 17:22

## 2023-05-04 RX ADMIN — Medication 3 MILLIGRAM(S): at 21:02

## 2023-05-04 RX ADMIN — ENOXAPARIN SODIUM 30 MILLIGRAM(S): 100 INJECTION SUBCUTANEOUS at 05:26

## 2023-05-04 RX ADMIN — METHOCARBAMOL 1000 MILLIGRAM(S): 500 TABLET, FILM COATED ORAL at 12:26

## 2023-05-04 RX ADMIN — FAMOTIDINE 20 MILLIGRAM(S): 10 INJECTION INTRAVENOUS at 05:26

## 2023-05-04 RX ADMIN — Medication 650 MILLIGRAM(S): at 13:18

## 2023-05-04 RX ADMIN — OXYCODONE HYDROCHLORIDE 10 MILLIGRAM(S): 5 TABLET ORAL at 18:02

## 2023-05-04 RX ADMIN — Medication 650 MILLIGRAM(S): at 12:25

## 2023-05-04 RX ADMIN — GABAPENTIN 300 MILLIGRAM(S): 400 CAPSULE ORAL at 13:22

## 2023-05-04 RX ADMIN — OXYCODONE HYDROCHLORIDE 10 MILLIGRAM(S): 5 TABLET ORAL at 03:15

## 2023-05-04 RX ADMIN — Medication 1 DROP(S): at 05:33

## 2023-05-04 RX ADMIN — METHOCARBAMOL 1000 MILLIGRAM(S): 500 TABLET, FILM COATED ORAL at 17:34

## 2023-05-04 RX ADMIN — OXYCODONE HYDROCHLORIDE 10 MILLIGRAM(S): 5 TABLET ORAL at 02:45

## 2023-05-04 RX ADMIN — OXYCODONE HYDROCHLORIDE 10 MILLIGRAM(S): 5 TABLET ORAL at 14:40

## 2023-05-04 RX ADMIN — ENOXAPARIN SODIUM 30 MILLIGRAM(S): 100 INJECTION SUBCUTANEOUS at 17:34

## 2023-05-04 RX ADMIN — Medication 50 MICROGRAM(S): at 05:25

## 2023-05-04 RX ADMIN — ALBUTEROL 2 PUFF(S): 90 AEROSOL, METERED ORAL at 19:58

## 2023-05-04 RX ADMIN — GABAPENTIN 300 MILLIGRAM(S): 400 CAPSULE ORAL at 05:26

## 2023-05-04 RX ADMIN — Medication 40 MILLIGRAM(S): at 20:59

## 2023-05-04 RX ADMIN — Medication 5 MILLIGRAM(S): at 21:03

## 2023-05-04 RX ADMIN — Medication 650 MILLIGRAM(S): at 05:29

## 2023-05-04 RX ADMIN — GABAPENTIN 300 MILLIGRAM(S): 400 CAPSULE ORAL at 21:01

## 2023-05-04 RX ADMIN — CHLORHEXIDINE GLUCONATE 1 APPLICATION(S): 213 SOLUTION TOPICAL at 05:27

## 2023-05-04 RX ADMIN — LORATADINE 10 MILLIGRAM(S): 10 TABLET ORAL at 21:02

## 2023-05-04 RX ADMIN — SENNA PLUS 2 TABLET(S): 8.6 TABLET ORAL at 21:01

## 2023-05-04 RX ADMIN — Medication 650 MILLIGRAM(S): at 06:31

## 2023-05-04 RX ADMIN — OXYCODONE HYDROCHLORIDE 10 MILLIGRAM(S): 5 TABLET ORAL at 13:21

## 2023-05-04 RX ADMIN — OXYCODONE HYDROCHLORIDE 10 MILLIGRAM(S): 5 TABLET ORAL at 18:40

## 2023-05-04 NOTE — PROGRESS NOTE ADULT - ASSESSMENT
Neuropsychology Follow Up    Treatment focused on mood therapy    Pain: Yes Location: Neck/Lower half Ratin/10 Intervention: RN notified; patient was scheduled to receive oxycodone prescription within the following hour.    Orientation: WNL    Arousal Level: Alert    Behavior: Cooperative    Affect Range: WNL    Attention: WNL    Insight into illness/deficits: Good    Treatment Session Focused on Mood/ Coping/ Psychoeducation    The patient was seen to support positive coping and mood therapy.    During today's session, the discussed feeling frustrated, regarding her health situation and trying to deal with family related issues. However, she acknowledged that she continues gradually progress in OT/PT. The patient denied any changes in cognition, appetite, and sleeping.    Patient was provided with support and cognitive behavioral therapy. Patient continue to process and explore emotions related to frustration. Patient explored thought patterns related to her emotional state, and how they revolved around a perceived sense of a desired outcome. Patient address in session the relationship between thoughts and emotions. Patient discussed how she felt like she should have made more progress than where she currently is, and indicated that resulted in bouts of frustration.     Patient was provided psychoeducation on how emotions and override rationality, and also how the relationship can work in the other direction using cognition to more effectively orchestrate emotions. Patient was provided  with intervention tools to help facilitate cognitive restructuring through identifying emotions as they come up, evaluating them, and then attaching a new perspective them. Patient continued to discuss the significance support, and utilizing her family system in place.    Goals: Facilitate Positive Coping    Plan: Support

## 2023-05-04 NOTE — PROGRESS NOTE ADULT - SUBJECTIVE AND OBJECTIVE BOX
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 alcoholic cirrhosis (stopped drinking over 9 yrs ago), presenting for neck pain on 4/20. Pt started having neck pain almost 4 months ago for which her chiropractor ordered an MRI that was done 1 month ago. Pt was not sure what the read of the MRI was but there was concern about lesions so she went to see Dr. Peña's office for the first time. Dr. Peña examined her and told her she has breast cancer with metastasis and needs to come to the ED for further evaluation. Pt is a non smoker and has no family history of breast Ca. Denies any significant weight loss, night sweats, or fevers. No reported bloody discharge from her nipples. Upon examining pt's right breast, dimpling with a lump was found. Pt believes that she noticed this change almost over a yr a go, but did not think of it was anything serious. Pt's neck pain has been significantly worsening limiting her ability to move her neck. She only has pain when moving her neck and has full sensation and ROM of her b/l UE. Denies any SOB, chest pain, or palpitations. States that she has pain with ROM, and states that it is worse with movement ex moving out of bed. Patient states that pain is mostly midline at the base of head, with radiation to her paraspinal muscles. Denies any radicular pain to UE & LE. States that she has been sleeping only in a recliner and has since then been developing worsening lower back pain. Denies any ataxia, imbalance, or difficulty with ambulation. Denies any numbness, tingling, paresthesias, weakness, saddle anesthesia, or bowel / bladder incontinence. Neurosurgery was consulted. On 4.24.23 she was taken to the operating room where she underwent an Occiput to T2 instrumentation and Fusion, ORIF of C2. She had a hemovac drain placed intraop which was removed on POD#3. She was on a PCA pump postop which was removed on POD#3. She remained neurologically stable postop.      Patient is tolerating bedside PT, OT, and SLP. The patient was evaluated by the PM&R team once medically stable. The patient was found to have functional limitations in terms of muscle strength, endurance, physical mobility, ability to carry out activities of daily living including: self-care, transfers, and ambulation. Participating with bedside therapeutic exercises and cognitive retraining. The patient is motivated and is able to tolerate up to 3 hours of daily therapy for 5-6 days a week for a total of 15 hours a week. Evaluated by Physiatry and deemed to be a good candidate for admission to  for acute inpatient rehab. Admitted to Acute Rehab on 4/28/23.    PMHx/PSHx: as above  SHx: history of alcohol use disorder - quit 9 years ago, denies smoking and illicit drug use   FHx: non-contributory   Allergies: NKDA  Prior level of function: independent with ambulation and ADLs without assistive device although had been using straight cane for a few months and the last 2 weeks prior to presentation reports being essentially chair bound due to neck/back pain and difficulty with mobility.   Living situation: Pt resides with  in 2nd floor apt using 8 steps to enter and a flight of stairs to access.   Current level of function:  SLP 4/26: Some complaints of globus w/ solids Recommend: continue easy to chew w/ thin  PT 4/28: max assist for bed mobility, mod assist for transfers, min assist for ambulation 40ft with RW  OT 4/27: dependent for lower body dressing    (28 Apr 2023 14:46)      TODAY'S SUBJECTIVE & REVIEW OF SYMPTOMS:  Patient was seen and examined at bedside. In NAD  No acute events reported overnight   Yesterday, pt weaned off supplemental O2, on RA pt noted to be satting 84-85%, pt placed back on 1L O2 and sats improved to mid 90s.  Pt continues to report continued improvement in her low back spasms. Pt encouraged to utilize IS  Overall pain controlled.       Stated she has had regular BM. Voiding freely without issues.   Neurosurgery team confirmed pt can wear soft collar   vitals reviewed       Review of Systems:  Constitutional:    [  ] WNL           [   ] insomnia   [   ] tired  [ x ] recent weight loss and poor appetite - reporting depressed mood at home   Cardio:             [ x ] WNL           [   ] CP   [   ] SANCHEZ   [   ] palpitations         Resp:                [ x ] WNL           [   ] SOB   [   ] cough   [   ] wheezing  GI:                    [ x ] WNL           [   ] constipation   [   ] diarrhea   [   ] abdominal pain   [   ] nausea   [   ] emesis    :                   [ x ] WNL           [   ] SILVA  [   ] dysuria   [   ] difficulty voiding            Endo:                [ x ] WNL          [   ] polyuria   [   ] temperature intolerance                Skin:                  [  ] WNL          [   ] pain   [ x  ] Surgical incision along cervical spine c/d/i. [   ] rash  MSK:                 [  ] WNL          [   ] muscle pain   [ x  ] midline neck and low back pain  [   ] muscle tenderness   [   ] swelling  Neuro:              [  ] WNL          [   ] HA   [   ] change in vision   [   ] tremor   [   ] weakness   [  x  ]dysphagia             Cognitive:          [ x ] WNL          [   ]confusion     Psych:                [ x ] WNL          [   ] hallucinations   [   ]agitation   [   ] delusion   [   ]depression    PHYSICAL EXAM    Vital Signs (24 Hrs):  T(C): 37.3 (05-04-23 @ 05:04), Max: 37.3 (05-04-23 @ 05:04)  HR: 89 (05-04-23 @ 05:04) (89 - 99)  BP: 110/60 (05-04-23 @ 05:04) (110/60 - 123/80)  RR: 18 (05-04-23 @ 05:04) (18 - 20)  SpO2: 96% (05-04-23 @ 05:04) (86% - 96%)    I&O's Summary    03 May 2023 07:01  -  04 May 2023 07:00  --------------------------------------------------------  IN: 0 mL / OUT: 1500 mL / NET: -1500 mL        General:[ x ] NAD, Resting Comfortable,   [   ] other:                                HEENT: [ x ] NC/AT, EOMI, PERRL , Normal Conjunctivae,   [   ] other:  Cardio: [ x ] RRR, no murmer,   [   ] other:                              Pulm: [ x ] No Respiratory Distress,  Lungs CTAB,   [   ] other:                       Abdomen: [ x ]ND/NT, Soft,   [   ] other:    : [ x ] NO SILVA CATHETER, [   ] SILVA CATHETER- no meatal tear, no discharge, [   ] other:                                            MSK: [  ] No joint swelling, Full ROM,   [ x  ] other:  Surgical incision along cervical spine c/d/i.                                      Ext: [ x ]No C/C/E, No calf tenderness,   [   ]other:    Skin: [  ]intact,   [ x  ] other: Surgical incision along cervical spine c/d/i.                                                                  Neurological Examination:  Cognitive: [  x  ] AAO x 3,   [    ]  other: A&Ox3. Oriented to person, place and time. Follows 2 step commands, language components intact. No aphasia.   Attention:  [  x  ] intact,   [    ]  other:  intact spelling and simple calculations                Memory: [  x  ] intact,    [    ]  other: intact registration and recall    Mood/Affect: [  x  ] wnl,    [    ]  other:                                                                             Communication: [  x  ]Fluent, no dysarthria, following commands:  [    ] other:   CN II - XII:  [  x  ] intact,  [    ] other:                                                                                        Motor:   RIGHT UE: [  ] WNL,  [ x  ] other: 3-/5 shoulder abduction limited by neck/back pain, 4/5 elbow flexion/extension, 5/5 hand   LEFT    UE: [  ] WNL,  [  x ] other: 3-/5 shoulder abduction limited by neck/back pain, 4/5 elbow flexion/extension, 5/5 hand   RIGHT LE: [ x ] WNL,  [   ] other: 5/5 hip flexion, knee flexion/extension, ankle dorsiflexion/plantarflexion  LEFT    LE: [ x ] WNL,  [   ] other: 5/5 hip flexion, knee flexion/extension, ankle dorsiflexion/plantarflexion    Tone: [  x  ] wnl,   [    ]  other:  DTRs: [ x  ]symmetric, [   ] other:  Coordination:   [ x  ] intact,   [    ] other:                                                                           Sensory: [ x  ] Intact to light touch,   [    ] other:    Clonus negative    CLOF: dependent BM, Wan transfers, PA 50'x2 RW    MEDICATIONS  (STANDING):  artificial  tears Solution 1 Drop(s) Both EYES two times a day  bisacodyl 5 milliGRAM(s) Oral at bedtime  chlorhexidine 2% Cloths 1 Application(s) Topical <User Schedule>  enoxaparin Injectable 30 milliGRAM(s) SubCutaneous every 12 hours  famotidine    Tablet 20 milliGRAM(s) Oral every 12 hours  gabapentin 300 milliGRAM(s) Oral every 8 hours  labetalol 100 milliGRAM(s) Oral every 12 hours  levothyroxine 50 MICROGram(s) Oral daily  loratadine 10 milliGRAM(s) Oral at bedtime  melatonin 3 milliGRAM(s) Oral at bedtime  methocarbamol 1000 milliGRAM(s) Oral every 6 hours  polyethylene glycol 3350 17 Gram(s) Oral daily  senna 2 Tablet(s) Oral at bedtime    MEDICATIONS  (PRN):  acetaminophen     Tablet .. 650 milliGRAM(s) Oral every 6 hours PRN Mild Pain (1 - 3)  naloxone Injectable 0.1 milliGRAM(s) IV Push every 3 minutes PRN For ANY of the following changes in patient status:  A. RR LESS THAN 10 breaths per minute, B. Oxygen saturation LESS THAN 90%, C. Sedation score of 6  ondansetron Injectable 4 milliGRAM(s) IV Push every 6 hours PRN Nausea and/or Vomiting  oxyCODONE    IR 10 milliGRAM(s) Oral every 4 hours PRN Moderate Pain (4 - 6)          RECENT LABS/IMAGING               05-03    136  |  98  |  9<L>  ----------------------------<  110<H>  4.3   |  31  |  0.5<L>    Ca    9.8      03 May 2023 07:52  Mg     1.8     05-03    TPro  5.5<L>  /  Alb  3.1<L>  /  TBili  0.4  /  DBili  x   /  AST  26  /  ALT  13  /  AlkPhos  228<H>  05-03 05-03    136  |  98  |  9<L>  ----------------------------<  110<H>  4.3   |  31  |  0.5<L>    Ca    9.8      03 May 2023 07:52  Mg     1.8     05-03    TPro  5.5<L>  /  Alb  3.1<L>  /  TBili  0.4  /  DBili  x   /  AST  26  /  ALT  13  /  AlkPhos  228<H>  05-03             Patient is a 57y old  Female who presents with a chief complaint of 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 alcoholic cirrhosis (stopped drinking over 9 yrs ago), presenting for neck pain on 4/20. Pt started having neck pain almost 4 months ago for which her chiropractor ordered an MRI that was done 1 month ago. Pt was not sure what the read of the MRI was but there was concern about lesions so she went to see Dr. Peña's office for the first time. Dr. Peña examined her and told her she has breast cancer with metastasis and needs to come to the ED for further evaluation. Pt is a non smoker and has no family history of breast Ca. Denies any significant weight loss, night sweats, or fevers. No reported bloody discharge from her nipples. Upon examining pt's right breast, dimpling with a lump was found. Pt believes that she noticed this change almost over a yr a go, but did not think of it was anything serious. Pt's neck pain has been significantly worsening limiting her ability to move her neck. She only has pain when moving her neck and has full sensation and ROM of her b/l UE. Denies any SOB, chest pain, or palpitations. States that she has pain with ROM, and states that it is worse with movement ex moving out of bed. Patient states that pain is mostly midline at the base of head, with radiation to her paraspinal muscles. Denies any radicular pain to UE & LE. States that she has been sleeping only in a recliner and has since then been developing worsening lower back pain. Denies any ataxia, imbalance, or difficulty with ambulation. Denies any numbness, tingling, paresthesias, weakness, saddle anesthesia, or bowel / bladder incontinence. Neurosurgery was consulted. On 4.24.23 she was taken to the operating room where she underwent an Occiput to T2 instrumentation and Fusion, ORIF of C2. She had a hemovac drain placed intraop which was removed on POD#3. She was on a PCA pump postop which was removed on POD#3. She remained neurologically stable postop.      Patient is tolerating bedside PT, OT, and SLP. The patient was evaluated by the PM&R team once medically stable. The patient was found to have functional limitations in terms of muscle strength, endurance, physical mobility, ability to carry out activities of daily living including: self-care, transfers, and ambulation. Participating with bedside therapeutic exercises and cognitive retraining. The patient is motivated and is able to tolerate up to 3 hours of daily therapy for 5-6 days a week for a total of 15 hours a week. Evaluated by Physiatry and deemed to be a good candidate for admission to  for acute inpatient rehab. Admitted to Acute Rehab on 4/28/23.    PMHx/PSHx: as above  SHx: history of alcohol use disorder - quit 9 years ago, denies smoking and illicit drug use   FHx: non-contributory   Allergies: NKDA  Prior level of function: independent with ambulation and ADLs without assistive device although had been using straight cane for a few months and the last 2 weeks prior to presentation reports being essentially chair bound due to neck/back pain and difficulty with mobility.   Living situation: Pt resides with  in 2nd floor apt using 8 steps to enter and a flight of stairs to access.   Current level of function:  SLP 4/26: Some complaints of globus w/ solids Recommend: continue easy to chew w/ thin  PT 4/28: max assist for bed mobility, mod assist for transfers, min assist for ambulation 40ft with RW  OT 4/27: dependent for lower body dressing    (28 Apr 2023 14:46)      TODAY'S SUBJECTIVE & REVIEW OF SYMPTOMS:  Patient was seen and examined at bedside. In NAD  No acute events reported overnight   Yesterday, pt weaned off supplemental O2, on RA pt noted to be satting 84-85%, pt placed back on 1L O2 and sats improved to mid 90s.  Pt continues to report continued improvement in her low back spasms. Pt encouraged to utilize IS  Overall pain controlled.       Stated she has had regular BM. Voiding freely without issues.   Neurosurgery team confirmed pt can wear soft collar   vitals reviewed. C/O some new burning pain on the left posterior neck  Therapists note POx drops to 87 with ambulation and pulse increases to 120      Review of Systems:  Constitutional:    [  ] WNL           [   ] insomnia   [   ] tired  [ x ] recent weight loss and poor appetite - reporting depressed mood at home   Cardio:             [ x ] WNL           [   ] CP   [   ] SANCHEZ   [   ] palpitations         Resp:                [ x ] WNL           [   ] SOB   [   ] cough   [   ] wheezing  GI:                    [ x ] WNL           [   ] constipation   [   ] diarrhea   [   ] abdominal pain   [   ] nausea   [   ] emesis    :                   [ x ] WNL           [   ] SILVA  [   ] dysuria   [   ] difficulty voiding            Endo:                [ x ] WNL          [   ] polyuria   [   ] temperature intolerance                Skin:                  [  ] WNL          [   ] pain   [ x  ] Surgical incision along cervical spine    ] rash  MSK:                 [  ] WNL          [   ] muscle pain   [ x  ] midline neck and low back pain  [   ] muscle tenderness   [   ] swelling  Neuro:              [  ] WNL          [   ] HA   [   ] change in vision   [   ] tremor   [   ] weakness   [  x  ]dysphagia             Cognitive:          [ x ] WNL          [   ]confusion     Psych:                [ x ] WNL          [   ] hallucinations   [   ]agitation   [   ] delusion   [   ]depression    PHYSICAL EXAM    Vital Signs (24 Hrs):  T(C): 37.3 (05-04-23 @ 05:04), Max: 37.3 (05-04-23 @ 05:04)  HR: 89 (05-04-23 @ 05:04) (89 - 99)  BP: 110/60 (05-04-23 @ 05:04) (110/60 - 123/80)  RR: 18 (05-04-23 @ 05:04) (18 - 20)  SpO2: 96% (05-04-23 @ 05:04) (86% - 96%)    I&O's Summary    03 May 2023 07:01  -  04 May 2023 07:00  --------------------------------------------------------  IN: 0 mL / OUT: 1500 mL / NET: -1500 mL        General:[ x ] NAD, Resting Comfortable,   [   ] other:                                HEENT: [ x ] NC/AT, EOMI, PERRL , Normal Conjunctivae,   [   ] other:  Cardio: [ x ] RRR, no murmer,   [   ] other:                              Pulm: [ x ] No Respiratory Distress,  Lungs CTAB,   [   ] other:                       Abdomen: [ x ]ND/NT, Soft,   [   ] other:    : [ x ] NO SILVA CATHETER, [   ] SILVA CATHETER- no meatal tear, no discharge, [   ] other:                                            MSK: [  ] No joint swelling, Full ROM,   [ x  ] other:  Surgical incision along cervical spine c/d/i.                                      Ext: [ x ]No C/C/E, No calf tenderness,   [   ]other:    Skin: [  ]intact,   [ x  ] other: Surgical incision along cervical spine c/d/i.                                                                  Neurological Examination:  Cognitive: [  x  ] AAO x 3,   [    ]  other: A&Ox3. Oriented to person, place and time. Follows 2 step commands, language components intact. No aphasia.   Attention:  [  x  ] intact,   [    ]  other:  intact spelling and simple calculations                Memory: [  x  ] intact,    [    ]  other: intact registration and recall    Mood/Affect: [  x  ] wnl,    [    ]  other:                                                                             Communication: [  x  ]Fluent, no dysarthria, following commands:  [    ] other:   CN II - XII:  [  x  ] intact,  [    ] other:                                                                                        Motor:   RIGHT UE: [  ] WNL,  [ x  ] other: 3-/5 shoulder abduction limited by neck/back pain, 4/5 elbow flexion/extension, 5/5 hand   LEFT    UE: [  ] WNL,  [  x ] other: 3-/5 shoulder abduction limited by neck/back pain, 4/5 elbow flexion/extension, 5/5 hand   RIGHT LE: [ x ] WNL,  [   ] other: 5/5 hip flexion, knee flexion/extension, ankle dorsiflexion/plantarflexion  LEFT    LE: [ x ] WNL,  [   ] other: 5/5 hip flexion, knee flexion/extension, ankle dorsiflexion/plantarflexion    Tone: [  x  ] wnl,   [    ]  other:  DTRs: [ x  ]symmetric, [   ] other:  Coordination:   [ x  ] intact,   [    ] other:                                                                           Sensory: [ x  ] Intact to light touch,   [    ] other:    Clonus negative    CLOF: dependent BM, modA transfers, PA 50'x2 RW    MEDICATIONS  (STANDING):  artificial  tears Solution 1 Drop(s) Both EYES two times a day  bisacodyl 5 milliGRAM(s) Oral at bedtime  chlorhexidine 2% Cloths 1 Application(s) Topical <User Schedule>  enoxaparin Injectable 30 milliGRAM(s) SubCutaneous every 12 hours  famotidine    Tablet 20 milliGRAM(s) Oral every 12 hours  gabapentin 300 milliGRAM(s) Oral every 8 hours  labetalol 100 milliGRAM(s) Oral every 12 hours  levothyroxine 50 MICROGram(s) Oral daily  loratadine 10 milliGRAM(s) Oral at bedtime  melatonin 3 milliGRAM(s) Oral at bedtime  methocarbamol 1000 milliGRAM(s) Oral every 6 hours  polyethylene glycol 3350 17 Gram(s) Oral daily  senna 2 Tablet(s) Oral at bedtime    MEDICATIONS  (PRN):  acetaminophen     Tablet .. 650 milliGRAM(s) Oral every 6 hours PRN Mild Pain (1 - 3)  naloxone Injectable 0.1 milliGRAM(s) IV Push every 3 minutes PRN For ANY of the following changes in patient status:  A. RR LESS THAN 10 breaths per minute, B. Oxygen saturation LESS THAN 90%, C. Sedation score of 6  ondansetron Injectable 4 milliGRAM(s) IV Push every 6 hours PRN Nausea and/or Vomiting  oxyCODONE    IR 10 milliGRAM(s) Oral every 4 hours PRN Moderate Pain (4 - 6)          RECENT LABS/IMAGING               05-03    136  |  98  |  9<L>  ----------------------------<  110<H>  4.3   |  31  |  0.5<L>    Ca    9.8      03 May 2023 07:52  Mg     1.8     05-03    TPro  5.5<L>  /  Alb  3.1<L>  /  TBili  0.4  /  DBili  x   /  AST  26  /  ALT  13  /  AlkPhos  228<H>  05-03 05-03    136  |  98  |  9<L>  ----------------------------<  110<H>  4.3   |  31  |  0.5<L>    Ca    9.8      03 May 2023 07:52  Mg     1.8     05-03    TPro  5.5<L>  /  Alb  3.1<L>  /  TBili  0.4  /  DBili  x   /  AST  26  /  ALT  13  /  AlkPhos  228<H>  05-03

## 2023-05-04 NOTE — PROGRESS NOTE ADULT - ATTENDING COMMENTS
I reviewed the chart and examined the patient with the resident and we discussed the findings and treatment plan.  The patient is tolerating the rehab program well. I agree with the findings and treatment plan above, which I modified as indicated. The patient requires 3 hrs a day of acute inpatient rehab. Noted to have episodes of hypoxemia and tachycardia with ambulation. Likely related to her asthma. Will start Prednisone, Spiriva and standing Nebs and d/c Labetalol, as BP has been running low. NC O2 as needed. Continue full rehab program.    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   - MRI spine: Numerous enhancing osseous metastatic lesions are noted throughout the cervical/thoracic/lumbar spine (most significant at the C2 and C3 level with evidence of  epidural extension causing severe spinal stenosis and mass effect). Malignant compression  deformity noted of the T8 vertebral body   - Neurosurgery Eval 4/22: Needs Hard collar [place] J when OOB  - Oncology Consulted: will need biopsy before additional tx plan recs, also MRI brain w contrast to r/u Mets   - Radonc consulted 4/27: will need radiation to the C spine (no sooner than 2 weeks after surgery)  -Hemovac discontinued 4/27  -Per Neurosurgery: plan was for [place] J cervical collar when OOB but collars available do not fit so soft collar to be worn when OOB - confirmed with Neurosurgery team.   -Pain medications: previously evaluated by pain management. Gabapentin 300mg PO q8h, Tylenol 650mg q6h PRN mild pain, Oxycodone 10mg PO q4h PRN moderate pain, robaxin 1000mg q6hr  -following with Dr. Peña outpatient    #Dysphagia  - mild, since surgery  - regular diet    #s/p Hypertension  - resolving  - d/c Labetalol, which also may be making her asthma worse    #Hypoxemia/ Asthma;  - CXR negative, PE r/o on V/Q low probability of PE  On 1L satting in mid 90s this AM  - Patient is dropping to 85- 87% with ambulation and pulse increases to 120  - Will start short course of Prednisone, standing Nebs and Spiriva and monitor    #Hypercalcemia likely 2/2 malignancy  #Elevated AlkPhos likely 2/2 malignancy  - resolved s/p IVF   -Will monitor BMP and trend electrolytes     #Rt eye redness likely conjunctivitis   - c/w Polytrim     # hypothyroidism   - TSH (4/21) WNL   - c/w levothyroxine 50mcg OD

## 2023-05-04 NOTE — PROGRESS NOTE ADULT - ASSESSMENT
57F with PMH hypothyroidism, suspected alcoholic cirrhosis (stopped drinking over 9 yrs ago), Presented 4/20 for neck pain w/ limited movement /2 pain x4 months. Admitted for management of suspected breast CA with metastases to the spine now s/p Occiput-T2 instrumentation and fusion with ORIF of C2.    Prior to current illness and functional decline, patient was independent with ADLs and ambulation. Patient now requires assistance with ambulation and ADLs 2/2 neck/back pain, gait dysfunction, and deconditioning. Patient also with medical comorbidities of hypothyroidism, sinus tachycardia, hypertensive urgency and h/o cirrhosis, requiring medication management and oversight by Physiatry team and nursing. May also need specialist consulting from Neurosurgery. Patient is a complex medical case with mixed Neurosurgical, oncological, and medical issues. There are significant comorbidities which warrants acute rehab hospitalization. To return home it is in the patient’s best interest to have acute inpatient rehabilitative services to undergo intensive interdisciplinary therapy. Furthermore, the patient is motivated and is able to tolerate up to 3 hours of daily therapy for 5-6 days a week for a total of 15 hours a week. Evaluated by Physiatry and deemed to be a good candidate for admission to  for acute inpatient rehab. Admitted to Acute Rehab on 4/28/23.     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   - MRI spine: Numerous enhancing osseous metastatic lesions are noted throughout the cervical/thoracic/lumbar spine (most significant at the C2 and C3 level with evidence of  epidural extension causing severe spinal stenosis and mass effect). Malignant compression  deformity noted of the T8 vertebral body   - Neurosurgery Eval 4/22: Needs Hard collar [place] J when OOB  - Oncology Consulted: will need biopsy before additional tx plan recs, also MRI brain w contrast to r/u Mets   - Radonc consulted 4/27: will need radiation to the C spine (no sooner than 2 weeks after surgery)  -Hemovac discontinued 4/27  -Per Neurosurgery: plan was for [place] J cervical collar when OOB but collars available do not fit so soft collar to be worn when OOB - confirmed with Neurosurgery team.   -Pain medications: previously evaluated by pain management. Gabapentin 300mg PO q8h, Tylenol 650mg q6h PRN mild pain, Oxycodone 10mg PO q4h PRN moderate pain, robaxin 1000mg q6hr  -following with Dr. Peña outpatient      #Dysphagia  - mild, since surgery  - swallow SLP eval requested    #Hypoxemia; mild and improving  - CXR negative, PE r/o on V/Q low probability of PE  On 1L satting in mid 90s this AM  - wean O2 today as tolerated, monitor on RA (mid 80s 5/3)  - monitor O2 sat on RA    #Hypercalcemia likely 2/2 malignancy  #Elevated AlkPhos likely 2/2 malignancy  - resolved s/p IVF   -Will monitor BMP and trend electrolytes     #Sinus tachycardia likely 2/2 dehydration and pain  #Hypertensive urgency likely 2/2  pain  - resolved   - BP systolic running low  - c/w Labetalol 100 q8h with hold parameters (SBP<120) and monitor    #Rt eye redness likely conjunctivitis   - c/w Polytrim     # hypothyroidism   - TSH (4/21) WNL   - c/w levothyroxine 50mcg OD    #Maintenance   - Pain control: as above  - GI/Bowel Mgmt: no issues at this time, monitor for BMs.   - Bladder management: voiding freely, no issues at this time.   - Skin: monitor cervical surgical wound, Neurosurgery f/u as needed   - FEN: Hypercalcemia resolved as above. Monitor electrolytes and fluid status.   - Diet: DASH/TLC easy to chew diet     #Precautions / PROPHYLAXIS:    - Falls  - Ortho: Weight bearing status: WBAT    - DVT prophylaxis: Lovenox 30 SQ daily          57F with PMH hypothyroidism, suspected alcoholic cirrhosis (stopped drinking over 9 yrs ago), Presented 4/20 for neck pain w/ limited movement /2 pain x4 months. Admitted for management of suspected breast CA with metastases to the spine now s/p Occiput-T2 instrumentation and fusion with ORIF of C2.    Prior to current illness and functional decline, patient was independent with ADLs and ambulation. Patient now requires assistance with ambulation and ADLs 2/2 neck/back pain, gait dysfunction, and deconditioning. Patient also with medical comorbidities of hypothyroidism, sinus tachycardia, hypertensive urgency and h/o cirrhosis, requiring medication management and oversight by Physiatry team and nursing. May also need specialist consulting from Neurosurgery. Patient is a complex medical case with mixed Neurosurgical, oncological, and medical issues. There are significant comorbidities which warrants acute rehab hospitalization. To return home it is in the patient’s best interest to have acute inpatient rehabilitative services to undergo intensive interdisciplinary therapy. Furthermore, the patient is motivated and is able to tolerate up to 3 hours of daily therapy for 5-6 days a week for a total of 15 hours a week. Evaluated by Physiatry and deemed to be a good candidate for admission to  for acute inpatient rehab. Admitted to Acute Rehab on 4/28/23.     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   - MRI spine: Numerous enhancing osseous metastatic lesions are noted throughout the cervical/thoracic/lumbar spine (most significant at the C2 and C3 level with evidence of  epidural extension causing severe spinal stenosis and mass effect). Malignant compression  deformity noted of the T8 vertebral body   - Neurosurgery Eval 4/22: Needs Hard collar [place] J when OOB  - Oncology Consulted: will need biopsy before additional tx plan recs, also MRI brain w contrast to r/u Mets   - Radonc consulted 4/27: will need radiation to the C spine (no sooner than 2 weeks after surgery)  -Hemovac discontinued 4/27  -Per Neurosurgery: plan was for [place] cervical collar when OOB but collars available do not fit so soft collar to be worn when OOB - confirmed with Neurosurgery team.   -Pain medications: previously evaluated by pain management. Gabapentin 300mg PO q8h, Tylenol 650mg q6h PRN mild pain, Oxycodone 10mg PO q4h PRN moderate pain, robaxin 1000mg q6hr  -following with Dr. Peña outpatient    #Dysphagia  - mild, since surgery  - regular diet    #s/p Hypertension  - resolving  - d/c Labetalol, which also may be making her asthma worse    #Hypoxemia/ Asthma;  - CXR negative, PE r/o on V/Q low probability of PE  On 1L satting in mid 90s this AM  - Patient is dropping to 85- 87% with ambulation and pulse increases to 120  - Will start short course of Prednisone, standing Nebs and Spiriva and monitor    #Hypercalcemia likely 2/2 malignancy  #Elevated AlkPhos likely 2/2 malignancy  - resolved s/p IVF   -Will monitor BMP and trend electrolytes     #Rt eye redness likely conjunctivitis   - c/w Polytrim     # hypothyroidism   - TSH (4/21) WNL   - c/w levothyroxine 50mcg OD    #Maintenance   - Pain control: as above  - GI/Bowel Mgmt: no issues at this time, monitor for BMs.   - Bladder management: voiding freely, no issues at this time.   - Skin: monitor cervical surgical wound, Neurosurgery f/u as needed   - FEN: Hypercalcemia resolved as above. Monitor electrolytes and fluid status.   - Diet: DASH/TLC easy to chew diet     #Precautions / PROPHYLAXIS:    - Falls  - Ortho: Weight bearing status: WBAT    - DVT prophylaxis: Lovenox 30 SQ daily

## 2023-05-04 NOTE — CHART NOTE - NSCHARTNOTEFT_GEN_A_CORE
Patient desaturated to 86 to 87 % while doing physical therapy, on oxygen 1 liter at rest to keep oxygen > 92 %,.  Patient has  a hx of using albuterol inhaler in the past ?Copd ,    Will start her on prednisone   40 mg x5 days   albuterol  q6h  walhs 2 days then prn, and Spiriva  2.5  mcg daily 1 puffs   and will check  pox q shift ..     Vital Signs Last 24 Hrs  T(C): 37.1 (04 May 2023 13:18), Max: 37.3 (04 May 2023 05:04)  T(F): 98.8 (04 May 2023 13:18), Max: 99.1 (04 May 2023 05:04)  HR: 91 (04 May 2023 13:18) (89 - 99)  BP: 128/73 (04 May 2023 13:18) (110/60 - 128/73)    RR: 18 (04 May 2023 13:18) (18 - 20)  SpO2: 96% (04 May 2023 05:04) (96% - 96%)  Spo2 at 1000 am 86 % upon ambulation and off oxygen     Parameters below as of 04 May 2023 05:04  Patient On (Oxygen Delivery Method): nasal cannula  O2 Flow (L/min): 1           05-04    139  |  99  |  7<L>  ----------------------------<  106<H>  4.3   |  29  |  0.5<L>    Ca    9.9      04 May 2023 08:01  Mg     1.9     05-04    TPro  5.7<L>  /  Alb  3.2<L>  /  TBili  0.4  /  DBili  x   /  AST  30  /  ALT  13  /  AlkPhos  246<H>  05-04

## 2023-05-05 RX ADMIN — CHLORHEXIDINE GLUCONATE 1 APPLICATION(S): 213 SOLUTION TOPICAL at 05:15

## 2023-05-05 RX ADMIN — GABAPENTIN 300 MILLIGRAM(S): 400 CAPSULE ORAL at 05:14

## 2023-05-05 RX ADMIN — FAMOTIDINE 20 MILLIGRAM(S): 10 INJECTION INTRAVENOUS at 21:24

## 2023-05-05 RX ADMIN — OXYCODONE HYDROCHLORIDE 10 MILLIGRAM(S): 5 TABLET ORAL at 03:30

## 2023-05-05 RX ADMIN — Medication 40 MILLIGRAM(S): at 21:25

## 2023-05-05 RX ADMIN — OXYCODONE HYDROCHLORIDE 10 MILLIGRAM(S): 5 TABLET ORAL at 15:43

## 2023-05-05 RX ADMIN — Medication 1 DROP(S): at 05:18

## 2023-05-05 RX ADMIN — FAMOTIDINE 20 MILLIGRAM(S): 10 INJECTION INTRAVENOUS at 05:12

## 2023-05-05 RX ADMIN — ALBUTEROL 2 PUFF(S): 90 AEROSOL, METERED ORAL at 07:41

## 2023-05-05 RX ADMIN — METHOCARBAMOL 1000 MILLIGRAM(S): 500 TABLET, FILM COATED ORAL at 12:36

## 2023-05-05 RX ADMIN — Medication 650 MILLIGRAM(S): at 09:29

## 2023-05-05 RX ADMIN — OXYCODONE HYDROCHLORIDE 10 MILLIGRAM(S): 5 TABLET ORAL at 13:28

## 2023-05-05 RX ADMIN — ENOXAPARIN SODIUM 30 MILLIGRAM(S): 100 INJECTION SUBCUTANEOUS at 05:11

## 2023-05-05 RX ADMIN — OXYCODONE HYDROCHLORIDE 10 MILLIGRAM(S): 5 TABLET ORAL at 09:30

## 2023-05-05 RX ADMIN — ENOXAPARIN SODIUM 30 MILLIGRAM(S): 100 INJECTION SUBCUTANEOUS at 21:24

## 2023-05-05 RX ADMIN — Medication 650 MILLIGRAM(S): at 16:14

## 2023-05-05 RX ADMIN — OXYCODONE HYDROCHLORIDE 10 MILLIGRAM(S): 5 TABLET ORAL at 02:40

## 2023-05-05 RX ADMIN — OXYCODONE HYDROCHLORIDE 10 MILLIGRAM(S): 5 TABLET ORAL at 09:14

## 2023-05-05 RX ADMIN — LORATADINE 10 MILLIGRAM(S): 10 TABLET ORAL at 21:25

## 2023-05-05 RX ADMIN — Medication 1 DROP(S): at 21:23

## 2023-05-05 RX ADMIN — Medication 3 MILLIGRAM(S): at 21:24

## 2023-05-05 RX ADMIN — Medication 650 MILLIGRAM(S): at 19:42

## 2023-05-05 RX ADMIN — METHOCARBAMOL 1000 MILLIGRAM(S): 500 TABLET, FILM COATED ORAL at 00:35

## 2023-05-05 RX ADMIN — GABAPENTIN 300 MILLIGRAM(S): 400 CAPSULE ORAL at 21:24

## 2023-05-05 RX ADMIN — OXYCODONE HYDROCHLORIDE 10 MILLIGRAM(S): 5 TABLET ORAL at 22:10

## 2023-05-05 RX ADMIN — Medication 50 MICROGRAM(S): at 05:11

## 2023-05-05 RX ADMIN — GABAPENTIN 300 MILLIGRAM(S): 400 CAPSULE ORAL at 13:29

## 2023-05-05 RX ADMIN — ALBUTEROL 2 PUFF(S): 90 AEROSOL, METERED ORAL at 13:22

## 2023-05-05 RX ADMIN — OXYCODONE HYDROCHLORIDE 10 MILLIGRAM(S): 5 TABLET ORAL at 21:39

## 2023-05-05 RX ADMIN — METHOCARBAMOL 1000 MILLIGRAM(S): 500 TABLET, FILM COATED ORAL at 05:11

## 2023-05-05 RX ADMIN — Medication 650 MILLIGRAM(S): at 08:48

## 2023-05-05 RX ADMIN — TIOTROPIUM BROMIDE 2 PUFF(S): 18 CAPSULE ORAL; RESPIRATORY (INHALATION) at 07:40

## 2023-05-05 NOTE — CONSULT NOTE ADULT - ASSESSMENT
56 yo F with PMH of hypothyroidism and ?alcoholic cirrhosis (stopped drinking over 9 yrs ago) presented to the ED due to neck pain. She saw Dr. Peña (oncologist) on 4/20/23 to establish care for her suspected metastatic bone disease when she had a MRI of neck done on 3/21/23 which showed  diffuse metastatic bone disease most pronounced at C2 with bony expansion causing circumferential narrowing of central canal. In the ED, she was seen by neurosx team and recommended MRI C/T/L spine with sachin, currently pending. Oncology team is consulted for her metastatic disease.     # [place] metastatic Breast Cancer (ER +1 [place] +1  Ki-67  < 5%, HER2 pending)    - seen at oncology office for diffuse osseous mets  - PE shows R breast with nipple retraction and a firm nodule close proximity to nipple at 3 o'clock   - CT chest/abdomen/pelvis : Rt breast mass , multiple enlarged mediastinal LN and paratracheal LN, lytic and scleoritic sternal or mami resions. No visceral mets.   - MRI C/T/L spine with sachin appreciated, numerous osseous mets through spines w/ most significant at the C2 and C3 level with evidence of epidural extension  - 4/24 : operated by Neurosurgery for occipitocervical junction fusion. Pathology results showed : Metastatic Ca with Breast Primary)      Recommendations:      - Obtain MR head w/ and w/o IV contrast and bone scan to r/o brain mets  - Pt with metastatic breast cancer that is weakly hormone receptor positive. We can start treatment with the combination of endocrine therapy with CDK 4/6i  - Pt will require dental clearance before we can start her on monthly Bisphosphonate treatment

## 2023-05-05 NOTE — PROGRESS NOTE ADULT - SUBJECTIVE AND OBJECTIVE BOX
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 alcoholic cirrhosis (stopped drinking over 9 yrs ago), presenting for neck pain on 4/20. Pt started having neck pain almost 4 months ago for which her chiropractor ordered an MRI that was done 1 month ago. Pt was not sure what the read of the MRI was but there was concern about lesions so she went to see Dr. Peña's office for the first time. Dr. Peña examined her and told her she has breast cancer with metastasis and needs to come to the ED for further evaluation. Pt is a non smoker and has no family history of breast Ca. Denies any significant weight loss, night sweats, or fevers. No reported bloody discharge from her nipples. Upon examining pt's right breast, dimpling with a lump was found. Pt believes that she noticed this change almost over a yr a go, but did not think of it was anything serious. Pt's neck pain has been significantly worsening limiting her ability to move her neck. She only has pain when moving her neck and has full sensation and ROM of her b/l UE. Denies any SOB, chest pain, or palpitations. States that she has pain with ROM, and states that it is worse with movement ex moving out of bed. Patient states that pain is mostly midline at the base of head, with radiation to her paraspinal muscles. Denies any radicular pain to UE & LE. States that she has been sleeping only in a recliner and has since then been developing worsening lower back pain. Denies any ataxia, imbalance, or difficulty with ambulation. Denies any numbness, tingling, paresthesias, weakness, saddle anesthesia, or bowel / bladder incontinence. Neurosurgery was consulted. On 4.24.23 she was taken to the operating room where she underwent an Occiput to T2 instrumentation and Fusion, ORIF of C2. She had a hemovac drain placed intraop which was removed on POD#3. She was on a PCA pump postop which was removed on POD#3. She remained neurologically stable postop.      Patient is tolerating bedside PT, OT, and SLP. The patient was evaluated by the PM&R team once medically stable. The patient was found to have functional limitations in terms of muscle strength, endurance, physical mobility, ability to carry out activities of daily living including: self-care, transfers, and ambulation. Participating with bedside therapeutic exercises and cognitive retraining. The patient is motivated and is able to tolerate up to 3 hours of daily therapy for 5-6 days a week for a total of 15 hours a week. Evaluated by Physiatry and deemed to be a good candidate for admission to  for acute inpatient rehab. Admitted to Acute Rehab on 4/28/23.    PMHx/PSHx: as above  SHx: history of alcohol use disorder - quit 9 years ago, denies smoking and illicit drug use   FHx: non-contributory   Allergies: NKDA  Prior level of function: independent with ambulation and ADLs without assistive device although had been using straight cane for a few months and the last 2 weeks prior to presentation reports being essentially chair bound due to neck/back pain and difficulty with mobility.   Living situation: Pt resides with  in 2nd floor apt using 8 steps to enter and a flight of stairs to access.   Current level of function:  SLP 4/26: Some complaints of globus w/ solids Recommend: continue easy to chew w/ thin  PT 4/28: max assist for bed mobility, mod assist for transfers, min assist for ambulation 40ft with RW  OT 4/27: dependent for lower body dressing    (28 Apr 2023 14:46)      TODAY'S SUBJECTIVE & REVIEW OF SYMPTOMS:  Patient was seen and examined at bedside. In NAD  No acute events reported overnight   Pulse ox 94% on RA this morning. Started on increased asthma treatment. No SOB.   Denies back spasms. Ongoing burning/ numbness of left lateral neck/ lower face       Review of Systems:  Constitutional:    [  ] WNL           [   ] insomnia   [   ] tired  [ x ] recent weight loss and poor appetite - reporting depressed mood at home   Cardio:             [ x ] WNL           [   ] CP   [   ] SANCHEZ   [   ] palpitations         Resp:                [ x ] WNL           [   ] SOB   [   ] cough   [   ] wheezing  GI:                    [ x ] WNL           [   ] constipation   [   ] diarrhea   [   ] abdominal pain   [   ] nausea   [   ] emesis    :                   [ x ] WNL           [   ] SILVA  [   ] dysuria   [   ] difficulty voiding            Endo:                [ x ] WNL          [   ] polyuria   [   ] temperature intolerance                Skin:                  [  ] WNL          [   ] pain   [ x  ] Surgical incision along cervical spine, left neck/ lower face pain    ] rash  MSK:                 [  ] WNL          [   ] muscle pain   [ x  ] pain midline neck   [   ] muscle tenderness   [   ] swelling  Neuro:              [  ] WNL          [   ] HA   [   ] change in vision   [   ] tremor   [   ] weakness   [  x  ]dysphagia             Cognitive:          [ x ] WNL          [   ]confusion     Psych:                [ x ] WNL          [   ] hallucinations   [   ]agitation   [   ] delusion   [   ]depression    PHYSICAL EXAM    Vital Signs Last 24 Hrs  T(C): 36.7 (05 May 2023 05:00), Max: 37.4 (04 May 2023 20:40)  T(F): 98 (05 May 2023 05:00), Max: 99.3 (04 May 2023 20:40)  HR: 91 (05 May 2023 05:00) (91 - 104)  BP: 119/69 (05 May 2023 05:00) (119/69 - 123/60)  BP(mean): --  RR: 18 (05 May 2023 05:00) (18 - 20)  SpO2: 94% (05 May 2023 05:21) (94% - 94%)    Parameters below as of 05 May 2023 05:21  Patient On (Oxygen Delivery Method): room air    General:[ x ] NAD, Resting Comfortable,   [   ] other:                                HEENT: [ x ] NC/AT, EOMI, PERRL , Normal Conjunctivae,   [   ] other:  Cardio: [ x ] RRR, no murmer,   [   ] other:                              Pulm: [ x ] No Respiratory Distress,  Lungs CTAB,   [   ] other:                       Abdomen: [ x ]ND/NT, Soft,   [   ] other:    : [ x ] NO SILVA CATHETER, [   ] SILVA CATHETER- no meatal tear, no discharge, [   ] other:                                            MSK: [  ] No joint swelling, Full ROM,   [ x  ] other:  Surgical incision along cervical spine c/d/i.                                      Ext: [ x ]No C/C/E, No calf tenderness,   [   ]other:    Skin: [  ]intact,   [ x  ] other: Surgical incision along cervical spine c/d/i.                                                                  Neurological Examination:  Cognitive: [  x  ] AAO x 3,   [    ]  other: A&Ox3. Oriented to person, place and time. Follows 2 step commands, language components intact. No aphasia.   Attention:  [  x  ] intact,   [    ]  other:  intact spelling and simple calculations                Memory: [  x  ] intact,    [    ]  other: intact registration and recall    Mood/Affect: [  x  ] wnl,    [    ]  other:                                                                             Communication: [  x  ]Fluent, no dysarthria, following commands:  [    ] other:   CN II - XII:  [  x  ] intact,  [    ] other:                                                                                        Motor:   RIGHT UE: [  ] WNL,  [ x  ] other: 3-/5 shoulder abduction limited by neck/back pain, 4/5 elbow flexion/extension, 5/5 hand   LEFT    UE: [  ] WNL,  [  x ] other: 3-/5 shoulder abduction limited by neck/back pain, 4/5 elbow flexion/extension, 5/5 hand   RIGHT LE: [ x ] WNL,  [   ] other: 5/5 hip flexion, knee flexion/extension, ankle dorsiflexion/plantarflexion  LEFT    LE: [ x ] WNL,  [   ] other: 5/5 hip flexion, knee flexion/extension, ankle dorsiflexion/plantarflexion    Tone: [  x  ] wnl,   [    ]  other:  DTRs: [ x  ]symmetric, [   ] other:  Coordination:   [ x  ] intact,   [    ] other:                                                                           Sensory: [ x  ] Intact to light touch,   [    ] other:    Clonus negative    CLOF: dependent BM, modA transfers, PA 50'x2 RW    MEDICATIONS  (STANDING):  albuterol    90 MICROgram(s) HFA Inhaler 2 Puff(s) Inhalation every 6 hours  artificial  tears Solution 1 Drop(s) Both EYES two times a day  bisacodyl 5 milliGRAM(s) Oral at bedtime  chlorhexidine 2% Cloths 1 Application(s) Topical <User Schedule>  enoxaparin Injectable 30 milliGRAM(s) SubCutaneous every 12 hours  famotidine    Tablet 20 milliGRAM(s) Oral every 12 hours  gabapentin 300 milliGRAM(s) Oral every 8 hours  levothyroxine 50 MICROGram(s) Oral daily  loratadine 10 milliGRAM(s) Oral at bedtime  melatonin 3 milliGRAM(s) Oral at bedtime  methocarbamol 1000 milliGRAM(s) Oral every 6 hours  polyethylene glycol 3350 17 Gram(s) Oral daily  predniSONE   Tablet 40 milliGRAM(s) Oral <User Schedule>  senna 2 Tablet(s) Oral at bedtime  tiotropium 2.5 MICROgram(s) Inhaler 2 Puff(s) Inhalation daily    MEDICATIONS  (PRN):  acetaminophen     Tablet .. 650 milliGRAM(s) Oral every 6 hours PRN Mild Pain (1 - 3)  naloxone Injectable 0.1 milliGRAM(s) IV Push every 3 minutes PRN For ANY of the following changes in patient status:  A. RR LESS THAN 10 breaths per minute, B. Oxygen saturation LESS THAN 90%, C. Sedation score of 6  ondansetron Injectable 4 milliGRAM(s) IV Push every 6 hours PRN Nausea and/or Vomiting  oxyCODONE    IR 10 milliGRAM(s) Oral every 4 hours PRN Moderate Pain (4 - 6)      RECENT LABS/IMAGING               05-03    136  |  98  |  9<L>  ----------------------------<  110<H>  4.3   |  31  |  0.5<L>    Ca    9.8      03 May 2023 07:52  Mg     1.8     05-03    TPro  5.5<L>  /  Alb  3.1<L>  /  TBili  0.4  /  DBili  x   /  AST  26  /  ALT  13  /  AlkPhos  228<H>  05-03

## 2023-05-05 NOTE — PROGRESS NOTE ADULT - ASSESSMENT
57F with PMH hypothyroidism, suspected alcoholic cirrhosis (stopped drinking over 9 yrs ago), Presented 4/20 for neck pain w/ limited movement /2 pain x4 months. Admitted for management of suspected breast CA with metastases to the spine now s/p Occiput-T2 instrumentation and fusion with ORIF of C2.    Prior to current illness and functional decline, patient was independent with ADLs and ambulation. Patient now requires assistance with ambulation and ADLs 2/2 neck/back pain, gait dysfunction, and deconditioning. Patient also with medical comorbidities of hypothyroidism, sinus tachycardia, hypertensive urgency and h/o cirrhosis, requiring medication management and oversight by Physiatry team and nursing. May also need specialist consulting from Neurosurgery. Patient is a complex medical case with mixed Neurosurgical, oncological, and medical issues. There are significant comorbidities which warrants acute rehab hospitalization. To return home it is in the patient’s best interest to have acute inpatient rehabilitative services to undergo intensive interdisciplinary therapy. Furthermore, the patient is motivated and is able to tolerate up to 3 hours of daily therapy for 5-6 days a week for a total of 15 hours a week. Evaluated by Physiatry and deemed to be a good candidate for admission to  for acute inpatient rehab. Admitted to Acute Rehab on 4/28/23.     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   - MRI spine: Numerous enhancing osseous metastatic lesions are noted throughout the cervical/thoracic/lumbar spine (most significant at the C2 and C3 level with evidence of  epidural extension causing severe spinal stenosis and mass effect). Malignant compression  deformity noted of the T8 vertebral body   - Neurosurgery Eval 4/22: Needs Hard collar [place] J when OOB  - Oncology Consulted: will need biopsy before additional tx plan recs, also MRI brain w contrast to r/u Mets   - Radonc consulted 4/27: will need radiation to the C spine (no sooner than 2 weeks after surgery)  -Hemovac discontinued 4/27  -Per Neurosurgery: plan was for [place] cervical collar when OOB but collars available do not fit so soft collar to be worn when OOB - confirmed with Neurosurgery team.   -Pain medications: previously evaluated by pain management. Gabapentin 300mg PO q8h, Tylenol 650mg q6h PRN mild pain, Oxycodone 10mg PO q4h PRN moderate pain, robaxin 1000mg q6hr  -following with Dr. Peña outpatient    #Dysphagia  - mild, since surgery  - regular diet    #s/p Hypertension  - resolving  - d/c Labetalol, which also may be making her asthma worse - BP in good range    #Hypoxemia/ Asthma;  - CXR negative, PE r/o on V/Q low probability of PE  On 1L satting in mid 90s this AM  - Patient is dropping to 85- 87% with ambulation and pulse increases to 120  - Will start short course of Prednisone, standing Nebs and Spiriva and monitor    #Hypercalcemia likely 2/2 malignancy  #Elevated AlkPhos likely 2/2 malignancy  - resolved s/p IVF   -Will monitor BMP and trend electrolytes     # hypothyroidism   - TSH (4/21) WNL   - c/w levothyroxine 50mcg OD    #Maintenance   - Pain control: as above  - GI/Bowel Mgmt: no issues at this time, monitor for BMs.   - Bladder management: voiding freely, no issues at this time.   - Skin: monitor cervical surgical wound, Neurosurgery f/u as needed   - FEN: Hypercalcemia resolved as above. Monitor electrolytes and fluid status.   - Diet: DASH/TLC easy to chew diet     #Precautions / PROPHYLAXIS:    - Falls  - Ortho: Weight bearing status: WBAT  - Soft Cervical Collar  - DVT prophylaxis: Lovenox 30 SQ daily

## 2023-05-06 RX ADMIN — OXYCODONE HYDROCHLORIDE 10 MILLIGRAM(S): 5 TABLET ORAL at 23:00

## 2023-05-06 RX ADMIN — METHOCARBAMOL 1000 MILLIGRAM(S): 500 TABLET, FILM COATED ORAL at 00:05

## 2023-05-06 RX ADMIN — OXYCODONE HYDROCHLORIDE 10 MILLIGRAM(S): 5 TABLET ORAL at 01:43

## 2023-05-06 RX ADMIN — Medication 1 DROP(S): at 05:57

## 2023-05-06 RX ADMIN — GABAPENTIN 300 MILLIGRAM(S): 400 CAPSULE ORAL at 05:59

## 2023-05-06 RX ADMIN — METHOCARBAMOL 1000 MILLIGRAM(S): 500 TABLET, FILM COATED ORAL at 05:59

## 2023-05-06 RX ADMIN — POLYETHYLENE GLYCOL 3350 17 GRAM(S): 17 POWDER, FOR SOLUTION ORAL at 11:27

## 2023-05-06 RX ADMIN — OXYCODONE HYDROCHLORIDE 10 MILLIGRAM(S): 5 TABLET ORAL at 13:00

## 2023-05-06 RX ADMIN — OXYCODONE HYDROCHLORIDE 10 MILLIGRAM(S): 5 TABLET ORAL at 08:39

## 2023-05-06 RX ADMIN — Medication 650 MILLIGRAM(S): at 11:30

## 2023-05-06 RX ADMIN — Medication 40 MILLIGRAM(S): at 21:11

## 2023-05-06 RX ADMIN — METHOCARBAMOL 1000 MILLIGRAM(S): 500 TABLET, FILM COATED ORAL at 23:09

## 2023-05-06 RX ADMIN — LORATADINE 10 MILLIGRAM(S): 10 TABLET ORAL at 21:12

## 2023-05-06 RX ADMIN — OXYCODONE HYDROCHLORIDE 10 MILLIGRAM(S): 5 TABLET ORAL at 12:56

## 2023-05-06 RX ADMIN — ALBUTEROL 2 PUFF(S): 90 AEROSOL, METERED ORAL at 19:47

## 2023-05-06 RX ADMIN — Medication 1 DROP(S): at 17:05

## 2023-05-06 RX ADMIN — OXYCODONE HYDROCHLORIDE 10 MILLIGRAM(S): 5 TABLET ORAL at 21:12

## 2023-05-06 RX ADMIN — ENOXAPARIN SODIUM 30 MILLIGRAM(S): 100 INJECTION SUBCUTANEOUS at 05:58

## 2023-05-06 RX ADMIN — GABAPENTIN 300 MILLIGRAM(S): 400 CAPSULE ORAL at 11:26

## 2023-05-06 RX ADMIN — CHLORHEXIDINE GLUCONATE 1 APPLICATION(S): 213 SOLUTION TOPICAL at 06:45

## 2023-05-06 RX ADMIN — ALBUTEROL 2 PUFF(S): 90 AEROSOL, METERED ORAL at 17:10

## 2023-05-06 RX ADMIN — Medication 650 MILLIGRAM(S): at 06:12

## 2023-05-06 RX ADMIN — METHOCARBAMOL 1000 MILLIGRAM(S): 500 TABLET, FILM COATED ORAL at 17:04

## 2023-05-06 RX ADMIN — ALBUTEROL 2 PUFF(S): 90 AEROSOL, METERED ORAL at 12:21

## 2023-05-06 RX ADMIN — FAMOTIDINE 20 MILLIGRAM(S): 10 INJECTION INTRAVENOUS at 05:58

## 2023-05-06 RX ADMIN — Medication 3 MILLIGRAM(S): at 21:11

## 2023-05-06 RX ADMIN — ENOXAPARIN SODIUM 30 MILLIGRAM(S): 100 INJECTION SUBCUTANEOUS at 17:04

## 2023-05-06 RX ADMIN — Medication 50 MICROGRAM(S): at 05:59

## 2023-05-06 RX ADMIN — Medication 650 MILLIGRAM(S): at 17:03

## 2023-05-06 RX ADMIN — Medication 650 MILLIGRAM(S): at 17:57

## 2023-05-06 RX ADMIN — GABAPENTIN 300 MILLIGRAM(S): 400 CAPSULE ORAL at 21:12

## 2023-05-06 RX ADMIN — METHOCARBAMOL 1000 MILLIGRAM(S): 500 TABLET, FILM COATED ORAL at 11:26

## 2023-05-06 RX ADMIN — FAMOTIDINE 20 MILLIGRAM(S): 10 INJECTION INTRAVENOUS at 17:04

## 2023-05-06 NOTE — PROGRESS NOTE ADULT - ATTENDING COMMENTS
Rehab of metastatic Ca to the spine. Pt seen and examined with the resident. The treatment plan discussed. Agree with the above.

## 2023-05-06 NOTE — PROGRESS NOTE ADULT - SUBJECTIVE AND OBJECTIVE BOX
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 alcoholic cirrhosis (stopped drinking over 9 yrs ago), presenting for neck pain on 4/20. Pt started having neck pain almost 4 months ago for which her chiropractor ordered an MRI that was done 1 month ago. Pt was not sure what the read of the MRI was but there was concern about lesions so she went to see Dr. Peña's office for the first time. Dr. Peña examined her and told her she has breast cancer with metastasis and needs to come to the ED for further evaluation. Pt is a non smoker and has no family history of breast Ca. Denies any significant weight loss, night sweats, or fevers. No reported bloody discharge from her nipples. Upon examining pt's right breast, dimpling with a lump was found. Pt believes that she noticed this change almost over a yr a go, but did not think of it was anything serious. Pt's neck pain has been significantly worsening limiting her ability to move her neck. She only has pain when moving her neck and has full sensation and ROM of her b/l UE. Denies any SOB, chest pain, or palpitations. States that she has pain with ROM, and states that it is worse with movement ex moving out of bed. Patient states that pain is mostly midline at the base of head, with radiation to her paraspinal muscles. Denies any radicular pain to UE & LE. States that she has been sleeping only in a recliner and has since then been developing worsening lower back pain. Denies any ataxia, imbalance, or difficulty with ambulation. Denies any numbness, tingling, paresthesias, weakness, saddle anesthesia, or bowel / bladder incontinence. Neurosurgery was consulted. On 4.24.23 she was taken to the operating room where she underwent an Occiput to T2 instrumentation and Fusion, ORIF of C2. She had a hemovac drain placed intraop which was removed on POD#3. She was on a PCA pump postop which was removed on POD#3. She remained neurologically stable postop.      Patient is tolerating bedside PT, OT, and SLP. The patient was evaluated by the PM&R team once medically stable. The patient was found to have functional limitations in terms of muscle strength, endurance, physical mobility, ability to carry out activities of daily living including: self-care, transfers, and ambulation. Participating with bedside therapeutic exercises and cognitive retraining. The patient is motivated and is able to tolerate up to 3 hours of daily therapy for 5-6 days a week for a total of 15 hours a week. Evaluated by Physiatry and deemed to be a good candidate for admission to  for acute inpatient rehab. Admitted to Acute Rehab on 4/28/23.    PMHx/PSHx: as above  SHx: history of alcohol use disorder - quit 9 years ago, denies smoking and illicit drug use   FHx: non-contributory   Allergies: NKDA  Prior level of function: independent with ambulation and ADLs without assistive device although had been using straight cane for a few months and the last 2 weeks prior to presentation reports being essentially chair bound due to neck/back pain and difficulty with mobility.   Living situation: Pt resides with  in 2nd floor apt using 8 steps to enter and a flight of stairs to access.   Current level of function:  SLP 4/26: Some complaints of globus w/ solids Recommend: continue easy to chew w/ thin  PT 4/28: max assist for bed mobility, mod assist for transfers, min assist for ambulation 40ft with RW  OT 4/27: dependent for lower body dressing    (28 Apr 2023 14:46)      TODAY'S SUBJECTIVE & REVIEW OF SYMPTOMS:  Patient was seen and examined at bedside. In NAD  No acute events reported overnight   Pt continue to improve on asthma treatment. No SOB.   Denies back spasms. Ongoing burning/ numbness of left lateral neck/ lower face   vitals reviewed.    Review of Systems:  Constitutional:    [  ] WNL           [   ] insomnia   [   ] tired  [ x ] recent weight loss and poor appetite - reporting depressed mood at home   Cardio:             [ x ] WNL           [   ] CP   [   ] SANCHEZ   [   ] palpitations         Resp:                [ x ] WNL           [   ] SOB   [   ] cough   [   ] wheezing  GI:                    [ x ] WNL           [   ] constipation   [   ] diarrhea   [   ] abdominal pain   [   ] nausea   [   ] emesis    :                   [ x ] WNL           [   ] SILVA  [   ] dysuria   [   ] difficulty voiding            Endo:                [ x ] WNL          [   ] polyuria   [   ] temperature intolerance                Skin:                  [  ] WNL          [   ] pain   [ x  ] Surgical incision along cervical spine, left neck/ lower face pain    ] rash  MSK:                 [  ] WNL          [   ] muscle pain   [ x  ] pain midline neck   [   ] muscle tenderness   [   ] swelling  Neuro:              [  ] WNL          [   ] HA   [   ] change in vision   [   ] tremor   [   ] weakness   [  x  ]dysphagia             Cognitive:          [ x ] WNL          [   ]confusion     Psych:                [ x ] WNL          [   ] hallucinations   [   ]agitation   [   ] delusion   [   ]depression    PHYSICAL EXAM    Vital Signs (24 Hrs):  T(C): 36.2 (05-06-23 @ 04:56), Max: 36.5 (05-05-23 @ 21:00)  HR: 85 (05-06-23 @ 04:56) (85 - 107)  BP: 128/65 (05-06-23 @ 04:56) (119/56 - 128/65)  RR: 18 (05-06-23 @ 04:56) (18 - 18)        General:[ x ] NAD, Resting Comfortable,   [   ] other:                                HEENT: [ x ] NC/AT, EOMI, PERRL , Normal Conjunctivae,   [   ] other:  Cardio: [ x ] RRR, no murmer,   [   ] other:                              Pulm: [ x ] No Respiratory Distress,  Lungs CTAB,   [   ] other:                       Abdomen: [ x ]ND/NT, Soft,   [   ] other:    : [ x ] NO SILVA CATHETER, [   ] SLIVA CATHETER- no meatal tear, no discharge, [   ] other:                                            MSK: [  ] No joint swelling, Full ROM,   [ x  ] other:  Surgical incision along cervical spine c/d/i.                                      Ext: [ x ]No C/C/E, No calf tenderness,   [   ]other:    Skin: [  ]intact,   [ x  ] other: Surgical incision along cervical spine c/d/i.                                                                  Neurological Examination:  Cognitive: [  x  ] AAO x 3,   [    ]  other: A&Ox3. Oriented to person, place and time. Follows 2 step commands, language components intact. No aphasia.   Attention:  [  x  ] intact,   [    ]  other:  intact spelling and simple calculations                Memory: [  x  ] intact,    [    ]  other: intact registration and recall    Mood/Affect: [  x  ] wnl,    [    ]  other:                                                                             Communication: [  x  ]Fluent, no dysarthria, following commands:  [    ] other:   CN II - XII:  [  x  ] intact,  [    ] other:                                                                                        Motor:   RIGHT UE: [  ] WNL,  [ x  ] other: 3-/5 shoulder abduction limited by neck/back pain, 4/5 elbow flexion/extension, 5/5 hand   LEFT    UE: [  ] WNL,  [  x ] other: 3-/5 shoulder abduction limited by neck/back pain, 4/5 elbow flexion/extension, 5/5 hand   RIGHT LE: [ x ] WNL,  [   ] other: 5/5 hip flexion, knee flexion/extension, ankle dorsiflexion/plantarflexion  LEFT    LE: [ x ] WNL,  [   ] other: 5/5 hip flexion, knee flexion/extension, ankle dorsiflexion/plantarflexion    Tone: [  x  ] wnl,   [    ]  other:  DTRs: [ x  ]symmetric, [   ] other:  Coordination:   [ x  ] intact,   [    ] other:                                                                           Sensory: [ x  ] Intact to light touch,   [    ] other:    Clonus negative    CLOF: dependent BM, modA transfers, PA 50'x2 RW    MEDICATIONS  (STANDING):  albuterol    90 MICROgram(s) HFA Inhaler 2 Puff(s) Inhalation every 6 hours  artificial  tears Solution 1 Drop(s) Both EYES two times a day  bisacodyl 5 milliGRAM(s) Oral at bedtime  chlorhexidine 2% Cloths 1 Application(s) Topical <User Schedule>  enoxaparin Injectable 30 milliGRAM(s) SubCutaneous every 12 hours  famotidine    Tablet 20 milliGRAM(s) Oral every 12 hours  gabapentin 300 milliGRAM(s) Oral every 8 hours  levothyroxine 50 MICROGram(s) Oral daily  loratadine 10 milliGRAM(s) Oral at bedtime  melatonin 3 milliGRAM(s) Oral at bedtime  methocarbamol 1000 milliGRAM(s) Oral every 6 hours  polyethylene glycol 3350 17 Gram(s) Oral daily  predniSONE   Tablet 40 milliGRAM(s) Oral <User Schedule>  senna 2 Tablet(s) Oral at bedtime  tiotropium 2.5 MICROgram(s) Inhaler 2 Puff(s) Inhalation daily    MEDICATIONS  (PRN):  acetaminophen     Tablet .. 650 milliGRAM(s) Oral every 6 hours PRN Mild Pain (1 - 3)  naloxone Injectable 0.1 milliGRAM(s) IV Push every 3 minutes PRN For ANY of the following changes in patient status:  A. RR LESS THAN 10 breaths per minute, B. Oxygen saturation LESS THAN 90%, C. Sedation score of 6  ondansetron Injectable 4 milliGRAM(s) IV Push every 6 hours PRN Nausea and/or Vomiting  oxyCODONE    IR 10 milliGRAM(s) Oral every 4 hours PRN Moderate Pain (4 - 6)        RECENT LABS/IMAGING               05-03    136  |  98  |  9<L>  ----------------------------<  110<H>  4.3   |  31  |  0.5<L>    Ca    9.8      03 May 2023 07:52  Mg     1.8     05-03    TPro  5.5<L>  /  Alb  3.1<L>  /  TBili  0.4  /  DBili  x   /  AST  26  /  ALT  13  /  AlkPhos  228<H>  05-03

## 2023-05-06 NOTE — PROGRESS NOTE ADULT - ASSESSMENT
57F with PMH hypothyroidism, suspected alcoholic cirrhosis (stopped drinking over 9 yrs ago), Presented 4/20 for neck pain w/ limited movement /2 pain x4 months. Admitted for management of suspected breast CA with metastases to the spine now s/p Occiput-T2 instrumentation and fusion with ORIF of C2.    Prior to current illness and functional decline, patient was independent with ADLs and ambulation. Patient now requires assistance with ambulation and ADLs 2/2 neck/back pain, gait dysfunction, and deconditioning. Patient also with medical comorbidities of hypothyroidism, sinus tachycardia, hypertensive urgency and h/o cirrhosis, requiring medication management and oversight by Physiatry team and nursing. May also need specialist consulting from Neurosurgery. Patient is a complex medical case with mixed Neurosurgical, oncological, and medical issues. There are significant comorbidities which warrants acute rehab hospitalization. To return home it is in the patient’s best interest to have acute inpatient rehabilitative services to undergo intensive interdisciplinary therapy. Furthermore, the patient is motivated and is able to tolerate up to 3 hours of daily therapy for 5-6 days a week for a total of 15 hours a week. Evaluated by Physiatry and deemed to be a good candidate for admission to  for acute inpatient rehab. Admitted to Acute Rehab on 4/28/23.     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   - MRI spine: Numerous enhancing osseous metastatic lesions are noted throughout the cervical/thoracic/lumbar spine (most significant at the C2 and C3 level with evidence of  epidural extension causing severe spinal stenosis and mass effect). Malignant compression  deformity noted of the T8 vertebral body   - Neurosurgery Eval 4/22: Needs Hard collar [place] J when OOB  - Oncology Consulted: will need biopsy before additional tx plan recs, also MRI brain w contrast to r/u Mets   - Radonc consulted 4/27: will need radiation to the C spine (no sooner than 2 weeks after surgery)  -Hemovac discontinued 4/27  -Per Neurosurgery: plan was for [place] J cervical collar when OOB but collars available do not fit so soft collar to be worn when OOB - confirmed with Neurosurgery team.   -Pain medications: previously evaluated by pain management. Gabapentin 300mg PO q8h, Tylenol 650mg q6h PRN mild pain, Oxycodone 10mg PO q4h PRN moderate pain, robaxin 1000mg q6hr  -following with Dr. Peña outpatient    #Dysphagia  - mild, since surgery  - regular diet    #s/p Hypertension  - resolving  - d/c Labetalol, which also may be making her asthma worse - BP in good range    #Hypoxemia/ Asthma;  - CXR negative, PE r/o on V/Q low probability of PE  On 1L satting in mid 90s this AM  - Patient is dropping to 85- 87% with ambulation and pulse increases to 120  - Will start short course of Prednisone, standing Nebs and Spiriva and monitor    #Hypercalcemia likely 2/2 malignancy  #Elevated AlkPhos likely 2/2 malignancy  - resolved s/p IVF   -Will monitor BMP and trend electrolytes     # hypothyroidism   - TSH (4/21) WNL   - c/w levothyroxine 50mcg OD    #Maintenance   - Pain control: as above  - GI/Bowel Mgmt: no issues at this time, monitor for BMs.   - Bladder management: voiding freely, no issues at this time.   - Skin: monitor cervical surgical wound, Neurosurgery f/u as needed   - FEN: Hypercalcemia resolved as above. Monitor electrolytes and fluid status.   - Diet: DASH/TLC easy to chew diet     #Precautions / PROPHYLAXIS:    - Falls  - Ortho: Weight bearing status: WBAT  - Soft Cervical Collar  - DVT prophylaxis: Lovenox 30 SQ daily

## 2023-05-07 RX ADMIN — Medication 650 MILLIGRAM(S): at 06:47

## 2023-05-07 RX ADMIN — OXYCODONE HYDROCHLORIDE 10 MILLIGRAM(S): 5 TABLET ORAL at 08:49

## 2023-05-07 RX ADMIN — GABAPENTIN 300 MILLIGRAM(S): 400 CAPSULE ORAL at 06:07

## 2023-05-07 RX ADMIN — OXYCODONE HYDROCHLORIDE 10 MILLIGRAM(S): 5 TABLET ORAL at 21:13

## 2023-05-07 RX ADMIN — OXYCODONE HYDROCHLORIDE 10 MILLIGRAM(S): 5 TABLET ORAL at 13:04

## 2023-05-07 RX ADMIN — ALBUTEROL 2 PUFF(S): 90 AEROSOL, METERED ORAL at 13:05

## 2023-05-07 RX ADMIN — TIOTROPIUM BROMIDE 2 PUFF(S): 18 CAPSULE ORAL; RESPIRATORY (INHALATION) at 08:20

## 2023-05-07 RX ADMIN — OXYCODONE HYDROCHLORIDE 10 MILLIGRAM(S): 5 TABLET ORAL at 23:00

## 2023-05-07 RX ADMIN — ENOXAPARIN SODIUM 30 MILLIGRAM(S): 100 INJECTION SUBCUTANEOUS at 17:25

## 2023-05-07 RX ADMIN — ALBUTEROL 2 PUFF(S): 90 AEROSOL, METERED ORAL at 21:17

## 2023-05-07 RX ADMIN — METHOCARBAMOL 1000 MILLIGRAM(S): 500 TABLET, FILM COATED ORAL at 23:24

## 2023-05-07 RX ADMIN — METHOCARBAMOL 1000 MILLIGRAM(S): 500 TABLET, FILM COATED ORAL at 12:15

## 2023-05-07 RX ADMIN — CHLORHEXIDINE GLUCONATE 1 APPLICATION(S): 213 SOLUTION TOPICAL at 06:09

## 2023-05-07 RX ADMIN — GABAPENTIN 300 MILLIGRAM(S): 400 CAPSULE ORAL at 13:05

## 2023-05-07 RX ADMIN — Medication 1 DROP(S): at 06:08

## 2023-05-07 RX ADMIN — FAMOTIDINE 20 MILLIGRAM(S): 10 INJECTION INTRAVENOUS at 06:08

## 2023-05-07 RX ADMIN — Medication 650 MILLIGRAM(S): at 12:15

## 2023-05-07 RX ADMIN — Medication 3 MILLIGRAM(S): at 21:14

## 2023-05-07 RX ADMIN — METHOCARBAMOL 1000 MILLIGRAM(S): 500 TABLET, FILM COATED ORAL at 06:08

## 2023-05-07 RX ADMIN — OXYCODONE HYDROCHLORIDE 10 MILLIGRAM(S): 5 TABLET ORAL at 05:19

## 2023-05-07 RX ADMIN — GABAPENTIN 300 MILLIGRAM(S): 400 CAPSULE ORAL at 21:13

## 2023-05-07 RX ADMIN — Medication 50 MICROGRAM(S): at 06:07

## 2023-05-07 RX ADMIN — LORATADINE 10 MILLIGRAM(S): 10 TABLET ORAL at 21:14

## 2023-05-07 RX ADMIN — OXYCODONE HYDROCHLORIDE 10 MILLIGRAM(S): 5 TABLET ORAL at 08:19

## 2023-05-07 RX ADMIN — ENOXAPARIN SODIUM 30 MILLIGRAM(S): 100 INJECTION SUBCUTANEOUS at 06:08

## 2023-05-07 RX ADMIN — Medication 650 MILLIGRAM(S): at 12:45

## 2023-05-07 RX ADMIN — OXYCODONE HYDROCHLORIDE 10 MILLIGRAM(S): 5 TABLET ORAL at 03:38

## 2023-05-07 RX ADMIN — Medication 40 MILLIGRAM(S): at 21:13

## 2023-05-07 RX ADMIN — OXYCODONE HYDROCHLORIDE 10 MILLIGRAM(S): 5 TABLET ORAL at 13:34

## 2023-05-07 RX ADMIN — ALBUTEROL 2 PUFF(S): 90 AEROSOL, METERED ORAL at 08:21

## 2023-05-07 RX ADMIN — Medication 650 MILLIGRAM(S): at 06:13

## 2023-05-07 RX ADMIN — Medication 1 DROP(S): at 17:26

## 2023-05-07 RX ADMIN — Medication 650 MILLIGRAM(S): at 17:27

## 2023-05-07 RX ADMIN — METHOCARBAMOL 1000 MILLIGRAM(S): 500 TABLET, FILM COATED ORAL at 17:25

## 2023-05-07 RX ADMIN — FAMOTIDINE 20 MILLIGRAM(S): 10 INJECTION INTRAVENOUS at 17:25

## 2023-05-07 NOTE — PROGRESS NOTE ADULT - ASSESSMENT
57F with PMH hypothyroidism, suspected alcoholic cirrhosis (stopped drinking over 9 yrs ago), Presented 4/20 for neck pain w/ limited movement /2 pain x4 months. Admitted for management of suspected breast CA with metastases to the spine now s/p Occiput-T2 instrumentation and fusion with ORIF of C2.    Prior to current illness and functional decline, patient was independent with ADLs and ambulation. Patient now requires assistance with ambulation and ADLs 2/2 neck/back pain, gait dysfunction, and deconditioning. Patient also with medical comorbidities of hypothyroidism, sinus tachycardia, hypertensive urgency and h/o cirrhosis, requiring medication management and oversight by Physiatry team and nursing. May also need specialist consulting from Neurosurgery. Patient is a complex medical case with mixed Neurosurgical, oncological, and medical issues. There are significant comorbidities which warrants acute rehab hospitalization. To return home it is in the patient’s best interest to have acute inpatient rehabilitative services to undergo intensive interdisciplinary therapy. Furthermore, the patient is motivated and is able to tolerate up to 3 hours of daily therapy for 5-6 days a week for a total of 15 hours a week. Evaluated by Physiatry and deemed to be a good candidate for admission to  for acute inpatient rehab. Admitted to Acute Rehab on 4/28/23.     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   - MRI spine: Numerous enhancing osseous metastatic lesions are noted throughout the cervical/thoracic/lumbar spine (most significant at the C2 and C3 level with evidence of  epidural extension causing severe spinal stenosis and mass effect). Malignant compression  deformity noted of the T8 vertebral body   - Neurosurgery Eval 4/22: Needs Hard collar [place] when OOB  - Oncology Consulted: will need biopsy before additional tx plan recs, also MRI brain w contrast to r/u Mets   - Radonc consulted 4/27: will need radiation to the C spine (no sooner than 2 weeks after surgery)  -Hemovac discontinued 4/27  -Per Neurosurgery: plan was for [place] cervical collar when OOB but collars available do not fit so soft collar to be worn when OOB - confirmed with Neurosurgery team.   -Pain medications: previously evaluated by pain management. Gabapentin 300mg PO q8h, Tylenol 650mg q6h PRN mild pain, Oxycodone 10mg PO q4h PRN moderate pain, robaxin 1000mg q6hr  -following with Dr. Peña outpatient    #Dysphagia  - mild, since surgery  - regular diet    #s/p Hypertension  - resolving  - d/c Labetalol, which also may be making her asthma worse - BP in good range    #Hypoxemia/ Asthma;  - CXR negative, PE r/o on V/Q low probability of PE  On 1L satting in mid 90s this AM  - Patient is dropping to 85- 87% with ambulation and pulse increases to 120  - Will start short course of Prednisone, standing Nebs and Spiriva and monitor    #Hypercalcemia likely 2/2 malignancy  #Elevated AlkPhos likely 2/2 malignancy  - resolved s/p IVF   -Will monitor BMP and trend electrolytes     # hypothyroidism   - TSH (4/21) WNL   - c/w levothyroxine 50mcg OD    #Maintenance   - Pain control: as above  - GI/Bowel Mgmt: no issues at this time, monitor for BMs.   - Bladder management: voiding freely, no issues at this time.   - Skin: monitor cervical surgical wound, Neurosurgery f/u as needed   - FEN: Hypercalcemia resolved as above. Monitor electrolytes and fluid status.   - Diet: DASH/TLC easy to chew diet     #Precautions / PROPHYLAXIS:    - Falls  - Ortho: Weight bearing status: WBAT  - Soft Cervical Collar  - DVT prophylaxis: Lovenox 30 SQ daily          57F with PMH hypothyroidism, suspected alcoholic cirrhosis (stopped drinking over 9 yrs ago), Presented 4/20 for neck pain w/ limited movement /2 pain x4 months. Admitted for management of suspected breast CA with metastases to the spine now s/p Occiput-T2 instrumentation and fusion with ORIF of C2.    Prior to current illness and functional decline, patient was independent with ADLs and ambulation. Patient now requires assistance with ambulation and ADLs 2/2 neck/back pain, gait dysfunction, and deconditioning. Patient also with medical comorbidities of hypothyroidism, sinus tachycardia, hypertensive urgency and h/o cirrhosis, requiring medication management and oversight by Physiatry team and nursing. May also need specialist consulting from Neurosurgery. Patient is a complex medical case with mixed Neurosurgical, oncological, and medical issues. There are significant comorbidities which warrants acute rehab hospitalization. To return home it is in the patient’s best interest to have acute inpatient rehabilitative services to undergo intensive interdisciplinary therapy. Furthermore, the patient is motivated and is able to tolerate up to 3 hours of daily therapy for 5-6 days a week for a total of 15 hours a week. Evaluated by Physiatry and deemed to be a good candidate for admission to  for acute inpatient rehab. Admitted to Acute Rehab on 4/28/23.     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   - MRI spine: Numerous enhancing osseous metastatic lesions are noted throughout the cervical/thoracic/lumbar spine (most significant at the C2 and C3 level with evidence of  epidural extension causing severe spinal stenosis and mass effect). Malignant compression  deformity noted of the T8 vertebral body   - Neurosurgery Eval 4/22: Needs Hard collar [place] when OOB  - Oncology Consulted: will need biopsy before additional tx plan recs, also MRI brain w contrast to r/u Mets   - Radonc consulted 4/27: will need radiation to the C spine (no sooner than 2 weeks after surgery)  -Hemovac discontinued 4/27  -Per Neurosurgery: plan was for [place] cervical collar when OOB but collars available do not fit so soft collar to be worn when OOB - confirmed with Neurosurgery team.   -Pain medications: previously evaluated by pain management. Gabapentin 300mg PO q8h, Tylenol 650mg q6h PRN mild pain, Oxycodone 10mg PO q4h PRN moderate pain, robaxin 1000mg q6hr  -following with Dr. Peña outpatient    #Dysphagia  - mild, since surgery  - regular diet    #s/p Hypertension  - resolving  - d/c Labetalol, which also may be making her asthma worse - BP in good range    #Hypoxemia/ Asthma;  - CXR negative, PE r/o on V/Q low probability of PE  On 1L satting in mid 90s this AM  - Patient was dropping to 85- 87% with ambulation and pulse increases to 120  - Started short course of Prednisone, standing Nebs and Spiriva and doing well offf O2    #Hypercalcemia likely 2/2 malignancy  #Elevated AlkPhos likely 2/2 malignancy  - resolved s/p IVF   -Will monitor BMP and trend electrolytes     # hypothyroidism   - TSH (4/21) WNL   - c/w levothyroxine 50mcg OD    #Maintenance   - Pain control: as above  - GI/Bowel Mgmt: no issues at this time, monitor for BMs.   - Bladder management: voiding freely, no issues at this time.   - Skin: monitor cervical surgical wound, Neurosurgery f/u as needed   - FEN: Hypercalcemia resolved as above. Monitor electrolytes and fluid status.   - Diet: DASH/TLC easy to chew diet     #Precautions / PROPHYLAXIS:    - Falls  - Ortho: Weight bearing status: WBAT  - Soft Cervical Collar  - DVT prophylaxis: Lovenox 30 SQ daily

## 2023-05-07 NOTE — PROGRESS NOTE ADULT - ATTENDING COMMENTS
Rehab of metastatic Ca to the spine. Pt seen and examined with the resident. The treatment plan discussed. Agree with the above. I reviewed the chart and examined the patient with the resident and we discussed the findings and treatment plan.  The patient is tolerating the rehab program well. I agree with the findings and treatment plan above, which I modified as indicated. The patient requires 3 hrs a day of acute inpatient rehab. No new complaints. Pain controlled. Denies SOB or wheezing. Doing will off O2 on asthma treatment with short course of Prednisone. Continue full rehab program.    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   - MRI spine: Numerous enhancing osseous metastatic lesions are noted throughout the cervical/thoracic/lumbar spine (most significant at the C2 and C3 level with evidence of  epidural extension causing severe spinal stenosis and mass effect). Malignant compression  deformity noted of the T8 vertebral body   - Neurosurgery Eval 4/22: Needs Hard collar [place] J when OOB  - Oncology Consulted: will need biopsy before additional tx plan recs, also MRI brain w contrast to r/u Mets   - Radonc consulted 4/27: will need radiation to the C spine (no sooner than 2 weeks after surgery)  -Hemovac discontinued 4/27  -Per Neurosurgery: plan was for [place] cervical collar when OOB but collars available do not fit so soft collar to be worn when OOB - confirmed with Neurosurgery team.   -Pain medications: previously evaluated by pain management. Gabapentin 300mg PO q8h, Tylenol 650mg q6h PRN mild pain, Oxycodone 10mg PO q4h PRN moderate pain, robaxin 1000mg q6hr  -following with Dr. Peña outpatient    #Dysphagia  - mild, since surgery  - regular diet    #s/p Hypertension  - resolving  - d/c Labetalol, which also may be making her asthma worse - BP in good range    #Hypoxemia/ Asthma;  - CXR negative, PE r/o on V/Q low probability of PE  On 1L satting in mid 90s this AM  - Patient was dropping to 85- 87% with ambulation and pulse increases to 120  - Started short course of Prednisone, standing Nebs and Spiriva and doing well offf O2    #Hypercalcemia likely 2/2 malignancy  #Elevated AlkPhos likely 2/2 malignancy  - resolved s/p IVF   -Will monitor BMP and trend electrolytes     # hypothyroidism   - TSH (4/21) WNL   - c/w levothyroxine 50mcg OD

## 2023-05-07 NOTE — PROGRESS NOTE ADULT - SUBJECTIVE AND OBJECTIVE BOX
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 alcoholic cirrhosis (stopped drinking over 9 yrs ago), presenting for neck pain on 4/20. Pt started having neck pain almost 4 months ago for which her chiropractor ordered an MRI that was done 1 month ago. Pt was not sure what the read of the MRI was but there was concern about lesions so she went to see Dr. Peña's office for the first time. Dr. Peña examined her and told her she has breast cancer with metastasis and needs to come to the ED for further evaluation. Pt is a non smoker and has no family history of breast Ca. Denies any significant weight loss, night sweats, or fevers. No reported bloody discharge from her nipples. Upon examining pt's right breast, dimpling with a lump was found. Pt believes that she noticed this change almost over a yr a go, but did not think of it was anything serious. Pt's neck pain has been significantly worsening limiting her ability to move her neck. She only has pain when moving her neck and has full sensation and ROM of her b/l UE. Denies any SOB, chest pain, or palpitations. States that she has pain with ROM, and states that it is worse with movement ex moving out of bed. Patient states that pain is mostly midline at the base of head, with radiation to her paraspinal muscles. Denies any radicular pain to UE & LE. States that she has been sleeping only in a recliner and has since then been developing worsening lower back pain. Denies any ataxia, imbalance, or difficulty with ambulation. Denies any numbness, tingling, paresthesias, weakness, saddle anesthesia, or bowel / bladder incontinence. Neurosurgery was consulted. On 4.24.23 she was taken to the operating room where she underwent an Occiput to T2 instrumentation and Fusion, ORIF of C2. She had a hemovac drain placed intraop which was removed on POD#3. She was on a PCA pump postop which was removed on POD#3. She remained neurologically stable postop.      Patient is tolerating bedside PT, OT, and SLP. The patient was evaluated by the PM&R team once medically stable. The patient was found to have functional limitations in terms of muscle strength, endurance, physical mobility, ability to carry out activities of daily living including: self-care, transfers, and ambulation. Participating with bedside therapeutic exercises and cognitive retraining. The patient is motivated and is able to tolerate up to 3 hours of daily therapy for 5-6 days a week for a total of 15 hours a week. Evaluated by Physiatry and deemed to be a good candidate for admission to  for acute inpatient rehab. Admitted to Acute Rehab on 4/28/23.    PMHx/PSHx: as above  SHx: history of alcohol use disorder - quit 9 years ago, denies smoking and illicit drug use   FHx: non-contributory   Allergies: NKDA  Prior level of function: independent with ambulation and ADLs without assistive device although had been using straight cane for a few months and the last 2 weeks prior to presentation reports being essentially chair bound due to neck/back pain and difficulty with mobility.   Living situation: Pt resides with  in 2nd floor apt using 8 steps to enter and a flight of stairs to access.   Current level of function:  SLP 4/26: Some complaints of globus w/ solids Recommend: continue easy to chew w/ thin  PT 4/28: max assist for bed mobility, mod assist for transfers, min assist for ambulation 40ft with RW  OT 4/27: dependent for lower body dressing    (28 Apr 2023 14:46)      TODAY'S SUBJECTIVE & REVIEW OF SYMPTOMS:  Patient was seen and examined at bedside. In NAD  No acute events reported overnight   vitals reviewed.    Review of Systems:  Constitutional:    [  ] WNL           [   ] insomnia   [   ] tired  [ x ] recent weight loss and poor appetite - reporting depressed mood at home   Cardio:             [ x ] WNL           [   ] CP   [   ] SANCHEZ   [   ] palpitations         Resp:                [ x ] WNL           [   ] SOB   [   ] cough   [   ] wheezing  GI:                    [ x ] WNL           [   ] constipation   [   ] diarrhea   [   ] abdominal pain   [   ] nausea   [   ] emesis    :                   [ x ] WNL           [   ] SILVA  [   ] dysuria   [   ] difficulty voiding            Endo:                [ x ] WNL          [   ] polyuria   [   ] temperature intolerance                Skin:                  [  ] WNL          [   ] pain   [ x  ] Surgical incision along cervical spine, left neck/ lower face pain    ] rash  MSK:                 [  ] WNL          [   ] muscle pain   [ x  ] pain midline neck   [   ] muscle tenderness   [   ] swelling  Neuro:              [  ] WNL          [   ] HA   [   ] change in vision   [   ] tremor   [   ] weakness   [  x  ]dysphagia             Cognitive:          [ x ] WNL          [   ]confusion     Psych:                [ x ] WNL          [   ] hallucinations   [   ]agitation   [   ] delusion   [   ]depression    PHYSICAL EXAM  Vital Signs Last 24 Hrs  T(C): 36.4 (07 May 2023 05:37), Max: 36.4 (07 May 2023 05:37)  T(F): 97.6 (07 May 2023 05:37), Max: 97.6 (07 May 2023 05:37)  HR: 102 (07 May 2023 05:37) (90 - 102)  BP: 125/79 (07 May 2023 05:37) (121/52 - 132/70)  BP(mean): --  RR: 20 (07 May 2023 05:37) (18 - 20)  SpO2: 94% (06 May 2023 20:25) (94% - 94%)    Parameters below as of 06 May 2023 20:25  Patient On (Oxygen Delivery Method): room air      General:[ x ] NAD, Resting Comfortable,   [   ] other:                                HEENT: [ x ] NC/AT, EOMI, PERRL , Normal Conjunctivae,   [   ] other:  Cardio: [ x ] RRR, no murmer,   [   ] other:                              Pulm: [ x ] No Respiratory Distress,  Lungs CTAB,   [   ] other:                       Abdomen: [ x ]ND/NT, Soft,   [   ] other:    : [ x ] NO SILVA CATHETER, [   ] SILVA CATHETER- no meatal tear, no discharge, [   ] other:                                            MSK: [  ] No joint swelling, Full ROM,   [ x  ] other:  Surgical incision along cervical spine c/d/i.                                      Ext: [ x ]No C/C/E, No calf tenderness,   [   ]other:    Skin: [  ]intact,   [ x  ] other: Surgical incision along cervical spine c/d/i.                                                                  Neurological Examination:  Cognitive: [  x  ] AAO x 3,   [    ]  other: A&Ox3. Oriented to person, place and time. Follows 2 step commands, language components intact. No aphasia.   Attention:  [  x  ] intact,   [    ]  other:  intact spelling and simple calculations                Memory: [  x  ] intact,    [    ]  other: intact registration and recall    Mood/Affect: [  x  ] wnl,    [    ]  other:                                                                             Communication: [  x  ]Fluent, no dysarthria, following commands:  [    ] other:   CN II - XII:  [  x  ] intact,  [    ] other:                                                                                        Motor:   RIGHT UE: [  ] WNL,  [ x  ] other: 3-/5 shoulder abduction limited by neck/back pain, 4/5 elbow flexion/extension, 5/5 hand   LEFT    UE: [  ] WNL,  [  x ] other: 3-/5 shoulder abduction limited by neck/back pain, 4/5 elbow flexion/extension, 5/5 hand   RIGHT LE: [ x ] WNL,  [   ] other: 5/5 hip flexion, knee flexion/extension, ankle dorsiflexion/plantarflexion  LEFT    LE: [ x ] WNL,  [   ] other: 5/5 hip flexion, knee flexion/extension, ankle dorsiflexion/plantarflexion    Tone: [  x  ] wnl,   [    ]  other:  DTRs: [ x  ]symmetric, [   ] other:  Coordination:   [ x  ] intact,   [    ] other:                                                                           Sensory: [ x  ] Intact to light touch,   [    ] other:    Clonus negative    CLOF: dependent BM, Wan transfers, PA 50'x2 RW    MEDICATIONS  (STANDING):  albuterol    90 MICROgram(s) HFA Inhaler 2 Puff(s) Inhalation every 6 hours  artificial  tears Solution 1 Drop(s) Both EYES two times a day  bisacodyl 5 milliGRAM(s) Oral at bedtime  chlorhexidine 2% Cloths 1 Application(s) Topical <User Schedule>  enoxaparin Injectable 30 milliGRAM(s) SubCutaneous every 12 hours  famotidine    Tablet 20 milliGRAM(s) Oral every 12 hours  gabapentin 300 milliGRAM(s) Oral every 8 hours  levothyroxine 50 MICROGram(s) Oral daily  loratadine 10 milliGRAM(s) Oral at bedtime  melatonin 3 milliGRAM(s) Oral at bedtime  methocarbamol 1000 milliGRAM(s) Oral every 6 hours  polyethylene glycol 3350 17 Gram(s) Oral daily  predniSONE   Tablet 40 milliGRAM(s) Oral <User Schedule>  senna 2 Tablet(s) Oral at bedtime  tiotropium 2.5 MICROgram(s) Inhaler 2 Puff(s) Inhalation daily    MEDICATIONS  (PRN):  acetaminophen     Tablet .. 650 milliGRAM(s) Oral every 6 hours PRN Mild Pain (1 - 3)  naloxone Injectable 0.1 milliGRAM(s) IV Push every 3 minutes PRN For ANY of the following changes in patient status:  A. RR LESS THAN 10 breaths per minute, B. Oxygen saturation LESS THAN 90%, C. Sedation score of 6  ondansetron Injectable 4 milliGRAM(s) IV Push every 6 hours PRN Nausea and/or Vomiting  oxyCODONE    IR 10 milliGRAM(s) Oral every 4 hours PRN Moderate Pain (4 - 6)          RECENT LABS/IMAGING               05-03    136  |  98  |  9<L>  ----------------------------<  110<H>  4.3   |  31  |  0.5<L>    Ca    9.8      03 May 2023 07:52  Mg     1.8     05-03    TPro  5.5<L>  /  Alb  3.1<L>  /  TBili  0.4  /  DBili  x   /  AST  26  /  ALT  13  /  AlkPhos  228<H>  05-03

## 2023-05-08 DIAGNOSIS — C79.51 SECONDARY MALIGNANT NEOPLASM OF BONE: ICD-10-CM
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
## 2023-05-08 DIAGNOSIS — R09.02 HYPOXEMIA: ICD-10-CM

## 2023-05-08 DIAGNOSIS — E03.9 HYPOTHYROIDISM, UNSPECIFIED: ICD-10-CM

## 2023-05-08 DIAGNOSIS — I16.0 HYPERTENSIVE URGENCY: ICD-10-CM

## 2023-05-08 DIAGNOSIS — M48.02 SPINAL STENOSIS, CERVICAL REGION: ICD-10-CM

## 2023-05-08 DIAGNOSIS — H10.9 UNSPECIFIED CONJUNCTIVITIS: ICD-10-CM

## 2023-05-08 DIAGNOSIS — E83.52 HYPERCALCEMIA: ICD-10-CM

## 2023-05-08 DIAGNOSIS — K70.30 ALCOHOLIC CIRRHOSIS OF LIVER WITHOUT ASCITES: ICD-10-CM

## 2023-05-08 DIAGNOSIS — C50.011 MALIGNANT NEOPLASM OF NIPPLE AND AREOLA, RIGHT FEMALE BREAST: ICD-10-CM

## 2023-05-08 DIAGNOSIS — I97.3 POSTPROCEDURAL HYPERTENSION: ICD-10-CM

## 2023-05-08 DIAGNOSIS — G89.3 NEOPLASM RELATED PAIN (ACUTE) (CHRONIC): ICD-10-CM

## 2023-05-08 DIAGNOSIS — E86.0 DEHYDRATION: ICD-10-CM

## 2023-05-08 DIAGNOSIS — M84.58XA PATHOLOGICAL FRACTURE IN NEOPLASTIC DISEASE, OTHER SPECIFIED SITE, INITIAL ENCOUNTER FOR FRACTURE: ICD-10-CM

## 2023-05-08 DIAGNOSIS — F10.21 ALCOHOL DEPENDENCE, IN REMISSION: ICD-10-CM

## 2023-05-08 DIAGNOSIS — M53.2X1 SPINAL INSTABILITIES, OCCIPITO-ATLANTO-AXIAL REGION: ICD-10-CM

## 2023-05-08 DIAGNOSIS — M40.292 OTHER KYPHOSIS, CERVICAL REGION: ICD-10-CM

## 2023-05-08 DIAGNOSIS — G95.29 OTHER CORD COMPRESSION: ICD-10-CM

## 2023-05-08 DIAGNOSIS — R00.0 TACHYCARDIA, UNSPECIFIED: ICD-10-CM

## 2023-05-08 LAB
ANISOCYTOSIS BLD QL: SLIGHT — SIGNIFICANT CHANGE UP
BASOPHILS # BLD AUTO: 0 K/UL — SIGNIFICANT CHANGE UP (ref 0–0.2)
BASOPHILS NFR BLD AUTO: 0 % — SIGNIFICANT CHANGE UP (ref 0–1)
EOSINOPHIL # BLD AUTO: 0 K/UL — SIGNIFICANT CHANGE UP (ref 0–0.7)
EOSINOPHIL NFR BLD AUTO: 0 % — SIGNIFICANT CHANGE UP (ref 0–8)
GIANT PLATELETS BLD QL SMEAR: PRESENT — SIGNIFICANT CHANGE UP
HCT VFR BLD CALC: 29.9 % — LOW (ref 37–47)
HGB BLD-MCNC: 9.2 G/DL — LOW (ref 12–16)
HYPOCHROMIA BLD QL: SLIGHT — SIGNIFICANT CHANGE UP
LYMPHOCYTES # BLD AUTO: 0.96 K/UL — LOW (ref 1.2–3.4)
LYMPHOCYTES # BLD AUTO: 9.6 % — LOW (ref 20.5–51.1)
MANUAL SMEAR VERIFICATION: SIGNIFICANT CHANGE UP
MCHC RBC-ENTMCNC: 27.2 PG — SIGNIFICANT CHANGE UP (ref 27–31)
MCHC RBC-ENTMCNC: 30.8 G/DL — LOW (ref 32–37)
MCV RBC AUTO: 88.5 FL — SIGNIFICANT CHANGE UP (ref 81–99)
MICROCYTES BLD QL: SLIGHT — SIGNIFICANT CHANGE UP
MONOCYTES # BLD AUTO: 0.43 K/UL — SIGNIFICANT CHANGE UP (ref 0.1–0.6)
MONOCYTES NFR BLD AUTO: 4.3 % — SIGNIFICANT CHANGE UP (ref 1.7–9.3)
MYELOCYTES NFR BLD: 1.7 % — HIGH (ref 0–0)
NEUTROPHILS # BLD AUTO: 8.45 K/UL — HIGH (ref 1.4–6.5)
NEUTROPHILS NFR BLD AUTO: 84.4 % — HIGH (ref 42.2–75.2)
PLAT MORPH BLD: NORMAL — SIGNIFICANT CHANGE UP
PLATELET # BLD AUTO: 425 K/UL — HIGH (ref 130–400)
PMV BLD: 9.3 FL — SIGNIFICANT CHANGE UP (ref 7.4–10.4)
POLYCHROMASIA BLD QL SMEAR: SLIGHT — SIGNIFICANT CHANGE UP
RBC # BLD: 3.38 M/UL — LOW (ref 4.2–5.4)
RBC # FLD: 14.1 % — SIGNIFICANT CHANGE UP (ref 11.5–14.5)
RBC BLD AUTO: ABNORMAL
WBC # BLD: 10.01 K/UL — SIGNIFICANT CHANGE UP (ref 4.8–10.8)
WBC # FLD AUTO: 10.01 K/UL — SIGNIFICANT CHANGE UP (ref 4.8–10.8)

## 2023-05-08 RX ADMIN — FAMOTIDINE 20 MILLIGRAM(S): 10 INJECTION INTRAVENOUS at 06:47

## 2023-05-08 RX ADMIN — Medication 50 MICROGRAM(S): at 06:48

## 2023-05-08 RX ADMIN — ALBUTEROL 2 PUFF(S): 90 AEROSOL, METERED ORAL at 13:21

## 2023-05-08 RX ADMIN — METHOCARBAMOL 1000 MILLIGRAM(S): 500 TABLET, FILM COATED ORAL at 17:37

## 2023-05-08 RX ADMIN — ENOXAPARIN SODIUM 30 MILLIGRAM(S): 100 INJECTION SUBCUTANEOUS at 06:48

## 2023-05-08 RX ADMIN — POLYETHYLENE GLYCOL 3350 17 GRAM(S): 17 POWDER, FOR SOLUTION ORAL at 15:32

## 2023-05-08 RX ADMIN — Medication 1 DROP(S): at 06:54

## 2023-05-08 RX ADMIN — METHOCARBAMOL 1000 MILLIGRAM(S): 500 TABLET, FILM COATED ORAL at 11:56

## 2023-05-08 RX ADMIN — ALBUTEROL 2 PUFF(S): 90 AEROSOL, METERED ORAL at 07:44

## 2023-05-08 RX ADMIN — GABAPENTIN 300 MILLIGRAM(S): 400 CAPSULE ORAL at 22:46

## 2023-05-08 RX ADMIN — OXYCODONE HYDROCHLORIDE 10 MILLIGRAM(S): 5 TABLET ORAL at 08:41

## 2023-05-08 RX ADMIN — Medication 3 MILLIGRAM(S): at 22:48

## 2023-05-08 RX ADMIN — OXYCODONE HYDROCHLORIDE 10 MILLIGRAM(S): 5 TABLET ORAL at 14:00

## 2023-05-08 RX ADMIN — Medication 650 MILLIGRAM(S): at 16:37

## 2023-05-08 RX ADMIN — CHLORHEXIDINE GLUCONATE 1 APPLICATION(S): 213 SOLUTION TOPICAL at 06:54

## 2023-05-08 RX ADMIN — Medication 1 DROP(S): at 17:38

## 2023-05-08 RX ADMIN — FAMOTIDINE 20 MILLIGRAM(S): 10 INJECTION INTRAVENOUS at 17:38

## 2023-05-08 RX ADMIN — Medication 650 MILLIGRAM(S): at 15:37

## 2023-05-08 RX ADMIN — METHOCARBAMOL 1000 MILLIGRAM(S): 500 TABLET, FILM COATED ORAL at 06:48

## 2023-05-08 RX ADMIN — OXYCODONE HYDROCHLORIDE 10 MILLIGRAM(S): 5 TABLET ORAL at 13:34

## 2023-05-08 RX ADMIN — Medication 650 MILLIGRAM(S): at 06:47

## 2023-05-08 RX ADMIN — OXYCODONE HYDROCHLORIDE 10 MILLIGRAM(S): 5 TABLET ORAL at 03:18

## 2023-05-08 RX ADMIN — Medication 40 MILLIGRAM(S): at 22:47

## 2023-05-08 RX ADMIN — OXYCODONE HYDROCHLORIDE 10 MILLIGRAM(S): 5 TABLET ORAL at 22:58

## 2023-05-08 RX ADMIN — OXYCODONE HYDROCHLORIDE 10 MILLIGRAM(S): 5 TABLET ORAL at 23:45

## 2023-05-08 RX ADMIN — LORATADINE 10 MILLIGRAM(S): 10 TABLET ORAL at 22:47

## 2023-05-08 RX ADMIN — ALBUTEROL 2 PUFF(S): 90 AEROSOL, METERED ORAL at 20:23

## 2023-05-08 RX ADMIN — GABAPENTIN 300 MILLIGRAM(S): 400 CAPSULE ORAL at 15:31

## 2023-05-08 RX ADMIN — GABAPENTIN 300 MILLIGRAM(S): 400 CAPSULE ORAL at 06:47

## 2023-05-08 RX ADMIN — OXYCODONE HYDROCHLORIDE 10 MILLIGRAM(S): 5 TABLET ORAL at 08:11

## 2023-05-08 RX ADMIN — ENOXAPARIN SODIUM 30 MILLIGRAM(S): 100 INJECTION SUBCUTANEOUS at 17:38

## 2023-05-08 NOTE — PROGRESS NOTE ADULT - ATTENDING COMMENTS
I reviewed the chart and examined the patient with the resident and we discussed the findings and treatment plan.  The patient is tolerating the rehab program well. I agree with the findings and treatment plan above, which I modified as indicated. The patient requires 3 hrs a day of acute inpatient rehab. No new complaints. VSS. Labs noted - stable. Neuro exam stable. No significant pain. Ambulates with CG/ PA with RW. Continue acte rehab.    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   - MRI spine: Numerous enhancing osseous metastatic lesions are noted throughout the cervical/thoracic/lumbar spine (most significant at the C2 and C3 level with evidence of  epidural extension causing severe spinal stenosis and mass effect). Malignant compression  deformity noted of the T8 vertebral body   - Neurosurgery Eval 4/22: Needs Hard collar [place] J when OOB  - Oncology Consulted: will need biopsy before additional tx plan recs, also MRI brain w contrast to r/u Mets   - Radonc consulted 4/27: will need radiation to the C spine (no sooner than 2 weeks after surgery). To f/u as outpatient after d/c  -Hemovac discontinued 4/27  -Per Neurosurgery: plan was for [place] J cervical collar when OOB but collars available do not fit so soft collar to be worn when OOB - confirmed with Neurosurgery team.   -Pain medications: previously evaluated by pain management. Gabapentin 300mg PO q8h, Tylenol 650mg q6h PRN mild pain, Oxycodone 10mg PO q4h PRN moderate pain, robaxin 1000mg q6hr  -following with Dr. Peña outpatient    #Dysphagia  - mild, since surgery  - regular diet    #s/p Hypertension  - resolving  - d/c Labetalol, which also may be making her asthma worse - BP in good range    #Hypoxemia/ Asthma; improving  - CXR negative, PE r/o on V/Q low probability of PE  on RA this AM doing well  - Patient was dropping to 85- 87% with ambulation and pulse increases to 120  - Started short course of Prednisone, standing Nebs and Spiriva and doing well off O2    #Hypercalcemia likely 2/2 malignancy  #Elevated AlkPhos likely 2/2 malignancy  - resolved s/p IVF   -Will monitor BMP and trend electrolytes     # hypothyroidism   - TSH (4/21) WNL   - c/w levothyroxine 50mcg OD    #Maintenance   - Pain control: as above  - GI/Bowel Mgmt: no issues at this time, monitor for BMs.   - Bladder management: voiding freely, no issues at this time.   - Skin: monitor cervical surgical wound, Neurosurgery f/u as needed   - FEN: Hypercalcemia resolved as above. Monitor electrolytes and fluid status.   - Diet: DASH/TLC easy to chew diet

## 2023-05-08 NOTE — PROGRESS NOTE ADULT - ASSESSMENT
57F with PMH hypothyroidism, suspected alcoholic cirrhosis (stopped drinking over 9 yrs ago), Presented 4/20 for neck pain w/ limited movement /2 pain x4 months. Admitted for management of suspected breast CA with metastases to the spine now s/p Occiput-T2 instrumentation and fusion with ORIF of C2.    Prior to current illness and functional decline, patient was independent with ADLs and ambulation. Patient now requires assistance with ambulation and ADLs 2/2 neck/back pain, gait dysfunction, and deconditioning. Patient also with medical comorbidities of hypothyroidism, sinus tachycardia, hypertensive urgency and h/o cirrhosis, requiring medication management and oversight by Physiatry team and nursing. May also need specialist consulting from Neurosurgery. Patient is a complex medical case with mixed Neurosurgical, oncological, and medical issues. There are significant comorbidities which warrants acute rehab hospitalization. To return home it is in the patient’s best interest to have acute inpatient rehabilitative services to undergo intensive interdisciplinary therapy. Furthermore, the patient is motivated and is able to tolerate up to 3 hours of daily therapy for 5-6 days a week for a total of 15 hours a week. Evaluated by Physiatry and deemed to be a good candidate for admission to  for acute inpatient rehab. Admitted to Acute Rehab on 4/28/23.     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   - MRI spine: Numerous enhancing osseous metastatic lesions are noted throughout the cervical/thoracic/lumbar spine (most significant at the C2 and C3 level with evidence of  epidural extension causing severe spinal stenosis and mass effect). Malignant compression  deformity noted of the T8 vertebral body   - Neurosurgery Eval 4/22: Needs Hard collar [place] J when OOB  - Oncology Consulted: will need biopsy before additional tx plan recs, also MRI brain w contrast to r/u Mets   - Radonc consulted 4/27: will need radiation to the C spine (no sooner than 2 weeks after surgery)  -Hemovac discontinued 4/27  -Per Neurosurgery: plan was for [place] J cervical collar when OOB but collars available do not fit so soft collar to be worn when OOB - confirmed with Neurosurgery team.   -Pain medications: previously evaluated by pain management. Gabapentin 300mg PO q8h, Tylenol 650mg q6h PRN mild pain, Oxycodone 10mg PO q4h PRN moderate pain, robaxin 1000mg q6hr  -following with Dr. Peña outpatient    #Dysphagia  - mild, since surgery  - regular diet    #s/p Hypertension  - resolving  - d/c Labetalol, which also may be making her asthma worse - BP in good range    #Hypoxemia/ Asthma;  - CXR negative, PE r/o on V/Q low probability of PE  On 1L satting in mid 90s this AM  - Patient was dropping to 85- 87% with ambulation and pulse increases to 120  - Started short course of Prednisone, standing Nebs and Spiriva and doing well off O2    #Hypercalcemia likely 2/2 malignancy  #Elevated AlkPhos likely 2/2 malignancy  - resolved s/p IVF   -Will monitor BMP and trend electrolytes     # hypothyroidism   - TSH (4/21) WNL   - c/w levothyroxine 50mcg OD    #Maintenance   - Pain control: as above  - GI/Bowel Mgmt: no issues at this time, monitor for BMs.   - Bladder management: voiding freely, no issues at this time.   - Skin: monitor cervical surgical wound, Neurosurgery f/u as needed   - FEN: Hypercalcemia resolved as above. Monitor electrolytes and fluid status.   - Diet: DASH/TLC easy to chew diet     #Precautions / PROPHYLAXIS:    - Falls  - Ortho: Weight bearing status: WBAT  - Soft Cervical Collar  - DVT prophylaxis: Lovenox 30 SQ daily          57F with PMH hypothyroidism, suspected alcoholic cirrhosis (stopped drinking over 9 yrs ago), Presented 4/20 for neck pain w/ limited movement /2 pain x4 months. Admitted for management of suspected breast CA with metastases to the spine now s/p Occiput-T2 instrumentation and fusion with ORIF of C2.    Prior to current illness and functional decline, patient was independent with ADLs and ambulation. Patient now requires assistance with ambulation and ADLs 2/2 neck/back pain, gait dysfunction, and deconditioning. Patient also with medical comorbidities of hypothyroidism, sinus tachycardia, hypertensive urgency and h/o cirrhosis, requiring medication management and oversight by Physiatry team and nursing. May also need specialist consulting from Neurosurgery. Patient is a complex medical case with mixed Neurosurgical, oncological, and medical issues. There are significant comorbidities which warrants acute rehab hospitalization. To return home it is in the patient’s best interest to have acute inpatient rehabilitative services to undergo intensive interdisciplinary therapy. Furthermore, the patient is motivated and is able to tolerate up to 3 hours of daily therapy for 5-6 days a week for a total of 15 hours a week. Evaluated by Physiatry and deemed to be a good candidate for admission to  for acute inpatient rehab. Admitted to Acute Rehab on 4/28/23.     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   - MRI spine: Numerous enhancing osseous metastatic lesions are noted throughout the cervical/thoracic/lumbar spine (most significant at the C2 and C3 level with evidence of  epidural extension causing severe spinal stenosis and mass effect). Malignant compression  deformity noted of the T8 vertebral body   - Neurosurgery Eval 4/22: Needs Hard collar [place] J when OOB  - Oncology Consulted: will need biopsy before additional tx plan recs, also MRI brain w contrast to r/u Mets   - Radonc consulted 4/27: will need radiation to the C spine (no sooner than 2 weeks after surgery)  -Hemovac discontinued 4/27  -Per Neurosurgery: plan was for [place] J cervical collar when OOB but collars available do not fit so soft collar to be worn when OOB - confirmed with Neurosurgery team.   -Pain medications: previously evaluated by pain management. Gabapentin 300mg PO q8h, Tylenol 650mg q6h PRN mild pain, Oxycodone 10mg PO q4h PRN moderate pain, robaxin 1000mg q6hr  -following with Dr. Peña outpatient    #Dysphagia  - mild, since surgery  - regular diet    #s/p Hypertension  - resolving  - d/c Labetalol, which also may be making her asthma worse - BP in good range    #Hypoxemia/ Asthma; improving  - CXR negative, PE r/o on V/Q low probability of PE  on RA this AM doing well  - Patient was dropping to 85- 87% with ambulation and pulse increases to 120  - Started short course of Prednisone, standing Nebs and Spiriva and doing well off O2    #Hypercalcemia likely 2/2 malignancy  #Elevated AlkPhos likely 2/2 malignancy  - resolved s/p IVF   -Will monitor BMP and trend electrolytes     # hypothyroidism   - TSH (4/21) WNL   - c/w levothyroxine 50mcg OD    #Maintenance   - Pain control: as above  - GI/Bowel Mgmt: no issues at this time, monitor for BMs.   - Bladder management: voiding freely, no issues at this time.   - Skin: monitor cervical surgical wound, Neurosurgery f/u as needed   - FEN: Hypercalcemia resolved as above. Monitor electrolytes and fluid status.   - Diet: DASH/TLC easy to chew diet     #Precautions / PROPHYLAXIS:    - Falls  - Ortho: Weight bearing status: WBAT  - Soft Cervical Collar  - DVT prophylaxis: Lovenox 30 SQ daily          57F with PMH hypothyroidism, suspected alcoholic cirrhosis (stopped drinking over 9 yrs ago), Presented 4/20 for neck pain w/ limited movement /2 pain x4 months. Admitted for management of suspected breast CA with metastases to the spine now s/p Occiput-T2 instrumentation and fusion with ORIF of C2.    Prior to current illness and functional decline, patient was independent with ADLs and ambulation. Patient now requires assistance with ambulation and ADLs 2/2 neck/back pain, gait dysfunction, and deconditioning. Patient also with medical comorbidities of hypothyroidism, sinus tachycardia, hypertensive urgency and h/o cirrhosis, requiring medication management and oversight by Physiatry team and nursing. May also need specialist consulting from Neurosurgery. Patient is a complex medical case with mixed Neurosurgical, oncological, and medical issues. There are significant comorbidities which warrants acute rehab hospitalization. To return home it is in the patient’s best interest to have acute inpatient rehabilitative services to undergo intensive interdisciplinary therapy. Furthermore, the patient is motivated and is able to tolerate up to 3 hours of daily therapy for 5-6 days a week for a total of 15 hours a week. Evaluated by Physiatry and deemed to be a good candidate for admission to  for acute inpatient rehab. Admitted to Acute Rehab on 4/28/23.     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   - MRI spine: Numerous enhancing osseous metastatic lesions are noted throughout the cervical/thoracic/lumbar spine (most significant at the C2 and C3 level with evidence of  epidural extension causing severe spinal stenosis and mass effect). Malignant compression  deformity noted of the T8 vertebral body   - Neurosurgery Eval 4/22: Needs Hard collar [place] J when OOB  - Oncology Consulted: will need biopsy before additional tx plan recs, also MRI brain w contrast to r/u Mets   - Radonc consulted 4/27: will need radiation to the C spine (no sooner than 2 weeks after surgery). To f/u as outpatient after d/c  -Hemovac discontinued 4/27  -Per Neurosurgery: plan was for [place] J cervical collar when OOB but collars available do not fit so soft collar to be worn when OOB - confirmed with Neurosurgery team.   -Pain medications: previously evaluated by pain management. Gabapentin 300mg PO q8h, Tylenol 650mg q6h PRN mild pain, Oxycodone 10mg PO q4h PRN moderate pain, robaxin 1000mg q6hr  -following with Dr. Peña outpatient    #Dysphagia  - mild, since surgery  - regular diet    #s/p Hypertension  - resolving  - d/c Labetalol, which also may be making her asthma worse - BP in good range    #Hypoxemia/ Asthma; improving  - CXR negative, PE r/o on V/Q low probability of PE  on RA this AM doing well  - Patient was dropping to 85- 87% with ambulation and pulse increases to 120  - Started short course of Prednisone, standing Nebs and Spiriva and doing well off O2    #Hypercalcemia likely 2/2 malignancy  #Elevated AlkPhos likely 2/2 malignancy  - resolved s/p IVF   -Will monitor BMP and trend electrolytes     # hypothyroidism   - TSH (4/21) WNL   - c/w levothyroxine 50mcg OD    #Maintenance   - Pain control: as above  - GI/Bowel Mgmt: no issues at this time, monitor for BMs.   - Bladder management: voiding freely, no issues at this time.   - Skin: monitor cervical surgical wound, Neurosurgery f/u as needed   - FEN: Hypercalcemia resolved as above. Monitor electrolytes and fluid status.   - Diet: DASH/TLC easy to chew diet     #Precautions / PROPHYLAXIS:    - Falls  - Ortho: Weight bearing status: WBAT  - Soft Cervical Collar  - DVT prophylaxis: Lovenox 30 SQ daily

## 2023-05-08 NOTE — CHART NOTE - NSCHARTNOTEFT_GEN_A_CORE
Registered Dietitian Follow-Up     Patient Profile Reviewed                           Yes [x]   No []     Nutrition History Previously Obtained        Yes [x]  No []       Pertinent Subjective Information: Pt reports significant improvement in appetite/ PO intake since admission (quantifies improvement by 90%). States she consumed >/=75% of breakfast this morning. Denies difficulty chewing/ swallowing on current diet, reports ordering soft foods. Noted SLP recommended easy to chew with thin liquids, last documented 4/26.       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 Admitted to Acute Rehab on 4/28/23.      Diet order: Diet, Soft and Bite Sized (04-29-23 @ 22:25) [Pending Verification By Attending]  Diet, Regular (04-28-23 @ 14:27) [Active]     Anthropometrics:  - Ht. 162.6 cm (5'4")  - Wt. 92.3 kg (203.4 lbs) documented on 4/28/23   - %wt change: no new weights to assess; unable to obtain   - BMI 34.9 kg/m^2   - IBW 55 kg (120 lbs)       Pertinent Lab Data: 5/8- Hgb 9.2L, Hct 29.9L 5/4- BUN 7L, Cre 0.5L, BG 106H, Alk Phos 246H      Pertinent Meds: prednisone, dulcolax, pepcid, synthroid, zofran prn, miralax, senna      Physical Findings:  - Appearance: no overt signs of muscle or fat wasting   - GI function: no BM documented per flowsheets; pt reports last BM Monday, bowel regimen ordered   - Tubes: N/A   - Oral/Mouth cavity: tolerating diet   - Skin: surgical incision; no pressure injuries noted      Nutrition Requirements  Weight Used: ABW 92.3 kg for energy and fluids; IBW 55 kg for protein      Estimated Energy Needs    Continue [x]  Adjust []  Adjusted Energy Recommendations:  8874-5921 kcal/day (15-20 kcal/kg)         Estimated Protein Needs    Continue [x]  Adjust []  Adjusted Protein Recommendations:  66-83 gm/day (1.2-1.5 gm/kg)         Estimated Fluid Needs        Continue [x]  Adjust []  Adjusted Fluid Recommendations:  6559-9058 mL/day (1mL/kcal)      Nutrient Intake: pt consuming >/=75% of meals        [] Previous Nutrition Diagnosis: Chewing/ swallowing difficulty related to neck surgery as evidenced by need for soft diet             [x] Ongoing          [] Resolved       Nutrition Intervention:     Goal/Expected Outcome: Pt to consume >/=75% of meals without s/s aspiration or unintentional weight changes within 5-7 days   Pt is at moderate nutrition risk, RD to f/i in 5-7 days      Indicator/Monitoring: PO intake, GI function, chewing/swallowing function, biochemical data and weight trends Registered Dietitian Follow-Up     Patient Profile Reviewed                           Yes [x]   No []     Nutrition History Previously Obtained        Yes [x]  No []       Pertinent Subjective Information: Pt reports significant improvement in appetite/ PO intake since admission (quantifies improvement by 90%). States she consumed >/=75% of breakfast this morning. Denies difficulty chewing/ swallowing on current diet, reports ordering soft foods. Noted SLP recommended easy to chew with thin liquids, last documented 4/26.       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 Admitted to Acute Rehab on 4/28/23.      Diet order: Diet, Soft and Bite Sized (04-29-23 @ 22:25) [Pending Verification By Attending]  Diet, Regular (04-28-23 @ 14:27) [Active]     Anthropometrics:  - Ht. 162.6 cm (5'4")  - Wt. 92.3 kg (203.4 lbs) documented on 4/28/23   - %wt change: no new weights to assess; unable to obtain   - BMI 34.9 kg/m^2   - IBW 55 kg (120 lbs)       Pertinent Lab Data: 5/8- Hgb 9.2L, Hct 29.9L 5/4- BUN 7L, Cre 0.5L, BG 106H, Alk Phos 246H      Pertinent Meds: prednisone, dulcolax, pepcid, synthroid, zofran prn, miralax, senna      Physical Findings:  - Appearance: no overt signs of muscle or fat wasting   - GI function: no BM documented per flowsheets; pt reports last BM Monday, bowel regimen ordered   - Tubes: N/A   - Oral/Mouth cavity: tolerating diet   - Skin: surgical incision; no pressure injuries noted      Nutrition Requirements  Weight Used: ABW 92.3 kg for energy and fluids; IBW 55 kg for protein      Estimated Energy Needs    Continue [x]  Adjust []  Adjusted Energy Recommendations:  0752-0486 kcal/day (15-20 kcal/kg)         Estimated Protein Needs    Continue [x]  Adjust []  Adjusted Protein Recommendations:  66-83 gm/day (1.2-1.5 gm/kg)         Estimated Fluid Needs        Continue [x]  Adjust []  Adjusted Fluid Recommendations:  7049-0266 mL/day (1mL/kcal)      Nutrient Intake: pt consuming >/=75% of meals        [] Previous Nutrition Diagnosis: Chewing/ swallowing difficulty related to neck surgery as evidenced by need for soft diet             [x] Ongoing          [] Resolved       Nutrition Intervention:  1. Diet Modification: continue current diet order - monitor SLP recommendations for diet advancement as able ***      Goal/Expected Outcome: Pt to consume >/=75% of meals without s/s aspiration or unintentional weight changes within 5-7 days   Pt is at moderate nutrition risk, RD to f/i in 5-7 days      Indicator/Monitoring: PO intake, GI function, chewing/swallowing function, biochemical data and weight trends Registered Dietitian Follow-Up     Patient Profile Reviewed                           Yes [x]   No []     Nutrition History Previously Obtained        Yes [x]  No []       Pertinent Subjective Information: Pt reports significant improvement in appetite/ PO intake since admission (quantifies improvement by 90%). States she consumed >/=75% of breakfast this morning. Denies difficulty chewing/ swallowing on current diet, reports ordering soft foods. Noted SLP recommended easy to chew with thin liquids, last documented 4/26.       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 Admitted to Acute Rehab on 4/28/23.      Diet order: Diet, Soft and Bite Sized (04-29-23 @ 22:25) [Pending Verification By Attending]  Diet, Regular (04-28-23 @ 14:27) [Active]     Anthropometrics:  - Ht. 162.6 cm (5'4")  - Wt. 92.3 kg (203.4 lbs) documented on 4/28/23   - %wt change: no new weights to assess; unable to obtain   - BMI 34.9 kg/m^2   - IBW 55 kg (120 lbs)       Pertinent Lab Data: 5/8- Hgb 9.2L, Hct 29.9L 5/4- BUN 7L, Cre 0.5L, BG 106H, Alk Phos 246H      Pertinent Meds: prednisone, dulcolax, pepcid, synthroid, zofran prn, miralax, senna      Physical Findings:  - Appearance: no overt signs of muscle or fat wasting; alert & oriented   - GI function: no BM documented per flowsheets; pt reports last BM Monday, bowel regimen ordered   - Tubes: N/A   - Oral/Mouth cavity: tolerating diet   - Skin: surgical incision; no pressure injuries noted      Nutrition Requirements  Weight Used: ABW 92.3 kg for energy and fluids; IBW 55 kg for protein      Estimated Energy Needs    Continue [x]  Adjust []  Adjusted Energy Recommendations:  8484-3691 kcal/day (15-20 kcal/kg)         Estimated Protein Needs    Continue [x]  Adjust []  Adjusted Protein Recommendations:  66-83 gm/day (1.2-1.5 gm/kg)         Estimated Fluid Needs        Continue [x]  Adjust []  Adjusted Fluid Recommendations:  9747-7656 mL/day (1mL/kcal)      Nutrient Intake: pt consuming >/=75% of meals        [] Previous Nutrition Diagnosis: Chewing/ swallowing difficulty related to neck surgery as evidenced by need for soft diet             [x] Ongoing          [] Resolved       Nutrition Intervention:  1. Diet Modification: continue current diet order - monitor SLP recommendations for diet advancement as able      Goal/Expected Outcome: Pt to consume >/=75% of meals without s/s aspiration or unintentional weight changes within 7-10 days   Pt is at low nutrition risk, RD to f/i in 7-10 days      Indicator/Monitoring: PO intake, GI function, chewing/swallowing function, biochemical data and weight trends

## 2023-05-08 NOTE — PROGRESS NOTE ADULT - SUBJECTIVE AND OBJECTIVE BOX
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 alcoholic cirrhosis (stopped drinking over 9 yrs ago), presenting for neck pain on 4/20. Pt started having neck pain almost 4 months ago for which her chiropractor ordered an MRI that was done 1 month ago. Pt was not sure what the read of the MRI was but there was concern about lesions so she went to see Dr. Peña's office for the first time. Dr. Peña examined her and told her she has breast cancer with metastasis and needs to come to the ED for further evaluation. Pt is a non smoker and has no family history of breast Ca. Denies any significant weight loss, night sweats, or fevers. No reported bloody discharge from her nipples. Upon examining pt's right breast, dimpling with a lump was found. Pt believes that she noticed this change almost over a yr a go, but did not think of it was anything serious. Pt's neck pain has been significantly worsening limiting her ability to move her neck. She only has pain when moving her neck and has full sensation and ROM of her b/l UE. Denies any SOB, chest pain, or palpitations. States that she has pain with ROM, and states that it is worse with movement ex moving out of bed. Patient states that pain is mostly midline at the base of head, with radiation to her paraspinal muscles. Denies any radicular pain to UE & LE. States that she has been sleeping only in a recliner and has since then been developing worsening lower back pain. Denies any ataxia, imbalance, or difficulty with ambulation. Denies any numbness, tingling, paresthesias, weakness, saddle anesthesia, or bowel / bladder incontinence. Neurosurgery was consulted. On 4.24.23 she was taken to the operating room where she underwent an Occiput to T2 instrumentation and Fusion, ORIF of C2. She had a hemovac drain placed intraop which was removed on POD#3. She was on a PCA pump postop which was removed on POD#3. She remained neurologically stable postop.      Patient is tolerating bedside PT, OT, and SLP. The patient was evaluated by the PM&R team once medically stable. The patient was found to have functional limitations in terms of muscle strength, endurance, physical mobility, ability to carry out activities of daily living including: self-care, transfers, and ambulation. Participating with bedside therapeutic exercises and cognitive retraining. The patient is motivated and is able to tolerate up to 3 hours of daily therapy for 5-6 days a week for a total of 15 hours a week. Evaluated by Physiatry and deemed to be a good candidate for admission to  for acute inpatient rehab. Admitted to Acute Rehab on 4/28/23.    PMHx/PSHx: as above  SHx: history of alcohol use disorder - quit 9 years ago, denies smoking and illicit drug use   FHx: non-contributory   Allergies: NKDA  Prior level of function: independent with ambulation and ADLs without assistive device although had been using straight cane for a few months and the last 2 weeks prior to presentation reports being essentially chair bound due to neck/back pain and difficulty with mobility.   Living situation: Pt resides with  in 2nd floor apt using 8 steps to enter and a flight of stairs to access.   Current level of function:  SLP 4/26: Some complaints of globus w/ solids Recommend: continue easy to chew w/ thin  PT 4/28: max assist for bed mobility, mod assist for transfers, min assist for ambulation 40ft with RW  OT 4/27: dependent for lower body dressing    (28 Apr 2023 14:46)      TODAY'S SUBJECTIVE & REVIEW OF SYMPTOMS:  No acute events reported overnight   Patient was seen and examined at bedside. In NAD  Pt having regular bowel movements and voiding bladder spontaneously.  Pt on RA with no acute symptoms or concerns.  vitals/labwork reviewed.    CLOF: dependent BM, modA transfers, PA 50'x2 RW    Review of Systems:  Constitutional:    [  ] WNL           [   ] insomnia   [   ] tired  [ x ] recent weight loss and poor appetite - reporting depressed mood at home   Cardio:             [ x ] WNL           [   ] CP   [   ] SANCHEZ   [   ] palpitations         Resp:                [ x ] WNL           [   ] SOB   [   ] cough   [   ] wheezing  GI:                    [ x ] WNL           [   ] constipation   [   ] diarrhea   [   ] abdominal pain   [   ] nausea   [   ] emesis    :                   [ x ] WNL           [   ] SILVA  [   ] dysuria   [   ] difficulty voiding            Endo:                [ x ] WNL          [   ] polyuria   [   ] temperature intolerance                Skin:                  [  ] WNL          [   ] pain   [ x  ] Surgical incision along cervical spine, left neck/ lower face pain    ] rash  MSK:                 [  ] WNL          [   ] muscle pain   [ x  ] pain midline neck   [   ] muscle tenderness   [   ] swelling  Neuro:              [  ] WNL          [   ] HA   [   ] change in vision   [   ] tremor   [   ] weakness   [  x  ]dysphagia             Cognitive:          [ x ] WNL          [   ]confusion     Psych:                [ x ] WNL          [   ] hallucinations   [   ]agitation   [   ] delusion   [   ]depression    PHYSICAL EXAM  Vital Signs (24 Hrs):  T(C): 35.9 (05-08-23 @ 05:41), Max: 36.6 (05-07-23 @ 20:47)  HR: 97 (05-08-23 @ 05:41) (97 - 111)  BP: 139/72 (05-08-23 @ 05:41) (124/60 - 139/72)  RR: 18 (05-08-23 @ 05:41) (18 - 18)      General:[ x ] NAD, Resting Comfortable,   [   ] other:                                HEENT: [ x ] NC/AT, EOMI, PERRL , Normal Conjunctivae,   [   ] other:  Cardio: [ x ] RRR, no murmer,   [   ] other:                              Pulm: [ x ] No Respiratory Distress,  Lungs CTAB,   [   ] other:                       Abdomen: [ x ]ND/NT, Soft,   [   ] other:    : [ x ] NO SILVA CATHETER, [   ] SILVA CATHETER- no meatal tear, no discharge, [   ] other:                                            MSK: [  ] No joint swelling, Full ROM,   [ x  ] other:  Surgical incision along cervical spine c/d/i.                                      Ext: [ x ]No C/C/E, No calf tenderness,   [   ]other:    Skin: [  ]intact,   [ x  ] other: Surgical incision along cervical spine c/d/i.                                                                  Neurological Examination:  Cognitive: [  x  ] AAO x 3,   [    ]  other: A&Ox3. Oriented to person, place and time. Follows 2 step commands, language components intact. No aphasia.   Attention:  [  x  ] intact,   [    ]  other:  intact spelling and simple calculations                Memory: [  x  ] intact,    [    ]  other: intact registration and recall    Mood/Affect: [  x  ] wnl,    [    ]  other:                                                                             Communication: [  x  ]Fluent, no dysarthria, following commands:  [    ] other:   CN II - XII:  [  x  ] intact,  [    ] other:                                                                                        Motor:   RIGHT UE: [  ] WNL,  [ x  ] other: 3-/5 shoulder abduction limited by neck/back pain, 4/5 elbow flexion/extension, 5/5 hand   LEFT    UE: [  ] WNL,  [  x ] other: 3-/5 shoulder abduction limited by neck/back pain, 4/5 elbow flexion/extension, 5/5 hand   RIGHT LE: [ x ] WNL,  [   ] other: 5/5 hip flexion, knee flexion/extension, ankle dorsiflexion/plantarflexion  LEFT    LE: [ x ] WNL,  [   ] other: 5/5 hip flexion, knee flexion/extension, ankle dorsiflexion/plantarflexion    Tone: [  x  ] wnl,   [    ]  other:  DTRs: [ x  ]symmetric, [   ] other:  Coordination:   [ x  ] intact,   [    ] other:                                                                           Sensory: [ x  ] Intact to light touch,   [    ] other:    Clonus negative        MEDICATIONS  (STANDING):  albuterol    90 MICROgram(s) HFA Inhaler 2 Puff(s) Inhalation every 6 hours  artificial  tears Solution 1 Drop(s) Both EYES two times a day  bisacodyl 5 milliGRAM(s) Oral at bedtime  chlorhexidine 2% Cloths 1 Application(s) Topical <User Schedule>  enoxaparin Injectable 30 milliGRAM(s) SubCutaneous every 12 hours  famotidine    Tablet 20 milliGRAM(s) Oral every 12 hours  gabapentin 300 milliGRAM(s) Oral every 8 hours  levothyroxine 50 MICROGram(s) Oral daily  loratadine 10 milliGRAM(s) Oral at bedtime  melatonin 3 milliGRAM(s) Oral at bedtime  methocarbamol 1000 milliGRAM(s) Oral every 6 hours  polyethylene glycol 3350 17 Gram(s) Oral daily  predniSONE   Tablet 40 milliGRAM(s) Oral <User Schedule>  senna 2 Tablet(s) Oral at bedtime  tiotropium 2.5 MICROgram(s) Inhaler 2 Puff(s) Inhalation daily    MEDICATIONS  (PRN):  acetaminophen     Tablet .. 650 milliGRAM(s) Oral every 6 hours PRN Mild Pain (1 - 3)  naloxone Injectable 0.1 milliGRAM(s) IV Push every 3 minutes PRN For ANY of the following changes in patient status:  A. RR LESS THAN 10 breaths per minute, B. Oxygen saturation LESS THAN 90%, C. 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 alcoholic cirrhosis (stopped drinking over 9 yrs ago), presenting for neck pain on 4/20. Pt started having neck pain almost 4 months ago for which her chiropractor ordered an MRI that was done 1 month ago. Pt was not sure what the read of the MRI was but there was concern about lesions so she went to see Dr. Peña's office for the first time. Dr. Peña examined her and told her she has breast cancer with metastasis and needs to come to the ED for further evaluation. Pt is a non smoker and has no family history of breast Ca. Denies any significant weight loss, night sweats, or fevers. No reported bloody discharge from her nipples. Upon examining pt's right breast, dimpling with a lump was found. Pt believes that she noticed this change almost over a yr a go, but did not think of it was anything serious. Pt's neck pain has been significantly worsening limiting her ability to move her neck. She only has pain when moving her neck and has full sensation and ROM of her b/l UE. Denies any SOB, chest pain, or palpitations. States that she has pain with ROM, and states that it is worse with movement ex moving out of bed. Patient states that pain is mostly midline at the base of head, with radiation to her paraspinal muscles. Denies any radicular pain to UE & LE. States that she has been sleeping only in a recliner and has since then been developing worsening lower back pain. Denies any ataxia, imbalance, or difficulty with ambulation. Denies any numbness, tingling, paresthesias, weakness, saddle anesthesia, or bowel / bladder incontinence. Neurosurgery was consulted. On 4.24.23 she was taken to the operating room where she underwent an Occiput to T2 instrumentation and Fusion, ORIF of C2. She had a hemovac drain placed intraop which was removed on POD#3. She was on a PCA pump postop which was removed on POD#3. She remained neurologically stable postop.      Patient is tolerating bedside PT, OT, and SLP. The patient was evaluated by the PM&R team once medically stable. The patient was found to have functional limitations in terms of muscle strength, endurance, physical mobility, ability to carry out activities of daily living including: self-care, transfers, and ambulation. Participating with bedside therapeutic exercises and cognitive retraining. The patient is motivated and is able to tolerate up to 3 hours of daily therapy for 5-6 days a week for a total of 15 hours a week. Evaluated by Physiatry and deemed to be a good candidate for admission to  for acute inpatient rehab. Admitted to Acute Rehab on 4/28/23.    PMHx/PSHx: as above  SHx: history of alcohol use disorder - quit 9 years ago, denies smoking and illicit drug use   FHx: non-contributory   Allergies: NKDA  Prior level of function: independent with ambulation and ADLs without assistive device although had been using straight cane for a few months and the last 2 weeks prior to presentation reports being essentially chair bound due to neck/back pain and difficulty with mobility.   Living situation: Pt resides with  in 2nd floor apt using 8 steps to enter and a flight of stairs to access.   Current level of function:  SLP 4/26: Some complaints of globus w/ solids Recommend: continue easy to chew w/ thin  PT 4/28: max assist for bed mobility, mod assist for transfers, min assist for ambulation 40ft with RW  OT 4/27: dependent for lower body dressing    (28 Apr 2023 14:46)      TODAY'S SUBJECTIVE & REVIEW OF SYMPTOMS:  No acute events reported overnight   Patient was seen and examined at bedside. In NAD  Pt having regular bowel movements and voiding bladder spontaneously.  Pt on RA with no acute symptoms or concerns.  vitals/labwork reviewed.    CLOF: supervision bed mobility, PA transfers, ' with RW    Review of Systems:  Constitutional:    [  ] WNL           [   ] insomnia   [   ] tired  [ x ] recent weight loss and poor appetite - reporting depressed mood at home   Cardio:             [ x ] WNL           [   ] CP   [   ] SANCHEZ   [   ] palpitations         Resp:                [ x ] WNL           [   ] SOB   [   ] cough   [   ] wheezing  GI:                    [ x ] WNL           [   ] constipation   [   ] diarrhea   [   ] abdominal pain   [   ] nausea   [   ] emesis    :                   [ x ] WNL           [   ] SILVA  [   ] dysuria   [   ] difficulty voiding            Endo:                [ x ] WNL          [   ] polyuria   [   ] temperature intolerance                Skin:                  [  ] WNL          [   ] pain   [ x  ] Surgical incision along cervical spine, left neck/ lower face pain    ] rash  MSK:                 [  ] WNL          [   ] muscle pain   [ x  ] pain midline neck   [   ] muscle tenderness   [   ] swelling  Neuro:              [  ] WNL          [   ] HA   [   ] change in vision   [   ] tremor   [   ] weakness   [  x  ]dysphagia             Cognitive:          [ x ] WNL          [   ]confusion     Psych:                [ x ] WNL          [   ] hallucinations   [   ]agitation   [   ] delusion   [   ]depression    PHYSICAL EXAM  Vital Signs (24 Hrs):  T(C): 35.9 (05-08-23 @ 05:41), Max: 36.6 (05-07-23 @ 20:47)  HR: 97 (05-08-23 @ 05:41) (97 - 111)  BP: 139/72 (05-08-23 @ 05:41) (124/60 - 139/72)  RR: 18 (05-08-23 @ 05:41) (18 - 18)      General:[ x ] NAD, Resting Comfortable,   [   ] other:                                HEENT: [ x ] NC/AT, EOMI, PERRL , Normal Conjunctivae,   [   ] other:  Cardio: [ x ] RRR, no murmer,   [   ] other:                              Pulm: [ x ] No Respiratory Distress,  Lungs CTAB,   [   ] other:                       Abdomen: [ x ]ND/NT, Soft,   [   ] other:    : [ x ] NO SILVA CATHETER, [   ] SILVA CATHETER- no meatal tear, no discharge, [   ] other:                                            MSK: [  ] No joint swelling, Full ROM,   [ x  ] other:  Surgical incision along cervical spine c/d/i.                                      Ext: [ x ]No C/C/E, No calf tenderness,   [   ]other:    Skin: [  ]intact,   [ x  ] other: Surgical incision along cervical spine c/d/i.                                                                  Neurological Examination:  Cognitive: [  x  ] AAO x 3,   [    ]  other: A&Ox3. Oriented to person, place and time. Follows 2 step commands, language components intact. No aphasia.   Attention:  [  x  ] intact,   [    ]  other:  intact spelling and simple calculations                Memory: [  x  ] intact,    [    ]  other: intact registration and recall    Mood/Affect: [  x  ] wnl,    [    ]  other:                                                                             Communication: [  x  ]Fluent, no dysarthria, following commands:  [    ] other:   CN II - XII:  [  x  ] intact,  [    ] other:                                                                                        Motor:   RIGHT UE: [  ] WNL,  [ x  ] other: 3-/5 shoulder abduction limited by neck/back pain, 4/5 elbow flexion/extension, 5/5 hand   LEFT    UE: [  ] WNL,  [  x ] other: 3-/5 shoulder abduction limited by neck/back pain, 4/5 elbow flexion/extension, 5/5 hand   RIGHT LE: [ x ] WNL,  [   ] other: 5/5 hip flexion, knee flexion/extension, ankle dorsiflexion/plantarflexion  LEFT    LE: [ x ] WNL,  [   ] other: 5/5 hip flexion, knee flexion/extension, ankle dorsiflexion/plantarflexion    Tone: [  x  ] wnl,   [    ]  other:  DTRs: [ x  ]symmetric, [   ] other:  Coordination:   [ x  ] intact,   [    ] other:                                                                           Sensory: [ x  ] Intact to light touch,   [    ] other:    Clonus negative        MEDICATIONS  (STANDING):  albuterol    90 MICROgram(s) HFA Inhaler 2 Puff(s) Inhalation every 6 hours  artificial  tears Solution 1 Drop(s) Both EYES two times a day  bisacodyl 5 milliGRAM(s) Oral at bedtime  chlorhexidine 2% Cloths 1 Application(s) Topical <User Schedule>  enoxaparin Injectable 30 milliGRAM(s) SubCutaneous every 12 hours  famotidine    Tablet 20 milliGRAM(s) Oral every 12 hours  gabapentin 300 milliGRAM(s) Oral every 8 hours  levothyroxine 50 MICROGram(s) Oral daily  loratadine 10 milliGRAM(s) Oral at bedtime  melatonin 3 milliGRAM(s) Oral at bedtime  methocarbamol 1000 milliGRAM(s) Oral every 6 hours  polyethylene glycol 3350 17 Gram(s) Oral daily  predniSONE   Tablet 40 milliGRAM(s) Oral <User Schedule>  senna 2 Tablet(s) Oral at bedtime  tiotropium 2.5 MICROgram(s) Inhaler 2 Puff(s) Inhalation daily    MEDICATIONS  (PRN):  acetaminophen     Tablet .. 650 milliGRAM(s) Oral every 6 hours PRN Mild Pain (1 - 3)  naloxone Injectable 0.1 milliGRAM(s) IV Push every 3 minutes PRN For ANY of the following changes in patient status:  A. RR LESS THAN 10 breaths per minute, B. Oxygen saturation LESS THAN 90%, C. 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 alcoholic cirrhosis (stopped drinking over 9 yrs ago), presenting for neck pain on 4/20. Pt started having neck pain almost 4 months ago for which her chiropractor ordered an MRI that was done 1 month ago. Pt was not sure what the read of the MRI was but there was concern about lesions so she went to see Dr. Peña's office for the first time. Dr. Peña examined her and told her she has breast cancer with metastasis and needs to come to the ED for further evaluation. Pt is a non smoker and has no family history of breast Ca. Denies any significant weight loss, night sweats, or fevers. No reported bloody discharge from her nipples. Upon examining pt's right breast, dimpling with a lump was found. Pt believes that she noticed this change almost over a yr a go, but did not think of it was anything serious. Pt's neck pain has been significantly worsening limiting her ability to move her neck. She only has pain when moving her neck and has full sensation and ROM of her b/l UE. Denies any SOB, chest pain, or palpitations. States that she has pain with ROM, and states that it is worse with movement ex moving out of bed. Patient states that pain is mostly midline at the base of head, with radiation to her paraspinal muscles. Denies any radicular pain to UE & LE. States that she has been sleeping only in a recliner and has since then been developing worsening lower back pain. Denies any ataxia, imbalance, or difficulty with ambulation. Denies any numbness, tingling, paresthesias, weakness, saddle anesthesia, or bowel / bladder incontinence. Neurosurgery was consulted. On 4.24.23 she was taken to the operating room where she underwent an Occiput to T2 instrumentation and Fusion, ORIF of C2. She had a hemovac drain placed intraop which was removed on POD#3. She was on a PCA pump postop which was removed on POD#3. She remained neurologically stable postop.      Patient is tolerating bedside PT, OT, and SLP. The patient was evaluated by the PM&R team once medically stable. The patient was found to have functional limitations in terms of muscle strength, endurance, physical mobility, ability to carry out activities of daily living including: self-care, transfers, and ambulation. Participating with bedside therapeutic exercises and cognitive retraining. The patient is motivated and is able to tolerate up to 3 hours of daily therapy for 5-6 days a week for a total of 15 hours a week. Evaluated by Physiatry and deemed to be a good candidate for admission to  for acute inpatient rehab. Admitted to Acute Rehab on 4/28/23.    PMHx/PSHx: as above  SHx: history of alcohol use disorder - quit 9 years ago, denies smoking and illicit drug use   FHx: non-contributory   Allergies: NKDA  Prior level of function: independent with ambulation and ADLs without assistive device although had been using straight cane for a few months and the last 2 weeks prior to presentation reports being essentially chair bound due to neck/back pain and difficulty with mobility.   Living situation: Pt resides with  in 2nd floor apt using 8 steps to enter and a flight of stairs to access.   Current level of function:  SLP 4/26: Some complaints of globus w/ solids Recommend: continue easy to chew w/ thin  PT 4/28: max assist for bed mobility, mod assist for transfers, min assist for ambulation 40ft with RW  OT 4/27: dependent for lower body dressing    (28 Apr 2023 14:46)      TODAY'S SUBJECTIVE & REVIEW OF SYMPTOMS:  No acute events reported overnight   Patient was seen and examined at bedside. In NAD  Pt having regular bowel movements and voiding bladder spontaneously.  Pt on RA with no acute symptoms or concerns.  vitals/labwork reviewed.    CLOF: supervision bed mobility, PA transfers, ' with RW    Review of Systems:  Constitutional:    [  ] WNL           [   ] insomnia   [   ] tired  [ x ] recent weight loss and poor appetite - reporting depressed mood at home   Cardio:             [ x ] WNL           [   ] CP   [   ] SANCHEZ   [   ] palpitations         Resp:                [ x ] WNL           [   ] SOB   [   ] cough   [   ] wheezing  GI:                    [ x ] WNL           [   ] constipation   [   ] diarrhea   [   ] abdominal pain   [   ] nausea   [   ] emesis    :                   [ x ] WNL           [   ] SILVA  [   ] dysuria   [   ] difficulty voiding            Endo:                [ x ] WNL          [   ] polyuria   [   ] temperature intolerance                Skin:                  [  ] WNL          [   ] pain   [ x  ] Surgical incision along cervical spine, left neck/ lower face pain    ] rash  MSK:                 [  ] WNL          [   ] muscle pain   [ x  ] pain midline neck   [   ] muscle tenderness   [   ] swelling  Neuro:              [  ] WNL          [   ] HA   [   ] change in vision   [   ] tremor   [   ] weakness   [  x  ]dysphagia             Cognitive:          [ x ] WNL          [   ]confusion     Psych:                [ x ] WNL          [   ] hallucinations   [   ]agitation   [   ] delusion   [   ]depression    PHYSICAL EXAM  Vital Signs (24 Hrs):  T(C): 35.9 (05-08-23 @ 05:41), Max: 36.6 (05-07-23 @ 20:47)  HR: 97 (05-08-23 @ 05:41) (97 - 111)  BP: 139/72 (05-08-23 @ 05:41) (124/60 - 139/72)  RR: 18 (05-08-23 @ 05:41) (18 - 18)      General:[ x ] NAD, Resting Comfortable,   [   ] other:                                HEENT: [ x ] NC/AT, EOMI, PERRL , Normal Conjunctivae,   [   ] other:  Cardio: [ x ] RRR, no murmer,   [   ] other:                              Pulm: [ x ] No Respiratory Distress,  Lungs CTAB,   [   ] other:                       Abdomen: [ x ]ND/NT, Soft,   [   ] other:    : [ x ] NO SILVA CATHETER, [   ] SILVA CATHETER- no meatal tear, no discharge, [   ] other:                                            MSK: [  ] No joint swelling, Full ROM,   [ x  ] other:  Surgical incision along cervical spine c/d/i.                                      Ext: [ x ]No C/C/E, No calf tenderness,   [   ]other:    Skin: [  ]intact,   [ x  ] other: Surgical incision along cervical spine c/d/i.                                                                  Neurological Examination:  Cognitive: [  x  ] AAO x 3,   [    ]  other: A&Ox3. Oriented to person, place and time. Follows 2 step commands, language components intact. No aphasia.   Attention:  [  x  ] intact,   [    ]  other:  intact spelling and simple calculations                Memory: [  x  ] intact,    [    ]  other: intact registration and recall    Mood/Affect: [  x  ] wnl,    [    ]  other:                                                                             Communication: [  x  ]Fluent, no dysarthria, following commands:  [    ] other:   CN II - XII:  [  x  ] intact,  [    ] other:                                                                                        Motor:   RIGHT UE: [  ] WNL,  [ x  ] other: 3-/5 shoulder abduction limited by neck/back pain, 4/5 elbow flexion/extension, 5/5 hand   LEFT    UE: [  ] WNL,  [  x ] other: 3-/5 shoulder abduction limited by neck/back pain, 4/5 elbow flexion/extension, 5/5 hand   RIGHT LE: [ x ] WNL,  [   ] other: 5/5 hip flexion, knee flexion/extension, ankle dorsiflexion/plantarflexion  LEFT    LE: [ x ] WNL,  [   ] other: 5/5 hip flexion, knee flexion/extension, ankle dorsiflexion/plantarflexion    Tone: [  x  ] wnl,   [    ]  other:  DTRs: [ x  ]symmetric, [   ] other:  Coordination:   [ x  ] intact,   [    ] other:                                                                           Sensory: [ x  ] Intact to light touch,   [    ] other:    Clonus negative    MEDICATIONS  (STANDING):  albuterol    90 MICROgram(s) HFA Inhaler 2 Puff(s) Inhalation every 6 hours  artificial  tears Solution 1 Drop(s) Both EYES two times a day  bisacodyl 5 milliGRAM(s) Oral at bedtime  chlorhexidine 2% Cloths 1 Application(s) Topical <User Schedule>  enoxaparin Injectable 30 milliGRAM(s) SubCutaneous every 12 hours  famotidine    Tablet 20 milliGRAM(s) Oral every 12 hours  gabapentin 300 milliGRAM(s) Oral every 8 hours  levothyroxine 50 MICROGram(s) Oral daily  loratadine 10 milliGRAM(s) Oral at bedtime  melatonin 3 milliGRAM(s) Oral at bedtime  methocarbamol 1000 milliGRAM(s) Oral every 6 hours  polyethylene glycol 3350 17 Gram(s) Oral daily  predniSONE   Tablet 40 milliGRAM(s) Oral <User Schedule>  senna 2 Tablet(s) Oral at bedtime  tiotropium 2.5 MICROgram(s) Inhaler 2 Puff(s) Inhalation daily    MEDICATIONS  (PRN):  acetaminophen     Tablet .. 650 milliGRAM(s) Oral every 6 hours PRN Mild Pain (1 - 3)  naloxone Injectable 0.1 milliGRAM(s) IV Push every 3 minutes PRN For ANY of the following changes in patient status:  A. RR LESS THAN 10 breaths per minute, B. Oxygen saturation LESS THAN 90%, C. Sedation score of 6  ondansetron Injectable 4 milliGRAM(s) IV Push every 6 hours PRN Nausea and/or Vomiting  oxyCODONE    IR 10 milliGRAM(s) Oral every 4 hours PRN Moderate Pain (4 - 6)        RECENT LABS/IMAGING                          9.2    10.01 )-----------( 425      ( 08 May 2023 06:29 )             29.9

## 2023-05-09 ENCOUNTER — NON-APPOINTMENT (OUTPATIENT)
Age: 58
End: 2023-05-09

## 2023-05-09 LAB
ALBUMIN SERPL ELPH-MCNC: 3.8 G/DL — SIGNIFICANT CHANGE UP (ref 3.5–5.2)
ALP SERPL-CCNC: 224 U/L — HIGH (ref 30–115)
ALT FLD-CCNC: 19 U/L — SIGNIFICANT CHANGE UP (ref 0–41)
ANION GAP SERPL CALC-SCNC: 10 MMOL/L — SIGNIFICANT CHANGE UP (ref 7–14)
AST SERPL-CCNC: 30 U/L — SIGNIFICANT CHANGE UP (ref 0–41)
BASOPHILS # BLD AUTO: 0.04 K/UL — SIGNIFICANT CHANGE UP (ref 0–0.2)
BASOPHILS NFR BLD AUTO: 0.4 % — SIGNIFICANT CHANGE UP (ref 0–1)
BILIRUB SERPL-MCNC: 0.4 MG/DL — SIGNIFICANT CHANGE UP (ref 0.2–1.2)
BUN SERPL-MCNC: 11 MG/DL — SIGNIFICANT CHANGE UP (ref 10–20)
CALCIUM SERPL-MCNC: 10.3 MG/DL — SIGNIFICANT CHANGE UP (ref 8.4–10.5)
CHLORIDE SERPL-SCNC: 102 MMOL/L — SIGNIFICANT CHANGE UP (ref 98–110)
CO2 SERPL-SCNC: 26 MMOL/L — SIGNIFICANT CHANGE UP (ref 17–32)
CREAT SERPL-MCNC: 0.6 MG/DL — LOW (ref 0.7–1.5)
EGFR: 104 ML/MIN/1.73M2 — SIGNIFICANT CHANGE UP
EOSINOPHIL # BLD AUTO: 0 K/UL — SIGNIFICANT CHANGE UP (ref 0–0.7)
EOSINOPHIL NFR BLD AUTO: 0 % — SIGNIFICANT CHANGE UP (ref 0–8)
GLUCOSE SERPL-MCNC: 155 MG/DL — HIGH (ref 70–99)
HCT VFR BLD CALC: 32.9 % — LOW (ref 37–47)
HGB BLD-MCNC: 10.5 G/DL — LOW (ref 12–16)
IMM GRANULOCYTES NFR BLD AUTO: 2.6 % — HIGH (ref 0.1–0.3)
LYMPHOCYTES # BLD AUTO: 1.01 K/UL — LOW (ref 1.2–3.4)
LYMPHOCYTES # BLD AUTO: 9.6 % — LOW (ref 20.5–51.1)
MAGNESIUM SERPL-MCNC: 2.2 MG/DL — SIGNIFICANT CHANGE UP (ref 1.8–2.4)
MCHC RBC-ENTMCNC: 28.2 PG — SIGNIFICANT CHANGE UP (ref 27–31)
MCHC RBC-ENTMCNC: 31.9 G/DL — LOW (ref 32–37)
MCV RBC AUTO: 88.2 FL — SIGNIFICANT CHANGE UP (ref 81–99)
MONOCYTES # BLD AUTO: 0.3 K/UL — SIGNIFICANT CHANGE UP (ref 0.1–0.6)
MONOCYTES NFR BLD AUTO: 2.9 % — SIGNIFICANT CHANGE UP (ref 1.7–9.3)
NEUTROPHILS # BLD AUTO: 8.89 K/UL — HIGH (ref 1.4–6.5)
NEUTROPHILS NFR BLD AUTO: 84.5 % — HIGH (ref 42.2–75.2)
NRBC # BLD: 0 /100 WBCS — SIGNIFICANT CHANGE UP (ref 0–0)
PLATELET # BLD AUTO: 428 K/UL — HIGH (ref 130–400)
PMV BLD: 8.9 FL — SIGNIFICANT CHANGE UP (ref 7.4–10.4)
POTASSIUM SERPL-MCNC: 5.1 MMOL/L — HIGH (ref 3.5–5)
POTASSIUM SERPL-SCNC: 5.1 MMOL/L — HIGH (ref 3.5–5)
PROT SERPL-MCNC: 6.4 G/DL — SIGNIFICANT CHANGE UP (ref 6–8)
RBC # BLD: 3.73 M/UL — LOW (ref 4.2–5.4)
RBC # FLD: 14.3 % — SIGNIFICANT CHANGE UP (ref 11.5–14.5)
SODIUM SERPL-SCNC: 138 MMOL/L — SIGNIFICANT CHANGE UP (ref 135–146)
WBC # BLD: 10.51 K/UL — SIGNIFICANT CHANGE UP (ref 4.8–10.8)
WBC # FLD AUTO: 10.51 K/UL — SIGNIFICANT CHANGE UP (ref 4.8–10.8)

## 2023-05-09 RX ADMIN — LORATADINE 10 MILLIGRAM(S): 10 TABLET ORAL at 21:42

## 2023-05-09 RX ADMIN — ALBUTEROL 2 PUFF(S): 90 AEROSOL, METERED ORAL at 14:00

## 2023-05-09 RX ADMIN — METHOCARBAMOL 1000 MILLIGRAM(S): 500 TABLET, FILM COATED ORAL at 17:05

## 2023-05-09 RX ADMIN — CHLORHEXIDINE GLUCONATE 1 APPLICATION(S): 213 SOLUTION TOPICAL at 06:13

## 2023-05-09 RX ADMIN — OXYCODONE HYDROCHLORIDE 10 MILLIGRAM(S): 5 TABLET ORAL at 21:42

## 2023-05-09 RX ADMIN — Medication 1 DROP(S): at 17:06

## 2023-05-09 RX ADMIN — OXYCODONE HYDROCHLORIDE 10 MILLIGRAM(S): 5 TABLET ORAL at 21:35

## 2023-05-09 RX ADMIN — Medication 50 MICROGRAM(S): at 06:13

## 2023-05-09 RX ADMIN — FAMOTIDINE 20 MILLIGRAM(S): 10 INJECTION INTRAVENOUS at 17:06

## 2023-05-09 RX ADMIN — GABAPENTIN 300 MILLIGRAM(S): 400 CAPSULE ORAL at 06:13

## 2023-05-09 RX ADMIN — Medication 650 MILLIGRAM(S): at 11:54

## 2023-05-09 RX ADMIN — OXYCODONE HYDROCHLORIDE 10 MILLIGRAM(S): 5 TABLET ORAL at 17:09

## 2023-05-09 RX ADMIN — METHOCARBAMOL 1000 MILLIGRAM(S): 500 TABLET, FILM COATED ORAL at 00:00

## 2023-05-09 RX ADMIN — POLYETHYLENE GLYCOL 3350 17 GRAM(S): 17 POWDER, FOR SOLUTION ORAL at 11:54

## 2023-05-09 RX ADMIN — ALBUTEROL 2 PUFF(S): 90 AEROSOL, METERED ORAL at 20:44

## 2023-05-09 RX ADMIN — Medication 3 MILLIGRAM(S): at 21:40

## 2023-05-09 RX ADMIN — OXYCODONE HYDROCHLORIDE 10 MILLIGRAM(S): 5 TABLET ORAL at 08:16

## 2023-05-09 RX ADMIN — OXYCODONE HYDROCHLORIDE 10 MILLIGRAM(S): 5 TABLET ORAL at 04:10

## 2023-05-09 RX ADMIN — Medication 650 MILLIGRAM(S): at 21:42

## 2023-05-09 RX ADMIN — ENOXAPARIN SODIUM 30 MILLIGRAM(S): 100 INJECTION SUBCUTANEOUS at 17:06

## 2023-05-09 RX ADMIN — METHOCARBAMOL 1000 MILLIGRAM(S): 500 TABLET, FILM COATED ORAL at 11:53

## 2023-05-09 RX ADMIN — METHOCARBAMOL 1000 MILLIGRAM(S): 500 TABLET, FILM COATED ORAL at 21:42

## 2023-05-09 RX ADMIN — Medication 650 MILLIGRAM(S): at 21:37

## 2023-05-09 RX ADMIN — FAMOTIDINE 20 MILLIGRAM(S): 10 INJECTION INTRAVENOUS at 06:15

## 2023-05-09 RX ADMIN — GABAPENTIN 300 MILLIGRAM(S): 400 CAPSULE ORAL at 21:41

## 2023-05-09 RX ADMIN — GABAPENTIN 300 MILLIGRAM(S): 400 CAPSULE ORAL at 14:30

## 2023-05-09 RX ADMIN — Medication 650 MILLIGRAM(S): at 12:50

## 2023-05-09 RX ADMIN — METHOCARBAMOL 1000 MILLIGRAM(S): 500 TABLET, FILM COATED ORAL at 06:13

## 2023-05-09 RX ADMIN — ENOXAPARIN SODIUM 30 MILLIGRAM(S): 100 INJECTION SUBCUTANEOUS at 06:14

## 2023-05-09 RX ADMIN — Medication 1 DROP(S): at 06:31

## 2023-05-09 RX ADMIN — OXYCODONE HYDROCHLORIDE 10 MILLIGRAM(S): 5 TABLET ORAL at 09:15

## 2023-05-09 NOTE — PROGRESS NOTE ADULT - SUBJECTIVE AND OBJECTIVE BOX
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 alcoholic cirrhosis (stopped drinking over 9 yrs ago), presenting for neck pain on 4/20. Pt started having neck pain almost 4 months ago for which her chiropractor ordered an MRI that was done 1 month ago. Pt was not sure what the read of the MRI was but there was concern about lesions so she went to see Dr. Peña's office for the first time. Dr. Peña examined her and told her she has breast cancer with metastasis and needs to come to the ED for further evaluation. Pt is a non smoker and has no family history of breast Ca. Denies any significant weight loss, night sweats, or fevers. No reported bloody discharge from her nipples. Upon examining pt's right breast, dimpling with a lump was found. Pt believes that she noticed this change almost over a yr a go, but did not think of it was anything serious. Pt's neck pain has been significantly worsening limiting her ability to move her neck. She only has pain when moving her neck and has full sensation and ROM of her b/l UE. Denies any SOB, chest pain, or palpitations. States that she has pain with ROM, and states that it is worse with movement ex moving out of bed. Patient states that pain is mostly midline at the base of head, with radiation to her paraspinal muscles. Denies any radicular pain to UE & LE. States that she has been sleeping only in a recliner and has since then been developing worsening lower back pain. Denies any ataxia, imbalance, or difficulty with ambulation. Denies any numbness, tingling, paresthesias, weakness, saddle anesthesia, or bowel / bladder incontinence. Neurosurgery was consulted. On 4.24.23 she was taken to the operating room where she underwent an Occiput to T2 instrumentation and Fusion, ORIF of C2. She had a hemovac drain placed intraop which was removed on POD#3. She was on a PCA pump postop which was removed on POD#3. She remained neurologically stable postop.      Patient is tolerating bedside PT, OT, and SLP. The patient was evaluated by the PM&R team once medically stable. The patient was found to have functional limitations in terms of muscle strength, endurance, physical mobility, ability to carry out activities of daily living including: self-care, transfers, and ambulation. Participating with bedside therapeutic exercises and cognitive retraining. The patient is motivated and is able to tolerate up to 3 hours of daily therapy for 5-6 days a week for a total of 15 hours a week. Evaluated by Physiatry and deemed to be a good candidate for admission to  for acute inpatient rehab. Admitted to Acute Rehab on 4/28/23.    PMHx/PSHx: as above  SHx: history of alcohol use disorder - quit 9 years ago, denies smoking and illicit drug use   FHx: non-contributory   Allergies: NKDA  Prior level of function: independent with ambulation and ADLs without assistive device although had been using straight cane for a few months and the last 2 weeks prior to presentation reports being essentially chair bound due to neck/back pain and difficulty with mobility.   Living situation: Pt resides with  in 2nd floor apt using 8 steps to enter and a flight of stairs to access.   Current level of function:  SLP 4/26: Some complaints of globus w/ solids Recommend: continue easy to chew w/ thin  PT 4/28: max assist for bed mobility, mod assist for transfers, min assist for ambulation 40ft with RW  OT 4/27: dependent for lower body dressing    (28 Apr 2023 14:46)      TODAY'S SUBJECTIVE & REVIEW OF SYMPTOMS:  No acute events reported overnight   Patient was seen and examined at bedside. In NAD  Pt having regular bowel movements and voiding bladder spontaneously.  Pt on RA with no acute symptoms or concerns. Will reach out to neurosurgery for staple removal/surgical site eval  vitals/labwork reviewed.    CLOF: supervision bed mobility, PA transfers, ' with RW    Review of Systems:  Constitutional:    [  ] WNL           [   ] insomnia   [   ] tired  [ x ] recent weight loss and poor appetite - reporting depressed mood at home   Cardio:             [ x ] WNL           [   ] CP   [   ] SANCHEZ   [   ] palpitations         Resp:                [ x ] WNL           [   ] SOB   [   ] cough   [   ] wheezing  GI:                    [ x ] WNL           [   ] constipation   [   ] diarrhea   [   ] abdominal pain   [   ] nausea   [   ] emesis    :                   [ x ] WNL           [   ] SILVA  [   ] dysuria   [   ] difficulty voiding            Endo:                [ x ] WNL          [   ] polyuria   [   ] temperature intolerance                Skin:                  [  ] WNL          [   ] pain   [ x  ] Surgical incision along cervical spine, left neck/ lower face pain    ] rash  MSK:                 [  ] WNL          [   ] muscle pain   [ x  ] pain midline neck   [   ] muscle tenderness   [   ] swelling  Neuro:              [  ] WNL          [   ] HA   [   ] change in vision   [   ] tremor   [   ] weakness   [  x  ]dysphagia             Cognitive:          [ x ] WNL          [   ]confusion     Psych:                [ x ] WNL          [   ] hallucinations   [   ]agitation   [   ] delusion   [   ]depression    PHYSICAL EXAM  Vital Signs (24 Hrs):  T(C): 35.8 (05-09-23 @ 05:11), Max: 37.1 (05-08-23 @ 22:15)  HR: 96 (05-09-23 @ 05:11) (96 - 109)  BP: 137/83 (05-09-23 @ 05:11) (120/77 - 137/83)  RR: 18 (05-09-23 @ 05:11) (18 - 18)        General:[ x ] NAD, Resting Comfortable,   [   ] other:                                HEENT: [ x ] NC/AT, EOMI, PERRL , Normal Conjunctivae,   [   ] other:  Cardio: [ x ] RRR, no murmer,   [   ] other:                              Pulm: [ x ] No Respiratory Distress,  Lungs CTAB,   [   ] other:                       Abdomen: [ x ]ND/NT, Soft,   [   ] other:    : [ x ] NO SILVA CATHETER, [   ] SILVA CATHETER- no meatal tear, no discharge, [   ] other:                                            MSK: [  ] No joint swelling, Full ROM,   [ x  ] other:  Surgical incision along cervical spine c/d/i.                                      Ext: [ x ]No C/C/E, No calf tenderness,   [   ]other:    Skin: [  ]intact,   [ x  ] other: Surgical incision along cervical spine c/d/i.                                                                  Neurological Examination:  Cognitive: [  x  ] AAO x 3,   [    ]  other: A&Ox3. Oriented to person, place and time. Follows 2 step commands, language components intact. No aphasia.   Attention:  [  x  ] intact,   [    ]  other:  intact spelling and simple calculations                Memory: [  x  ] intact,    [    ]  other: intact registration and recall    Mood/Affect: [  x  ] wnl,    [    ]  other:                                                                             Communication: [  x  ]Fluent, no dysarthria, following commands:  [    ] other:   CN II - XII:  [  x  ] intact,  [    ] other:                                                                                        Motor:   RIGHT UE: [  ] WNL,  [ x  ] other: 3-/5 shoulder abduction limited by neck/back pain, 4/5 elbow flexion/extension, 5/5 hand   LEFT    UE: [  ] WNL,  [  x ] other: 3-/5 shoulder abduction limited by neck/back pain, 4/5 elbow flexion/extension, 5/5 hand   RIGHT LE: [ x ] WNL,  [   ] other: 5/5 hip flexion, knee flexion/extension, ankle dorsiflexion/plantarflexion  LEFT    LE: [ x ] WNL,  [   ] other: 5/5 hip flexion, knee flexion/extension, ankle dorsiflexion/plantarflexion    Tone: [  x  ] wnl,   [    ]  other:  DTRs: [ x  ]symmetric, [   ] other:  Coordination:   [ x  ] intact,   [    ] other:                                                                           Sensory: [ x  ] Intact to light touch,   [    ] other:    Clonus negative    MEDICATIONS  (STANDING):  albuterol    90 MICROgram(s) HFA Inhaler 2 Puff(s) Inhalation every 6 hours  artificial  tears Solution 1 Drop(s) Both EYES two times a day  bisacodyl 5 milliGRAM(s) Oral at bedtime  chlorhexidine 2% Cloths 1 Application(s) Topical <User Schedule>  enoxaparin Injectable 30 milliGRAM(s) SubCutaneous every 12 hours  famotidine    Tablet 20 milliGRAM(s) Oral every 12 hours  gabapentin 300 milliGRAM(s) Oral every 8 hours  levothyroxine 50 MICROGram(s) Oral daily  loratadine 10 milliGRAM(s) Oral at bedtime  melatonin 3 milliGRAM(s) Oral at bedtime  methocarbamol 1000 milliGRAM(s) Oral every 6 hours  polyethylene glycol 3350 17 Gram(s) Oral daily  senna 2 Tablet(s) Oral at bedtime  tiotropium 2.5 MICROgram(s) Inhaler 2 Puff(s) Inhalation daily    MEDICATIONS  (PRN):  acetaminophen     Tablet .. 650 milliGRAM(s) Oral every 6 hours PRN Mild Pain (1 - 3)  naloxone Injectable 0.1 milliGRAM(s) IV Push every 3 minutes PRN For ANY of the following changes in patient status:  A. RR LESS THAN 10 breaths per minute, B. Oxygen saturation LESS THAN 90%, C. Sedation score of 6  ondansetron Injectable 4 milliGRAM(s) IV Push every 6 hours PRN Nausea and/or Vomiting  oxyCODONE    IR 10 milliGRAM(s) Oral every 4 hours PRN Moderate Pain (4 - 6)          RECENT LABS/IMAGING                                 10.5   10.51 )-----------( 428      ( 09 May 2023 08:02 )             32.9     05-09    138  |  102  |  11  ----------------------------<  155<H>  5.1<H>   |  26  |  0.6<L>    Ca    10.3      09 May 2023 08:02  Mg     2.2     05-09    TPro  6.4  /  Alb  3.8  /  TBili  0.4  /  DBili  x   /  AST  30  /  ALT  19  /  AlkPhos  224<H>  05-09

## 2023-05-09 NOTE — PROGRESS NOTE ADULT - ASSESSMENT
57F with PMH hypothyroidism, suspected alcoholic cirrhosis (stopped drinking over 9 yrs ago), Presented 4/20 for neck pain w/ limited movement /2 pain x4 months. Admitted for management of suspected breast CA with metastases to the spine now s/p Occiput-T2 instrumentation and fusion with ORIF of C2.    Prior to current illness and functional decline, patient was independent with ADLs and ambulation. Patient now requires assistance with ambulation and ADLs 2/2 neck/back pain, gait dysfunction, and deconditioning. Patient also with medical comorbidities of hypothyroidism, sinus tachycardia, hypertensive urgency and h/o cirrhosis, requiring medication management and oversight by Physiatry team and nursing. May also need specialist consulting from Neurosurgery. Patient is a complex medical case with mixed Neurosurgical, oncological, and medical issues. There are significant comorbidities which warrants acute rehab hospitalization. To return home it is in the patient’s best interest to have acute inpatient rehabilitative services to undergo intensive interdisciplinary therapy. Furthermore, the patient is motivated and is able to tolerate up to 3 hours of daily therapy for 5-6 days a week for a total of 15 hours a week. Evaluated by Physiatry and deemed to be a good candidate for admission to  for acute inpatient rehab. Admitted to Acute Rehab on 4/28/23.     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   - MRI spine: Numerous enhancing osseous metastatic lesions are noted throughout the cervical/thoracic/lumbar spine (most significant at the C2 and C3 level with evidence of  epidural extension causing severe spinal stenosis and mass effect). Malignant compression  deformity noted of the T8 vertebral body   - Neurosurgery Eval 4/22: Needs Hard collar [place] J when OOB  - Oncology Consulted: will need biopsy before additional tx plan recs, also MRI brain w contrast to r/u Mets   - Radonc consulted 4/27: will need radiation to the C spine (no sooner than 2 weeks after surgery). To f/u as outpatient after d/c  -Hemovac discontinued 4/27  -Per Neurosurgery: plan was for [place] cervical collar when OOB but collars available do not fit so soft collar to be worn when OOB - confirmed with Neurosurgery team.   -Pain medications: previously evaluated by pain management. Gabapentin 300mg PO q8h, Tylenol 650mg q6h PRN mild pain, Oxycodone 10mg PO q4h PRN moderate pain, robaxin 1000mg q6hr  -following with Dr. Peña outpatient    #Dysphagia  - mild, since surgery  - regular diet    #s/p Hypertension  - resolving  - d/c Labetalol, which also may be making her asthma worse - BP in good range    #Hypoxemia/ Asthma; improving  - CXR negative, PE r/o on V/Q low probability of PE  on RA this AM doing well  - Patient was dropping to 85- 87% with ambulation and pulse increases to 120  - Started short course of Prednisone, standing Nebs and Spiriva and doing well off O2    #Hypercalcemia likely 2/2 malignancy  #Elevated AlkPhos likely 2/2 malignancy  - resolved s/p IVF   -Will monitor BMP and trend electrolytes     # hypothyroidism   - TSH (4/21) WNL   - c/w levothyroxine 50mcg OD    #Maintenance   - Pain control: as above  - GI/Bowel Mgmt: no issues at this time, monitor for BMs.   - Bladder management: voiding freely, no issues at this time.   - Skin: monitor cervical surgical wound, Neurosurgery f/u as needed   - FEN: Hypercalcemia resolved as above. Monitor electrolytes and fluid status.   - Diet: DASH/TLC easy to chew diet     #Precautions / PROPHYLAXIS:    - Falls  - Ortho: Weight bearing status: WBAT  - Soft Cervical Collar  - DVT prophylaxis: Lovenox 30 SQ daily

## 2023-05-09 NOTE — PROGRESS NOTE ADULT - ATTENDING COMMENTS
I reviewed the chart and examined the patient with the resident and we discussed the findings and treatment plan.  The patient is tolerating the rehab program well. I agree with the findings and treatment plan above, which I modified as indicated. The patient requires 3 hrs a day of acute inpatient rehab. No new complaint. VSS. Denies pain. Ambulates with CG. JAZ RIGGS 4/5. Neurosurgery asked to f/u regarding suture removal and discussing future treatment/ f/u. Continue acute rehab program.    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   - MRI spine: Numerous enhancing osseous metastatic lesions are noted throughout the cervical/thoracic/lumbar spine (most significant at the C2 and C3 level with evidence of  epidural extension causing severe spinal stenosis and mass effect). Malignant compression  deformity noted of the T8 vertebral body   - Neurosurgery Eval 4/22: Needs Hard collar [place] J when OOB  - Oncology Consulted: will need biopsy before additional tx plan recs, also MRI brain w contrast to r/u Mets   - Radonc consulted 4/27: will need radiation to the C spine (no sooner than 2 weeks after surgery). To f/u as outpatient after d/c  -Hemovac discontinued 4/27  -Per Neurosurgery: plan was for [place] cervical collar when OOB but collars available do not fit so soft collar to be worn when OOB - confirmed with Neurosurgery team.   -Pain medications: previously evaluated by pain management. Gabapentin 300mg PO q8h, Tylenol 650mg q6h PRN mild pain, Oxycodone 10mg PO q4h PRN moderate pain, robaxin 1000mg q6hr  -following with Dr. Peña outpatient    #Dysphagia  - mild, since surgery  - regular diet    #s/p Hypertension  - resolving  - d/c Labetalol, which also may be making her asthma worse - BP in good range    #Hypoxemia/ Asthma; improving  - CXR negative, PE r/o on V/Q low probability of PE  on RA this AM doing well  - Patient was dropping to 85- 87% with ambulation and pulse increases to 120  - Started short course of Prednisone, standing Nebs and Spiriva and doing well off O2    #Hypercalcemia likely 2/2 malignancy  #Elevated AlkPhos likely 2/2 malignancy  - resolved s/p IVF   -Will monitor BMP and trend electrolytes     # hypothyroidism   - TSH (4/21) WNL   - c/w levothyroxine 50mcg OD    #Maintenance   - Pain control: as above  - GI/Bowel Mgmt: no issues at this time, monitor for BMs.   - Bladder management: voiding freely, no issues at this time.   - Skin: monitor cervical surgical wound, Neurosurgery f/u as needed   - FEN: Hypercalcemia resolved as above. Monitor electrolytes and fluid status.   - Diet: DASH/TLC easy to chew diet     #Precautions / PROPHYLAXIS:    - Falls  - Ortho: Weight bearing status: WBAT  - Soft Cervical Collar  - DVT prophylaxis: Lovenox 30 SQ daily

## 2023-05-09 NOTE — PROGRESS NOTE ADULT - ASSESSMENT
Neuropsychology Follow Up    Treatment focused on mood therapy    Pain: Yes Location: Neck/Lower half Ratin/10 Intervention: RN notified; patient was scheduled to receive oxycodone prescription within the following hour.    Orientation: WNL    Arousal Level: Alert    Behavior: Cooperative    Affect Range: WNL    Attention: WNL    Insight into illness/deficits: Good    Treatment Session Focused on Mood/ Coping/ Psychoeducation    The patient was seen to support positive coping and mood therapy.    During today's session, expressed feeling optimistic regarding her discharge scheduled for Friday. However, she indicated that she was slightly frustrated that she will have minimal movement in her neck moving forward. However, she acknowledged that she continues gradually progress in OT/PT. The patient denied any changes in cognition, appetite, and sleeping.    Patient was provided with support and cognitive behavioral therapy. Patient continue to process and explore emotion related to interpersonal relationships and her health situation. Patient discussed themes of defense mechanisms stemming from unwanted emotions. Patient was provided psychoeducation about intellectualization, rationalization, and rigid boundaries, and they can represent and severe as a defensive mechanism to prevent experiencing unwanted emotions. Patient explored and discussed how defensive mechanisms were elicited and played out within interpersonal dynamics. Patient discussed how minimizing emotional experiences was related to her history of alcohol use, and also discussed how the underlying themes that lead to her sobriety over the last 9 years. Patient was provided psychoeducation about the impact of alcohol on the dopamine system, and connected these previous discussions about dopamine reward predication error. Patient discussed how patterns of behavior severed as a form of control. Patient was provided psychoeducation about cognitive flexibility stemming from generating thought patterns that integrate emotions rather than attempting to suppress them. Patient was with intervention tools to help facilitate cognitive restructuring through identifying emotions as they manifest, specifically within interpersonal relationships. Patient continued to discuss the significance support, and utilizing her family system in place.    Goals: Facilitate Positive Coping    Plan: Support

## 2023-05-10 NOTE — PROGRESS NOTE ADULT - SUBJECTIVE AND OBJECTIVE BOX
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 alcoholic cirrhosis (stopped drinking over 9 yrs ago), presenting for neck pain on 4/20. Pt started having neck pain almost 4 months ago for which her chiropractor ordered an MRI that was done 1 month ago. Pt was not sure what the read of the MRI was but there was concern about lesions so she went to see Dr. Peña's office for the first time. Dr. Peña examined her and told her she has breast cancer with metastasis and needs to come to the ED for further evaluation. Pt is a non smoker and has no family history of breast Ca. Denies any significant weight loss, night sweats, or fevers. No reported bloody discharge from her nipples. Upon examining pt's right breast, dimpling with a lump was found. Pt believes that she noticed this change almost over a yr a go, but did not think of it was anything serious. Pt's neck pain has been significantly worsening limiting her ability to move her neck. She only has pain when moving her neck and has full sensation and ROM of her b/l UE. Denies any SOB, chest pain, or palpitations. States that she has pain with ROM, and states that it is worse with movement ex moving out of bed. Patient states that pain is mostly midline at the base of head, with radiation to her paraspinal muscles. Denies any radicular pain to UE & LE. States that she has been sleeping only in a recliner and has since then been developing worsening lower back pain. Denies any ataxia, imbalance, or difficulty with ambulation. Denies any numbness, tingling, paresthesias, weakness, saddle anesthesia, or bowel / bladder incontinence. Neurosurgery was consulted. On 4.24.23 she was taken to the operating room where she underwent an Occiput to T2 instrumentation and Fusion, ORIF of C2. She had a hemovac drain placed intraop which was removed on POD#3. She was on a PCA pump postop which was removed on POD#3. She remained neurologically stable postop.      Patient is tolerating bedside PT, OT, and SLP. The patient was evaluated by the PM&R team once medically stable. The patient was found to have functional limitations in terms of muscle strength, endurance, physical mobility, ability to carry out activities of daily living including: self-care, transfers, and ambulation. Participating with bedside therapeutic exercises and cognitive retraining. The patient is motivated and is able to tolerate up to 3 hours of daily therapy for 5-6 days a week for a total of 15 hours a week. Evaluated by Physiatry and deemed to be a good candidate for admission to  for acute inpatient rehab. Admitted to Acute Rehab on 4/28/23.    PMHx/PSHx: as above  SHx: history of alcohol use disorder - quit 9 years ago, denies smoking and illicit drug use   FHx: non-contributory   Allergies: NKDA  Prior level of function: independent with ambulation and ADLs without assistive device although had been using straight cane for a few months and the last 2 weeks prior to presentation reports being essentially chair bound due to neck/back pain and difficulty with mobility.   Living situation: Pt resides with  in 2nd floor apt using 8 steps to enter and a flight of stairs to access.   Current level of function:  SLP 4/26: Some complaints of globus w/ solids Recommend: continue easy to chew w/ thin  PT 4/28: max assist for bed mobility, mod assist for transfers, min assist for ambulation 40ft with RW  OT 4/27: dependent for lower body dressing    (28 Apr 2023 14:46)      TODAY'S SUBJECTIVE & REVIEW OF SYMPTOMS:  No acute events reported overnight   Patient was seen and examined at bedside. In NAD  Pt having regular bowel movements and voiding bladder spontaneously.  Pt on RA with no acute symptoms or concerns. Will reach out to neurosurgery for staple removal/surgical site eval  vitals/labwork reviewed.    Plan for DC friday    CLOF: supervision bed mobility, PA transfers, ' with RW    Review of Systems:  Constitutional:    [  ] WNL           [   ] insomnia   [   ] tired  [ x ] recent weight loss and poor appetite - reporting depressed mood at home   Cardio:             [ x ] WNL           [   ] CP   [   ] SANCHEZ   [   ] palpitations         Resp:                [ x ] WNL           [   ] SOB   [   ] cough   [   ] wheezing  GI:                    [ x ] WNL           [   ] constipation   [   ] diarrhea   [   ] abdominal pain   [   ] nausea   [   ] emesis    :                   [ x ] WNL           [   ] SILVA  [   ] dysuria   [   ] difficulty voiding            Endo:                [ x ] WNL          [   ] polyuria   [   ] temperature intolerance                Skin:                  [  ] WNL          [   ] pain   [ x  ] Surgical incision along cervical spine, left neck/ lower face pain    ] rash  MSK:                 [  ] WNL          [   ] muscle pain   [ x  ] pain midline neck   [   ] muscle tenderness   [   ] swelling  Neuro:              [  ] WNL          [   ] HA   [   ] change in vision   [   ] tremor   [   ] weakness   [  x  ]dysphagia             Cognitive:          [ x ] WNL          [   ]confusion     Psych:                [ x ] WNL          [   ] hallucinations   [   ]agitation   [   ] delusion   [   ]depression    PHYSICAL EXAM  Vital Signs (24 Hrs):  T(C): 36.2 (05-10-23 @ 05:47), Max: 36.4 (05-09-23 @ 20:17)  HR: 81 (05-10-23 @ 05:47) (81 - 119)  BP: 104/67 (05-10-23 @ 05:47) (104/67 - 126/83)  RR: 18 (05-10-23 @ 05:47) (18 - 19)            General:[ x ] NAD, Resting Comfortable,   [   ] other:                                HEENT: [ x ] NC/AT, EOMI, PERRL , Normal Conjunctivae,   [   ] other:  Cardio: [ x ] RRR, no murmer,   [   ] other:                              Pulm: [ x ] No Respiratory Distress,  Lungs CTAB,   [   ] other:                       Abdomen: [ x ]ND/NT, Soft,   [   ] other:    : [ x ] NO SILVA CATHETER, [   ] SILVA CATHETER- no meatal tear, no discharge, [   ] other:                                            MSK: [  ] No joint swelling, Full ROM,   [ x  ] other:  Surgical incision along cervical spine c/d/i.                                      Ext: [ x ]No C/C/E, No calf tenderness,   [   ]other:    Skin: [  ]intact,   [ x  ] other: Surgical incision along cervical spine c/d/i.                                                                  Neurological Examination:  Cognitive: [  x  ] AAO x 3,   [    ]  other: A&Ox3. Oriented to person, place and time. Follows 2 step commands, language components intact. No aphasia.   Attention:  [  x  ] intact,   [    ]  other:  intact spelling and simple calculations                Memory: [  x  ] intact,    [    ]  other: intact registration and recall    Mood/Affect: [  x  ] wnl,    [    ]  other:                                                                             Communication: [  x  ]Fluent, no dysarthria, following commands:  [    ] other:   CN II - XII:  [  x  ] intact,  [    ] other:                                                                                        Motor:   RIGHT UE: [  ] WNL,  [ x  ] other: 3-/5 shoulder abduction limited by neck/back pain, 4/5 elbow flexion/extension, 5/5 hand   LEFT    UE: [  ] WNL,  [  x ] other: 3-/5 shoulder abduction limited by neck/back pain, 4/5 elbow flexion/extension, 5/5 hand   RIGHT LE: [ x ] WNL,  [   ] other: 5/5 hip flexion, knee flexion/extension, ankle dorsiflexion/plantarflexion  LEFT    LE: [ x ] WNL,  [   ] other: 5/5 hip flexion, knee flexion/extension, ankle dorsiflexion/plantarflexion    Tone: [  x  ] wnl,   [    ]  other:  DTRs: [ x  ]symmetric, [   ] other:  Coordination:   [ x  ] intact,   [    ] other:                                                                           Sensory: [ x  ] Intact to light touch,   [    ] other:    Clonus negative    MEDICATIONS  (STANDING):  albuterol    90 MICROgram(s) HFA Inhaler 2 Puff(s) Inhalation every 6 hours  artificial  tears Solution 1 Drop(s) Both EYES two times a day  bisacodyl 5 milliGRAM(s) Oral at bedtime  chlorhexidine 2% Cloths 1 Application(s) Topical <User Schedule>  enoxaparin Injectable 30 milliGRAM(s) SubCutaneous every 12 hours  famotidine    Tablet 20 milliGRAM(s) Oral every 12 hours  gabapentin 300 milliGRAM(s) Oral every 8 hours  levothyroxine 50 MICROGram(s) Oral daily  loratadine 10 milliGRAM(s) Oral at bedtime  melatonin 3 milliGRAM(s) Oral at bedtime  methocarbamol 1000 milliGRAM(s) Oral every 6 hours  polyethylene glycol 3350 17 Gram(s) Oral daily  senna 2 Tablet(s) Oral at bedtime  tiotropium 2.5 MICROgram(s) Inhaler 2 Puff(s) Inhalation daily    MEDICATIONS  (PRN):  acetaminophen     Tablet .. 650 milliGRAM(s) Oral every 6 hours PRN Mild Pain (1 - 3)  naloxone Injectable 0.1 milliGRAM(s) IV Push every 3 minutes PRN For ANY of the following changes in patient status:  A. RR LESS THAN 10 breaths per minute, B. Oxygen saturation LESS THAN 90%, C. Sedation score of 6  ondansetron Injectable 4 milliGRAM(s) IV Push every 6 hours PRN Nausea and/or Vomiting  oxyCODONE    IR 10 milliGRAM(s) Oral every 4 hours PRN Moderate Pain (4 - 6)            RECENT LABS/IMAGING                                 10.5   10.51 )-----------( 428      ( 09 May 2023 08:02 )             32.9     05-09    138  |  102  |  11  ----------------------------<  155<H>  5.1<H>   |  26  |  0.6<L>    Ca    10.3      09 May 2023 08:02  Mg     2.2     05-09    TPro  6.4  /  Alb  3.8  /  TBili  0.4  /  DBili  x   /  AST  30  /  ALT  19  /  AlkPhos  224<H>  05-09         Patient is a 57y old  Female who presents with a chief complaint of 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 alcoholic cirrhosis (stopped drinking over 9 yrs ago), presenting for neck pain on 4/20. Pt started having neck pain almost 4 months ago for which her chiropractor ordered an MRI that was done 1 month ago. Pt was not sure what the read of the MRI was but there was concern about lesions so she went to see Dr. Peña's office for the first time. Dr. Peña examined her and told her she has breast cancer with metastasis and needs to come to the ED for further evaluation. Pt is a non smoker and has no family history of breast Ca. Denies any significant weight loss, night sweats, or fevers. No reported bloody discharge from her nipples. Upon examining pt's right breast, dimpling with a lump was found. Pt believes that she noticed this change almost over a yr a go, but did not think of it was anything serious. Pt's neck pain has been significantly worsening limiting her ability to move her neck. She only has pain when moving her neck and has full sensation and ROM of her b/l UE. Denies any SOB, chest pain, or palpitations. States that she has pain with ROM, and states that it is worse with movement ex moving out of bed. Patient states that pain is mostly midline at the base of head, with radiation to her paraspinal muscles. Denies any radicular pain to UE & LE. States that she has been sleeping only in a recliner and has since then been developing worsening lower back pain. Denies any ataxia, imbalance, or difficulty with ambulation. Denies any numbness, tingling, paresthesias, weakness, saddle anesthesia, or bowel / bladder incontinence. Neurosurgery was consulted. On 4.24.23 she was taken to the operating room where she underwent an Occiput to T2 instrumentation and Fusion, ORIF of C2. She had a hemovac drain placed intraop which was removed on POD#3. She was on a PCA pump postop which was removed on POD#3. She remained neurologically stable postop.      Patient is tolerating bedside PT, OT, and SLP. The patient was evaluated by the PM&R team once medically stable. The patient was found to have functional limitations in terms of muscle strength, endurance, physical mobility, ability to carry out activities of daily living including: self-care, transfers, and ambulation. Participating with bedside therapeutic exercises and cognitive retraining. The patient is motivated and is able to tolerate up to 3 hours of daily therapy for 5-6 days a week for a total of 15 hours a week. Evaluated by Physiatry and deemed to be a good candidate for admission to  for acute inpatient rehab. Admitted to Acute Rehab on 4/28/23.    PMHx/PSHx: as above  SHx: history of alcohol use disorder - quit 9 years ago, denies smoking and illicit drug use   FHx: non-contributory   Allergies: NKDA  Prior level of function: independent with ambulation and ADLs without assistive device although had been using straight cane for a few months and the last 2 weeks prior to presentation reports being essentially chair bound due to neck/back pain and difficulty with mobility.   Living situation: Pt resides with  in 2nd floor apt using 8 steps to enter and a flight of stairs to access.   Current level of function:  SLP 4/26: Some complaints of globus w/ solids Recommend: continue easy to chew w/ thin  PT 4/28: max assist for bed mobility, mod assist for transfers, min assist for ambulation 40ft with RW  OT 4/27: dependent for lower body dressing    (28 Apr 2023 14:46)      TODAY'S SUBJECTIVE & REVIEW OF SYMPTOMS:  No acute events reported overnight   Patient was seen and examined at bedside. In NAD  Pt having regular bowel movements and voiding bladder spontaneously.  Pt on RA with no acute symptoms or concerns. Will reach out to neurosurgery for staple removal/surgical site eval  vitals/labwork reviewed.    Plan for DC friday    CLOF: supervision bed mobility, PA transfers, ' with RW    Review of Systems:  Constitutional:    [  ] WNL           [   ] insomnia   [   ] tired  [ x ] recent weight loss and poor appetite - reporting depressed mood at home   Cardio:             [ x ] WNL           [   ] CP   [   ] SANCHEZ   [   ] palpitations         Resp:                [ x ] WNL           [   ] SOB   [   ] cough   [   ] wheezing  GI:                    [ x ] WNL           [   ] constipation   [   ] diarrhea   [   ] abdominal pain   [   ] nausea   [   ] emesis    :                   [ x ] WNL           [   ] SILVA  [   ] dysuria   [   ] difficulty voiding            Endo:                [ x ] WNL          [   ] polyuria   [   ] temperature intolerance                Skin:                  [  ] WNL          [   ] pain   [ x  ] Surgical incision along cervical spine    [ ] rash  MSK:                 [  ] WNL          [   ] muscle pain   [ x  ] pain midline neck   [   ] muscle tenderness   [   ] swelling  Neuro:              [  ] WNL          [   ] HA   [   ] change in vision   [   ] tremor   [   ] weakness   [  x  ]dysphagia             Cognitive:          [ x ] WNL          [   ]confusion     Psych:                [ x ] WNL          [   ] hallucinations   [   ]agitation   [   ] delusion   [   ]depression    PHYSICAL EXAM  Vital Signs (24 Hrs):  T(C): 36.2 (05-10-23 @ 05:47), Max: 36.4 (05-09-23 @ 20:17)  HR: 81 (05-10-23 @ 05:47) (81 - 119)  BP: 104/67 (05-10-23 @ 05:47) (104/67 - 126/83)  RR: 18 (05-10-23 @ 05:47) (18 - 19)      General:[ x ] NAD, Resting Comfortable,   [   ] other:                                HEENT: [ x ] NC/AT, EOMI, PERRL , Normal Conjunctivae,   [   ] other:  Cardio: [ x ] RRR, no murmer,   [   ] other:                              Pulm: [ x ] No Respiratory Distress,  Lungs CTAB,   [   ] other:                       Abdomen: [ x ]ND/NT, Soft,   [   ] other:    : [ x ] NO SILVA CATHETER, [   ] SILVA CATHETER- no meatal tear, no discharge, [   ] other:                                            MSK: [  ] No joint swelling, Full ROM,   [ x  ] other:  Surgical incision along cervical spine c/d/i.                                      Ext: [ x ]No C/C/E, No calf tenderness,   [   ]other:    Skin: [  ]intact,   [ x  ] other: Surgical incision along cervical spine c/d/i.                                                                  Neurological Examination:  Cognitive: [  x  ] AAO x 3,   [    ]  other: A&Ox3. Oriented to person, place and time. Follows 2 step commands, language components intact. No aphasia.   Attention:  [  x  ] intact,   [    ]  other:  intact spelling and simple calculations                Memory: [  x  ] intact,    [    ]  other: intact registration and recall    Mood/Affect: [  x  ] wnl,    [    ]  other:                                                                             Communication: [  x  ]Fluent, no dysarthria, following commands:  [    ] other:   CN II - XII:  [  x  ] intact,  [    ] other:                                                                                        Motor:   RIGHT UE: [  ] WNL,  [ x  ] other: 3-/5 shoulder abduction limited by neck/back pain, 4/5 elbow flexion/extension, 5/5 hand   LEFT    UE: [  ] WNL,  [  x ] other: 3-/5 shoulder abduction limited by neck/back pain, 4/5 elbow flexion/extension, 5/5 hand   RIGHT LE: [ x ] WNL,  [   ] other: 5/5 hip flexion, knee flexion/extension, ankle dorsiflexion/plantarflexion  LEFT    LE: [ x ] WNL,  [   ] other: 5/5 hip flexion, knee flexion/extension, ankle dorsiflexion/plantarflexion    Tone: [  x  ] wnl,   [    ]  other:  DTRs: [ x  ]symmetric, [   ] other:  Coordination:   [ x  ] intact,   [    ] other:                                                                           Sensory: [ x  ] Intact to light touch,   [    ] other:    Clonus negative    MEDICATIONS  (STANDING):  albuterol    90 MICROgram(s) HFA Inhaler 2 Puff(s) Inhalation every 6 hours  artificial  tears Solution 1 Drop(s) Both EYES two times a day  bisacodyl 5 milliGRAM(s) Oral at bedtime  chlorhexidine 2% Cloths 1 Application(s) Topical <User Schedule>  enoxaparin Injectable 30 milliGRAM(s) SubCutaneous every 12 hours  famotidine    Tablet 20 milliGRAM(s) Oral every 12 hours  gabapentin 300 milliGRAM(s) Oral every 8 hours  levothyroxine 50 MICROGram(s) Oral daily  loratadine 10 milliGRAM(s) Oral at bedtime  melatonin 3 milliGRAM(s) Oral at bedtime  methocarbamol 1000 milliGRAM(s) Oral every 6 hours  polyethylene glycol 3350 17 Gram(s) Oral daily  senna 2 Tablet(s) Oral at bedtime  tiotropium 2.5 MICROgram(s) Inhaler 2 Puff(s) Inhalation daily    MEDICATIONS  (PRN):  acetaminophen     Tablet .. 650 milliGRAM(s) Oral every 6 hours PRN Mild Pain (1 - 3)  naloxone Injectable 0.1 milliGRAM(s) IV Push every 3 minutes PRN For ANY of the following changes in patient status:  A. RR LESS THAN 10 breaths per minute, B. Oxygen saturation LESS THAN 90%, C. Sedation score of 6  ondansetron Injectable 4 milliGRAM(s) IV Push every 6 hours PRN Nausea and/or Vomiting  oxyCODONE    IR 10 milliGRAM(s) Oral every 4 hours PRN Moderate Pain (4 - 6)            RECENT LABS/IMAGING                                 10.5   10.51 )-----------( 428      ( 09 May 2023 08:02 )             32.9     05-09    138  |  102  |  11  ----------------------------<  155<H>  5.1<H>   |  26  |  0.6<L>    Ca    10.3      09 May 2023 08:02  Mg     2.2     05-09    TPro  6.4  /  Alb  3.8  /  TBili  0.4  /  DBili  x   /  AST  30  /  ALT  19  /  AlkPhos  224<H>  05-09

## 2023-05-10 NOTE — PROGRESS NOTE ADULT - ASSESSMENT
57F with PMH hypothyroidism, suspected alcoholic cirrhosis (stopped drinking over 9 yrs ago), Presented 4/20 for neck pain w/ limited movement /2 pain x4 months. Admitted for management of suspected breast CA with metastases to the spine now s/p Occiput-T2 instrumentation and fusion with ORIF of C2.    Prior to current illness and functional decline, patient was independent with ADLs and ambulation. Patient now requires assistance with ambulation and ADLs 2/2 neck/back pain, gait dysfunction, and deconditioning. Patient also with medical comorbidities of hypothyroidism, sinus tachycardia, hypertensive urgency and h/o cirrhosis, requiring medication management and oversight by Physiatry team and nursing. May also need specialist consulting from Neurosurgery. Patient is a complex medical case with mixed Neurosurgical, oncological, and medical issues. There are significant comorbidities which warrants acute rehab hospitalization. To return home it is in the patient’s best interest to have acute inpatient rehabilitative services to undergo intensive interdisciplinary therapy. Furthermore, the patient is motivated and is able to tolerate up to 3 hours of daily therapy for 5-6 days a week for a total of 15 hours a week. Evaluated by Physiatry and deemed to be a good candidate for admission to  for acute inpatient rehab. Admitted to Acute Rehab on 4/28/23.     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   - MRI spine: Numerous enhancing osseous metastatic lesions are noted throughout the cervical/thoracic/lumbar spine (most significant at the C2 and C3 level with evidence of  epidural extension causing severe spinal stenosis and mass effect). Malignant compression  deformity noted of the T8 vertebral body   - Neurosurgery Eval 4/22: Needs Hard collar [place] when OOB  - Oncology Consulted: will need biopsy before additional tx plan recs, also MRI brain w contrast to r/u Mets   - Radonc consulted 4/27: will need radiation to the C spine (no sooner than 2 weeks after surgery). To f/u as outpatient after d/c  -Hemovac discontinued 4/27  -Per Neurosurgery: plan was for [place] cervical collar when OOB but collars available do not fit so soft collar to be worn when OOB - confirmed with Neurosurgery team.   -Pain medications: previously evaluated by pain management. Gabapentin 300mg PO q8h, Tylenol 650mg q6h PRN mild pain, Oxycodone 10mg PO q4h PRN moderate pain, robaxin 1000mg q6hr  -following with Dr. Peña outpatient  - Neurosurgery to eval for staple removal    #Dysphagia  - mild, since surgery  - regular diet    #s/p Hypertension  - resolving  - d/c Labetalol, which also may be making her asthma worse - BP in good range    #Hypoxemia/ Asthma; improving  - CXR negative, PE r/o on V/Q low probability of PE  on RA this AM doing well  - Patient was dropping to 85- 87% with ambulation and pulse increases to 120  - Started short course of Prednisone, standing Nebs and Spiriva and doing well off O2    #Hypercalcemia likely 2/2 malignancy  #Elevated AlkPhos likely 2/2 malignancy  - resolved s/p IVF   -Will monitor BMP and trend electrolytes     # hypothyroidism   - TSH (4/21) WNL   - c/w levothyroxine 50mcg OD    #Maintenance   - Pain control: as above  - GI/Bowel Mgmt: no issues at this time, monitor for BMs.   - Bladder management: voiding freely, no issues at this time.   - Skin: monitor cervical surgical wound, Neurosurgery f/u as needed   - FEN: Hypercalcemia resolved as above. Monitor electrolytes and fluid status.   - Diet: DASH/TLC easy to chew diet     #Precautions / PROPHYLAXIS:    - Falls  - Ortho: Weight bearing status: WBAT  - Soft Cervical Collar  - DVT prophylaxis: Lovenox 30 SQ daily          57F with PMH hypothyroidism, suspected alcoholic cirrhosis (stopped drinking over 9 yrs ago), Presented 4/20 for neck pain w/ limited movement /2 pain x4 months. Admitted for management of suspected breast CA with metastases to the spine now s/p Occiput-T2 instrumentation and fusion with ORIF of C2.    Prior to current illness and functional decline, patient was independent with ADLs and ambulation. Patient now requires assistance with ambulation and ADLs 2/2 neck/back pain, gait dysfunction, and deconditioning. Patient also with medical comorbidities of hypothyroidism, sinus tachycardia, hypertensive urgency and h/o cirrhosis, requiring medication management and oversight by Physiatry team and nursing. May also need specialist consulting from Neurosurgery. Patient is a complex medical case with mixed Neurosurgical, oncological, and medical issues. There are significant comorbidities which warrants acute rehab hospitalization. To return home it is in the patient’s best interest to have acute inpatient rehabilitative services to undergo intensive interdisciplinary therapy. Furthermore, the patient is motivated and is able to tolerate up to 3 hours of daily therapy for 5-6 days a week for a total of 15 hours a week. Evaluated by Physiatry and deemed to be a good candidate for admission to  for acute inpatient rehab. Admitted to Acute Rehab on 4/28/23.     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   - MRI spine: Numerous enhancing osseous metastatic lesions are noted throughout the cervical/thoracic/lumbar spine (most significant at the C2 and C3 level with evidence of  epidural extension causing severe spinal stenosis and mass effect). Malignant compression  deformity noted of the T8 vertebral body   - Neurosurgery Eval 4/22: Needs Hard collar [place] when OOB  - Oncology Consulted: will need biopsy before additional tx plan recs, also MRI brain w contrast to r/u Mets   - Radonc consulted 4/27: will need radiation to the C spine (no sooner than 2 weeks after surgery). To f/u as outpatient after d/c  -Hemovac discontinued 4/27  -Per Neurosurgery: plan was for [place] cervical collar when OOB but collars available do not fit so soft collar to be worn when OOB - confirmed with Neurosurgery team.   -Pain medications: previously evaluated by pain management. Gabapentin 300mg PO q8h, Tylenol 650mg q6h PRN mild pain, Oxycodone 10mg PO q4h PRN moderate pain, robaxin 1000mg q6hr  -following with Dr. ePña outpatient  - Neurosurgery to eval for staple removal today    #Dysphagia  - mild, since surgery  - regular diet    #s/p Hypertension  - resolving  - d/c Labetalol, which also may be making her asthma worse - BP in good range    #Hypoxemia/ Asthma; improving  - CXR negative, PE r/o on V/Q low probability of PE  on RA this AM doing well  - Patient was dropping to 85- 87% with ambulation and pulse increases to 120  - Started short course of Prednisone, standing Nebs and Spiriva and doing well off O2    #Hypercalcemia likely 2/2 malignancy  #Elevated AlkPhos likely 2/2 malignancy  - resolved s/p IVF   -Will monitor BMP and trend electrolytes     # hypothyroidism   - TSH (4/21) WNL   - c/w levothyroxine 50mcg OD    #Maintenance   - Pain control: as above  - GI/Bowel Mgmt: no issues at this time, monitor for BMs.   - Bladder management: voiding freely, no issues at this time.   - Skin: monitor cervical surgical wound, Neurosurgery f/u as needed   - FEN: Hypercalcemia resolved as above. Monitor electrolytes and fluid status.   - Diet: DASH/TLC easy to chew diet     #Precautions / PROPHYLAXIS:    - Falls  - Ortho: Weight bearing status: WBAT  - Soft Cervical Collar  - DVT prophylaxis: Lovenox 30 SQ daily

## 2023-05-10 NOTE — PROGRESS NOTE ADULT - ATTENDING COMMENTS
I reviewed the chart and examined the patient with the resident and we discussed the findings and treatment plan.  The patient is tolerating the rehab program well. I agree with the findings and treatment plan above, which I modified as indicated. The patient requires 3 hrs a day of acute inpatient rehab. Patient doing well. Pain controlled on current meds. Participating well in therapies. NSGY to f/u for staple removal today. Incision C&D. VSS. To reportedly go for RT mapping today. Ambulates with RW and CG. Antic d/c home Friday with family.    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   - MRI spine: Numerous enhancing osseous metastatic lesions are noted throughout the cervical/thoracic/lumbar spine (most significant at the C2 and C3 level with evidence of  epidural extension causing severe spinal stenosis and mass effect). Malignant compression  deformity noted of the T8 vertebral body   - Neurosurgery Eval 4/22: Needs Hard collar [place] when OOB  - Oncology Consulted: will need biopsy before additional tx plan recs, also MRI brain w contrast to r/u Mets   - Radonc consulted 4/27: will need radiation to the C spine (no sooner than 2 weeks after surgery). To f/u as outpatient after d/c  -Hemovac discontinued 4/27  -Per Neurosurgery: plan was for [place] cervical collar when OOB but collars available do not fit so soft collar to be worn when OOB - confirmed with Neurosurgery team.   -Pain medications: previously evaluated by pain management. Gabapentin 300mg PO q8h, Tylenol 650mg q6h PRN mild pain, Oxycodone 10mg PO q4h PRN moderate pain, robaxin 1000mg q6hr  -following with Dr. Odaimi outpatient  - Neurosurgery to eval for staple removal today    #Dysphagia  - mild, since surgery  - regular diet    #s/p Hypertension  - resolving  - d/c Labetalol, which also may be making her asthma worse - BP in good range    #Hypoxemia/ Asthma; improving  - CXR negative, PE r/o on V/Q low probability of PE  - on RA doing well  - Patient was dropping to 85- 87% with ambulation and pulse increases to 120  - Started short course of Prednisone, standing Nebs and Spiriva and doing well off O2    #Hypercalcemia likely 2/2 malignancy  #Elevated AlkPhos likely 2/2 malignancy  - resolved s/p IVF   -Will monitor BMP and trend electrolytes     # hypothyroidism   - TSH (4/21) WNL   - c/w levothyroxine 50mcg OD

## 2023-05-11 NOTE — PROGRESS NOTE ADULT - TIME BILLING
patient contact and cognitive therapy
patient contact and cognitive/mood therapy
patient contact and mood assessment
patient contact and mood follow up

## 2023-05-11 NOTE — CONSULT NOTE ADULT - SUBJECTIVE AND OBJECTIVE BOX
Hematology Consult Note    HPI:  57-year-old female with PMH of hypothyroidism and ? alcoholic cirrhosis (stopped drinking over 9 yrs ago), presenting for neck pain on 4/20. Pt started having neck pain almost 4 months ago for which her chiropractor ordered an MRI that was done 1 month ago. Pt was not sure what the read of the MRI was but there was concern about lesions so she went to see Dr. Peña's office for the first time. Dr. Peña examined her and told her she has breast cancer with metastasis and needs to come to the ED for further evaluation. Pt is a non smoker and has no family history of breast Ca. Denies any significant weight loss, night sweats, or fevers. No reported bloody discharge from her nipples. Upon examining pt's right breast, dimpling with a lump was found. Pt believes that she noticed this change almost over a yr a go, but did not think of it was anything serious. Pt's neck pain has been significantly worsening limiting her ability to move her neck. She only has pain when moving her neck and has full sensation and ROM of her b/l UE. Denies any SOB, chest pain, or palpitations. States that she has pain with ROM, and states that it is worse with movement ex moving out of bed. Patient states that pain is mostly midline at the base of head, with radiation to her paraspinal muscles. Denies any radicular pain to UE & LE. States that she has been sleeping only in a recliner and has since then been developing worsening lower back pain. Denies any ataxia, imbalance, or difficulty with ambulation. Denies any numbness, tingling, paresthesias, weakness, saddle anesthesia, or bowel / bladder incontinence. Neurosurgery was consulted. On 4.24.23 she was taken to the operating room where she underwent an Occiput to T2 instrumentation and Fusion, ORIF of C2. She had a hemovac drain placed intraop which was removed on POD#3. She was on a PCA pump postop which was removed on POD#3. She remained neurologically stable postop.      Patient is tolerating bedside PT, OT, and SLP. The patient was evaluated by the PM&R team once medically stable. The patient was found to have functional limitations in terms of muscle strength, endurance, physical mobility, ability to carry out activities of daily living including: self-care, transfers, and ambulation. Participating with bedside therapeutic exercises and cognitive retraining. The patient is motivated and is able to tolerate up to 3 hours of daily therapy for 5-6 days a week for a total of 15 hours a week. Evaluated by Physiatry and deemed to be a good candidate for admission to  for acute inpatient rehab. Admitted to Acute Rehab on 4/28/23.    PMHx/PSHx: as above  SHx: history of alcohol use disorder - quit 9 years ago, denies smoking and illicit drug use   FHx: non-contributory   Allergies: NKDA  Prior level of function: independent with ambulation and ADLs without assistive device although had been using straight cane for a few months and the last 2 weeks prior to presentation reports being essentially chairbound due to neck/back pain and difficulty with mobility.   Living situation: Pt resides with  in 2nd floor apt using 8 steps to enter and a flight of stairs to access.   Current level of function:  SLP 4/26: Some complaints of globus w/ solids Recommend: continue easy to chew w/ thin  PT 4/28: max assist for bed mobility, mod assist for transfers, min assist for ambulation 40ft with RW  OT 4/27: dependent for lower body dressing      (28 Apr 2023 14:46)      Allergies    No Known Allergies    Intolerances    hydromorphone (Faint; Nausea)      MEDICATIONS  (STANDING):  albuterol    90 MICROgram(s) HFA Inhaler 2 Puff(s) Inhalation every 6 hours  artificial  tears Solution 1 Drop(s) Both EYES two times a day  bisacodyl 5 milliGRAM(s) Oral at bedtime  chlorhexidine 2% Cloths 1 Application(s) Topical <User Schedule>  enoxaparin Injectable 30 milliGRAM(s) SubCutaneous every 12 hours  famotidine    Tablet 20 milliGRAM(s) Oral every 12 hours  gabapentin 300 milliGRAM(s) Oral every 8 hours  levothyroxine 50 MICROGram(s) Oral daily  loratadine 10 milliGRAM(s) Oral at bedtime  melatonin 3 milliGRAM(s) Oral at bedtime  methocarbamol 1000 milliGRAM(s) Oral every 6 hours  polyethylene glycol 3350 17 Gram(s) Oral daily  senna 2 Tablet(s) Oral at bedtime  tiotropium 2.5 MICROgram(s) Inhaler 2 Puff(s) Inhalation daily    MEDICATIONS  (PRN):  acetaminophen     Tablet .. 650 milliGRAM(s) Oral every 6 hours PRN Mild Pain (1 - 3)  naloxone Injectable 0.1 milliGRAM(s) IV Push every 3 minutes PRN For ANY of the following changes in patient status:  A. RR LESS THAN 10 breaths per minute, B. Oxygen saturation LESS THAN 90%, C. Sedation score of 6  ondansetron Injectable 4 milliGRAM(s) IV Push every 6 hours PRN Nausea and/or Vomiting  oxyCODONE    IR 10 milliGRAM(s) Oral every 4 hours PRN Moderate Pain (4 - 6)      PAST MEDICAL & SURGICAL HISTORY:  Hypothyroidism      H/O cirrhosis      No significant past surgical history          FAMILY HISTORY:  FHx: melanoma (Mother)    FHx: arrhythmia (Father)        SOCIAL HISTORY: No EtOH, no tobacco    REVIEW OF SYSTEMS:    CONSTITUTIONAL: No weakness, fevers or chills  EYES/ENT: No visual changes;  No vertigo or throat pain   NECK: No pain or stiffness  RESPIRATORY: No cough, wheezing, hemoptysis; No shortness of breath  CARDIOVASCULAR: No chest pain or palpitations  GASTROINTESTINAL: No abdominal or epigastric pain. No nausea, vomiting, or hematemesis; No diarrhea or constipation. No melena or hematochezia.  GENITOURINARY: No dysuria, frequency or hematuria  NEUROLOGICAL: No numbness or weakness  SKIN: No itching, burning, rashes, or lesions   All other review of systems is negative unless indicated above.        T(F): 98.2 (05-11-23 @ 06:28), Max: 98.4 (05-10-23 @ 22:24)  HR: 100 (05-11-23 @ 06:28)  BP: 119/60 (05-11-23 @ 06:28)  RR: 18 (05-11-23 @ 06:28)  SpO2: --  Wt(kg): --    GENERAL: NAD, well-developed  HEAD:  Atraumatic, Normocephalic  EYES: EOMI, PERRLA, conjunctiva and sclera clear  NECK: Supple, No JVD  CHEST/LUNG: Clear to auscultation bilaterally; No wheeze  HEART: Regular rate and rhythm; No murmurs, rubs, or gallops  ABDOMEN: Soft, Nontender, Nondistended; Bowel sounds present  EXTREMITIES:  2+ Peripheral Pulses, No clubbing, cyanosis, or edema  NEUROLOGY: non-focal  SKIN: No rashes or lesions                    
Hematology Consult Note    HPI:  57-year-old female with PMH of hypothyroidism and ? alcoholic cirrhosis (stopped drinking over 9 yrs ago), presenting for neck pain on 4/20. Pt started having neck pain almost 4 months ago for which her chiropractor ordered an MRI that was done 1 month ago. Pt was not sure what the read of the MRI was but there was concern about lesions so she went to see Dr. Peña's office for the first time. Dr. Peña examined her and told her she has breast cancer with metastasis and needs to come to the ED for further evaluation. Pt is a non smoker and has no family history of breast Ca. Denies any significant weight loss, night sweats, or fevers. No reported bloody discharge from her nipples. Upon examining pt's right breast, dimpling with a lump was found. Pt believes that she noticed this change almost over a yr a go, but did not think of it was anything serious. Pt's neck pain has been significantly worsening limiting her ability to move her neck. She only has pain when moving her neck and has full sensation and ROM of her b/l UE. Denies any SOB, chest pain, or palpitations. States that she has pain with ROM, and states that it is worse with movement ex moving out of bed. Patient states that pain is mostly midline at the base of head, with radiation to her paraspinal muscles. Denies any radicular pain to UE & LE. States that she has been sleeping only in a recliner and has since then been developing worsening lower back pain. Denies any ataxia, imbalance, or difficulty with ambulation. Denies any numbness, tingling, paresthesias, weakness, saddle anesthesia, or bowel / bladder incontinence. Neurosurgery was consulted. On 4.24.23 she was taken to the operating room where she underwent an Occiput to T2 instrumentation and Fusion, ORIF of C2. She had a hemovac drain placed intraop which was removed on POD#3. She was on a PCA pump postop which was removed on POD#3. She remained neurologically stable postop.      Patient is tolerating bedside PT, OT, and SLP. The patient was evaluated by the PM&R team once medically stable. The patient was found to have functional limitations in terms of muscle strength, endurance, physical mobility, ability to carry out activities of daily living including: self-care, transfers, and ambulation. Participating with bedside therapeutic exercises and cognitive retraining. The patient is motivated and is able to tolerate up to 3 hours of daily therapy for 5-6 days a week for a total of 15 hours a week. Evaluated by Physiatry and deemed to be a good candidate for admission to  for acute inpatient rehab. Admitted to Acute Rehab on 4/28/23.    PMHx/PSHx: as above  SHx: history of alcohol use disorder - quit 9 years ago, denies smoking and illicit drug use   FHx: non-contributory   Allergies: NKDA  Prior level of function: independent with ambulation and ADLs without assistive device although had been using straight cane for a few months and the last 2 weeks prior to presentation reports being essentially chairbound due to neck/back pain and difficulty with mobility.   Living situation: Pt resides with  in 2nd floor apt using 8 steps to enter and a flight of stairs to access.   Current level of function:  SLP 4/26: Some complaints of globus w/ solids Recommend: continue easy to chew w/ thin  PT 4/28: max assist for bed mobility, mod assist for transfers, min assist for ambulation 40ft with RW  OT 4/27: dependent for lower body dressing      (28 Apr 2023 14:46)      Allergies    No Known Allergies    Intolerances    hydromorphone (Faint; Nausea)      MEDICATIONS  (STANDING):  albuterol    90 MICROgram(s) HFA Inhaler 2 Puff(s) Inhalation every 6 hours  artificial  tears Solution 1 Drop(s) Both EYES two times a day  bisacodyl 5 milliGRAM(s) Oral at bedtime  chlorhexidine 2% Cloths 1 Application(s) Topical <User Schedule>  enoxaparin Injectable 30 milliGRAM(s) SubCutaneous every 12 hours  famotidine    Tablet 20 milliGRAM(s) Oral every 12 hours  gabapentin 300 milliGRAM(s) Oral every 8 hours  levothyroxine 50 MICROGram(s) Oral daily  loratadine 10 milliGRAM(s) Oral at bedtime  melatonin 3 milliGRAM(s) Oral at bedtime  methocarbamol 1000 milliGRAM(s) Oral every 6 hours  polyethylene glycol 3350 17 Gram(s) Oral daily  predniSONE   Tablet 40 milliGRAM(s) Oral <User Schedule>  senna 2 Tablet(s) Oral at bedtime  tiotropium 2.5 MICROgram(s) Inhaler 2 Puff(s) Inhalation daily    MEDICATIONS  (PRN):  acetaminophen     Tablet .. 650 milliGRAM(s) Oral every 6 hours PRN Mild Pain (1 - 3)  naloxone Injectable 0.1 milliGRAM(s) IV Push every 3 minutes PRN For ANY of the following changes in patient status:  A. RR LESS THAN 10 breaths per minute, B. Oxygen saturation LESS THAN 90%, C. Sedation score of 6  ondansetron Injectable 4 milliGRAM(s) IV Push every 6 hours PRN Nausea and/or Vomiting  oxyCODONE    IR 10 milliGRAM(s) Oral every 4 hours PRN Moderate Pain (4 - 6)      PAST MEDICAL & SURGICAL HISTORY:  Hypothyroidism      H/O cirrhosis      No significant past surgical history          FAMILY HISTORY:  FHx: melanoma (Mother)    FHx: arrhythmia (Father)        SOCIAL HISTORY: No EtOH, no tobacco    REVIEW OF SYSTEMS:    CONSTITUTIONAL: No weakness, fevers or chills  EYES/ENT: No visual changes;  No vertigo or throat pain   NECK: No pain or stiffness  RESPIRATORY: No cough, wheezing, hemoptysis; No shortness of breath  CARDIOVASCULAR: No chest pain or palpitations  GASTROINTESTINAL: No abdominal or epigastric pain. No nausea, vomiting, or hematemesis; No diarrhea or constipation. No melena or hematochezia.  GENITOURINARY: No dysuria, frequency or hematuria  NEUROLOGICAL: No numbness or weakness  SKIN: No itching, burning, rashes, or lesions   All other review of systems is negative unless indicated above.        T(F): 98 (05-05-23 @ 05:00), Max: 99.3 (05-04-23 @ 20:40)  HR: 91 (05-05-23 @ 05:00)  BP: 119/69 (05-05-23 @ 05:00)  RR: 18 (05-05-23 @ 05:00)  SpO2: 94% (05-05-23 @ 05:21)  Wt(kg): --    GENERAL: NAD, well-developed  HEAD:  Atraumatic, Normocephalic  EYES: EOMI, PERRLA, conjunctiva and sclera clear  NECK: Supple, No JVD  CHEST/LUNG: Clear to auscultation bilaterally; No wheeze  HEART: Regular rate and rhythm; No murmurs, rubs, or gallops  ABDOMEN: Soft, Nontender, Nondistended; Bowel sounds present  EXTREMITIES:  2+ Peripheral Pulses, No clubbing, cyanosis, or edema  NEUROLOGY: non-focal  SKIN: No rashes or lesions        05-04    139  |  99  |  7<L>  ----------------------------<  106<H>  4.3   |  29  |  0.5<L>    Ca    9.9      04 May 2023 08:01  Mg     1.9     05-04    TPro  5.7<L>  /  Alb  3.2<L>  /  TBili  0.4  /  DBili  x   /  AST  30  /  ALT  13  /  AlkPhos  246<H>  05-04          Radiology    < from: CT Abdomen and Pelvis w/ Oral Cont and w/ IV Cont (04.21.23 @ 17:06) >    IMPRESSION:    CHEST:    Right breast mass measuring approximately 2 x 2.8 cm with nipple   retraction.    Multiple enlarged mediastinal lymph nodes including enlarged lower   paratracheal lymph nodes measuring up to 1.6 cm.    Multiple mixed lytic and sclerotic sternal and rib lesions. Sclerotic T8   vertebral body lesion.    Additional thoracic spine lesions, better visualized on recent MRI.    ABDOMEN/PELVIS:    Mixed lytic and sclerotic lesion of the L5 vertebral body. Additional   lumbar spine lesions, better visualized on recent MRI.    --- End of Report ---      < end of copied text >

## 2023-05-11 NOTE — DISCHARGE NOTE NURSING/CASE MANAGEMENT/SOCIAL WORK - NSDCPEFALRISK_GEN_ALL_CORE
For information on Fall & Injury Prevention, visit: [email or web address] OR  [email or web address] OR  https://www.cdc.gov/steadi/patient.html

## 2023-05-11 NOTE — PROGRESS NOTE ADULT - ATTENDING COMMENTS
I reviewed the chart and examined the patient with the resident and we discussed the findings and treatment plan.  The patient is tolerating the rehab program well. I agree with the findings and treatment plan above, which I modified as indicated. The patient requires 3 hrs a day of acute inpatient rehab. No new complaints. Staples removed 5/10. Neuro exam stable. VSS. Heme-onc consult appreciated. Want to start Letrozole upon d/c. Also wants MRI w/ and w/o contrast to r/o brain mets. Patient is to be discharged home tomorrow. If patient wants, this can be done as an outpatient. Otherwise, she can benefit from staying on rehab until Monday to improve her transfers and ambulation and have the test done here. I will discuss with her.    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   - MRI spine: Numerous enhancing osseous metastatic lesions are noted throughout the cervical/thoracic/lumbar spine (most significant at the C2 and C3 level with evidence of  epidural extension causing severe spinal stenosis and mass effect). Malignant compression  deformity noted of the T8 vertebral body   - Neurosurgery Eval 4/22: Needs Hard collar [place] when OOB  - Oncology Consulted: will need biopsy before additional tx plan recs, also MRI brain w contrast to r/u Mets   - Radonc consulted 4/27: will need radiation to the C spine (no sooner than 2 weeks after surgery). To f/u as outpatient after d/c  -Hemovac discontinued 4/27  -Per Neurosurgery: plan was for [place] cervical collar when OOB but collars available do not fit so soft collar to be worn when OOB - confirmed with Neurosurgery team.   -Pain medications: previously evaluated by pain management. Gabapentin 300mg PO q8h, Tylenol 650mg q6h PRN mild pain, Oxycodone 10mg PO q4h PRN moderate pain, robaxin 1000mg q6hr  -following with Dr. Peña outpatient  - Neurosurgery removed staples 5/10    #Dysphagia  - mild, since surgery  - regular diet    #s/p Hypertension  - resolving  - d/c Labetalol, which also may be making her asthma worse - BP in good range    #Hypoxemia/ Asthma; improving  - CXR negative, PE r/o on V/Q low probability of PE  on RA this AM doing well  - Patient was dropping to 85- 87% with ambulation and pulse increases to 120  - Started short course of Prednisone, standing Nebs and Spiriva and doing well off O2    #Hypercalcemia likely 2/2 malignancy  #Elevated AlkPhos likely 2/2 malignancy  - resolved s/p IVF   -Will monitor BMP and trend electrolytes     # hypothyroidism   - TSH (4/21) WNL   - c/w levothyroxine 50mcg OD

## 2023-05-11 NOTE — PROGRESS NOTE ADULT - SUBJECTIVE AND OBJECTIVE BOX
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 alcoholic cirrhosis (stopped drinking over 9 yrs ago), presenting for neck pain on 4/20. Pt started having neck pain almost 4 months ago for which her chiropractor ordered an MRI that was done 1 month ago. Pt was not sure what the read of the MRI was but there was concern about lesions so she went to see Dr. Peña's office for the first time. Dr. Peña examined her and told her she has breast cancer with metastasis and needs to come to the ED for further evaluation. Pt is a non smoker and has no family history of breast Ca. Denies any significant weight loss, night sweats, or fevers. No reported bloody discharge from her nipples. Upon examining pt's right breast, dimpling with a lump was found. Pt believes that she noticed this change almost over a yr a go, but did not think of it was anything serious. Pt's neck pain has been significantly worsening limiting her ability to move her neck. She only has pain when moving her neck and has full sensation and ROM of her b/l UE. Denies any SOB, chest pain, or palpitations. States that she has pain with ROM, and states that it is worse with movement ex moving out of bed. Patient states that pain is mostly midline at the base of head, with radiation to her paraspinal muscles. Denies any radicular pain to UE & LE. States that she has been sleeping only in a recliner and has since then been developing worsening lower back pain. Denies any ataxia, imbalance, or difficulty with ambulation. Denies any numbness, tingling, paresthesias, weakness, saddle anesthesia, or bowel / bladder incontinence. Neurosurgery was consulted. On 4.24.23 she was taken to the operating room where she underwent an Occiput to T2 instrumentation and Fusion, ORIF of C2. She had a hemovac drain placed intraop which was removed on POD#3. She was on a PCA pump postop which was removed on POD#3. She remained neurologically stable postop.      Patient is tolerating bedside PT, OT, and SLP. The patient was evaluated by the PM&R team once medically stable. The patient was found to have functional limitations in terms of muscle strength, endurance, physical mobility, ability to carry out activities of daily living including: self-care, transfers, and ambulation. Participating with bedside therapeutic exercises and cognitive retraining. The patient is motivated and is able to tolerate up to 3 hours of daily therapy for 5-6 days a week for a total of 15 hours a week. Evaluated by Physiatry and deemed to be a good candidate for admission to  for acute inpatient rehab. Admitted to Acute Rehab on 4/28/23.    PMHx/PSHx: as above  SHx: history of alcohol use disorder - quit 9 years ago, denies smoking and illicit drug use   FHx: non-contributory   Allergies: NKDA  Prior level of function: independent with ambulation and ADLs without assistive device although had been using straight cane for a few months and the last 2 weeks prior to presentation reports being essentially chair bound due to neck/back pain and difficulty with mobility.   Living situation: Pt resides with  in 2nd floor apt using 8 steps to enter and a flight of stairs to access.   Current level of function:  SLP 4/26: Some complaints of globus w/ solids Recommend: continue easy to chew w/ thin  PT 4/28: max assist for bed mobility, mod assist for transfers, min assist for ambulation 40ft with RW  OT 4/27: dependent for lower body dressing    (28 Apr 2023 14:46)      TODAY'S SUBJECTIVE & REVIEW OF SYMPTOMS:  No acute events reported overnight   Patient was seen and examined at bedside. In NAD  Pt having regular bowel movements and voiding bladder spontaneously.  Pt on RA with no acute symptoms or concerns. s/p staple removal per NSGY  vitals/labwork reviewed.    Plan for DC tomorrow    CLOF: supervision bed mobility, PA transfers, ' with RW    Review of Systems:  Constitutional:    [  ] WNL           [   ] insomnia   [   ] tired  [ x ] recent weight loss and poor appetite - reporting depressed mood at home   Cardio:             [ x ] WNL           [   ] CP   [   ] SANCHEZ   [   ] palpitations         Resp:                [ x ] WNL           [   ] SOB   [   ] cough   [   ] wheezing  GI:                    [ x ] WNL           [   ] constipation   [   ] diarrhea   [   ] abdominal pain   [   ] nausea   [   ] emesis    :                   [ x ] WNL           [   ] SILVA  [   ] dysuria   [   ] difficulty voiding            Endo:                [ x ] WNL          [   ] polyuria   [   ] temperature intolerance                Skin:                  [  ] WNL          [   ] pain   [ x  ] Surgical incision along cervical spine    [ ] rash  MSK:                 [  ] WNL          [   ] muscle pain   [ x  ] pain midline neck   [   ] muscle tenderness   [   ] swelling  Neuro:              [  ] WNL          [   ] HA   [   ] change in vision   [   ] tremor   [   ] weakness   [  x  ]dysphagia             Cognitive:          [ x ] WNL          [   ]confusion     Psych:                [ x ] WNL          [   ] hallucinations   [   ]agitation   [   ] delusion   [   ]depression    PHYSICAL EXAM  Vital Signs (24 Hrs):  T(C): 36.8 (05-11-23 @ 06:28), Max: 36.9 (05-10-23 @ 22:24)  HR: 100 (05-11-23 @ 06:28) (84 - 107)  BP: 119/60 (05-11-23 @ 06:28) (116/74 - 157/82)  RR: 18 (05-11-23 @ 06:28) (18 - 20)      General:[ x ] NAD, Resting Comfortable,   [   ] other:                                HEENT: [ x ] NC/AT, EOMI, PERRL , Normal Conjunctivae,   [   ] other:  Cardio: [ x ] RRR, no murmer,   [   ] other:                              Pulm: [ x ] No Respiratory Distress,  Lungs CTAB,   [   ] other:                       Abdomen: [ x ]ND/NT, Soft,   [   ] other:    : [ x ] NO SILVA CATHETER, [   ] SILVA CATHETER- no meatal tear, no discharge, [   ] other:                                            MSK: [  ] No joint swelling, Full ROM,   [ x  ] other:  Surgical incision along cervical spine c/d/i.                                      Ext: [ x ]No C/C/E, No calf tenderness,   [   ]other:    Skin: [  ]intact,   [ x  ] other: Surgical incision along cervical spine c/d/i.                                                                  Neurological Examination:  Cognitive: [  x  ] AAO x 3,   [    ]  other: A&Ox3. Oriented to person, place and time. Follows 2 step commands, language components intact. No aphasia.   Attention:  [  x  ] intact,   [    ]  other:  intact spelling and simple calculations                Memory: [  x  ] intact,    [    ]  other: intact registration and recall    Mood/Affect: [  x  ] wnl,    [    ]  other:                                                                             Communication: [  x  ]Fluent, no dysarthria, following commands:  [    ] other:   CN II - XII:  [  x  ] intact,  [    ] other:                                                                                        Motor:   RIGHT UE: [  ] WNL,  [ x  ] other: 3-/5 shoulder abduction limited by neck/back pain, 4/5 elbow flexion/extension, 5/5 hand   LEFT    UE: [  ] WNL,  [  x ] other: 3-/5 shoulder abduction limited by neck/back pain, 4/5 elbow flexion/extension, 5/5 hand   RIGHT LE: [ x ] WNL,  [   ] other: 5/5 hip flexion, knee flexion/extension, ankle dorsiflexion/plantarflexion  LEFT    LE: [ x ] WNL,  [   ] other: 5/5 hip flexion, knee flexion/extension, ankle dorsiflexion/plantarflexion    Tone: [  x  ] wnl,   [    ]  other:  DTRs: [ x  ]symmetric, [   ] other:  Coordination:   [ x  ] intact,   [    ] other:                                                                           Sensory: [ x  ] Intact to light touch,   [    ] other:    Clonus negative    MEDICATIONS  (STANDING):  albuterol    90 MICROgram(s) HFA Inhaler 2 Puff(s) Inhalation every 6 hours  artificial  tears Solution 1 Drop(s) Both EYES two times a day  bisacodyl 5 milliGRAM(s) Oral at bedtime  chlorhexidine 2% Cloths 1 Application(s) Topical <User Schedule>  enoxaparin Injectable 30 milliGRAM(s) SubCutaneous every 12 hours  famotidine    Tablet 20 milliGRAM(s) Oral every 12 hours  gabapentin 300 milliGRAM(s) Oral every 8 hours  levothyroxine 50 MICROGram(s) Oral daily  loratadine 10 milliGRAM(s) Oral at bedtime  melatonin 3 milliGRAM(s) Oral at bedtime  methocarbamol 1000 milliGRAM(s) Oral every 6 hours  polyethylene glycol 3350 17 Gram(s) Oral daily  senna 2 Tablet(s) Oral at bedtime  tiotropium 2.5 MICROgram(s) Inhaler 2 Puff(s) Inhalation daily    MEDICATIONS  (PRN):  acetaminophen     Tablet .. 650 milliGRAM(s) Oral every 6 hours PRN Mild Pain (1 - 3)  naloxone Injectable 0.1 milliGRAM(s) IV Push every 3 minutes PRN For ANY of the following changes in patient status:  A. RR LESS THAN 10 breaths per minute, B. Oxygen saturation LESS THAN 90%, C. Sedation score of 6  ondansetron Injectable 4 milliGRAM(s) IV Push every 6 hours PRN Nausea and/or Vomiting  oxyCODONE    IR 10 milliGRAM(s) Oral every 4 hours PRN Moderate Pain (4 - 6)        RECENT LABS/IMAGING                                   10.5   10.51 )-----------( 428      ( 09 May 2023 08:02 )             32.9     05-09    138  |  102  |  11  ----------------------------<  155<H>  5.1<H>   |  26  |  0.6<L>    Ca    10.3      09 May 2023 08:02  Mg     2.2     05-09    TPro  6.4  /  Alb  3.8  /  TBili  0.4  /  DBili  x   /  AST  30  /  ALT  19  /  AlkPhos  224<H>  05-09

## 2023-05-11 NOTE — PROGRESS NOTE ADULT - ASSESSMENT
Neuropsychology Follow Up    Treatment focused on mood therapy    Pain: Yes Location: Neck/Lower half Ratin/10 Intervention: RN notified; patient was scheduled to receive oxycodone prescription within the following hour.    Orientation: WNL    Arousal Level: Alert    Behavior: Cooperative    Affect Range: WNL    Attention: WNL    Insight into illness/deficits: Good    Treatment Session Focused on Mood/ Coping/ Psychoeducation    The patient was seen to support positive coping and mood therapy.    During today's session, expressed feeling optimistic regarding her discharge scheduled for tomorrow. The patient acknowledged that she continues gradually progress in OT/PT. She denied any changes in cognition, appetite, and sleeping.    Patient was provided with support and cognitive behavioral therapy. Patient continued to process and discuss emotions within the context of interpersonal relationships. Patient discussed patterns of defense mechanisms to avoid unwanted emotions. Patient was provided psychoeducation on defense mechanisms and how they limit cognitive flexibility, and can lead to dichotomous through process.     Patient continue to explore defense mechanism within the context of interpersonal dynamics, in particular, familial relationships.     Patient was provided with intervention tools to help facilitate greater cognitive flexibility, specifically within how she approaches family dynamics. Patient continued to discuss the significance support, and how she could practice greater cognitive flexibility with her family and her overall health and treatment moving forward. Patient was provided psychoeducation on outpatient psychology services. Patient in agreement for outpatient psychology referral.    Plan: Patient is scheduled for discharge tomorrow (23).

## 2023-05-11 NOTE — CONSULT NOTE ADULT - ASSESSMENT
56 yo F with PMH of hypothyroidism and ?alcoholic cirrhosis (stopped drinking over 9 yrs ago) presented to the ED due to neck pain. She saw Dr. Peña (oncologist) on 4/20/23 to establish care for her suspected metastatic bone disease when she had a MRI of neck done on 3/21/23 which showed  diffuse metastatic bone disease most pronounced at C2 with bony expansion causing circumferential narrowing of central canal. In the ED, she was seen by neurosx team and recommended MRI C/T/L spine with sachin, currently pending. Oncology team is consulted for her metastatic disease.    # De clementina metastatic Breast Cancer (ER +1 TN +1  Ki-67  < 5%, HER2 pending)    - seen at oncology office for diffuse osseous mets  - PE shows R breast with nipple retraction and a firm nodule close proximity to nipple at 3 o'clock  - CT chest/abdomen/pelvis : Rt breast mass , multiple enlarged mediastinal LN and paratracheal LN, lytic and scleoritic sternal or mami resions. No visceral mets.  - MRI C/T/L spine with sachin appreciated, numerous osseous mets through spines w/ most significant at the C2 and C3 level with evidence of epidural extension  - 4/24 : operated by Neurosurgery for occipitocervical junction fusion. Pathology results showed : Metastatic Ca with Breast Primary)      Recommendations:      - Obtain MR head w/ and w/o IV contrast r/o brain mets  - Pt with metastatic breast cancer that is weakly hormone receptor positive. We can start treatment with the combination of endocrine therapy with CDK 4/6i  - Pt will require dental clearance before we can start her on monthly Bisphosphonate treatment    This is an incomplete  note     58 yo F with PMH of hypothyroidism and ?alcoholic cirrhosis (stopped drinking over 9 yrs ago) presented to the ED due to neck pain. She saw Dr. Peña (oncologist) on 4/20/23 to establish care for her suspected metastatic bone disease when she had a MRI of neck done on 3/21/23 which showed  diffuse metastatic bone disease most pronounced at C2 with bony expansion causing circumferential narrowing of central canal. In the ED, she was seen by neurosx team and recommended MRI C/T/L spine with sachin, currently pending. Oncology team is consulted for her metastatic disease.    # De clementina metastatic Breast Cancer (ER +1 HI +1  Ki-67  < 5%, HER2 pending)    - seen at oncology office for diffuse osseous mets  - PE shows R breast with nipple retraction and a firm nodule close proximity to nipple at 3 o'clock  - CT chest/abdomen/pelvis : Rt breast mass , multiple enlarged mediastinal LN and paratracheal LN, lytic and scleoritic sternal or mami resions. No visceral mets.  - MRI C/T/L spine with sachin appreciated, numerous osseous mets through spines w/ most significant at the C2 and C3 level with evidence of epidural extension  - 4/24 : operated by Neurosurgery for occipitocervical junction fusion. Pathology results showed : Metastatic Ca with Breast Primary)      Recommendations:      - Obtain MR head w/ and w/o IV contrast r/o brain mets  - Start patient on Letrozole 2.4 mg once daily . (prescription for one month sent to pharmacy)  - Pt with metastatic breast cancer that is weakly hormone receptor positive. We can start treatment with the combination of endocrine therapy with CDK 4/6i. Will wait for HER2 status before finalizing systemic treatment.  - Pt to follow up with Dr Peña at [facility] on 5/25/2023    For any questions call [phone] or text via MS teams

## 2023-05-11 NOTE — CONSULT NOTE ADULT - REASON FOR ADMISSION
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

## 2023-05-11 NOTE — CONSULT NOTE ADULT - ATTENDING COMMENTS
Patient examined by myself , above read . Progressing well with PT .  will follow up as outpatient , MRI brain , start on letrozole, patient apprised of potential adverse effects mainly arthralgias, vasomotor symptoms , bone loss.  Post operative radiation .    Will need bone directed therapy as well ( Denosumab ) after dental clearance .

## 2023-05-11 NOTE — DISCHARGE NOTE NURSING/CASE MANAGEMENT/SOCIAL WORK - PATIENT PORTAL LINK FT
· Continue rosuvastatin 40 mg p.o. daily. followup lipids.   You can access the FollowMyHealth Patient Portal offered by [facility] by registering at the following website: [email or web address]. By joining [employer]’s FollowMyHealth portal, you will also be able to view your health information using other applications (apps) compatible with our system.

## 2023-05-12 NOTE — DISCHARGE NOTE PROVIDER - NSDCFUSCHEDAPPT_GEN_ALL_CORE_FT
Kamran Peña  [facility] PreAdmits  Scheduled Appointment: 05/25/2023    Kamran Peña  [facility]  [address]  Scheduled Appointment: 05/25/2023    [facility]  NEUROSURG [address]  Scheduled Appointment: 05/30/2023

## 2023-05-12 NOTE — PROGRESS NOTE ADULT - ASSESSMENT
57F with PMH hypothyroidism, suspected alcoholic cirrhosis (stopped drinking over 9 yrs ago), Presented 4/20 for neck pain w/ limited movement /2 pain x4 months. Admitted for management of suspected breast CA with metastases to the spine now s/p Occiput-T2 instrumentation and fusion with ORIF of C2.    Prior to current illness and functional decline, patient was independent with ADLs and ambulation. Patient now requires assistance with ambulation and ADLs 2/2 neck/back pain, gait dysfunction, and deconditioning. Patient also with medical comorbidities of hypothyroidism, sinus tachycardia, hypertensive urgency and h/o cirrhosis, requiring medication management and oversight by Physiatry team and nursing. May also need specialist consulting from Neurosurgery. Patient is a complex medical case with mixed Neurosurgical, oncological, and medical issues. There are significant comorbidities which warrants acute rehab hospitalization. To return home it is in the patient’s best interest to have acute inpatient rehabilitative services to undergo intensive interdisciplinary therapy. Furthermore, the patient is motivated and is able to tolerate up to 3 hours of daily therapy for 5-6 days a week for a total of 15 hours a week. Evaluated by Physiatry and deemed to be a good candidate for admission to  for acute inpatient rehab. Admitted to Acute Rehab on 4/28/23.     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   - MRI spine: Numerous enhancing osseous metastatic lesions are noted throughout the cervical/thoracic/lumbar spine (most significant at the C2 and C3 level with evidence of  epidural extension causing severe spinal stenosis and mass effect). Malignant compression  deformity noted of the T8 vertebral body   - Neurosurgery Eval 4/22: Needs Hard collar [place] J when OOB  - Oncology Consulted: will need biopsy before additional tx plan recs, also MRI brain w contrast to r/u Mets   - Radonc consulted 4/27: will need radiation to the C spine (no sooner than 2 weeks after surgery). To f/u as outpatient after d/c  -Hemovac discontinued 4/27  -Per Neurosurgery: plan was for [place] cervical collar when OOB but collars available do not fit so soft collar to be worn when OOB - confirmed with Neurosurgery team.   -Pain medications: previously evaluated by pain management. Gabapentin 300mg PO q8h, Tylenol 650mg q6h PRN mild pain, Oxycodone 10mg PO q4h PRN moderate pain, robaxin 1000mg q6hr  -following with Dr. Peña outpatient  - Neurosurgery removed staples 5/10    - Completed acute rehab course and did well. Ambulating with RW with close supervision. Family ed. completed. Home care PT and OT requested and may come for outpatient Neuropsych for support.   - To f/u with Rad. Onc and with Dr. Seals and Neurosurgery as scheduled.  - Will get MRI +/- as an outpatient . Continue pain meds.  - Starting Letrozole upon discharge per Heme/Onc  - Physiatry f/u prn      #Dysphagia  - mild, since surgery  - s/ MBS with SLP  - regular diet    #s/p Hypertension  - resolving  - d/c Labetalol, which also may be making her asthma worse - BP in good range    #Hypoxemia/ Asthma; improving  - CXR negative, PE r/o on V/Q low probability of PE  on RA this AM doing well  - Patient was dropping to 85- 87% with ambulation and pulse increases to 120  - Started short course of Prednisone, standing Nebs and Spiriva and doing well off O2    #Hypercalcemia likely 2/2 malignancy  #Elevated AlkPhos likely 2/2 malignancy  - resolved s/p IVF   -Will monitor BMP and trend electrolytes     # hypothyroidism   - TSH (4/21) WNL   - c/w levothyroxine 50mcg OD    Regular diet  Soft collar out of bed  Activities as tolerated with soft collar and walker and supervision.  Physiatry f/u prn

## 2023-05-12 NOTE — DISCHARGE NOTE PROVIDER - PROVIDER TOKENS
PROVIDER:[TOKEN:[[id number]],FOLLOWUP:[1 week],ESTABLISHEDPATIENT:[T]],PROVIDER:[TOKEN:[[id number]],FOLLOWUP:[2 weeks],ESTABLISHEDPATIENT:[T]]

## 2023-05-12 NOTE — PROGRESS NOTE ADULT - SUBJECTIVE AND OBJECTIVE BOX
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 alcoholic cirrhosis (stopped drinking over 9 yrs ago), presenting for neck pain on 4/20. Pt started having neck pain almost 4 months ago for which her chiropractor ordered an MRI that was done 1 month ago. Pt was not sure what the read of the MRI was but there was concern about lesions so she went to see Dr. Peña's office for the first time. Dr. Peña examined her and told her she has breast cancer with metastasis and needs to come to the ED for further evaluation. Pt is a non smoker and has no family history of breast Ca. Denies any significant weight loss, night sweats, or fevers. No reported bloody discharge from her nipples. Upon examining pt's right breast, dimpling with a lump was found. Pt believes that she noticed this change almost over a yr a go, but did not think of it was anything serious. Pt's neck pain has been significantly worsening limiting her ability to move her neck. She only has pain when moving her neck and has full sensation and ROM of her b/l UE. Denies any SOB, chest pain, or palpitations. States that she has pain with ROM, and states that it is worse with movement ex moving out of bed. Patient states that pain is mostly midline at the base of head, with radiation to her paraspinal muscles. Denies any radicular pain to UE & LE. States that she has been sleeping only in a recliner and has since then been developing worsening lower back pain. Denies any ataxia, imbalance, or difficulty with ambulation. Denies any numbness, tingling, paresthesias, weakness, saddle anesthesia, or bowel / bladder incontinence. Neurosurgery was consulted. On 4.24.23 she was taken to the operating room where she underwent an Occiput to T2 instrumentation and Fusion, ORIF of C2. She had a hemovac drain placed intraop which was removed on POD#3. She was on a PCA pump postop which was removed on POD#3. She remained neurologically stable postop.      Patient is tolerating bedside PT, OT, and SLP. The patient was evaluated by the PM&R team once medically stable. The patient was found to have functional limitations in terms of muscle strength, endurance, physical mobility, ability to carry out activities of daily living including: self-care, transfers, and ambulation. Participating with bedside therapeutic exercises and cognitive retraining. The patient is motivated and is able to tolerate up to 3 hours of daily therapy for 5-6 days a week for a total of 15 hours a week. Evaluated by Physiatry and deemed to be a good candidate for admission to  for acute inpatient rehab. Admitted to Acute Rehab on 4/28/23.    PMHx/PSHx: as above  SHx: history of alcohol use disorder - quit 9 years ago, denies smoking and illicit drug use   FHx: non-contributory   Allergies: NKDA  Prior level of function: independent with ambulation and ADLs without assistive device although had been using straight cane for a few months and the last 2 weeks prior to presentation reports being essentially chair bound due to neck/back pain and difficulty with mobility.   Living situation: Pt resides with  in 2nd floor apt using 8 steps to enter and a flight of stairs to access.   Current level of function:  SLP 4/26: Some complaints of globus w/ solids Recommend: continue easy to chew w/ thin  PT 4/28: max assist for bed mobility, mod assist for transfers, min assist for ambulation 40ft with RW  OT 4/27: dependent for lower body dressing    (28 Apr 2023 14:46)      TODAY'S SUBJECTIVE & REVIEW OF SYMPTOMS:  No acute events reported overnight   Patient was seen and examined at bedside. In NAD  No complaints. Feeling well and eager for d/c home today with [facility] home care therapy.  vitals/labwork reviewed.    CLOF: supervision bed mobility, PA transfers, ambulates 130' with RW with CS    Review of Systems:  Constitutional:    [  ] WNL           [   ] insomnia   [   ] tired  [ x ] recent weight loss and poor appetite - reporting depressed mood at home   Cardio:             [ x ] WNL           [   ] CP   [   ] SANCHEZ   [   ] palpitations         Resp:                [ x ] WNL           [   ] SOB   [   ] cough   [   ] wheezing  GI:                    [ x ] WNL           [   ] constipation   [   ] diarrhea   [   ] abdominal pain   [   ] nausea   [   ] emesis    :                   [ x ] WNL           [   ] SILVA  [   ] dysuria   [   ] difficulty voiding            Endo:                [ x ] WNL          [   ] polyuria   [   ] temperature intolerance                Skin:                  [  ] WNL          [   ] pain   [ x  ] Surgical incision along cervical spine    [ ] rash  MSK:                 [  ] WNL          [   ] muscle pain   [ x  ] pain midline neck   [   ] muscle tenderness   [   ] swelling  Neuro:              [  ] WNL          [   ] HA   [   ] change in vision   [   ] tremor   [   ] weakness   [  x  ]dysphagia             Cognitive:          [ x ] WNL          [   ]confusion     Psych:                [ x ] WNL          [   ] hallucinations   [   ]agitation   [   ] delusion   [   ]depression    PHYSICAL EXAM    Vital Signs Last 24 Hrs  T(C): 36.9 (12 May 2023 05:19), Max: 36.9 (12 May 2023 05:19)  T(F): 98.4 (12 May 2023 05:19), Max: 98.4 (12 May 2023 05:19)  HR: 96 (12 May 2023 05:19) (93 - 99)  BP: 118/78 (12 May 2023 05:19) (118/78 - 137/61)  BP(mean): 91 (12 May 2023 05:19) (91 - 91)  RR: 19 (12 May 2023 05:19) (17 - 20)  SpO2: --      General:[ x ] NAD, Resting Comfortable,   [   ] other:                                HEENT: [ x ] NC/AT, EOMI, PERRL , Normal Conjunctivae,   [   ] other:  Neck: incision well healed. Sutures removed.  Cardio: [ x ] RRR, no murmer,   [   ] other:                              Pulm: [ x ] No Respiratory Distress,  Lungs CTAB,   [   ] other:                       Abdomen: [ x ]ND/NT, Soft,   [   ] other:    : [ x ] NO SILVA CATHETER, [   ] SILVA CATHETER- no meatal tear, no discharge, [   ] other:                                            MSK: [  ] No joint swelling, Full ROM,   [ x  ] other:  Surgical incision along cervical spine c/d/i.                                      Ext: [ x ]No C/C/E, No calf tenderness,   [   ]other:    Skin: [  ]intact,   [ x  ] other: Surgical incision along cervical spine c/d/i.                                                                  Neurological Examination:  Cognitive: [  x  ] AAO x 3,   [    ]  other: A&Ox3. Oriented to person, place and time. Follows 2 step commands, language components intact. No aphasia.   Attention:  [  x  ] intact,   [    ]  other:  intact spelling and simple calculations                Memory: [  x  ] intact,    [    ]  other: intact registration and recall    Mood/Affect: [  x  ] wnl,    [    ]  other:                                                                             Communication: [  x  ]Fluent, no dysarthria, following commands:  [    ] other:   CN II - XII:  [  x  ] intact,  [    ] other:                                                                                        Motor:   RIGHT UE: [  ] WNL,  [ x  ] other: 4-/5 shoulder abduction limited by neck/back pain, 4/5 elbow flexion/extension, 5/5 hand   LEFT    UE: [  ] WNL,  [  x ] other: 4-/5 shoulder abduction limited by neck/back pain, 4/5 elbow flexion/extension, 5/5 hand   RIGHT LE: [ x ] WNL,  [   ] other: 5/5 hip flexion, knee flexion/extension, ankle dorsiflexion/plantarflexion  LEFT    LE: [ x ] WNL,  [   ] other: 5/5 hip flexion, knee flexion/extension, ankle dorsiflexion/plantarflexion    Tone: [  x  ] wnl,   [    ]  other:  DTRs: [ x  ]symmetric, [   ] other:  Coordination:   [ x  ] intact,   [    ] other:                                                                           Sensory: [ x  ] Intact to light touch,   [    ] other:    Clonus negative    MEDICATIONS  (STANDING):  albuterol    90 MICROgram(s) HFA Inhaler 2 Puff(s) Inhalation every 6 hours  artificial  tears Solution 1 Drop(s) Both EYES two times a day  bisacodyl 5 milliGRAM(s) Oral at bedtime  chlorhexidine 2% Cloths 1 Application(s) Topical <User Schedule>  enoxaparin Injectable 30 milliGRAM(s) SubCutaneous every 12 hours  famotidine    Tablet 20 milliGRAM(s) Oral every 12 hours  gabapentin 300 milliGRAM(s) Oral every 8 hours  levothyroxine 50 MICROGram(s) Oral daily  loratadine 10 milliGRAM(s) Oral at bedtime  melatonin 3 milliGRAM(s) Oral at bedtime  methocarbamol 1000 milliGRAM(s) Oral every 6 hours  polyethylene glycol 3350 17 Gram(s) Oral daily  senna 2 Tablet(s) Oral at bedtime  tiotropium 2.5 MICROgram(s) Inhaler 2 Puff(s) Inhalation daily    MEDICATIONS  (PRN):  acetaminophen     Tablet .. 650 milliGRAM(s) Oral every 6 hours PRN Mild Pain (1 - 3)  naloxone Injectable 0.1 milliGRAM(s) IV Push every 3 minutes PRN For ANY of the following changes in patient status:  A. RR LESS THAN 10 breaths per minute, B. Oxygen saturation LESS THAN 90%, C. Sedation score of 6  ondansetron Injectable 4 milliGRAM(s) IV Push every 6 hours PRN Nausea and/or Vomiting  oxyCODONE    IR 10 milliGRAM(s) Oral every 4 hours PRN Moderate Pain (4 - 6)          RECENT LABS/IMAGING                                   10.5   10.51 )-----------( 428      ( 09 May 2023 08:02 )             32.9     05-09    138  |  102  |  11  ----------------------------<  155<H>  5.1<H>   |  26  |  0.6<L>    Ca    10.3      09 May 2023 08:02  Mg     2.2     05-09    TPro  6.4  /  Alb  3.8  /  TBili  0.4  /  DBili  x   /  AST  30  /  ALT  19  /  AlkPhos  224<H>  05-09

## 2023-05-12 NOTE — DISCHARGE NOTE PROVIDER - NSDCMRMEDTOKEN_GEN_ALL_CORE_FT
acetaminophen 325 mg oral tablet: 2 tab(s) orally every 6 hours as needed for Mild Pain (1 - 3) or fever  albuterol 90 mcg/inh inhalation aerosol: 2 puff(s) inhaled every 6 hours as needed for  shortness of breath and/or wheezing  famotidine 20 mg oral tablet: 1 tab(s) orally every 12 hours  gabapentin 300 mg oral capsule: 1 cap(s) orally every 8 hours (3 times a day)  letrozole 2.5 mg oral tablet: 1 tab(s) orally once a day  levothyroxine 50 mcg (0.05 mg) oral tablet: 1 tab(s) orally once a day (in the morning) take one hour before breakfast and other medications  loratadine 10 mg oral tablet: 1 tab(s) orally once a day (at bedtime)  melatonin 3 mg oral tablet: 1 tab(s) orally once a day (at bedtime) as needed for  insomnia  methocarbamol 500 mg oral tablet: 2 tab(s) orally every 6 hours (4 times a day)  Narcan 4 mg/0.1 mL nasal spray: 4 milligram(s) intranasally once as needed for known/suspected narcotic/opioid overdose , may repeat once, and  CALL 911 IMMEDIATELY  ocular lubricant ophthalmic solution: 1 drop(s) to each affected eye 2 times a day ; you can instill 1 or 2 drops in each eye and can be used more often if needed -- use preservative-free solution  oxyCODONE 10 mg oral tablet: 1 tab(s) orally every 4 hours as needed for Moderate to severe pain (4 - 10/10) MDD: 6  polyethylene glycol 3350 oral powder for reconstitution: 17 gram(s) orally once a day  senna leaf extract oral tablet: 2 tab(s) orally once a day (at bedtime) as needed for  constipation  tiotropium 2.5 mcg/inh inhalation aerosol: 2 puff(s) inhaled once a day

## 2023-05-12 NOTE — DISCHARGE NOTE PROVIDER - NSDCCPCAREPLAN_GEN_ALL_CORE_FT
PRINCIPAL DISCHARGE DIAGNOSIS  Diagnosis: Breast cancer metastasized to bone, right  Assessment and Plan of Treatment: You were diagnosed with right breast cancer that spread to the spine. Surgical repair of your cervical spine (vertebrae in your neck) was done on 4/24/23 to stabilize the vertebrae. It is important that you continue to wear the cervical collar when you are out of bed. You were followed by multiple teams in the hospital, including Neurosurgery, Hematology/Oncology, Radiation Oncology, Internal medicine, and Physiatry (Rehab). You received intensive physical and occupational therapy during your hospital stay, and therapy will continue at home.   Ambulate with the rolling walker and assistance/supervision for your safety.   You will also need to have radiation to your spine, and you were also started on a hormone therapy called Letrozole to treat the breast cancer; you may need chemotherapy as well. It is very important that you follow up with all  your doctors often. You have an appointment to see Dr. Peña (your oncologist) on May 25, and the Radiation Oncology department will notify you when radiation therapy will begin. You also have an appointment to see your Neurosurgeon, Dr. Ortega, on May 30. Please follow up with your Primary Care Provider (PCP) Dr. Suarez, in one to two weeks.  Continue pain medications--take oxycodone as needed, and you are also taking robaxin (methocarbamol) and gabapentin.  A prescription was also given to you to have an MRI of your brain to rule out metastatic lesions there.        SECONDARY DISCHARGE DIAGNOSES  Diagnosis: Asthma  Assessment and Plan of Treatment: You were diagnosed with asthma during this admission, could have been exacerbated from the anesthesia and stress. Your oxygen level was a little low after surgery and you did require oxygen for a few days. You have improved with the inhalers (albuterol and spiriva) and by using the incentive spirometer to keep your lungs expanded with air. Avoid second-hand smoke and also avoid anyone who is ill with a respiratory or other contagious infection. Follow up regularly with Dr. Suarez.    Diagnosis: Hypothyroid  Assessment and Plan of Treatment: Your thyroid function tests are normal. Continue to take levothyroxine in the morning on an empty stomach, take it one hour before breakfast and other medications. Follow up with Dr. Suarez.

## 2023-05-12 NOTE — DISCHARGE NOTE PROVIDER - HOSPITAL COURSE
57-year-old female with PMH of hypothyroidism and ? alcoholic cirrhosis (stopped drinking over 9 yrs ago), presented on 4/20/23 for neck pain. Pt started having neck pain almost 4 months ago for which her chiropractor ordered an MRI that was done 1 month ago. Pt was not sure what the read of the MRI was but there was concern about lesions so she went to see Dr. Peña's office for the first time. Dr. Peña examined her and told her she has breast cancer with metastasis and needs to come to the ED for further evaluation. Pt is a non smoker and has no family history of breast Ca. Denies any significant weight loss, night sweats, or fevers. No reported bloody discharge from her nipples. Upon examining pt's right breast, dimpling with a lump was found. Pt believes that she noticed this change almost over a yr a go, but did not think of it was anything serious. Pt's neck pain has been significantly worsening limiting her ability to move her neck. She only has pain when moving her neck and has full sensation and ROM of her b/l UE. Denies any SOB, chest pain, or palpitations. States that she has pain with ROM, and states that it is worse with movement ex moving out of bed. Patient states that pain is mostly midline at the base of head, with radiation to her paraspinal muscles. Denies any radicular pain to UE & LE. States that she has been sleeping only in a recliner and has since then been developing worsening lower back pain. Denies any ataxia, imbalance, or difficulty with ambulation. Denies any numbness, tingling, paresthesias, weakness, saddle anesthesia, or bowel / bladder incontinence. Neurosurgery was consulted. On 4.24.23 she was taken to the operating room where she underwent an Occiput to T2 instrumentation and Fusion, ORIF of C2. She had a hemovac drain placed intraop which was removed on POD#3. She was on a PCA pump postop which was removed on POD#3. She remained neurologically stable postop.   The patient was evaluated by the PM&R team once medically stable. The patient was found to have functional limitations in terms of muscle strength, endurance, physical mobility, ability to carry out activities of daily living including: self-care, transfers, and ambulation. Participating with bedside therapeutic exercises and cognitive retraining. The patient is motivated and is able to tolerate up to 3 hours of daily therapy for 5-6 days a week for a total of 15 hours a week. She was evaluated by Physiatry and deemed to be a good candidate for admission to  for acute inpatient rehab, and she was admitted to Acute Rehab on 4/28/23.  Prior level of function: independent with ambulation and ADLs without assistive device although had been using straight cane for a few months and the last 2 weeks prior to presentation reports being essentially chair bound due to neck/back pain and difficulty with mobility.   Living situation: Pt resides with  in 2nd floor apt using 8 steps to enter and a flight of stairs to access.   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, S/P Occiput-T2 posterior instrumentation and fusion, ORIF of C2 body with functional decline and impaired ADLs/mobility   #Severe neck pain 2/2 metastatic lesions at Cervical, Thoracic, Lumbar with multiple metastatic lesions confirmed w MRI   #Breast cancer - no biopsy to confirm   -PE shows R breast with nipple retraction and a firm nodule close proximity to nipple at 3 o'clock - PTA to Acute rehab, plan was for surgical consult for biopsy which was not completed   -pt never had mammogram or routine cancer screening   - CT Chest/AP  Right breast mass,  Multiple enlarged mediastinal lymph nodes.   - MRI spine: Numerous enhancing osseous metastatic lesions are noted throughout the cervical/thoracic/lumbar spine (most significant at the C2 and C3 level with evidence of  epidural extension causing severe spinal stenosis and mass effect). Malignant compression  deformity noted of the T8 vertebral body   - Neurosurgery Eval 4/22: Needs Hard collar Sanborn J when OOB  - Oncology Consulted: will need biopsy before additional tx plan recs, also MRI brain w contrast to r/u Mets   - Radonc consulted 4/27: will need radiation to the C spine (no sooner than 2 weeks after surgery). To f/u as outpatient after d/c  -Hemovac discontinued 4/27  -Per Neurosurgery: plan was for Sanborn J cervical collar when OOB but collars available do not fit so soft collar to be worn when OOB - confirmed with Neurosurgery team.   -Pain medications: previously evaluated by pain management. Gabapentin 300mg PO q8h, Tylenol 650mg q6h PRN mild pain, Oxycodone 10mg PO q4h PRN moderate pain, robaxin 1000mg q6hr  -following with Dr. Peña outpatient  - Neurosurgery removed staples 5/10    #Dysphagia  - mild, since surgery  - regular diet    #s/p Hypertension  - resolving  - d/c Labetalol, which also may be making her asthma worse - BP in good range    #Hypoxemia/ Asthma; improving  - CXR negative, PE r/o on V/Q low probability of PE  on RA this AM doing well  - Patient was dropping to 85- 87% with ambulation and pulse increases to 120  - Started short course of Prednisone, standing Nebs and Spiriva and doing well off O2  - patient never smoked but states she was exposed extensively to second-hand smoke    #Hypercalcemia likely 2/2 malignancy  #Elevated AlkPhos likely 2/2 malignancy  - resolved s/p IVF   -Will monitor BMP and trend electrolytes     # hypothyroidism   - TSH (4/21) WNL   - c/w levothyroxine 50mcg OD    #Maintenance   - Pain control: as above  - GI/Bowel Mgmt: no issues at this time, monitor for BMs.   - Bladder management: voiding freely, no issues at this time.   - Skin: monitor cervical surgical wound, Neurosurgery f/u as needed   - FEN: Hypercalcemia resolved as above. Monitor electrolytes and fluid status.   - Diet: Regular    #Precautions / PROPHYLAXIS:    - Falls  - Ortho: Weight bearing status: WBAT  - Soft Cervical Collar  - DVT prophylaxis: Lovenox 30 SQ daily while in hospital    The patient is being discharged home with home care on 5/12/23. She will follow up with her PCP Dr. Scott Suarez, and she has an appointment with Dr. Peña on 5/25/23; Dr. Onur Gupta will be following her at [facility]--she was already mapped and will be called with appointment to start RT.   57-year-old female with PMH of hypothyroidism and ? alcoholic cirrhosis (stopped drinking over 9 yrs ago), presented on 4/20/23 for neck pain. Pt started having neck pain almost 4 months ago for which her chiropractor ordered an MRI that was done 1 month ago. Pt was not sure what the read of the MRI was but there was concern about lesions so she went to see Dr. Peña's office for the first time. Dr. Peña examined her and told her she has breast cancer with metastasis and needs to come to the ED for further evaluation. Pt is a non smoker and has no family history of breast Ca. Denies any significant weight loss, night sweats, or fevers. No reported bloody discharge from her nipples. Upon examining pt's right breast, dimpling with a lump was found. Pt believes that she noticed this change almost over a yr a go, but did not think of it was anything serious. Pt's neck pain has been significantly worsening limiting her ability to move her neck. She only has pain when moving her neck and has full sensation and ROM of her b/l UE. Denies any SOB, chest pain, or palpitations. States that she has pain with ROM, and states that it is worse with movement ex moving out of bed. Patient states that pain is mostly midline at the base of head, with radiation to her paraspinal muscles. Denies any radicular pain to UE & LE. States that she has been sleeping only in a recliner and has since then been developing worsening lower back pain. Denies any ataxia, imbalance, or difficulty with ambulation. Denies any numbness, tingling, paresthesias, weakness, saddle anesthesia, or bowel / bladder incontinence. Neurosurgery was consulted. On 4.24.23 she was taken to the operating room where she underwent an Occiput to T2 instrumentation and Fusion, ORIF of C2. She had a hemovac drain placed intraop which was removed on POD#3. She was on a PCA pump postop which was removed on POD#3. She remained neurologically stable postop.   The patient was evaluated by the PM&R team once medically stable. The patient was found to have functional limitations in terms of muscle strength, endurance, physical mobility, ability to carry out activities of daily living including: self-care, transfers, and ambulation. Participating with bedside therapeutic exercises and cognitive retraining. The patient is motivated and is able to tolerate up to 3 hours of daily therapy for 5-6 days a week for a total of 15 hours a week. She was evaluated by Physiatry and deemed to be a good candidate for admission to  for acute inpatient rehab, and she was admitted to Acute Rehab on 4/28/23.  Prior level of function: independent with ambulation and ADLs without assistive device although had been using straight cane for a few months and the last 2 weeks prior to presentation reports being essentially chair bound due to neck/back pain and difficulty with mobility.   Living situation: Pt resides with  in 2nd floor apt using 8 steps to enter and a flight of stairs to access.   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, S/P Occiput-T2 posterior instrumentation and fusion, ORIF of C2 body with functional decline and impaired ADLs/mobility   #Severe neck pain 2/2 metastatic lesions at Cervical, Thoracic, Lumbar with multiple metastatic lesions confirmed w MRI   #Breast cancer - no biopsy to confirm   -PE shows R breast with nipple retraction and a firm nodule close proximity to nipple at 3 o'clock - PTA to Acute rehab, plan was for surgical consult for biopsy which was not completed   -pt never had mammogram or routine cancer screening   - CT Chest/AP  Right breast mass,  Multiple enlarged mediastinal lymph nodes.   - MRI spine: Numerous enhancing osseous metastatic lesions are noted throughout the cervical/thoracic/lumbar spine (most significant at the C2 and C3 level with evidence of  epidural extension causing severe spinal stenosis and mass effect). Malignant compression  deformity noted of the T8 vertebral body   - Neurosurgery Eval 4/22: Needs Hard collar Epps J when OOB  - Oncology Consulted: will need biopsy before additional tx plan recs, also MRI brain w contrast to r/u Mets   - Radonc consulted 4/27: will need radiation to the C spine (no sooner than 2 weeks after surgery). To f/u as outpatient after d/c  -Hemovac discontinued 4/27  -Per Neurosurgery: plan was for Epps J cervical collar when OOB but collars available do not fit so soft collar to be worn when OOB - confirmed with Neurosurgery team.   -Pain medications: previously evaluated by pain management. Gabapentin 300mg PO q8h, Tylenol 650mg q6h PRN mild pain, Oxycodone 10mg PO q4h PRN moderate pain, robaxin 1000mg q6hr  -following with Dr. Peña outpatient  - Neurosurgery removed staples 5/10    #Dysphagia  - mild, since surgery  - regular diet    #s/p Hypertension  - resolving  - d/c Labetalol, which also may be making her asthma worse - BP in good range    #Hypoxemia/ Asthma; improving  - CXR negative, PE r/o on V/Q low probability of PE  on RA this AM doing well  - Patient was dropping to 85- 87% with ambulation and pulse increases to 120  - Started short course of Prednisone, standing Nebs and Spiriva and doing well off O2  - patient never smoked but states she was exposed extensively to second-hand smoke    #Hypercalcemia likely 2/2 malignancy  #Elevated AlkPhos likely 2/2 malignancy  - resolved s/p IVF   -Will monitor BMP and trend electrolytes     # hypothyroidism   - TSH (4/21) WNL   - c/w levothyroxine 50mcg OD    #Maintenance   - Pain control: as above  - GI/Bowel Mgmt: no issues at this time, monitor for BMs.   - Bladder management: voiding freely, no issues at this time.   - Skin: monitor cervical surgical wound, Neurosurgery f/u as needed   - FEN: Hypercalcemia resolved as above. Monitor electrolytes and fluid status.   - Diet: Regular    #Precautions / PROPHYLAXIS:    - Falls  - Ortho: Weight bearing status: WBAT  - Soft Cervical Collar  - DVT prophylaxis: Lovenox 30 SQ daily while in hospital    The patient is being discharged home with home care on 5/12/23. She will follow up with her PCP Dr. Scott Suarez in one to two weeks, and she has an appointment with Dr. Peña on 5/25/23; Dr. Onur Gupta will be following her at [facility]--she was already mapped and will be called with appointment to start RT.

## 2023-05-12 NOTE — PROGRESS NOTE ADULT - PROVIDER SPECIALTY LIST ADULT
Rehab Medicine
Neuropsychology
Rehab Medicine
Neuropsychology
Rehab Medicine
Neuropsychology
Neuropsychology

## 2023-05-17 NOTE — CHART NOTE - NSCHARTNOTEFT_GEN_A_CORE
Note after discharge on 5/12 when pt called to inform that she did not receive any oxycodone since it is in back order , pt advised not to  oxy even if becomes available since we are giving her new rx for tramadol, She has allergy to Dilaudid as documented  in medical records .. patient was called and notified of this information .

## 2023-05-23 NOTE — VITALS
[Maximal Pain Intensity: 8/10] : 8/10 [Least Pain Intensity: 4/10] : 4/10 [Pain Location: ___] : Pain Location: [unfilled] [Opioid] : opioid [NSAID/Non-Opioid] : NSAID/Non-Opioid [80: Normal activity with effort; some signs or symptoms of disease.] : 80: Normal activity with effort; some signs or symptoms of disease.

## 2023-05-24 NOTE — DISEASE MANAGEMENT
[IV] : IV [Clinical] : TNM Stage: c [FreeTextEntry4] : metastatic breast Cancer [TTNM] : x [NTNM] : x [MTNM] : x [de-identified] : 800cGy [de-identified] : 2000cGy [de-identified] : C-spine

## 2023-05-24 NOTE — PHYSICAL EXAM
[General Appearance - Well Developed] : well developed [General Appearance - Alert] : alert [General Appearance - In No Acute Distress] : in no acute distress [] : no respiratory distress [Oriented To Time, Place, And Person] : oriented to person, place, and time [de-identified] : unable to fully assess/ has soft neck collar and in wheelchair/

## 2023-05-24 NOTE — REVIEW OF SYSTEMS
[Dizziness: Grade 0] : Dizziness: Grade 0  [Headache: Grade 0] : Headache: Grade 0 [Fatigue: Grade 1 - Fatigue relieved by rest] : Fatigue: Grade 1 - Fatigue relieved by rest

## 2023-05-24 NOTE — HISTORY OF PRESENT ILLNESS
[FreeTextEntry1] : 5/23/2023 OTV 800cGy/2000cGy to the C-spine: Patient accompanied by  and niece. Soft neck collar in place. She wears it mostly all the time and removes at bedtime. PT, OT and psych comes to house to follow up. Pain in neck area is 8/10 and takes percocet, robaxin, and gabapentin (300mg po 3xday) for pain. She feels a lot of pain post-RT. She takes 2 percocet when she gets home. Patient has metastatic Breast Ca (diffuse metastatic bone disease), hormone positive. She was originally consulted on by Rad onc while admitted in hospital in April. She was recently discharged from rehab after her surgical intervention. She was started on letrozole 2.4mg po daily by  while admitted in hospital. She ambulates with assistance. Follows up with  on 5/25/2023 to discuss systemic treatment.

## 2023-05-27 NOTE — PHYSICAL EXAM
[Normal] : grossly intact [de-identified] : well developed ,constantly  holding her head up and in moderate discomfort .  [de-identified] : limited ROM .  [de-identified] : large central right breast mass with skin and nipple retraction , appears smaller than prior exam  [de-identified] : no focal deficits.

## 2023-05-27 NOTE — REASON FOR VISIT
[Follow-Up Visit] : a follow-up [Spouse] : spouse [Family Member] : family member [FreeTextEntry2] : breast cancer

## 2023-05-27 NOTE — ASSESSMENT
[FreeTextEntry1] : 57 year old female with right breast mass, diffuse bone metastasis ,severe neck pain due  epidural extension and fracture at C2 , S/P internal fixation .Biopsy breast cancer ER IA weakly positive , HER 2 neg , low KI 67 \par Mediastinal adenopathy , hypercalcemia ( resolved ) . mild anemia after surgery . \par On post operative radiation .\par On endocrine therapy with femara. tolerated well . \par Plan : continue femara , add ibrance 125 ng \par          dental clearance for Xgeva .\par          physical therapy .

## 2023-05-27 NOTE — HISTORY OF PRESENT ILLNESS
[de-identified] : 57 year old female referred by Dr Suarez for suspected metastatic bone disease. PMH of alcohol abuse , cirrhosis ? sober for 9 years. Presented with worsening neck and lower back pain . MRI ( 3/21/2023 ) shows diffuse metastatic bone  disease most pronounced at C2 with bony expansion causing circumferential narrowing of central canal .\par pain is only partially relieved with tylenol and advil . unable to move,  rotate her neck or lie flat . essentially confined to recliner. She denies weakness, numbness or sphincteric symptoms . She notes right breast lesion for 3 years , increased in size recently . [de-identified] : 5/25/2023 Patient returns for follow up after internal fixation of C2 fracture , biopsy consistent with breast cancer ER ID 1+ positive , HER 2 negative , KI67 5% \par She is on radiation and complains of mild sore throat and dry mouth , Also started on femara and is tolerating well . CT scan showed mediastinal adenopathy in addition to extensive widespread bone metastasis. SHe is on Robaxin and percocet . to start on home PT . NO leg weakness or numbness.

## 2023-06-19 PROBLEM — Z00.00 ENCOUNTER FOR PREVENTIVE HEALTH EXAMINATION: Status: ACTIVE | Noted: 2023-04-20

## 2023-06-20 NOTE — HISTORY OF PRESENT ILLNESS
[de-identified] : 57 year old female referred by Dr Suarez for suspected metastatic bone disease. PMH of alcohol abuse , cirrhosis ? sober for 9 years. Presented with worsening neck and lower back pain . MRI ( 3/21/2023 ) shows diffuse metastatic bone  disease most pronounced at C2 with bony expansion causing circumferential narrowing of central canal .\par pain is only partially relieved with tylenol and advil . unable to move,  rotate her neck or lie flat . essentially confined to recliner. She denies weakness, numbness or sphincteric symptoms . She notes right breast lesion for 3 years , increased in size recently . [de-identified] : 5/25/2023 Patient returns for follow up after internal fixation of C2 fracture , biopsy consistent with breast cancer ER OH 1+ positive , HER 2 negative , KI67 5% \par She is on radiation and complains of mild sore throat and dry mouth , Also started on femara and is tolerating well . CT scan showed mediastinal adenopathy in addition to extensive widespread bone metastasis. SHe is on Robaxin and Percocet . to start on home PT . NO leg weakness or numbness. \par \par 6/19/23: Patient returns for follow up today. She is doing well. She was having nausea for two weeks when starting Kisqali but it is controlled with the use of oral antiemetics. She is feeling much better this week. She does still require pain medication, she is using Percocet about three times a day. She had an EKG done today in office which shows a prolonged QTc of 497 with tachycardia HR: 130.

## 2023-06-20 NOTE — PHYSICAL EXAM
[Normal] : grossly intact [de-identified] : neck collar in place  [de-identified] : limited ROM .  [de-identified] : tachycardic on auscultation  [de-identified] : large central right breast mass with skin and nipple retraction , appears smaller than prior exam  [de-identified] : no focal deficits.

## 2023-06-20 NOTE — ASSESSMENT
[FreeTextEntry1] : 57 year old female with right breast mass, diffuse bone metastasis ,severe neck pain due  epidural extension and fracture at C2 , S/P internal fixation .Biopsy breast cancer ER MN weakly positive , HER 2 neg , low KI 67 \par Mediastinal adenopathy , hypercalcemia ( resolved ) . mild anemia after surgery . \par On post operative radiation .\par On endocrine therapy with femara. tolerated well . Now with addition of Kisqali \par nausea , vomiting . \par EKG today shows elevated QTc (>480) with Magnesium 1.5\par \par Plan : continue femara, Hold Kisqali with correction of Magnesium \par           Cardiology referral \par            Plan to switch to Ibrance once QTc normalizes \par            Dental clearance for Xgeva .\par            Physical therapy .\par \par RTC 4 weeks

## 2023-07-13 NOTE — ED PROVIDER NOTE - PHYSICAL EXAMINATION
_  Vital signs reviewed; ABCs intact  GENERAL: Well nourished, comfortable  SKIN: Warm, dry  HEAD & NECK: NCAT, supple neck  EYES: EOMI, PER B/L  ENT: +Dry MM  CARD: RRR, S1, S2; no murmurs, no rubs, no gallops  RESP: Normal respiratory effort, CTAB, no rales, no wheezing  ABD: Soft, ND, NT, no rebound, no guarding  NEUROMSK: Grossly intact  PSYCH: AAOx3, cooperative, appropriate

## 2023-07-13 NOTE — ED PROVIDER NOTE - PROGRESS NOTE DETAILS
Resident AO: Patient admitted for starvation ketosis, failure to thrive, and multiple electrolyte abnormalities. UA noted with UTI - called lab to receive urine culture: no urine culture sample. Request new urine sample. Antibiotics ordered. Inpatient team to follow.

## 2023-07-13 NOTE — ED PROVIDER NOTE - NS ED ATTENDING STATEMENT MOD
This was a shared visit with the EFRAIN. I reviewed and verified the documentation and independently performed the documented: Attending with

## 2023-07-13 NOTE — H&P ADULT - HISTORY OF PRESENT ILLNESS
57-year-old woman with a history of hypothyroidism,HTN, former alcohol use, breast cancer with diffuse bone metastasis, status post radiation 5 sessions finished 5/31/23, on letrozole daily, was on Ribociclib and was discontinued due to low magnesium with high QTc, surgical history of occiput-T2 posterior instrumentation and fusion, ORIF of C2 body with functional decline and impaired ADLs/mobility, ambulates with a walker. Following with Dr. Peña.     She presented to the emergency department complaining of decreased appetite and oral intake of 3 weeks duration. She is saying that she's not hungry, no mouth pain, no dysphagia or odynophagia, no regurgitation or heart burn, no abdominal pain, no diarrhea or constipation, no change in stool consistency. This was accompanied by generalized fatigue but no fever, no chills or rigors. She admitted decreased urine output, weight loss.     She also complained of left knee pain and swelling that started 1 week ago after hitting her knee while sitting, she didn't seek medical evaluation at that time. No decreased range of motion.     Vital Signs T(F): 96.6 HR: 128 BP: 122/93 RR: 18   EKG showed sinus tachycardia   Labs were significant for severe hypokalemia, hypomagnesemia, hyponatremia, low bicarb    Given IV fluids, potassium and admitted to medicine for further evaluation and treatment        57-year-old woman with a history of hypothyroidism, asthma, former alcohol use, breast cancer with diffuse bone metastasis, status post radiation 5 sessions finished 5/31/23, on letrozole daily, was on Ribociclib and was discontinued due to low magnesium with high QTc, surgical history of occiput-T2 posterior instrumentation and fusion, ORIF of C2 body with functional decline and impaired ADLs/mobility, ambulates with a walker. Following with Dr. Peña.     She presented to the emergency department complaining of decreased appetite and oral intake of 3 weeks duration. She is saying that she's not hungry, no mouth pain, no dysphagia or odynophagia, no regurgitation or heart burn, no abdominal pain, no diarrhea or constipation, no change in stool consistency. This was accompanied by generalized fatigue but no fever, no chills or rigors. She admitted decreased urine output, weight loss.     She also complained of left knee pain and swelling that started 1 week ago after hitting her knee while sitting, she didn't seek medical evaluation at that time. No decreased range of motion.     Vital Signs T(F): 96.6 HR: 128 BP: 122/93 RR: 18   EKG showed sinus tachycardia   Labs were significant for severe hypokalemia, hypomagnesemia, hyponatremia, low bicarb    Given IV fluids, potassium and admitted to medicine for further evaluation and treatment

## 2023-07-13 NOTE — H&P ADULT - ASSESSMENT
57-year-old woman with a history of hypothyroidism,HTN, former alcohol use, breast cancer with diffuse bone metastasis, status post radiation 5 sessions finished 5/31/23, on letrozole daily, was on Ribociclib and was discontinued due to low magnesium with high QTc, surgical history of occiput-T2 posterior instrumentation and fusion, ORIF of C2 body with functional decline and impaired ADLs/mobility, ambulates with a walker. Following with Dr. Peña.     She presented to the emergency department complaining of decreased appetite and oral intake of 3 weeks duration.       # Decreased oral intake and appetite  # Electrolytes disturbances ( hypokalemia, hypomagnesemia, hyponatremia ) due to severe dehydration and decreased oral intake  # High anion gap metabolic acidosis with ketosis mixed with metabolic and respiratory alkalosis - mostly due to starvation and weight loss   - Decreased oral intake since 3 weeks with weight loss  - examination showed severe dehydration of skin and mucous membranes  - EKG showed sinus tachycardia, otherwise unremarkable  - Labs: Na 128, K 2.8, Mg 1.6  - VBG pH 7.43, CO2 29, HCO3 16, AG 28, positive b-hydroxybutyrate 8.8, positive ketones in urine   - S/P 2 L of LR with magnesium 2 g, potassium 40 meq in ED  - Continue LR rate 100cc/hr  - Replace K, Mg aggressively and follow up closely K Mg and P  - Nutrition consult   - Follow up acidosis       # Left knee pain and swelling - traumatic per history   - no significant pain or discoloration, mild swelling of medial knee, tenderness laterally   - Xray left knee  - pain control as needed       # Breast cancer with multiple bone and spine metastasis  # S/P occiput-T2 posterior instrumentation and fusion, ORIF of C2 body due to metastasis   - Currently on Letrozole 2.5 mg daily   - ribociclib was stopped due to increased QTc and hypomagnesemia   - was planned for Denosumab as outpatient after dental clearance  - Pain control as needed: stopped taking oxycodone, continue Robaxin 1 g X4 as needed   - Oncology consult - Dr. Peña       # Hypothyroidism  - on levothyroxine 50 mcg daily   - check TSH      # GI prophylaxis: famotidine 20X2  # DVT prophylaxis: Lovenox 40X1  # Full code  # Diet: regular, bite sized  57-year-old woman with a history of hypothyroidism, asthma, former alcohol use, breast cancer with diffuse bone metastasis, status post radiation 5 sessions finished 5/31/23, on letrozole daily, was on Ribociclib and was discontinued due to low magnesium with high QTc, surgical history of occiput-T2 posterior instrumentation and fusion, ORIF of C2 body with functional decline and impaired ADLs/mobility, ambulates with a walker. Following with Dr. Peña.     She presented to the emergency department complaining of decreased appetite and oral intake of 3 weeks duration.       # Decreased oral intake and appetite  # Electrolytes disturbances ( hypokalemia, hypomagnesemia, hyponatremia ) due to severe dehydration and decreased oral intake  # High anion gap metabolic acidosis with ketosis mixed with metabolic and respiratory alkalosis - mostly due to starvation and weight loss   - Decreased oral intake since 3 weeks with weight loss  - examination showed severe dehydration of skin and mucous membranes  - EKG showed sinus tachycardia, otherwise unremarkable  - Labs: Na 128, K 2.8, Mg 1.6  - VBG pH 7.43, CO2 29, HCO3 16, AG 28, positive b-hydroxybutyrate 8.8, positive ketones in urine   - S/P 2 L of LR with magnesium 2 g, potassium 40 meq in ED  - Continue dextrose LR rate 100cc/hr  - Replace K, Mg aggressively and follow up closely K Mg P and AG   - Start thiamine and folate   - Start Megace 40 mg X4   - Nutrition consult       # Left knee pain and swelling - traumatic per history   - no significant pain or discoloration, mild swelling of medial knee, tenderness laterally   - Xray left knee  - pain control as needed       # Breast cancer with multiple bone and spine metastasis  # S/P occiput-T2 posterior instrumentation and fusion, ORIF of C2 body due to metastasis   - Currently on Letrozole 2.5 mg daily   - ribociclib was stopped due to increased QTc and hypomagnesemia   - was planned for Denosumab as outpatient after dental clearance  - Pain control as needed: stopped taking oxycodone, continue Robaxin 1 g X4 as needed   - Oncology consult - Dr. Peña       # Hypothyroidism  - on levothyroxine 50 mcg daily   - check TSH      # Hx of asthma - not in exacerbation  - Continue home albuterol and Spiriva   - Continue loratadine       # GI prophylaxis: famotidine 20X2  # DVT prophylaxis: Lovenox 40X1  # Full code  # Diet: regular, bite sized  57-year-old woman with a history of hypothyroidism, asthma, former alcohol use, breast cancer with diffuse bone metastasis, status post radiation 5 sessions finished 5/31/23, on letrozole daily, was on Ribociclib and was discontinued due to low magnesium with high QTc, surgical history of occiput-T2 posterior instrumentation and fusion, ORIF of C2 body with functional decline and impaired ADLs/mobility, ambulates with a walker. Following with Dr. Peña.     She presented to the emergency department complaining of decreased appetite and oral intake of 3 weeks duration.       # Decreased oral intake and appetite  # Electrolytes disturbances ( hypokalemia, hypomagnesemia, hyponatremia ) due to severe dehydration and decreased oral intake  # High anion gap metabolic acidosis with ketosis mixed with metabolic and respiratory alkalosis - mostly due to starvation and weight loss   # Sinus tachycardia due to severe dehydration  - Decreased oral intake since 3 weeks with weight loss  - examination showed severe dehydration of skin and mucous membranes  - EKG showed sinus tachycardia, otherwise unremarkable  - Labs: Na 128, K 2.8, Mg 1.6  - VBG pH 7.43, CO2 29, HCO3 16, AG 28, positive b-hydroxybutyrate 8.8, positive ketones in urine   - S/P 2 L of LR with magnesium 2 g, potassium 40 meq in ED  - Continue dextrose LR rate 100cc/hr  - Replace K, Mg aggressively and follow up closely K Mg P and AG   - Start thiamine and folate   - Start Megace 40 mg X4   - Nutrition consult   - Admit to telemetry       # Left knee pain and swelling - traumatic per history   - no significant pain or discoloration, mild swelling of medial knee, tenderness laterally   - Xray left knee  - pain control as needed       # Breast cancer with multiple bone and spine metastasis  # S/P occiput-T2 posterior instrumentation and fusion, ORIF of C2 body due to metastasis   - Currently on Letrozole 2.5 mg daily   - ribociclib was stopped due to increased QTc and hypomagnesemia   - was planned for Denosumab as outpatient after dental clearance  - Pain control as needed: stopped taking oxycodone, continue Robaxin 1 g X4 as needed   - Oncology consult - Dr. Peña       # Hypothyroidism  - on levothyroxine 50 mcg daily   - check TSH      # Hx of asthma - not in exacerbation  - Continue home albuterol and Spiriva   - Continue loratadine       # GI prophylaxis: famotidine 20X2  # DVT prophylaxis: Lovenox 40X1  # Full code  # Diet: regular, bite sized  57-year-old woman with a history of hypothyroidism, asthma, former alcohol use, breast cancer with diffuse bone metastasis, status post radiation 5 sessions finished 5/31/23, on letrozole daily, was on Ribociclib and was discontinued due to low magnesium with high QTc, surgical history of occiput-T2 posterior instrumentation and fusion, ORIF of C2 body with functional decline and impaired ADLs/mobility, ambulates with a walker. Following with Dr. Peña.     She presented to the emergency department complaining of decreased appetite and oral intake of 3 weeks duration.       # Decreased oral intake and appetite  # Electrolytes disturbances ( hypokalemia, hypomagnesemia, hyponatremia ) due to severe dehydration and decreased oral intake  # High anion gap metabolic acidosis with ketosis mixed with metabolic and respiratory alkalosis - mostly due to starvation and weight loss   # Sinus tachycardia due to severe dehydration  - Decreased oral intake since 3 weeks with weight loss  - examination showed severe dehydration of skin and mucous membranes  - EKG showed sinus tachycardia, otherwise unremarkable  - Labs: Na 128, K 2.8, Mg 1.6  - VBG pH 7.43, CO2 29, HCO3 16, AG 28, positive b-hydroxybutyrate 8.8, positive ketones in urine   - S/P 2 L of LR with magnesium 2 g, potassium 40 meq in ED  - Continue dextrose LR rate 100cc/hr  - Replace K, Mg aggressively and follow up closely K Mg P and AG   - Start thiamine and folate   - Start Megace 40 mg X4   - Nutrition consult   - Admit to telemetry       # Left knee pain and swelling - traumatic per history   - no significant pain or discoloration, mild swelling of medial knee, tenderness laterally   - Xray left knee  - pain control as needed       # Breast cancer with multiple bone and spine metastasis  # S/P occiput-T2 posterior instrumentation and fusion, ORIF of C2 body due to metastasis   - Currently on Letrozole 2.5 mg daily   - ribociclib was stopped due to increased QTc and hypomagnesemia   - was planned for Denosumab as outpatient after dental clearance  - Pain control as needed: stopped taking oxycodone, continue Robaxin 1 g X4 as needed   - Oncology consult - Dr. Peña       # Hypothyroidism  - on levothyroxine 50 mcg daily   - check TSH      # Hx of asthma - not in exacerbation  - Continue home albuterol and Spiriva   - Continue loratadine       # GI prophylaxis: famotidine 20X2  # DVT prophylaxis: Lovenox 40X1  # DNR/DNI   # Diet: regular, bite sized  57-year-old woman with a history of hypothyroidism, asthma, former alcohol use, breast cancer with diffuse bone metastasis, status post radiation 5 sessions finished 5/31/23, on letrozole daily, was on Ribociclib and was discontinued due to low magnesium with high QTc, surgical history of occiput-T2 posterior instrumentation and fusion, ORIF of C2 body with functional decline and impaired ADLs/mobility, ambulates with a walker. Following with Dr. Peña.     She presented to the emergency department complaining of decreased appetite and oral intake of 3 weeks duration.       # Decreased oral intake and appetite  # Electrolytes disturbances ( hypokalemia, hypomagnesemia, hyponatremia ) due to severe dehydration and decreased oral intake  # High anion gap metabolic acidosis with ketosis mixed with metabolic and respiratory alkalosis - mostly due to starvation and weight loss   # Sinus tachycardia due to severe dehydration  - Decreased oral intake since 3 weeks with weight loss  - examination showed severe dehydration of skin and mucous membranes  - EKG showed sinus tachycardia, otherwise unremarkable  - Labs: Na 128, K 2.8, Mg 1.6  - VBG pH 7.43, CO2 29, HCO3 16, AG 28, positive b-hydroxybutyrate 8.8, positive ketones in urine   - S/P 2 L of LR with magnesium 2 g, potassium 40 meq in ED  - Continue dextrose LR rate 100cc/hr  - Replace K, Mg aggressively and follow up closely K Mg P and AG   - Start thiamine and folate   - Start Megace 40 mg X 4 times a day   - Nutrition consult   - Admit to telemetry       # Left knee pain and swelling - traumatic per history   - no significant pain or discoloration, mild swelling of medial knee, tenderness laterally   - Xray left knee  - pain control as needed       # Breast cancer with multiple bone and spine metastasis  # S/P occiput-T2 posterior instrumentation and fusion, ORIF of C2 body due to metastasis   - Currently on Letrozole 2.5 mg daily   - ribociclib was stopped due to increased QTc and hypomagnesemia   - was planned for Denosumab as outpatient after dental clearance  - Pain control as needed: stopped taking oxycodone, continue Robaxin 1 g X4 as needed   - Oncology consult - Dr. Peña       # Hypothyroidism  - on levothyroxine 50 mcg daily   - check TSH      # Hx of asthma - not in exacerbation  - Continue home albuterol and Spiriva   - Continue loratadine       # GI prophylaxis: famotidine 20X2  # DVT prophylaxis: Lovenox 40X1  # DNR/DNI   # Diet: regular, bite sized

## 2023-07-13 NOTE — ED PROVIDER NOTE - OBJECTIVE STATEMENT
Patient is a 57-year-old woman with a history of hypothyroidism, pretension, asthma, former alcohol use, breast CA status post radiation, with mets to the bone, surgical history of occiput-T2 posterior instrumentation and fusion, ORIF of C2 body with functional decline and impaired ADLs/mobility, ambulates with a walker.  Patient presenting for decreased appetite, generalized weakness, and fatigue for several days.  Patient has occasional nausea and vomiting. No other complaints - no fever, CP, palpitations, SOB, cough, abd pain, diarrhea, dysuria.   Patient's  passed away one month ago, and patient's mother is currently unwell, which is contributing to the patient's stress. Patient, however, denies SI/HI. Denies hallucinations. Patient is a 57-year-old woman with a history of hypothyroidism, pretension, asthma, former alcohol use, breast CA status post radiation, with mets to the bone, surgical history of occiput-T2 posterior instrumentation and fusion, ORIF of C2 body with functional decline and impaired ADLs/mobility, ambulates with a walker.  Patient presenting for decreased appetite, generalized weakness, and fatigue for several days.  Patient has occasional nausea and vomiting. No other complaints - no fever, CP, palpitations, SOB, cough, abd pain, diarrhea, dysuria.   Patient's father passed away one month ago, and patient's mother is currently unwell, which is contributing to the patient's stress. Patient, however, denies SI/HI. Denies hallucinations.

## 2023-07-13 NOTE — H&P ADULT - ATTENDING COMMENTS
HPI as above.  Interval history: Pt seen and examined at bedside. No cp or sob.  Has not been eating x 3-4 wks  Vital Signs (24 Hrs):  T(C): 35.9 (07-13-23 @ 13:00), Max: 35.9 (07-13-23 @ 13:00)  HR: 128 (07-13-23 @ 13:00) (128 - 128)  BP: 122/93 (07-13-23 @ 13:00) (122/93 - 122/93)  RR: 18 (07-13-23 @ 13:00) (18 - 18)  SpO2: --  Wt(kg): --  Daily Height in cm: 162.56 (13 Jul 2023 13:00)    Daily     I&O's Summary    PHYSICAL EXAM:  GENERAL: NAD, well-developed  HEAD:  Atraumatic, Normocephalic  EYES: EOMI, PERRLA, conjunctiva and sclera clear  NECK: Supple, No JVD, +neck brace  CHEST/LUNG: Clear to auscultation bilaterally; No wheeze  HEART: Regular rate and rhythm; No murmurs, rubs, or gallops  ABDOMEN: Soft, Nontender, Nondistended; Bowel sounds present  EXTREMITIES:  2+ Peripheral Pulses, No clubbing, cyanosis, or edema  PSYCH: AAOx3  NEUROLOGY: non-focal  SKIN: No rashes or lesions  Labs reviewed  Imaging reviewed independently and reviewed read  EKG reviewed independently and reviewed read    Plan as above dw resident in real time.     #Progress Note Handoff  Pending (specify):  follow up electrolytes closely, no changes on ekg, hemeonc, nutrition   Family discussion: house staff updated pt family  Disposition: cardiac telemonitoring   Decision to admit the pt is based on acuity as above

## 2023-07-13 NOTE — ED ADULT NURSE NOTE - OBJECTIVE STATEMENT
BIBEMS from home c/o lethargy, increased weakness, decreased PO intake and developing bedsores on b/l LE s/p cervical spine surgery.

## 2023-07-13 NOTE — H&P ADULT - NSHPPHYSICALEXAM_GEN_ALL_CORE
PHYSICAL EXAM:  GENERAL: NAD  EYES: conjunctiva and sclera clear  ENMT: No tonsillar erythema, exudates, or enlargement; dehydrated mucous membranes  NECK: Supple, No JVD, Normal thyroid  HEART: Regular rate and rhythm; tachycardic, Normal S1 S2   RESPIRATORY: Clear to auscultation bilaterally, resonant percussion note, no added sounds   ABDOMEN: Soft, Nontender, Nondistended; Bowel sounds present  NEUROLOGY: A&Ox3, grossly intact power and sensation   EXTREMITIES:  2+ Peripheral Pulses, no edema. left lateral knee joint tenderness, swelling of the knee   SKIN: dry skin, no rash Akbar Kang MD

## 2023-07-13 NOTE — H&P ADULT - CONVERSATION DETAILS
Goals of Care Conversation done with pt. Discussed FULL CODE VS DNR/DNI. CPR and intubation discussed with Pt. Pt wishes to be DNR/DNI.  Pt AAOX3. All questions answered. BRAIN signed and placed in chart

## 2023-07-13 NOTE — ED ADULT TRIAGE NOTE - CHIEF COMPLAINT QUOTE
Patient BIBA c/o increasing lethargy, decreased appetite, and general malaise over recent weeks. Per family: patient has been developing bed sores on legs from decreased activity. Patient hx recent cervical spine surgery.

## 2023-07-13 NOTE — ED PROVIDER NOTE - ATTENDING APP SHARED VISIT CONTRIBUTION OF CARE
58 yo f with pmh of asthma, etoh abuse, breast ca stage 4, mets to bones, rad tx, occiput to t2 fusion (dr. yip), biba with c/o stead decline at home since surgery.  family is concerned that she has been only on the couch all day.  pt uses a walker at home, but walks very little.  as per pt, she had cancelled a few of the home PT sessions after her surgery because she wasn't up to doing it.  pt has had decreased appetite and feeling generally down.  no si or hi.  pt has not seen pmd dr. perrin since before surgery.  father  last month.  exam: nad, ncat, perrl, eomi, mmm, rrr, ctab, abd soft, nt, nd aox3, +soft neck collar imp: pt with general decline at home, family would like a medical eval and also likely STR to regain pt's mobility

## 2023-07-13 NOTE — H&P ADULT - NSHPLABSRESULTS_GEN_ALL_CORE
13.4   6.37  )-----------( 239      ( 2023 13:55 )             40.3           128<L>  |  84<L>  |  8<L>  ----------------------------<  75  2.8<L>   |  16<L>  |  0.8    Ca    9.4      2023 13:55  Phos  2.8       Mg     1.6         TPro  6.5  /  Alb  4.2  /  TBili  0.4  /  DBili  x   /  AST  14  /  ALT  5   /  AlkPhos  196<H>                Urinalysis Basic - ( 2023 16:14 )    Color: Yellow / Appearance: Slightly Turbid / S.013 / pH: x  Gluc: x / Ketone: Large  / Bili: Negative / Urobili: <2 mg/dL   Blood: x / Protein: 30 mg/dL / Nitrite: Negative   Leuk Esterase: Large / RBC: 3 /HPF /  /HPF   Sq Epi: x / Non Sq Epi: x / Bacteria: Negative            Lactate Trend            CAPILLARY BLOOD GLUCOSE

## 2023-07-13 NOTE — ED ADULT NURSE NOTE - NSFALLHARMRISKINTERV_ED_ALL_ED
Assistance OOB with selected safe patient handling equipment if applicable/Assistance with ambulation/Communicate risk of Fall with Harm to all staff, patient, and family/Monitor gait and stability/Monitor for mental status changes and reorient to person, place, and time, as needed/Move patient closer to nursing station/within visual sight of ED staff/Provide patient with walking aids/Provide visual cue: red socks, yellow wristband, yellow gown, etc/Reinforce activity limits and safety measures with patient and family/Toileting schedule using arm’s reach rule for commode and bathroom/Use of alarms - bed, stretcher, chair and/or video monitoring/Bed in lowest position, wheels locked, appropriate side rails in place/Call bell, personal items and telephone in reach/Instruct patient to call for assistance before getting out of bed/chair/stretcher/Non-slip footwear applied when patient is off stretcher/[place] to call system/Physically safe environment - no spills, clutter or unnecessary equipment/Purposeful Proactive Rounding/Room/bathroom lighting operational, light cord in reach

## 2023-07-13 NOTE — CHART NOTE - NSCHARTNOTEFT_GEN_A_CORE
Worsening LFTs and trops rising. Likely continues to be in rhabdomyolysis. Cr improving had 1400 cc as per nursing staff post escamilla placement. CC consult placed. DW CC, recc continue current management for now if worsens may need SDU or ICU.

## 2023-07-13 NOTE — H&P ADULT - NSHPREVIEWOFSYSTEMS_GEN_ALL_CORE
REVIEW OF SYSTEMS:    CONSTITUTIONAL: generalized weakness, no fevers or chills  EYES/ENT: No visual changes;  No vertigo or throat pain, no headache   NECK: No pain or stiffness  RESPIRATORY: No cough, wheezing, hemoptysis; No shortness of breath  CARDIOVASCULAR: No chest pain or palpitations, no lower limb edema  GASTROINTESTINAL: No abdominal or epigastric pain. No nausea, vomiting, or hematemesis; No diarrhea or constipation. No melena or hematochezia.  GENITOURINARY: No dysuria, frequency or hematuria  NEUROLOGICAL: No numbness or weakness  SKIN: No itching, rashes  MUSCULOSKELETAL: left knee pain and swelling

## 2023-07-13 NOTE — ED PROVIDER NOTE - CARE PLAN
1 Principal Discharge DX:	Adult failure to thrive  Secondary Diagnosis:	Starvation ketoacidosis  Secondary Diagnosis:	Hyponatremia  Secondary Diagnosis:	Hypokalemia  Secondary Diagnosis:	Hypomagnesemia   Principal Discharge DX:	Adult failure to thrive  Secondary Diagnosis:	Starvation ketoacidosis  Secondary Diagnosis:	Hyponatremia  Secondary Diagnosis:	Hypokalemia  Secondary Diagnosis:	Hypomagnesemia  Secondary Diagnosis:	Elevated serum hCG

## 2023-07-13 NOTE — ED PROVIDER NOTE - CLINICAL SUMMARY MEDICAL DECISION MAKING FREE TEXT BOX
Pt with FTT/starvation, refusing to eat at home and declining physical status , found to have multiple electrolyte disturbances, ketosis.  VSS.  will admit to tele.  Any ordered labs and EKG were reviewed.  Any imaging was ordered and reviewed by me.  Appropriate medications for patient's presenting complaints were ordered and effects were reassessed.  Patient's records (prior hospital, ED visit, and/or nursing home notes if available) were reviewed.  Additional history was obtained from EMS, family, and/or PCP (where available).  Escalation to admission/observation was considered.  Patient requires inpatient hospitalization - monitored setting.

## 2023-07-13 NOTE — ED PROVIDER NOTE - SEVERE SEPSIS CRITERIA MET YN (MLM)
PT evaluation on hold. Patient is being upgraded to ICU as per Dr. Anthony. PT consult discontinued.
Sepsis Criteria were met:

## 2023-07-14 NOTE — CONSULT NOTE ADULT - SUBJECTIVE AND OBJECTIVE BOX
Hematology Consult Note    HPI:  57-year-old woman with a history of hypothyroidism, asthma, former alcohol use, breast cancer with diffuse bone metastasis, status post radiation 5 sessions finished 5/31/23, on letrozole daily, was on Ribociclib and was discontinued due to low magnesium with high QTc, surgical history of occiput-T2 posterior instrumentation and fusion, ORIF of C2 body with functional decline and impaired ADLs/mobility, ambulates with a walker. Following with Dr. Peña.     She presented to the emergency department complaining of decreased appetite and oral intake of 3 weeks duration. She is saying that she's not hungry, no mouth pain, no dysphagia or odynophagia, no regurgitation or heart burn, no abdominal pain, no diarrhea or constipation, no change in stool consistency. This was accompanied by generalized fatigue but no fever, no chills or rigors. She admitted decreased urine output, weight loss.     She also complained of left knee pain and swelling that started 1 week ago after hitting her knee while sitting, she didn't seek medical evaluation at that time. No decreased range of motion.     Vital Signs T(F): 96.6 HR: 128 BP: 122/93 RR: 18   EKG showed sinus tachycardia   Labs were significant for severe hypokalemia, hypomagnesemia, hyponatremia, low bicarb    Given IV fluids, potassium and admitted to medicine for further evaluation and treatment        (13 Jul 2023 16:53)      Allergies    No Known Allergies    Intolerances    hydromorphone (Faint; Nausea)      MEDICATIONS  (STANDING):  dextrose 5% + lactated ringers. 1000 milliLiter(s) (100 mL/Hr) IV Continuous <Continuous>  enoxaparin Injectable 40 milliGRAM(s) SubCutaneous every 24 hours  famotidine    Tablet 20 milliGRAM(s) Oral two times a day  folic acid 1 milliGRAM(s) Oral daily  letrozole 2.5 milliGRAM(s) Oral daily  levothyroxine 50 MICROGram(s) Oral daily  loratadine 10 milliGRAM(s) Oral daily  megestrol 40 milliGRAM(s) Oral every 6 hours  thiamine 100 milliGRAM(s) Oral daily  tiotropium 2.5 MICROgram(s) Inhaler 2 Puff(s) Inhalation daily    MEDICATIONS  (PRN):  albuterol    90 MICROgram(s) HFA Inhaler 2 Puff(s) Inhalation every 6 hours PRN for shortness of breath and/or wheezing  methocarbamol 1000 milliGRAM(s) Oral every 6 hours PRN Muscle Spasm  ondansetron Injectable 4 milliGRAM(s) IV Push every 8 hours PRN Nausea and/or Vomiting      PAST MEDICAL & SURGICAL HISTORY:  Hypothyroidism      H/O cirrhosis      Breast cancer      No significant past surgical history          FAMILY HISTORY:  FHx: melanoma (Mother)    FHx: arrhythmia (Father)        SOCIAL HISTORY: No EtOH, no tobacco    REVIEW OF SYSTEMS:    CONSTITUTIONAL: No weakness, fevers or chills  EYES/ENT: No visual changes;  No vertigo or throat pain   NECK: No pain or stiffness  RESPIRATORY: No cough, wheezing, hemoptysis; No shortness of breath  CARDIOVASCULAR: No chest pain or palpitations  GASTROINTESTINAL: No abdominal or epigastric pain. No nausea, vomiting, or hematemesis; No diarrhea or constipation. No melena or hematochezia.  GENITOURINARY: No dysuria, frequency or hematuria  NEUROLOGICAL: No numbness or weakness  SKIN: No itching, burning, rashes, or lesions   All other review of systems is negative unless indicated above.    Height (cm): 162.6 (07-13 @ 13:00)  Weight (kg): 72.6 (07-13 @ 13:00)  BMI (kg/m2): 27.5 (07-13 @ 13:00)  BSA (m2): 1.78 (07-13 @ 13:00)    T(F): 98.3 (07-14-23 @ 08:02), Max: 98.3 (07-14-23 @ 08:02)  HR: 108 (07-14-23 @ 08:02)  BP: 125/78 (07-14-23 @ 08:02)  RR: 18 (07-14-23 @ 08:02)  SpO2: 97% (07-14-23 @ 08:02)  Wt(kg): --    GENERAL: NAD, well-developed  HEAD:  Atraumatic, Normocephalic  EYES: EOMI, PERRLA, conjunctiva and sclera clear  NECK: Supple, No JVD  CHEST/LUNG: Clear to auscultation bilaterally; No wheeze  HEART: Regular rate and rhythm; No murmurs, rubs, or gallops  ABDOMEN: Soft, Nontender, Nondistended; Bowel sounds present  EXTREMITIES:  2+ Peripheral Pulses, No clubbing, cyanosis, or edema  NEUROLOGY: non-focal  SKIN: No rashes or lesions                          11.7   4.67  )-----------( 209      ( 14 Jul 2023 08:33 )             36.0       07-13    135  |  94<L>  |  6<L>  ----------------------------<  60<L>  3.6   |  16<L>  |  0.7    Ca    8.4      13 Jul 2023 20:59  Phos  2.4     07-13  Mg     2.0     07-13    TPro  5.7<L>  /  Alb  3.6  /  TBili  0.4  /  DBili  x   /  AST  11  /  ALT  <5  /  AlkPhos  170<H>  07-13      Magnesium: 2.0 mg/dL (07-13 @ 20:59)  Phosphorus: 2.4 mg/dL (07-13 @ 20:59)  Magnesium: 1.6 mg/dL (07-13 @ 13:55)  Phosphorus: 2.8 mg/dL (07-13 @ 13:55)

## 2023-07-14 NOTE — PROGRESS NOTE ADULT - SUBJECTIVE AND OBJECTIVE BOX
JEAN-PAUL GIANG  57y Female    INTERVAL HPI/OVERNIGHT EVENTS:    pt feels better today and is motivated to eat some soup for lunch  sister in law at bedside  pt and family want her to return to her prior functional status - interested in STR on discharge  no fever, pain, dysphagia, odynophagia, N/V, SOB. diarrhea.  per family at bedside, she has not been eating or moving around much at home.    T(F): 98.3 (07-14-23 @ 08:02), Max: 98.3 (07-14-23 @ 08:02)  HR: 108 (07-14-23 @ 08:02) (85 - 108)  BP: 125/78 (07-14-23 @ 08:02) (125/78 - 131/81)  RR: 18 (07-14-23 @ 08:02) (18 - 18)  SpO2: 97% (07-14-23 @ 08:02) (95% - 97%) on RA    PHYSICAL EXAM:  GENERAL: NAD  HEAD:  Normocephalic  EYES:  conjunctiva and sclera clear  ENMT: Moist mucous membranes  NECK: Supple  NERVOUS SYSTEM:  Alert, awake, Good concentration, b/l LE weakness  CHEST/LUNG: CTA b/l  HEART: Regular rate and rhythm; No murmurs  ABDOMEN: Soft, Nontender, Nondistended; Bowel sounds present  EXTREMITIES: No edema  SKIN: warm, dry    Consultant(s) Notes Reviewed:  [x ] YES  [ ] NO  Care Discussed with Consultants/Other Providers [ x] YES  [ ] NO    MEDICATIONS  (STANDING):  dextrose 5% + lactated ringers. 1000 milliLiter(s) (100 mL/Hr) IV Continuous <Continuous>  enoxaparin Injectable 40 milliGRAM(s) SubCutaneous every 24 hours  famotidine    Tablet 20 milliGRAM(s) Oral two times a day  folic acid 1 milliGRAM(s) Oral daily  letrozole 2.5 milliGRAM(s) Oral daily  levothyroxine 50 MICROGram(s) Oral daily  loratadine 10 milliGRAM(s) Oral daily  megestrol 40 milliGRAM(s) Oral every 6 hours  mirtazapine 7.5 milliGRAM(s) Oral at bedtime  sucralfate suspension 1 Gram(s) Oral every 6 hours  thiamine 100 milliGRAM(s) Oral daily  tiotropium 2.5 MICROgram(s) Inhaler 2 Puff(s) Inhalation daily    MEDICATIONS  (PRN):  albuterol    90 MICROgram(s) HFA Inhaler 2 Puff(s) Inhalation every 6 hours PRN for shortness of breath and/or wheezing  methocarbamol 1000 milliGRAM(s) Oral every 6 hours PRN Muscle Spasm  ondansetron Injectable 4 milliGRAM(s) IV Push every 8 hours PRN Nausea and/or Vomiting      Telemetry reviewed by me    LABS:                        11.7   4.67  )-----------( 209      ( 14 Jul 2023 08:33 )             36.0     07-14    137  |  96<L>  |  4<L>  ----------------------------<  103<H>  3.5   |  23  |  0.7    Ca    8.8      14 Jul 2023 08:33  Phos  2.4     07-13  Mg     2.0     07-13    TPro  5.5<L>  /  Alb  3.4<L>  /  TBili  0.4  /  DBili  x   /  AST  12  /  ALT  <5  /  AlkPhos  174<H>  07-14              RADIOLOGY & ADDITIONAL TESTS:    Imaging or report Personally Reviewed:  [ x] YES  [ ] NO    < from: Xray Knee 1 or 2 Views, Left (07.13.23 @ 20:15) >  FINDINGS/  IMPRESSION:    No acute fracture is seen. Apparent lucent of the posterior lateral   tibial plateau may be artifactual but is difficult to exclude for a lytic   lesion in the setting of known metastatic disease.    There are moderate tricompartmental degenerative changes. There is trace   knee joint effusion.    Nonspecific soft tissue swelling is noted medial to the knee.      < end of copied text >      < from: Xray Chest 1 View- PORTABLE-Urgent (07.13.23 @ 14:21) >  Impression:    Chronic elevation right hemidiaphragm with right basilar fibrotic changes.      < end of copied text >      Case discussed with residents and RN on rounds today    Care discussed with pt and family

## 2023-07-14 NOTE — PATIENT PROFILE PEDIATRIC - VISION (WITH CORRECTIVE LENSES IF THE PATIENT USUALLY WEARS THEM):
Normal vision: sees adequately in most situations; can see medication labels, newsprint Bilateral Helical Rim Advancement Flap Text: The defect edges were debeveled with a #15 blade scalpel.  Given the location of the defect and the proximity to free margins (helical rim) a bilateral helical rim advancement flap was deemed most appropriate.  Using a sterile surgical marker, the appropriate advancement flaps were drawn incorporating the defect and placing the expected incisions between the helical rim and antihelix where possible.  The area thus outlined was incised through and through with a #15 scalpel blade.  With a skin hook and iris scissors, the flaps were gently and sharply undermined and freed up.

## 2023-07-14 NOTE — PROGRESS NOTE ADULT - ASSESSMENT
58 y/o woman with PMH of hypothyroidism, asthma, former alcohol use, breast cancer with diffuse bone metastasis dx this year and follows with Dr. Peña [s/p radiation x 5 sessions which finished 5/31/23, on letrozole daily, was on Ribociclib and it was discontinued due to low magnesium with high QTc] surgical history of occiput-T2 posterior instrumentation and fusion, ORIF of C2 body with functional decline and impaired ADLs/mobility and now ambulating with a walker presented to the ED complaining of decreased appetite and oral intake of 3 weeks duration.    Starvation ketoacidosis  dehydration  hypokalemia, hypomagnesemia, hyponatremia - now resolved  sinus tachycardia  decreased oral intake possibly due to radiation esophagitis? (symptoms began after RT was completed per pt)  possible depression  debility  h/o recent prolonged QTc  metastatic breast cancer to the bone and on letrozole  hypothyroid - TSH WNL  asthma    HAGMA improving with hydration - continue for now and encourage PO intake  nutrition consulted  monitor for refeeding syndrome  soft diet with supplements  trial of carafate, megace  discussed with pt re: GI eval and she is not interested in any procedures at this time (esophagram or EGD)  continue H2 blocker  PT consulted  fall precautions  repeat EKG  keep K >4 and Mg >2  continue supplements for suspected vit B1 and folate deficiencies  HemOnc consult in progress  case management informed of pt and family desire for STR on discharge  trial of mirtazapine for anorexia and suspected depression  DVT prophylaxis      DNR/DNI  very guarded prognosis      PROGRESS NOTE HANDOFF    Pending: improvement in PO intake, labs in AM, nutrition and HemONc evals, EKG    Family discussion at bedside today    Disposition: STR

## 2023-07-14 NOTE — CONSULT NOTE ADULT - ASSESSMENT
56 yo F with PMH of hypothyroidism and ?alcoholic cirrhosis (stopped drinking over 9 yrs ago) presented to the ED due to neck pain. She saw Dr. Peña (oncologist) on 4/20/23 to establish care for her suspected metastatic bone disease when she had a MRI of neck done on 3/21/23 which showed  diffuse metastatic bone disease most pronounced at C2 with bony expansion causing circumferential narrowing of central canal. In the ED, she was seen by neurosx team and recommended MRI C/T/L spine with sachin, currently pending. Oncology team is consulted for her metastatic disease.    # [place] metastatic Breast Cancer (ER +1 GA +1  Ki-67  < 5%, HER2 pending)    - seen at oncology office for diffuse osseous mets  - PE shows R breast with nipple retraction and a firm nodule close proximity to nipple at 3 o'clock  - CT chest/abdomen/pelvis : Rt breast mass , multiple enlarged mediastinal LN and paratracheal LN, lytic and scleoritic sternal or mami resions. No visceral mets.  - MRI C/T/L spine with sachin appreciated, numerous osseous mets through spines w/ most significant at the C2 and C3 level with evidence of epidural extension  - 4/24 : operated by Neurosurgery for occipitocervical junction fusion. Pathology results showed : Metastatic Ca with Breast Primary)      Recommendations:      - Obtain MR head w/ and w/o IV contrast r/o brain mets  - Start patient on Letrozole 2.4 mg once daily . (prescription for one month sent to pharmacy)  - Pt with metastatic breast cancer that is weakly hormone receptor positive. We can start treatment with the combination of endocrine therapy with CDK 4/6i. Will wait for HER2 status before finalizing systemic treatment.  - Pt to follow up with Dr Peña at [facility] on 5/25/2023    For any questions call [phone] or text via MS teams         58 yo F PMHx metastatic breast cancer, hypothyroidism, former alcohol use and asthma who presented to the ED for decreased PO intake. Admitted for multiple electrolytes abnormalities.     # [place] metastatic Breast Cancer (ER +1 OR +1, HER2 negative,  Ki-67  < 5%)  - Diagnosed in 4/23  - Initially presented with back pain in 3/23, MRI showed evidence of metastasis in C2 vertebra, s/p internal fixation and biopsy in 4/23  - CA 27.29: 55. CEA 3.7 in 4/23  - CT chest/abdomen/pelvis 4/23: multiple enlarged mediastinal LN and paratracheal LN, lytic and sclerotic sternal or rib lesions. No visceral mets.  - s/p ibrance  - currently on letrozole 2.5 mg daily  - Ribocilib (600mg daily for 21 days on, 7 days off) on hold given prolonged QTC  - pt was supposed to see Dr Hamm 7/14      Recommendations:             58 yo F PMHx metastatic breast cancer, hypothyroidism, former alcohol use and asthma who presented to the ED for decreased PO intake. Admitted for multiple electrolytes abnormalities.     # [place]lement[place] metastatic Breast Cancer (ER +1 WA +1, HER2 negative,  Ki-67 < 5%)  - Diagnosed in 4/23  - Initially presented with back pain in 3/23, MRI showed evidence of metastasis in C2 vertebra, s/p internal fixation and biopsy in 4/23  - s/p RT to C spine x5 sessions, last in 5/31/2023  - CA 27.29: 55. CEA 3.7 in 4/23  - CT chest/abdomen/pelvis 4/23: multiple enlarged mediastinal LN and paratracheal LN. No visceral mets.  - MRI spine 4/23: Numerous enhancing osseous metastatic lesions are noted throughout the cervical spine most significant at the C2 and C3 level with evidence of epidural extension causing severe spinal stenosis and mass effect upon the spinal cord at C2-C3.  - currently on letrozole 2.5 mg daily  - Ribocilib (600mg daily for 21 days on, 7 days off) started on 5/25 currently on hold given prolonged QTC (only received it 3 weeks)  - Plan was to switch to Ibrance once QTC normalizes (QTC 7/13 is 454)  - pt was supposed to see Dr Hamm 7/14      Recommendations:             56 yo F PMHx metastatic breast cancer, hypothyroidism, former alcohol use and asthma who presented to the ED for decreased PO intake. Admitted for multiple electrolytes abnormalities.     # [place] metastatic Breast Cancer (ER +1 FL +1, HER2 negative,  Ki-67 < 5%)  - Diagnosed in 4/23  - Initially presented with back pain in 3/23, MRI showed evidence of metastasis in C2 vertebra, s/p internal fixation and biopsy in 4/23  - s/p RT to C spine x5 sessions, last in 5/31/2023  - CA 27.29: 55. CEA 3.7 in 4/23  - CT chest/abdomen/pelvis 4/23: multiple enlarged mediastinal LN and paratracheal LN. No visceral mets.  - MRI spine 4/23: Numerous enhancing osseous metastatic lesions are noted throughout the cervical spine most significant at the C2 and C3 level with evidence of epidural extension causing severe spinal stenosis and mass effect upon the spinal cord at C2-C3.  - currently on letrozole 2.5 mg daily  - Ribocilib (600mg daily for 21 days on, 7 days off) started on 5/25 currently on hold given prolonged QTC (only received it 3 weeks)  - Plan was to switch to Ibrance once QTC normalizes (QTC 7/13 is 454)  - pt was supposed to see Dr Hamm 7/14      Recommendations:  - Repeat EKG 7/15  - Cardio consult for prolonged QTC on kisqali (pt was scheduled to see Dr. Hamm on 7/14)  - Get MRI brain with contrast  - Plan to start Ibrance on 7/18 if QTC normal   - Outpatient follow up with Dr. Peña

## 2023-07-15 NOTE — PROGRESS NOTE ADULT - SUBJECTIVE AND OBJECTIVE BOX
LENGTH OF HOSPITAL STAY: 2d    CHIEF COMPLAINT:    Patient is a 57y old  Female who presents with a chief complaint of Decreased oral intake with electrolyte disturbances (15 Jul 2023 12:02)    HPI:    HPI:  57-year-old woman with a history of hypothyroidism, asthma, former alcohol use, breast cancer with diffuse bone metastasis, status post radiation 5 sessions finished 5/31/23, on letrozole daily, was on Ribociclib and was discontinued due to low magnesium with high QTc, surgical history of occiput-T2 posterior instrumentation and fusion, ORIF of C2 body with functional decline and impaired ADLs/mobility, ambulates with a walker. Following with Dr. Peña.     She presented to the emergency department complaining of decreased appetite and oral intake of 3 weeks duration. She is saying that she's not hungry, no mouth pain, no dysphagia or odynophagia, no regurgitation or heart burn, no abdominal pain, no diarrhea or constipation, no change in stool consistency. This was accompanied by generalized fatigue but no fever, no chills or rigors. She admitted decreased urine output, weight loss.     She also complained of left knee pain and swelling that started 1 week ago after hitting her knee while sitting, she didn't seek medical evaluation at that time. No decreased range of motion.     Vital Signs T(F): 96.6 HR: 128 BP: 122/93 RR: 18   EKG showed sinus tachycardia   Labs were significant for severe hypokalemia, hypomagnesemia, hyponatremia, low bicarb    Given IV fluids, potassium and admitted to medicine for further evaluation and treatment        (13 Jul 2023 16:53)      ALLERGIES:    Allergies    No Known Allergies    Intolerances    hydromorphone (Faint; Nausea)      PMHx:    PAST MEDICAL & SURGICAL HISTORY:  Hypothyroidism      H/O cirrhosis      Breast cancer      No significant past surgical history    MEDICATIONS:  STANDING MEDICATIONS  dextrose 5% + lactated ringers. 1000 milliLiter(s) IV Continuous <Continuous>  enoxaparin Injectable 40 milliGRAM(s) SubCutaneous every 24 hours  famotidine    Tablet 20 milliGRAM(s) Oral two times a day  folic acid 1 milliGRAM(s) Oral daily  letrozole 2.5 milliGRAM(s) Oral daily  levothyroxine 50 MICROGram(s) Oral daily  loratadine 10 milliGRAM(s) Oral daily  megestrol 40 milliGRAM(s) Oral every 6 hours  mirtazapine 7.5 milliGRAM(s) Oral at bedtime  sucralfate suspension 1 Gram(s) Oral every 6 hours  thiamine 100 milliGRAM(s) Oral daily  tiotropium 2.5 MICROgram(s) Inhaler 2 Puff(s) Inhalation daily    PRN MEDICATIONS  albuterol    90 MICROgram(s) HFA Inhaler 2 Puff(s) Inhalation every 6 hours PRN  methocarbamol 1000 milliGRAM(s) Oral every 6 hours PRN  ondansetron Injectable 4 milliGRAM(s) IV Push every 8 hours PRN      LABS:                        11.2   3.90  )-----------( 180      ( 15 Jul 2023 08:03 )             34.5     07-15    142  |  106  |  <3<L>  ----------------------------<  112<H>  3.0<L>   |  27  |  0.5<L>    Ca    8.2<L>      15 Jul 2023 08:03  Phos  2.3     07-15  Mg     1.4     07-15    TPro  4.9<L>  /  Alb  3.1<L>  /  TBili  0.4  /  DBili  x   /  AST  11  /  ALT  <5  /  AlkPhos  165<H>  07-15      Urinalysis Basic - ( 15 Jul 2023 08:03 )    Color: x / Appearance: x / SG: x / pH: x  Gluc: 112 mg/dL / Ketone: x  / Bili: x / Urobili: x   Blood: x / Protein: x / Nitrite: x   Leuk Esterase: x / RBC: x / WBC x   Sq Epi: x / Non Sq Epi: x / Bacteria: x      RADIOLOGY:    VITALS:   T(F): 98.2  HR: 111  BP: 123/74  RR: 18  SpO2: 96%

## 2023-07-15 NOTE — CONSULT NOTE ADULT - ASSESSMENT
Impression  # Transient prolonted QTc in the setting of QTc prolonging meds and electrolytes derangements   # Breast cancer with bone mets   # Poor PO intake     Recommendations   - Please correct electrolytes to keep Mg > 2.2 and K > 4.5   - Hold QTc prolonging meds   - Daily ECGs and K and Mg checks   - Consider nutrition evaluation for oral mineral supplementation   - TTE     Incomplete note. This a preliminary plan. Will need to discuss with attending.   Signature: Yue ARCHER, 2nd year cardiovascular diseases fellow   Impression  # Transient prolonted QTc in the setting of QTc prolonging meds and electrolytes derangements   # Breast cancer with bone mets   # Poor PO intake     Recommendations   - Please correct electrolytes to keep Mg > 2.2 and K > 4.5   - Hold QTc prolonging meds   - Daily ECGs and K and Mg checks   - Consider nutrition evaluation for oral mineral supplementation   - TTE   - No further cardiac workup otherwise     Incomplete note. This a preliminary plan. Will need to discuss with attending.   Signature: Yue ARCHER, 2nd year cardiovascular diseases fellow   Impression  # Transient prolonged QTc in the setting of QTc prolonging meds and electrolytes derangements   # Metastatic breast cancer   # Poor PO intake       Recommendations   - Please correct electrolytes to keep Mg > 2.2 and K > 4.5   - Hold QTc prolonging meds   - Daily ECGs and K and Mg checks   - Consider nutrition evaluation for oral mineral supplementation   - TTE   - No further cardiac workup otherwise

## 2023-07-15 NOTE — CONSULT NOTE ADULT - SUBJECTIVE AND OBJECTIVE BOX
Outpt cardiologist:    Reason for Consult:     HISTORY OF PRESENT ILLNESS:  57-year-old woman with a history of hypothyroidism, asthma, former alcohol use, breast cancer with diffuse bone metastasis, status post radiation 5 sessions finished 23, on letrozole daily, was on Ribociclib and was discontinued due to low magnesium with high QTc, surgical history of occiput-T2 posterior instrumentation and fusion, ORIF of C2 body with functional decline and impaired ADLs/mobility, ambulates with a walker. Following with Dr. Peña.     She presented to the emergency department complaining of decreased appetite and oral intake of 3 weeks duration. She is saying that she's not hungry, no mouth pain, no dysphagia or odynophagia, no regurgitation or heart burn, no abdominal pain, no diarrhea or constipation, no change in stool consistency. This was accompanied by generalized fatigue but no fever, no chills or rigors. She admitted decreased urine output, weight loss.     She also complained of left knee pain and swelling that started 1 week ago after hitting her knee while sitting, she didn't seek medical evaluation at that time. No decreased range of motion.     Vital Signs T(F): 96.6 HR: 128 BP: 122/93 RR: 18   EKG showed sinus tachycardia   Labs were significant for severe hypokalemia, hypomagnesemia, hyponatremia, low bicarb    Given IV fluids, potassium and admitted to medicine for further evaluation and treatment        (2023 16:53)      Cardiology Fellow Notes    Cardiology consulted for prolonged QTc in the setting of anti her 2 medications, low magnesium and K        PAST MEDICAL & SURGICAL HISTORY  Hypothyroidism    H/O cirrhosis    Breast cancer    No significant past surgical history        FAMILY HISTORY:  FAMILY HISTORY:  FHx: melanoma (Mother)    FHx: arrhythmia (Father)        SOCIAL HISTORY:  Social History:  non smoker, stopped alcohol drinking 9 years ago  lives in [place] with her family (2023 16:53)      ALLERGIES:  No Known Allergies      MEDICATIONS:  dextrose 5% + lactated ringers. 1000 milliLiter(s) (100 mL/Hr) IV Continuous <Continuous>  enoxaparin Injectable 40 milliGRAM(s) SubCutaneous every 24 hours  famotidine    Tablet 20 milliGRAM(s) Oral two times a day  folic acid 1 milliGRAM(s) Oral daily  letrozole 2.5 milliGRAM(s) Oral daily  levothyroxine 50 MICROGram(s) Oral daily  loratadine 10 milliGRAM(s) Oral daily  magnesium sulfate  IVPB 2 Gram(s) IV Intermittent every 2 hours  megestrol 40 milliGRAM(s) Oral every 6 hours  potassium chloride  20 mEq/100 mL IVPB 20 milliEquivalent(s) IV Intermittent every 2 hours  sucralfate suspension 1 Gram(s) Oral every 6 hours  thiamine 100 milliGRAM(s) Oral daily  tiotropium 2.5 MICROgram(s) Inhaler 2 Puff(s) Inhalation daily    PRN:  albuterol    90 MICROgram(s) HFA Inhaler 2 Puff(s) Inhalation every 6 hours PRN  methocarbamol 1000 milliGRAM(s) Oral every 6 hours PRN      HOME MEDICATIONS:  Home Medications:  famotidine 20 mg oral tablet: 1 tab(s) orally every 12 hours (2023 17:26)  loratadine 10 mg oral tablet: 1 tab(s) orally once a day (at bedtime) (2023 17:26)  oxyCODONE 5 mg oral tablet: 1 tab(s) orally every 6 hours as needed for  severe pain (2023 17:26)      VITALS:   T(F): 98.2 (07-15 @ 09:24), Max: 98.9 ( @ 16:53)  HR: 111 (07-15 @ 09:24) (85 - 128)  BP: 123/74 (07-15 @ 09:24) (121/75 - 131/81)  BP(mean): 96 ( @ 08:02) (96 - 96)  RR: 18 (07-15 @ 09:24) (18 - 18)  SpO2: 96% ( @ 21:00) (94% - 97%)    I&O's Summary    2023 07:01  -  15 Jul 2023 07:00  --------------------------------------------------------  IN: 0 mL / OUT: 500 mL / NET: -500 mL        REVIEW OF SYSTEMS:  CONSTITUTIONAL: No weakness, fevers or chills  HEENT: No visual changes, neck/ear pain  RESPIRATORY: No cough, sob  CARDIOVASCULAR: See HPI  GASTROINTESTINAL: No abdominal pain. No nausea, vomiting, diarrhea   GENITOURINARY: No dysuria, frequency or hematuria  NEUROLOGICAL: No new focal deficits  SKIN: No new rashes    PHYSICAL EXAM:  General: Not in distress.  Non-toxic appearing. Frail. Muscle wasting.   Cardio: regular, S1, S2, no murmur  Pulm: B/L BS.  No wheezing / crackles / rales  Abdomen: Soft, non-tender, non-distended. Normoactive bowel sounds  Extremities: No edema b/l le  Neuro: A&O x3. No focal deficits    LABS:                        11.2   3.90  )-----------( 180      ( 15 Jul 2023 08:03 )             34.5     07-15    142  |  106  |  <3<L>  ----------------------------<  112<H>  3.0<L>   |  27  |  0.5<L>    Ca    8.2<L>      15 Jul 2023 08:03  Phos  2.3     07-15  Mg     1.4     07-15    TPro  4.9<L>  /  Alb  3.1<L>  /  TBili  0.4  /  DBili  x   /  AST  11  /  ALT  <5  /  AlkPhos  165<H>  07-15              Troponin trend:      EC Lead ECG:   Ventricular Rate 121 BPM    Atrial Rate 121 BPM    P-R Interval 126 ms    QRS Duration 78 ms    Q-T Interval 320 ms    QTC Calculation(Bazett) 454 ms    P Axis 33 degrees    R Axis 92 degrees    T Axis 191 degrees    Diagnosis Line Sinus tachycardia  Rightward axis  Possible Inferior infarct , age undetermined  Anterior infarct , age undetermined  Abnormal ECG    Confirmed by LUCRECIA BARRIGA MD ([id number]) on 2023 7:02:41 AM ( @ 16:01)      TELEMETRY EVENTS: HISTORY OF PRESENT ILLNESS:  57-year-old woman with a history of hypothyroidism, asthma, former alcohol use, breast cancer with diffuse bone metastasis, status post radiation 5 sessions finished 23, on letrozole daily, was on Ribociclib and was discontinued due to low magnesium with high QTc, surgical history of occiput-T2 posterior instrumentation and fusion, ORIF of C2 body with functional decline and impaired ADLs/mobility, ambulates with a walker. Following with Dr. Peña.     She presented to the emergency department complaining of decreased appetite and oral intake of 3 weeks duration. She is saying that she's not hungry, no mouth pain, no dysphagia or odynophagia, no regurgitation or heart burn, no abdominal pain, no diarrhea or constipation, no change in stool consistency. This was accompanied by generalized fatigue but no fever, no chills or rigors. She admitted decreased urine output, weight loss.     She also complained of left knee pain and swelling that started 1 week ago after hitting her knee while sitting, she didn't seek medical evaluation at that time. No decreased range of motion.     Vital Signs T(F): 96.6 HR: 128 BP: 122/93 RR: 18   EKG showed sinus tachycardia   Labs were significant for severe hypokalemia, hypomagnesemia, hyponatremia, low bicarb    Given IV fluids, potassium and admitted to medicine for further evaluation and treatment        (2023 16:53)      Cardiology Fellow Notes    Cardiology consulted for prolonged QTc in the setting of anti her 2 medications, low magnesium and K        PAST MEDICAL & SURGICAL HISTORY  Hypothyroidism    H/O cirrhosis    Breast cancer    No significant past surgical history        FAMILY HISTORY:  FAMILY HISTORY:  FHx: melanoma (Mother)    FHx: arrhythmia (Father)        SOCIAL HISTORY:  Social History:  non smoker, stopped alcohol drinking 9 years ago  lives in [place] with her family (2023 16:53)      ALLERGIES:  No Known Allergies      MEDICATIONS:  dextrose 5% + lactated ringers. 1000 milliLiter(s) (100 mL/Hr) IV Continuous <Continuous>  enoxaparin Injectable 40 milliGRAM(s) SubCutaneous every 24 hours  famotidine    Tablet 20 milliGRAM(s) Oral two times a day  folic acid 1 milliGRAM(s) Oral daily  letrozole 2.5 milliGRAM(s) Oral daily  levothyroxine 50 MICROGram(s) Oral daily  loratadine 10 milliGRAM(s) Oral daily  magnesium sulfate  IVPB 2 Gram(s) IV Intermittent every 2 hours  megestrol 40 milliGRAM(s) Oral every 6 hours  potassium chloride  20 mEq/100 mL IVPB 20 milliEquivalent(s) IV Intermittent every 2 hours  sucralfate suspension 1 Gram(s) Oral every 6 hours  thiamine 100 milliGRAM(s) Oral daily  tiotropium 2.5 MICROgram(s) Inhaler 2 Puff(s) Inhalation daily    PRN:  albuterol    90 MICROgram(s) HFA Inhaler 2 Puff(s) Inhalation every 6 hours PRN  methocarbamol 1000 milliGRAM(s) Oral every 6 hours PRN      HOME MEDICATIONS:  Home Medications:  famotidine 20 mg oral tablet: 1 tab(s) orally every 12 hours (2023 17:26)  loratadine 10 mg oral tablet: 1 tab(s) orally once a day (at bedtime) (2023 17:26)  oxyCODONE 5 mg oral tablet: 1 tab(s) orally every 6 hours as needed for  severe pain (2023 17:26)      VITALS:   T(F): 98.2 (07-15 @ 09:24), Max: 98.9 ( @ 16:53)  HR: 111 (07-15 @ 09:24) (85 - 128)  BP: 123/74 (07-15 @ 09:24) (121/75 - 131/81)  BP(mean): 96 ( @ 08:02) (96 - 96)  RR: 18 (07-15 @ 09:24) (18 - 18)  SpO2: 96% ( @ 21:00) (94% - 97%)    I&O's Summary    2023 07:01  -  15 Jul 2023 07:00  --------------------------------------------------------  IN: 0 mL / OUT: 500 mL / NET: -500 mL        REVIEW OF SYSTEMS:  CONSTITUTIONAL: No weakness, fevers or chills  HEENT: No visual changes, neck/ear pain  RESPIRATORY: No cough, sob  CARDIOVASCULAR: See HPI  GASTROINTESTINAL: No abdominal pain. No nausea, vomiting, diarrhea   GENITOURINARY: No dysuria, frequency or hematuria  NEUROLOGICAL: No new focal deficits  SKIN: No new rashes    PHYSICAL EXAM:  General: Not in distress.  Non-toxic appearing. Frail. Muscle wasting.   Cardio: regular, S1, S2, no murmur  Pulm: B/L BS.  No wheezing / crackles / rales  Abdomen: Soft, non-tender, non-distended. Normoactive bowel sounds  Extremities: No edema b/l le  Neuro: A&O x3. No focal deficits    LABS:                        11.2   3.90  )-----------( 180      ( 15 Jul 2023 08:03 )             34.5     -15    142  |  106  |  <3<L>  ----------------------------<  112<H>  3.0<L>   |  27  |  0.5<L>    Ca    8.2<L>      15 Jul 2023 08:03  Phos  2.3     07-15  Mg     1.4     07-15    TPro  4.9<L>  /  Alb  3.1<L>  /  TBili  0.4  /  DBili  x   /  AST  11  /  ALT  <5  /  AlkPhos  165<H>  07-15              Troponin trend:      EC Lead ECG:   Ventricular Rate 121 BPM    Atrial Rate 121 BPM    P-R Interval 126 ms    QRS Duration 78 ms    Q-T Interval 320 ms    QTC Calculation(Bazett) 454 ms    P Axis 33 degrees    R Axis 92 degrees    T Axis 191 degrees    Diagnosis Line Sinus tachycardia  Rightward axis  Possible Inferior infarct , age undetermined  Anterior infarct , age undetermined  Abnormal ECG    Confirmed by LUCRECIA BARRIGA MD ([id number]) on 2023 7:02:41 AM ( @ 16:01)      TELEMETRY EVENTS: HISTORY OF PRESENT ILLNESS:  57-year-old woman with a history of hypothyroidism, asthma, former alcohol use, breast cancer with diffuse bone metastasis, status post radiation 5 sessions finished 23, on letrozole daily, was on Ribociclib and was discontinued due to low magnesium with high QTc, surgical history of occiput-T2 posterior instrumentation and fusion, ORIF of C2 body with functional decline and impaired ADLs/mobility, ambulates with a walker. Following with Dr. Peña.     She presented to the emergency department complaining of decreased appetite and oral intake of 3 weeks duration. She is saying that she's not hungry, no mouth pain, no dysphagia or odynophagia, no regurgitation or heart burn, no abdominal pain, no diarrhea or constipation, no change in stool consistency. This was accompanied by generalized fatigue but no fever, no chills or rigors. She admitted decreased urine output, weight loss.     She also complained of left knee pain and swelling that started 1 week ago after hitting her knee while sitting, she didn't seek medical evaluation at that time. No decreased range of motion.     Vital Signs T(F): 96.6 HR: 128 BP: 122/93 RR: 18   EKG showed sinus tachycardia   Labs were significant for severe hypokalemia, hypomagnesemia, hyponatremia, low bicarb    Given IV fluids, potassium and admitted to medicine for further evaluation and treatment         Cardiology Fellow Notes    Cardiology consulted for prolonged QTc in the setting of anti her 2 medications, low magnesium and K        PAST MEDICAL & SURGICAL HISTORY  Hypothyroidism    H/O cirrhosis    Breast cancer    No significant past surgical history      FAMILY HISTORY:  FHx: melanoma (Mother)    FHx: arrhythmia (Father)        SOCIAL HISTORY:  non smoker, stopped alcohol drinking 9 years ago  lives in [place] with her family (2023 16:53)      ALLERGIES:  No Known Allergies      MEDICATIONS:  dextrose 5% + lactated ringers. 1000 milliLiter(s) (100 mL/Hr) IV Continuous <Continuous>  enoxaparin Injectable 40 milliGRAM(s) SubCutaneous every 24 hours  famotidine    Tablet 20 milliGRAM(s) Oral two times a day  folic acid 1 milliGRAM(s) Oral daily  letrozole 2.5 milliGRAM(s) Oral daily  levothyroxine 50 MICROGram(s) Oral daily  loratadine 10 milliGRAM(s) Oral daily  magnesium sulfate  IVPB 2 Gram(s) IV Intermittent every 2 hours  megestrol 40 milliGRAM(s) Oral every 6 hours  potassium chloride  20 mEq/100 mL IVPB 20 milliEquivalent(s) IV Intermittent every 2 hours  sucralfate suspension 1 Gram(s) Oral every 6 hours  thiamine 100 milliGRAM(s) Oral daily  tiotropium 2.5 MICROgram(s) Inhaler 2 Puff(s) Inhalation daily    PRN:  albuterol    90 MICROgram(s) HFA Inhaler 2 Puff(s) Inhalation every 6 hours PRN  methocarbamol 1000 milliGRAM(s) Oral every 6 hours PRN      HOME MEDICATIONS:  Home Medications:  famotidine 20 mg oral tablet: 1 tab(s) orally every 12 hours (2023 17:26)  loratadine 10 mg oral tablet: 1 tab(s) orally once a day (at bedtime) (2023 17:26)  oxyCODONE 5 mg oral tablet: 1 tab(s) orally every 6 hours as needed for  severe pain (2023 17:26)      VITALS:   T(F): 98.2 (07-15 @ 09:24), Max: 98.9 ( @ 16:53)  HR: 111 (07-15 @ 09:24) (85 - 128)  BP: 123/74 (07-15 @ 09:24) (121/75 - 131/81)  BP(mean): 96 ( @ 08:02) (96 - 96)  RR: 18 (07-15 @ 09:24) (18 - 18)  SpO2: 96% ( @ 21:00) (94% - 97%)    I&O's Summary    2023 07:01  -  15 Jul 2023 07:00  --------------------------------------------------------  IN: 0 mL / OUT: 500 mL / NET: -500 mL        REVIEW OF SYSTEMS:  CONSTITUTIONAL: +decreased appetite  NECK: No pain or stiffness  RESPIRATORY: See HPI  CARDIOVASCULAR: See HPI  GASTROINTESTINAL: No abdominal/epigastric pain, nausea, vomiting, hematemesis, diarrhea, constipation, melena or hematochezia  GENITOURINARY: No dysuria, frequency, hematuria, incontinence  NEUROLOGICAL: No headaches, memory loss, loss of strength, numbness, tremors  SKIN: No itching, burning, rashes, lesions   ENDOCRINE: No heat/cold intolerance or hair loss  MUSCULOSKELETAL: No joint pain or swelling  HEME/LYMPH: No easy bruising or bleeding gums    PHYSICAL EXAM:  General: Not in distress.  Non-toxic appearing. Frail. Muscle wasting.   Cardio: regular, S1, S2, no murmur  Pulm: B/L BS.  No wheezing / crackles / rales  Abdomen: Soft, non-tender, non-distended  Extremities: No edema b/l le  Neuro: A&O x3      LABS:                        11.2   3.90  )-----------( 180      ( 15 Jul 2023 08:03 )             34.5     07-15    142  |  106  |  <3<L>  ----------------------------<  112<H>  3.0<L>   |  27  |  0.5<L>    Ca    8.2<L>      15 Jul 2023 08:03  Phos  2.3     07-15  Mg     1.4     -15    TPro  4.9<L>  /  Alb  3.1<L>  /  TBili  0.4  /  DBili  x   /  AST  11  /  ALT  <5  /  AlkPhos  165<H>  07-15          EC Lead ECG:   Ventricular Rate 121 BPM    Atrial Rate 121 BPM    P-R Interval 126 ms    QRS Duration 78 ms    Q-T Interval 320 ms    QTC Calculation(Bazett) 454 ms    P Axis 33 degrees    R Axis 92 degrees    T Axis 191 degrees    Diagnosis Line Sinus tachycardia  Rightward axis  Possible Inferior infarct , age undetermined  Anterior infarct , age undetermined  Abnormal ECG

## 2023-07-15 NOTE — PROGRESS NOTE ADULT - ASSESSMENT
56 y/o woman with PMH of hypothyroidism, asthma, former alcohol use, breast cancer with diffuse bone metastasis dx this year and follows with Dr. Peña [s/p radiation x 5 sessions which finished 5/31/23, on letrozole daily, was on Ribociclib and it was discontinued due to low magnesium with high QTc] surgical history of occiput-T2 posterior instrumentation and fusion, ORIF of C2 body with functional decline and impaired ADLs/mobility and now ambulating with a walker presented to the ED complaining of decreased appetite and oral intake of 3 weeks duration.    Starvation ketoacidosis  dehydration  hypokalemia, hypomagnesemia  hyponatremia - resolved   sinus tachycardia could be secondary to hypovolemia / dehydration - monitor   decreased oral intake possibly due to radiation esophagitis? (symptoms began after RT was completed per pt)  possible depression  debility  h/o recent prolonged QTc  metastatic breast cancer to the bone and on letrozole  hypothyroid - TSH WNL  asthma    HAGMA improving with hydration today AG normalized  - continue for now and encourage PO intake  c/w gentle hydration for now D5W and LR   nutrition consulted  monitor for refeeding syndrome  monitor Phos level, replete K and Mg for hypokalemia and Hypomagnesemia   soft diet with supplements  trial of carafate, megace already started - continue for now   as per previous notes the team discussed with pt re: GI eval and she is not interested in any procedures at this time (esophagram or EGD)  I also discussed with her today she confirmed the same   continue H2 blocker  PT consulted  fall precautions  repeat EKG  keep K >4 and Mg >2  continue supplements for suspected vit B1 and folate deficiencies  HemOnc consult appreciated    as per hem/onc Repeat EKG 7/15, - Cardio consult for prolonged QTC on kisqali (pt was scheduled to see Dr. Hamm on 7/14)  - Get MRI brain with contrast  - Plan to start Ibrance on 7/18 if QTC normal  so would get EKG today and cardiology and MRI Brain   case management informed of pt and family desire for STR on discharge  trial of mirtazapine was ordered but would hold for now until QTc stable   avoid QTc prolonging meds for now   TSH 1.46 c/w levothyroxine   Pt stated that she is eating and feeling better     DVT prophylaxis      DNR/DNI  very guarded prognosis      PROGRESS NOTE HANDOFF    Pending: improvement in PO intake, , Labs / EKG/ MRI brain/ cardiology / Nutritions/ HemONC     Family discussion at bedside today    Disposition: STR

## 2023-07-15 NOTE — PROGRESS NOTE ADULT - SUBJECTIVE AND OBJECTIVE BOX
T H I S   I S    N O  T   A    F I N A L I Z E D   N O T E      JEAN-PAUL GIANG  57y, Female  Allergy: No Known Allergies  hydromorphone (Faint; Nausea)    Hospital Day: 2d    Patient seen and examined earlier today.     PMH/PSH:  PAST MEDICAL & SURGICAL HISTORY:  Hypothyroidism      H/O cirrhosis      Breast cancer      No significant past surgical history          LAST 24-Hr EVENTS:    VITALS:  T(F): 98.2 (07-15-23 @ 09:24), Max: 98.9 (07-14-23 @ 16:53)  HR: 111 (07-15-23 @ 09:24)  BP: 123/74 (07-15-23 @ 09:24) (121/75 - 131/66)  RR: 18 (07-15-23 @ 09:24)  SpO2: 96% (07-14-23 @ 21:00)          TESTS & MEASUREMENTS:  Weight/BMI  72.6 (07-13-23 @ 13:00)  27.5 (07-13-23 @ 13:00)    07-14-23 @ 07:01  -  07-15-23 @ 07:00  --------------------------------------------------------  IN: 0 mL / OUT: 500 mL / NET: -500 mL                            11.2   3.90  )-----------( 180      ( 15 Jul 2023 08:03 )             34.5         07-15    142  |  106  |  <3<L>  ----------------------------<  112<H>  3.0<L>   |  27  |  0.5<L>    Ca    8.2<L>      15 Jul 2023 08:03  Phos  2.3     07-15  Mg     1.4     07-15    TPro  4.9<L>  /  Alb  3.1<L>  /  TBili  0.4  /  DBili  x   /  AST  11  /  ALT  <5  /  AlkPhos  165<H>  07-15    LIVER FUNCTIONS - ( 15 Jul 2023 08:03 )  Alb: 3.1 g/dL / Pro: 4.9 g/dL / ALK PHOS: 165 U/L / ALT: <5 U/L / AST: 11 U/L / GGT: x                 Urinalysis Basic - ( 15 Jul 2023 08:03 )    Color: x / Appearance: x / SG: x / pH: x  Gluc: 112 mg/dL / Ketone: x  / Bili: x / Urobili: x   Blood: x / Protein: x / Nitrite: x   Leuk Esterase: x / RBC: x / WBC x   Sq Epi: x / Non Sq Epi: x / Bacteria: x                            RADIOLOGY, ECG, & ADDITIONAL TESTS:  12 Lead ECG:   Ventricular Rate 121 BPM    Atrial Rate 121 BPM    P-R Interval 126 ms    QRS Duration 78 ms    Q-T Interval 320 ms    QTC Calculation(Bazett) 454 ms    P Axis 33 degrees    R Axis 92 degrees    T Axis 191 degrees    Diagnosis Line Sinus tachycardia  Rightward axis  Possible Inferior infarct , age undetermined  Anterior infarct , age undetermined  Abnormal ECG    Confirmed by LUCRECIA BARRIGA MD ([id number]) on 7/14/2023 7:02:41 AM (07-13-23 @ 16:01)      RECENT DIAGNOSTIC ORDERS:  Phosphorus: AM Sched. Collection: 17-Jul-2023 04:30 (07-14-23 @ 14:08)  Phosphorus: AM Sched. Collection: 16-Jul-2023 04:30 (07-14-23 @ 14:08)  Magnesium: AM Sched. Collection: 17-Jul-2023 04:30 (07-14-23 @ 14:08)  Magnesium: AM Sched. Collection: 16-Jul-2023 04:30 (07-14-23 @ 14:08)  Comprehensive Metabolic Panel: AM Sched. Collection: 17-Jul-2023 04:30 (07-14-23 @ 14:08)  Comprehensive Metabolic Panel: AM Sched. Collection: 16-Jul-2023 04:30 (07-14-23 @ 14:08)  Complete Blood Count + Automated Diff: AM Sched. Collection: 17-Jul-2023 04:30 (07-14-23 @ 14:08)  Complete Blood Count + Automated Diff: AM Sched. Collection: 16-Jul-2023 04:30 (07-14-23 @ 14:08)  12 Lead ECG:   Provider's Contact #: [phone] (07-14-23 @ 13:06)      MEDICATIONS:  MEDICATIONS  (STANDING):  dextrose 5% + lactated ringers. 1000 milliLiter(s) (100 mL/Hr) IV Continuous <Continuous>  enoxaparin Injectable 40 milliGRAM(s) SubCutaneous every 24 hours  famotidine    Tablet 20 milliGRAM(s) Oral two times a day  folic acid 1 milliGRAM(s) Oral daily  letrozole 2.5 milliGRAM(s) Oral daily  levothyroxine 50 MICROGram(s) Oral daily  loratadine 10 milliGRAM(s) Oral daily  megestrol 40 milliGRAM(s) Oral every 6 hours  mirtazapine 7.5 milliGRAM(s) Oral at bedtime  sucralfate suspension 1 Gram(s) Oral every 6 hours  thiamine 100 milliGRAM(s) Oral daily  tiotropium 2.5 MICROgram(s) Inhaler 2 Puff(s) Inhalation daily    MEDICATIONS  (PRN):  albuterol    90 MICROgram(s) HFA Inhaler 2 Puff(s) Inhalation every 6 hours PRN for shortness of breath and/or wheezing  methocarbamol 1000 milliGRAM(s) Oral every 6 hours PRN Muscle Spasm  ondansetron Injectable 4 milliGRAM(s) IV Push every 8 hours PRN Nausea and/or Vomiting      HOME MEDICATIONS:  albuterol 90 mcg/inh inhalation aerosol (07-13)  famotidine 20 mg oral tablet (07-13)  letrozole 2.5 mg oral tablet (07-13)  levothyroxine 50 mcg (0.05 mg) oral tablet (07-13)  loratadine 10 mg oral tablet (07-13)  methocarbamol 500 mg oral tablet (07-13)  oxyCODONE 5 mg oral tablet (07-13)  tiotropium 2.5 mcg/inh inhalation aerosol (07-13)      PHYSICAL EXAM:  GENERAL:   CHEST/LUNG:   HEART:   ABDOMEN:   EXTREMITIES:               JEAN-PAUL GIANG  57y, Female  Allergy: No Known Allergies  hydromorphone (Faint; Nausea)    Hospital Day: 2d    Patient seen and examined earlier today. the patient stated that she feels better today eating better     PMH/PSH:  PAST MEDICAL & SURGICAL HISTORY:  Hypothyroidism      H/O cirrhosis      Breast cancer      No significant past surgical history          LAST 24-Hr EVENTS:    VITALS:  T(F): 98.2 (07-15-23 @ 09:24), Max: 98.9 (07-14-23 @ 16:53)  HR: 111 (07-15-23 @ 09:24)  BP: 123/74 (07-15-23 @ 09:24) (121/75 - 131/66)  RR: 18 (07-15-23 @ 09:24)  SpO2: 96% (07-14-23 @ 21:00)          TESTS & MEASUREMENTS:  Weight/BMI  72.6 (07-13-23 @ 13:00)  27.5 (07-13-23 @ 13:00)    07-14-23 @ 07:01  -  07-15-23 @ 07:00  --------------------------------------------------------  IN: 0 mL / OUT: 500 mL / NET: -500 mL                            11.2   3.90  )-----------( 180      ( 15 Jul 2023 08:03 )             34.5         07-15    142  |  106  |  <3<L>  ----------------------------<  112<H>  3.0<L>   |  27  |  0.5<L>    Ca    8.2<L>      15 Jul 2023 08:03  Phos  2.3     07-15  Mg     1.4     07-15    TPro  4.9<L>  /  Alb  3.1<L>  /  TBili  0.4  /  DBili  x   /  AST  11  /  ALT  <5  /  AlkPhos  165<H>  07-15    LIVER FUNCTIONS - ( 15 Jul 2023 08:03 )  Alb: 3.1 g/dL / Pro: 4.9 g/dL / ALK PHOS: 165 U/L / ALT: <5 U/L / AST: 11 U/L / GGT: x                 Urinalysis Basic - ( 15 Jul 2023 08:03 )    Color: x / Appearance: x / SG: x / pH: x  Gluc: 112 mg/dL / Ketone: x  / Bili: x / Urobili: x   Blood: x / Protein: x / Nitrite: x   Leuk Esterase: x / RBC: x / WBC x   Sq Epi: x / Non Sq Epi: x / Bacteria: x                            RADIOLOGY, ECG, & ADDITIONAL TESTS:  12 Lead ECG:   Ventricular Rate 121 BPM    Atrial Rate 121 BPM    P-R Interval 126 ms    QRS Duration 78 ms    Q-T Interval 320 ms    QTC Calculation(Bazett) 454 ms    P Axis 33 degrees    R Axis 92 degrees    T Axis 191 degrees    Diagnosis Line Sinus tachycardia  Rightward axis  Possible Inferior infarct , age undetermined  Anterior infarct , age undetermined  Abnormal ECG    Confirmed by LUCRECIA BARRIGA MD ([id number]) on 7/14/2023 7:02:41 AM (07-13-23 @ 16:01)      RECENT DIAGNOSTIC ORDERS:  Phosphorus: AM Sched. Collection: 17-Jul-2023 04:30 (07-14-23 @ 14:08)  Phosphorus: AM Sched. Collection: 16-Jul-2023 04:30 (07-14-23 @ 14:08)  Magnesium: AM Sched. Collection: 17-Jul-2023 04:30 (07-14-23 @ 14:08)  Magnesium: AM Sched. Collection: 16-Jul-2023 04:30 (07-14-23 @ 14:08)  Comprehensive Metabolic Panel: AM Sched. Collection: 17-Jul-2023 04:30 (07-14-23 @ 14:08)  Comprehensive Metabolic Panel: AM Sched. Collection: 16-Jul-2023 04:30 (07-14-23 @ 14:08)  Complete Blood Count + Automated Diff: AM Sched. Collection: 17-Jul-2023 04:30 (07-14-23 @ 14:08)  Complete Blood Count + Automated Diff: AM Sched. Collection: 16-Jul-2023 04:30 (07-14-23 @ 14:08)  12 Lead ECG:   Provider's Contact #: [phone] (07-14-23 @ 13:06)      MEDICATIONS:  MEDICATIONS  (STANDING):  dextrose 5% + lactated ringers. 1000 milliLiter(s) (100 mL/Hr) IV Continuous <Continuous>  enoxaparin Injectable 40 milliGRAM(s) SubCutaneous every 24 hours  famotidine    Tablet 20 milliGRAM(s) Oral two times a day  folic acid 1 milliGRAM(s) Oral daily  letrozole 2.5 milliGRAM(s) Oral daily  levothyroxine 50 MICROGram(s) Oral daily  loratadine 10 milliGRAM(s) Oral daily  megestrol 40 milliGRAM(s) Oral every 6 hours  mirtazapine 7.5 milliGRAM(s) Oral at bedtime  sucralfate suspension 1 Gram(s) Oral every 6 hours  thiamine 100 milliGRAM(s) Oral daily  tiotropium 2.5 MICROgram(s) Inhaler 2 Puff(s) Inhalation daily    MEDICATIONS  (PRN):  albuterol    90 MICROgram(s) HFA Inhaler 2 Puff(s) Inhalation every 6 hours PRN for shortness of breath and/or wheezing  methocarbamol 1000 milliGRAM(s) Oral every 6 hours PRN Muscle Spasm  ondansetron Injectable 4 milliGRAM(s) IV Push every 8 hours PRN Nausea and/or Vomiting      HOME MEDICATIONS:  albuterol 90 mcg/inh inhalation aerosol (07-13)  famotidine 20 mg oral tablet (07-13)  letrozole 2.5 mg oral tablet (07-13)  levothyroxine 50 mcg (0.05 mg) oral tablet (07-13)  loratadine 10 mg oral tablet (07-13)  methocarbamol 500 mg oral tablet (07-13)  oxyCODONE 5 mg oral tablet (07-13)  tiotropium 2.5 mcg/inh inhalation aerosol (07-13)      PHYSICAL EXAM:  On exam  General: awake, alert, NAD, chronic ill appearance  Lungs:  clear to ausculations b/l, normal resp effort  Heart: regular ryhthm   Abdomen: soft, non tender non distended  Ext: no edema, can move all  his extremities

## 2023-07-16 NOTE — DIETITIAN INITIAL EVALUATION ADULT - NSFNSGIIOFT_GEN_A_CORE
Dx: 58y/o female with h/o hypothyroidism, asthma, former alcohol use, breast cancer with diffuse bone metastasis dx this year and follows with Dr. Peña [s/p radiation x 5 sessions which finished 5/31/23, on letrozole daily, was on Ribociclib and it was discontinued due to low magnesium with high QTc] surgical history of occiput-T2 posterior instrumentation and fusion, ORIF of C2 body with functional decline and impaired ADLs/mobility and now ambulating with a walker presented to the ED complaining of decreased appetite and oral intake of 3 weeks duration. Noted to have starvation ketoacidosis and dehydration.

## 2023-07-16 NOTE — PROGRESS NOTE ADULT - ASSESSMENT
58 y/o woman with PMH of hypothyroidism, asthma, former alcohol use, breast cancer with diffuse bone metastasis dx this year and follows with Dr. Peña [s/p radiation x 5 sessions which finished 5/31/23, on letrozole daily, was on Ribociclib and it was discontinued due to low magnesium with high QTc] surgical history of occiput-T2 posterior instrumentation and fusion, ORIF of C2 body with functional decline and impaired ADLs/mobility and now ambulating with a walker presented to the ED complaining of decreased appetite and oral intake of 3 weeks duration.    Starvation ketoacidosis  dehydration  hypokalemia, hypomagnesemia  hyponatremia - resolved   sinus tachycardia could be secondary to hypovolemia / dehydration - monitor   decreased oral intake possibly due to radiation esophagitis? (symptoms began after RT was completed per pt)  possible depression  debility  h/o recent prolonged QTc  metastatic breast cancer to the bone and on letrozole  hypothyroid - TSH WNL  asthma    HAGMA improving with hydration today AG normalized  - continue for now and encourage PO intake  c/w gentle hydration for now D5W and LR   nutrition consulted  monitor for refeeding syndrome  monitor Phos level, replete K and Mg for hypokalemia and Hypomagnesemia   soft diet with supplements  trial of carafate, megace already started - continue for now   as per previous notes the team discussed with pt re: GI eval and she is not interested in any procedures at this time (esophagram or EGD)  I also discussed with her today she confirmed the same   continue H2 blocker  PT consulted  fall precautions  repeat EKG  keep K >4 and Mg >2  continue supplements for suspected vit B1 and folate deficiencies  HemOnc consult appreciated    as per hem/onc Repeat EKG 7/15, - Cardio consult for prolonged QTC on kisqali (pt was scheduled to see Dr. Hamm on 7/14)  - MRI brain with contrast-preformed not yet read   - Plan to start Ibrance on 7/18 if QTC normal  so would get EKG today and cardiology and MRI Brain   case management informed of pt and family desire for STR on discharge  trial of mirtazapine was ordered but would hold for now until QTc stable   avoid QTc prolonging meds for now   TSH 1.46 c/w levothyroxine   Pt stated that she is eating and feeling better     DVT prophylaxis      DNR/DNI  very guarded prognosis      PROGRESS NOTE HANDOFF    Pending: improvement in PO intake, , Labs / EKG/ MRI brain/ cardiology / Nutritions/ HemONC/ddimer       Disposition: STR

## 2023-07-16 NOTE — DIETITIAN INITIAL EVALUATION ADULT - PERTINENT LABORATORY DATA
07-16    140  |  104  |  <3<L>  ----------------------------<  116<H>  3.8   |  25  |  <0.5<L>    Ca    8.2<L>      16 Jul 2023 06:54  Phos  2.1     07-16  Mg     2.0     07-16    TPro  4.6<L>  /  Alb  2.7<L>  /  TBili  0.3  /  DBili  x   /  AST  12  /  ALT  <5  /  AlkPhos  160<H>  07-16

## 2023-07-16 NOTE — DIETITIAN INITIAL EVALUATION ADULT - ADD RECOMMEND
1.Continue to provide an appetite stimulant   2.Continue to provide Thiamin and Folic acid once daily  3.Recommend provide a MVI with minerals once daily   4.Consider order a swallow evaluation

## 2023-07-16 NOTE — PROGRESS NOTE ADULT - SUBJECTIVE AND OBJECTIVE BOX
Patient is a 57y old  Female who presents with a chief complaint of Failure to thrive in adult     (16 Jul 2023 17:21)      Patient seen and examined at bedside.    ALLERGIES:  No Known Allergies  hydromorphone (Faint; Nausea)    MEDICATIONS:  albuterol    90 MICROgram(s) HFA Inhaler 2 Puff(s) Inhalation every 6 hours PRN  dextrose 5% + lactated ringers. 1000 milliLiter(s) IV Continuous <Continuous>  enoxaparin Injectable 40 milliGRAM(s) SubCutaneous every 24 hours  famotidine    Tablet 20 milliGRAM(s) Oral two times a day  folic acid 1 milliGRAM(s) Oral daily  letrozole 2.5 milliGRAM(s) Oral daily  levothyroxine 50 MICROGram(s) Oral daily  loratadine 10 milliGRAM(s) Oral daily  megestrol 40 milliGRAM(s) Oral every 6 hours  methocarbamol 1000 milliGRAM(s) Oral every 6 hours PRN  sucralfate suspension 1 Gram(s) Oral every 6 hours  thiamine 100 milliGRAM(s) Oral daily  tiotropium 2.5 MICROgram(s) Inhaler 2 Puff(s) Inhalation daily    Vital Signs Last 24 Hrs  T(F): 98.4 (16 Jul 2023 16:00), Max: 98.4 (16 Jul 2023 16:00)  HR: 130 (16 Jul 2023 16:40) (111 - 133)  BP: 120/80 (16 Jul 2023 16:40) (120/80 - 136/84)  RR: 18 (16 Jul 2023 16:40) (18 - 18)  SpO2: 98% (16 Jul 2023 16:40) (98% - 98%)  I&O's Summary    15 Jul 2023 07:01  -  16 Jul 2023 07:00  --------------------------------------------------------  IN: 0 mL / OUT: 800 mL / NET: -800 mL    16 Jul 2023 07:01  -  16 Jul 2023 18:20  --------------------------------------------------------  IN: 0 mL / OUT: 800 mL / NET: -800 mL        PHYSICAL EXAM:  General: NAD, A/O x 3  ENT: MMM  Neck: Supple, No JVD  Lungs: Clear to auscultation bilaterally, no crackles   Cardio: RRR, S1/S2, No murmurs. tachycardia   Abdomen: Soft, Nontender, Nondistended; Bowel sounds present  Extremities: No cyanosis, +1 edema     LABS:                        10.4   3.93  )-----------( 180      ( 16 Jul 2023 06:54 )             32.4     07-16    140  |  104  |  <3  ----------------------------<  116  3.8   |  25  |  <0.5    Ca    8.2      16 Jul 2023 06:54  Phos  2.1     07-16  Mg     2.0     07-16    TPro  4.6  /  Alb  2.7  /  TBili  0.3  /  DBili  x   /  AST  12  /  ALT  <5  /  AlkPhos  160  07-16                TSH 1.46   TSH with FT4 reflex --  Total T3 --                  Urinalysis Basic - ( 16 Jul 2023 06:54 )    Color: x / Appearance: x / SG: x / pH: x  Gluc: 116 mg/dL / Ketone: x  / Bili: x / Urobili: x   Blood: x / Protein: x / Nitrite: x   Leuk Esterase: x / RBC: x / WBC x   Sq Epi: x / Non Sq Epi: x / Bacteria: x        Culture - Urine (collected 13 Jul 2023 02:32)  Source: Clean Catch Clean Catch (Midstream)  Final Report (15 Jul 2023 15:48):    <10,000 CFU/mL Normal Urogenital Gertrudis          RADIOLOGY & ADDITIONAL TESTS:    Care Discussed with Consultants/Other Providers:

## 2023-07-16 NOTE — DIETITIAN INITIAL EVALUATION ADULT - PERTINENT MEDS FT
MEDICATIONS  (STANDING):  dextrose 5% + lactated ringers. 1000 milliLiter(s) (100 mL/Hr) IV Continuous <Continuous>  enoxaparin Injectable 40 milliGRAM(s) SubCutaneous every 24 hours  famotidine    Tablet 20 milliGRAM(s) Oral two times a day  folic acid 1 milliGRAM(s) Oral daily  letrozole 2.5 milliGRAM(s) Oral daily  levothyroxine 50 MICROGram(s) Oral daily  loratadine 10 milliGRAM(s) Oral daily  megestrol 40 milliGRAM(s) Oral every 6 hours  sucralfate suspension 1 Gram(s) Oral every 6 hours  thiamine 100 milliGRAM(s) Oral daily  tiotropium 2.5 MICROgram(s) Inhaler 2 Puff(s) Inhalation daily    MEDICATIONS  (PRN):  albuterol    90 MICROgram(s) HFA Inhaler 2 Puff(s) Inhalation every 6 hours PRN for shortness of breath and/or wheezing  methocarbamol 1000 milliGRAM(s) Oral every 6 hours PRN Muscle Spasm

## 2023-07-16 NOTE — PROVIDER CONTACT NOTE (OTHER) - ACTION/TREATMENT ORDERED:
RALF Cristobal notified, assessed the pt at the bedside, EKG stat ordered RALF Cristobal notified, assessed the pt at the bedside, EKG stat ordered,  ml bolus ordered

## 2023-07-16 NOTE — DIETITIAN INITIAL EVALUATION ADULT - OTHER CALCULATIONS
Estimated Calorie Needs: [id number] x AF 1.3-1.4=[id number]/day -Due to possible weight loss  Estimated Protein Needs: 87-102grams/day (1.2-1.4grams/kg of admit weight) -Due to possible weight loss  Estimated Fluid Needs: 1684-1813mL/day (1mL/kcal)

## 2023-07-16 NOTE — DIETITIAN INITIAL EVALUATION ADULT - ORAL INTAKE PTA/DIET HISTORY
An interview with the patient could not be completed at this time since the patient is off the unit. Once medically feasible, an interview with the patient will be completed.

## 2023-07-16 NOTE — DIETITIAN INITIAL EVALUATION ADULT - NAME AND PHONE
Intervention: 1.Meals and Snacks 2.Medical Food Supplement 3.Vitamin Supplement 4.Nutrition Related Medication 5.Coordination of Care   Monitor/Evaluate: Diet order, energy intake, nutrition focused physical findings and anemia profile

## 2023-07-17 NOTE — PROGRESS NOTE ADULT - ASSESSMENT
56 yo F PMHx metastatic breast cancer, hypothyroidism, former alcohol use and asthma who presented to the ED for decreased PO intake. Admitted for multiple electrolytes abnormalities.     # [place] metastatic Breast Cancer (ER +1 ID +1, HER2 negative,  Ki-67 < 5%)  - Diagnosed in 4/23  - Initially presented with back pain in 3/23, MRI showed evidence of metastasis in C2 vertebra, s/p internal fixation and biopsy in 4/23  - s/p RT to C spine x5 sessions, last in 5/31/2023  - CA 27.29: 55. CEA 3.7 in 4/23  - CT chest/abdomen/pelvis 4/23: multiple enlarged mediastinal LN and paratracheal LN. No visceral mets.  - MRI spine 4/23: Numerous enhancing osseous metastatic lesions are noted throughout the cervical spine most significant at the C2 and C3 level with evidence of epidural extension causing severe spinal stenosis and mass effect upon the spinal cord at C2-C3.  - MRI brain 7/16/23: no evidence of metastasis  - currently on letrozole 2.5 mg daily  - Ribocilib (600mg daily for 21 days on, 7 days off) started on 5/25 currently on hold given prolonged QTC (only received it 3 weeks)  - Plan was to switch to Ibrance once QTC normalizes (QTC 7/16 401)      Recommendations:  - Cardio advised holding QTC prolonging meds, will continue holding off on ribociclib  - c/w letrozole 2.5 mg daily  - Recommend switching treatment as outpatient to palbociclib or abemicilib (preferred, survival benefit)  - Outpatient follow up with Dr. Peña upon discharge

## 2023-07-17 NOTE — PROGRESS NOTE ADULT - SUBJECTIVE AND OBJECTIVE BOX
Patient is a 57y old  Female who presents with a chief complaint of Decreased oral intake with electrolyte disturbances (17 Jul 2023 17:21)      Patient seen and examined at bedside.  Patient denies any chest pain or shortness of breath   ALLERGIES:  No Known Allergies  hydromorphone (Faint; Nausea)    MEDICATIONS:  acetaminophen     Tablet .. 650 milliGRAM(s) Oral every 6 hours PRN  albuterol    90 MICROgram(s) HFA Inhaler 2 Puff(s) Inhalation every 6 hours PRN  dextrose 5% + lactated ringers 1000 milliLiter(s) IV Continuous <Continuous>  enoxaparin Injectable 40 milliGRAM(s) SubCutaneous every 24 hours  famotidine    Tablet 20 milliGRAM(s) Oral two times a day  folic acid 1 milliGRAM(s) Oral daily  letrozole 2.5 milliGRAM(s) Oral daily  levothyroxine 50 MICROGram(s) Oral daily  loratadine 10 milliGRAM(s) Oral daily  megestrol 40 milliGRAM(s) Oral every 6 hours  methocarbamol 1000 milliGRAM(s) Oral every 6 hours PRN  potassium chloride   Powder 40 milliEquivalent(s) Oral once  potassium phosphate / sodium phosphate Powder (PHOS-NaK) 1 Packet(s) Oral three times a day  sucralfate suspension 1 Gram(s) Oral every 6 hours  thiamine 100 milliGRAM(s) Oral daily  tiotropium 2.5 MICROgram(s) Inhaler 2 Puff(s) Inhalation daily    Vital Signs Last 24 Hrs  T(F): 99 (17 Jul 2023 15:49), Max: 99 (17 Jul 2023 15:49)  HR: 127 (17 Jul 2023 15:49) (109 - 127)  BP: 119/78 (17 Jul 2023 15:49) (119/78 - 125/83)  RR: 18 (17 Jul 2023 15:49) (18 - 18)  SpO2: 97% (17 Jul 2023 08:33) (97% - 97%)  I&O's Summary    16 Jul 2023 07:01  -  17 Jul 2023 07:00  --------------------------------------------------------  IN: 0 mL / OUT: 800 mL / NET: -800 mL        PHYSICAL EXAM:  General: NAD, A/O x 3  ENT: MMM  Neck: Supple, No JVD  Lungs: Clear to auscultation bilaterally  Cardio: RRR, S1/S2, 2/6 systolic murmur   Abdomen: Soft, Nontender, Nondistended; Bowel sounds present  Extremities: No cyanosis, No edema    LABS:                        11.0   3.17  )-----------( 160      ( 17 Jul 2023 06:45 )             34.2     07-17    141  |  106  |  <3  ----------------------------<  127  3.2   |  26  |  <0.5    Ca    8.3      17 Jul 2023 06:45  Phos  1.6     07-17  Mg     1.4     07-17    TPro  4.4  /  Alb  2.8  /  TBili  0.4  /  DBili  x   /  AST  362  /  ALT  99  /  AlkPhos  170  07-17                                Urinalysis Basic - ( 17 Jul 2023 06:45 )    Color: x / Appearance: x / SG: x / pH: x  Gluc: 127 mg/dL / Ketone: x  / Bili: x / Urobili: x   Blood: x / Protein: x / Nitrite: x   Leuk Esterase: x / RBC: x / WBC x   Sq Epi: x / Non Sq Epi: x / Bacteria: x        Culture - Urine (collected 13 Jul 2023 02:32)  Source: Clean Catch Clean Catch (Midstream)  Final Report (15 Jul 2023 15:48):    <10,000 CFU/mL Normal Urogenital Gertrudis          RADIOLOGY & ADDITIONAL TESTS:  < from: CT Angio Chest PE Protocol w/ IV Cont (07.17.23 @ 03:07) >  IMPRESSION:    1.  No evidence of central or segmental pulmonary embolus.  2.  Small bilateral pleural effusions with adjacent atelectasis.  3.  A midthoracic vertebral body compression fracture, possibly T7 or T8   is new from prior study. Correlate for point tenderness.  4.  Redemonstrated right breast mass and diffuse osseous metastases.    < end of copied text >  < from: MR Head w/ IV Cont (07.16.23 @ 16:18) >  IMPRESSION:    1.  No MRI evidence of intracranial metastatic disease.    2.  Mild chronic microvascular changes.    3.  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    < end of copied text >    Care Discussed with Consultants/Other Providers:

## 2023-07-17 NOTE — PROGRESS NOTE ADULT - ATTENDING COMMENTS
She was seen in follow-up today.  She still remains bedbound from previous surgery on the C2 body.    Given issues with QTc and electrolyte abnormalities suggest stopping ribociclib    Continue to correct electrolytes    Continue with letrozole 2.5 mg daily she can be switched to  palbociclib or abemicilib (preferred, survival benefit)    Continue with DVT prophylaxis on Lovenox she is also on Megace for appetite stimulation.  BMI is currently close to 28,  depending on oral intake may need to discontinue, given that she is bedbound and Megace has risk of thrombosis

## 2023-07-17 NOTE — CONSULT NOTE ADULT - ASSESSMENT
- breast can metastatic to bone  - loss of appetite   - hypokalemia, hypomagnesemia, hypophosphatemia      due to chemo, worsened by refeeding  - dehydration on admission, resolved    SUGGEST:  - pt eating poorly but tolerating po diet; enteral or parenteral support not indicated at this time  - reweigh pt to clarify Q of wt loss (chris after IV hydration on admission)  - intermittent "riders" will not resolve longer standing or continuous losses of lytes  - based on today's values and IV fluid volume, suggest stopping all oral lyte supplements (oral Mg causes diarrhea, neutraphos can be bloating and cause some nausea, chris on empty stomach) and riders      - change IV fluid to D5 LR with 15 mM K-phos and 20 mEq Mg SO4 per liter at 75 ml/h         --> 27 mM phos, 39.6 mEq K, and 36 mEq Mg in the next 24 h, in a slower more continuous dosing that is more likely to correct levels  - review Megace with oncologist  - if B1 and folate given for refeeding, stop them after the next 3 days, and once lytes stabilize

## 2023-07-17 NOTE — CONSULT NOTE ADULT - SUBJECTIVE AND OBJECTIVE BOX
NUTRITION SUPPORT TEAM  -  CONSULT NOTE     57-year-old woman with a history of hypothyroidism, asthma, former alcohol use, breast cancer with diffuse bone metastasis, status post radiation 5 sessions finished 5/31/23, on letrozole daily, was on Ribociclib and was discontinued due to low magnesium with high QTc, surgical history of occiput-T2 posterior instrumentation and fusion, ORIF of C2 body with functional decline and impaired ADLs/mobility, ambulates with a walker. Following with Dr. Peña.     She presented to the emergency department complaining of decreased appetite and oral intake of 3 weeks duration. She is saying that she's not hungry, no mouth pain, no dysphagia or odynophagia, no regurgitation or heart burn, no abdominal pain, no diarrhea or constipation, no change in stool consistency. This was accompanied by generalized fatigue but no fever, no chills or rigors. She admitted decreased urine output, weight loss.     She also complained of left knee pain and swelling that started 1 week ago after hitting her knee while sitting, she didn't seek medical evaluation at that time. No decreased range of motion.   Vital Signs T(F): 96.6 HR: 128 BP: 122/93 RR: 18   EKG showed sinus tachycardia   Labs were significant for severe hypokalemia, hypomagnesemia, hyponatremia, low bicarb  Given IV fluids, potassium and admitted to medicine for further evaluation and treatment    (13 Jul 2023 16:53)    Onc noted:  - currently on letrozole 2.5 mg daily  - Ribocilib (600mg daily for 21 days on, 7 days off) started on 5/25 currently on hold given prolonged QTC (only received it 3 weeks)- Cardio consult for prolonged QTC on kisqali (pt was scheduled to see Dr. Hamm on 7/14)  - Get MRI brain with contrast  - Plan to start Ibrance on 7/18 if QTC normal   - Outpatient follow up with Dr. BushmiNUTRITION SUPPORT NOTE:    pt denies dysphagia or odynophagia. + poor appetite, reportedly somewhat improved now.    REVIEW OF SYSTEMS:  Negative except as noted above.     PAST MEDICAL/SURGICAL HISTORY:   Hypothyroidism  H/O cirrhosis  Breast cancer  No significant past surgical history    ALLERGIES:  No Known Allergies  hydromorphone (Faint; Nausea)    VITALS:  T(F): 96.9 (07-17 @ 08:33), Max: 96.9 (07-17 @ 08:33)  HR: 111 (07-17 @ 08:33) (111 - 111)  BP: 125/83 (07-17 @ 08:33) (125/83 - 125/83)  RR: 18 (07-17 @ 08:33) (18 - 18)  SpO2: 97% (07-17 @ 08:33) (97% - 97%)    HEIGHT/WEIGHT/BMI:   Height (cm): 162.6 (07-16), 162.6 (04-28), 162.6 (04-25)  Weight (kg): 72.6 (07-13), 92.3 (04-28), 92.3 (04-25)  BMI (kg/m2): 27.5 (07-16), 27.5 (07-13), 34.9 (04-28), 34.9 (04-25)    I/Os:     07-16-23 @ 07:01  -  07-17-23 @ 07:00  --------------------------------------------------------  IN:  Total IN: 0 mL    OUT:    Voided (mL): 800 mL  Total OUT: 800 mL    Total NET: -800 mL    PHYSICAL EXAM:   GENERAL: NAD, well-groomed, well-developed, alert, verbal  HEENT: Moist mucous membranes, no oral lesions  ABDOMEN: Soft, Nontender, Nondistended, obese  EXTREMITIES:  No clubbing, cyanosis, or edema    STANDING MEDICATIONS:   dextrose 5% + lactated ringers. 1000 milliLiter(s) IV Continuous <Continuous>at 75 ml/h  enoxaparin Injectable 40 milliGRAM(s) SubCutaneous every 24 hours  famotidine    Tablet 20 milliGRAM(s) Oral two times a day  folic acid 1 milliGRAM(s) Oral daily  letrozole 2.5 milliGRAM(s) Oral daily  levothyroxine 50 MICROGram(s) Oral daily  loratadine 10 milliGRAM(s) Oral daily  magnesium sulfate  IVPB 2 Gram(s) IV Intermittent every 2 hours  megestrol 40 milliGRAM(s) Oral every 6 hours  potassium chloride   Powder 40 milliEquivalent(s) Oral once  potassium phosphate / sodium phosphate Powder (PHOS-NaK) 1 Packet(s) Oral three times a day  sucralfate suspension 1 Gram(s) Oral every 6 hours  thiamine 100 milliGRAM(s) Oral daily  tiotropium 2.5 MICROgram(s) Inhaler 2 Puff(s) Inhalation daily    LABS:                         11.0   3.17  )-----------( 160      ( 17 Jul 2023 06:45 )             34.2     141  |  106  |  <3<L>  ----------------------------<  127<H>          (07-17-23 @ 06:45)-------  admission K 2.8,  Mg 1.6,  phos 2.8  3.2<L>   |  26  |  <0.5<L>    Ca    8.3<L>          (07-17-23 @ 06:45)  Phos  1.6         (07-17-23 @ 06:45)  Mg     1.4         (07-17-23 @ 06:45)    TPro  4.4<L>  /  Alb  2.8<L>  /  TBili  0.4  /  DBili  x   /  AST  362<H>  /  ALT  99<H>  /  AlkPhos  170<H>       07-17-23 @ 06:45  Triglycerides, Serum:   Vitamin D, 25-Hydroxy:   Folate:   Vitamin B12, Serum:   Zinc Level, Plasma:       DIET:   Diet, Soft and Bite Sized:   Supplement Feeding Modality:  Oral  Ensure Plus High Protein Cans or Servings Per Day:  3       Frequency:  Three Times a day (07-13-23 @ 18:19) [Active]    RADIOLOGY:   < from: CT Angio Chest PE Protocol w/ IV Cont (07.17.23 @ 03:07) >  1.  No evidence of central or segmental pulmonary embolus.  2.  Small bilateral pleural effusions with adjacent atelectasis.  3.  A midthoracic vertebral body compression fracture, possibly T7 or T8   is new from prior study. Correlate for point tenderness.  4.  Redemonstrated right breast mass and diffuse osseous metastases.    < end of copied text >

## 2023-07-17 NOTE — PROGRESS NOTE ADULT - SUBJECTIVE AND OBJECTIVE BOX
LENGTH OF HOSPITAL STAY: 4d    CHIEF COMPLAINT:    Patient is a 57y old  Female who presents with a chief complaint of Decreased oral intake with electrolyte disturbances (16 Jul 2023 18:19)    OVERNIGHT EVENTS: NAEON    HPI:    HPI:  57-year-old woman with a history of hypothyroidism, asthma, former alcohol use, breast cancer with diffuse bone metastasis, status post radiation 5 sessions finished 5/31/23, on letrozole daily, was on Ribociclib and was discontinued due to low magnesium with high QTc, surgical history of occiput-T2 posterior instrumentation and fusion, ORIF of C2 body with functional decline and impaired ADLs/mobility, ambulates with a walker. Following with Dr. Peña.     She presented to the emergency department complaining of decreased appetite and oral intake of 3 weeks duration. She is saying that she's not hungry, no mouth pain, no dysphagia or odynophagia, no regurgitation or heart burn, no abdominal pain, no diarrhea or constipation, no change in stool consistency. This was accompanied by generalized fatigue but no fever, no chills or rigors. She admitted decreased urine output, weight loss.     She also complained of left knee pain and swelling that started 1 week ago after hitting her knee while sitting, she didn't seek medical evaluation at that time. No decreased range of motion.     Vital Signs T(F): 96.6 HR: 128 BP: 122/93 RR: 18   EKG showed sinus tachycardia   Labs were significant for severe hypokalemia, hypomagnesemia, hyponatremia, low bicarb    Given IV fluids, potassium and admitted to medicine for further evaluation and treatment        (13 Jul 2023 16:53)      ALLERGIES:    Allergies    No Known Allergies    Intolerances    hydromorphone (Faint; Nausea)      PMHx:    PAST MEDICAL & SURGICAL HISTORY:  Hypothyroidism      H/O cirrhosis      Breast cancer      No significant past surgical history      MEDICATIONS:  STANDING MEDICATIONS  dextrose 5% + lactated ringers. 1000 milliLiter(s) IV Continuous <Continuous>  enoxaparin Injectable 40 milliGRAM(s) SubCutaneous every 24 hours  famotidine    Tablet 20 milliGRAM(s) Oral two times a day  folic acid 1 milliGRAM(s) Oral daily  letrozole 2.5 milliGRAM(s) Oral daily  levothyroxine 50 MICROGram(s) Oral daily  loratadine 10 milliGRAM(s) Oral daily  magnesium sulfate  IVPB 2 Gram(s) IV Intermittent every 2 hours  megestrol 40 milliGRAM(s) Oral every 6 hours  potassium chloride   Powder 40 milliEquivalent(s) Oral once  potassium phosphate / sodium phosphate Powder (PHOS-NaK) 1 Packet(s) Oral three times a day  sucralfate suspension 1 Gram(s) Oral every 6 hours  thiamine 100 milliGRAM(s) Oral daily  tiotropium 2.5 MICROgram(s) Inhaler 2 Puff(s) Inhalation daily    PRN MEDICATIONS  acetaminophen     Tablet .. 650 milliGRAM(s) Oral every 6 hours PRN  albuterol    90 MICROgram(s) HFA Inhaler 2 Puff(s) Inhalation every 6 hours PRN  methocarbamol 1000 milliGRAM(s) Oral every 6 hours PRN      LABS:                        11.0   3.17  )-----------( 160      ( 17 Jul 2023 06:45 )             34.2     07-17    141  |  106  |  <3<L>  ----------------------------<  127<H>  3.2<L>   |  26  |  <0.5<L>    Ca    8.3<L>      17 Jul 2023 06:45  Phos  1.6     07-17  Mg     1.4     07-17    TPro  4.4<L>  /  Alb  2.8<L>  /  TBili  0.4  /  DBili  x   /  AST  362<H>  /  ALT  99<H>  /  AlkPhos  170<H>  07-17      Urinalysis Basic - ( 17 Jul 2023 06:45 )    Color: x / Appearance: x / SG: x / pH: x  Gluc: 127 mg/dL / Ketone: x  / Bili: x / Urobili: x   Blood: x / Protein: x / Nitrite: x   Leuk Esterase: x / RBC: x / WBC x   Sq Epi: x / Non Sq Epi: x / Bacteria: x            CARDIAC MARKERS ( 16 Jul 2023 09:50 )  x     / <0.01 ng/mL / x     / x     / x          RADIOLOGY:  < from: MR Head w/ IV Cont (07.16.23 @ 16:18) >    ACC: [id number] EXAM:  MR BRAIN IC   ORDERED BY: SHAWN CAGLE     PROCEDURE DATE:  07/16/2023          INTERPRETATION:  Clinical History / Reason for exam: Metastatic workup.    TECHNIQUE: MRI brain with and without contrast. Multiplanar   multisequential MRI of the brain was performed before and following the   intravenous administration of 7 cc Gadavist (3 cc discarded) on the 1.5   Kayleigh magnet.    COMPARISON: CT head dated 4/21/2023. MRI of the spine dated 4/21/2023.    FINDINGS:    There are numerous enhancing lesions throughout the calvarium, skull   base, mandible and upper cervical spine consistent with known osseous   metastatic disease.    Partially visualized cranial/cervical fusion hardware with susceptibility   artifact partially obscuring the posterior fossa.    The ventricles and cortical sulci are compatible with a mild degree of   parenchymal volume loss.    There is patchy T2/FLAIR signal hyperintensity within the cerebral   hemispheric white matter consistent with chronic microvascular changes.    There is no evidence of acute intracranial hemorrhage, extra-axial fluid   collection or midline shift.    There is no diffusion abnormality to suggest acute/subacute infarct.   There is normal flow void present within the major vascular structures.    Dural ossification along the cerebral falx is noted.    There is no evidence for pathologic intracranial enhancement.    Right mastoid effusion is noted. The paranasal sinuses are clear. The   globes and orbits are unremarkable.    IMPRESSION:    1.  No MRI evidence of intracranial metastatic disease.    2.  Mild chronic microvascular changes.    3.  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    --- End of Report ---    < end of copied text >      VITALS:   T(F): 96.9  HR: 111  BP: 125/83  RR: 18  SpO2: 97%        PHYSICAL EXAM:  General: No acute distress, appears stated age  HEENT: Normocephalic, autramatic  LUNGS: CTAB, no w/r/r  HEART: RRR, normal S1 and S2, no m/r/g   ABDOMEN: Soft, nontender, nondistended, normal bowel sounds  NEURO: AOx3

## 2023-07-17 NOTE — PROGRESS NOTE ADULT - ASSESSMENT
58 y/o woman with PMH of hypothyroidism, asthma, former alcohol use, breast cancer with diffuse bone metastasis dx this year and follows with Dr. Peña [s/p radiation x 5 sessions which finished 5/31/23, on letrozole daily, was on Ribociclib and it was discontinued due to low magnesium with high QTc] surgical history of occiput-T2 posterior instrumentation and fusion, ORIF of C2 body with functional decline and impaired ADLs/mobility and now ambulating with a walker presented to the ED complaining of decreased appetite and oral intake of 3 weeks duration.    Starvation ketoacidosis  dehydration  hypokalemia, hypomagnesemia  hyponatremia - resolved   sinus tachycardia could be secondary to hypovolemia / dehydration - monitor   decreased oral intake possibly due to radiation esophagitis? (symptoms began after RT was completed per pt)  possible depression  debility  h/o recent prolonged QTc  metastatic breast cancer to the bone and on letrozole  hypothyroid - TSH WNL  asthma    HAGMA improving with hydration today AG normalized  - continue for now and encourage PO intake  c/w gentle hydration for now D5W and LR   nutrition consult appreciated   monitor for refeeding syndrome  monitor Phos level, replete K and Mg for hypokalemia and hypomagnesemia, keep K >4 and Mg >2  7/17   -K 3.2 repleted  -Mag 1.4 repleted  -Phos 1.6 repleted  -F/u CMP  soft diet with supplements  calorie count started  trial of carafate, megace already started - continue for now   as per previous notes the team discussed with pt re: GI eval and she is not interested in any procedures at this time (esophagram or EGD)  continue H2 blocker  PT consulted  fall precautions  repeat EKG  continue supplements for suspected vit B1 and folate deficiencies  HemOnc consult appreciated    as per hem/onc Repeat EKG 7/15, - Cardio consult for prolonged QTC on kisqali (pt was scheduled to see Dr. 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  - Plan to start Ibrance on 7/18 if QTC normal  case management informed of pt and family desire for STR on discharge  trial of mirtazapine was ordered but would hold for now until QTc stable   avoid QTc prolonging meds for now   TSH 1.46 c/w levothyroxine   Pt stated that she is eating and feeling better     DVT prophylaxis      DNR/DNI  very guarded prognosis    Pending: improvement in PO intake, Labs/Nutrition, discussion with onco regarding cranial bone metastasis     Disposition: STR   56 y/o woman with PMH of hypothyroidism, asthma, former alcohol use, breast cancer with diffuse bone metastasis dx this year and follows with Dr. Peña [s/p radiation x 5 sessions which finished 5/31/23, on letrozole daily, was on Ribociclib and it was discontinued due to low magnesium with high QTc] surgical history of occiput-T2 posterior instrumentation and fusion, ORIF of C2 body with functional decline and impaired ADLs/mobility and now ambulating with a walker presented to the ED complaining of decreased appetite and oral intake of 3 weeks duration.    Starvation ketoacidosis  dehydration  hypokalemia, hypomagnesemia  hyponatremia - resolved   sinus tachycardia could be secondary to hypovolemia / dehydration - monitor   decreased oral intake possibly due to radiation esophagitis? (symptoms began after RT was completed per pt)  possible depression  debility  h/o recent prolonged QTc  metastatic breast cancer to the bone and on letrozole  hypothyroid - TSH WNL  asthma    HAGMA improving with hydration today AG normalized  - continue for now and encourage PO intake  c/w gentle hydration for now D5W and LR   nutrition consult appreciated   monitor for refeeding syndrome  monitor Phos level, replete K and Mg for hypokalemia and hypomagnesemia, keep K >4 and Mg >2  7/17   -K 3.2 repleted  -Mag 1.4 repleted  -Phos 1.6 repleted  -F/u CMP  soft diet with supplements  calorie count started  trial of carafate, megace already started - continue for now   as per previous notes the team discussed with pt re: GI eval and she is not interested in any procedures at this time (esophagram or EGD)  continue H2 blocker  PT consulted  fall precautions  repeat EKG  continue supplements for suspected vit B1 and folate deficiencies  HemOnc consult appreciated    as per hem/onc Repeat EKG 7/15, - Cardio consult for prolonged QTC on kisqali (pt was scheduled to see Dr. 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  - Plan to start Ibrance on 7/18 if QTC normal  case management informed of pt and family desire for STR on discharge  trial of mirtazapine was ordered but would hold for now until QTc stable   avoid QTc prolonging meds for now   TSH 1.46 c/w levothyroxine   Pt stated that she is eating and feeling better     Transaminitis  - sharp increase 7/17, repeat ordered for this evening     DVT prophylaxis      DNR/DNI  very guarded prognosis    Pending: improvement in PO intake, Labs/Nutrition, discussion with onco regarding cranial bone metastasis, LFt trend    Disposition: STR

## 2023-07-17 NOTE — PROGRESS NOTE ADULT - SUBJECTIVE AND OBJECTIVE BOX
SUBJECTIVE:    Patient is a 57y old Female who presents with a chief complaint of Decreased oral intake with electrolyte disturbances (17 Jul 2023 15:24)    Currently admitted to medicine with the primary diagnosis of Adult failure to thrive       Today is hospital day 4d. This morning she is resting comfortably in bed and reports no new issues or overnight events.     PAST MEDICAL & SURGICAL HISTORY  Hypothyroidism    H/O cirrhosis    Breast cancer    No significant past surgical history      SOCIAL HISTORY:  Negative for smoking/alcohol/drug use.     ALLERGIES:  No Known Allergies    MEDICATIONS:  STANDING MEDICATIONS  dextrose 5% + lactated ringers 1000 milliLiter(s) IV Continuous <Continuous>  enoxaparin Injectable 40 milliGRAM(s) SubCutaneous every 24 hours  famotidine    Tablet 20 milliGRAM(s) Oral two times a day  folic acid 1 milliGRAM(s) Oral daily  letrozole 2.5 milliGRAM(s) Oral daily  levothyroxine 50 MICROGram(s) Oral daily  loratadine 10 milliGRAM(s) Oral daily  megestrol 40 milliGRAM(s) Oral every 6 hours  potassium chloride   Powder 40 milliEquivalent(s) Oral once  potassium phosphate / sodium phosphate Powder (PHOS-NaK) 1 Packet(s) Oral three times a day  sucralfate suspension 1 Gram(s) Oral every 6 hours  thiamine 100 milliGRAM(s) Oral daily  tiotropium 2.5 MICROgram(s) Inhaler 2 Puff(s) Inhalation daily    PRN MEDICATIONS  acetaminophen     Tablet .. 650 milliGRAM(s) Oral every 6 hours PRN  albuterol    90 MICROgram(s) HFA Inhaler 2 Puff(s) Inhalation every 6 hours PRN  methocarbamol 1000 milliGRAM(s) Oral every 6 hours PRN    VITALS:   T(F): 99  HR: 127  BP: 119/78  RR: 18  SpO2: 97%    LABS:                        11.0   3.17  )-----------( 160      ( 17 Jul 2023 06:45 )             34.2     07-17    141  |  106  |  <3<L>  ----------------------------<  127<H>  3.2<L>   |  26  |  <0.5<L>    Ca    8.3<L>      17 Jul 2023 06:45  Phos  1.6     07-17  Mg     1.4     07-17    TPro  4.4<L>  /  Alb  2.8<L>  /  TBili  0.4  /  DBili  x   /  AST  362<H>  /  ALT  99<H>  /  AlkPhos  170<H>  07-17      Urinalysis Basic - ( 17 Jul 2023 06:45 )    Color: x / Appearance: x / SG: x / pH: x  Gluc: 127 mg/dL / Ketone: x  / Bili: x / Urobili: x   Blood: x / Protein: x / Nitrite: x   Leuk Esterase: x / RBC: x / WBC x   Sq Epi: x / Non Sq Epi: x / Bacteria: x            CARDIAC MARKERS ( 16 Jul 2023 09:50 )  x     / <0.01 ng/mL / x     / x     / x          RADIOLOGY:    PHYSICAL EXAM:  GEN: No acute distress  LUNGS: Clear to auscultation bilaterally   HEART: Regular  ABD: Soft, non-tender, non-distended.  EXT: NC/NC/NE/2+PP/WINTER/Skin Intact.   NEURO: AAOX3

## 2023-07-17 NOTE — PROGRESS NOTE ADULT - ASSESSMENT
58 y/o woman with PMH of hypothyroidism, asthma, former alcohol use, breast cancer with diffuse bone metastasis dx this year and follows with Dr. Peña [s/p radiation x 5 sessions which finished 5/31/23, on letrozole daily, was on Ribociclib and it was discontinued due to low magnesium with high QTc] surgical history of occiput-T2 posterior instrumentation and fusion, ORIF of C2 body with functional decline and impaired ADLs/mobility and now ambulating with a walker presented to the ED complaining of decreased appetite and oral intake of 3 weeks duration.    Starvation ketoacidosis  dehydration  hypokalemia, hypomagnesemia  hyponatremia - resolved   sinus tachycardia could be secondary to hypovolemia / dehydration - monitor   decreased oral intake possibly due to radiation esophagitis? (symptoms began after RT was completed per pt)  possible depression  debility  h/o recent prolonged QTc  metastatic breast cancer to the bone and on letrozole  hypothyroid - TSH WNL  asthma    HAGMA improving with hydration today AG normalized  - continue for now and encourage PO intake  c/w gentle hydration for now D5W and LR   nutrition consulted  monitor for refeeding syndrome  monitor Phos level, replete K and Mg for hypokalemia and hypomagnesemia, keep K >4 and Mg >2  7/17   -K 3.2 repleted  -Mag 1.4 repleted  -Phos 1.6 repleted  -F/u CMP  soft diet with supplements  calorie count  trial of carafate, megace already started - continue for now   as per previous notes the team discussed with pt re: GI eval and she is not interested in any procedures at this time (esophagram or EGD)  continue H2 blocker  PT consulted  fall precautions  repeat EKG  continue supplements for suspected vit B1 and folate deficiencies  HemOnc consult appreciated    as per hem/onc Repeat EKG 7/15, - Cardio consult for prolonged QTC on kisqali (pt was scheduled to see Dr. 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  - Plan to start Ibrance on 7/18 if QTC normal  case management informed of pt and family desire for STR on discharge  trial of mirtazapine was ordered but would hold for now until QTc stable   avoid QTc prolonging meds for now   TSH 1.46 c/w levothyroxine   Pt stated that she is eating and feeling better     DVT prophylaxis      DNR/DNI  very guarded prognosis    Pending: improvement in PO intake, Labs/Nutrition    Disposition: STR

## 2023-07-18 NOTE — PROGRESS NOTE ADULT - ASSESSMENT
56 y/o woman with PMH of hypothyroidism, asthma, former alcohol use, breast cancer with diffuse bone metastasis dx this year and follows with Dr. Peña [s/p radiation x 5 sessions which finished 5/31/23, on letrozole daily, was on Ribociclib and it was discontinued due to low magnesium with high QTc] surgical history of occiput-T2 posterior instrumentation and fusion, ORIF of C2 body with functional decline and impaired ADLs/mobility and now ambulating with a walker presented to the ED complaining of decreased appetite and oral intake of 3 weeks duration.    Starvation ketoacidosis  dehydration  hypokalemia, hypomagnesemia  hyponatremia - resolved   sinus tachycardia could be secondary to hypovolemia / dehydration - monitor   decreased oral intake possibly due to radiation esophagitis? (symptoms began after RT was completed per pt)  possible depression  debility  h/o recent prolonged QTc  metastatic breast cancer to the bone and on letrozole  hypothyroid - TSH WNL  asthma    HAGMA improving with hydration today AG normalized  - continue for now and encourage PO intake  c/w gentle hydration for now D5W and LR   nutrition consult appreciated   monitor Phos level, replete K and Mg for hypokalemia and hypomagnesemia, keep K >4 and Mg >2  7/17   -electrolytes are repleting well   soft diet with supplements  -calorie count started 7/15  trial of carafate, megace already started - continue for now   as per previous notes the team discussed with pt re: GI eval and she is not interested in any procedures at this time (esophagram or EGD)  continue H2 blocker  PT consulted - recommend ERROL placemtn   fall precautions  continue supplements for suspected vit B1 and folate deficiencies  HemOnc consult appreciated    as per hem/onc Repeat EKG 7/15, - Cardio consult for prolonged QTC on kisqali (pt was scheduled to see Dr. 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.--Oncology is aware   case management informed of pt and family desire for STR on discharge  trial of mirtazapine was ordered but would hold for now until QTc stable   avoid QTc prolonging meds for now   TSH 1.46 c/w levothyroxine   Pt stated that she is eating and feeling better     Transaminitis  - sharp increase 7/17-->7/18  - Oncology reports not 2/2 to any chemotherapy or biologic  - Pt with history of alcoholic cirrhosis, sober for 9yrs  - 7/18 spoke to hepatology fellow Vanesa -- liver US ordered, will monitor LFTs bid for now, coags ordered    DVT prophylaxis      DNR/DNI  very guarded prognosis    Pending: improvement in PO intake, Labs/Nutrition, hepatology formal recs, trend LFTs, liver US, and coags     Disposition: STR

## 2023-07-18 NOTE — PROGRESS NOTE ADULT - ASSESSMENT
58 y/o woman with PMH of hypothyroidism, asthma, former alcohol use, breast cancer with diffuse bone metastasis dx this year and follows with Dr. Peña [s/p radiation x 5 sessions which finished 5/31/23, on letrozole daily, was on Ribociclib and it was discontinued due to low magnesium with high QTc] surgical history of occiput-T2 posterior instrumentation and fusion, ORIF of C2 body with functional decline and impaired ADLs/mobility and now ambulating with a walker presented to the ED complaining of decreased appetite and oral intake of 3 weeks duration.    Starvation ketoacidosis  dehydration  hypokalemia, hypomagnesemia  hyponatremia - resolved   sinus tachycardia could be secondary to hypovolemia / dehydration - monitor   decreased oral intake possibly due to radiation esophagitis? (symptoms began after RT was completed per pt)  possible depression  debility  h/o recent prolonged QTc  metastatic breast cancer to the bone and on letrozole  hypothyroid - TSH WNL  asthma    HAGMA improving with hydration today AG normalized  - continue for now and encourage PO intake  c/w gentle hydration for now D5W and LR   nutrition recs appreciated, change IV fluid to D5 LR with 15 mM K-phos and 20 mEq Mg SO4 per liter at 75 ml/h --> 27 mM phos, 39.6 mEq K, and 36 mEq Mg in the next 24 h, in a slower more continuous dosing that is more likely to correct levels  monitor for refeeding syndrome  monitor Phos level, goal K >4 and Mg >2  soft diet with supplements  calorie count  trial of carafate, megace already started - continue for now   as per previous notes the team discussed with pt re: GI eval and she is not interested in any procedures at this time (esophagram or EGD)  continue H2 blocker  PT consulted  fall precautions  repeat EKG  continue supplements for suspected vit B1 and folate deficiencies  HemOnc consult appreciated    as per hem/onc Repeat EKG 7/15, - Cardio consult for prolonged QTC on kisqali (pt was scheduled to see Dr. 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  - Plan to start Ibrance on 7/18 if QTC normal  case management informed of pt and family desire for STR on discharge  trial of mirtazapine was ordered but would hold for now until QTc stable   avoid QTc prolonging meds for now   TSH 1.46 c/w levothyroxine   Pt stated that she is eating and feeling better     DVT prophylaxis      DNR/DNI  very guarded prognosis    Pending: improvement in PO intake, Labs/Nutrition    Disposition: STR   56 y/o woman with PMH of hypothyroidism, asthma, former alcohol use, breast cancer with diffuse bone metastasis dx this year and follows with Dr. Peña [s/p radiation x 5 sessions which finished 5/31/23, on letrozole daily, was on Ribociclib and it was discontinued due to low magnesium with high QTc] surgical history of occiput-T2 posterior instrumentation and fusion, ORIF of C2 body with functional decline and impaired ADLs/mobility and now ambulating with a walker presented to the ED complaining of decreased appetite and oral intake of 3 weeks duration.    Starvation ketoacidosis  dehydration  hypokalemia, hypomagnesemia  hyponatremia - resolved   sinus tachycardia could be secondary to hypovolemia / dehydration - monitor   decreased oral intake possibly due to radiation esophagitis? (symptoms began after RT was completed per pt)  possible depression  debility  h/o recent prolonged QTc  metastatic breast cancer to the bone and on letrozole  hypothyroid - TSH WNL  asthma    HAGMA improving with hydration today AG normalized  - continue for now and encourage PO intake  nutrition recs appreciated, change IV fluid to D5 LR with 15 mM K-phos and 20 mEq Mg SO4 per liter at 75 ml/h --> 27 mM phos, 39.6 mEq K, and 36 mEq Mg in the next 24 h, in a slower more continuous dosing that is more likely to correct levels  Stop b1 and folate on 7/20 if lytes stable  monitor for refeeding syndrome  monitor Phos level, goal K >4 and Mg >2  soft diet with supplements  calorie count  trial of carafate, megace already started - continue for now   as per previous notes the team discussed with pt re: GI eval and she is not interested in any procedures at this time (esophagram or EGD)  continue H2 blocker  PT consulted  fall precautions  repeat EKG  continue supplements for suspected vit B1 and folate deficiencies  HemOnc consult appreciated    as per hem/onc Repeat EKG 7/15, - Cardio consult for prolonged QTC on kisqali (pt was scheduled to see Dr. 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  - Plan to start Ibrance on 7/18 if QTC normal  case management informed of pt and family desire for STR on discharge  trial of mirtazapine was ordered but would hold for now until QTc stable   avoid QTc prolonging meds for now   TSH 1.46 c/w levothyroxine   Pt stated that she is eating and feeling better     #Transaminitis  -AST 2405 from 787  - from 214  -Alk Phos 171  -Total bilirubin 0.5  -Albumin 2.3  -Lactate  -Hepatitis  -US Abdomen  -Hepatology consult  -Get labs BID    DVT prophylaxis      DNR/DNI  very guarded prognosis    Pending: improvement in PO intake, Labs/Nutrition    Disposition: STR

## 2023-07-18 NOTE — PHYSICAL THERAPY INITIAL EVALUATION ADULT - PERTINENT HX OF CURRENT PROBLEM, REHAB EVAL
57-year-old woman with a history of hypothyroidism, asthma, former alcohol use, breast cancer with diffuse bone metastasis, status post radiation 5 sessions finished 5/31/23, on letrozole daily, was on Ribociclib and was discontinued due to low magnesium with high QTc, surgical history of occiput-T2 posterior instrumentation and fusion, ORIF of C2 body with functional decline and impaired ADLs/mobility, ambulates with a walker. Following with Dr. Peña.     She presented to the emergency department complaining of decreased appetite and oral intake of 3 weeks duration. She is saying that she's not hungry, no mouth pain, no dysphagia or odynophagia, no regurgitation or heart burn, no abdominal pain, no diarrhea or constipation, no change in stool consistency. This was accompanied by generalized fatigue but no fever, no chills or rigors. She admitted decreased urine output, weight loss.     She also complained of left knee pain and swelling that started 1 week ago after hitting her knee while sitting, she didn't seek medical evaluation at that time. No decreased range of motion.

## 2023-07-18 NOTE — PROGRESS NOTE ADULT - SUBJECTIVE AND OBJECTIVE BOX
LENGTH OF HOSPITAL STAY: 5d    CHIEF COMPLAINT:    Patient is a 57y old  Female who presents with a chief complaint of Decreased oral intake with electrolyte disturbances (17 Jul 2023 18:42)      OVERNIGHT EVENTS: NAEON    HPI:    HPI:  57-year-old woman with a history of hypothyroidism, asthma, former alcohol use, breast cancer with diffuse bone metastasis, status post radiation 5 sessions finished 5/31/23, on letrozole daily, was on Ribociclib and was discontinued due to low magnesium with high QTc, surgical history of occiput-T2 posterior instrumentation and fusion, ORIF of C2 body with functional decline and impaired ADLs/mobility, ambulates with a walker. Following with Dr. Peña.     She presented to the emergency department complaining of decreased appetite and oral intake of 3 weeks duration. She is saying that she's not hungry, no mouth pain, no dysphagia or odynophagia, no regurgitation or heart burn, no abdominal pain, no diarrhea or constipation, no change in stool consistency. This was accompanied by generalized fatigue but no fever, no chills or rigors. She admitted decreased urine output, weight loss.     She also complained of left knee pain and swelling that started 1 week ago after hitting her knee while sitting, she didn't seek medical evaluation at that time. No decreased range of motion.     Vital Signs T(F): 96.6 HR: 128 BP: 122/93 RR: 18   EKG showed sinus tachycardia   Labs were significant for severe hypokalemia, hypomagnesemia, hyponatremia, low bicarb    Given IV fluids, potassium and admitted to medicine for further evaluation and treatment        (13 Jul 2023 16:53)      ALLERGIES:    Allergies    No Known Allergies    Intolerances    hydromorphone (Faint; Nausea)      PMHx:    PAST MEDICAL & SURGICAL HISTORY:  Hypothyroidism      H/O cirrhosis      Breast cancer      No significant past surgical history      MEDICATIONS:  STANDING MEDICATIONS  dextrose 5% + lactated ringers 1000 milliLiter(s) IV Continuous <Continuous>  enoxaparin Injectable 40 milliGRAM(s) SubCutaneous every 24 hours  famotidine    Tablet 20 milliGRAM(s) Oral two times a day  folic acid 1 milliGRAM(s) Oral daily  letrozole 2.5 milliGRAM(s) Oral daily  levothyroxine 50 MICROGram(s) Oral daily  loratadine 10 milliGRAM(s) Oral daily  megestrol 40 milliGRAM(s) Oral every 6 hours  sucralfate suspension 1 Gram(s) Oral every 6 hours  thiamine 100 milliGRAM(s) Oral daily  tiotropium 2.5 MICROgram(s) Inhaler 2 Puff(s) Inhalation daily    PRN MEDICATIONS  albuterol    90 MICROgram(s) HFA Inhaler 2 Puff(s) Inhalation every 6 hours PRN  methocarbamol 1000 milliGRAM(s) Oral every 6 hours PRN      LABS:                        10.3   4.22  )-----------( 113      ( 18 Jul 2023 06:51 )             31.5     07-18    140  |  107  |  5<L>  ----------------------------<  99  4.0   |  24  |  <0.5<L>    Ca    7.9<L>      18 Jul 2023 06:51  Phos  3.2     07-18  Mg     2.1     07-18    TPro  4.1<L>  /  Alb  2.3<L>  /  TBili  0.5  /  DBili  x   /  AST  2405<H>  /  ALT  674<H>  /  AlkPhos  171<H>  07-18      Urinalysis Basic - ( 18 Jul 2023 06:51 )    Color: x / Appearance: x / SG: x / pH: x  Gluc: 99 mg/dL / Ketone: x  / Bili: x / Urobili: x   Blood: x / Protein: x / Nitrite: x   Leuk Esterase: x / RBC: x / WBC x   Sq Epi: x / Non Sq Epi: x / Bacteria: x      RADIOLOGY:    VITALS:   T(F): 98  HR: 108  BP: 109/70  RR: 18  SpO2: 97%        PHYSICAL EXAM:    General: No acute distress, appears stated age  HEENT: Normocephalic, autramatic  LUNGS: CTAB, no w/r/r  HEART: RRR, normal S1 and S2  ABDOMEN: Soft, nontender, nondistended, normal bowel sounds  EXT: Warm, well-perfused  NEURO: AOx3

## 2023-07-18 NOTE — PHYSICAL THERAPY INITIAL EVALUATION ADULT - GENERAL OBSERVATIONS, REHAB EVAL
900-940 am Chart reviewed. Pt. seen semirecline in bed , in No apparent distress , + IV meds ongoing , soft cervical collar, Prima fit device , Pt. alert and oriented X 4, Pt. agreed to activity/therapy.

## 2023-07-18 NOTE — PROGRESS NOTE ADULT - SUBJECTIVE AND OBJECTIVE BOX
Patient is a 57y old  Female who presents with a chief complaint of Decreased oral intake with electrolyte disturbances (18 Jul 2023 15:50)      Patient seen and examined at bedside.    ALLERGIES:  No Known Allergies  hydromorphone (Faint; Nausea)    MEDICATIONS:  albuterol    90 MICROgram(s) HFA Inhaler 2 Puff(s) Inhalation every 6 hours PRN  dextrose 5% + lactated ringers 1000 milliLiter(s) IV Continuous <Continuous>  enoxaparin Injectable 40 milliGRAM(s) SubCutaneous every 24 hours  famotidine    Tablet 20 milliGRAM(s) Oral two times a day  folic acid 1 milliGRAM(s) Oral daily  letrozole 2.5 milliGRAM(s) Oral daily  levothyroxine 50 MICROGram(s) Oral daily  loratadine 10 milliGRAM(s) Oral daily  megestrol 40 milliGRAM(s) Oral every 6 hours  methocarbamol 1000 milliGRAM(s) Oral every 6 hours PRN  sucralfate suspension 1 Gram(s) Oral every 6 hours  thiamine 100 milliGRAM(s) Oral daily  tiotropium 2.5 MICROgram(s) Inhaler 2 Puff(s) Inhalation daily    Vital Signs Last 24 Hrs  T(F): 98.6 (18 Jul 2023 15:30), Max: 98.6 (18 Jul 2023 15:30)  HR: 115 (18 Jul 2023 15:30) (108 - 115)  BP: 132/82 (18 Jul 2023 15:30) (109/70 - 132/82)  RR: 18 (18 Jul 2023 15:30) (18 - 18)  SpO2: 96% (18 Jul 2023 15:30) (96% - 97%)  I&O's Summary    18 Jul 2023 07:01  -  18 Jul 2023 17:33  --------------------------------------------------------  IN: 360 mL / OUT: 0 mL / NET: 360 mL        PHYSICAL EXAM:  General: NAD, A/O x 3  ENT: MMM  Neck: Supple, No JVD  Lungs: Clear to auscultation bilaterally  Cardio: RRR, S1/S2, No murmurs, tachycardia   Abdomen: Soft, Nontender, Nondistended; Bowel sounds present  Extremities: No cyanosis, No edema    LABS:                        10.3   4.22  )-----------( 113      ( 18 Jul 2023 06:51 )             31.5     07-18    140  |  107  |  5   ----------------------------<  99  4.0   |  24  |  <0.5    Ca    7.9      18 Jul 2023 06:51  Phos  3.2     07-18  Mg     2.1     07-18    TPro  4.1  /  Alb  2.3  /  TBili  0.5  /  DBili  x   /  AST  2405  /  ALT  674  /  AlkPhos  171  07-18                                Urinalysis Basic - ( 18 Jul 2023 06:51 )    Color: x / Appearance: x / SG: x / pH: x  Gluc: 99 mg/dL / Ketone: x  / Bili: x / Urobili: x   Blood: x / Protein: x / Nitrite: x   Leuk Esterase: x / RBC: x / WBC x   Sq Epi: x / Non Sq Epi: x / Bacteria: x        Culture - Urine (collected 13 Jul 2023 02:32)  Source: Clean Catch Clean Catch (Midstream)  Final Report (15 Jul 2023 15:48):    <10,000 CFU/mL Normal Urogenital Gertrudis          RADIOLOGY & ADDITIONAL TESTS:    Care Discussed with Consultants/Other Providers:

## 2023-07-18 NOTE — PHYSICAL THERAPY INITIAL EVALUATION ADULT - TINETTI GAIT TEST, REHAB EVAL
----- Message from Chika Scott sent at 3/25/2019  4:12 PM CDT -----  Contact: Yasmin [phone]  Type: Patient Call Back    Who called:Yasmin     What is the request in detail: The patient is requesting a call back from the staff in regards to her medication: apixaban (ELIQUIS) 5 mg Tab    Best call back number:[phone]         Tinetti Score=  4 /28.   Based on this test, pt is high risk for falls.

## 2023-07-18 NOTE — CHART NOTE - NSCHARTNOTEFT_GEN_A_CORE
3 Day Calorie Count initiated from 7/17 - 7/19; Calorie Count noted to be hung. RD to f/u on results of Calorie Count on 7/20    RD to remain available: Adela Rosenberg, RD [phone] or via Teams

## 2023-07-19 NOTE — PROGRESS NOTE ADULT - ASSESSMENT
58 y/o woman with PMH of hypothyroidism, asthma, former alcohol use, breast cancer with diffuse bone metastasis dx this year and follows with Dr. Peña [s/p radiation x 5 sessions which finished 5/31/23, on letrozole daily, was on Ribociclib and it was discontinued due to low magnesium with high QTc] surgical history of occiput-T2 posterior instrumentation and fusion, ORIF of C2 body with functional decline and impaired ADLs/mobility and now ambulating with a walker presented to the ED complaining of decreased appetite and oral intake of 3 weeks duration.    Starvation ketoacidosis  dehydration  hypokalemia, hypomagnesemia  hyponatremia - resolved   sinus tachycardia could be secondary to hypovolemia / dehydration - monitor   decreased oral intake possibly due to radiation esophagitis? (symptoms began after RT was completed per pt)  possible depression  debility  h/o recent prolonged QTc  metastatic breast cancer to the bone and on letrozole  hypothyroid - TSH WNL  asthma    HAGMA improving with hydration today AG normalized  - continue for now and encourage PO intake  Nutrition recs appreciated, changed IV fluid to D5 LR with 15 mM K-phos and 20 mEq Mg SO4 per liter at 75 ml/h --> 27 mM phos, 39.6 mEq K, and 36 mEq Mg in the next 24 h, in a slower more continuous dosing that is more likely to correct levels  Electrolytes repleting well  Stop b1 and folate on 7/20 if lytes stable  monitor for refeeding syndrome  monitor Phos level, goal K >4 and Mg >2  soft diet with supplements  calorie count  trial of carafate, megace already started - continue for now   as per previous notes the team discussed with pt re: GI eval and she is not interested in any procedures at this time (esophagram or EGD)  continue H2 blocker  PT consulted  fall precautions  repeat EKG  continue supplements for suspected vit B1 and folate deficiencies  HemOnc consult appreciated    as per hem/onc Repeat EKG 7/15, - Cardio consult for prolonged QTC on kisqali (pt was scheduled to see Dr. 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  - Plan to start Ibrance on 7/18 if QTC normal  case management informed of pt and family desire for STR on discharge  trial of mirtazapine was ordered but would hold for now until QTc stable   avoid QTc prolonging meds for now   TSH 1.46 c/w levothyroxine   Pt stated that she is eating and feeling better     #Transaminitis  - >> 2405 >> 2756 >> 1463  - >> 674 >> 1015 >> 797  -Alk Phos 176 >> 171 >> 217 >> 208  -Total bilirubin 0.6  -Albumin 2.4  -Lactate 2.2  -F/u Hepatitis  -F/u acetaminophen level  -LFTs BID  -US Abdomen findings consistent with cirrhosis  -F/u Hepatology consult      DVT prophylaxis      DNR/DNI  very guarded prognosis    Pending: improvement in PO intake, Labs/Nutrition    Disposition: STR

## 2023-07-19 NOTE — CONSULT NOTE ADULT - SUBJECTIVE AND OBJECTIVE BOX
Gastroenterology Consultation:    Patient is a 57y old  Female who presents with a chief complaint of Decreased oral intake with electrolyte disturbances (18 Jul 2023 17:33)        Admitted on: 07-13-23      HPI:  57-year-old woman with a history of hypothyroidism, asthma, former alcohol use, breast cancer with diffuse bone metastasis, status post radiation 5 sessions finished 5/31/23, on letrozole daily, was on Ribociclib and was discontinued due to low magnesium with high QTc, surgical history of occiput-T2 posterior instrumentation and fusion, ORIF of C2 body with functional decline and impaired ADLs/mobility, ambulates with a walker. Following with Dr. Peña.     She presented to the emergency department complaining of decreased appetite and oral intake of 3 weeks duration. She is saying that she's not hungry, no mouth pain, no dysphagia or odynophagia, no regurgitation or heart burn, no abdominal pain, no diarrhea or constipation, no change in stool consistency. This was accompanied by generalized fatigue but no fever, no chills or rigors. She admitted decreased urine output, weight loss.     She also complained of left knee pain and swelling that started 1 week ago after hitting her knee while sitting, she didn't seek medical evaluation at that time. No decreased range of motion.     Vital Signs T(F): 96.6 HR: 128 BP: 122/93 RR: 18   EKG showed sinus tachycardia   Labs were significant for severe hypokalemia, hypomagnesemia, hyponatremia, low bicarb    Given IV fluids, potassium and admitted to medicine for further evaluation and treatment        (13 Jul 2023 16:53)        Prior EGD:    Prior Colonoscopy:      PAST MEDICAL & SURGICAL HISTORY:  Hypothyroidism      H/O cirrhosis      Breast cancer      No significant past surgical history            FAMILY HISTORY:  FHx: melanoma (Mother)    FHx: arrhythmia (Father)        Social History:  Tobacco:  Alcohol:  Drugs:    Home Medications:  famotidine 20 mg oral tablet: 1 tab(s) orally every 12 hours (13 Jul 2023 17:26)  loratadine 10 mg oral tablet: 1 tab(s) orally once a day (at bedtime) (13 Jul 2023 17:26)  oxyCODONE 5 mg oral tablet: 1 tab(s) orally every 6 hours as needed for  severe pain (13 Jul 2023 17:26)        MEDICATIONS  (STANDING):  dextrose 5% + lactated ringers 1000 milliLiter(s) (75 mL/Hr) IV Continuous <Continuous>  enoxaparin Injectable 40 milliGRAM(s) SubCutaneous every 24 hours  famotidine    Tablet 20 milliGRAM(s) Oral two times a day  folic acid 1 milliGRAM(s) Oral daily  letrozole 2.5 milliGRAM(s) Oral daily  levothyroxine 50 MICROGram(s) Oral daily  loratadine 10 milliGRAM(s) Oral daily  megestrol 40 milliGRAM(s) Oral every 6 hours  sucralfate suspension 1 Gram(s) Oral every 6 hours  thiamine 100 milliGRAM(s) Oral daily  tiotropium 2.5 MICROgram(s) Inhaler 2 Puff(s) Inhalation daily    MEDICATIONS  (PRN):  albuterol    90 MICROgram(s) HFA Inhaler 2 Puff(s) Inhalation every 6 hours PRN for shortness of breath and/or wheezing  methocarbamol 1000 milliGRAM(s) Oral every 6 hours PRN Muscle Spasm      Allergies  No Known Allergies  hydromorphone (Faint; Nausea)      Review of Systems:   Constitutional:  No Fever, No Chills  ENT/Mouth:  No Hearing Changes,  No Difficulty Swallowing  Eyes:  No Eye Pain, No Vision Changes  Cardiovascular:  No Chest Pain, No Palpitations  Respiratory:  No Cough, No Dyspnea  Gastrointestinal:  As described in HPI  Musculoskeletal:  No Joint Swelling, No Back Pain  Skin:  No Skin Lesions, No Jaundice  Neuro:  No Syncope, No Dizziness  Heme/Lymph:  No Bruising, No Bleeding.          Physical Examination:  T(C): 36.6 (07-19-23 @ 08:09), Max: 37 (07-18-23 @ 15:30)  HR: 95 (07-19-23 @ 08:09) (95 - 115)  BP: 107/57 (07-19-23 @ 08:09) (103/53 - 132/82)  RR: 18 (07-19-23 @ 08:09) (18 - 18)  SpO2: 96% (07-19-23 @ 08:09) (96% - 96%)      07-18-23 @ 07:01  -  07-19-23 @ 07:00  --------------------------------------------------------  IN: 360 mL / OUT: 0 mL / NET: 360 mL          GENERAL: AAOx3, no acute distress.  HEAD:  Atraumatic, Normocephalic  EYES: conjunctiva and sclera clear  NECK: Supple, no JVD or thyromegaly  CHEST/LUNG: Clear to auscultation bilaterally; No wheeze, rhonchi, or rales  HEART: Regular rate and rhythm; normal S1, S2, No murmurs.  ABDOMEN: Soft, nontender, nondistended; Bowel sounds present  NEUROLOGY: No asterixis or tremor.   SKIN: Intact, no jaundice        Data:                        9.6    4.07  )-----------( 117      ( 19 Jul 2023 07:12 )             29.9     Hgb Trend:  9.6  07-19-23 @ 07:12  10.3  07-18-23 @ 06:51  11.0  07-17-23 @ 06:45        07-19    140  |  108  |  6<L>  ----------------------------<  102<H>  3.8   |  22  |  <0.5<L>    Ca    7.7<L>      19 Jul 2023 07:12  Phos  4.1     07-19  Mg     1.9     07-19    TPro  4.0<L>  /  Alb  2.4<L>  /  TBili  0.6  /  DBili  x   /  AST  x   /  ALT  x   /  AlkPhos  208<H>  07-19    Liver panel trend:  TBili 0.6   /   AST --   /   ALT --   /   AlkP 208   /   Tptn 4.0   /   Alb 2.4    /   DBili --      07-19  TBili 0.6   /   AST 2756   /   ALT 1015   /   AlkP 217   /   Tptn 4.7   /   Alb 2.7    /   DBili --      07-18  TBili 0.5   /   AST 2405   /      /   AlkP 171   /   Tptn 4.1   /   Alb 2.3    /   DBili --      07-18  TBili 0.4   /      /      /   AlkP 176   /   Tptn 4.4   /   Alb 2.6    /   DBili 0.2      07-17  TBili 0.4   /      /      /   AlkP 178   /   Tptn 4.4   /   Alb 2.7    /   DBili --      07-17  TBili 0.4   /      /   ALT 99   /   AlkP 170   /   Tptn 4.4   /   Alb 2.8    /   DBili --      07-17  TBili 0.3   /   AST 12   /   ALT <5   /   AlkP 160   /   Tptn 4.6   /   Alb 2.7    /   DBili --      07-16  TBili 0.4   /   AST 11   /   ALT <5   /   AlkP 165   /   Tptn 4.9   /   Alb 3.1    /   DBili --      07-15  TBili 0.4   /   AST 12   /   ALT <5   /   AlkP 174   /   Tptn 5.5   /   Alb 3.4    /   DBili --      07-14  TBili 0.4   /   AST 11   /   ALT <5   /   AlkP 170   /   Tptn 5.7   /   Alb 3.6    /   DBili --      07-13  TBili 0.4   /   AST 14   /   ALT 5   /   AlkP 196   /   Tptn 6.5   /   Alb 4.2    /   DBili --      07-13      PT/INR - ( 18 Jul 2023 18:11 )   PT: 17.00 sec;   INR: 1.47 ratio         PTT - ( 18 Jul 2023 18:11 )  PTT:33.9 sec        Radiology:    US Abdomen Upper Quadrant Right:   ACC: [id number] EXAM:  US ABDOMEN RT UPR QUADRANT   ORDERED BY: SHAWN CAGLE     PROCEDURE DATE:  07/18/2023          INTERPRETATION:  CLINICAL INFORMATION: Right upper quadrant pain,   elevated liver enzymes.    COMPARISON: CT abdomen and pelvis dated 4/22/2023.    TECHNIQUE: Sonography of the right upper quadrant.      FINDINGS:    Liver: Heterogeneous in echotexture and lobulated in contour..  Bile ducts: Normal caliber. Common bile duct measures 5 mm.  Gallbladder: Gallstones. No sonographic Luke's sign.  Pancreas: Visualized portions are within normal limits..  Right kidney: 9.3 cm. No hydronephrosis..  Ascites: None. Small right pleural effusion  IVC: Visualized portions are within normal limits.    IMPRESSION:    Heterogeneous echotexture to liver with a lobulated contour, findings   which can be seen in cirrhosis.    Gallstones    Small right pleural effusion        --- End of Report ---          MANUEL SEE MD; Resident Radiologist  This document has been electronically signed.  SALLY STERN MD; Attending Radiologist  This document has been electronically signed. Jul 18 2023  6:05PM (07-18-23 @ 16:54)     Gastroenterology Consultation:    Patient is a 57y old  Female who presents with a chief complaint of Decreased oral intake with electrolyte disturbances (18 Jul 2023 17:33)        Admitted on: 07-13-23      HPI:  57-year-old woman with a history of hypothyroidism, asthma, former alcohol use, breast cancer with diffuse bone metastasis, status post radiation 5 sessions finished 5/31/23, on letrozole daily, was on Ribociclib and was discontinued due to low magnesium with high QTc, surgical history of occiput-T2 posterior instrumentation and fusion, ORIF of C2 body with functional decline and impaired ADLs/mobility, ambulates with a walker. Following with Dr. Peña.     She presented to the emergency department complaining of decreased appetite and oral intake of 3 weeks duration. She is saying that she's not hungry, no mouth pain, no dysphagia or odynophagia, no regurgitation or heart burn, no abdominal pain, no diarrhea or constipation, no change in stool consistency. This was accompanied by generalized fatigue but no fever, no chills or rigors. She admitted decreased urine output, weight loss.     She also complained of left knee pain and swelling that started 1 week ago after hitting her knee while sitting, she didn't seek medical evaluation at that time. No decreased range of motion.     Vital Signs T(F): 96.6 HR: 128 BP: 122/93 RR: 18   EKG showed sinus tachycardia   Labs were significant for severe hypokalemia, hypomagnesemia, hyponatremia, low bicarb    Given IV fluids, potassium and admitted to medicine for further evaluation and treatment          PAST MEDICAL & SURGICAL HISTORY:  Hypothyroidism      H/O cirrhosis      Breast cancer      No significant past surgical history            FAMILY HISTORY:  FHx: melanoma (Mother)    FHx: arrhythmia (Father)        Social History:  Tobacco:  Alcohol:  Drugs:    Home Medications:  famotidine 20 mg oral tablet: 1 tab(s) orally every 12 hours (13 Jul 2023 17:26)  loratadine 10 mg oral tablet: 1 tab(s) orally once a day (at bedtime) (13 Jul 2023 17:26)  oxyCODONE 5 mg oral tablet: 1 tab(s) orally every 6 hours as needed for  severe pain (13 Jul 2023 17:26)        MEDICATIONS  (STANDING):  dextrose 5% + lactated ringers 1000 milliLiter(s) (75 mL/Hr) IV Continuous <Continuous>  enoxaparin Injectable 40 milliGRAM(s) SubCutaneous every 24 hours  famotidine    Tablet 20 milliGRAM(s) Oral two times a day  folic acid 1 milliGRAM(s) Oral daily  letrozole 2.5 milliGRAM(s) Oral daily  levothyroxine 50 MICROGram(s) Oral daily  loratadine 10 milliGRAM(s) Oral daily  megestrol 40 milliGRAM(s) Oral every 6 hours  sucralfate suspension 1 Gram(s) Oral every 6 hours  thiamine 100 milliGRAM(s) Oral daily  tiotropium 2.5 MICROgram(s) Inhaler 2 Puff(s) Inhalation daily    MEDICATIONS  (PRN):  albuterol    90 MICROgram(s) HFA Inhaler 2 Puff(s) Inhalation every 6 hours PRN for shortness of breath and/or wheezing  methocarbamol 1000 milliGRAM(s) Oral every 6 hours PRN Muscle Spasm      Allergies  No Known Allergies  hydromorphone (Faint; Nausea)      Review of Systems:   Constitutional:  No Fever, No Chills  ENT/Mouth:  No Hearing Changes,  No Difficulty Swallowing  Eyes:  No Eye Pain, No Vision Changes  Cardiovascular:  No Chest Pain, No Palpitations  Respiratory:  No Cough, No Dyspnea  Gastrointestinal:  As described in HPI  Musculoskeletal:  No Joint Swelling, No Back Pain  Skin:  No Skin Lesions, No Jaundice  Neuro:  No Syncope, No Dizziness  Heme/Lymph:  No Bruising, No Bleeding.          Physical Examination:  T(C): 36.6 (07-19-23 @ 08:09), Max: 37 (07-18-23 @ 15:30)  HR: 95 (07-19-23 @ 08:09) (95 - 115)  BP: 107/57 (07-19-23 @ 08:09) (103/53 - 132/82)  RR: 18 (07-19-23 @ 08:09) (18 - 18)  SpO2: 96% (07-19-23 @ 08:09) (96% - 96%)      07-18-23 @ 07:01  -  07-19-23 @ 07:00  --------------------------------------------------------  IN: 360 mL / OUT: 0 mL / NET: 360 mL          GENERAL: AAOx3, no acute distress.  HEAD:  Atraumatic, Normocephalic  EYES: conjunctiva and sclera clear  NECK: Supple, no JVD or thyromegaly  CHEST/LUNG: Clear to auscultation bilaterally; No wheeze, rhonchi, or rales  HEART: Regular rate and rhythm; normal S1, S2, No murmurs.  ABDOMEN: Soft, nontender, nondistended; Bowel sounds present  NEUROLOGY: No asterixis or tremor.   SKIN: Intact, no jaundice        Data:                        9.6    4.07  )-----------( 117      ( 19 Jul 2023 07:12 )             29.9     Hgb Trend:  9.6  07-19-23 @ 07:12  10.3  07-18-23 @ 06:51  11.0  07-17-23 @ 06:45        07-19    140  |  108  |  6<L>  ----------------------------<  102<H>  3.8   |  22  |  <0.5<L>    Ca    7.7<L>      19 Jul 2023 07:12  Phos  4.1     07-19  Mg     1.9     07-19    TPro  4.0<L>  /  Alb  2.4<L>  /  TBili  0.6  /  DBili  x   /  AST  x   /  ALT  x   /  AlkPhos  208<H>  07-19    Liver panel trend:  TBili 0.6   /   AST --   /   ALT --   /   AlkP 208   /   Tptn 4.0   /   Alb 2.4    /   DBili --      07-19  TBili 0.6   /   AST 2756   /   ALT 1015   /   AlkP 217   /   Tptn 4.7   /   Alb 2.7    /   DBili --      07-18  TBili 0.5   /   AST 2405   /      /   AlkP 171   /   Tptn 4.1   /   Alb 2.3    /   DBili --      07-18  TBili 0.4   /      /      /   AlkP 176   /   Tptn 4.4   /   Alb 2.6    /   DBili 0.2      07-17  TBili 0.4   /      /      /   AlkP 178   /   Tptn 4.4   /   Alb 2.7    /   DBili --      07-17  TBili 0.4   /      /   ALT 99   /   AlkP 170   /   Tptn 4.4   /   Alb 2.8    /   DBili --      07-17  TBili 0.3   /   AST 12   /   ALT <5   /   AlkP 160   /   Tptn 4.6   /   Alb 2.7    /   DBili --      07-16  TBili 0.4   /   AST 11   /   ALT <5   /   AlkP 165   /   Tptn 4.9   /   Alb 3.1    /   DBili --      07-15  TBili 0.4   /   AST 12   /   ALT <5   /   AlkP 174   /   Tptn 5.5   /   Alb 3.4    /   DBili --      07-14  TBili 0.4   /   AST 11   /   ALT <5   /   AlkP 170   /   Tptn 5.7   /   Alb 3.6    /   DBili --      07-13  TBili 0.4   /   AST 14   /   ALT 5   /   AlkP 196   /   Tptn 6.5   /   Alb 4.2    /   DBili --      07-13      PT/INR - ( 18 Jul 2023 18:11 )   PT: 17.00 sec;   INR: 1.47 ratio         PTT - ( 18 Jul 2023 18:11 )  PTT:33.9 sec        Radiology:    US Abdomen Upper Quadrant Right:   ACC: [id number] EXAM:  US ABDOMEN RT UPR QUADRANT   ORDERED BY: SHAWN CAGLE     PROCEDURE DATE:  07/18/2023          INTERPRETATION:  CLINICAL INFORMATION: Right upper quadrant pain,   elevated liver enzymes.    COMPARISON: CT abdomen and pelvis dated 4/22/2023.    TECHNIQUE: Sonography of the right upper quadrant.      FINDINGS:    Liver: Heterogeneous in echotexture and lobulated in contour..  Bile ducts: Normal caliber. Common bile duct measures 5 mm.  Gallbladder: Gallstones. No sonographic Luke's sign.  Pancreas: Visualized portions are within normal limits..  Right kidney: 9.3 cm. No hydronephrosis..  Ascites: None. Small right pleural effusion  IVC: Visualized portions are within normal limits.    IMPRESSION:    Heterogeneous echotexture to liver with a lobulated contour, findings   which can be seen in cirrhosis.    Gallstones    Small right pleural effusion        --- End of Report ---          MANUEL SEE MD; Resident Radiologist  This document has been electronically signed.  SALLY STERN MD; Attending Radiologist  This document has been electronically signed. Jul 18 2023  6:05PM (07-18-23 @ 16:54)

## 2023-07-19 NOTE — PROGRESS NOTE ADULT - SUBJECTIVE AND OBJECTIVE BOX
Patient is a 57y old  Female who presents with a chief complaint of Decreased oral intake with electrolyte disturbances (19 Jul 2023 10:20)    Patient was seen and examined.  Pt with poor oral intake  Denies any new complaints.  All systems reviewed positive history as mentioned above.    PAST MEDICAL & SURGICAL HISTORY:  Hypothyroidism  H/O cirrhosis  Breast cancer    No significant past surgical history    Allergies  No Known Allergies  Intolerances  hydromorphone (Faint; Nausea)    MEDICATIONS  (STANDING):  calcium carbonate    500 mG (Tums) Chewable 1 Tablet(s) Chew three times a day  dextrose 5% + lactated ringers 1000 milliLiter(s) (75 mL/Hr) IV Continuous <Continuous>  enoxaparin Injectable 40 milliGRAM(s) SubCutaneous every 24 hours  famotidine    Tablet 20 milliGRAM(s) Oral two times a day  folic acid 1 milliGRAM(s) Oral daily  letrozole 2.5 milliGRAM(s) Oral daily  levothyroxine 50 MICROGram(s) Oral daily  loratadine 10 milliGRAM(s) Oral daily  megestrol 40 milliGRAM(s) Oral every 6 hours  sucralfate suspension 1 Gram(s) Oral every 6 hours  thiamine 100 milliGRAM(s) Oral daily  tiotropium 2.5 MICROgram(s) Inhaler 2 Puff(s) Inhalation daily    MEDICATIONS  (PRN):  albuterol    90 MICROgram(s) HFA Inhaler 2 Puff(s) Inhalation every 6 hours PRN for shortness of breath and/or wheezing  methocarbamol 1000 milliGRAM(s) Oral every 6 hours PRN Muscle Spasm    Vital Signs Last 24 Hrs  T(C): 36.6  T(F): 97.9  HR: 95  BP: 107/57  BP(mean): --  RR: 18  SpO2: 96%    O/E:  Awake, alert, not in distress.  HEENT: atraumatic, EOMI.  Chest: decreased breath sounds at bases  CVS: SIS2 +, no murmur.  P/A: Soft, BS+  CNS: awake, alert  Ext: no edema feet.  All systems reviewed positive findings as above.    POCT Blood Glucose.: 126 mg/dL (19 Jul 2023 11:25)  POCT Blood Glucose.: 94 mg/dL (19 Jul 2023 09:03)                        9.6<L>  4.07<L> )-----------( 117<L>    ( 19 Jul 2023 07:12 )             29.9<L>                        10.3<L>  4.22<L> )-----------( 113<L>    ( 18 Jul 2023 06:51 )             31.5<L>    07-19    140  |  108  |  6<L>  ----------------------------<  102<H>  3.8   |  22  |  <0.5<L>  07-18    139  |  106  |  6<L>  ----------------------------<  113<H>  3.8   |  23  |  <0.5<L>    Ca    7.7<L>      19 Jul 2023 07:12  Ca    8.2<L>      18 Jul 2023 18:11  Ca    7.9<L>      18 Jul 2023 06:51  Ca    8.3<L>      17 Jul 2023 17:08  Phos  4.1     07-19  Mg     1.9     07-19    TPro  4.0<L>  /  Alb  2.4<L>  /  TBili  0.6  /  DBili  x   /  AST  1463<H>  /  ALT  797<H>  /  AlkPhos  208<H>  07-19  TPro  4.7<L>  /  Alb  2.7<L>  /  TBili  0.6  /  DBili  x   /  AST  2756<H>  /  ALT  1015<H>  /  AlkPhos  217<H>  07-18  TPro  4.1<L>  /  Alb  2.3<L>  /  TBili  0.5  /  DBili  x   /  AST  2405<H>  /  ALT  674<H>  /  AlkPhos  171<H>  07-18  TPro  4.4<L>  /  Alb  2.6<L>  /  TBili  0.4  /  DBili  0.2  /  AST  787<H>  /  ALT  214<H>  /  AlkPhos  176<H>  07-17  TPro  4.4<L>  /  Alb  2.7<L>  /  TBili  0.4  /  DBili  x   /  AST  788<H>  /  ALT  219<H>  /  AlkPhos  178<H>  07-17      CARDIAC MARKERS ( 18 Jul 2023 18:11 )  x     / x     / 58 U/L / x     / x          PT/INR - ( 18 Jul 2023 18:11 )   PT: 17.00 sec;   INR: 1.47 ratio         PTT - ( 18 Jul 2023 18:11 )  PTT:33.9 sec  Urinalysis Basic - ( 19 Jul 2023 07:12 )    Color: x / Appearance: x / SG: x / pH: x  Gluc: 102 mg/dL / Ketone: x  / Bili: x / Urobili: x   Blood: x / Protein: x / Nitrite: x   Leuk Esterase: x / RBC: x / WBC x   Sq Epi: x / Non Sq Epi: x / Bacteria: x

## 2023-07-19 NOTE — CONSULT NOTE ADULT - ASSESSMENT
57-year-old woman with a history of hypothyroidism, asthma, former alcohol use, breast cancer with diffuse bone metastasis, status post radiation 5 sessions finished 5/31/23, on letrozole daily, was on Ribociclib and was discontinued due to low magnesium with high QTc, surgical history of occiput fusion, ORIF of C2 body with functional decline and impaired ADLs/mobility, She presented to the emergency department complaining of decreased appetite and oral intake of 3 weeks duration. hepatology consulted for evaluation of elevated LFTs.    # Acute transaminitis:  # H/O compensated alcoholic cirrhosis:   # H/O elevated ALP presumed 2/2 bony metastasis:   - TBili 0.6   /   AST 2756   /   ALT 1015   /   AlkP 217   /   Tptn 4.7   /   Alb 2.7    /   DBili --      07-18  TBili 0.5   /   AST 2405   /      /   AlkP 171   /   Tptn 4.1   /   Alb 2.3    /   DBili --      07-18  TBili 0.4   /      /      /   AlkP 176   /   Tptn 4.4   /   Alb 2.6    /   DBili 0.2      07-17  TBili 0.4   /      /      /   AlkP 178   /   Tptn 4.4   /   Alb 2.7    /   DBili --      07-17  TBili 0.4   /      /   ALT 99   /   AlkP 170   /   Tptn 4.4   /   Alb 2.8    /   DBili --      07-17  TBili 0.3   /   AST 12   /   ALT <5   /   AlkP 160   /   Tptn 4.6   /   Alb 2.7    /   DBili --      07-16  - medications reviewed  - US: cirrhosis, GB stones, cbd 5 mm  - medications reviewed, no documented episodes of hypotension    recommendations:  - trend LFTs  - avoid hepatotoxic  - get CK level  - send acute hepatitis panel  - get RUQ US with duplex

## 2023-07-19 NOTE — PROGRESS NOTE ADULT - SUBJECTIVE AND OBJECTIVE BOX
LENGTH OF HOSPITAL STAY: 6d    CHIEF COMPLAINT:    Patient is a 57y old  Female who presents with a chief complaint of Decreased oral intake with electrolyte disturbances (19 Jul 2023 09:28)      OVERNIGHT EVENTS: NAEON. Patient reports she is eating and has an improved appetite. She feels well. No abdominal pain.     HPI:    HPI:  57-year-old woman with a history of hypothyroidism, asthma, former alcohol use, breast cancer with diffuse bone metastasis, status post radiation 5 sessions finished 5/31/23, on letrozole daily, was on Ribociclib and was discontinued due to low magnesium with high QTc, surgical history of occiput-T2 posterior instrumentation and fusion, ORIF of C2 body with functional decline and impaired ADLs/mobility, ambulates with a walker. Following with Dr. Peña.     She presented to the emergency department complaining of decreased appetite and oral intake of 3 weeks duration. She is saying that she's not hungry, no mouth pain, no dysphagia or odynophagia, no regurgitation or heart burn, no abdominal pain, no diarrhea or constipation, no change in stool consistency. This was accompanied by generalized fatigue but no fever, no chills or rigors. She admitted decreased urine output, weight loss.     She also complained of left knee pain and swelling that started 1 week ago after hitting her knee while sitting, she didn't seek medical evaluation at that time. No decreased range of motion.     Vital Signs T(F): 96.6 HR: 128 BP: 122/93 RR: 18   EKG showed sinus tachycardia   Labs were significant for severe hypokalemia, hypomagnesemia, hyponatremia, low bicarb    Given IV fluids, potassium and admitted to medicine for further evaluation and treatment        (13 Jul 2023 16:53)      ALLERGIES:    Allergies    No Known Allergies    Intolerances    hydromorphone (Faint; Nausea)      PMHx:    PAST MEDICAL & SURGICAL HISTORY:  Hypothyroidism      H/O cirrhosis      Breast cancer      No significant past surgical history      MEDICATIONS:  STANDING MEDICATIONS  dextrose 5% + lactated ringers 1000 milliLiter(s) IV Continuous <Continuous>  enoxaparin Injectable 40 milliGRAM(s) SubCutaneous every 24 hours  famotidine    Tablet 20 milliGRAM(s) Oral two times a day  folic acid 1 milliGRAM(s) Oral daily  letrozole 2.5 milliGRAM(s) Oral daily  levothyroxine 50 MICROGram(s) Oral daily  loratadine 10 milliGRAM(s) Oral daily  megestrol 40 milliGRAM(s) Oral every 6 hours  sucralfate suspension 1 Gram(s) Oral every 6 hours  thiamine 100 milliGRAM(s) Oral daily  tiotropium 2.5 MICROgram(s) Inhaler 2 Puff(s) Inhalation daily    PRN MEDICATIONS  albuterol    90 MICROgram(s) HFA Inhaler 2 Puff(s) Inhalation every 6 hours PRN  methocarbamol 1000 milliGRAM(s) Oral every 6 hours PRN      LABS:                        9.6    4.07  )-----------( 117      ( 19 Jul 2023 07:12 )             29.9     07-19    140  |  108  |  6<L>  ----------------------------<  102<H>  3.8   |  22  |  <0.5<L>    Ca    7.7<L>      19 Jul 2023 07:12  Phos  4.1     07-19  Mg     1.9     07-19    TPro  4.0<L>  /  Alb  2.4<L>  /  TBili  0.6  /  DBili  x   /  AST  1463<H>  /  ALT  797<H>  /  AlkPhos  208<H>  07-19    PT/INR - ( 18 Jul 2023 18:11 )   PT: 17.00 sec;   INR: 1.47 ratio         PTT - ( 18 Jul 2023 18:11 )  PTT:33.9 sec  Urinalysis Basic - ( 19 Jul 2023 07:12 )    Color: x / Appearance: x / SG: x / pH: x  Gluc: 102 mg/dL / Ketone: x  / Bili: x / Urobili: x   Blood: x / Protein: x / Nitrite: x   Leuk Esterase: x / RBC: x / WBC x   Sq Epi: x / Non Sq Epi: x / Bacteria: x        Lactate, Blood: 2.2 mmol/L *H* (07-18-23 @ 22:18)  Creatine Kinase, Serum: 58 U/L (07-18-23 @ 18:11)      CARDIAC MARKERS ( 18 Jul 2023 18:11 )  x     / x     / 58 U/L / x     / x          RADIOLOGY:  < from: US Abdomen Upper Quadrant Right (07.18.23 @ 16:54) >    ACC: [id number] EXAM:  US ABDOMEN RT UPR QUADRANT   ORDERED BY: SHAWN CAGLE     PROCEDURE DATE:  07/18/2023          INTERPRETATION:  CLINICAL INFORMATION: Right upper quadrant pain,   elevated liver enzymes.    COMPARISON: CT abdomen and pelvis dated 4/22/2023.    TECHNIQUE: Sonography of the right upper quadrant.      FINDINGS:    Liver: Heterogeneous in echotexture and lobulated in contour..  Bile ducts: Normal caliber. Common bile duct measures 5 mm.  Gallbladder: Gallstones. No sonographic Luke's sign.  Pancreas: Visualized portions are within normal limits..  Right kidney: 9.3 cm. No hydronephrosis..  Ascites: None. Small right pleural effusion  IVC: Visualized portions are within normal limits.    IMPRESSION:    Heterogeneous echotexture to liver with a lobulated contour, findings   which can be seen in cirrhosis.    Gallstones    Small right pleural effusion        --- End of Report ---    < end of copied text >      VITALS:   T(F): 97.9  HR: 95  BP: 107/57  RR: 18  SpO2: 96%    PHYSICAL EXAM:    General: No acute distress, appears stated age  HEENT: Normocephalic, autramatic  LUNGS: CTAB, no w/r/r  HEART: RRR, normal S1 and S2, no m/r/g   ABDOMEN: Soft, nontender, nondistended, normal bowel sounds  NEURO: AOx3

## 2023-07-19 NOTE — CONSULT NOTE ADULT - ATTENDING COMMENTS
58 yo F with high BMI hypothyroidism asthma breast ca wth bone mets former AUD IAIN cirrhosis admitted with poor oral intake seen for rapid severe rise in transaminases. Has chronic elevation of ALP due to bone mets and on aromatase inhibitors for years. No supplements teas or antibiotic use. No hypotensive episode.   Looks well  No icterus  Abdomen soft non tender  AST > ALT  AST peaked 2756 ALT 1015  Acute hepatitis A B C negative  Acute hepatocellular liver injury unclear etiology with no documented hypotensive episode or newly started heptaotoxic medication   Would check CK US doppler liver.
seen/ examined w/ hemonc team; note reviewed; case discussed agree w/ above  explained the extent of the disease  the family asked about her prognosis; indicated it is advanced stage and the prognosis depends on ECOG before and during the treatment; so far, QTc got prolonged and the treatment was stopped; as of 7/13/23,QTC got normalized; will repeat another time and get cardiology consult as was advised by Dr Peña; if no contraindications, will either restart the same regimen or different hormonal based systemic approach: as long as she tolerates the therapy and has good response, will continue the treatment

## 2023-07-19 NOTE — CONSULT NOTE ADULT - REASON FOR ADMISSION
Decreased oral intake with electrolyte disturbances

## 2023-07-19 NOTE — PROGRESS NOTE ADULT - ASSESSMENT
57-year-old woman with a history of hypothyroidism, asthma, former alcohol use, breast cancer with diffuse bone metastasis, status post radiation 5 sessions finished 5/31/23, on letrozole daily, was on Ribociclib and was discontinued due to low magnesium with high QTc, surgical history of occiput-T2 posterior instrumentation and fusion, ORIF of C2 body with functional decline and impaired ADLs/mobility, ambulates with a walker. Following with Dr. Peña.  She presented to the emergency department complaining of decreased appetite and oral intake of 3 weeks duration.      # Lactic acidosis  # Starvation ketoacidosis  # Dehydration due to decrease oral intake  # HAGMA  # Probable radiation esophagitis  - c/w famotidine  - c/w IV fluids  - c/w megace  - c/w calorie count  - monitor LFTS  - trend lactate    # Transaminitis   # H/o Cirrhosis  - US Abdomen Upper Quadrant Right (07.18.23 @ 16:54) >Heterogeneous echotexture to liver with a lobulated contour, findings which can be seen in cirrhosis. Gallstones Small right pleural effusion  - monitor LFT  - CT abd    # H/o breast cancer with bone mets  - oncology eval Given issues with QTc and electrolyte abnormalities suggest stopping ribociclib  - c/w letrozole    # Hypothyroidism  - c/w synthroid    # DVT prophylaxis    # DNR/DNI    # Pending: monitor LFT, trend lactate, CT abd, calorie count  # Disposition: STR when stable         57-year-old woman with a history of hypothyroidism, asthma, former alcohol use, breast cancer with diffuse bone metastasis, status post radiation 5 sessions finished 5/31/23, on letrozole daily, was on Ribociclib and was discontinued due to low magnesium with high QTc, surgical history of occiput-T2 posterior instrumentation and fusion, ORIF of C2 body with functional decline and impaired ADLs/mobility, ambulates with a walker. Following with Dr. Peña.  She presented to the emergency department complaining of decreased appetite and oral intake of 3 weeks duration.      # Lactic acidosis  # Starvation ketoacidosis  # Dehydration due to decrease oral intake  # HAGMA  # Probable radiation esophagitis  - c/w famotidine  - c/w IV fluids  - c/w megace  - c/w calorie count  - monitor LFTS  - trend lactate    # Transaminitis   # H/o Cirrhosis  - US Abdomen Upper Quadrant Right (07.18.23 @ 16:54) >Heterogeneous echotexture to liver with a lobulated contour, findings which can be seen in cirrhosis. Gallstones Small right pleural effusion  - monitor LFT  - CT abd    # Thrombocytopenia  - monitor platelets    # H/o breast cancer with bone mets  - oncology eval Given issues with QTc and electrolyte abnormalities suggest stopping ribociclib  - c/w letrozole    # Hypothyroidism  - c/w synthroid    # DVT prophylaxis    # DNR/DNI    # Pending: monitor LFT, trend lactate, CT abd, calorie count  # Disposition: STR when stable

## 2023-07-20 NOTE — PROGRESS NOTE ADULT - ATTENDING COMMENTS
56 yo F with high BMI hypothyroidism asthma breast ca wth bone mets former AUD IAIN cirrhosis admitted with poor oral intake seen for rapid severe rise in transaminases. Has chronic elevation of ALP due to bone mets and on aromatase inhibitors for years. No supplements teas or antibiotic use. No hypotensive episode.   Looks well  No icterus  Abdomen soft non tender  AST > ALT  AST peaked 2756 ALT 1015  Acute hepatitis A B C negative  CK normal  Echo - no RV dysfunction    Acute hepatocellular liver injury  - improving rapidly. ? ischemic hepatitis.   No documented hypotensive episode or newly started hepatotoxic medication   Await US doppler liver.

## 2023-07-20 NOTE — PROGRESS NOTE ADULT - SUBJECTIVE AND OBJECTIVE BOX
LENGTH OF HOSPITAL STAY: 7d    CHIEF COMPLAINT:    Patient is a 57y old Female who presents with a chief complaint of decreased oral intake with electrolyte disturbances (20 Jul 2023 12:26)    OVERNIGHT EVENTS: NAEON. Patient reports no new changes and she reports feeling well. Patient reports she has an appetite.    HPI:    HPI:  57-year-old woman with a history of hypothyroidism, asthma, former alcohol use, breast cancer with diffuse bone metastasis, status post radiation 5 sessions finished 5/31/23, on letrozole daily, was on Ribociclib and was discontinued due to low magnesium with high QTc, surgical history of occiput-T2 posterior instrumentation and fusion, ORIF of C2 body with functional decline and impaired ADLs/mobility, ambulates with a walker. Following with Dr. Peña.     She presented to the emergency department complaining of decreased appetite and oral intake of 3 weeks duration. She is saying that she's not hungry, no mouth pain, no dysphagia or odynophagia, no regurgitation or heart burn, no abdominal pain, no diarrhea or constipation, no change in stool consistency. This was accompanied by generalized fatigue but no fever, no chills or rigors. She admitted decreased urine output, weight loss.     She also complained of left knee pain and swelling that started 1 week ago after hitting her knee while sitting, she didn't seek medical evaluation at that time. No decreased range of motion.     Vital Signs T(F): 96.6 HR: 128 BP: 122/93 RR: 18   EKG showed sinus tachycardia   Labs were significant for severe hypokalemia, hypomagnesemia, hyponatremia, low bicarb    Given IV fluids, potassium and admitted to medicine for further evaluation and treatment        (13 Jul 2023 16:53)      ALLERGIES:    Allergies    No Known Allergies    Intolerances    hydromorphone (Faint; Nausea)      PMHx:    PAST MEDICAL & SURGICAL HISTORY:  Hypothyroidism      H/O cirrhosis      Breast cancer      No significant past surgical history    Allergies    No Known Allergies    Intolerances    hydromorphone (Faint; Nausea)      PHYSICAL EXAM:    General: No acute distress, appears stated age  HEENT: Normocephalic, autramatic, no scleral icterus  LUNGS: CTAB, no w/r/r  HEART: RRR, normal S1 and S2, no m/r/g   ABDOMEN: Soft, nontender, nondistended, normal bowel sounds  NEURO: AOx3  SKIN: No rash, no erythema

## 2023-07-20 NOTE — PROGRESS NOTE ADULT - SUBJECTIVE AND OBJECTIVE BOX
Gastroenterology progress note:     Patient is a 57y old  Female who presents with a chief complaint of Decreased oral intake with electrolyte disturbances (19 Jul 2023 15:11)       Admitted on: 07-13-23    We are following the patient for: acute transaminitis        Interval History:    No acute events overnight.          PAST MEDICAL & SURGICAL HISTORY:  Hypothyroidism      H/O cirrhosis      Breast cancer      No significant past surgical history          MEDICATIONS  (STANDING):  calcium carbonate    500 mG (Tums) Chewable 1 Tablet(s) Chew three times a day  dextrose 5% + lactated ringers 1000 milliLiter(s) (75 mL/Hr) IV Continuous <Continuous>  enoxaparin Injectable 40 milliGRAM(s) SubCutaneous every 24 hours  famotidine    Tablet 20 milliGRAM(s) Oral two times a day  folic acid 1 milliGRAM(s) Oral daily  letrozole 2.5 milliGRAM(s) Oral daily  levothyroxine 50 MICROGram(s) Oral daily  loratadine 10 milliGRAM(s) Oral daily  sucralfate suspension 1 Gram(s) Oral every 6 hours  thiamine 100 milliGRAM(s) Oral daily  tiotropium 2.5 MICROgram(s) Inhaler 2 Puff(s) Inhalation daily    MEDICATIONS  (PRN):  albuterol    90 MICROgram(s) HFA Inhaler 2 Puff(s) Inhalation every 6 hours PRN for shortness of breath and/or wheezing  ibuprofen  Tablet. 200 milliGRAM(s) Oral every 4 hours PRN Mild Pain (1 - 3), Moderate Pain (4 - 6)  methocarbamol 1000 milliGRAM(s) Oral every 6 hours PRN Muscle Spasm      Allergies  No Known Allergies  hydromorphone (Faint; Nausea)      Review of Systems:   Cardiovascular:  No Chest Pain, No Palpitations  Respiratory:  No Cough, No Dyspnea  Gastrointestinal:  As described in HPI  Skin:  No Skin Lesions, No Jaundice  Neuro:  No Syncope, No Dizziness    Physical Examination:  T(C): 37.1 (07-20-23 @ 07:11), Max: 37.6 (07-20-23 @ 00:30)  HR: 101 (07-20-23 @ 07:11) (91 - 108)  BP: 141/82 (07-20-23 @ 07:11) (109/75 - 141/82)  RR: 18 (07-20-23 @ 07:11) (18 - 18)  SpO2: 95% (07-20-23 @ 00:30) (95% - 95%)        GENERAL: AAOx3, no acute distress.  HEAD:  Atraumatic, Normocephalic  EYES: conjunctiva and sclera clear  NECK: Supple, no JVD or thyromegaly  CHEST/LUNG: Clear to auscultation bilaterally; No wheeze, rhonchi, or rales  HEART: Regular rate and rhythm; normal S1, S2, No murmurs.  ABDOMEN: Soft, nontender, nondistended; Bowel sounds present  NEUROLOGY: No asterixis or tremor.   SKIN: Intact, no jaundice     Data:                        9.8    4.67  )-----------( 139      ( 20 Jul 2023 06:58 )             29.8     Hgb trend:  9.8  07-20-23 @ 06:58  10.0  07-19-23 @ 19:05  9.6  07-19-23 @ 07:12  10.3  07-18-23 @ 06:51        07-20    137  |  106  |  4<L>  ----------------------------<  112<H>  3.9   |  22  |  <0.5<L>    Ca    7.7<L>      20 Jul 2023 06:58  Phos  4.5     07-20  Mg     2.1     07-20    TPro  4.2<L>  /  Alb  2.4<L>  /  TBili  0.6  /  DBili  x   /  AST  336<H>  /  ALT  495<H>  /  AlkPhos  262<H>  07-20    Liver panel trend:  TBili 0.6   /      /      /   AlkP 262   /   Tptn 4.2   /   Alb 2.4    /   DBili --      07-20  TBili 0.5   /      /      /   AlkP 241   /   Tptn 4.4   /   Alb 2.4    /   DBili --      07-19  TBili 0.6   /   AST 1463   /      /   AlkP 208   /   Tptn 4.0   /   Alb 2.4    /   DBili --      07-19  TBili 0.6   /   AST 2756   /   ALT 1015   /   AlkP 217   /   Tptn 4.7   /   Alb 2.7    /   DBili --      07-18  TBili 0.5   /   AST 2405   /      /   AlkP 171   /   Tptn 4.1   /   Alb 2.3    /   DBili --      07-18  TBili 0.4   /      /      /   AlkP 176   /   Tptn 4.4   /   Alb 2.6    /   DBili 0.2      07-17  TBili 0.4   /      /      /   AlkP 178   /   Tptn 4.4   /   Alb 2.7    /   DBili --      07-17  TBili 0.4   /      /   ALT 99   /   AlkP 170   /   Tptn 4.4   /   Alb 2.8    /   DBili --      07-17  TBili 0.3   /   AST 12   /   ALT <5   /   AlkP 160   /   Tptn 4.6   /   Alb 2.7    /   DBili --      07-16  TBili 0.4   /   AST 11   /   ALT <5   /   AlkP 165   /   Tptn 4.9   /   Alb 3.1    /   DBili --      07-15  TBili 0.4   /   AST 12   /   ALT <5   /   AlkP 174   /   Tptn 5.5   /   Alb 3.4    /   DBili --      07-14  TBili 0.4   /   AST 11   /   ALT <5   /   AlkP 170   /   Tptn 5.7   /   Alb 3.6    /   DBili --      07-13  TBili 0.4   /   AST 14   /   ALT 5   /   AlkP 196   /   Tptn 6.5   /   Alb 4.2    /   DBili --      07-13      PT/INR - ( 18 Jul 2023 18:11 )   PT: 17.00 sec;   INR: 1.47 ratio         PTT - ( 18 Jul 2023 18:11 )  PTT:33.9 sec       Radiology:  CT Abdomen and Pelvis No Cont:   ACC: [id number] EXAM:  CT ABDOMEN AND PELVIS   ORDERED BY: SHAWN CAGLE     PROCEDURE DATE:  07/19/2023          INTERPRETATION:  Clinical Indication: Abdominal pain.    Technique: Multidetector-row CT images of the abdomen and pelvis were   obtained from the xiphoid through the symphysis pubis. Oral contrast was   not administered. Intravenous contrast was not administered. Coronal and   sagittal reconstructions were performed.    Comparison: CT abdomen pelvis 4/21/2023    Evaluation limited by lack of contrast. Images degraded by streak   artifact.    Findings:    01. LIVER: Nodular contour of the liver.  Hepatic steatosis.  02. SPLEEN: Normal unenhanced.  03. PANCREAS: Normal unenhanced.  04. GALLBLDDER/BILIARY TREE: Cholelithiasis. No biliary dilation.  05. ADRENALS: Normal.  06. KIDNEYS: Symmetric in size.  No hydronephrosis. Bilateral punctate   nonobstructing renal stones.  07. LYMPHADENOPATHY/RETROPERITONEUM: No lymphadenopathy.  08. BOWEL: No bowel obstruction. Hepatic flexure lies lateral to the   liver edge. Mild rectal wall thickening with small perirectal fat   stranding.  09. PELVIC VISCERA: The uterus, adnexa, and bladder are unremarkable.  10. PELVIC LYMPH NODES: No lymphadenopathy.  11. VASCULATURE: Normal caliber abdominal aorta. Scattered aortoiliac   calcifications.  12. PERITONEUM/ABDOMINAL WALL: No ascites.  Bilateral anterior abdominal   wall subcutaneous air (series 3 image 73 and image 70), may represent air   trapped within a fold. No definite inflammatorychanges at the sites of   subcutaneous air. Small fat-containing umbilical hernia.  13. SKELETAL: Increased diffuse sclerotic and lytic lesions throughout   the bones, compatible with known metastases. New T7 and L4 compression   fractures since 4/21/2023. Essentially stable right breast mass measuring   3 x 2.2 cm with the nipple retraction (series 3 image 19)  14. LUNG BASES: Small right-sided pleural effusion. Trace left pleural   effusion. Bilateral small atelectasis.    IMPRESSION:    Findings may represent mild proctitis. No bowel obstruction or free air.    New T7 and L4 compression fractures, when compared to CT from April 2023.    Small right and trace left pleural effusions.    Increase in diffuse osseous metastases. Stable right breast mass with   nipple retraction.    Nodularity of the liver may be seen with cirrhosis.    --- End of Report ---          CHRISTIANO RODRIGUEZ MD; Resident Radiologist  This document has been electronically signed.  AMAURY BURTON MD; Attending Radiologist  This document has been electronically signed. Jul 20 2023 10:55AM (07-19-23 @ 16:51)    US Abdomen Upper Quadrant Right:   ACC: [id number] EXAM:  US ABDOMEN RT UPR QUADRANT   ORDERED BY: SHAWN CAGLE     PROCEDURE DATE:  07/18/2023          INTERPRETATION:  CLINICAL INFORMATION: Right upper quadrant pain,   elevated liver enzymes.    COMPARISON: CT abdomen and pelvis dated 4/22/2023.    TECHNIQUE: Sonography of the right upper quadrant.      FINDINGS:    Liver: Heterogeneous in echotexture and lobulated in contour..  Bile ducts: Normal caliber. Common bile duct measures 5 mm.  Gallbladder: Gallstones. No sonographic Luke's sign.  Pancreas: Visualized portions are within normal limits..  Right kidney: 9.3 cm. No hydronephrosis..  Ascites: None. Small right pleural effusion  IVC: Visualized portions are within normal limits.    IMPRESSION:    Heterogeneous echotexture to liver with a lobulated contour, findings   which can be seen in cirrhosis.    Gallstones    Small right pleural effusion        --- End of Report ---          MANUEL SEE MD; Resident Radiologist  This document has been electronically signed.  SALLY STERN MD; Attending Radiologist  This document has been electronically signed. Jul 18 2023  6:05PM (07-18-23 @ 16:54)

## 2023-07-20 NOTE — PROGRESS NOTE ADULT - ASSESSMENT
57-year-old woman with a history of hypothyroidism, asthma, former alcohol use, breast cancer with diffuse bone metastasis, status post radiation 5 sessions finished 5/31/23, on letrozole daily, was on Ribociclib and was discontinued due to low magnesium with high QTc, surgical history of occiput-T2 posterior instrumentation and fusion, ORIF of C2 body with functional decline and impaired ADLs/mobility, ambulates with a walker. Following with Dr. Peña.  She presented to the emergency department complaining of decreased appetite and oral intake of 3 weeks duration.     # Lactic acidosis- resolved  # Starvation ketoacidosis- resolved  # Dehydration due to decrease oral intake- resolved  # HAGMA-resolved  # Probable radiation esophagitis  - Lactate, Blood: 2.0 mmol/L (07.19.23 @ 19:05)  - c/w famotidine  - c/w IV fluids  - dc  megace    # Transaminitis -resolving  # Cholelithiasis  # H/o Cirrhosis  - US Abdomen Upper Quadrant Right (07.18.23 @ 16:54) >Heterogeneous echotexture to liver with a lobulated contour, findings which can be seen in cirrhosis. Gallstones Small right pleural effusion  -  CT Abdomen and Pelvis No Cont (07.19.23 @ 16:51Findings may represent mild proctitis. No bowel obstruction or free air.   Nodularity of the liver may be seen with cirrhosis.  - abdominal duplex  - monitor LFT  - Discussed with hepatology team    # Thrombocytopenia- resolved    # H/o breast cancer with bone mets  # New T7 and L4 compression fractures on imaging probably sec  to bone mets  - CT abdNew T7 and L4 compression fractures, when compared to CT from April 2023. Small right and trace left pleural effusions. Increase in diffuse osseous metastases. Stable right breast mass with nipple retraction.  - oncology eval Given issues with QTc and electrolyte abnormalities suggest stopping ribociclib  - c/w letrozole    # Hypothyroidism  - c/w synthroid    # DVT prophylaxis    # DNR/DNI    # Pending: monitor LFT,  US abd duplex  # Discussed plan of care with patient  # Disposition: STR when stable

## 2023-07-20 NOTE — PROGRESS NOTE ADULT - SUBJECTIVE AND OBJECTIVE BOX
Patient is a 57y old  Female who presents with a chief complaint of Decreased oral intake with electrolyte disturbances (19 Jul 2023 10:20)    Patient was seen and examined.  Appetite improving  Denies any new complaints.  All systems reviewed positive history as mentioned above.    PAST MEDICAL & SURGICAL HISTORY:  Hypothyroidism  H/O cirrhosis  Breast cancer    No significant past surgical history    Allergies  No Known Allergies  Intolerances  hydromorphone (Faint; Nausea)    MEDICATIONS  (STANDING):  calcium carbonate    500 mG (Tums) Chewable 1 Tablet(s) Chew three times a day  dextrose 5% + lactated ringers 1000 milliLiter(s) (75 mL/Hr) IV Continuous <Continuous>  enoxaparin Injectable 40 milliGRAM(s) SubCutaneous every 24 hours  famotidine    Tablet 20 milliGRAM(s) Oral two times a day  folic acid 1 milliGRAM(s) Oral daily  letrozole 2.5 milliGRAM(s) Oral daily  levothyroxine 50 MICROGram(s) Oral daily  loratadine 10 milliGRAM(s) Oral daily  sucralfate suspension 1 Gram(s) Oral every 6 hours  thiamine 100 milliGRAM(s) Oral daily  tiotropium 2.5 MICROgram(s) Inhaler 2 Puff(s) Inhalation daily    MEDICATIONS  (PRN):  albuterol    90 MICROgram(s) HFA Inhaler 2 Puff(s) Inhalation every 6 hours PRN for shortness of breath and/or wheezing  ibuprofen  Tablet. 200 milliGRAM(s) Oral every 4 hours PRN Mild Pain (1 - 3), Moderate Pain (4 - 6)  methocarbamol 1000 milliGRAM(s) Oral every 6 hours PRN Muscle Spasm    T(C): 37.1 (07-20-23 @ 07:11), Max: 37.6 (07-20-23 @ 00:30)  HR: 101 (07-20-23 @ 07:11) (91 - 108)  BP: 141/82 (07-20-23 @ 07:11) (109/75 - 141/82)  RR: 18 (07-20-23 @ 07:11) (18 - 18)  SpO2: 95% (07-20-23 @ 00:30) (95% - 95%)    O/E:  Awake, alert, not in distress.  HEENT: atraumatic, EOMI.  Chest: decreased breath sounds at bases  CVS: SIS2 +, no murmur.  P/A: Soft, BS+  CNS: awake, alert  Ext: no edema feet.  All systems reviewed positive findings as above.                          9.8<L>  4.67<L> )-----------( 139      ( 20 Jul 2023 06:58 )             29.8<L>                        10.0<L>  x     )-----------( x        ( 19 Jul 2023 19:05 )             30.8<L>    07-20    137  |  106  |  4<L>  ----------------------------<  112<H>  3.9   |  22  |  <0.5<L>  07-19    140  |  109  |  5<L>  ----------------------------<  128<H>  3.9   |  18  |  <0.5<L>    Ca    7.7<L>      20 Jul 2023 06:58  Ca    7.9<L>      19 Jul 2023 19:05  Ca    7.7<L>      19 Jul 2023 07:12  Ca    8.2<L>      18 Jul 2023 18:11  Phos  4.5     07-20  Mg     2.1     07-20    TPro  4.2<L>  /  Alb  2.4<L>  /  TBili  0.6  /  DBili  x   /  AST  336<H>  /  ALT  495<H>  /  AlkPhos  262<H>  07-20  TPro  4.4<L>  /  Alb  2.4<L>  /  TBili  0.5  /  DBili  x   /  AST  741<H>  /  ALT  652<H>  /  AlkPhos  241<H>  07-19  TPro  4.0<L>  /  Alb  2.4<L>  /  TBili  0.6  /  DBili  x   /  AST  1463<H>  /  ALT  797<H>  /  AlkPhos  208<H>  07-19  TPro  4.7<L>  /  Alb  2.7<L>  /  TBili  0.6  /  DBili  x   /  AST  2756<H>  /  ALT  1015<H>  /  AlkPhos  217<H>  07-18

## 2023-07-20 NOTE — PROGRESS NOTE ADULT - ASSESSMENT
57-year-old woman with a history of hypothyroidism, asthma, former alcohol use, breast cancer with diffuse bone metastasis, status post radiation 5 sessions finished 5/31/23, on letrozole daily, was on Ribociclib and was discontinued due to low magnesium with high QTc, surgical history of occiput fusion, ORIF of C2 body with functional decline and impaired ADLs/mobility, She presented to the emergency department complaining of decreased appetite and oral intake of 3 weeks duration. hepatology consulted for evaluation of elevated LFTs.    # Acute transaminitis: improved significantly DDx ischemic hepatitis 2/2 hypoperfusion event vs others:   # H/O compensated alcoholic cirrhosis:   # H/O elevated ALP presumed 2/2 bony metastasis:   TBili 0.6   /      /      /   AlkP 262   /   Tptn 4.2   /   Alb 2.4    /   DBili --      07-20  - TBili 0.6   /   AST 2756   /   ALT 1015   /   AlkP 217   /   Tptn 4.7   /   Alb 2.7    /   DBili --      07-18  TBili 0.5   /   AST 2405   /      /   AlkP 171   /   Tptn 4.1   /   Alb 2.3    /   DBili --      07-18  TBili 0.4   /      /      /   AlkP 176   /   Tptn 4.4   /   Alb 2.6    /   DBili 0.2      07-17  TBili 0.4   /      /      /   AlkP 178   /   Tptn 4.4   /   Alb 2.7    /   DBili --      07-17  TBili 0.4   /      /   ALT 99   /   AlkP 170   /   Tptn 4.4   /   Alb 2.8    /   DBili --      07-17  TBili 0.3   /   AST 12   /   ALT <5   /   AlkP 160   /   Tptn 4.6   /   Alb 2.7    /   DBili --      07-16  - medications reviewed  - US: cirrhosis, GB stones, cbd 5 mm  - medications reviewed, no documented episodes of hypotension  - CT A/P non cont 6/19: Nodularity of the liver may be seen with cirrhosis, bony metastasis and comp fractures   - normal CK level   - negative acute hepatitis panel     recommendations:  - trend LFTs  - avoid hepatotoxic  - get RUQ US with duplex    - supportive measures  - Follow up with our Hepatology Clinic located at [address] [place] [id number]. Phone Number: [phone]

## 2023-07-20 NOTE — PROGRESS NOTE ADULT - ASSESSMENT
58 y/o woman with PMH of hypothyroidism, asthma, former alcohol use, breast cancer with diffuse bone metastasis dx this year and follows with Dr. Peña [s/p radiation x 5 sessions which finished 5/31/23, on letrozole daily, was on Ribociclib and it was discontinued due to low magnesium with high QTc] surgical history of occiput-T2 posterior instrumentation and fusion, ORIF of C2 body with functional decline and impaired ADLs/mobility and now ambulating with a walker presented to the ED complaining of decreased appetite and oral intake of 3 weeks duration.    Starvation ketoacidosis  dehydration  hypokalemia, hypomagnesemia  hyponatremia - resolved   sinus tachycardia could be secondary to hypovolemia / dehydration - monitor   decreased oral intake possibly due to radiation esophagitis? (symptoms began after RT was completed per pt)  possible depression  debility  h/o recent prolonged QTc  metastatic breast cancer to the bone and on letrozole  hypothyroid - TSH WNL  asthma    HAGMA improving with hydration today AG normalized  - continue for now and encourage PO intake  Nutrition recs appreciated, changed IV fluid to D5 LR with 15 mM K-phos and 20 mEq Mg SO4 per liter at 75 ml/h --> 27 mM phos, 39.6 mEq K, and 36 mEq Mg in the next 24 h, in a slower more continuous dosing that is more likely to correct levels  Electrolytes repleting well  Stopped b1 as lytes stable 7/20  monitor for refeeding syndrome  monitor Phos level, goal K >4 and Mg >2  soft diet with supplements  calorie count  trial of carafate, megace already started and d/c'ed 7/20  as per previous notes the team discussed with pt re: GI eval and she is not interested in any procedures at this time (esophagram or EGD)  continue H2 blocker  PT consulted  fall precautions  repeat EKG  continue supplements for suspected vit B1 and folate deficiencies  HemOnc consult appreciated    as per hem/onc Repeat EKG 7/15, - Cardio consult for prolonged QTC on kisqali (pt was scheduled to see Dr. 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  - Plan to start Ibrance on 7/18 if QTC normal  case management informed of pt and family desire for STR on discharge  trial of mirtazapine was ordered but would hold for now until QTc stable   avoid QTc prolonging meds for now   TSH 1.46 c/w levothyroxine   Pt stated that she is eating and feeling better     #Transaminitis, improving  - >> 2405 >> 2756 >> 1463 >> 741 >> 336  - >> 674 >> 1015 >> 797 >> 652 >> 495  -Alk Phos 176 >> 171 >> 217 >> 208 >> 241 >> 262  -Total bilirubin 0.6  -Albumin 2.4  -Lactate 2.2 >> 2.0  -Acute hepatitis panel negative  -Serum acetaminophen level <5 non elevated  -US Abdomen findings consistent with cirrhosis  -LFTs BID  -Hepatology consulted, recs appreciated - Follow up with Hepatology Clinic [address] [place] [id number]. Phone Number: [phone]    -US abd duplex    DVT prophylaxis    DNR/DNI  very guarded prognosis    Pending: monitor LFTs, US abd duplex    Disposition: STR

## 2023-07-21 NOTE — PROGRESS NOTE ADULT - SUBJECTIVE AND OBJECTIVE BOX
Gastroenterology progress note:     Patient is a 57y old  Female who presents with a chief complaint of Decreased oral intake with electrolyte disturbances (21 Jul 2023 13:14)       Admitted on: 07-13-23    We are following the patient for: transaminitis        Interval History:    No acute events overnight.        PAST MEDICAL & SURGICAL HISTORY:  Hypothyroidism      H/O cirrhosis      Breast cancer      No significant past surgical history          MEDICATIONS  (STANDING):  calcium carbonate    500 mG (Tums) Chewable 1 Tablet(s) Chew three times a day  dextrose 5% + lactated ringers 1000 milliLiter(s) (75 mL/Hr) IV Continuous <Continuous>  enoxaparin Injectable 40 milliGRAM(s) SubCutaneous every 24 hours  famotidine    Tablet 20 milliGRAM(s) Oral two times a day  folic acid 1 milliGRAM(s) Oral daily  letrozole 2.5 milliGRAM(s) Oral daily  levothyroxine 50 MICROGram(s) Oral daily  loratadine 10 milliGRAM(s) Oral daily  palbociclib 125 milliGRAM(s) Oral daily  pamidronate IVPB 90 milliGRAM(s) IV Intermittent once  sucralfate suspension 1 Gram(s) Oral every 6 hours  tiotropium 2.5 MICROgram(s) Inhaler 2 Puff(s) Inhalation daily    MEDICATIONS  (PRN):  albuterol    90 MICROgram(s) HFA Inhaler 2 Puff(s) Inhalation every 6 hours PRN for shortness of breath and/or wheezing  ibuprofen  Tablet. 200 milliGRAM(s) Oral every 4 hours PRN Mild Pain (1 - 3), Moderate Pain (4 - 6)  methocarbamol 1000 milliGRAM(s) Oral every 6 hours PRN Muscle Spasm      Allergies  No Known Allergies  hydromorphone (Faint; Nausea)      Review of Systems:   Cardiovascular:  No Chest Pain, No Palpitations  Respiratory:  No Cough, No Dyspnea  Gastrointestinal:  As described in HPI  Skin:  No Skin Lesions, No Jaundice  Neuro:  No Syncope, No Dizziness    Physical Examination:  T(C): 36.8 (07-21-23 @ 08:00), Max: 36.8 (07-21-23 @ 08:00)  HR: 94 (07-21-23 @ 08:00) (94 - 94)  BP: 126/72 (07-21-23 @ 08:00) (126/72 - 126/72)  RR: 18 (07-21-23 @ 08:00) (18 - 18)  SpO2: --      07-21-23 @ 07:01  -  07-21-23 @ 15:56  --------------------------------------------------------  IN: 0 mL / OUT: 1400 mL / NET: -1400 mL        GENERAL: AAOx3, no acute distress.  HEAD:  Atraumatic, Normocephalic  EYES: conjunctiva and sclera clear  NECK: Supple, no JVD or thyromegaly  CHEST/LUNG: Clear to auscultation bilaterally; No wheeze, rhonchi, or rales  HEART: Regular rate and rhythm; normal S1, S2, No murmurs.  ABDOMEN: Soft, nontender, nondistended; Bowel sounds present  NEUROLOGY: No asterixis or tremor.   SKIN: Intact, no jaundice     Data:                        9.7    3.04  )-----------( 155      ( 21 Jul 2023 06:51 )             30.3     Hgb trend:  9.7  07-21-23 @ 06:51  9.8  07-20-23 @ 06:58  10.0  07-19-23 @ 19:05  9.6  07-19-23 @ 07:12        07-21    141  |  112<H>  |  4<L>  ----------------------------<  116<H>  4.2   |  21  |  <0.5<L>    Ca    8.0<L>      21 Jul 2023 06:51  Phos  4.6     07-21  Mg     2.3     07-21    TPro  4.1<L>  /  Alb  2.4<L>  /  TBili  0.5  /  DBili  x   /  AST  105<H>  /  ALT  311<H>  /  AlkPhos  260<H>  07-21    Liver panel trend:  TBili 0.5   /      /      /   AlkP 260   /   Tptn 4.1   /   Alb 2.4    /   DBili --      07-21  TBili 0.6   /      /      /   AlkP 284   /   Tptn 4.4   /   Alb 2.5    /   DBili --      07-20  TBili 0.6   /      /      /   AlkP 262   /   Tptn 4.2   /   Alb 2.4    /   DBili --      07-20  TBili 0.5   /      /      /   AlkP 241   /   Tptn 4.4   /   Alb 2.4    /   DBili --      07-19  TBili 0.6   /   AST 1463   /      /   AlkP 208   /   Tptn 4.0   /   Alb 2.4    /   DBili --      07-19  TBili 0.6   /   AST 2756   /   ALT 1015   /   AlkP 217   /   Tptn 4.7   /   Alb 2.7    /   DBili --      07-18  TBili 0.5   /   AST 2405   /      /   AlkP 171   /   Tptn 4.1   /   Alb 2.3    /   DBili --      07-18  TBili 0.4   /      /      /   AlkP 176   /   Tptn 4.4   /   Alb 2.6    /   DBili 0.2      07-17  TBili 0.4   /      /      /   AlkP 178   /   Tptn 4.4   /   Alb 2.7    /   DBili --      07-17  TBili 0.4   /      /   ALT 99   /   AlkP 170   /   Tptn 4.4   /   Alb 2.8    /   DBili --      07-17  TBili 0.3   /   AST 12   /   ALT <5   /   AlkP 160   /   Tptn 4.6   /   Alb 2.7    /   DBili --      07-16  TBili 0.4   /   AST 11   /   ALT <5   /   AlkP 165   /   Tptn 4.9   /   Alb 3.1    /   DBili --      07-15  TBili 0.4   /   AST 12   /   ALT <5   /   AlkP 174   /   Tptn 5.5   /   Alb 3.4    /   DBili --      07-14  TBili 0.4   /   AST 11   /   ALT <5   /   AlkP 170   /   Tptn 5.7   /   Alb 3.6    /   DBili --      07-13  TBili 0.4   /   AST 14   /   ALT 5   /   AlkP 196   /   Tptn 6.5   /   Alb 4.2    /   DBili --      07-13             Radiology:    < from: CT Abdomen and Pelvis No Cont (07.19.23 @ 16:51) >    IMPRESSION:    Findings may represent mild proctitis. No bowel obstruction or free air.    New T7 and L4 compression fractures, when compared to CT from April 2023.    Small right and trace left pleural effusions.    Increase in diffuse osseous metastases. Stable right breast mass with   nipple retraction.    Nodularity of the liver may be seen with cirrhosis.    < end of copied text >

## 2023-07-21 NOTE — PROGRESS NOTE ADULT - SUBJECTIVE AND OBJECTIVE BOX
SUBJECTIVE:    Patient is a 57y old Female who presents with a chief complaint of Decreased oral intake with electrolyte disturbances (21 Jul 2023 13:14)    Currently admitted to medicine with the primary diagnosis of Adult failure to thrive       Today is hospital day 8d. This morning she is resting comfortably in bed and reports no new issues or overnight events.     PAST MEDICAL & SURGICAL HISTORY  Hypothyroidism    H/O cirrhosis    Breast cancer    No significant past surgical history      SOCIAL HISTORY:  Negative for smoking/alcohol/drug use.     ALLERGIES:  No Known Allergies    MEDICATIONS:  STANDING MEDICATIONS  calcium carbonate    500 mG (Tums) Chewable 1 Tablet(s) Chew three times a day  dextrose 5% + lactated ringers 1000 milliLiter(s) IV Continuous <Continuous>  enoxaparin Injectable 40 milliGRAM(s) SubCutaneous every 24 hours  famotidine    Tablet 20 milliGRAM(s) Oral two times a day  folic acid 1 milliGRAM(s) Oral daily  letrozole 2.5 milliGRAM(s) Oral daily  levothyroxine 50 MICROGram(s) Oral daily  loratadine 10 milliGRAM(s) Oral daily  palbociclib 125 milliGRAM(s) Oral daily  pamidronate IVPB 90 milliGRAM(s) IV Intermittent once  sucralfate suspension 1 Gram(s) Oral every 6 hours  tiotropium 2.5 MICROgram(s) Inhaler 2 Puff(s) Inhalation daily    PRN MEDICATIONS  albuterol    90 MICROgram(s) HFA Inhaler 2 Puff(s) Inhalation every 6 hours PRN  ibuprofen  Tablet. 200 milliGRAM(s) Oral every 4 hours PRN  methocarbamol 1000 milliGRAM(s) Oral every 6 hours PRN    VITALS:   T(F): 98.3  HR: 94  BP: 126/72  RR: 18  SpO2: --    LABS:                        9.7    3.04  )-----------( 155      ( 21 Jul 2023 06:51 )             30.3     07-21    141  |  112<H>  |  4<L>  ----------------------------<  116<H>  4.2   |  21  |  <0.5<L>    Ca    8.0<L>      21 Jul 2023 06:51  Phos  4.6     07-21  Mg     2.3     07-21    TPro  4.1<L>  /  Alb  2.4<L>  /  TBili  0.5  /  DBili  x   /  AST  105<H>  /  ALT  311<H>  /  AlkPhos  260<H>  07-21      Urinalysis Basic - ( 21 Jul 2023 06:51 )    Color: x / Appearance: x / SG: x / pH: x  Gluc: 116 mg/dL / Ketone: x  / Bili: x / Urobili: x   Blood: x / Protein: x / Nitrite: x   Leuk Esterase: x / RBC: x / WBC x   Sq Epi: x / Non Sq Epi: x / Bacteria: x            CARDIAC MARKERS ( 20 Jul 2023 11:20 )  x     / x     / 28 U/L / x     / x          RADIOLOGY:  < from: CT Abdomen and Pelvis No Cont (07.19.23 @ 16:51) >  Findings may represent mild proctitis. No bowel obstruction or free air.  New T7 and L4 compression fractures, when compared to CT from April 2023.  Small right and trace left pleural effusions.  Increase in diffuse osseous metastases. Stable right breast mass with   nipple retraction.  Nodularity of the liver may be seen with cirrhosis.      < end of copied text >          PHYSICAL EXAM:  GEN: No acute distress  LUNGS: Clear to auscultation bilaterally   HEART: Regular  ABD: Soft, non-tender, non-distended.  EXT: NC/NC/NE/2+PP/WINTER/Skin Intact.   NEURO: AAOX3

## 2023-07-21 NOTE — PROGRESS NOTE ADULT - ASSESSMENT
58 yo F PMHx metastatic breast cancer, hypothyroidism, former alcohol use and asthma who presented to the ED for decreased PO intake. Admitted for multiple electrolytes abnormalities.     # [place] metastatic Breast Cancer (ER +1 GA +1, HER2 negative,  Ki-67 < 5%)  - Diagnosed in 4/23  - Initially presented with back pain in 3/23, MRI showed evidence of metastasis in C2 vertebra, s/p internal fixation and biopsy in 4/23  - s/p RT to C spine x5 sessions, last in 5/31/2023  - CA 27.29: 55. CEA 3.7 in 4/23  - CT chest/abdomen/pelvis 4/23: multiple enlarged mediastinal LN and paratracheal LN. No visceral mets.  - MRI spine 4/23: Numerous enhancing osseous metastatic lesions are noted throughout the cervical spine most significant at the C2 and C3 level with evidence of epidural extension causing severe spinal stenosis and mass effect upon the spinal cord at C2-C3.  - MRI brain 7/16/23: no evidence of metastasis  - currently on letrozole 2.5 mg daily  - Ribocilib (600mg daily for 21 days on, 7 days off) started on 5/25 currently on hold given prolonged QTC (only   received it 3 weeks).  - CT A/P 7/19 showed New T7 and L4 compression fractures and Increase in diffuse osseous metastases  - Plan to start inpatient Ibrance 125 mg daily, 3 weeks on and 1 week off, given new ct findings of increase osseous metastasis.    Recommendations:  - Start ibrance 125 mg daily, 3 weeks on and 1 week off. Start date 7/21  - Ordered pamidronate 90 mg single dose.  - outpatient zometa  - c/w letrozole 2.5 mg daily  - Outpatient follow up with Dr. Peña upon discharge

## 2023-07-21 NOTE — PROGRESS NOTE ADULT - ATTENDING COMMENTS
Patient was seen and evaluated today.  Her LFTs are trending down.  She did have repeat imaging which are now showing worsening of her disease.    Given her medical issues I recommended we continue with letrozole and to add on Ibrance, although overall survival data was not improved the PFS benefit was the same and it is very well-tolerated and I am not sure that Michaela would tolerate side effects from another C4/6 inhibitor therefore decided to proceed with Ibrance and letrozole.    Thanks with the help of our clinical pharmacist, it was sent to [facility] and it was approved  and it was obtained and we could start it while inpatient.    I also recommended a dose of medronate 90 mg IV once because of bony metastases she can be transition to Zometa or Xgeva as outpatient there is a nonformulary in our institution.  Patient dentition was adequate she has dentures but dental examination can be performed as outpatient.

## 2023-07-21 NOTE — PROGRESS NOTE ADULT - ATTENDING COMMENTS
56 yo F with high BMI hypothyroidism asthma breast ca wth bone mets former AUD IAIN cirrhosis admitted with poor oral intake seen for rapid severe rise in transaminases. Has chronic elevation of ALP due to bone mets and on aromatase inhibitors for years. No supplements teas or antibiotic use. No hypotensive episode.     No complaints. Working with PT.  Looks well  No icterus  Abdomen soft non tender  AST > ALT  AST peaked 2756 ALT 1015  Acute hepatitis A B C negative  CK normal  Echo - no RV dysfunction    Acute hepatocellular liver injury  - improving rapidly. ? ischemic hepatitis.   No documented hypotensive episode or newly started hepatotoxic medication   Await US doppler liver.

## 2023-07-21 NOTE — PROGRESS NOTE ADULT - ASSESSMENT
57-year-old woman with a history of hypothyroidism, asthma, former alcohol use, breast cancer with diffuse bone metastasis, status post radiation 5 sessions finished 5/31/23, on letrozole daily, was on Ribociclib and was discontinued due to low magnesium with high QTc, surgical history of occiput fusion, ORIF of C2 body with functional decline and impaired ADLs/mobility, She presented to the emergency department complaining of decreased appetite and oral intake of 3 weeks duration. hepatology consulted for evaluation of elevated LFTs.    # Acute transaminitis: improved significantly DDx ischemic hepatitis 2/2 hypoperfusion event vs others: resolving  # H/O compensated alcoholic cirrhosis:   # H/O elevated ALP presumed 2/2 bony metastasis:   TBili 0.5   /      /      /   AlkP 260   /   Tptn 4.1   /   Alb 2.4    /   DBili --      07-21  - TBili 0.6   /   AST 2756   /   ALT 1015   /   AlkP 217   /   Tptn 4.7   /   Alb 2.7    /   DBili --      07-18  TBili 0.4   /      /      /   AlkP 178   /   Tptn 4.4   /   Alb 2.7    /   DBili --      07-17  TBili 0.4   /      /   ALT 99   /   AlkP 170   /   Tptn 4.4   /   Alb 2.8    /   DBili --      07-17  TBili 0.3   /   AST 12   /   ALT <5   /   AlkP 160   /   Tptn 4.6   /   Alb 2.7    /   DBili --      07-16  - medications reviewed  - US: cirrhosis, GB stones, cbd 5 mm  - medications reviewed, no documented episodes of hypotension  - CT A/P non cont 6/19: Nodularity of the liver may be seen with cirrhosis, bony metastasis and comp fractures   - normal CK level   - negative acute hepatitis panel     recommendations:  - trend LFTs  - avoid hepatotoxic  - get RUQ US with duplex  , if negative can be discharged from hepatology stand point   - supportive measures  - Follow up with our Hepatology Clinic located at [address] [place] [address]. Phone Number: [phone]

## 2023-07-21 NOTE — PROGRESS NOTE ADULT - ASSESSMENT
57-year-old woman with a history of hypothyroidism, asthma, former alcohol use, breast cancer with diffuse bone metastasis, status post radiation 5 sessions finished 5/31/23, on letrozole daily, was on Ribociclib and was discontinued due to low magnesium with high QTc, surgical history of occiput-T2 posterior instrumentation and fusion, ORIF of C2 body with functional decline and impaired ADLs/mobility, ambulates with a walker. Following with Dr. Peña.  She presented to the emergency department complaining of decreased appetite and oral intake of 3 weeks duration.     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 Gallstones Small right pleural effusion  -  CT Abdomen and Pelvis No Cont (07.19.23 @ 16:51Findings may represent mild proctitis. No bowel obstruction or free air. Nodularity of the liver may be seen with cirrhosis.  - abdominal duplex  - monitor LFT  - Discussed with hepatology team    # Thrombocytopenia- resolved    # H/o breast cancer with bone mets  # New T7 and L4 compression fractures on imaging probably sec  to bone mets  - CT abdNew T7 and L4 compression fractures, when compared to CT from April 2023. Small right and trace left pleural effusions. Increase in diffuse osseous metastases. Stable right breast mass with nipple retraction.  - oncology eval Given issues with QTc and electrolyte abnormalities suggest stopping ribociclib  - c/w letrozole    # Hypothyroidism  - c/w synthroid    # DVT prophylaxis    # DNR/DNI    # Pending: monitor LFT,  US abd duplex  # Discussed plan of care with patient  # Disposition: STR when stable

## 2023-07-21 NOTE — CHART NOTE - NSCHARTNOTEFT_GEN_A_CORE
Unable to calculate calorie count. Calorie count missing from patients room and not in patient's physical chart.     Shanthi Simons, RD  #[id number] or via TEAMS

## 2023-07-21 NOTE — PROGRESS NOTE ADULT - SUBJECTIVE AND OBJECTIVE BOX
JEAN-PAUL GIANG 57y Female  MRN#: [id number]   Hospital Day: 8d    HPI:  57-year-old woman with a history of hypothyroidism, asthma, former alcohol use, breast cancer with diffuse bone metastasis, status post radiation 5 sessions finished 5/31/23, on letrozole daily, was on Ribociclib and was discontinued due to low magnesium with high QTc, surgical history of occiput-T2 posterior instrumentation and fusion, ORIF of C2 body with functional decline and impaired ADLs/mobility, ambulates with a walker. Following with Dr. Peña.     She presented to the emergency department complaining of decreased appetite and oral intake of 3 weeks duration. She is saying that she's not hungry, no mouth pain, no dysphagia or odynophagia, no regurgitation or heart burn, no abdominal pain, no diarrhea or constipation, no change in stool consistency. This was accompanied by generalized fatigue but no fever, no chills or rigors. She admitted decreased urine output, weight loss.     She also complained of left knee pain and swelling that started 1 week ago after hitting her knee while sitting, she didn't seek medical evaluation at that time. No decreased range of motion.     Vital Signs T(F): 96.6 HR: 128 BP: 122/93 RR: 18   EKG showed sinus tachycardia   Labs were significant for severe hypokalemia, hypomagnesemia, hyponatremia, low bicarb    Given IV fluids, potassium and admitted to medicine for further evaluation and treatment        (13 Jul 2023 16:53)      SUBJECTIVE  Patient is a 57y old Female who presents with a chief complaint of Decreased oral intake with electrolyte disturbances (21 Jul 2023 12:49)  Currently admitted to medicine with the primary diagnosis of Adult failure to thrive      INTERVAL HPI AND OVERNIGHT EVENTS:  Patient was examined and seen at bedside. This morning she is resting comfortably in bed and reports no issues or overnight events.    REVIEW OF SYMPTOMS:  CONSTITUTIONAL: No weakness, fevers or chills; No headaches  EYES: No visual changes, eye pain, or discharge  ENT: No vertigo; No ear pain or change in hearing; No sore throat or difficulty swallowing  NECK: No pain or stiffness  RESPIRATORY: No cough, wheezing, or hemoptysis; No shortness of breath  CARDIOVASCULAR: No chest pain or palpitations  GASTROINTESTINAL: No abdominal or epigastric pain; No nausea, vomiting, or hematemesis; No diarrhea or constipation; No melena or hematochezia  GENITOURINARY: No dysuria, frequency or hematuria  MUSCULOSKELETAL: No joint pain, no muscle pain, no weakness  NEUROLOGICAL: No numbness or weakness  SKIN: No itching or rashes    OBJECTIVE  PAST MEDICAL & SURGICAL HISTORY  Hypothyroidism    H/O cirrhosis    Breast cancer    No significant past surgical history      ALLERGIES:  No Known Allergies    MEDICATIONS:  STANDING MEDICATIONS  calcium carbonate    500 mG (Tums) Chewable 1 Tablet(s) Chew three times a day  dextrose 5% + lactated ringers 1000 milliLiter(s) IV Continuous <Continuous>  enoxaparin Injectable 40 milliGRAM(s) SubCutaneous every 24 hours  famotidine    Tablet 20 milliGRAM(s) Oral two times a day  folic acid 1 milliGRAM(s) Oral daily  letrozole 2.5 milliGRAM(s) Oral daily  levothyroxine 50 MICROGram(s) Oral daily  loratadine 10 milliGRAM(s) Oral daily  sucralfate suspension 1 Gram(s) Oral every 6 hours  tiotropium 2.5 MICROgram(s) Inhaler 2 Puff(s) Inhalation daily    PRN MEDICATIONS  albuterol    90 MICROgram(s) HFA Inhaler 2 Puff(s) Inhalation every 6 hours PRN  ibuprofen  Tablet. 200 milliGRAM(s) Oral every 4 hours PRN  methocarbamol 1000 milliGRAM(s) Oral every 6 hours PRN      PHYSICAL EXAM:  CONSTITUTIONAL: No acute distress, well-developed, well-groomed, AAOx3  HEAD: Atraumatic, normocephalic  EYES: EOM intact, PERRLA, conjunctiva and sclera clear  ENT: Supple, no masses, no thyromegaly, no bruits, no JVD; moist mucous membranes  PULMONARY: Clear to auscultation bilaterally; no wheezes, rales, or rhonchi  CARDIOVASCULAR: Regular rate and rhythm; no murmurs, rubs, or gallops  GASTROINTESTINAL: Soft, non-tender, non-distended; bowel sounds present  MUSCULOSKELETAL: 2+ peripheral pulses; no clubbing, no cyanosis, no edema  NEUROLOGY: non-focal  SKIN: No rashes or lesions; warm and dry    VITAL SIGNS: Last 24 Hours  T(C): 36.8 (21 Jul 2023 08:00), Max: 37.2 (20 Jul 2023 15:30)  T(F): 98.3 (21 Jul 2023 08:00), Max: 98.9 (20 Jul 2023 15:30)  HR: 94 (21 Jul 2023 08:00) (94 - 109)  BP: 126/72 (21 Jul 2023 08:00) (110/73 - 126/72)  BP(mean): --  RR: 18 (21 Jul 2023 08:00) (18 - 18)  SpO2: 97% (20 Jul 2023 15:30) (97% - 97%)    LABS:                        9.7    3.04  )-----------( 155      ( 21 Jul 2023 06:51 )             30.3     07-21    141  |  112<H>  |  4<L>  ----------------------------<  116<H>  4.2   |  21  |  <0.5<L>    Ca    8.0<L>      21 Jul 2023 06:51  Phos  4.6     07-21  Mg     2.3     07-21    TPro  4.1<L>  /  Alb  2.4<L>  /  TBili  0.5  /  DBili  x   /  AST  105<H>  /  ALT  311<H>  /  AlkPhos  260<H>  07-21      Urinalysis Basic - ( 21 Jul 2023 06:51 )    Color: x / Appearance: x / SG: x / pH: x  Gluc: 116 mg/dL / Ketone: x  / Bili: x / Urobili: x   Blood: x / Protein: x / Nitrite: x   Leuk Esterase: x / RBC: x / WBC x   Sq Epi: x / Non Sq Epi: x / Bacteria: x    CARDIAC MARKERS ( 20 Jul 2023 11:20 )  x     / x     / 28 U/L / x     / x                ASSESSMENT & PLAN    56 y/o woman with PMH of hypothyroidism, asthma, former alcohol use, breast cancer with diffuse bone metastasis dx this year and follows with Dr. Peña [s/p radiation x 5 sessions which finished 5/31/23, on letrozole daily, was on Ribociclib and it was discontinued due to low magnesium with high QTc] surgical history of occiput-T2 posterior instrumentation and fusion, ORIF of C2 body with functional decline and impaired ADLs/mobility and now ambulating with a walker presented to the ED complaining of decreased appetite and oral intake of 3 weeks duration.    Starvation ketoacidosis  dehydration  hypokalemia, hypomagnesemia  hyponatremia - resolved   sinus tachycardia could be secondary to hypovolemia / dehydration - monitor   decreased oral intake possibly due to radiation esophagitis? (symptoms began after RT was completed per pt)  possible depression  debility  h/o recent prolonged QTc  metastatic breast cancer to the bone and on letrozole  hypothyroid - TSH WNL  asthma    HAGMA improving with hydration today AG normalized  - continue for now and encourage PO intake  Nutrition recs appreciated, changed IV fluid to D5 LR with 15 mM K-phos and 20 mEq Mg SO4 per liter at 75 ml/h --> 27 mM phos, 39.6 mEq K, and 36 mEq Mg in the next 24 h, in a slower more continuous dosing that is more likely to correct levels  Electrolytes repleting well  Stopped b1 as lytes stable 7/20  monitor for refeeding syndrome  monitor Phos level, goal K >4 and Mg >2  soft diet with supplements  calorie count  trial of carafate, megace already started and d/c'ed 7/20  as per previous notes the team discussed with pt re: GI eval and she is not interested in any procedures at this time (esophagram or EGD)  continue H2 blocker  PT consulted  fall precautions  repeat EKG  continue supplements for suspected vit B1 and folate deficiencies  HemOnc consult appreciated    as per hem/onc Repeat EKG 7/15, - Cardio consult for prolonged QTC on kisqali (pt was scheduled to see Dr. 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  - Plan to start Ibrance on 7/18 if QTC normal  case management informed of pt and family desire for STR on discharge  trial of mirtazapine was ordered but would hold for now until QTc stable   avoid QTc prolonging meds for now   TSH 1.46 c/w levothyroxine   Pt stated that she is eating and feeling better     #Transaminitis, improving  - >> 2405 >> 2756 >> 1463 >> 741 >> 336  - >> 674 >> 1015 >> 797 >> 652 >> 495  -Alk Phos 176 >> 171 >> 217 >> 208 >> 241 >> 262  -Acute hepatitis panel negative  -Serum acetaminophen level <5 non elevated  -US Abdomen findings consistent with cirrhosis  -LFTs BID  -Hepatology consulted, recs appreciated - Follow up with [facility] [address] [place] [address]. Phone Number: [phone]    -US abd duplex    DVT prophylaxis    DNR/DNI  very guarded prognosis    Pending: monitor LFTs, US abd duplex    Disposition: STR   JEAN-PAUL GIANG 57y Female  MRN#: [id number]   Hospital Day: 8d    HPI:  57-year-old woman with a history of hypothyroidism, asthma, former alcohol use, breast cancer with diffuse bone metastasis, status post radiation 5 sessions finished 5/31/23, on letrozole daily, was on Ribociclib and was discontinued due to low magnesium with high QTc, surgical history of occiput-T2 posterior instrumentation and fusion, ORIF of C2 body with functional decline and impaired ADLs/mobility, ambulates with a walker. Following with Dr. Peña.     She presented to the emergency department complaining of decreased appetite and oral intake of 3 weeks duration. She is saying that she's not hungry, no mouth pain, no dysphagia or odynophagia, no regurgitation or heart burn, no abdominal pain, no diarrhea or constipation, no change in stool consistency. This was accompanied by generalized fatigue but no fever, no chills or rigors. She admitted decreased urine output, weight loss.     She also complained of left knee pain and swelling that started 1 week ago after hitting her knee while sitting, she didn't seek medical evaluation at that time. No decreased range of motion.     Vital Signs T(F): 96.6 HR: 128 BP: 122/93 RR: 18   EKG showed sinus tachycardia   Labs were significant for severe hypokalemia, hypomagnesemia, hyponatremia, low bicarb    Given IV fluids, potassium and admitted to medicine for further evaluation and treatment        (13 Jul 2023 16:53)      SUBJECTIVE  Patient is a 57y old Female who presents with a chief complaint of Decreased oral intake with electrolyte disturbances (21 Jul 2023 12:49)  Currently admitted to medicine with the primary diagnosis of Adult failure to thrive      INTERVAL HPI AND OVERNIGHT EVENTS:  Patient was examined and seen at bedside. This morning she is resting comfortably in bed and reports no issues or overnight events.    REVIEW OF SYMPTOMS:  CONSTITUTIONAL: No weakness, fevers or chills; No headaches  EYES: No visual changes, eye pain, or discharge  ENT: No vertigo; No ear pain or change in hearing; No sore throat or difficulty swallowing  NECK: No pain or stiffness  RESPIRATORY: No cough, wheezing, or hemoptysis; No shortness of breath  CARDIOVASCULAR: No chest pain or palpitations  GASTROINTESTINAL: No abdominal or epigastric pain; No nausea, vomiting, or hematemesis; No diarrhea or constipation; No melena or hematochezia  GENITOURINARY: No dysuria, frequency or hematuria  MUSCULOSKELETAL: No joint pain, no muscle pain, no weakness  NEUROLOGICAL: No numbness or weakness  SKIN: No itching or rashes    OBJECTIVE  PAST MEDICAL & SURGICAL HISTORY  Hypothyroidism    H/O cirrhosis    Breast cancer    No significant past surgical history      ALLERGIES:  No Known Allergies    MEDICATIONS:  STANDING MEDICATIONS  calcium carbonate    500 mG (Tums) Chewable 1 Tablet(s) Chew three times a day  dextrose 5% + lactated ringers 1000 milliLiter(s) IV Continuous <Continuous>  enoxaparin Injectable 40 milliGRAM(s) SubCutaneous every 24 hours  famotidine    Tablet 20 milliGRAM(s) Oral two times a day  folic acid 1 milliGRAM(s) Oral daily  letrozole 2.5 milliGRAM(s) Oral daily  levothyroxine 50 MICROGram(s) Oral daily  loratadine 10 milliGRAM(s) Oral daily  sucralfate suspension 1 Gram(s) Oral every 6 hours  tiotropium 2.5 MICROgram(s) Inhaler 2 Puff(s) Inhalation daily    PRN MEDICATIONS  albuterol    90 MICROgram(s) HFA Inhaler 2 Puff(s) Inhalation every 6 hours PRN  ibuprofen  Tablet. 200 milliGRAM(s) Oral every 4 hours PRN  methocarbamol 1000 milliGRAM(s) Oral every 6 hours PRN      PHYSICAL EXAM:  CONSTITUTIONAL: No acute distress, well-developed, well-groomed, AAOx3  HEAD: Atraumatic, normocephalic  EYES: EOM intact, PERRLA, conjunctiva and sclera clear  ENT: Supple, no masses, no thyromegaly, no bruits, no JVD; moist mucous membranes  PULMONARY: Clear to auscultation bilaterally; no wheezes, rales, or rhonchi  CARDIOVASCULAR: Regular rate and rhythm; no murmurs, rubs, or gallops  GASTROINTESTINAL: Soft, non-tender, non-distended; bowel sounds present  MUSCULOSKELETAL: 2+ peripheral pulses; no clubbing, no cyanosis, no edema  NEUROLOGY: non-focal  SKIN: No rashes or lesions; warm and dry    VITAL SIGNS: Last 24 Hours  T(C): 36.8 (21 Jul 2023 08:00), Max: 37.2 (20 Jul 2023 15:30)  T(F): 98.3 (21 Jul 2023 08:00), Max: 98.9 (20 Jul 2023 15:30)  HR: 94 (21 Jul 2023 08:00) (94 - 109)  BP: 126/72 (21 Jul 2023 08:00) (110/73 - 126/72)  BP(mean): --  RR: 18 (21 Jul 2023 08:00) (18 - 18)  SpO2: 97% (20 Jul 2023 15:30) (97% - 97%)    LABS:                        9.7    3.04  )-----------( 155      ( 21 Jul 2023 06:51 )             30.3     07-21    141  |  112<H>  |  4<L>  ----------------------------<  116<H>  4.2   |  21  |  <0.5<L>    Ca    8.0<L>      21 Jul 2023 06:51  Phos  4.6     07-21  Mg     2.3     07-21    TPro  4.1<L>  /  Alb  2.4<L>  /  TBili  0.5  /  DBili  x   /  AST  105<H>  /  ALT  311<H>  /  AlkPhos  260<H>  07-21      Urinalysis Basic - ( 21 Jul 2023 06:51 )    Color: x / Appearance: x / SG: x / pH: x  Gluc: 116 mg/dL / Ketone: x  / Bili: x / Urobili: x   Blood: x / Protein: x / Nitrite: x   Leuk Esterase: x / RBC: x / WBC x   Sq Epi: x / Non Sq Epi: x / Bacteria: x    CARDIAC MARKERS ( 20 Jul 2023 11:20 )  x     / x     / 28 U/L / x     / x                ASSESSMENT & PLAN    58 y/o woman with PMH of hypothyroidism, asthma, former alcohol use, breast cancer with diffuse bone metastasis dx this year and follows with Dr. Peña [s/p radiation x 5 sessions which finished 5/31/23, on letrozole daily, was on Ribociclib and it was discontinued due to low magnesium with high QTc] surgical history of occiput-T2 posterior instrumentation and fusion, ORIF of C2 body with functional decline and impaired ADLs/mobility and now ambulating with a walker presented to the ED complaining of decreased appetite and oral intake of 3 weeks duration.    Starvation ketoacidosis  dehydration  hypokalemia, hypomagnesemia  hyponatremia - resolved   sinus tachycardia could be secondary to hypovolemia / dehydration - monitor   decreased oral intake possibly due to radiation esophagitis? (symptoms began after RT was completed per pt)  possible depression  debility  h/o recent prolonged QTc  metastatic breast cancer to the bone and on letrozole  hypothyroid - TSH WNL  asthma    HAGMA improving with hydration today AG normalized  - continue for now and encourage PO intake  Nutrition recs appreciated, changed IV fluid to D5 LR with 15 mM K-phos and 20 mEq Mg SO4 per liter at 75 ml/h --> 27 mM phos, 39.6 mEq K, and 36 mEq Mg in the next 24 h, in a slower more continuous dosing that is more likely to correct levels  Electrolytes repleting well  Stopped b1 as lytes stable 7/20  monitor for refeeding syndrome  monitor Phos level, goal K >4 and Mg >2  soft diet with supplements  calorie count  trial of carafate, megace already started and d/c'ed 7/20  as per previous notes the team discussed with pt re: GI eval and she is not interested in any procedures at this time (esophagram or EGD)  continue H2 blocker  PT consulted  fall precautions  repeat EKG  continue supplements for suspected vit B1 and folate deficiencies  HemOnc consult appreciated    as per hem/onc Repeat EKG 7/15, - Cardio consult for prolonged QTC on kisqali (pt was scheduled to see Dr. 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  - Plan to start Ibrance on 7/18 if QTC normal  case management informed of pt and family desire for STR on discharge  trial of mirtazapine was ordered but would hold for now until QTc stable   avoid QTc prolonging meds for now   TSH 1.46 c/w levothyroxine   Pt stated that she is eating and feeling better     #Transaminitis, improving  - >> 2405 >> 2756 >> 1463 >> 741 >> 336  - >> 674 >> 1015 >> 797 >> 652 >> 495  -Alk Phos 176 >> 171 >> 217 >> 208 >> 241 >> 262  -Acute hepatitis panel negative  -Serum acetaminophen level <5 non elevated  -US Abdomen findings consistent with cirrhosis  -LFTs BID  -Hepatology consulted, recs appreciated - Follow up with [facility] [address] [place] [address]. Phone Number: [phone]    -US abd duplex: follow up    DVT prophylaxis    DNR/DNI  very guarded prognosis    Pending: monitor LFTs, US abd duplex    Disposition: STR

## 2023-07-21 NOTE — PROGRESS NOTE ADULT - SUBJECTIVE AND OBJECTIVE BOX
Patient is a 57y old  Female who presents with a chief complaint of Decreased oral intake with electrolyte disturbances (19 Jul 2023 10:20)    Patient was seen and examined.  Appetite improving  Denies any new complaints.  All systems reviewed positive history as mentioned above.    PAST MEDICAL & SURGICAL HISTORY:  Hypothyroidism  H/O cirrhosis  Breast cancer    No significant past surgical history    Allergies  No Known Allergies  Intolerances  hydromorphone (Faint; Nausea)    MEDICATIONS  (STANDING):  calcium carbonate    500 mG (Tums) Chewable 1 Tablet(s) Chew three times a day  dextrose 5% + lactated ringers 1000 milliLiter(s) (75 mL/Hr) IV Continuous <Continuous>  enoxaparin Injectable 40 milliGRAM(s) SubCutaneous every 24 hours  famotidine    Tablet 20 milliGRAM(s) Oral two times a day  folic acid 1 milliGRAM(s) Oral daily  letrozole 2.5 milliGRAM(s) Oral daily  levothyroxine 50 MICROGram(s) Oral daily  loratadine 10 milliGRAM(s) Oral daily  sucralfate suspension 1 Gram(s) Oral every 6 hours  tiotropium 2.5 MICROgram(s) Inhaler 2 Puff(s) Inhalation daily    MEDICATIONS  (PRN):  albuterol    90 MICROgram(s) HFA Inhaler 2 Puff(s) Inhalation every 6 hours PRN for shortness of breath and/or wheezing  ibuprofen  Tablet. 200 milliGRAM(s) Oral every 4 hours PRN Mild Pain (1 - 3), Moderate Pain (4 - 6)  methocarbamol 1000 milliGRAM(s) Oral every 6 hours PRN Muscle Spasm    T(C): 36.8 (07-21-23 @ 08:00), Max: 37.2 (07-20-23 @ 15:30)  HR: 94 (07-21-23 @ 08:00) (94 - 109)  BP: 126/72 (07-21-23 @ 08:00) (110/73 - 126/72)  RR: 18 (07-21-23 @ 08:00) (18 - 18)  SpO2: 97% (07-20-23 @ 15:30) (97% - 97%)    O/E:  Awake, alert, not in distress.  HEENT: atraumatic, EOMI.  Chest: decreased breath sounds at bases  CVS: SIS2 +, no murmur.  P/A: Soft, BS+  CNS: awake, alert  Ext: no edema feet.  All systems reviewed positive findings as above.                              9.7<L>  3.04<L> )-----------( 155      ( 21 Jul 2023 06:51 )             30.3<L>                        9.8<L>  4.67<L> )-----------( 139      ( 20 Jul 2023 06:58 )             29.8<L>  07-21    141  |  112<H>  |  4<L>  ----------------------------<  116<H>  4.2   |  21  |  <0.5<L>  07-20    140  |  108  |  4<L>  ----------------------------<  106<H>  4.2   |  21  |  <0.5<L>    Ca    8.0<L>      21 Jul 2023 06:51  Ca    7.8<L>      20 Jul 2023 11:20  Ca    7.7<L>      20 Jul 2023 06:58  Ca    7.9<L>      19 Jul 2023 19:05  Phos  4.6     07-21  Mg     2.3     07-21    TPro  4.1<L>  /  Alb  2.4<L>  /  TBili  0.5  /  DBili  x   /  AST  105<H>  /  ALT  311<H>  /  AlkPhos  260<H>  07-21  TPro  4.4<L>  /  Alb  2.5<L>  /  TBili  0.6  /  DBili  x   /  AST  294<H>  /  ALT  479<H>  /  AlkPhos  284<H>  07-20  TPro  4.2<L>  /  Alb  2.4<L>  /  TBili  0.6  /  DBili  x   /  AST  336<H>  /  ALT  495<H>  /  AlkPhos  262<H>  07-20  TPro  4.4<L>  /  Alb  2.4<L>  /  TBili  0.5  /  DBili  x   /  AST  741<H>  /  ALT  652<H>  /  AlkPhos  241<H>  07-19

## 2023-07-22 NOTE — DISCHARGE NOTE PROVIDER - NSDCFUSCHEDAPPT_GEN_ALL_CORE_FT
Onur Gupta [facility] PreAdmits  Scheduled Appointment: 07/27/2023    [facility]  [address]  Scheduled Appointment: 07/27/2023

## 2023-07-22 NOTE — DISCHARGE NOTE PROVIDER - ATTENDING DISCHARGE PHYSICAL EXAMINATION:
GENERAL: NAD, chronically ill appearing   CHEST/LUNG: Clear to auscultation bilaterally; No rales, rhonchi, wheezing, or rubs. Unlabored respirations  HEART: Regular rate and rhythm; No murmurs, rubs, or gallops  ABDOMEN: Bowel sounds present; Soft, Nontender, Nondistended.  EXTREMITIES:  2+ Peripheral Pulses, brisk capillary refill. No clubbing, cyanosis, or edema  NERVOUS SYSTEM:  Alert & Oriented X3, speech clear. No deficits   MSK: FROM all 4 extremities, full and equal strength  SKIN: No rashes or lesions

## 2023-07-22 NOTE — PROGRESS NOTE ADULT - SUBJECTIVE AND OBJECTIVE BOX
Patient is a 57y old  Female who presents with a chief complaint of Decreased oral intake with electrolyte disturbances (19 Jul 2023 10:20)    Patient was seen and examined.  no new complaints    PAST MEDICAL & SURGICAL HISTORY:  Hypothyroidism  H/O cirrhosis  Breast cancer    No significant past surgical history    Allergies  No Known Allergies  Intolerances  hydromorphone (Faint; Nausea)    MEDICATIONS  (STANDING):  calcium carbonate    500 mG (Tums) Chewable 1 Tablet(s) Chew three times a day  dextrose 5% + lactated ringers 1000 milliLiter(s) (75 mL/Hr) IV Continuous <Continuous>  enoxaparin Injectable 40 milliGRAM(s) SubCutaneous every 24 hours  famotidine    Tablet 20 milliGRAM(s) Oral two times a day  folic acid 1 milliGRAM(s) Oral daily  letrozole 2.5 milliGRAM(s) Oral daily  levothyroxine 50 MICROGram(s) Oral daily  loratadine 10 milliGRAM(s) Oral daily  palbociclib 125 milliGRAM(s) Oral daily  sucralfate suspension 1 Gram(s) Oral every 6 hours  tiotropium 2.5 MICROgram(s) Inhaler 2 Puff(s) Inhalation daily    MEDICATIONS  (PRN):  albuterol    90 MICROgram(s) HFA Inhaler 2 Puff(s) Inhalation every 6 hours PRN for shortness of breath and/or wheezing  ibuprofen  Tablet. 200 milliGRAM(s) Oral every 4 hours PRN Mild Pain (1 - 3), Moderate Pain (4 - 6)  methocarbamol 1000 milliGRAM(s) Oral every 6 hours PRN Muscle Spasm    T(C): 36.7 (07-22-23 @ 00:00), Max: 36.9 (07-21-23 @ 17:02)  HR: 100 (07-22-23 @ 00:00) (98 - 100)  BP: 118/82 (07-22-23 @ 00:00) (118/82 - 131/78)  RR: 18 (07-22-23 @ 00:00) (18 - 18)  SpO2: 97% (07-22-23 @ 00:00) (97% - 99%)    O/E:  Awake, alert, not in distress.  HEENT: atraumatic, EOMI.  Chest: decreased breath sounds at bases  CVS: SIS2 +, no murmur.  P/A: Soft, BS+  CNS: awake, alert  Ext: no edema feet.  All systems reviewed positive findings as above.                               10.0<L>  3.73<L> )-----------( 195      ( 22 Jul 2023 07:46 )             30.8<L>                        9.7<L>  3.04<L> )-----------( 155      ( 21 Jul 2023 06:51 )             30.3<L>  07-22    138  |  110  |  4<L>  ----------------------------<  109<H>  4.2   |  18  |  <0.5<L>  07-21    141  |  112<H>  |  4<L>  ----------------------------<  116<H>  4.2   |  21  |  <0.5<L>    Ca    7.9<L>      22 Jul 2023 07:46  Ca    8.0<L>      21 Jul 2023 06:51  Phos  4.3     07-22  Mg     2.1     07-22    TPro  4.3<L>  /  Alb  2.5<L>  /  TBili  0.5  /  DBili  x   /  AST  47<H>  /  ALT  208<H>  /  AlkPhos  242<H>  07-22  TPro  4.1<L>  /  Alb  2.4<L>  /  TBili  0.5  /  DBili  x   /  AST  105<H>  /  ALT  311<H>  /  AlkPhos  260<H>  07-21

## 2023-07-22 NOTE — DISCHARGE NOTE PROVIDER - CARE PROVIDER_API CALL
Scott Suarez .  Internal Medicine  [address] [place] [address]  Phone: [phone]  Fax: [phone]  Follow Up Time: 1 month    Kamran Peña  Medical Oncology  [address] [place] [address]  Phone: [phone]  Fax: [phone]  Follow Up Time: 2 weeks   Scott Suarez   Internal Medicine  [address] [place] [address]  Phone: [phone]  Fax: [phone]  Follow Up Time: 1 month    Kamran Peña  Medical Oncology  [address] [place] [address]  Phone: [phone]  Fax: [phone]  Follow Up Time: 2 weeks    Kerrie Rush  Internal Medicine  [address] [place] [address]  Phone: [phone]  Fax: [phone]  Follow Up Time: 1 week

## 2023-07-22 NOTE — DISCHARGE NOTE PROVIDER - HOSPITAL COURSE
57-year-old woman with a history of hypothyroidism, asthma, former alcohol use, breast cancer with diffuse bone metastasis, status post radiation 5 sessions finished 5/31/23, on letrozole daily, was on Ribociclib and was discontinued due to low magnesium with high QTc, surgical history of occiput-T2 posterior instrumentation and fusion, ORIF of C2 body with functional decline and impaired ADLs/mobility, ambulates with a walker. Following with Dr. Peña.     She presented to the emergency department complaining of decreased appetite and oral intake of 3 weeks duration. She is saying that she's not hungry, no mouth pain, no dysphagia or odynophagia, no regurgitation or heart burn, no abdominal pain, no diarrhea or constipation, no change in stool consistency. This was accompanied by generalized fatigue but no fever, no chills or rigors.  She was admitted to med floor, starvation ketoacidosis- improved dehydration: improved hypokalemia, hypomagnesemia- corrected hyponatremia - resolved. Decreased oral intake possibly due to radiation esophagitis? (symptoms began after RT was completed per pt), now patient have apetite, feeding soft and bite sized with supplemental feeding with unsure. At discharge, patient is stable, orally feeding,no agitation, well oriented to time, place and person.      HAGMA improving with hydration today AG normalized   Nutrition recs appreciated  Electrolytes repleting well  soft diet with supplements  calorie count done  trial of carafate, megace already started and d/c'ed 7/20  as per previous notes the team discussed with pt re: GI eval and she is not interested in any procedures at this time (esophagram or EGD)  continue H2 blocker  PT consulted  fall precautions  continue supplements for suspected vit B1 and folate deficiencies  HemOnc consult appreciated and advice followed.  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  - started Ibrance    - TSH 1.46 c/w levothyroxine   - Pt stated that she is eating and feeling better     #Transaminitis, probably ischemic   -ALT: 1045>>>>311  -AST: 787 >> 2405 >> 2756 >> 1463 > 495>>105  -Alk Phos 176 >> 171 >> 217 >> 208 >> 241 >> 262  -Acute hepatitis panel negative  -Serum acetaminophen level <5 non elevated  -US Abdomen findings consistent with cirrhosis  -Hepatology consulted, recs appreciated  -US abd duplex:    - [facility] [address]. Phone Number: [phone]    -    # metastatic breast cancer to the bone   -hematology consult appreciated.  - cardio consulted for long qt interval, advice followed  - letrozole: hypothyroid - TSH WNL, levo 50mcg daily  - pamidronate 90mg single dose given  - Started ibrance 125 mg daily, 3 weeks on and 1 week off. Start date 7/21  - outpatient zometa   - c/w letrozole 2.5 mg daily  - Outpatient follow up with Dr. Peña upon discharge    #asthma  - no symptoms, under albuterol      57-year-old woman with a history of hypothyroidism, asthma, former alcohol use, breast cancer with diffuse bone metastasis, status post radiation 5 sessions finished 5/31/23, on letrozole daily, was on Ribociclib and was discontinued due to low magnesium with high QTc, surgical history of occiput-T2 posterior instrumentation and fusion, ORIF of C2 body with functional decline and impaired ADLs/mobility, ambulates with a walker. Following with Dr. Peña.     She presented to the emergency department complaining of decreased appetite and oral intake of 3 weeks duration. She is saying that she's not hungry, no mouth pain, no dysphagia or odynophagia, no regurgitation or heart burn, no abdominal pain, no diarrhea or constipation, no change in stool consistency. This was accompanied by generalized fatigue but no fever, no chills or rigors.  She was admitted to med floor, starvation ketoacidosis- improved dehydration: improved hypokalemia, hypomagnesemia- corrected hyponatremia - resolved. Decreased oral intake possibly due to radiation esophagitis? (symptoms began after RT was completed per pt), now patient have apetite, feeding soft and bite sized with supplemental feeding with unsure. At discharge, patient is stable, orally feeding,no agitation, well oriented to time, place and person.      HAGMA improving with hydration today AG normalized   Nutrition recs appreciated  Electrolytes repleting well  soft diet with supplements  calorie count done  trial of carafate, megace already started and d/c'ed 7/20  as per previous notes the team discussed with pt re: GI eval and she is not interested in any procedures at this time (esophagram or EGD)  continue H2 blocker  PT consulted  fall precautions  continue supplements for suspected vit B1 and folate deficiencies  HemOnc consult appreciated and advice followed.  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  - started Ibrance    - TSH 1.46 c/w levothyroxine   - Pt stated that she is eating and feeling better     #Transaminitis, probably ischemic   -ALT: 1045>>>>311>>> 208  -AST: 787 >> 2405 >> 2756 >> 1463 > 495>>105>>47  -Alk Phos 176 >> 171 >> 217 >> 208 >> 241 >> 262  -Acute hepatitis panel negative  -Serum acetaminophen level <5 non elevated  -US Abdomen findings consistent with cirrhosis  -Hepatology consulted, recs appreciated  -US abd duplex:    - Follow up with Hepatology Clinic [address] [place] [address]. Phone Number: [phone]    -    # metastatic breast cancer to the bone   -hematology consult appreciated.  - cardio consulted for long qt interval, advice followed  - letrozole: hypothyroid - TSH WNL, levo 50mcg daily  - pamidronate 90mg single dose given  - Started ibrance 125 mg daily, 3 weeks on and 1 week off. Start date 7/21  - outpatient zometa   - c/w letrozole 2.5 mg daily  - Outpatient follow up with Dr. Peña upon discharge    #asthma  - no symptoms, under albuterol      57-year-old woman with a history of hypothyroidism, asthma, former alcohol use, breast cancer with diffuse bone metastasis, status post radiation 5 sessions finished 5/31/23, on letrozole daily, was on Ribociclib and was discontinued due to low magnesium with high QTc, surgical history of occiput-T2 posterior instrumentation and fusion, ORIF of C2 body with functional decline and impaired ADLs/mobility, ambulates with a walker. Following with Dr. Peña.     She presented to the emergency department complaining of decreased appetite and oral intake of 3 weeks duration. She is saying that she's not hungry, no mouth pain, no dysphagia or odynophagia, no regurgitation or heart burn, no abdominal pain, no diarrhea or constipation, no change in stool consistency. This was accompanied by generalized fatigue but no fever, no chills or rigors.  She was admitted to med floor, starvation ketoacidosis- improved dehydration: improved hypokalemia, hypomagnesemia- corrected hyponatremia - resolved. Decreased oral intake possibly due to radiation esophagitis? (symptoms began after RT was completed per pt), now patient have apetite, feeding soft and bite sized with supplemental feeding with unsure. At discharge, patient is stable, orally feeding,no agitation, well oriented to time, place and person.      HAGMA improving with hydration today AG normalized   Nutrition recs appreciated  Electrolytes repleting well  soft diet with supplements  calorie count done  trial of carafate, megace already started and d/c'ed 7/20  as per previous notes the team discussed with pt re: GI eval and she is not interested in any procedures at this time (esophagram or EGD)  continue H2 blocker  PT consulted  fall precautions  continue supplements for suspected vit B1 and folate deficiencies  HemOnc consult appreciated and advice followed.  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  - started Ibrance    - TSH 1.46 c/w levothyroxine   - Pt stated that she is eating and feeling better     #Transaminitis, probably ischemic   -ALT: 1045>>>>311>>> 208  -AST: 787 >> 2405 >> 2756 >> 1463 > 495>>105>>47  -Alk Phos 176 >> 171 >> 217 >> 208 >> 241 >> 262  -Acute hepatitis panel negative  -Serum acetaminophen level <5 non elevated  -US Abdomen findings consistent with cirrhosis  -Hepatology consulted, recs appreciated  -US abd duplex:    - Follow up with [facility]atology Clinic [address] [place] [address]. Phone Number: [phone]      # metastatic breast cancer to the bone   -hematology consult appreciated.  - cardio consulted for long qt interval, advice followed  - letrozole: hypothyroid - TSH WNL, levo 50mcg daily  - pamidronate 90mg single dose given  - Started ibrance 125 mg daily, 3 weeks on and 1 week off. Start date 7/21  - outpatient zometa   - c/w letrozole 2.5 mg daily  - Outpatient follow up with Dr. Peña upon discharge    #asthma  - no symptoms, under albuterol      57-year-old woman with a history of hypothyroidism, asthma, former alcohol use, breast cancer with diffuse bone metastasis, status post radiation 5 sessions finished 5/31/23, on letrozole daily, was on Ribociclib and was discontinued due to low magnesium with high QTc, surgical history of occiput-T2 posterior instrumentation and fusion, ORIF of C2 body with functional decline and impaired ADLs/mobility, ambulates with a walker. Following with Dr. Peña.     She presented to the emergency department complaining of decreased appetite and oral intake of 3 weeks duration. She is saying that she's not hungry, no mouth pain, no dysphagia or odynophagia, no regurgitation or heart burn, no abdominal pain, no diarrhea or constipation, no change in stool consistency. This was accompanied by generalized fatigue but no fever, no chills or rigors.  She was admitted to med floor, starvation ketoacidosis- improved dehydration: improved hypokalemia, hypomagnesemia- corrected hyponatremia - resolved. Decreased oral intake possibly due to radiation esophagitis. (symptoms began after RT was completed per pt). On the floor patient was trialed on marinol which did not work for her. Started on megace and dexa. Now feeding soft and bite sized with supplemental feeding with unsure. Nutrition and dietitan were consulted. Calorie count was done. Patient is stable for discharge.    Patient has history of metastatic breast cancer to the bone. Heme/onc following patient during her stay here, medications received per heme/onc recs. Outpatient follow up with Dr. Peña upon discharge.    Patient found to have transaminitis. Hepatology consulted and wanted patient to follow up outpatient. Follow up with Hepatology Clinic [address] [place] [address]. Phone Number: [phone]

## 2023-07-22 NOTE — PROGRESS NOTE ADULT - ASSESSMENT
57-year-old woman with a history of hypothyroidism, asthma, former alcohol use, breast cancer with diffuse bone metastasis, status post radiation 5 sessions finished 5/31/23, on letrozole daily, was on Ribociclib and was discontinued due to low magnesium with high QTc, surgical history of occiput-T2 posterior instrumentation and fusion, ORIF of C2 body with functional decline and impaired ADLs/mobility, ambulates with a walker. Following with Dr. Peña.  She presented to the emergency department complaining of decreased appetite and oral intake of 3 weeks duration.     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 Gallstones Small right pleural effusion  -  CT Abdomen and Pelvis No Cont (07.19.23 @ 16:51Findings may represent mild proctitis. No bowel obstruction or free air. Nodularity of the liver may be seen with cirrhosis.  - F/u abdominal duplex  - monitor LFT  - Discussed with hepatology team    # Thrombocytopenia- resolved    # H/o breast cancer with bone mets  # New T7 and L4 compression fractures on imaging probably sec  to bone mets  - CT abdNew T7 and L4 compression fractures, when compared to CT from April 2023. Small right and trace left pleural effusions. Increase in diffuse osseous metastases. Stable right breast mass with nipple retraction.  - oncology eval Given issues with QTc and electrolyte abnormalities suggest stopping ribociclib  - c/w letrozole  - S/p  medronate 90 mg IV once on 7/21  - oncology F/u: continue with letrozole and to add on Ibrance. Also recommend  medronate 90 mg IV once because of bony metastases she can be transition to Zometa or Xgeva as outpatient there is a nonformulary in our institution.     # Hypothyroidism  - c/w synthroid    # DVT prophylaxis    # DNR/DNI    # Pending: monitor LFT,  US abd duplex, awaiting auth  # Discussed plan of care with patient  # Disposition: STR

## 2023-07-22 NOTE — DISCHARGE NOTE PROVIDER - NSDCCPCAREPLAN_GEN_ALL_CORE_FT
PRINCIPAL DISCHARGE DIAGNOSIS  Diagnosis: Adult failure to thrive  Assessment and Plan of Treatment: You were evaluated in the hospital for your decrease apetite and functional mobility. You were admitted to med floor, electrolytes were corrected, feeding was initiated with consult from dietician. You had increased liver enzymes during your stay which is trending down.   After discharge, you will need to:   - Follow up with your primary care doctor within 1-2 weeks  - Follow up with hematology/oncology for your metastatic cancer.  - Follow up with hepatology clinic [address] [place] [address].  - Take all the medications you were discharged with, unless otherwise instructed by your healthcare provider(s).   Please follow up with your providers by calling them to make an appointment so that you can see them in 1-2weeks; bring your paperwork from this hospital stay to that visit. You can access your visit information by signing up for an account for the patient portal at [email or web address]   Seek immediate medical attention if you develop fevers, chills, chest pain, shortness of breath, nausea and vomiting, abdominal pain, passing out, weakness or numbness or tingling on one side of your body, or any other concerning signs or symptoms.        SECONDARY DISCHARGE DIAGNOSES  Diagnosis: Starvation ketoacidosis  Assessment and Plan of Treatment:     Diagnosis: Hyponatremia  Assessment and Plan of Treatment:     Diagnosis: Hypokalemia  Assessment and Plan of Treatment:     Diagnosis: Hypomagnesemia  Assessment and Plan of Treatment:     Diagnosis: Elevated serum hCG  Assessment and Plan of Treatment:      PRINCIPAL DISCHARGE DIAGNOSIS  Diagnosis: Adult failure to thrive  Assessment and Plan of Treatment: You were evaluated in the hospital for your decreased appetite and functional mobility. You were admitted to med floor, electrolytes were corrected, feeding was initiated with consult from dietician. You had increased liver enzymes during your stay which is trending down. You were started on medications that stimulated your appetite. You are not experiencing malnutrition symptoms and you are now stable for discharge.  After discharge, you will need to:   - Follow up with your primary care doctor within 1-2 weeks  - Follow up with hematology/oncology for your metastatic cancer.  - Follow up with hepatology clinic [address] [place] [address].  - Take all the medications you were discharged with, unless otherwise instructed by your healthcare provider(s).   Please follow up with your providers by calling them to make an appointment so that you can see them in 1-2weeks; bring your paperwork from this hospital stay to that visit. You can access your visit information by signing up for an account for the patient portal at [email or web address]   Seek immediate medical attention if you develop fevers, chills, chest pain, shortness of breath, nausea and vomiting, abdominal pain, passing out, weakness or numbness or tingling on one side of your body, or any other concerning signs or symptoms.        SECONDARY DISCHARGE DIAGNOSES  Diagnosis: Starvation ketoacidosis  Assessment and Plan of Treatment:     Diagnosis: Hyponatremia  Assessment and Plan of Treatment:     Diagnosis: Hypokalemia  Assessment and Plan of Treatment:     Diagnosis: Hypomagnesemia  Assessment and Plan of Treatment:     Diagnosis: Elevated serum hCG  Assessment and Plan of Treatment:      PRINCIPAL DISCHARGE DIAGNOSIS  Diagnosis: Adult failure to thrive  Assessment and Plan of Treatment: You were evaluated in the hospital for your decreased appetite and functional mobility. You were admitted to med floor, electrolytes were corrected, feeding was initiated with consult from dietician. You had increased liver enzymes during your stay which is trending down. You were started on medications that stimulated your appetite. You are not experiencing malnutrition symptoms and you are now stable for discharge.  After discharge, you will need to:   - Follow up with your primary care doctor within 1 week to check your electrolyte levels  - Follow up with hematology/oncology for your metastatic cancer.  - Follow up with hepatology clinic [address] [place] [address].  - Take all the medications you were discharged with, unless otherwise instructed by your healthcare provider(s).   Please follow up with your providers by calling them to make an appointment so that you can see them in 1-2weeks; bring your paperwork from this hospital stay to that visit. You can access your visit information by signing up for an account for the patient portal at [email or web address]   Seek immediate medical attention if you develop fevers, chills, chest pain, shortness of breath, nausea and vomiting, abdominal pain, passing out, weakness or numbness or tingling on one side of your body, or any other concerning signs or symptoms.        SECONDARY DISCHARGE DIAGNOSES  Diagnosis: Starvation ketoacidosis  Assessment and Plan of Treatment:     Diagnosis: Hyponatremia  Assessment and Plan of Treatment:     Diagnosis: Hypokalemia  Assessment and Plan of Treatment:     Diagnosis: Hypomagnesemia  Assessment and Plan of Treatment:     Diagnosis: Elevated serum hCG  Assessment and Plan of Treatment:

## 2023-07-22 NOTE — DISCHARGE NOTE PROVIDER - PROVIDER TOKENS
PROVIDER:[TOKEN:[[id number]],FOLLOWUP:[1 month]],PROVIDER:[TOKEN:[[id number]],FOLLOWUP:[2 weeks]] PROVIDER:[TOKEN:[[id number]],FOLLOWUP:[1 month]],PROVIDER:[TOKEN:[[id number]],FOLLOWUP:[2 weeks]],PROVIDER:[TOKEN:[[id number]],FOLLOWUP:[1 week]]

## 2023-07-22 NOTE — DISCHARGE NOTE PROVIDER - NSDCMRMEDTOKEN_GEN_ALL_CORE_FT
albuterol 90 mcg/inh inhalation aerosol: 2 puff(s) inhaled every 6 hours as needed for  shortness of breath and/or wheezing  famotidine 20 mg oral tablet: 1 tab(s) orally every 12 hours  letrozole 2.5 mg oral tablet: 1 tab(s) orally once a day  levothyroxine 50 mcg (0.05 mg) oral tablet: 1 tab(s) orally once a day (in the morning) take one hour before breakfast and other medications  loratadine 10 mg oral tablet: 1 tab(s) orally once a day (at bedtime)  methocarbamol 500 mg oral tablet: 2 tab(s) orally every 6 hours (4 times a day)  oxyCODONE 5 mg oral tablet: 1 tab(s) orally every 6 hours as needed for  severe pain  tiotropium 2.5 mcg/inh inhalation aerosol: 2 puff(s) inhaled once a day   albuterol 90 mcg/inh inhalation aerosol: 2 puff(s) inhaled every 6 hours as needed for  shortness of breath and/or wheezing  calcium carbonate 500 mg (200 mg elemental calcium) oral tablet, chewable: 1 tab(s) orally 3 times a day  famotidine 20 mg oral tablet: 1 tab(s) orally every 12 hours  folic acid 1 mg oral tablet: 1 tab(s) orally once a day  letrozole 2.5 mg oral tablet: 1 tab(s) orally once a day  levothyroxine 50 mcg (0.05 mg) oral tablet: 1 tab(s) orally once a day (in the morning) take one hour before breakfast and other medications  loratadine 10 mg oral tablet: 1 tab(s) orally once a day (at bedtime)  methocarbamol 500 mg oral tablet: 2 tab(s) orally every 6 hours (4 times a day)  oxyCODONE 5 mg oral tablet: 1 tab(s) orally every 6 hours as needed for  severe pain  palbociclib 125 mg oral capsule: 1 cap(s) orally once a day  tiotropium 2.5 mcg/inh inhalation aerosol: 2 puff(s) inhaled once a day   albuterol 90 mcg/inh inhalation aerosol: 2 puff(s) inhaled every 6 hours as needed for  shortness of breath and/or wheezing  benzonatate 100 mg oral capsule: 1 cap(s) orally 3 times a day As needed Cough  calcium carbonate 500 mg (200 mg elemental calcium) oral tablet, chewable: 1 tab(s) orally 3 times a day  droNABinol 2.5 mg oral capsule: 1 cap(s) orally 3 times a day (before meals)  famotidine 20 mg oral tablet: 1 tab(s) orally every 12 hours  folic acid 1 mg oral tablet: 1 tab(s) orally once a day  letrozole 2.5 mg oral tablet: 1 tab(s) orally once a day  levothyroxine 50 mcg (0.05 mg) oral tablet: 1 tab(s) orally once a day (in the morning) take one hour before breakfast and other medications  loratadine 10 mg oral tablet: 1 tab(s) orally once a day (at bedtime)  melatonin 5 mg oral tablet: 1 tab(s) orally once a day (at bedtime)  methocarbamol 500 mg oral tablet: 2 tab(s) orally 2 times a day as needed for  muscle spasm  palbociclib 125 mg oral capsule: 1 cap(s) orally once a day  tiotropium 2.5 mcg/inh inhalation aerosol: 2 puff(s) inhaled once a day   albuterol 90 mcg/inh inhalation aerosol: 2 puff(s) inhaled every 6 hours as needed for  shortness of breath and/or wheezing  benzonatate 100 mg oral capsule: 1 cap(s) orally 3 times a day As needed Cough  droNABinol 2.5 mg oral capsule: 1 cap(s) orally 3 times a day (before meals)  famotidine 20 mg oral tablet: 1 tab(s) orally every 12 hours  folic acid 1 mg oral tablet: 1 tab(s) orally once a day  letrozole 2.5 mg oral tablet: 1 tab(s) orally once a day  levothyroxine 50 mcg (0.05 mg) oral tablet: 1 tab(s) orally once a day (in the morning) take one hour before breakfast and other medications  loratadine 10 mg oral tablet: 1 tab(s) orally once a day (at bedtime)  melatonin 5 mg oral tablet: 1 tab(s) orally once a day (at bedtime)  methocarbamol 500 mg oral tablet: 2 tab(s) orally 2 times a day as needed for  muscle spasm  palbociclib 125 mg oral capsule: 1 cap(s) orally once a day  tiotropium 2.5 mcg/inh inhalation aerosol: 2 puff(s) inhaled once a day   Advil 200 mg oral tablet: 1 tab(s) orally every 6 hours as needed for  mild pain  albuterol 90 mcg/inh inhalation aerosol: 2 puff(s) inhaled every 6 hours as needed for  shortness of breath and/or wheezing  benzonatate 100 mg oral capsule: 1 cap(s) orally 3 times a day As needed Cough  Carafate 1 g/10 mL oral suspension: 1 gram(s) orally 4 times a day  dexAMETHasone 4 mg oral tablet: 1 tab(s) orally once a day For 7 days, stop on 8/15  famotidine 20 mg oral tablet: 1 tab(s) orally every 12 hours  folic acid 1 mg oral tablet: 1 tab(s) orally once a day  letrozole 2.5 mg oral tablet: 1 tab(s) orally once a day  levothyroxine 50 mcg (0.05 mg) oral tablet: 1 tab(s) orally once a day (in the morning) take one hour before breakfast and other medications  loratadine 10 mg oral tablet: 1 tab(s) orally once a day (at bedtime)  megestrol 40 mg/mL oral suspension: 10 milliliter(s) orally once a day  melatonin 5 mg oral tablet: 1 tab(s) orally once a day (at bedtime)  methocarbamol 500 mg oral tablet: 2 tab(s) orally 2 times a day as needed for  muscle spasm  palbociclib 125 mg oral capsule: 1 cap(s) orally once a day  tiotropium 2.5 mcg/inh inhalation aerosol: 2 puff(s) inhaled once a day   Advil 200 mg oral tablet: 1 tab(s) orally every 6 hours as needed for  mild pain  albuterol 90 mcg/inh inhalation aerosol: 2 puff(s) inhaled every 6 hours as needed for  shortness of breath and/or wheezing  benzonatate 100 mg oral capsule: 1 cap(s) orally 3 times a day As needed Cough  Carafate 1 g/10 mL oral suspension: 1 gram(s) orally 4 times a day  dexAMETHasone 4 mg oral tablet: 1 tab(s) orally once a day For 7 days, stop on 8/15  famotidine 20 mg oral tablet: 1 tab(s) orally every 12 hours  folic acid 1 mg oral tablet: 1 tab(s) orally once a day  letrozole 2.5 mg oral tablet: 1 tab(s) orally once a day  levothyroxine 50 mcg (0.05 mg) oral tablet: 1 tab(s) orally once a day (in the morning) take one hour before breakfast and other medications  loratadine 10 mg oral tablet: 1 tab(s) orally once a day (at bedtime)  magnesium oxide 400 mg oral tablet: 1 tab(s) orally once a day  megestrol 40 mg/mL oral suspension: 10 milliliter(s) orally once a day  melatonin 5 mg oral tablet: 1 tab(s) orally once a day (at bedtime)  methocarbamol 500 mg oral tablet: 2 tab(s) orally 2 times a day as needed for  muscle spasm  palbociclib 125 mg oral capsule: 1 cap(s) orally once a day  potassium chloride 20 mEq oral tablet, extended release: 1 tab(s) orally once a day  tiotropium 2.5 mcg/inh inhalation aerosol: 2 puff(s) inhaled once a day   Advil 200 mg oral tablet: 1 tab(s) orally every 6 hours as needed for  mild pain  albuterol 90 mcg/inh inhalation aerosol: 2 puff(s) inhaled every 6 hours as needed for  shortness of breath and/or wheezing  benzonatate 100 mg oral capsule: 1 cap(s) orally 3 times a day As needed Cough  Carafate 1 g/10 mL oral suspension: 1 gram(s) orally 4 times a day  dexAMETHasone 4 mg oral tablet: 1 tab(s) orally once a day For 7 days, stop on 8/15  famotidine 20 mg oral tablet: 1 tab(s) orally every 12 hours  folic acid 1 mg oral tablet: 1 tab(s) orally once a day  letrozole 2.5 mg oral tablet: 1 tab(s) orally once a day  levothyroxine 50 mcg (0.05 mg) oral tablet: 1 tab(s) orally once a day (in the morning) take one hour before breakfast and other medications  loratadine 10 mg oral tablet: 1 tab(s) orally once a day (at bedtime)  magnesium oxide 400 mg oral tablet: 1 tab(s) orally once a day  megestrol 40 mg/mL oral suspension: 10 milliliter(s) orally 2 times a day  melatonin 5 mg oral tablet: 1 tab(s) orally once a day (at bedtime)  methocarbamol 500 mg oral tablet: 2 tab(s) orally 2 times a day as needed for  muscle spasm  palbociclib 125 mg oral capsule: 1 cap(s) orally once a day  potassium chloride 20 mEq oral tablet, extended release: 1 tab(s) orally once a day  potassium chloride 20 mEq oral tablet, extended release: 2 tab(s) orally once a day for 7 days and then recheck potassium levels  tiotropium 2.5 mcg/inh inhalation aerosol: 2 puff(s) inhaled once a day   Advil 200 mg oral tablet: 1 tab(s) orally every 6 hours as needed for  mild pain  albuterol 90 mcg/inh inhalation aerosol: 2 puff(s) inhaled every 6 hours as needed for  shortness of breath and/or wheezing  benzonatate 100 mg oral capsule: 1 cap(s) orally 3 times a day As needed Cough  Carafate 1 g/10 mL oral suspension: 1 gram(s) orally 4 times a day  dexAMETHasone 4 mg oral tablet: 1 tab(s) orally once a day For 7 days, stop on 8/15  famotidine 20 mg oral tablet: 1 tab(s) orally every 12 hours  folic acid 1 mg oral tablet: 1 tab(s) orally once a day  letrozole 2.5 mg oral tablet: 1 tab(s) orally once a day  levothyroxine 50 mcg (0.05 mg) oral tablet: 1 tab(s) orally once a day (in the morning) take one hour before breakfast and other medications  loratadine 10 mg oral tablet: 1 tab(s) orally once a day (at bedtime)  magnesium oxide 400 mg oral tablet: 1 tab(s) orally once a day  megestrol 40 mg/mL oral suspension: 10 milliliter(s) orally 2 times a day  melatonin 5 mg oral tablet: 1 tab(s) orally once a day (at bedtime)  palbociclib 125 mg oral capsule: 1 cap(s) orally once a day  potassium chloride 20 mEq oral tablet, extended release: 2 tab(s) orally once a day for 7 days and then recheck potassium levels  tiotropium 2.5 mcg/inh inhalation aerosol: 2 puff(s) inhaled once a day

## 2023-07-22 NOTE — DISCHARGE NOTE PROVIDER - CARE PROVIDERS DIRECT ADDRESSES
,[email or web address]

## 2023-07-23 NOTE — PROGRESS NOTE ADULT - SUBJECTIVE AND OBJECTIVE BOX
Patient is a 57y old  Female who presents with a chief complaint of Decreased oral intake with electrolyte disturbances (19 Jul 2023 10:20)    Patient was seen and examined.  no new complaints    PAST MEDICAL & SURGICAL HISTORY:  Hypothyroidism  H/O cirrhosis  Breast cancer    No significant past surgical history    Allergies  No Known Allergies  Intolerances  hydromorphone (Faint; Nausea)    MEDICATIONS  (STANDING):  calcium carbonate    500 mG (Tums) Chewable 1 Tablet(s) Chew three times a day  dextrose 5% + lactated ringers 1000 milliLiter(s) (75 mL/Hr) IV Continuous <Continuous>  enoxaparin Injectable 40 milliGRAM(s) SubCutaneous every 24 hours  famotidine    Tablet 20 milliGRAM(s) Oral two times a day  folic acid 1 milliGRAM(s) Oral daily  letrozole 2.5 milliGRAM(s) Oral daily  levothyroxine 50 MICROGram(s) Oral daily  loratadine 10 milliGRAM(s) Oral daily  palbociclib 125 milliGRAM(s) Oral daily  sucralfate suspension 1 Gram(s) Oral every 6 hours  tiotropium 2.5 MICROgram(s) Inhaler 2 Puff(s) Inhalation daily    MEDICATIONS  (PRN):  albuterol    90 MICROgram(s) HFA Inhaler 2 Puff(s) Inhalation every 6 hours PRN for shortness of breath and/or wheezing  ibuprofen  Tablet. 200 milliGRAM(s) Oral every 4 hours PRN Mild Pain (1 - 3), Moderate Pain (4 - 6)  methocarbamol 1000 milliGRAM(s) Oral every 6 hours PRN Muscle Spasm    T(C): 36.9 (07-23-23 @ 07:14), Max: 37.4 (07-22-23 @ 23:33)  HR: 112 (07-23-23 @ 07:14) (110 - 123)  BP: 116/71 (07-23-23 @ 07:14) (110/78 - 149/88)  RR: 18 (07-23-23 @ 07:14) (18 - 18)  SpO2: 95% (07-23-23 @ 07:14) (95% - 97%)    O/E:  Awake, alert, not in distress.  HEENT: atraumatic, EOMI.  Chest: decreased breath sounds at bases  CVS: SIS2 +, no murmur.  P/A: Soft, BS+  CNS: awake, alert  Ext: no edema feet.  All systems reviewed positive findings as above.                                   10.3<L>  2.83<L> )-----------( 174      ( 23 Jul 2023 07:15 )             31.3<L>                        10.0<L>  3.73<L> )-----------( 195      ( 22 Jul 2023 07:46 )             30.8<L>  07-23    138  |  111<H>  |  5<L>  ----------------------------<  107<H>  4.2   |  17  |  <0.5<L>  07-22    138  |  110  |  4<L>  ----------------------------<  109<H>  4.2   |  18  |  <0.5<L>    Ca    7.8<L>      23 Jul 2023 07:15  Ca    7.9<L>      22 Jul 2023 07:46  Phos  3.8     07-23  Mg     1.9     07-23    TPro  4.7<L>  /  Alb  2.7<L>  /  TBili  0.6  /  DBili  x   /  AST  36  /  ALT  144<H>  /  AlkPhos  222<H>  07-23  TPro  4.3<L>  /  Alb  2.5<L>  /  TBili  0.5  /  DBili  x   /  AST  47<H>  /  ALT  208<H>  /  AlkPhos  242<H>  07-22

## 2023-07-23 NOTE — PROGRESS NOTE ADULT - SUBJECTIVE AND OBJECTIVE BOX
LENGTH OF HOSPITAL STAY: 10d    CHIEF COMPLAINT:    Patient is a 57y old  Female who presents with a chief complaint of Decreased oral intake with electrolyte disturbances (22 Jul 2023 15:19)    OVERNIGHT EVENTS: NAEON    HPI:    HPI:  57-year-old woman with a history of hypothyroidism, asthma, former alcohol use, breast cancer with diffuse bone metastasis, status post radiation 5 sessions finished 5/31/23, on letrozole daily, was on Ribociclib and was discontinued due to low magnesium with high QTc, surgical history of occiput-T2 posterior instrumentation and fusion, ORIF of C2 body with functional decline and impaired ADLs/mobility, ambulates with a walker. Following with Dr. Peña.     She presented to the emergency department complaining of decreased appetite and oral intake of 3 weeks duration. She is saying that she's not hungry, no mouth pain, no dysphagia or odynophagia, no regurgitation or heart burn, no abdominal pain, no diarrhea or constipation, no change in stool consistency. This was accompanied by generalized fatigue but no fever, no chills or rigors. She admitted decreased urine output, weight loss.     She also complained of left knee pain and swelling that started 1 week ago after hitting her knee while sitting, she didn't seek medical evaluation at that time. No decreased range of motion.     Vital Signs T(F): 96.6 HR: 128 BP: 122/93 RR: 18   EKG showed sinus tachycardia   Labs were significant for severe hypokalemia, hypomagnesemia, hyponatremia, low bicarb    Given IV fluids, potassium and admitted to medicine for further evaluation and treatment        (13 Jul 2023 16:53)      ALLERGIES:    Allergies    No Known Allergies    Intolerances    hydromorphone (Faint; Nausea)      PMHx:    PAST MEDICAL & SURGICAL HISTORY:  Hypothyroidism      H/O cirrhosis      Breast cancer      No significant past surgical history      MEDICATIONS:  STANDING MEDICATIONS  calcium carbonate    500 mG (Tums) Chewable 1 Tablet(s) Chew three times a day  dextrose 5% + lactated ringers 1000 milliLiter(s) IV Continuous <Continuous>  enoxaparin Injectable 40 milliGRAM(s) SubCutaneous every 24 hours  famotidine    Tablet 20 milliGRAM(s) Oral two times a day  folic acid 1 milliGRAM(s) Oral daily  letrozole 2.5 milliGRAM(s) Oral daily  levothyroxine 50 MICROGram(s) Oral daily  loratadine 10 milliGRAM(s) Oral daily  palbociclib 125 milliGRAM(s) Oral daily  sucralfate suspension 1 Gram(s) Oral every 6 hours  tiotropium 2.5 MICROgram(s) Inhaler 2 Puff(s) Inhalation daily    PRN MEDICATIONS  albuterol    90 MICROgram(s) HFA Inhaler 2 Puff(s) Inhalation every 6 hours PRN  ibuprofen  Tablet. 200 milliGRAM(s) Oral every 4 hours PRN  methocarbamol 1000 milliGRAM(s) Oral every 6 hours PRN      LABS:                        10.0   3.73  )-----------( 195      ( 22 Jul 2023 07:46 )             30.8     07-22    138  |  110  |  4<L>  ----------------------------<  109<H>  4.2   |  18  |  <0.5<L>    Ca    7.9<L>      22 Jul 2023 07:46  Phos  4.3     07-22  Mg     2.1     07-22    TPro  4.3<L>  /  Alb  2.5<L>  /  TBili  0.5  /  DBili  x   /  AST  47<H>  /  ALT  208<H>  /  AlkPhos  242<H>  07-22      Urinalysis Basic - ( 22 Jul 2023 07:46 )    Color: x / Appearance: x / SG: x / pH: x  Gluc: 109 mg/dL / Ketone: x  / Bili: x / Urobili: x   Blood: x / Protein: x / Nitrite: x   Leuk Esterase: x / RBC: x / WBC x   Sq Epi: x / Non Sq Epi: x / Bacteria: x        VITALS:   T(F): 99.4  HR: 123  BP: 149/88  RR: 18  SpO2: 97%

## 2023-07-24 NOTE — PROGRESS NOTE ADULT - ASSESSMENT
57-year-old woman with a history of hypothyroidism, asthma, former alcohol use, breast cancer with diffuse bone metastasis, status post radiation 5 sessions finished 5/31/23, on letrozole daily, was on Ribociclib and was discontinued due to low magnesium with high QTc, surgical history of occiput-T2 posterior instrumentation and fusion, ORIF of C2 body with functional decline and impaired ADLs/mobility, ambulates with a walker. Following with Dr. Peña.  She presented to the emergency department complaining of decreased appetite and oral intake of 3 weeks duration.     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 Gallstones Small right pleural effusion  - CT Abdomen and Pelvis No Cont (07.19.23 @ 16:51Findings may represent mild proctitis. No bowel obstruction or free air. Nodularity of the liver may be seen with cirrhosis.  - Abdominal duplex 7/21: Patent diminutive right and left portal veins. Patent right, left and middle hepatic veins.  - LFT improving; monitor LFT  - Discussed with hepatology team    # Thrombocytopenia- resolved    # H/o breast cancer with bone mets  # New T7 and L4 compression fractures on imaging probably sec  to bone mets  - CT abdNew T7 and L4 compression fractures, when compared to CT from April 2023. Small right and trace left pleural effusions. Increase in diffuse osseous metastases. Stable right breast mass with nipple retraction.  - oncology eval Given issues with QTc and electrolyte abnormalities suggest stopping ribociclib  - c/w letrozole  - S/p  medronate 90 mg IV once on 7/21  - oncology F/u: continue with letrozole and to add on Ibrance. Also recommend  medronate 90 mg IV once because of bony metastases she can be transition to Zometa or Xgeva as outpatient there is a nonformulary in our institution.     # Hypothyroidism  - c/w synthroid    # DVT prophylaxis    # DNR/DNI    # Pending: monitor LFT,  US abd duplex, awaiting auth  # Discussed plan of care with patient  # Disposition: STR

## 2023-07-24 NOTE — PROGRESS NOTE ADULT - SUBJECTIVE AND OBJECTIVE BOX
SUBJECTIVE:    Patient is a 57y old Female who presents with a chief complaint of Decreased oral intake with electrolyte disturbances (23 Jul 2023 11:57)    Currently admitted to medicine with the primary diagnosis of Adult failure to thrive       Today is hospital day 11d. This morning she is resting comfortably in bed and reports no new issues or overnight events.     PAST MEDICAL & SURGICAL HISTORY  Hypothyroidism    H/O cirrhosis    Breast cancer    No significant past surgical history      SOCIAL HISTORY:  Negative for smoking/alcohol/drug use.     ALLERGIES:  No Known Allergies    MEDICATIONS:  STANDING MEDICATIONS  calcium carbonate    500 mG (Tums) Chewable 1 Tablet(s) Chew three times a day  dextrose 5% + lactated ringers 1000 milliLiter(s) IV Continuous <Continuous>  enoxaparin Injectable 40 milliGRAM(s) SubCutaneous every 24 hours  famotidine    Tablet 20 milliGRAM(s) Oral two times a day  folic acid 1 milliGRAM(s) Oral daily  letrozole 2.5 milliGRAM(s) Oral daily  levothyroxine 50 MICROGram(s) Oral daily  loratadine 10 milliGRAM(s) Oral daily  palbociclib 125 milliGRAM(s) Oral daily  sucralfate suspension 1 Gram(s) Oral every 6 hours  tiotropium 2.5 MICROgram(s) Inhaler 2 Puff(s) Inhalation daily    PRN MEDICATIONS  albuterol    90 MICROgram(s) HFA Inhaler 2 Puff(s) Inhalation every 6 hours PRN  ibuprofen  Tablet. 200 milliGRAM(s) Oral every 4 hours PRN  methocarbamol 1000 milliGRAM(s) Oral every 6 hours PRN    VITALS:   T(F): 98.9  HR: 104  BP: 109/71  RR: 18  SpO2: --    LABS:                        10.0   3.76  )-----------( 187      ( 24 Jul 2023 04:57 )             30.7     07-24    140  |  111<H>  |  6<L>  ----------------------------<  119<H>  4.1   |  18  |  <0.5<L>    Ca    7.0<L>      24 Jul 2023 04:57  Phos  3.2     07-24  Mg     2.3     07-24    TPro  4.4<L>  /  Alb  2.5<L>  /  TBili  0.4  /  DBili  x   /  AST  31  /  ALT  104<H>  /  AlkPhos  199<H>  07-24      Urinalysis Basic - ( 24 Jul 2023 04:57 )    Color: x / Appearance: x / SG: x / pH: x  Gluc: 119 mg/dL / Ketone: x  / Bili: x / Urobili: x   Blood: x / Protein: x / Nitrite: x   Leuk Esterase: x / RBC: x / WBC x   Sq Epi: x / Non Sq Epi: x / Bacteria: x                RADIOLOGY:    PHYSICAL EXAM:  GEN: No acute distress  LUNGS: Clear to auscultation bilaterally   HEART: S1/S2 present. RRR.   ABD: Soft, non-tender, non-distended. Bowel sounds present  EXT: NC/NC/NE/2+PP/WINTER/Skin Intact.   NEURO: AAOX3    Intravenous access:   NG tube:   Wong Catheter:

## 2023-07-25 NOTE — PROGRESS NOTE ADULT - SUBJECTIVE AND OBJECTIVE BOX
Patient is a 57y old  Female who presents with a chief complaint of Decreased oral intake with electrolyte disturbances (19 Jul 2023 10:20)    Patient was seen and examined.  no new complaints    PAST MEDICAL & SURGICAL HISTORY:  Hypothyroidism  H/O cirrhosis  Breast cancer    No significant past surgical history    Allergies  No Known Allergies  Intolerances  hydromorphone (Faint; Nausea)    MEDICATIONS  (STANDING):  calcium carbonate    500 mG (Tums) Chewable 1 Tablet(s) Chew three times a day  dextrose 5% + lactated ringers 1000 milliLiter(s) (75 mL/Hr) IV Continuous <Continuous>  enoxaparin Injectable 40 milliGRAM(s) SubCutaneous every 24 hours  famotidine    Tablet 20 milliGRAM(s) Oral two times a day  folic acid 1 milliGRAM(s) Oral daily  letrozole 2.5 milliGRAM(s) Oral daily  levothyroxine 50 MICROGram(s) Oral daily  loratadine 10 milliGRAM(s) Oral daily  palbociclib 125 milliGRAM(s) Oral daily  sucralfate suspension 1 Gram(s) Oral every 6 hours  tiotropium 2.5 MICROgram(s) Inhaler 2 Puff(s) Inhalation daily    MEDICATIONS  (PRN):  albuterol    90 MICROgram(s) HFA Inhaler 2 Puff(s) Inhalation every 6 hours PRN for shortness of breath and/or wheezing  ibuprofen  Tablet. 200 milliGRAM(s) Oral every 4 hours PRN Mild Pain (1 - 3), Moderate Pain (4 - 6)  methocarbamol 1000 milliGRAM(s) Oral every 6 hours PRN Muscle Spasm    T(C): 36.2 (07-25-23 @ 07:42), Max: 37.2 (07-24-23 @ 16:17)  HR: 95 (07-25-23 @ 07:42) (74 - 107)  BP: 118/69 (07-25-23 @ 07:42) (111/76 - 120/81)  RR: 18 (07-25-23 @ 07:42) (18 - 18)  SpO2: 98% (07-25-23 @ 07:42) (98% - 100%)      O/E:  Awake, alert, not in distress.  HEENT: atraumatic, EOMI.  Chest: decreased breath sounds at bases  CVS: SIS2 +, no murmur.  P/A: Soft, BS+  CNS: awake, alert  Ext: no edema feet.  All systems reviewed positive findings as above.                            10.0<L>  3.76<L> )-----------( 187      ( 24 Jul 2023 04:57 )             30.7<L>    07-24    140  |  111<H>  |  6<L>  ----------------------------<  119<H>  4.1   |  18  |  <0.5<L>    Ca    7.0<L>      24 Jul 2023 04:57  Phos  3.2     07-24  Mg     2.3     07-24    TPro  4.4<L>  /  Alb  2.5<L>  /  TBili  0.4  /  DBili  x   /  AST  31  /  ALT  104<H>  /  AlkPhos  199<H>  07-24

## 2023-07-25 NOTE — PROGRESS NOTE ADULT - TIME BILLING
Direct patient care. Discussed on rounds with Housestaff
Direct patient care. Discussed on rounds with Housestaff.
Direct patient care. Discussed on rounds with Housestaff
Direct patient care. Discussed on rounds with Housestaff
Direct patient care. Discussed with Housestaff
time spent on review of labs, imaging studies, old records, obtaining history, personally examining patient, counselling and communicating with patient, entering orders for medications/tests/etc, discussions with other health care providers, documentation in electronic health records, independent interpretation of labs, imaging/procedure results and care coordination.

## 2023-07-25 NOTE — PROGRESS NOTE ADULT - ASSESSMENT
57-year-old woman with a history of hypothyroidism, asthma, former alcohol use, breast cancer with diffuse bone metastasis, status post radiation 5 sessions finished 5/31/23, on letrozole daily, was on Ribociclib and was discontinued due to low magnesium with high QTc, surgical history of occiput-T2 posterior instrumentation and fusion, ORIF of C2 body with functional decline and impaired ADLs/mobility, ambulates with a walker. Following with Dr. Peña.  She presented to the emergency department complaining of decreased appetite and oral intake of 3 weeks duration.     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 Gallstones Small right pleural effusion  -  CT Abdomen and Pelvis No Cont (07.19.23 @ 16:51Findings may represent mild proctitis. No bowel obstruction or free air. Nodularity of the liver may be seen with cirrhosis.  -  US Abdomen Doppler (07.21.23 @ 16:48) >Patent diminutive right and left portal veins. Patent right, left and middle hepatic veins.  - monitor LFT      # Thrombocytopenia- resolved    # H/o breast cancer with bone mets  # New T7 and L4 compression fractures on imaging probably sec  to bone mets  - CT abdNew T7 and L4 compression fractures, when compared to CT from April 2023. Small right and trace left pleural effusions. Increase in diffuse osseous metastases. Stable right breast mass with nipple retraction.  - oncology eval Given issues with QTc and electrolyte abnormalities suggest stopping ribociclib  - c/w letrozole  - S/p  medronate 90 mg IV once on 7/21  - oncology F/u: continue with letrozole and to add on Ibrance. Also recommend  medronate 90 mg IV once because of bony metastases she can be transition to Zometa or Xgeva as outpatient there is a nonformulary in our institution.     # Hypothyroidism  - c/w synthroid    # DVT prophylaxis    # DNR/DNI    # Pending: monitor LFT, auth for SNF  # Discussed plan of care with patient  # Disposition:  SNF

## 2023-07-26 NOTE — PROGRESS NOTE ADULT - ASSESSMENT
57-year-old woman with a history of hypothyroidism, asthma, former alcohol use, breast cancer with diffuse bone metastasis, status post radiation 5 sessions finished 5/31/23, on letrozole daily, was on Ribociclib and was discontinued due to low magnesium with high QTc, surgical history of occiput-T2 posterior instrumentation and fusion, ORIF of C2 body with functional decline and impaired ADLs/mobility, ambulates with a walker. Following with Dr. Peña.  She presented to the emergency department complaining of decreased appetite and oral intake of 3 weeks duration.     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 Gallstones Small right pleural effusion  - CT Abdomen and Pelvis No Cont (07.19.23 @ 16:51Findings may represent mild proctitis. No bowel obstruction or free air. Nodularity of the liver may be seen with cirrhosis.  - Abdominal duplex 7/21: Patent diminutive right and left portal veins. Patent right, left and middle hepatic veins.  - LFT improving; monitor LFT  - Discussed with hepatology team    # Thrombocytopenia- resolved    # H/o breast cancer with bone mets  # New T7 and L4 compression fractures on imaging probably sec  to bone mets  - CT abdNew T7 and L4 compression fractures, when compared to CT from April 2023. Small right and trace left pleural effusions. Increase in diffuse osseous metastases. Stable right breast mass with nipple retraction.  - oncology eval Given issues with QTc and electrolyte abnormalities suggest stopping ribociclib  - c/w letrozole  - S/p medronate 90 mg IV once on 7/21  - oncology F/u: continue with letrozole and to add on Ibrance. Also recommend  medronate 90 mg IV once because of bony metastases she can be transition to Zometa or Xgeva as outpatient there is a nonformulary in our institution.     # Hypothyroidism  - c/w synthroid    # DVT prophylaxis    # DNR/DNI    # Pending:   # Discussed plan of care with patient  # Disposition: Needs STR which was denied by [facility]. P2P called today for appeal, Auth awaited pending appeal

## 2023-07-26 NOTE — HISTORY OF PRESENT ILLNESS
[FreeTextEntry1] : 7/27/2023: PTE 2000cGy/5Fx to C-spine from 5/22/23-5/31/2023. \par \par \par \par 5/23/2023 OTV 800cGy/2000cGy to the C-spine: Patient accompanied by  and niece. Soft neck collar in place. She wears it mostly all the time and removes at bedtime. PT, OT and psych comes to house to follow up. Pain in neck area is 8/10 and takes percocet, robaxin, and gabapentin (300mg po 3xday) for pain. She feels a lot of pain post-RT. She takes 2 percocet when she gets home. Patient has metastatic Breast Ca (diffuse metastatic bone disease), hormone positive. She was originally consulted on by Rad onc while admitted in hospital in April. She was recently discharged from rehab after her surgical intervention. She was started on letrozole 2.4mg po daily by  while admitted in hospital. She ambulates with assistance. Follows up with  on 5/25/2023 to discuss systemic treatment.

## 2023-07-26 NOTE — PROGRESS NOTE ADULT - SUBJECTIVE AND OBJECTIVE BOX
SUBJECTIVE:    Patient is a 57y old Female who presents with a chief complaint of Decreased oral intake with electrolyte disturbances (26 Jul 2023 11:28)    Currently admitted to medicine with the primary diagnosis of Adult failure to thrive       Today is hospital day 13d. This morning she is resting comfortably in bed. Decrease appetite and cough    PAST MEDICAL & SURGICAL HISTORY  Hypothyroidism    H/O cirrhosis    Breast cancer    No significant past surgical history      SOCIAL HISTORY:  Negative for smoking/alcohol/drug use.     ALLERGIES:  No Known Allergies    MEDICATIONS:  STANDING MEDICATIONS  benzonatate 100 milliGRAM(s) Oral three times a day  calcium carbonate    500 mG (Tums) Chewable 1 Tablet(s) Chew three times a day  enoxaparin Injectable 40 milliGRAM(s) SubCutaneous every 24 hours  famotidine    Tablet 20 milliGRAM(s) Oral two times a day  folic acid 1 milliGRAM(s) Oral daily  letrozole 2.5 milliGRAM(s) Oral daily  levothyroxine 50 MICROGram(s) Oral daily  loratadine 10 milliGRAM(s) Oral daily  palbociclib 125 milliGRAM(s) Oral daily  sucralfate suspension 1 Gram(s) Oral every 6 hours  tiotropium 2.5 MICROgram(s) Inhaler 2 Puff(s) Inhalation daily    PRN MEDICATIONS  albuterol    90 MICROgram(s) HFA Inhaler 2 Puff(s) Inhalation every 6 hours PRN  ibuprofen  Tablet. 200 milliGRAM(s) Oral every 4 hours PRN  methocarbamol 1000 milliGRAM(s) Oral every 6 hours PRN    VITALS:   T(F): 97.1  HR: 102  BP: 119/64  RR: 18  SpO2: 97%    LABS:                        10.6   4.13  )-----------( 268      ( 26 Jul 2023 06:26 )             33.4     07-26    138  |  108  |  6<L>  ----------------------------<  130<H>  4.2   |  20  |  <0.5<L>    Ca    7.9<L>      26 Jul 2023 12:11  Mg     1.9     07-26    TPro  5.2<L>  /  Alb  3.1<L>  /  TBili  0.5  /  DBili  x   /  AST  23  /  ALT  59<H>  /  AlkPhos  190<H>  07-26      Urinalysis Basic - ( 26 Jul 2023 12:11 )    Color: x / Appearance: x / SG: x / pH: x  Gluc: 130 mg/dL / Ketone: x  / Bili: x / Urobili: x   Blood: x / Protein: x / Nitrite: x   Leuk Esterase: x / RBC: x / WBC x   Sq Epi: x / Non Sq Epi: x / Bacteria: x                RADIOLOGY:    PHYSICAL EXAM:  GEN: No acute distress  LUNGS: Clear to auscultation bilaterally   HEART: S1/S2 present. RRR.   ABD: Soft, non-tender, non-distended. Bowel sounds present  EXT: NC/NC/NE/2+PP/WINTER/Skin Intact.   NEURO: AAOX3    Intravenous access:   NG tube:   Wong Catheter:

## 2023-07-26 NOTE — DISEASE MANAGEMENT
[Clinical] : TNM Stage: c [FreeTextEntry4] : metastatic breast Cancer [TTNM] : x [NTNM] : x [MTNM] : x [IV] : IV [de-identified] : 2000cGy [de-identified] : 2000cGy [de-identified] : C-spine

## 2023-07-26 NOTE — PROGRESS NOTE ADULT - SUBJECTIVE AND OBJECTIVE BOX
Progress Note:  Provider Speciality                            Hospitalist      JEAN-PAUL GIANG MRN-[id number] 57y Female     CHIEF PRESENTING COMPLAINT:  Patient is a 57y old  Female who presents with a chief complaint of Decreased oral intake with electrolyte disturbances (25 Jul 2023 11:58)        SUBJECTIVE:  Patient was seen and examined at bedside. No new complaints described by patient in morning rounds.   No significant overnight events reported.     HISTORY OF PRESENTING ILLNESS:  HPI:  57-year-old woman with a history of hypothyroidism, asthma, former alcohol use, breast cancer with diffuse bone metastasis, status post radiation 5 sessions finished 5/31/23, on letrozole daily, was on Ribociclib and was discontinued due to low magnesium with high QTc, surgical history of occiput-T2 posterior instrumentation and fusion, ORIF of C2 body with functional decline and impaired ADLs/mobility, ambulates with a walker. Following with Dr. Peña.     She presented to the emergency department complaining of decreased appetite and oral intake of 3 weeks duration. She is saying that she's not hungry, no mouth pain, no dysphagia or odynophagia, no regurgitation or heart burn, no abdominal pain, no diarrhea or constipation, no change in stool consistency. This was accompanied by generalized fatigue but no fever, no chills or rigors. She admitted decreased urine output, weight loss.     She also complained of left knee pain and swelling that started 1 week ago after hitting her knee while sitting, she didn't seek medical evaluation at that time. No decreased range of motion.     Vital Signs T(F): 96.6 HR: 128 BP: 122/93 RR: 18   EKG showed sinus tachycardia   Labs were significant for severe hypokalemia, hypomagnesemia, hyponatremia, low bicarb    Given IV fluids, potassium and admitted to medicine for further evaluation and treatment        (13 Jul 2023 16:53)        REVIEW OF SYSTEMS:  Patient denies  headache, fever, chills. Denies chest pain, shortness of breath, palpitation. Denies nausea, vomiting, abdominal pain or diarrhoea, Denies dysuria.   At least 10 systems were reviewed in ROS. All systems reviewed  are within normal limits except for the complaints as described in Subjective.    PAST MEDICAL & SURGICAL HISTORY:  PAST MEDICAL & SURGICAL HISTORY:  Hypothyroidism      H/O cirrhosis      Breast cancer      No significant past surgical history              VITAL SIGNS:  Vital Signs Last 24 Hrs  T(C): 36.2 (26 Jul 2023 07:37), Max: 37.4 (25 Jul 2023 16:00)  T(F): 97.1 (26 Jul 2023 07:37), Max: 99.4 (25 Jul 2023 16:00)  HR: 102 (26 Jul 2023 07:37) (100 - 116)  BP: 119/64 (26 Jul 2023 07:37) (119/64 - 128/85)  BP(mean): --  RR: 18 (26 Jul 2023 07:37) (18 - 18)  SpO2: 97% (26 Jul 2023 07:37) (97% - 97%)    Parameters below as of 26 Jul 2023 07:37  Patient On (Oxygen Delivery Method): room air              PHYSICAL EXAMINATION:  Not in acute distress  General: No icterus  HEENT:   no JVD.  Heart: S1+S2 audible  Lungs: bilateral  moderate air entry, no wheezing, no crepitations.  Abdomen: Soft, non-tender, non-distended , no  rigidity or guarding.  CNS: Awake alert, CN  grossly intact.  Extremities:  No edema            CONSULTS:  Consultant(s) Notes Reviewed by me.   Care Discussed with Consultants/Other Providers where required.    All the images and labs were reviewed today        MEDICATIONS:  MEDICATIONS  (STANDING):  calcium carbonate    500 mG (Tums) Chewable 1 Tablet(s) Chew three times a day  enoxaparin Injectable 40 milliGRAM(s) SubCutaneous every 24 hours  famotidine    Tablet 20 milliGRAM(s) Oral two times a day  folic acid 1 milliGRAM(s) Oral daily  letrozole 2.5 milliGRAM(s) Oral daily  levothyroxine 50 MICROGram(s) Oral daily  loratadine 10 milliGRAM(s) Oral daily  palbociclib 125 milliGRAM(s) Oral daily  sucralfate suspension 1 Gram(s) Oral every 6 hours  tiotropium 2.5 MICROgram(s) Inhaler 2 Puff(s) Inhalation daily    MEDICATIONS  (PRN):  albuterol    90 MICROgram(s) HFA Inhaler 2 Puff(s) Inhalation every 6 hours PRN for shortness of breath and/or wheezing  ibuprofen  Tablet. 200 milliGRAM(s) Oral every 4 hours PRN Mild Pain (1 - 3), Moderate Pain (4 - 6)  methocarbamol 1000 milliGRAM(s) Oral every 6 hours PRN Muscle Spasm

## 2023-07-26 NOTE — PROGRESS NOTE ADULT - ASSESSMENT
57-year-old woman with a history of hypothyroidism, asthma, former alcohol use, breast cancer with diffuse bone metastasis, status post radiation 5 sessions finished 5/31/23, on letrozole daily, was on Ribociclib and was discontinued due to low magnesium with high QTc, surgical history of occiput-T2 posterior instrumentation and fusion, ORIF of C2 body with functional decline and impaired ADLs/mobility, ambulates with a walker. Following with Dr. Peña.  She presented to the emergency department complaining of decreased appetite and oral intake of 3 weeks duration.     Lactic acidosis- resolved  Starvation ketoacidosis- resolved  Dehydration due to decrease oral intake- resolved  HAGMA-resolved  Probable radiation esophagitis  Transaminitis -resolving  Cholelithiasis  H/o Cirrhosis    Pain management  Physical deconditioning  Needs STR which was denied by [facility]. P2P called today for appeal  Nonitor LFT  Thrombocytopenia- resolved  oncology F/u: continue with letrozole and to add on Ibrance. Also recommend  medronate 90 mg IV once because of bony metastases she can be transition to Zometa or Xgeva as outpatient there is a nonformulary in our institution.   Hypothyroidism- c/w synthroid  Pt is DNR/DNI  Handoff:Needs STR which was denied by [facility]. P2P called today for appeal, Auth awaited pending appeal

## 2023-07-27 NOTE — OCCUPATIONAL THERAPY INITIAL EVALUATION ADULT - PLANNED THERAPY INTERVENTIONS, OT EVAL
ADL retraining/IADL retraining/balance training/bed mobility training/parent/caregiver training.../ROM/strengthening/stretching/transfer training

## 2023-07-27 NOTE — PROGRESS NOTE ADULT - SUBJECTIVE AND OBJECTIVE BOX
SUBJECTIVE:    Patient is a 57y old Female who presents with a chief complaint of Decreased oral intake with electrolyte disturbances (26 Jul 2023 14:00)    Currently admitted to medicine with the primary diagnosis of Adult failure to thrive       Today is hospital day 14d. This morning she has a worsening cough, chills, and sneezing.     PAST MEDICAL & SURGICAL HISTORY  Hypothyroidism    H/O cirrhosis    Breast cancer    No significant past surgical history      SOCIAL HISTORY:  Negative for smoking/alcohol/drug use.     ALLERGIES:  No Known Allergies    MEDICATIONS:  STANDING MEDICATIONS  benzonatate 100 milliGRAM(s) Oral three times a day  calcium carbonate    500 mG (Tums) Chewable 1 Tablet(s) Chew three times a day  dextrose 5% + lactated ringers. 1000 milliLiter(s) IV Continuous <Continuous>  dronabinol 2.5 milliGRAM(s) Oral three times a day before meals  enoxaparin Injectable 40 milliGRAM(s) SubCutaneous every 24 hours  famotidine    Tablet 20 milliGRAM(s) Oral two times a day  folic acid 1 milliGRAM(s) Oral daily  letrozole 2.5 milliGRAM(s) Oral daily  levothyroxine 50 MICROGram(s) Oral daily  loratadine 10 milliGRAM(s) Oral daily  melatonin 5 milliGRAM(s) Oral at bedtime  palbociclib 125 milliGRAM(s) Oral daily  sucralfate suspension 1 Gram(s) Oral every 6 hours  tiotropium 2.5 MICROgram(s) Inhaler 2 Puff(s) Inhalation daily    PRN MEDICATIONS  albuterol    90 MICROgram(s) HFA Inhaler 2 Puff(s) Inhalation every 6 hours PRN  guaiFENesin ER 1200 milliGRAM(s) Oral every 12 hours PRN  ibuprofen  Tablet. 200 milliGRAM(s) Oral every 4 hours PRN  methocarbamol 1000 milliGRAM(s) Oral every 6 hours PRN    VITALS:   T(F): 98.2  HR: 99  BP: 118/76  RR: 18  SpO2: 97%    LABS:                        10.6   4.13  )-----------( 268      ( 26 Jul 2023 06:26 )             33.4     07-26    138  |  108  |  6<L>  ----------------------------<  130<H>  4.2   |  20  |  <0.5<L>    Ca    7.9<L>      26 Jul 2023 12:11  Mg     1.9     07-26    TPro  5.2<L>  /  Alb  3.1<L>  /  TBili  0.5  /  DBili  x   /  AST  23  /  ALT  59<H>  /  AlkPhos  190<H>  07-26      Urinalysis Basic - ( 26 Jul 2023 12:11 )    Color: x / Appearance: x / SG: x / pH: x  Gluc: 130 mg/dL / Ketone: x  / Bili: x / Urobili: x   Blood: x / Protein: x / Nitrite: x   Leuk Esterase: x / RBC: x / WBC x   Sq Epi: x / Non Sq Epi: x / Bacteria: x                RADIOLOGY:    PHYSICAL EXAM:  GEN: No acute distress  LUNGS: crackles b/l    HEART: S1/S2 present. RRR.   ABD: Soft, non-tender, non-distended. Bowel sounds present  EXT: NC/NC/NE/2+PP/WINTER/Skin Intact.   NEURO: AAOX3    Intravenous access:   NG tube:   Wong Catheter:

## 2023-07-27 NOTE — PROGRESS NOTE ADULT - ASSESSMENT
57-year-old woman with a history of hypothyroidism, asthma, former alcohol use, breast cancer with diffuse bone metastasis, status post radiation 5 sessions finished 5/31/23, on letrozole daily, was on Ribociclib and was discontinued due to low magnesium with high QTc, surgical history of occiput-T2 posterior instrumentation and fusion, ORIF of C2 body with functional decline and impaired ADLs/mobility, ambulates with a walker. Following with Dr. Peña.  She presented to the emergency department complaining of decreased appetite and oral intake of 3 weeks duration.     Lactic acidosis- resolved  Starvation ketoacidosis- resolved  Dehydration due to decrease oral intake- resolved  HAGMA-resolved  Probable radiation esophagitis  Transaminitis -resolving  Cholelithiasis  H/o Cirrhosis    Pain management  Physical deconditioning  Needs STR which was denied by [facility]. P2P called 7/26  Monitor LFT  Thrombocytopenia- resolved  oncology F/u: continue with letrozole and to add on Ibrance. Also recommend  medronate 90 mg IV once because of bony metastases she can be transition to Zometa or Xgeva as outpatient there is a nonformulary in our institution.   Hypothyroidism- c/w synthroid  now complaining of some cough  Pt is DNR/DNI  Handoff: Needs STR which was denied by [facility]. P2P called 7/26 for appeal, Auth awaited pending appeal  follow CXR, RVP panel . on IV fluids. monitor HR

## 2023-07-27 NOTE — OCCUPATIONAL THERAPY INITIAL EVALUATION ADULT - GENERAL OBSERVATIONS, REHAB EVAL
Pt seen 8:20-8:47. Pt received supine in bed sleeping +IV lock +primafit. Pt observed with soiled linens but refused to get clean stating she wants to wait until after they draw her blood. JERE Lerma notified. Pt left semi thornton in bed in NAD +IV lock +primafit with phlebotomist, RN aware.

## 2023-07-27 NOTE — PROGRESS NOTE ADULT - SUBJECTIVE AND OBJECTIVE BOX
Progress Note:  Provider Speciality                            Hospitalist      JEAN-PAUL GIANG MRN-[id number] 57y Female     CHIEF PRESENTING COMPLAINT:  Patient is a 57y old  Female who presents with a chief complaint of Decreased oral intake with electrolyte disturbances (25 Jul 2023 11:58)        SUBJECTIVE:  Patient was seen and examined at bedside.  No significant overnight events reported.   patient reported some cough. started 2-3 days ago.     HISTORY OF PRESENTING ILLNESS:  HPI:  57-year-old woman with a history of hypothyroidism, asthma, former alcohol use, breast cancer with diffuse bone metastasis, status post radiation 5 sessions finished 5/31/23, on letrozole daily, was on Ribociclib and was discontinued due to low magnesium with high QTc, surgical history of occiput-T2 posterior instrumentation and fusion, ORIF of C2 body with functional decline and impaired ADLs/mobility, ambulates with a walker. Following with Dr. Peña.     She presented to the emergency department complaining of decreased appetite and oral intake of 3 weeks duration. She is saying that she's not hungry, no mouth pain, no dysphagia or odynophagia, no regurgitation or heart burn, no abdominal pain, no diarrhea or constipation, no change in stool consistency. This was accompanied by generalized fatigue but no fever, no chills or rigors. She admitted decreased urine output, weight loss.     She also complained of left knee pain and swelling that started 1 week ago after hitting her knee while sitting, she didn't seek medical evaluation at that time. No decreased range of motion.     Vital Signs T(F): 96.6 HR: 128 BP: 122/93 RR: 18   EKG showed sinus tachycardia   Labs were significant for severe hypokalemia, hypomagnesemia, hyponatremia, low bicarb    Given IV fluids, potassium and admitted to medicine for further evaluation and treatment        (13 Jul 2023 16:53)        REVIEW OF SYSTEMS:  Patient denies  headache, fever, chills. Denies chest pain, shortness of breath, palpitation. Denies nausea, vomiting, abdominal pain or diarrhoea, Denies dysuria.   At least 10 systems were reviewed in ROS. All systems reviewed  are within normal limits except for the complaints as described in Subjective.    PAST MEDICAL & SURGICAL HISTORY:  PAST MEDICAL & SURGICAL HISTORY:  Hypothyroidism      H/O cirrhosis      Breast cancer      No significant past surgical history              VITAL SIGNS:  Vital Signs Last 24 Hrs  Vital Signs Last 24 Hrs  T(C): 36.2 (27 Jul 2023 15:53), Max: 37.2 (27 Jul 2023 00:06)  T(F): 97.2 (27 Jul 2023 15:53), Max: 98.9 (27 Jul 2023 00:06)  HR: 107 (27 Jul 2023 15:53) (99 - 111)  BP: 139/76 (27 Jul 2023 15:53) (118/76 - 139/76)  BP(mean): --  RR: 18 (27 Jul 2023 15:53) (18 - 18)  SpO2: 97% (27 Jul 2023 15:53) (95% - 97%)    Parameters below as of 27 Jul 2023 15:53  Patient On (Oxygen Delivery Method): room air                  PHYSICAL EXAMINATION:  Not in acute distress  General: No icterus  HEENT:   no JVD.  Heart: S1+S2 audible  Lungs: bilateral  moderate air entry, no wheezing, no crepitations.  Abdomen: Soft, non-tender, non-distended , no  rigidity or guarding.  CNS: Awake alert, CN  grossly intact.  Extremities:  No edema            CONSULTS:  Consultant(s) Notes Reviewed by me.   Care Discussed with Consultants/Other Providers where required.    All the images and labs were reviewed today        MEDICATIONS:  MEDICATIONS  (STANDING):  calcium carbonate    500 mG (Tums) Chewable 1 Tablet(s) Chew three times a day  enoxaparin Injectable 40 milliGRAM(s) SubCutaneous every 24 hours  famotidine    Tablet 20 milliGRAM(s) Oral two times a day  folic acid 1 milliGRAM(s) Oral daily  letrozole 2.5 milliGRAM(s) Oral daily  levothyroxine 50 MICROGram(s) Oral daily  loratadine 10 milliGRAM(s) Oral daily  palbociclib 125 milliGRAM(s) Oral daily  sucralfate suspension 1 Gram(s) Oral every 6 hours  tiotropium 2.5 MICROgram(s) Inhaler 2 Puff(s) Inhalation daily    MEDICATIONS  (PRN):  albuterol    90 MICROgram(s) HFA Inhaler 2 Puff(s) Inhalation every 6 hours PRN for shortness of breath and/or wheezing  ibuprofen  Tablet. 200 milliGRAM(s) Oral every 4 hours PRN Mild Pain (1 - 3), Moderate Pain (4 - 6)  methocarbamol 1000 milliGRAM(s) Oral every 6 hours PRN Muscle Spasm

## 2023-07-27 NOTE — PROGRESS NOTE ADULT - ASSESSMENT
57-year-old woman with a history of hypothyroidism, asthma, former alcohol use, breast cancer with diffuse bone metastasis, status post radiation 5 sessions finished 5/31/23, on letrozole daily, was on Ribociclib and was discontinued due to low magnesium with high QTc, surgical history of occiput-T2 posterior instrumentation and fusion, ORIF of C2 body with functional decline and impaired ADLs/mobility, ambulates with a walker. Following with Dr. Peña.  She presented to the emergency department complaining of decreased appetite and oral intake of 3 weeks duration.     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 Gallstones Small right pleural effusion  - CT Abdomen and Pelvis No Cont (07.19.23 @ 16:51Findings may represent mild proctitis. No bowel obstruction or free air. Nodularity of the liver may be seen with cirrhosis.  - Abdominal duplex 7/21: Patent diminutive right and left portal veins. Patent right, left and middle hepatic veins.  - LFT improving; monitor LFT  - Discussed with hepatology team    # Thrombocytopenia- resolved    # H/o breast cancer with bone mets  # New T7 and L4 compression fractures on imaging probably sec  to bone mets  - CT abdNew T7 and L4 compression fractures, when compared to CT from April 2023. Small right and trace left pleural effusions. Increase in diffuse osseous metastases. Stable right breast mass with nipple retraction.  - oncology eval Given issues with QTc and electrolyte abnormalities suggest stopping ribociclib  - c/w letrozole  - S/p medronate 90 mg IV once on 7/21  - oncology F/u: continue with letrozole and to add on Ibrance. Also recommend medronate 90 mg IV once because of bony metastases she can be transition to Zometa or Xgeva as outpatient there is a nonformulary in our institution.     # Hypothyroidism  - c/w synthroid    #New URI?  - cough, chills, sneezing 7/27  - started Benzonate  - started Mucinex   - F/u RVP  - F/u CXR    # DVT prophylaxis    # DNR/DNI    # Pending:   # Discussed plan of care with patient  # Disposition: Needs STR which was denied by [facility]. P2P called today for appeal, Auth awaited pending appeal

## 2023-07-27 NOTE — OCCUPATIONAL THERAPY INITIAL EVALUATION ADULT - PHYSICAL ASSIST/NONPHYSICAL ASSIST: EATING, OT EVAL
Patient presented to the office accompanied by his daughter who voiced concerns in regards to cold symptoms for greater than one week. Patient reports that he does note audible wheezing. He's had increased shortness of breath and rhinorrhea. Denies complaints of sore throat, ear pain or pressure or chest pain. Patient reports he's been compliant with utilizing his endocarditis prescribed. Patient does have decreased breath sounds throughout bilateral lobes with inspiratory and expiratory wheezes throughout.  Patient provided with a prescription for doxycycline 100 mg 1 tab p.o. b.i.d. ×10 days and a sample of Spiriva 1 puff inhalation daily was provided. Patient is continue with the Symbicort as previous to prescribed. He will follow up in 1 week's time for reevaluation or sooner should he have any additional questions or concerns.   set-up required

## 2023-07-28 NOTE — PROGRESS NOTE ADULT - ATTENDING COMMENTS
57-year-old woman with a history of hypothyroidism, asthma, former alcohol use, breast cancer with diffuse bone metastasis, status post radiation 5 sessions finished 5/31/23, on letrozole daily, was on Ribociclib and was discontinued due to low magnesium with high QTc, surgical history of occiput-T2 posterior instrumentation and fusion, ORIF of C2 body with functional decline and impaired ADLs/mobility, ambulates with a walker. Following with Dr. ePña.  She presented to the emergency department complaining of decreased appetite and oral intake of 3 weeks duration.     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 P2P called 7/26  Monitor LFT  oncology F/u: continue with letrozole and to add on Ibrance. Also recommend  medronate 90 mg IV once because of bony metastases she can be transition to Zometa or Xgeva as outpatient there is a nonformulary in our institution.   Hypothyroidism- c/w synthroid    Pt is DNR/DNI  Handoff: Needs STR which was denied by [facility]. P2P called 7/26 for appeal, Auth awaited pending appeal  follow CXR, RVP panel . on IV fluids. monitor HR 57-year-old woman with a history of hypothyroidism, asthma, former alcohol use, breast cancer with diffuse bone metastasis, status post radiation 5 sessions finished 5/31/23, on letrozole daily, was on Ribociclib and was discontinued due to low magnesium with high QTc, surgical history of occiput-T2 posterior instrumentation and fusion, ORIF of C2 body with functional decline and impaired ADLs/mobility, ambulates with a walker. Following with Dr. Peña.  She presented to the emergency department complaining of decreased appetite and oral intake of 3 weeks duration.     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 P2P called 7/26  Monitor LFT  oncology F/u: continue with letrozole and to add on Ibrance. Also recommend  medronate 90 mg IV once because of bony metastases she can be transition to Zometa or Xgeva as outpatient there is a nonformulary in our institution.   Hypothyroidism- c/w synthroid    Pt is DNR/DNI  Handoff: Needs STR which was denied by [facility]. P2P called 7/26 for appeal, Auth awaited pending appeal  monitor HR

## 2023-07-28 NOTE — PROGRESS NOTE ADULT - ASSESSMENT
57-year-old woman with a history of hypothyroidism, asthma, former alcohol use, breast cancer with diffuse bone metastasis, status post radiation 5 sessions finished 5/31/23, on letrozole daily, was on Ribociclib and was discontinued due to low magnesium with high QTc, surgical history of occiput-T2 posterior instrumentation and fusion, ORIF of C2 body with functional decline and impaired ADLs/mobility, ambulates with a walker. Following with Dr. Peña.  She presented to the emergency department complaining of decreased appetite and oral intake of 3 weeks duration.     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 Gallstones Small right pleural effusion  - CT Abdomen and Pelvis No Cont 7/19 - Findings may represent mild proctitis. No bowel obstruction or free air. Nodularity of the liver may be seen with cirrhosis.  - Abdominal duplex 7/21: Patent diminutive right and left portal veins. Patent right, left and middle hepatic veins.  - LFT improving, AST 23 ALT 59  - monitor LFTs   - case d/w hepatology     # H/o breast cancer with bone mets  # New T7 and L4 compression fractures on imaging probably sec to bone mets  - CT abd: New T7 and L4 compression fractures, when compared to CT from April 2023. Small right and trace left pleural effusions. Increase in diffuse osseous metastases. Stable right breast mass with nipple retraction.  - oncology following given issues with QTc and electrolyte abnormalities, ribociclib d/c'd. C/w letrozole, add Ibrance.  Also recommend medronate 90 mg IV once because of bony metastases, she can be transitioned to Zometa or Xgeva as outpatient there is a nonformulary in our institution.   - c/w letrozole, Ibrance  - S/p medronate 90 mg IV once on 7/21    # Hypothyroidism  - c/w synthroid    #New cough - URI vs dryness from poor po intake   - cough, chills, sneezing 7/27  - c/w Benzonate, Mucinex   - F/u RVP  - F/u CXR read    # DVT prophylaxis    # DNR/DNI    # Pending:   # Disposition: Needs STR which was denied by [facility]. P2P called today for appeal, Auth awaited pending appeal

## 2023-07-28 NOTE — PROGRESS NOTE ADULT - SUBJECTIVE AND OBJECTIVE BOX
24H events:    Patient is a 57y old Female who presents with a chief complaint of Decreased oral intake with electrolyte disturbances (27 Jul 2023 16:12)    Primary diagnosis of Adult failure to thrive       Today is hospital day 15d. This morning patient was seen and examined at bedside, resting comfortably in bed. Pt reports better appetite this morning and will try to eat breakfast when it arrives. Pt reports worsening cough this morning since yesterday. She denies CP, SOB, abdominal pain, n/v, d/c, leg pain or swelling, headaches, dizziness, lightheadedness.     PAST MEDICAL & SURGICAL HISTORY  Hypothyroidism    H/O cirrhosis    Breast cancer    No significant past surgical history      SOCIAL HISTORY:  Social History:  non smoker, stopped alcohol drinking 9 years ago  lives in [place] with her family (13 Jul 2023 16:53)      ALLERGIES:  No Known Allergies    MEDICATIONS:  STANDING MEDICATIONS  calcium carbonate    500 mG (Tums) Chewable 1 Tablet(s) Chew three times a day  dronabinol 2.5 milliGRAM(s) Oral three times a day before meals  enoxaparin Injectable 40 milliGRAM(s) SubCutaneous every 24 hours  famotidine    Tablet 20 milliGRAM(s) Oral two times a day  folic acid 1 milliGRAM(s) Oral daily  letrozole 2.5 milliGRAM(s) Oral daily  levothyroxine 50 MICROGram(s) Oral daily  loratadine 10 milliGRAM(s) Oral daily  melatonin 5 milliGRAM(s) Oral at bedtime  palbociclib 125 milliGRAM(s) Oral daily  sucralfate suspension 1 Gram(s) Oral every 6 hours  tiotropium 2.5 MICROgram(s) Inhaler 2 Puff(s) Inhalation daily    PRN MEDICATIONS  albuterol    90 MICROgram(s) HFA Inhaler 2 Puff(s) Inhalation every 6 hours PRN  guaiFENesin ER 1200 milliGRAM(s) Oral every 12 hours PRN  ibuprofen  Tablet. 200 milliGRAM(s) Oral every 4 hours PRN  methocarbamol 1000 milliGRAM(s) Oral every 6 hours PRN    VITALS:   T(F): 97.8  HR: 107  BP: 112/64  RR: 18  SpO2: 98%    PHYSICAL EXAM:  GENERAL: NAD  HEAD:  Atraumatic, Normocephalic  EYES: EOMI  NECK: Supple  NERVOUS SYSTEM:  Alert & Oriented X3, non focal   CHEST/LUNG: Clear to auscultation bilaterally, equal air entry bilaterally, No wheezing or crackles  HEART: Regular rate and rhythm; normal s1 s2, no murmurs   ABDOMEN: Soft, Nontender, Nondistended  EXTREMITIES:  2+ Peripheral Pulses, no edema   SKIN: No rashes or lesions    LABS:    07-26    138  |  108  |  6<L>  ----------------------------<  130<H>  4.2   |  20  |  <0.5<L>    Ca    7.9<L>      26 Jul 2023 12:11    TPro  5.1<L>  /  Alb  2.7<L>  /  TBili  0.4  /  DBili  0.2  /  AST  16  /  ALT  32  /  AlkPhos  150<H>  07-28      Urinalysis Basic - ( 26 Jul 2023 12:11 )    Color: x / Appearance: x / SG: x / pH: x  Gluc: 130 mg/dL / Ketone: x  / Bili: x / Urobili: x   Blood: x / Protein: x / Nitrite: x   Leuk Esterase: x / RBC: x / WBC x   Sq Epi: x / Non Sq Epi: x / Bacteria: x                RADIOLOGY:

## 2023-07-29 NOTE — PROGRESS NOTE ADULT - SUBJECTIVE AND OBJECTIVE BOX
MONICASARAHGAGANDEEPNIRAJJEAN-PAUL  57y  Female      Patient is a 57y old  Female who presents with a chief complaint of Decreased oral intake with electrolyte disturbances (29 Jul 2023 10:40)      INTERVAL HPI/OVERNIGHT EVENTS:  She feels ok,   Vital Signs Last 24 Hrs  T(C): 36.7 (29 Jul 2023 08:23), Max: 36.8 (28 Jul 2023 16:00)  T(F): 98 (29 Jul 2023 08:23), Max: 98.3 (28 Jul 2023 16:00)  HR: 103 (29 Jul 2023 08:23) (103 - 110)  BP: 116/77 (29 Jul 2023 08:23) (109/86 - 128/85)  BP(mean): --  RR: 18 (28 Jul 2023 23:46) (18 - 18)  SpO2: --          07-28-23 @ 07:01  -  07-29-23 @ 07:00  --------------------------------------------------------  IN: 320 mL / OUT: 1300 mL / NET: -980 mL    07-29-23 @ 07:01  -  07-29-23 @ 13:34  --------------------------------------------------------  IN: 0 mL / OUT: 700 mL / NET: -700 mL            Consultant(s) Notes Reviewed:  [x ] YES  [ ] NO          MEDICATIONS  (STANDING):  calcium carbonate    500 mG (Tums) Chewable 1 Tablet(s) Chew three times a day  dronabinol 2.5 milliGRAM(s) Oral three times a day before meals  enoxaparin Injectable 40 milliGRAM(s) SubCutaneous every 24 hours  famotidine    Tablet 20 milliGRAM(s) Oral two times a day  folic acid 1 milliGRAM(s) Oral daily  letrozole 2.5 milliGRAM(s) Oral daily  levothyroxine 50 MICROGram(s) Oral daily  loratadine 10 milliGRAM(s) Oral daily  melatonin 5 milliGRAM(s) Oral at bedtime  palbociclib 125 milliGRAM(s) Oral daily  sucralfate suspension 1 Gram(s) Oral every 6 hours  tiotropium 2.5 MICROgram(s) Inhaler 2 Puff(s) Inhalation daily    MEDICATIONS  (PRN):  albuterol    90 MICROgram(s) HFA Inhaler 2 Puff(s) Inhalation every 6 hours PRN for shortness of breath and/or wheezing  benzonatate 100 milliGRAM(s) Oral three times a day PRN Cough  guaiFENesin ER 1200 milliGRAM(s) Oral every 12 hours PRN Cough  ibuprofen  Tablet. 200 milliGRAM(s) Oral every 4 hours PRN Mild Pain (1 - 3), Moderate Pain (4 - 6)  methocarbamol 1000 milliGRAM(s) Oral every 6 hours PRN Muscle Spasm      LABS                          10.0   4.13  )-----------( 286      ( 29 Jul 2023 07:06 )             30.3     07-29    139  |  108  |  6<L>  ----------------------------<  113<H>  3.4<L>   |  19  |  <0.5<L>    Ca    8.2<L>      29 Jul 2023 07:06  Mg     1.6     07-29    TPro  5.1<L>  /  Alb  2.9<L>  /  TBili  0.4  /  DBili  x   /  AST  16  /  ALT  27  /  AlkPhos  136<H>  07-29      Urinalysis Basic - ( 29 Jul 2023 07:06 )    Color: x / Appearance: x / SG: x / pH: x  Gluc: 113 mg/dL / Ketone: x  / Bili: x / Urobili: x   Blood: x / Protein: x / Nitrite: x   Leuk Esterase: x / RBC: x / WBC x   Sq Epi: x / Non Sq Epi: x / Bacteria: x        Lactate Trend        CAPILLARY BLOOD GLUCOSE      POCT Blood Glucose.: 101 mg/dL (29 Jul 2023 11:18)        RADIOLOGY & ADDITIONAL TESTS:    Imaging Personally Reviewed:  [ ] YES  [ ] NO    HEALTH ISSUES - PROBLEM Dx:          PHYSICAL EXAM:  GENERAL: NAD, well-developed.  HEAD:  Atraumatic, Normocephalic.  EYES: EOMI, PERRLA, conjunctiva and sclera clear.  NECK: Supple, No JVD.  CHEST/LUNG: Clear to auscultation bilaterally; No wheeze.  HEART: Regular rate and rhythm; S1 S2.   ABDOMEN: Soft, Nontender, Nondistended; Bowel sounds present.  EXTREMITIES:  2+ Peripheral Pulses, No clubbing, cyanosis, or edema.  PSYCH: AAOx3.  NEUROLOGY: non-focal.  SKIN: No rashes or lesions.

## 2023-07-29 NOTE — PROGRESS NOTE ADULT - ASSESSMENT
57 year old female with  hypothyroidism, asthma, former alcohol use, breast cancer with diffuse bone metastasis, status post radiation 5 sessions finished 5/31/23, on letrozole daily presented to the emergency department complaining of decreased appetite and oral intake of 3 weeks duration.     A/p:   Lactic acidosis, resolved  Starvation ketoacidosis, resolved  From poor oral intake.   Continue Marinol for appetite.    Acute URI  RSV infection  Still with cough, no fever. Continue Mucinex.   CXR 7/28 showed stable right lower lobe effusion/atelectasis.     Acute Transaminitis: resolved,   Cholelithiasis  Compensated AlcohholicCirrhosis  US Abdomen Upper Quadrant Right 7/18 - Heterogeneous echotexture to liver with a lobulated contour, findings which can be seen in cirrhosis. Gallstones Small right pleural effusion  CT Abdomen and Pelvis No Cont 7/19 - Findings may represent mild proctitis. No bowel obstruction or free air. Nodularity of the liver may be seen with cirrhosis.  Abdominal duplex 7/21: Patent diminutive right and left portal veins. Patent right, left and middle hepatic veins.  LFT improving, AST 23 ALT 59  hepatology follow up.     Breast cancer with bone mets, New T7 and L4 compression fractures .  CT abdomen showed New T7 and L4 compression fractures, when compared to CT from April 2023. Small right and trace left pleural effusions. Increase in diffuse osseous metastases. Stable right breast mass with nipple retraction.  oncology following given issues with QTc and electrolyte abnormalities, ribociclib d/c'd. C/w letrozole, add Ibrance.  Also recommend medronate 90 mg IV once (given 7/21) because of bony metastases, she can be transitioned to Zometa or Xgeva as outpatient.    Hypothyroidism synthroid    DVT prophylaxis: Lovenox.     DNR/DNI      Disposition: pending STR  No contact room at this time   CM to f/u with [facility] for bed and pending appeal decision for STR

## 2023-07-29 NOTE — PROGRESS NOTE ADULT - SUBJECTIVE AND OBJECTIVE BOX
24H events:    Patient is a 57y old Female who presents with a chief complaint of Decreased oral intake with electrolyte disturbances (27 Jul 2023 16:12)    Primary diagnosis of Adult failure to thrive       Today is hospital day 15d. This morning patient was seen and examined at bedside, resting comfortably in bed. Pt states she has eaten some breakfast.     PAST MEDICAL & SURGICAL HISTORY  Hypothyroidism    H/O cirrhosis    Breast cancer    No significant past surgical history      SOCIAL HISTORY:  Social History:  non smoker, stopped alcohol drinking 9 years ago  lives in [place] with her family (13 Jul 2023 16:53)      ALLERGIES:  No Known Allergies    MEDICATIONS:  MEDICATIONS  (STANDING):  calcium carbonate    500 mG (Tums) Chewable 1 Tablet(s) Chew three times a day  dronabinol 2.5 milliGRAM(s) Oral three times a day before meals  enoxaparin Injectable 40 milliGRAM(s) SubCutaneous every 24 hours  famotidine    Tablet 20 milliGRAM(s) Oral two times a day  folic acid 1 milliGRAM(s) Oral daily  letrozole 2.5 milliGRAM(s) Oral daily  levothyroxine 50 MICROGram(s) Oral daily  loratadine 10 milliGRAM(s) Oral daily  magnesium sulfate  IVPB 2 Gram(s) IV Intermittent once  melatonin 5 milliGRAM(s) Oral at bedtime  palbociclib 125 milliGRAM(s) Oral daily  potassium chloride   Powder 40 milliEquivalent(s) Oral once  sucralfate suspension 1 Gram(s) Oral every 6 hours  tiotropium 2.5 MICROgram(s) Inhaler 2 Puff(s) Inhalation daily    MEDICATIONS  (PRN):  albuterol    90 MICROgram(s) HFA Inhaler 2 Puff(s) Inhalation every 6 hours PRN for shortness of breath and/or wheezing  benzonatate 100 milliGRAM(s) Oral three times a day PRN Cough  guaiFENesin ER 1200 milliGRAM(s) Oral every 12 hours PRN Cough  ibuprofen  Tablet. 200 milliGRAM(s) Oral every 4 hours PRN Mild Pain (1 - 3), Moderate Pain (4 - 6)  methocarbamol 1000 milliGRAM(s) Oral every 6 hours PRN Muscle Spasm    VITALS:   Vital Signs Last 24 Hrs  T(C): 36.7 (29 Jul 2023 08:23), Max: 36.8 (28 Jul 2023 16:00)  T(F): 98 (29 Jul 2023 08:23), Max: 98.3 (28 Jul 2023 16:00)  HR: 103 (29 Jul 2023 08:23) (103 - 110)  BP: 116/77 (29 Jul 2023 08:23) (109/86 - 128/85)  BP(mean): --  RR: 18 (28 Jul 2023 23:46) (18 - 18)  SpO2: --        PHYSICAL EXAM:  GENERAL: NAD  HEAD:  Atraumatic, Normocephalic  EYES: EOMI  NERVOUS SYSTEM:  Alert & Oriented X3, non focal   CHEST/LUNG: unlabored breathing, quiet breath sounds bilaterally   HEART: tachycardic, appreciated s1 s2, no murmurs   ABDOMEN: Soft, Nontender, Nondistended    LABS:                            10.0   4.13  )-----------( 286      ( 29 Jul 2023 07:06 )             30.3       07-29    139  |  108  |  6<L>  ----------------------------<  113<H>  3.4<L>   |  19  |  <0.5<L>    Ca    8.2<L>      29 Jul 2023 07:06  Mg     1.6     07-29    TPro  5.1<L>  /  Alb  2.9<L>  /  TBili  0.4  /  DBili  x   /  AST  16  /  ALT  27  /  AlkPhos  136<H>  07-29          RADIOLOGY:      ACC: [id number] EXAM:  US DPLX ABDOMEN   ORDERED BY: SHAWN CAGLE     PROCEDURE DATE:  07/21/2023      FINDINGS/  IMPRESSION:    Patent diminutive right and left portal veins.    Patent right, left and middle hepatic veins.  ______________________________________________________________    ACC: [id number] EXAM:  XR CHEST PORTABLE ROUTINE 1V   ORDERED BY: ALONSO MARIO     PROCEDURE DATE:  07/28/2023      IMPRESSION:    Increasing right basilar opacity/atelectasis. Bilateral interstitial   opacities.  ____________________________________________________________________      ACC: [id number] EXAM:  ECHO TTE WO CON COMP W DOPP                          PROCEDURE DATE:  07/18/2023      Summary:   1. Left ventricular ejection fraction, by visual estimation, is 55 to   60%.   2. Normal global left ventricular systolic function.   3. Endocardial visualization was enhanced with intravenous echo contrast.

## 2023-07-29 NOTE — PROGRESS NOTE ADULT - ASSESSMENT
57-year-old woman with a history of hypothyroidism, asthma, former alcohol use, breast cancer with diffuse bone metastasis, status post radiation 5 sessions finished 5/31/23, on letrozole daily, was on Ribociclib and was discontinued due to low magnesium with high QTc, surgical history of occiput-T2 posterior instrumentation and fusion, ORIF of C2 body with functional decline and impaired ADLs/mobility, ambulates with a walker. Following with Dr. Peña.  She presented to the emergency department complaining of decreased appetite and oral intake of 3 weeks duration.     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 Gallstones Small right pleural effusion  - CT Abdomen and Pelvis No Cont 7/19 - Findings may represent mild proctitis. No bowel obstruction or free air. Nodularity of the liver may be seen with cirrhosis.  - Abdominal duplex 7/21: Patent diminutive right and left portal veins. Patent right, left and middle hepatic veins.  - LFT improving, AST 23 ALT 59  - monitor LFTs   - case d/w hepatology     # H/o breast cancer with bone mets  # New T7 and L4 compression fractures on imaging probably sec to bone mets  - CT abd: New T7 and L4 compression fractures, when compared to CT from April 2023. Small right and trace left pleural effusions. Increase in diffuse osseous metastases. Stable right breast mass with nipple retraction.  - oncology following given issues with QTc and electrolyte abnormalities, ribociclib d/c'd. C/w letrozole, add Ibrance.  Also recommend medronate 90 mg IV once because of bony metastases, she can be transitioned to Zometa or Xgeva as outpatient there is a nonformulary in our institution.   - c/w letrozole, Ibrance  - S/p medronate 90 mg IV once on 7/21    # Hypothyroidism  - c/w synthroid    #New cough - URI vs dryness from poor po intake   - cough, chills, sneezing 7/27  - c/w Benzonate, Mucinex   - RVP+ 7/27/23   - CXR 7/28/23  Increasing right basilar opacity/atelectasis. Bilateral interstitial   opacities.    # DVT prophylaxis    # DNR/DNI    # Pending:   # Disposition: Needs STR which was denied by [facility]. P2P called today for appeal, Auth awaited pending appeal   7/28/23  Response from [facility] - willing to accept patient  No contact room at this time   CM to f/u with [facility] for bed and pending appeal decision for STR         57-year-old woman with a history of hypothyroidism, asthma, former alcohol use, breast cancer with diffuse bone metastasis, status post radiation 5 sessions finished 5/31/23, on letrozole daily, was on Ribociclib and was discontinued due to low magnesium with high QTc, surgical history of occiput-T2 posterior instrumentation and fusion, ORIF of C2 body with functional decline and impaired ADLs/mobility, ambulates with a walker. Following with Dr. Peña.  She presented to the emergency department complaining of decreased appetite and oral intake of 3 weeks duration.     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 Gallstones Small right pleural effusion  - CT Abdomen and Pelvis No Cont 7/19 - Findings may represent mild proctitis. No bowel obstruction or free air. Nodularity of the liver may be seen with cirrhosis.  - Abdominal duplex 7/21: Patent diminutive right and left portal veins. Patent right, left and middle hepatic veins.  - LFT improving, AST 23 ALT 59  - monitor LFTs   - case d/w hepatology     # H/o breast cancer with bone mets  # New T7 and L4 compression fractures on imaging probably sec to bone mets  - CT abd: New T7 and L4 compression fractures, when compared to CT from April 2023. Small right and trace left pleural effusions. Increase in diffuse osseous metastases. Stable right breast mass with nipple retraction.  - oncology following given issues with QTc and electrolyte abnormalities, ribociclib d/c'd. C/w letrozole, add Ibrance.  Also recommend medronate 90 mg IV once because of bony metastases, she can be transitioned to Zometa or Xgeva as outpatient there is a nonformulary in our institution.   - c/w letrozole, Ibrance  - S/p medronate 90 mg IV once on 7/21    # Hypothyroidism  - c/w synthroid    #New cough - URI vs dryness from poor po intake   - cough, chills, sneezing 7/27  - c/w Benzonate, Mucinex   - RVP+ 7/27/23   - CXR 7/28/23  - Increasing right basilar opacity/atelectasis. Bilateral interstitial   opacities  - Probably 2/2 URI due to RVP     # DVT prophylaxis    # DNR/DNI    # Pending:   # Disposition: Needs STR which was denied by [facility]. P2P called today for appeal, Auth awaited pending appeal   7/28/23  Response from [facility] - willing to accept patient  No contact room at this time   CM to f/u with Replaced by [facility] for bed and pending appeal decision for STR

## 2023-07-30 NOTE — PROGRESS NOTE ADULT - SUBJECTIVE AND OBJECTIVE BOX
MACIEOHCARTER JEAN-PAUL  57y  Female      Patient is a 57y old  Female who presents with a chief complaint of Decreased oral intake with electrolyte disturbances (29 Jul 2023 13:09)      INTERVAL HPI/OVERNIGHT EVENTS:  She feels ok, still with cough, no fever.   Vital Signs Last 24 Hrs  T(C): 36.5 (30 Jul 2023 08:08), Max: 37.1 (29 Jul 2023 23:46)  T(F): 97.7 (30 Jul 2023 08:08), Max: 98.8 (29 Jul 2023 23:46)  HR: 101 (30 Jul 2023 08:08) (101 - 105)  BP: 126/82 (30 Jul 2023 08:08) (116/69 - 137/75)  BP(mean): --  RR: 18 (29 Jul 2023 23:46) (18 - 18)  SpO2: --          07-29-23 @ 07:01  -  07-30-23 @ 07:00  --------------------------------------------------------  IN: 0 mL / OUT: 700 mL / NET: -700 mL            Consultant(s) Notes Reviewed:  [x ] YES  [ ] NO          MEDICATIONS  (STANDING):  calcium carbonate    500 mG (Tums) Chewable 1 Tablet(s) Chew three times a day  dronabinol 2.5 milliGRAM(s) Oral three times a day before meals  enoxaparin Injectable 40 milliGRAM(s) SubCutaneous every 24 hours  famotidine    Tablet 20 milliGRAM(s) Oral two times a day  folic acid 1 milliGRAM(s) Oral daily  letrozole 2.5 milliGRAM(s) Oral daily  levothyroxine 50 MICROGram(s) Oral daily  loratadine 10 milliGRAM(s) Oral daily  melatonin 5 milliGRAM(s) Oral at bedtime  palbociclib 125 milliGRAM(s) Oral daily  sucralfate suspension 1 Gram(s) Oral every 6 hours  tiotropium 2.5 MICROgram(s) Inhaler 2 Puff(s) Inhalation daily    MEDICATIONS  (PRN):  albuterol    90 MICROgram(s) HFA Inhaler 2 Puff(s) Inhalation every 6 hours PRN for shortness of breath and/or wheezing  benzonatate 100 milliGRAM(s) Oral three times a day PRN Cough  ibuprofen  Tablet. 200 milliGRAM(s) Oral every 4 hours PRN Mild Pain (1 - 3), Moderate Pain (4 - 6)  methocarbamol 1000 milliGRAM(s) Oral every 6 hours PRN Muscle Spasm      LABS                          10.2   2.99  )-----------( 261      ( 30 Jul 2023 07:35 )             32.1     07-30    140  |  109  |  7<L>  ----------------------------<  103<H>  3.5   |  20  |  <0.5<L>    Ca    8.3<L>      30 Jul 2023 07:35  Mg     1.9     07-30    TPro  5.1<L>  /  Alb  3.0<L>  /  TBili  0.4  /  DBili  x   /  AST  16  /  ALT  21  /  AlkPhos  135<H>  07-30      Urinalysis Basic - ( 30 Jul 2023 07:35 )    Color: x / Appearance: x / SG: x / pH: x  Gluc: 103 mg/dL / Ketone: x  / Bili: x / Urobili: x   Blood: x / Protein: x / Nitrite: x   Leuk Esterase: x / RBC: x / WBC x   Sq Epi: x / Non Sq Epi: x / Bacteria: x        Lactate Trend        CAPILLARY BLOOD GLUCOSE      POCT Blood Glucose.: 95 mg/dL (30 Jul 2023 07:50)        RADIOLOGY & ADDITIONAL TESTS:    Imaging Personally Reviewed:  [ ] YES  [ ] NO    HEALTH ISSUES - PROBLEM Dx:          PHYSICAL EXAM:  GENERAL: NAD, well-developed.  HEAD:  Atraumatic, Normocephalic.  EYES: EOMI, PERRLA, conjunctiva and sclera clear.  NECK: Supple, No JVD.  CHEST/LUNG: Clear to auscultation bilaterally; No wheeze.  HEART: Regular rate and rhythm; S1 S2.   ABDOMEN: Soft, Nontender, Nondistended; Bowel sounds present.  EXTREMITIES:  2+ Peripheral Pulses, No clubbing, cyanosis, or edema.  PSYCH: AAOx3.  NEUROLOGY: non-focal.  SKIN: No rashes or lesions.

## 2023-07-30 NOTE — PROGRESS NOTE ADULT - ASSESSMENT
57 year old female with  hypothyroidism, asthma, former alcohol use, breast cancer with diffuse bone metastasis, status post radiation 5 sessions finished 5/31/23, on letrozole daily presented to the emergency department complaining of decreased appetite and oral intake of 3 weeks duration.     A/p:   Lactic acidosis, resolved  Starvation ketoacidosis, resolved  From poor oral intake.   Continue Marinol for appetite.    Acute URI  Parainfluenza infection:   Still with cough, no fever. Continue Mucinex.   CXR 7/28 showed stable right lower lobe effusion/atelectasis.     Acute Transaminitis: resolved,   Cholelithiasis  Compensated AlcohholicCirrhosis  US Abdomen Upper Quadrant Right 7/18 - Heterogeneous echotexture to liver with a lobulated contour, findings which can be seen in cirrhosis. Gallstones Small right pleural effusion  CT Abdomen and Pelvis No Cont 7/19 - Findings may represent mild proctitis. No bowel obstruction or free air. Nodularity of the liver may be seen with cirrhosis.  Abdominal duplex 7/21: Patent diminutive right and left portal veins. Patent right, left and middle hepatic veins.  LFT improving, AST 23 ALT 59  hepatology follow up.     Breast cancer with bone mets, New T7 and L4 compression fractures .  CT abdomen showed New T7 and L4 compression fractures, when compared to CT from April 2023. Small right and trace left pleural effusions. Increase in diffuse osseous metastases. Stable right breast mass with nipple retraction.  oncology following given issues with QTc and electrolyte abnormalities, ribociclib d/c'd. C/w letrozole, add Ibrance.  Also recommend medronate 90 mg IV once (given 7/21) because of bony metastases, she can be transitioned to Zometa or Xgeva as outpatient.    Hypothyroidism synthroid    DVT prophylaxis: Lovenox.     DNR/DNI      Disposition: pending STR  CM to f/u with [facility] for bed and pending appeal decision for STR

## 2023-07-31 NOTE — PROGRESS NOTE ADULT - SUBJECTIVE AND OBJECTIVE BOX
JEAN-PAUL GIANG  57y  [facility] [address]      Patient is a 57y old  Female who presents with a chief complaint of Decreased oral intake with electrolyte disturbances (31 Jul 2023 09:02)      My note supersedes the resident's note      INTERVAL HPI/OVERNIGHT EVENTS:  no  acute events overnight       REVIEW OF SYSTEMS:  CONSTITUTIONAL: No fever, weight loss, or fatigue  EYES: No eye pain, visual disturbances, or discharge  ENMT:  No difficulty hearing, tinnitus, vertigo; No sinus or throat pain  NECK: No pain or stiffness  BREASTS: No pain, masses, or nipple discharge  RESPIRATORY:  mild cough reported   CARDIOVASCULAR: No chest pain, palpitations, dizziness, or leg swelling  GASTROINTESTINAL: No abdominal or epigastric pain. No nausea, vomiting, or hematemesis; No diarrhea or constipation. No melena or hematochezia.  GENITOURINARY: No dysuria, frequency, hematuria, or incontinence  NEUROLOGICAL: No headaches, memory loss, loss of strength, numbness, or tremors  SKIN: No itching, burning, rashes, or lesions   LYMPH NODES: No enlarged glands  ENDOCRINE: No heat or cold intolerance; No hair loss  MUSCULOSKELETAL: No joint pain or swelling; No muscle, back, or extremity pain  PSYCHIATRIC: No depression, anxiety, mood swings, or difficulty sleeping  HEME/LYMPH: No easy bruising, or bleeding gums  ALLERY AND IMMUNOLOGIC: No hives or eczema  FAMILY HISTORY:  FHx: melanoma (Mother)    FHx: arrhythmia (Father)      T(C): 35.8 (07-31-23 @ 08:36), Max: 36.1 (07-30-23 @ 23:30)  HR: 99 (07-31-23 @ 08:36) (99 - 100)  BP: 130/85 (07-31-23 @ 08:36) (130/85 - 145/83)  RR: 18 (07-30-23 @ 23:30) (18 - 18)  SpO2: --  Wt(kg): --Vital Signs Last 24 Hrs  T(C): 35.8 (31 Jul 2023 08:36), Max: 36.1 (30 Jul 2023 23:30)  T(F): 96.4 (31 Jul 2023 08:36), Max: 97 (30 Jul 2023 23:30)  HR: 99 (31 Jul 2023 08:36) (99 - 100)  BP: 130/85 (31 Jul 2023 08:36) (130/85 - 145/83)  BP(mean): --  RR: 18 (30 Jul 2023 23:30) (18 - 18)  SpO2: --        PHYSICAL EXAM:  GENERAL: NAD,   HEAD:  Atraumatic, Normocephalic  EYES: EOMI, PERRLA, conjunctiva and sclera clear  ENMT: No tonsillar erythema, exudates, or enlargement; Moist mucous membranes, Good dentition, No lesions  NECK: Supple, No JVD, Normal thyroid  NERVOUS SYSTEM:  Alert & Oriented X3, Good concentration; Motor Strength 5/5 B/L upper and lower extremities; DTRs 2+ intact and symmetric  PULM: Clear to auscultation bilaterally  CARDIAC: Regular rate and rhythm; No murmurs, rubs, or gallops  GI: Soft, Nontender, Nondistended; Bowel sounds present  EXTREMITIES:  2+ Peripheral Pulses, No clubbing, cyanosis, or edema  LYMPH: No lymphadenopathy noted  SKIN: No rashes or lesions    Consultant(s) Notes Reviewed:  [x ] YES  [ ] NO  Care Discussed with Consultants/Other Providers [ x] YES  [ ] NO    LABS:                            10.3   3.37  )-----------( 281      ( 31 Jul 2023 08:30 )             31.7   07-31    139  |  109  |  8<L>  ----------------------------<  99  3.4<L>   |  19  |  <0.5<L>    Ca    8.3<L>      31 Jul 2023 08:30  Mg     1.7     07-31    TPro  5.3<L>  /  Alb  3.1<L>  /  TBili  0.5  /  DBili  x   /  AST  13  /  ALT  18  /  AlkPhos  139<H>  07-31            albuterol    90 MICROgram(s) HFA Inhaler 2 Puff(s) Inhalation every 6 hours PRN  benzonatate 100 milliGRAM(s) Oral three times a day PRN  calcium carbonate    500 mG (Tums) Chewable 1 Tablet(s) Chew three times a day  dronabinol 2.5 milliGRAM(s) Oral three times a day before meals  enoxaparin Injectable 40 milliGRAM(s) SubCutaneous every 24 hours  famotidine    Tablet 20 milliGRAM(s) Oral two times a day  folic acid 1 milliGRAM(s) Oral daily  guaiFENesin Oral Liquid (Sugar-Free) 200 milliGRAM(s) Oral every 6 hours PRN  ibuprofen  Tablet. 200 milliGRAM(s) Oral every 4 hours PRN  letrozole 2.5 milliGRAM(s) Oral daily  levothyroxine 50 MICROGram(s) Oral daily  loratadine 10 milliGRAM(s) Oral daily  melatonin 5 milliGRAM(s) Oral at bedtime  methocarbamol 1000 milliGRAM(s) Oral every 6 hours PRN  palbociclib 125 milliGRAM(s) Oral daily  sucralfate suspension 1 Gram(s) Oral every 6 hours  tiotropium 2.5 MICROgram(s) Inhaler 2 Puff(s) Inhalation daily      HEALTH ISSUES - PROBLEM Dx:          Case Discussed with House Staff   Spectra [phone]

## 2023-07-31 NOTE — PROGRESS NOTE ADULT - ATTENDING COMMENTS
Patient examined by myself , above reviewed.  Complains of cough , no SOB , no back pain . Breast mass and skin changes have diminished on treatment .  continue on letrozole and Ibrance .

## 2023-07-31 NOTE — PROGRESS NOTE ADULT - ASSESSMENT
57-year-old woman with a history of hypothyroidism, asthma, former alcohol use, breast cancer with diffuse bone metastasis, status post radiation 5 sessions finished 5/31/23, on letrozole daily, was on Ribociclib and was discontinued due to low magnesium with high QTc, surgical history of occiput-T2 posterior instrumentation and fusion, ORIF of C2 body with functional decline and impaired ADLs/mobility, ambulates with a walker. Following with Dr. Peña.  She presented to the emergency department complaining of decreased appetite and oral intake of 3 weeks duration.     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 Gallstones Small right pleural effusion  - CT Abdomen and Pelvis No Cont 7/19 - Findings may represent mild proctitis. No bowel obstruction or free air. Nodularity of the liver may be seen with cirrhosis.  - Abdominal duplex 7/21: Patent diminutive right and left portal veins. Patent right, left and middle hepatic veins.  - LFT improving, AST 23 ALT 59  - monitor LFTs   - case d/w hepatology     # H/o breast cancer with bone mets  # New T7 and L4 compression fractures on imaging probably sec to bone mets  - CT abd: New T7 and L4 compression fractures, when compared to CT from April 2023. Small right and trace left pleural effusions. Increase in diffuse osseous metastases. Stable right breast mass with nipple retraction.  - oncology following given issues with QTc and electrolyte abnormalities, ribociclib d/c'd. C/w letrozole, add Ibrance.  Also recommend medronate 90 mg IV once because of bony metastases, she can be transitioned to Zometa or Xgeva as outpatient there is a nonformulary in our institution.   - c/w letrozole, Ibrance  - S/p medronate 90 mg IV once on 7/21    # Hypothyroidism  - c/w synthroid    # URI   - cough, chills, sneezing 7/27  - c/w Benzonate, Mucinex   - Parainfluenza infection:   - Still with cough, no fever. Continue Mucinex.   - CXR 7/28 showed stable right lower lobe effusion/atelectasis.     # DVT prophylaxis    # DNR/DNI    # Pending:   # Disposition: Needs STR which was denied by [facility]. P2P called today for appeal, Auth awaited pending appeal   7/28/23  Response from [facility] - willing to accept patient  No contact room at this time   CM to f/u with [facility] for bed and pending appeal decision for STR         57-year-old woman with a history of hypothyroidism, asthma, former alcohol use, breast cancer with diffuse bone metastasis, status post radiation 5 sessions finished 5/31/23, on letrozole daily, was on Ribociclib and was discontinued due to low magnesium with high QTc, surgical history of occiput-T2 posterior instrumentation and fusion, ORIF of C2 body with functional decline and impaired ADLs/mobility, ambulates with a walker. Following with Dr. Peña.  She presented to the emergency department complaining of decreased appetite and oral intake of 3 weeks duration.     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 Gallstones Small right pleural effusion  - CT Abdomen and Pelvis No Cont 7/19 - Findings may represent mild proctitis. No bowel obstruction or free air. Nodularity of the liver may be seen with cirrhosis.  - Abdominal duplex 7/21: Patent diminutive right and left portal veins. Patent right, left and middle hepatic veins.  - LFT improving, AST 23 ALT 59  - monitor LFTs   - case d/w hepatology     # H/o breast cancer with bone mets  # New T7 and L4 compression fractures on imaging probably sec to bone mets  - CT abd: New T7 and L4 compression fractures, when compared to CT from April 2023. Small right and trace left pleural effusions. Increase in diffuse osseous metastases. Stable right breast mass with nipple retraction.  - oncology following given issues with QTc and electrolyte abnormalities, ribociclib d/c'd. C/w letrozole, add Ibrance.  Also recommend medronate 90 mg IV once because of bony metastases, she can be transitioned to Zometa or Xgeva as outpatient there is a nonformulary in our institution.   - c/w letrozole, Ibrance  - S/p medronate 90 mg IV once on 7/21    # Hypothyroidism  - c/w synthroid    # URI   - cough, chills, sneezing 7/27  - c/w Benzonate, Mucinex   - Parainfluenza infection  - Still with cough, no fever. Continue Mucinex.   - CXR 7/28 showed stable right lower lobe effusion/atelectasis.     # DVT prophylaxis    # DNR/DNI    # Pending:   # Disposition: Needs STR which was denied by [facility]. P2P called today for appeal, Auth awaited pending appeal   7/28/23  Response from [facility] - willing to accept patient  No contact room at this time   CM to f/u with [facility] for bed and pending appeal decision for STR

## 2023-07-31 NOTE — PROGRESS NOTE ADULT - SUBJECTIVE AND OBJECTIVE BOX
24H events:    Patient is a 57y old Female who presents with a chief complaint of Decreased oral intake with electrolyte disturbances (27 Jul 2023 16:12)    Primary diagnosis of Adult failure to thrive    Today is hospital day 18d. This morning patient was seen and examined at bedside, resting comfortably in bed. Pt complains of cough, denies any CP, SOB, abdominal pain or N/V     PAST MEDICAL & SURGICAL HISTORY  Hypothyroidism    H/O cirrhosis    Breast cancer    No significant past surgical history      SOCIAL HISTORY:  Social History:  non smoker, stopped alcohol drinking 9 years ago  lives in [place] with her family (13 Jul 2023 16:53)      ALLERGIES:  No Known Allergies    MEDICATIONS:  MEDICATIONS  (STANDING):  calcium carbonate    500 mG (Tums) Chewable 1 Tablet(s) Chew three times a day  dronabinol 2.5 milliGRAM(s) Oral three times a day before meals  enoxaparin Injectable 40 milliGRAM(s) SubCutaneous every 24 hours  famotidine    Tablet 20 milliGRAM(s) Oral two times a day  folic acid 1 milliGRAM(s) Oral daily  letrozole 2.5 milliGRAM(s) Oral daily  levothyroxine 50 MICROGram(s) Oral daily  loratadine 10 milliGRAM(s) Oral daily  melatonin 5 milliGRAM(s) Oral at bedtime  palbociclib 125 milliGRAM(s) Oral daily  sucralfate suspension 1 Gram(s) Oral every 6 hours  tiotropium 2.5 MICROgram(s) Inhaler 2 Puff(s) Inhalation daily    MEDICATIONS  (PRN):  albuterol    90 MICROgram(s) HFA Inhaler 2 Puff(s) Inhalation every 6 hours PRN for shortness of breath and/or wheezing  benzonatate 100 milliGRAM(s) Oral three times a day PRN Cough  ibuprofen  Tablet. 200 milliGRAM(s) Oral every 4 hours PRN Mild Pain (1 - 3), Moderate Pain (4 - 6)  methocarbamol 1000 milliGRAM(s) Oral every 6 hours PRN Muscle Spasm    VITALS:   Vital Signs Last 24 Hrs  T(C): 36.1 (30 Jul 2023 23:30), Max: 36.1 (30 Jul 2023 23:30)  T(F): 97 (30 Jul 2023 23:30), Max: 97 (30 Jul 2023 23:30)  HR: 100 (30 Jul 2023 23:30) (100 - 100)  BP: 145/83 (30 Jul 2023 23:30) (145/83 - 145/83)  BP(mean): --  RR: 18 (30 Jul 2023 23:30) (18 - 18)  SpO2: --        PHYSICAL EXAM:  GENERAL: NAD  HEAD:  Atraumatic, Normocephalic  EYES: EOMI  NERVOUS SYSTEM:  Alert & Oriented X3, non focal   CHEST/LUNG: unlabored breathing   HEART: nonedematous extremities   ABDOMEN: Soft, Nontender, Nondistended    LABS:                          10.2   2.99  )-----------( 261      ( 30 Jul 2023 07:35 )             32.1       07-30    140  |  109  |  7<L>  ----------------------------<  103<H>  3.5   |  20  |  <0.5<L>    Ca    8.3<L>      30 Jul 2023 07:35  Mg     1.9     07-30    TPro  5.1<L>  /  Alb  3.0<L>  /  TBili  0.4  /  DBili  x   /  AST  16  /  ALT  21  /  AlkPhos  135<H>  07-30              RADIOLOGY:      ACC: [id number] EXAM:  US DPLX ABDOMEN   ORDERED BY: SHAWN CAGLE     PROCEDURE DATE:  07/21/2023      FINDINGS/  IMPRESSION:    Patent diminutive right and left portal veins.    Patent right, left and middle hepatic veins.  ______________________________________________________________    ACC: [id number] EXAM:  XR CHEST PORTABLE ROUTINE 1V   ORDERED BY: ALONSO MARIO     PROCEDURE DATE:  07/28/2023      IMPRESSION:    Increasing right basilar opacity/atelectasis. Bilateral interstitial   opacities.  ____________________________________________________________________      ACC: [id number] EXAM:  ECHO TTE WO CON COMP W DOPP                          PROCEDURE DATE:  07/18/2023      Summary:   1. Left ventricular ejection fraction, by visual estimation, is 55 to   60%.   2. Normal global left ventricular systolic function.   3. Endocardial visualization was enhanced with intravenous echo contrast.     24H events:    Patient is a 57y old Female who presents with a chief complaint of Decreased oral intake with electrolyte disturbances (27 Jul 2023 16:12)    Primary diagnosis of Adult failure to thrive    Today is hospital day 18d. This morning patient was seen and examined at bedside, resting comfortably in bed. Pt complains of cough, denies any CP, SOB, abdominal pain or N/V     PAST MEDICAL & SURGICAL HISTORY  Hypothyroidism    H/O cirrhosis    Breast cancer    No significant past surgical history      SOCIAL HISTORY:  Social History:  non smoker, stopped alcohol drinking 9 years ago  lives in [place] with her family (13 Jul 2023 16:53)      ALLERGIES:  No Known Allergies    MEDICATIONS:  MEDICATIONS  (STANDING):  calcium carbonate    500 mG (Tums) Chewable 1 Tablet(s) Chew three times a day  dronabinol 2.5 milliGRAM(s) Oral three times a day before meals  enoxaparin Injectable 40 milliGRAM(s) SubCutaneous every 24 hours  famotidine    Tablet 20 milliGRAM(s) Oral two times a day  folic acid 1 milliGRAM(s) Oral daily  letrozole 2.5 milliGRAM(s) Oral daily  levothyroxine 50 MICROGram(s) Oral daily  loratadine 10 milliGRAM(s) Oral daily  melatonin 5 milliGRAM(s) Oral at bedtime  palbociclib 125 milliGRAM(s) Oral daily  sucralfate suspension 1 Gram(s) Oral every 6 hours  tiotropium 2.5 MICROgram(s) Inhaler 2 Puff(s) Inhalation daily    MEDICATIONS  (PRN):  albuterol    90 MICROgram(s) HFA Inhaler 2 Puff(s) Inhalation every 6 hours PRN for shortness of breath and/or wheezing  benzonatate 100 milliGRAM(s) Oral three times a day PRN Cough  ibuprofen  Tablet. 200 milliGRAM(s) Oral every 4 hours PRN Mild Pain (1 - 3), Moderate Pain (4 - 6)  methocarbamol 1000 milliGRAM(s) Oral every 6 hours PRN Muscle Spasm    VITALS:   Vital Signs Last 24 Hrs  T(C): 36.1 (30 Jul 2023 23:30), Max: 36.1 (30 Jul 2023 23:30)  T(F): 97 (30 Jul 2023 23:30), Max: 97 (30 Jul 2023 23:30)  HR: 100 (30 Jul 2023 23:30) (100 - 100)  BP: 145/83 (30 Jul 2023 23:30) (145/83 - 145/83)  BP(mean): --  RR: 18 (30 Jul 2023 23:30) (18 - 18)  SpO2: --      PHYSICAL EXAM:  GENERAL: NAD  HEAD:  Atraumatic, Normocephalic  EYES: EOMI  NERVOUS SYSTEM:  Alert & Oriented X3, non focal   CHEST/LUNG: unlabored breathing   HEART: nonedematous extremities   ABDOMEN: Soft, Nontender, Nondistended    LABS:                                10.3   3.37  )-----------( 281      ( 31 Jul 2023 08:30 )             31.7       07-31    139  |  109  |  8<L>  ----------------------------<  99  3.4<L>   |  19  |  <0.5<L>    Ca    8.3<L>      31 Jul 2023 08:30  Mg     1.7     07-31    TPro  5.3<L>  /  Alb  3.1<L>  /  TBili  0.5  /  DBili  x   /  AST  13  /  ALT  18  /  AlkPhos  139<H>  07-31            RADIOLOGY:      ACC: [id number] EXAM:  US DPLX ABDOMEN   ORDERED BY: SHAWN CAGLE     PROCEDURE DATE:  07/21/2023      FINDINGS/  IMPRESSION:    Patent diminutive right and left portal veins.    Patent right, left and middle hepatic veins.  ______________________________________________________________    ACC: [id number] EXAM:  XR CHEST PORTABLE ROUTINE 1V   ORDERED BY: ALONSO MARIO     PROCEDURE DATE:  07/28/2023      IMPRESSION:    Increasing right basilar opacity/atelectasis. Bilateral interstitial   opacities.  ____________________________________________________________________      ACC: [id number] EXAM:  ECHO TTE WO CON COMP W DOPP                          PROCEDURE DATE:  07/18/2023      Summary:   1. Left ventricular ejection fraction, by visual estimation, is 55 to   60%.   2. Normal global left ventricular systolic function.   3. Endocardial visualization was enhanced with intravenous echo contrast.

## 2023-07-31 NOTE — PROGRESS NOTE ADULT - SUBJECTIVE AND OBJECTIVE BOX
SUBJECTIVE:    Patient is a 57y old Female who presents with a chief complaint of Decreased oral intake with electrolyte disturbances (31 Jul 2023 13:11)    Currently admitted to medicine with the primary diagnosis of Adult failure to thrive       Today is hospital day 18d. This morning she is resting comfortably in bed and reports no new issues or overnight events.     PAST MEDICAL & SURGICAL HISTORY  Hypothyroidism    H/O cirrhosis    Breast cancer    No significant past surgical history      SOCIAL HISTORY:  Negative for smoking/alcohol/drug use.     ALLERGIES:  No Known Allergies    MEDICATIONS:  STANDING MEDICATIONS  calcium carbonate    500 mG (Tums) Chewable 1 Tablet(s) Chew three times a day  dronabinol 2.5 milliGRAM(s) Oral three times a day before meals  enoxaparin Injectable 40 milliGRAM(s) SubCutaneous every 24 hours  famotidine    Tablet 20 milliGRAM(s) Oral two times a day  folic acid 1 milliGRAM(s) Oral daily  letrozole 2.5 milliGRAM(s) Oral daily  levothyroxine 50 MICROGram(s) Oral daily  loratadine 10 milliGRAM(s) Oral daily  melatonin 5 milliGRAM(s) Oral at bedtime  palbociclib 125 milliGRAM(s) Oral daily  sucralfate suspension 1 Gram(s) Oral every 6 hours  tiotropium 2.5 MICROgram(s) Inhaler 2 Puff(s) Inhalation daily    PRN MEDICATIONS  albuterol    90 MICROgram(s) HFA Inhaler 2 Puff(s) Inhalation every 6 hours PRN  benzonatate 100 milliGRAM(s) Oral three times a day PRN  guaiFENesin Oral Liquid (Sugar-Free) 200 milliGRAM(s) Oral every 6 hours PRN  ibuprofen  Tablet. 200 milliGRAM(s) Oral every 4 hours PRN  methocarbamol 1000 milliGRAM(s) Oral every 6 hours PRN    VITALS:   T(F): 96.4  HR: 99  BP: 130/85  RR: 18  SpO2: --    LABS:                        10.3   3.37  )-----------( 281      ( 31 Jul 2023 08:30 )             31.7     07-31    139  |  109  |  8<L>  ----------------------------<  99  3.4<L>   |  19  |  <0.5<L>    Ca    8.3<L>      31 Jul 2023 08:30  Mg     1.7     07-31    TPro  5.3<L>  /  Alb  3.1<L>  /  TBili  0.5  /  DBili  x   /  AST  13  /  ALT  18  /  AlkPhos  139<H>  07-31      Urinalysis Basic - ( 31 Jul 2023 08:30 )    Color: x / Appearance: x / SG: x / pH: x  Gluc: 99 mg/dL / Ketone: x  / Bili: x / Urobili: x   Blood: x / Protein: x / Nitrite: x   Leuk Esterase: x / RBC: x / WBC x   Sq Epi: x / Non Sq Epi: x / Bacteria: x          PHYSICAL EXAM:  GEN: No acute distress  LUNGS: Clear to auscultation bilaterally   HEART: Regular  ABD: Soft, non-tender, non-distended.  EXT: NC/NC/NE/2+PP/WINTER/Skin Intact.   NEURO: AAOX3

## 2023-07-31 NOTE — PROGRESS NOTE ADULT - ASSESSMENT
57 year old female with  hypothyroidism, asthma, former alcohol use, breast cancer with diffuse bone metastasis, status post radiation 5 sessions finished 5/31/23, on letrozole daily presented to the emergency department complaining of decreased appetite and oral intake of 3 weeks duration.       #Lactic acidosis, resolved    #Starvation ketoacidosis, resolved  From poor oral intake.   Continue Marinol for appetite.    #Overweight BMI 27 patient needs to see dieitian outpatient for further evaluation     #Acute URI improving     #Hypokalemia replete     #Acute Transaminitis: resolved,     #Folic acid deficiency     #Insomnia on melatonin     #Hypothyroid on synthroid     #Cholelithiasis    #Compensated AlcohholicCirrhosis  hepatology follow up.     #Breast cancer with bone mets, New T7 and L4 compression fractures .  CT abdomen showed New T7 and L4 compression fractures, when compared to CT from April 2023. Small right and trace left pleural effusions. Increase in diffuse osseous metastases. Stable right breast mass with nipple retraction.  oncology following given issues with QTc and electrolyte abnormalities, ribociclib d/c'd. C/w letrozole, add Ibrance.  Also recommend medronate 90 mg IV once (given 7/21) because of bony metastases, she can be transitioned to Zometa or Xgeva as outpatient.    #Hypothyroidism synthroid    #DVT prophylaxis: Lovenox.     DNR/DNI      PROGRESS NOTE HANDOFF    Pending:  disposition planning     Family discussion: patient verbalized understanding and agreeable to plan of care     Disposition: SNF

## 2023-07-31 NOTE — PROGRESS NOTE ADULT - ASSESSMENT
58 yo F PMHx metastatic breast cancer, hypothyroidism, former alcohol use and asthma who presented to the ED for decreased PO intake. Admitted for multiple electrolytes abnormalities.     # [place] metastatic Breast Cancer (ER +1 ND +1, HER2 negative,  Ki-67 < 5%)  - Diagnosed in 4/23  - Initially presented with back pain in 3/23, MRI showed evidence of metastasis in C2 vertebra, s/p internal fixation and biopsy in 4/23  - s/p RT to C spine x5 sessions, last in 5/31/2023  - CA 27.29: 55. CEA 3.7 in 4/23  - CT chest/abdomen/pelvis 4/23: multiple enlarged mediastinal LN and paratracheal LN. No visceral mets.  - MRI spine 4/23: Numerous enhancing osseous metastatic lesions are noted throughout the cervical spine most significant at the C2 and C3 level with evidence of epidural extension causing severe spinal stenosis and mass effect upon the spinal cord at C2-C3.  - MRI brain 7/16/23: no evidence of metastasis  - currently on letrozole 2.5 mg daily  - Ribocilib (600mg daily for 21 days on, 7 days off) started on 5/25 currently on hold given prolonged QTC (only   received it 3 weeks).  - CT A/P 7/19 showed New T7 and L4 compression fractures and Increase in diffuse osseous metastases  - on 7/21/23: Started on Ibrance 125 mg daily, 3 weeks on and 1 week off, given new ct findings of increase osseous metastasis.  - Received IV pamidronate on 7/21    # Parainfluenza pneumonia  - supportive care as per primary team    Recommendations:  - c/w ibrance 125 mg daily, 3 weeks on and 1 week off.  - c/w letrozole 2.5 mg daily  - Outpatient follow up with Dr. Peña upon discharge

## 2023-08-01 NOTE — PROGRESS NOTE ADULT - ASSESSMENT
57-year-old woman with a history of hypothyroidism, asthma, former alcohol use, breast cancer with diffuse bone metastasis, status post radiation 5 sessions finished 5/31/23, on letrozole daily, was on Ribociclib and was discontinued due to low magnesium with high QTc, surgical history of occiput-T2 posterior instrumentation and fusion, ORIF of C2 body with functional decline and impaired ADLs/mobility, ambulates with a walker. Following with Dr. Peña.  She presented to the emergency department complaining of decreased appetite and oral intake of 3 weeks duration.     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 Gallstones Small right pleural effusion  - CT Abdomen and Pelvis No Cont 7/19 - Findings may represent mild proctitis. No bowel obstruction or free air. Nodularity of the liver may be seen with cirrhosis.  - Abdominal duplex 7/21: Patent diminutive right and left portal veins. Patent right, left and middle hepatic veins.  - LFT improving, AST 23 ALT 59  - monitor LFTs   - case d/w hepatology     # H/o breast cancer with bone mets  # New T7 and L4 compression fractures on imaging probably sec to bone mets  - CT abd: New T7 and L4 compression fractures, when compared to CT from April 2023. Small right and trace left pleural effusions. Increase in diffuse osseous metastases. Stable right breast mass with nipple retraction.  - oncology following given issues with QTc and electrolyte abnormalities, ribociclib d/c'd. C/w letrozole, add Ibrance.  Also recommend medronate 90 mg IV once because of bony metastases, she can be transitioned to Zometa or Xgeva as outpatient there is a nonformulary in our institution.   - c/w letrozole, Ibrance  - S/p medronate 90 mg IV once on 7/21    # Hypothyroidism  - c/w synthroid    # URI   - cough, chills, sneezing 7/27  - c/w Benzonate, Mucinex   - Parainfluenza infection  - Still with cough, no fever. Continue Mucinex.   - CXR 7/28 showed stable right lower lobe effusion/atelectasis.     # DVT prophylaxis    # DNR/DNI    # Pending:   # Disposition: Per [facility] documentation case is pending review REF# [id number] -  determination can take up to 15 calendar days.  Dispo: [facility] pending outcome of appeal.         57-year-old woman with a history of hypothyroidism, asthma, former alcohol use, breast cancer with diffuse bone metastasis, status post radiation 5 sessions finished 5/31/23, on letrozole daily, was on Ribociclib and was discontinued due to low magnesium with high QTc, surgical history of occiput-T2 posterior instrumentation and fusion, ORIF of C2 body with functional decline and impaired ADLs/mobility, ambulates with a walker. Following with Dr. Peña.  She presented to the emergency department complaining of decreased appetite and oral intake of 3 weeks duration.     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 Gallstones Small right pleural effusion  - CT Abdomen and Pelvis No Cont 7/19 - Findings may represent mild proctitis. No bowel obstruction or free air. Nodularity of the liver may be seen with cirrhosis.  - Abdominal duplex 7/21: Patent diminutive right and left portal veins. Patent right, left and middle hepatic veins.  - LFT improving, AST 23 ALT 59  - monitor LFTs   - case d/w hepatology     # H/o breast cancer with bone mets  # New T7 and L4 compression fractures on imaging probably sec to bone mets  - CT abd: New T7 and L4 compression fractures, when compared to CT from April 2023. Small right and trace left pleural effusions. Increase in diffuse osseous metastases. Stable right breast mass with nipple retraction.  - oncology following given issues with QTc and electrolyte abnormalities, ribociclib d/c'd. C/w letrozole, add Ibrance.  Also recommend medronate 90 mg IV once because of bony metastases, she can be transitioned to Zometa or Xgeva as outpatient there is a nonformulary in our institution.   - S/p medronate 90 mg IV once on 7/21 7/31/23   - c/w ibrance 125 mg daily, 3 weeks on and 1 week off.  - c/w letrozole 2.5 mg daily  - Outpatient follow up with Dr. Peañ upon discharge    # Hypothyroidism  - c/w synthroid    # URI   - cough, chills, sneezing 7/27  - c/w Benzonate, Mucinex   - Parainfluenza infection  - Still with cough, no fever. Continue Mucinex.   - CXR 7/28 showed stable right lower lobe effusion/atelectasis.     # DVT prophylaxis    # DNR/DNI    # Pending:   # Disposition: Per [facility] documentation case is pending review REF# [id number] -  determination can take up to 15 calendar days.  Dispo: [facility] pending outcome of appeal.

## 2023-08-01 NOTE — PROGRESS NOTE ADULT - SUBJECTIVE AND OBJECTIVE BOX
24H events:    Patient is a 57y old Female who presents with a chief complaint of Decreased oral intake with electrolyte disturbances (27 Jul 2023 16:12)    Primary diagnosis of Adult failure to thrive    Today is hospital day 19d. This morning patient was seen and examined at bedside, resting comfortably in bed. Pt complains of cough, denies any CP, SOB, abdominal pain or N/V     PAST MEDICAL & SURGICAL HISTORY  Hypothyroidism    H/O cirrhosis    Breast cancer    No significant past surgical history      SOCIAL HISTORY:  Social History:  non smoker, stopped alcohol drinking 9 years ago  lives in [place] with her family (13 Jul 2023 16:53)      ALLERGIES:  No Known Allergies    MEDICATIONS:  MEDICATIONS  (STANDING):  calcium carbonate    500 mG (Tums) Chewable 1 Tablet(s) Chew three times a day  dronabinol 2.5 milliGRAM(s) Oral three times a day before meals  enoxaparin Injectable 40 milliGRAM(s) SubCutaneous every 24 hours  famotidine    Tablet 20 milliGRAM(s) Oral two times a day  folic acid 1 milliGRAM(s) Oral daily  letrozole 2.5 milliGRAM(s) Oral daily  levothyroxine 50 MICROGram(s) Oral daily  loratadine 10 milliGRAM(s) Oral daily  melatonin 5 milliGRAM(s) Oral at bedtime  palbociclib 125 milliGRAM(s) Oral daily  sucralfate suspension 1 Gram(s) Oral every 6 hours  tiotropium 2.5 MICROgram(s) Inhaler 2 Puff(s) Inhalation daily    MEDICATIONS  (PRN):  albuterol    90 MICROgram(s) HFA Inhaler 2 Puff(s) Inhalation every 6 hours PRN for shortness of breath and/or wheezing  benzonatate 100 milliGRAM(s) Oral three times a day PRN Cough  guaiFENesin Oral Liquid (Sugar-Free) 200 milliGRAM(s) Oral every 6 hours PRN Cough  ibuprofen  Tablet. 200 milliGRAM(s) Oral every 4 hours PRN Mild Pain (1 - 3), Moderate Pain (4 - 6)  methocarbamol 1000 milliGRAM(s) Oral every 6 hours PRN Muscle Spasm        VITALS:   Vital Signs Last 24 Hrs  T(C): 36.7 (01 Aug 2023 07:06), Max: 37.1 (31 Jul 2023 15:35)  T(F): 98 (01 Aug 2023 07:06), Max: 98.7 (31 Jul 2023 15:35)  HR: 94 (01 Aug 2023 07:06) (94 - 105)  BP: 128/77 (01 Aug 2023 07:06) (119/84 - 128/84)  BP(mean): --  RR: 18 (01 Aug 2023 07:06) (18 - 18)  SpO2: 98% (01 Aug 2023 00:07) (98% - 98%)    Parameters below as of 01 Aug 2023 00:07  Patient On (Oxygen Delivery Method): room air      PHYSICAL EXAM:  GENERAL: NAD  HEAD:  Atraumatic, Normocephalic  EYES: EOMI  NERVOUS SYSTEM:  Alert & Oriented X3, non focal   CHEST/LUNG: unlabored breathing   HEART: nonedematous extremities   ABDOMEN: Soft, Nontender, Nondistended    LABS:                                           10.3   3.37  )-----------( 281      ( 31 Jul 2023 08:30 )             31.7       07-31    139  |  109  |  8<L>  ----------------------------<  99  3.4<L>   |  19  |  <0.5<L>    Ca    8.3<L>      31 Jul 2023 08:30  Mg     1.7     07-31    TPro  5.3<L>  /  Alb  3.1<L>  /  TBili  0.5  /  DBili  x   /  AST  13  /  ALT  18  /  AlkPhos  139<H>  07-31          RADIOLOGY:      ACC: [id number] EXAM:  US DPLX ABDOMEN   ORDERED BY: SHAWN CAGLE     PROCEDURE DATE:  07/21/2023      FINDINGS/  IMPRESSION:    Patent diminutive right and left portal veins.    Patent right, left and middle hepatic veins.  ______________________________________________________________    ACC: [id number] EXAM:  XR CHEST PORTABLE ROUTINE 1V   ORDERED BY: ALONSO MARIO     PROCEDURE DATE:  07/28/2023      IMPRESSION:    Increasing right basilar opacity/atelectasis. Bilateral interstitial   opacities.  ____________________________________________________________________      ACC: [id number] EXAM:  ECHO TTE WO CON COMP W DOPP                          PROCEDURE DATE:  07/18/2023      Summary:   1. Left ventricular ejection fraction, by visual estimation, is 55 to   60%.   2. Normal global left ventricular systolic function.   3. Endocardial visualization was enhanced with intravenous echo contrast.     24H events:    Patient is a 57y old Female who presents with a chief complaint of Decreased oral intake with electrolyte disturbances (27 Jul 2023 16:12)    Primary diagnosis of Adult failure to thrive    Today is hospital day 19d. This morning patient was seen and examined at bedside, resting comfortably in bed. Pt complains of cough, denies any CP, SOB, abdominal pain or N/V     PAST MEDICAL & SURGICAL HISTORY  Hypothyroidism    H/O cirrhosis    Breast cancer    No significant past surgical history      SOCIAL HISTORY:  Social History:  non smoker, stopped alcohol drinking 9 years ago  lives in [place] with her family (13 Jul 2023 16:53)      ALLERGIES:  No Known Allergies    MEDICATIONS:  MEDICATIONS  (STANDING):  calcium carbonate    500 mG (Tums) Chewable 1 Tablet(s) Chew three times a day  dronabinol 2.5 milliGRAM(s) Oral three times a day before meals  enoxaparin Injectable 40 milliGRAM(s) SubCutaneous every 24 hours  famotidine    Tablet 20 milliGRAM(s) Oral two times a day  folic acid 1 milliGRAM(s) Oral daily  letrozole 2.5 milliGRAM(s) Oral daily  levothyroxine 50 MICROGram(s) Oral daily  loratadine 10 milliGRAM(s) Oral daily  melatonin 5 milliGRAM(s) Oral at bedtime  palbociclib 125 milliGRAM(s) Oral daily  sucralfate suspension 1 Gram(s) Oral every 6 hours  tiotropium 2.5 MICROgram(s) Inhaler 2 Puff(s) Inhalation daily    MEDICATIONS  (PRN):  albuterol    90 MICROgram(s) HFA Inhaler 2 Puff(s) Inhalation every 6 hours PRN for shortness of breath and/or wheezing  benzonatate 100 milliGRAM(s) Oral three times a day PRN Cough  guaiFENesin Oral Liquid (Sugar-Free) 200 milliGRAM(s) Oral every 6 hours PRN Cough  ibuprofen  Tablet. 200 milliGRAM(s) Oral every 4 hours PRN Mild Pain (1 - 3), Moderate Pain (4 - 6)  methocarbamol 1000 milliGRAM(s) Oral every 6 hours PRN Muscle Spasm        VITALS:   Vital Signs Last 24 Hrs  T(C): 36.7 (01 Aug 2023 07:06), Max: 37.1 (31 Jul 2023 15:35)  T(F): 98 (01 Aug 2023 07:06), Max: 98.7 (31 Jul 2023 15:35)  HR: 94 (01 Aug 2023 07:06) (94 - 105)  BP: 128/77 (01 Aug 2023 07:06) (119/84 - 128/84)  BP(mean): --  RR: 18 (01 Aug 2023 07:06) (18 - 18)  SpO2: 98% (01 Aug 2023 00:07) (98% - 98%)    Parameters below as of 01 Aug 2023 00:07  Patient On (Oxygen Delivery Method): room air      PHYSICAL EXAM:  GENERAL: NAD  HEAD:  Atraumatic, Normocephalic  EYES: EOMI  NERVOUS SYSTEM:  Alert & Oriented X3, non focal   CHEST/LUNG: unlabored breathing   HEART: nonedematous extremities   ABDOMEN: Soft, Nontender, Nondistended    LABS:                                         10.8   3.05  )-----------( 277      ( 01 Aug 2023 11:00 )             33.3       07-31    139  |  109  |  8<L>  ----------------------------<  99  3.4<L>   |  19  |  <0.5<L>    Ca    8.3<L>      31 Jul 2023 08:30  Mg     1.7     07-31    TPro  5.3<L>  /  Alb  3.1<L>  /  TBili  0.5  /  DBili  x   /  AST  13  /  ALT  18  /  AlkPhos  139<H>  07-31            RADIOLOGY:      ACC: [id number] EXAM:  US DPLX ABDOMEN   ORDERED BY: SHAWN CAGLE     PROCEDURE DATE:  07/21/2023      FINDINGS/  IMPRESSION:    Patent diminutive right and left portal veins.    Patent right, left and middle hepatic veins.  ______________________________________________________________    ACC: [id number] EXAM:  XR CHEST PORTABLE ROUTINE 1V   ORDERED BY: ALONSO MARIO     PROCEDURE DATE:  07/28/2023      IMPRESSION:    Increasing right basilar opacity/atelectasis. Bilateral interstitial   opacities.  ____________________________________________________________________      ACC: [id number] EXAM:  ECHO TTE WO CON COMP W DOPP                          PROCEDURE DATE:  07/18/2023      Summary:   1. Left ventricular ejection fraction, by visual estimation, is 55 to   60%.   2. Normal global left ventricular systolic function.   3. Endocardial visualization was enhanced with intravenous echo contrast.

## 2023-08-01 NOTE — PROGRESS NOTE ADULT - SUBJECTIVE AND OBJECTIVE BOX
JEAN-PAUL GIANG  57y  [facility] [address]      Patient is a 57y old  Female who presents with a chief complaint of Decreased oral intake with electrolyte disturbances (01 Aug 2023 09:13)      My note supersedes the resident's note      INTERVAL HPI/OVERNIGHT EVENTS:  no acute events overnight       REVIEW OF SYSTEMS:  CONSTITUTIONAL: No fever, weight loss, or fatigue  EYES: No eye pain, visual disturbances, or discharge  ENMT:  No difficulty hearing, tinnitus, vertigo; No sinus or throat pain  NECK: No pain or stiffness  BREASTS: No pain, masses, or nipple discharge  RESPIRATORY: No cough, wheezing, chills or hemoptysis; No shortness of breath  CARDIOVASCULAR: No chest pain, palpitations, dizziness, or leg swelling  GASTROINTESTINAL: No abdominal or epigastric pain. No nausea, vomiting, or hematemesis; No diarrhea or constipation. No melena or hematochezia.  GENITOURINARY: No dysuria, frequency, hematuria, or incontinence  NEUROLOGICAL: No headaches, memory loss, loss of strength, numbness, or tremors  SKIN: No itching, burning, rashes, or lesions   LYMPH NODES: No enlarged glands  ENDOCRINE: No heat or cold intolerance; No hair loss  MUSCULOSKELETAL: No joint pain or swelling; No muscle, back, or extremity pain  PSYCHIATRIC: No depression, anxiety, mood swings, or difficulty sleeping  HEME/LYMPH: No easy bruising, or bleeding gums  ALLERY AND IMMUNOLOGIC: No hives or eczema  FAMILY HISTORY:  FHx: melanoma (Mother)    FHx: arrhythmia (Father)      T(C): 36.7 (08-01-23 @ 07:06), Max: 37.1 (07-31-23 @ 15:35)  HR: 94 (08-01-23 @ 07:06) (94 - 105)  BP: 128/77 (08-01-23 @ 07:06) (119/84 - 128/84)  RR: 18 (08-01-23 @ 07:06) (18 - 18)  SpO2: 98% (08-01-23 @ 00:07) (98% - 98%)  Wt(kg): --Vital Signs Last 24 Hrs  T(C): 36.7 (01 Aug 2023 07:06), Max: 37.1 (31 Jul 2023 15:35)  T(F): 98 (01 Aug 2023 07:06), Max: 98.7 (31 Jul 2023 15:35)  HR: 94 (01 Aug 2023 07:06) (94 - 105)  BP: 128/77 (01 Aug 2023 07:06) (119/84 - 128/84)  BP(mean): --  RR: 18 (01 Aug 2023 07:06) (18 - 18)  SpO2: 98% (01 Aug 2023 00:07) (98% - 98%)    Parameters below as of 01 Aug 2023 00:07  Patient On (Oxygen Delivery Method): room air        PHYSICAL EXAM:  GENERAL: NAD,   HEAD:  Atraumatic, Normocephalic  EYES: EOMI, PERRLA, conjunctiva and sclera clear  ENMT: No tonsillar erythema, exudates, or enlargement; Moist mucous membranes, Good dentition, No lesions  NECK: Supple, No JVD, Normal thyroid  NERVOUS SYSTEM:  Alert & Oriented X3, Good concentration; Motor Strength 5/5 B/L upper and lower extremities; DTRs 2+ intact and symmetric  PULM: Clear to auscultation bilaterally  CARDIAC: Regular rate and rhythm; No murmurs, rubs, or gallops  GI: Soft, Nontender, Nondistended; Bowel sounds present  EXTREMITIES:  2+ Peripheral Pulses, No clubbing, cyanosis, or edema  LYMPH: No lymphadenopathy noted  SKIN: No rashes or lesions    Consultant(s) Notes Reviewed:  [x ] YES  [ ] NO  Care Discussed with Consultants/Other Providers [ x] YES  [ ] NO    LABS:                            10.3   3.37  )-----------( 281      ( 31 Jul 2023 08:30 )             31.7   07-31    139  |  109  |  8<L>  ----------------------------<  99  3.4<L>   |  19  |  <0.5<L>    Ca    8.3<L>      31 Jul 2023 08:30  Mg     1.7     07-31    TPro  5.3<L>  /  Alb  3.1<L>  /  TBili  0.5  /  DBili  x   /  AST  13  /  ALT  18  /  AlkPhos  139<H>  07-31            albuterol    90 MICROgram(s) HFA Inhaler 2 Puff(s) Inhalation every 6 hours PRN  benzonatate 100 milliGRAM(s) Oral three times a day PRN  calcium carbonate    500 mG (Tums) Chewable 1 Tablet(s) Chew three times a day  dronabinol 2.5 milliGRAM(s) Oral three times a day before meals  enoxaparin Injectable 40 milliGRAM(s) SubCutaneous every 24 hours  famotidine    Tablet 20 milliGRAM(s) Oral two times a day  folic acid 1 milliGRAM(s) Oral daily  guaiFENesin Oral Liquid (Sugar-Free) 200 milliGRAM(s) Oral every 6 hours PRN  ibuprofen  Tablet. 200 milliGRAM(s) Oral every 4 hours PRN  letrozole 2.5 milliGRAM(s) Oral daily  levothyroxine 50 MICROGram(s) Oral daily  loratadine 10 milliGRAM(s) Oral daily  melatonin 5 milliGRAM(s) Oral at bedtime  methocarbamol 1000 milliGRAM(s) Oral every 6 hours PRN  palbociclib 125 milliGRAM(s) Oral daily  sucralfate suspension 1 Gram(s) Oral every 6 hours  tiotropium 2.5 MICROgram(s) Inhaler 2 Puff(s) Inhalation daily      HEALTH ISSUES - PROBLEM Dx:          Case Discussed with House Staff   Spectra [phone]

## 2023-08-01 NOTE — PROGRESS NOTE ADULT - ASSESSMENT
57 year old female with  hypothyroidism, asthma, former alcohol use, breast cancer with diffuse bone metastasis, status post radiation 5 sessions finished 5/31/23, on letrozole daily presented to the emergency department complaining of decreased appetite and oral intake of 3 weeks duration.       #Lactic acidosis, resolved    #Starvation ketoacidosis, resolved  From poor oral intake.   Continue Marinol for appetite.    #Overweight BMI 27 patient needs to see dieitian outpatient for further evaluation     #Acute URI improving     #Acute Transaminitis: resolved,     #Folic acid deficiency     #Insomnia on melatonin     #Hypokalemia and hypomagnesemia recheck labs     #Hypothyroid on synthroid     #Cholelithiasis    #Compensated AlcohholicCirrhosis  hepatology follow up.     #Breast cancer with bone mets, New T7 and L4 compression fractures .  CT abdomen showed New T7 and L4 compression fractures, when compared to CT from April 2023. Small right and trace left pleural effusions. Increase in diffuse osseous metastases. Stable right breast mass with nipple retraction.  oncology following C/w letrozole, add Ibrance.    #Hypothyroidism synthroid    #DVT prophylaxis: Lovenox.     DNR/DNI      PROGRESS NOTE HANDOFF    Pending:  disposition planning     Family discussion: patient verbalized understanding and agreeable to plan of care     Disposition: SNF

## 2023-08-02 NOTE — PROGRESS NOTE ADULT - ASSESSMENT
57-year-old woman with a history of hypothyroidism, asthma, former alcohol use, breast cancer with diffuse bone metastasis, status post radiation 5 sessions finished 5/31/23, on letrozole daily, was on Ribociclib and was discontinued due to low magnesium with high QTc, surgical history of occiput-T2 posterior instrumentation and fusion, ORIF of C2 body with functional decline and impaired ADLs/mobility, ambulates with a walker. Following with Dr. Peña.  She presented to the emergency department complaining of decreased appetite and oral intake of 3 weeks duration.     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 Gallstones Small right pleural effusion  - CT Abdomen and Pelvis No Cont 7/19 - Findings may represent mild proctitis. No bowel obstruction or free air. Nodularity of the liver may be seen with cirrhosis.  - Abdominal duplex 7/21: Patent diminutive right and left portal veins. Patent right, left and middle hepatic veins.  - LFT improving, AST 23 ALT 59  - monitor LFTs   - case d/w hepatology     # H/o breast cancer with bone mets  # New T7 and L4 compression fractures on imaging probably sec to bone mets  - CT abd: New T7 and L4 compression fractures, when compared to CT from April 2023. Small right and trace left pleural effusions. Increase in diffuse osseous metastases. Stable right breast mass with nipple retraction.  - oncology following given issues with QTc and electrolyte abnormalities, ribociclib d/c'd. C/w letrozole, add Ibrance.  Also recommend medronate 90 mg IV once because of bony metastases, she can be transitioned to Zometa or Xgeva as outpatient there is a nonformulary in our institution.   - S/p medronate 90 mg IV once on 7/21 7/31/23   - c/w ibrance 125 mg daily, 3 weeks on and 1 week off.  - c/w letrozole 2.5 mg daily  - Outpatient follow up with Dr. Peña upon discharge    # Hypothyroidism  - c/w synthroid    # URI   - cough, chills, sneezing 7/27  - c/w Benzonate, Mucinex   - Parainfluenza infection  - Still with cough, no fever. Continue Mucinex.   - CXR 7/28 showed stable right lower lobe effusion/atelectasis.     # DVT prophylaxis    # DNR/DNI    # Pending:   # Disposition: Per [facility] documentation case is pending review REF# [id number] -  determination can take up to 15 calendar days.  Dispo: [facility] pending outcome of appeal.

## 2023-08-02 NOTE — PROGRESS NOTE ADULT - ATTENDING COMMENTS
57-year-old woman with a history of hypothyroidism, asthma, former alcohol use, breast cancer with diffuse bone metastasis, status post radiation 5 sessions finished 5/31/23, on letrozole daily, was on Ribociclib and was discontinued due to low magnesium with high QTc, surgical history of occiput-T2 posterior instrumentation and fusion, ORIF of C2 body with functional decline and impaired ADLs/mobility, ambulates with a walker. Following with Dr. Peña.  She presented to the emergency department complaining of decreased appetite and oral intake of 3 weeks duration.     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 Gallstones Small right pleural effusion  - CT Abdomen and Pelvis No Cont 7/19 - Findings may represent mild proctitis. No bowel obstruction or free air. Nodularity of the liver may be seen with cirrhosis.  - Abdominal duplex 7/21: Patent diminutive right and left portal veins. Patent right, left and middle hepatic veins.  - LFT improved, ALP elevated likely due to bone mets   - seen by Hepatology, OP follow up on dc     # H/o breast cancer with bone mets  # New T7 and L4 compression fractures on imaging probably sec to bone mets  - CT abd: New T7 and L4 compression fractures, when compared to CT from April 2023. Small right and trace left pleural effusions. Increase in diffuse osseous metastases. Stable right breast mass with nipple retraction.  - oncology following given issues with QTc and electrolyte abnormalities, ribociclib d/c'd. C/w letrozole, add Ibrance.  Also recommend medronate 90 mg IV once because of bony metastases, she can be transitioned to Zometa or Xgeva as outpatient there is a nonformulary in our institution.   - S/p medronate 90 mg IV once on 7/21  - c/w ibrance 125 mg daily, 3 weeks on and 1 week off.  - c/w letrozole 2.5 mg daily  - Outpatient follow up with Dr. Peña upon discharge    # Hypothyroidism  - c/w synthroid    # URI 2/2 Parainfluenza- improved   - Still with cough, no fever. Continue Mucinex.   - CXR 7/28 showed stable right lower lobe effusion/atelectasis.     #Physical Deconditioning   - Patient was previously admitted in April and s/p Occiput-T2 instrumentation and fusion with ORIF of C2. According to PT notes patients prior level of function was that patient " resides with family in 2nd floor apt using 8 steps to enter and a flight of stairs to access. Pt used to be independent with overall functional mobility, able to ambulate using No AD ". Given her deconditioning post operatively, pt discharged to Acute Rehab at that time. Per Rehab attending notes, pt tolerated therapy well and was ambulating with a RW at the time of discharge from Acute Rehab.     On this admission, pt presented with starvation ketoacidosis, dehydration, lactic acidosis all of which could contribute to functional decline. Furthermore, the patient had an acute transaminitis, and URI both of which complicated her course and led to prolonged hospitalization. Patient has shown significant improvement with Acute Rehab in the past, and she is motivated to continue to improve with physical therapy. I do not believe that her acute decline in function is solely related to her cancer diagnosis, rather I believe that her physical deconditioning is due to her admitting diagnoses ( dehydration, lactic acidosis, starvation ketoacidosis - all of which are now resolved ) and her prolonged hospitalization. Patient would certainly benefit from STR placement at this time.     # DVT prophylaxis- Lovenox     # DNR/DNI    # Disposition: pending STR placement / expedited appeal       Total time spent to complete patient's bedside assessment, review medical chart, discuss medical plan of care with covering medical team was more than 35 minutes  with >50% of time spent face to face with patient, discussion with patient/family and/or coordination of care    Arianna Pang DO

## 2023-08-02 NOTE — PROGRESS NOTE ADULT - SUBJECTIVE AND OBJECTIVE BOX
24H events:    Patient is a 57y old Female who presents with a chief complaint of Decreased oral intake with electrolyte disturbances (27 Jul 2023 16:12)    Primary diagnosis of Adult failure to thrive    Today is hospital day 20d. This morning patient was seen and examined at bedside, resting comfortably in bed. Pt complains of cough, denies any CP, SOB, abdominal pain or N/V     PAST MEDICAL & SURGICAL HISTORY  Hypothyroidism    H/O cirrhosis    Breast cancer    No significant past surgical history      SOCIAL HISTORY:  Social History:  non smoker, stopped alcohol drinking 9 years ago  lives in [place] with her family (13 Jul 2023 16:53)      ALLERGIES:  No Known Allergies    MEDICATIONS:  MEDICATIONS  (STANDING):  calcium carbonate    500 mG (Tums) Chewable 1 Tablet(s) Chew three times a day  dronabinol 2.5 milliGRAM(s) Oral three times a day before meals  enoxaparin Injectable 40 milliGRAM(s) SubCutaneous every 24 hours  famotidine    Tablet 20 milliGRAM(s) Oral two times a day  folic acid 1 milliGRAM(s) Oral daily  letrozole 2.5 milliGRAM(s) Oral daily  levothyroxine 50 MICROGram(s) Oral daily  loratadine 10 milliGRAM(s) Oral daily  melatonin 5 milliGRAM(s) Oral at bedtime  palbociclib 125 milliGRAM(s) Oral daily  sucralfate suspension 1 Gram(s) Oral every 6 hours  tiotropium 2.5 MICROgram(s) Inhaler 2 Puff(s) Inhalation daily    MEDICATIONS  (PRN):  albuterol    90 MICROgram(s) HFA Inhaler 2 Puff(s) Inhalation every 6 hours PRN for shortness of breath and/or wheezing  benzonatate 100 milliGRAM(s) Oral three times a day PRN Cough  guaiFENesin Oral Liquid (Sugar-Free) 200 milliGRAM(s) Oral every 6 hours PRN Cough  ibuprofen  Tablet. 200 milliGRAM(s) Oral every 4 hours PRN Mild Pain (1 - 3), Moderate Pain (4 - 6)  methocarbamol 1000 milliGRAM(s) Oral every 6 hours PRN Muscle Spasm      VITALS:   Vital Signs Last 24 Hrs  T(C): 37.1 (02 Aug 2023 07:57), Max: 37.1 (01 Aug 2023 16:22)  T(F): 98.7 (02 Aug 2023 07:57), Max: 98.7 (01 Aug 2023 16:22)  HR: 104 (02 Aug 2023 07:57) (99 - 104)  BP: 137/86 (02 Aug 2023 07:57) (118/72 - 137/86)  BP(mean): --  RR: 18 (02 Aug 2023 07:57) (18 - 18)  SpO2: 96% (02 Aug 2023 00:00) (96% - 97%)    Parameters below as of 02 Aug 2023 00:00  Patient On (Oxygen Delivery Method): room air        PHYSICAL EXAM:  GENERAL: NAD  HEAD:  Atraumatic, Normocephalic  EYES: EOMI  NERVOUS SYSTEM:  Alert & Oriented X3  CHEST/LUNG: unlabored breathing   HEART: nonedematous extremities       LABS:                                         10.8   3.05  )-----------( 277      ( 01 Aug 2023 11:00 )             33.3       07-31    139  |  109  |  8<L>  ----------------------------<  99  3.4<L>   |  19  |  <0.5<L>    Ca    8.3<L>      31 Jul 2023 08:30  Mg     1.7     07-31    TPro  5.3<L>  /  Alb  3.1<L>  /  TBili  0.5  /  DBili  x   /  AST  13  /  ALT  18  /  AlkPhos  139<H>  07-31            RADIOLOGY:      ACC: [id number] EXAM:  US DPLX ABDOMEN   ORDERED BY: SHAWN CAGLE     PROCEDURE DATE:  07/21/2023      FINDINGS/  IMPRESSION:    Patent diminutive right and left portal veins.    Patent right, left and middle hepatic veins.  ______________________________________________________________    ACC: [id number] EXAM:  XR CHEST PORTABLE ROUTINE 1V   ORDERED BY: ALONSO MARIO     PROCEDURE DATE:  07/28/2023      IMPRESSION:    Increasing right basilar opacity/atelectasis. Bilateral interstitial   opacities.  ____________________________________________________________________      ACC: [id number] EXAM:  ECHO TTE WO CON COMP W DOPP                          PROCEDURE DATE:  07/18/2023      Summary:   1. Left ventricular ejection fraction, by visual estimation, is 55 to   60%.   2. Normal global left ventricular systolic function.   3. Endocardial visualization was enhanced with intravenous echo contrast.

## 2023-08-02 NOTE — PROGRESS NOTE ADULT - ASSESSMENT
58 yo F PMHx metastatic breast cancer, hypothyroidism, former alcohol use and asthma who presented to the ED for decreased PO intake. Admitted for multiple electrolytes abnormalities.     # [place] metastatic Breast Cancer (ER +1 DE +1, HER2 negative,  Ki-67 < 5%)  - Diagnosed in 4/23  - Initially presented with back pain in 3/23, MRI showed evidence of metastasis in C2 vertebra, s/p internal fixation and biopsy in 4/23  - s/p RT to C spine x5 sessions, last in 5/31/2023  - CA 27.29: 55. CEA 3.7 in 4/23  - CT chest/abdomen/pelvis 4/23: multiple enlarged mediastinal LN and paratracheal LN. No visceral mets.  - MRI spine 4/23: Numerous enhancing osseous metastatic lesions are noted throughout the cervical spine most significant at the C2 and C3 level with evidence of epidural extension causing severe spinal stenosis and mass effect upon the spinal cord at C2-C3.  - MRI brain 7/16/23: no evidence of metastasis  - currently on letrozole 2.5 mg daily  - Ribocilib (600mg daily for 21 days on, 7 days off) started on 5/25 currently on hold given prolonged QTC (only   received it 3 weeks).  - CT A/P 7/19 showed New T7 and L4 compression fractures and Increase in diffuse osseous metastases  - on 7/21/23: Started on Ibrance 125 mg daily, 3 weeks on and 1 week off, given new ct findings of increase osseous metastasis.  - Received IV pamidronate on 7/21    # Parainfluenza pneumonia  - supportive care as per primary team    Recommendations:  - c/w ibrance 125 mg daily, 3 weeks on and 1 week off.  - c/w letrozole 2.5 mg daily  - Outpatient follow up with Dr. Peña upon discharge

## 2023-08-02 NOTE — PROGRESS NOTE ADULT - SUBJECTIVE AND OBJECTIVE BOX
SUBJECTIVE:    Patient is a 57y old Female who presents with a chief complaint of Decreased oral intake with electrolyte disturbances (02 Aug 2023 11:21)    Currently admitted to medicine with the primary diagnosis of Adult failure to thrive       Today is hospital day 20d. This morning she is resting comfortably in bed and reports no new issues or overnight events.     PAST MEDICAL & SURGICAL HISTORY  Hypothyroidism    H/O cirrhosis    Breast cancer    No significant past surgical history      SOCIAL HISTORY:  Negative for smoking/alcohol/drug use.     ALLERGIES:  No Known Allergies    MEDICATIONS:  STANDING MEDICATIONS  calcium carbonate    500 mG (Tums) Chewable 1 Tablet(s) Chew three times a day  chlorhexidine 2% Cloths 1 Application(s) Topical <User Schedule>  dronabinol 2.5 milliGRAM(s) Oral three times a day before meals  enoxaparin Injectable 40 milliGRAM(s) SubCutaneous every 24 hours  famotidine    Tablet 20 milliGRAM(s) Oral two times a day  folic acid 1 milliGRAM(s) Oral daily  letrozole 2.5 milliGRAM(s) Oral daily  levothyroxine 50 MICROGram(s) Oral daily  loratadine 10 milliGRAM(s) Oral daily  magnesium sulfate  IVPB 2 Gram(s) IV Intermittent once  melatonin 5 milliGRAM(s) Oral at bedtime  palbociclib 125 milliGRAM(s) Oral daily  sucralfate suspension 1 Gram(s) Oral every 6 hours  tiotropium 2.5 MICROgram(s) Inhaler 2 Puff(s) Inhalation daily    PRN MEDICATIONS  albuterol    90 MICROgram(s) HFA Inhaler 2 Puff(s) Inhalation every 6 hours PRN  benzonatate 100 milliGRAM(s) Oral three times a day PRN  guaiFENesin Oral Liquid (Sugar-Free) 200 milliGRAM(s) Oral every 6 hours PRN  ibuprofen  Tablet. 200 milliGRAM(s) Oral every 4 hours PRN  methocarbamol 1000 milliGRAM(s) Oral every 6 hours PRN    VITALS:   T(F): 99.1  HR: 107  BP: 125/80  RR: 18  SpO2: 96%    LABS:                        10.8   3.28  )-----------( 278      ( 02 Aug 2023 13:20 )             34.1     08-02    137  |  104  |  7<L>  ----------------------------<  80  3.5   |  15<L>  |  <0.5<L>    Ca    8.5      02 Aug 2023 13:20  Mg     1.7     08-02        Urinalysis Basic - ( 02 Aug 2023 13:20 )    Color: x / Appearance: x / SG: x / pH: x  Gluc: 80 mg/dL / Ketone: x  / Bili: x / Urobili: x   Blood: x / Protein: x / Nitrite: x   Leuk Esterase: x / RBC: x / WBC x   Sq Epi: x / Non Sq Epi: x / Bacteria: x        RADIOLOGY:    PHYSICAL EXAM:  GEN: No acute distress  LUNGS: Clear to auscultation bilaterally   HEART: Regular  ABD: Soft, non-tender, non-distended.  EXT: NC/NC/NE/2+PP/WINTER/Skin Intact.   NEURO: AAOX3

## 2023-08-03 NOTE — PROGRESS NOTE ADULT - ATTENDING COMMENTS
57-year-old woman with a history of hypothyroidism, asthma, former alcohol use, breast cancer with diffuse bone metastasis, status post radiation 5 sessions finished 5/31/23, on letrozole daily, was on Ribociclib and was discontinued due to low magnesium with high QTc, surgical history of occiput-T2 posterior instrumentation and fusion, ORIF of C2 body with functional decline and impaired ADLs/mobility, ambulates with a walker. Following with Dr. Peña.  She presented to the emergency department complaining of decreased appetite and oral intake of 3 weeks duration.     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 Gallstones Small right pleural effusion  - CT Abdomen and Pelvis No Cont 7/19 - Findings may represent mild proctitis. No bowel obstruction or free air. Nodularity of the liver may be seen with cirrhosis.  - Abdominal duplex 7/21: Patent diminutive right and left portal veins. Patent right, left and middle hepatic veins.  - LFT improved, ALP elevated likely due to bone mets   - seen by Hepatology, OP follow up on dc     # H/o breast cancer with bone mets  # New T7 and L4 compression fractures on imaging probably sec to bone mets  - CT abd: New T7 and L4 compression fractures, when compared to CT from April 2023. Small right and trace left pleural effusions. Increase in diffuse osseous metastases. Stable right breast mass with nipple retraction.  - oncology following given issues with QTc and electrolyte abnormalities, ribociclib d/c'd. C/w letrozole, add Ibrance.  Also recommend medronate 90 mg IV once because of bony metastases, she can be transitioned to Zometa or Xgeva as outpatient there is a nonformulary in our institution.   - S/p medronate 90 mg IV once on 7/21  - c/w ibrance 125 mg daily, 3 weeks on and 1 week off.  - c/w letrozole 2.5 mg daily  - Outpatient follow up with Dr. Peña upon discharge    # Hypothyroidism  - c/w synthroid    # URI 2/2 Parainfluenza- improved   - Still with cough, no fever. Continue Mucinex.   - CXR 7/28 showed stable right lower lobe effusion/atelectasis.     # DVT prophylaxis- Lovenox     # DNR/DNI    # Disposition: calorie count and nutrition eval pending       Total time spent to complete patient's bedside assessment, review medical chart, discuss medical plan of care with covering medical team was more than 35 minutes  with >50% of time spent face to face with patient, discussion with patient/family and/or coordination of care    Arianna Pang DO .

## 2023-08-03 NOTE — PROGRESS NOTE ADULT - ASSESSMENT
57-year-old woman with a history of hypothyroidism, asthma, former alcohol use, breast cancer with diffuse bone metastasis, status post radiation 5 sessions finished 5/31/23, on letrozole daily, was on Ribociclib and was discontinued due to low magnesium with high QTc, surgical history of occiput-T2 posterior instrumentation and fusion, ORIF of C2 body with functional decline and impaired ADLs/mobility, ambulates with a walker. Following with Dr. Peña.  She presented to the emergency department complaining of decreased appetite and oral intake of 3 weeks duration.     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 Gallstones Small right pleural effusion  - CT Abdomen and Pelvis No Cont 7/19 - Findings may represent mild proctitis. No bowel obstruction or free air. Nodularity of the liver may be seen with cirrhosis.  - Abdominal duplex 7/21: Patent diminutive right and left portal veins. Patent right, left and middle hepatic veins.  - LFT improving, AST 23 ALT 59  - monitor LFTs   - case d/w hepatology     # H/o breast cancer with bone mets  # New T7 and L4 compression fractures on imaging probably sec to bone mets  - CT abd: New T7 and L4 compression fractures, when compared to CT from April 2023. Small right and trace left pleural effusions. Increase in diffuse osseous metastases. Stable right breast mass with nipple retraction.  - oncology following given issues with QTc and electrolyte abnormalities, ribociclib d/c'd. C/w letrozole, add Ibrance.  Also recommend medronate 90 mg IV once because of bony metastases, she can be transitioned to Zometa or Xgeva as outpatient there is a nonformulary in our institution.   - S/p medronate 90 mg IV once on 7/21 7/31/23   - c/w ibrance 125 mg daily, 3 weeks on and 1 week off.  - c/w letrozole 2.5 mg daily  - Outpatient follow up with Dr. Peña upon discharge    # Hypothyroidism  - c/w synthroid    # URI   - cough, chills, sneezing 7/27  - c/w Benzonate, Mucinex   - Parainfluenza infection  - Still with cough, no fever. Continue Mucinex.   - CXR 7/28 showed stable right lower lobe effusion/atelectasis.     # DVT prophylaxis    # DNR/DNI    # Pending:   # Disposition:  CM met with pt at bedside, made aware denied SNF.  CM discussed options with pt such as long term placement, pt verbalized she  cannot do long term placment.  Per pt will talk to her sister in law and update writer on discharge plan.  CM also provided pt with private hire list.  Dispo: to be determine once pt speaks with family. pt reside at home with  spouse.           57-year-old woman with a history of hypothyroidism, asthma, former alcohol use, breast cancer with diffuse bone metastasis, status post radiation 5 sessions finished 5/31/23, on letrozole daily, was on Ribociclib and was discontinued due to low magnesium with high QTc, surgical history of occiput-T2 posterior instrumentation and fusion, ORIF of C2 body with functional decline and impaired ADLs/mobility, ambulates with a walker. Following with Dr. Peña.  She presented to the emergency department complaining of decreased appetite and oral intake of 3 weeks duration.     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 Gallstones Small right pleural effusion  - CT Abdomen and Pelvis No Cont 7/19 - Findings may represent mild proctitis. No bowel obstruction or free air. Nodularity of the liver may be seen with cirrhosis.  - Abdominal duplex 7/21: Patent diminutive right and left portal veins. Patent right, left and middle hepatic veins.  - LFT improving, AST 23 ALT 59  - monitor LFTs   - case d/w hepatology     # H/o breast cancer with bone mets  # New T7 and L4 compression fractures on imaging probably sec to bone mets  - CT abd: New T7 and L4 compression fractures, when compared to CT from April 2023. Small right and trace left pleural effusions. Increase in diffuse osseous metastases. Stable right breast mass with nipple retraction.  - oncology following given issues with QTc and electrolyte abnormalities, ribociclib d/c'd. C/w letrozole, add Ibrance.  Also recommend medronate 90 mg IV once because of bony metastases, she can be transitioned to Zometa or Xgeva as outpatient there is a nonformulary in our institution.   - S/p medronate 90 mg IV once on 7/21 7/31/23   - c/w ibrance 125 mg daily, 3 weeks on and 1 week off.  - c/w letrozole 2.5 mg daily  - Outpatient follow up with Dr. Peña upon discharge    # Hypothyroidism  - c/w synthroid    # URI   - cough, chills, sneezing 7/27  - c/w Benzonate, Mucinex   - Parainfluenza infection  - Still with cough, no fever. Continue Mucinex.   - CXR 7/28 showed stable right lower lobe effusion/atelectasis.     # DVT prophylaxis    # DNR/DNI    # Pending:   # Disposition:  CM met with pt at bedside, made aware denied SNF.  CM discussed options with pt such as long term placement, pt verbalized she  cannot do long term placment.  Per pt will talk to her sister in law and update writer on discharge plan.  CM also provided pt with private hire list.  Dispo: to be determine once pt speaks with family. pt reside at home with  spouse.           57-year-old woman with a history of hypothyroidism, asthma, former alcohol use, breast cancer with diffuse bone metastasis, status post radiation 5 sessions finished 5/31/23, on letrozole daily, was on Ribociclib and was discontinued due to low magnesium with high QTc, surgical history of occiput-T2 posterior instrumentation and fusion, ORIF of C2 body with functional decline and impaired ADLs/mobility, ambulates with a walker. Following with Dr. Peña.  She presented to the emergency department complaining of decreased appetite and oral intake of 3 weeks duration.     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   - 8/3/23, beta hydroxybuyrate increased at 4.8   - finger stick 77, given 25mg dextrose 5%   - 8/4/23 Anion gap still elevated at 17  - Nutrition consult pending  - encourage po intake     # Transaminitis, improving   # Cholelithiasis  # H/o Cirrhosis  - US Abdomen Upper Quadrant Right 7/18 - Heterogeneous echotexture to liver with a lobulated contour, findings which can be seen in cirrhosis. Gallstones Small right pleural effusion  - CT Abdomen and Pelvis No Cont 7/19 - Findings may represent mild proctitis. No bowel obstruction or free air. Nodularity of the liver may be seen with cirrhosis.  - Abdominal duplex 7/21: Patent diminutive right and left portal veins. Patent right, left and middle hepatic veins.  - LFT improving, AST 23 ALT 59  - monitor LFTs   - case d/w hepatology     # H/o breast cancer with bone mets  # New T7 and L4 compression fractures on imaging probably sec to bone mets  - CT abd: New T7 and L4 compression fractures, when compared to CT from April 2023. Small right and trace left pleural effusions. Increase in diffuse osseous metastases. Stable right breast mass with nipple retraction.  - oncology following given issues with QTc and electrolyte abnormalities, ribociclib d/c'd. C/w letrozole, add Ibrance.  Also recommend medronate 90 mg IV once because of bony metastases, she can be transitioned to Zometa or Xgeva as outpatient there is a nonformulary in our institution.   - S/p medronate 90 mg IV once on 7/21 7/31/23   - c/w ibrance 125 mg daily, 3 weeks on and 1 week off.  - c/w letrozole 2.5 mg daily  - Outpatient follow up with Dr. Peña upon discharge    # Hypothyroidism  - c/w synthroid    # URI   - cough, chills, sneezing 7/27  - c/w Benzonate, Mucinex   - Parainfluenza infection  - Still with cough, no fever. Continue Mucinex.   - CXR 7/28 showed stable right lower lobe effusion/atelectasis.   - no more isolation     # DVT prophylaxis    # DNR/DNI    # Pending:   # Disposition:  CM met with pt at bedside, made aware denied SNF.  CM discussed options with pt such as long term placement, pt verbalized she  cannot do long term placment.  Per pt will talk to her sister in law and update writer on discharge plan.  CM also provided pt with private hire list.  Dispo: to be determine once pt speaks with family. pt reside at home with  spouse.

## 2023-08-03 NOTE — PROGRESS NOTE ADULT - SUBJECTIVE AND OBJECTIVE BOX
24H events:    Patient is a 57y old Female who presents with a chief complaint of Decreased oral intake with electrolyte disturbances (27 Jul 2023 16:12)    Primary diagnosis of Adult failure to thrive    Today is hospital day 21d. This morning patient was seen and examined at bedside, resting comfortably in bed. Pt complains of cough, denies any CP, SOB, abdominal pain or N/V     PAST MEDICAL & SURGICAL HISTORY  Hypothyroidism    H/O cirrhosis    Breast cancer    No significant past surgical history      SOCIAL HISTORY:  Social History:  non smoker, stopped alcohol drinking 9 years ago  lives in [place] with her family (13 Jul 2023 16:53)      ALLERGIES:  No Known Allergies    MEDICATIONS:  MEDICATIONS  (STANDING):  calcium carbonate    500 mG (Tums) Chewable 1 Tablet(s) Chew three times a day  dronabinol 2.5 milliGRAM(s) Oral three times a day before meals  enoxaparin Injectable 40 milliGRAM(s) SubCutaneous every 24 hours  famotidine    Tablet 20 milliGRAM(s) Oral two times a day  folic acid 1 milliGRAM(s) Oral daily  letrozole 2.5 milliGRAM(s) Oral daily  levothyroxine 50 MICROGram(s) Oral daily  loratadine 10 milliGRAM(s) Oral daily  melatonin 5 milliGRAM(s) Oral at bedtime  palbociclib 125 milliGRAM(s) Oral daily  sucralfate suspension 1 Gram(s) Oral every 6 hours  tiotropium 2.5 MICROgram(s) Inhaler 2 Puff(s) Inhalation daily    MEDICATIONS  (PRN):  albuterol    90 MICROgram(s) HFA Inhaler 2 Puff(s) Inhalation every 6 hours PRN for shortness of breath and/or wheezing  benzonatate 100 milliGRAM(s) Oral three times a day PRN Cough  guaiFENesin Oral Liquid (Sugar-Free) 200 milliGRAM(s) Oral every 6 hours PRN Cough  ibuprofen  Tablet. 200 milliGRAM(s) Oral every 4 hours PRN Mild Pain (1 - 3), Moderate Pain (4 - 6)  methocarbamol 1000 milliGRAM(s) Oral every 6 hours PRN Muscle Spasm      VITALS:   Vital Signs Last 24 Hrs  T(C): 37.3 (02 Aug 2023 16:30), Max: 37.3 (02 Aug 2023 16:30)  T(F): 99.1 (02 Aug 2023 16:30), Max: 99.1 (02 Aug 2023 16:30)  HR: 107 (02 Aug 2023 16:30) (104 - 107)  BP: 125/80 (02 Aug 2023 16:30) (125/80 - 137/86)  BP(mean): --  RR: 18 (02 Aug 2023 16:30) (18 - 18)  SpO2: --        PHYSICAL EXAM:  GENERAL: NAD  HEAD:  Atraumatic, Normocephalic  EYES: EOMI  NERVOUS SYSTEM:  Alert & Oriented X3  CHEST/LUNG: unlabored breathing   HEART: nonedematous extremities       LABS:                                                     10.8   3.28  )-----------( 278      ( 02 Aug 2023 13:20 )             34.1       08-02    137  |  104  |  7<L>  ----------------------------<  80  3.5   |  15<L>  |  <0.5<L>    Ca    8.5      02 Aug 2023 13:20  Mg     1.7     08-02            RADIOLOGY:      ACC: [id number] EXAM:  US DPLX ABDOMEN   ORDERED BY: SHAWN CAGLE     PROCEDURE DATE:  07/21/2023      FINDINGS/  IMPRESSION:    Patent diminutive right and left portal veins.    Patent right, left and middle hepatic veins.  ______________________________________________________________    ACC: [id number] EXAM:  XR CHEST PORTABLE ROUTINE 1V   ORDERED BY: ALONSO MARIO     PROCEDURE DATE:  07/28/2023      IMPRESSION:    Increasing right basilar opacity/atelectasis. Bilateral interstitial   opacities.  ____________________________________________________________________      ACC: [id number] EXAM:  ECHO TTE WO CON COMP W DOPP                          PROCEDURE DATE:  07/18/2023      Summary:   1. Left ventricular ejection fraction, by visual estimation, is 55 to   60%.   2. Normal global left ventricular systolic function.   3. Endocardial visualization was enhanced with intravenous echo contrast.     24H events:    Patient is a 57y old Female who presents with a chief complaint of Decreased oral intake with electrolyte disturbances (27 Jul 2023 16:12)    Primary diagnosis of Adult failure to thrive    Today is hospital day 21d. This morning patient was seen and examined at bedside, resting comfortably in bed. Pt complains of cough, denies any CP, SOB, abdominal pain or N/V   Patient states she only had ensure because she does not feel hungry. Did not eat yesterday evening or today morning.     PAST MEDICAL & SURGICAL HISTORY  Hypothyroidism    H/O cirrhosis    Breast cancer    No significant past surgical history      SOCIAL HISTORY:  Social History:  non smoker, stopped alcohol drinking 9 years ago  lives in [place] with her family (13 Jul 2023 16:53)      ALLERGIES:  No Known Allergies    MEDICATIONS:  MEDICATIONS  (STANDING):  calcium carbonate    500 mG (Tums) Chewable 1 Tablet(s) Chew three times a day  chlorhexidine 2% Cloths 1 Application(s) Topical <User Schedule>  dextrose 5%. 1000 milliLiter(s) (100 mL/Hr) IV Continuous <Continuous>  dextrose 5%. 1000 milliLiter(s) (50 mL/Hr) IV Continuous <Continuous>  dextrose 50% Injectable 25 Gram(s) IV Push once  dextrose 50% Injectable 12.5 Gram(s) IV Push once  dextrose 50% Injectable 25 Gram(s) IV Push once  dronabinol 2.5 milliGRAM(s) Oral three times a day before meals  enoxaparin Injectable 40 milliGRAM(s) SubCutaneous every 24 hours  famotidine    Tablet 20 milliGRAM(s) Oral two times a day  folic acid 1 milliGRAM(s) Oral daily  glucagon  Injectable 1 milliGRAM(s) IntraMuscular once  letrozole 2.5 milliGRAM(s) Oral daily  levothyroxine 50 MICROGram(s) Oral daily  loratadine 10 milliGRAM(s) Oral daily  melatonin 5 milliGRAM(s) Oral at bedtime  palbociclib 125 milliGRAM(s) Oral daily  sucralfate suspension 1 Gram(s) Oral every 6 hours  tiotropium 2.5 MICROgram(s) Inhaler 2 Puff(s) Inhalation daily    MEDICATIONS  (PRN):  albuterol    90 MICROgram(s) HFA Inhaler 2 Puff(s) Inhalation every 6 hours PRN for shortness of breath and/or wheezing  benzonatate 100 milliGRAM(s) Oral three times a day PRN Cough  dextrose Oral Gel 15 Gram(s) Oral once PRN Blood Glucose LESS THAN 70 milliGRAM(s)/deciliter  guaiFENesin Oral Liquid (Sugar-Free) 200 milliGRAM(s) Oral every 6 hours PRN Cough  ibuprofen  Tablet. 200 milliGRAM(s) Oral every 4 hours PRN Mild Pain (1 - 3), Moderate Pain (4 - 6)  methocarbamol 1000 milliGRAM(s) Oral every 6 hours PRN Muscle Spasm      VITALS:   Vital Signs Last 24 Hrs  T(C): 36.6 (03 Aug 2023 08:00), Max: 37.3 (02 Aug 2023 16:30)  T(F): 97.8 (03 Aug 2023 08:00), Max: 99.1 (02 Aug 2023 16:30)  HR: 101 (03 Aug 2023 08:00) (101 - 107)  BP: 135/92 (03 Aug 2023 08:00) (125/80 - 135/92)  BP(mean): --  RR: 18 (03 Aug 2023 08:00) (18 - 18)  SpO2: --          PHYSICAL EXAM:  GENERAL: NAD  HEAD:  Atraumatic, Normocephalic  EYES: EOMI  NERVOUS SYSTEM:  Alert & Oriented X3  CHEST/LUNG: unlabored breathing   HEART: nonedematous extremities       LABS:                          11.0   2.78  )-----------( 257      ( 03 Aug 2023 12:24 )             34.1       08-03    137  |  105  |  5<L>  ----------------------------<  93  3.0<L>   |  15<L>  |  <0.5<L>    Ca    8.3<L>      03 Aug 2023 12:24  Mg     1.7     08-03            RADIOLOGY:      ACC: [id number] EXAM:  US DPLX ABDOMEN   ORDERED BY: SHAWN CAGLE     PROCEDURE DATE:  07/21/2023      FINDINGS/  IMPRESSION:    Patent diminutive right and left portal veins.    Patent right, left and middle hepatic veins.  ______________________________________________________________    ACC: [id number] EXAM:  XR CHEST PORTABLE ROUTINE 1V   ORDERED BY: ALONSO MARIO     PROCEDURE DATE:  07/28/2023      IMPRESSION:    Increasing right basilar opacity/atelectasis. Bilateral interstitial   opacities.  ____________________________________________________________________      ACC: [id number] EXAM:  ECHO TTE WO CON COMP W DOPP                          PROCEDURE DATE:  07/18/2023      Summary:   1. Left ventricular ejection fraction, by visual estimation, is 55 to   60%.   2. Normal global left ventricular systolic function.   3. Endocardial visualization was enhanced with intravenous echo contrast.     24H events:    Patient is a 57y old Female who presents with a chief complaint of Decreased oral intake with electrolyte disturbances (27 Jul 2023 16:12)    Primary diagnosis of Adult failure to thrive    Today is hospital day 22d. This morning patient was seen and examined at bedside, resting comfortably in bed. Pt complains of cough, denies any CP, SOB, abdominal pain or N/V   Patient states she ate a little bit of soup and ensure.     PAST MEDICAL & SURGICAL HISTORY  Hypothyroidism    H/O cirrhosis    Breast cancer    No significant past surgical history      SOCIAL HISTORY:  Social History:  non smoker, stopped alcohol drinking 9 years ago  lives in [place] with her family (13 Jul 2023 16:53)      ALLERGIES:  No Known Allergies    MEDICATIONS:  MEDICATIONS  (STANDING):  calcium carbonate    500 mG (Tums) Chewable 1 Tablet(s) Chew three times a day  chlorhexidine 2% Cloths 1 Application(s) Topical <User Schedule>  dextrose 5%. 1000 milliLiter(s) (100 mL/Hr) IV Continuous <Continuous>  dextrose 5%. 1000 milliLiter(s) (50 mL/Hr) IV Continuous <Continuous>  dextrose 50% Injectable 25 Gram(s) IV Push once  dextrose 50% Injectable 12.5 Gram(s) IV Push once  dextrose 50% Injectable 25 Gram(s) IV Push once  enoxaparin Injectable 40 milliGRAM(s) SubCutaneous every 24 hours  famotidine    Tablet 20 milliGRAM(s) Oral two times a day  folic acid 1 milliGRAM(s) Oral daily  glucagon  Injectable 1 milliGRAM(s) IntraMuscular once  letrozole 2.5 milliGRAM(s) Oral daily  levothyroxine 50 MICROGram(s) Oral daily  loratadine 10 milliGRAM(s) Oral daily  melatonin 5 milliGRAM(s) Oral at bedtime  palbociclib 125 milliGRAM(s) Oral daily  sucralfate suspension 1 Gram(s) Oral every 6 hours  tiotropium 2.5 MICROgram(s) Inhaler 2 Puff(s) Inhalation daily    MEDICATIONS  (PRN):  albuterol    90 MICROgram(s) HFA Inhaler 2 Puff(s) Inhalation every 6 hours PRN for shortness of breath and/or wheezing  benzonatate 100 milliGRAM(s) Oral three times a day PRN Cough  dextrose Oral Gel 15 Gram(s) Oral once PRN Blood Glucose LESS THAN 70 milliGRAM(s)/deciliter  guaiFENesin Oral Liquid (Sugar-Free) 200 milliGRAM(s) Oral every 6 hours PRN Cough  ibuprofen  Tablet. 200 milliGRAM(s) Oral every 4 hours PRN Mild Pain (1 - 3), Moderate Pain (4 - 6)  methocarbamol 1000 milliGRAM(s) Oral every 6 hours PRN Muscle Spasm      VITALS:   Vital Signs Last 24 Hrs  T(C): 37.1 (04 Aug 2023 08:21), Max: 37.2 (04 Aug 2023 00:03)  T(F): 98.7 (04 Aug 2023 08:21), Max: 99 (04 Aug 2023 00:03)  HR: 103 (04 Aug 2023 08:21) (103 - 117)  BP: 117/73 (04 Aug 2023 08:21) (117/73 - 135/82)  BP(mean): --  RR: 18 (04 Aug 2023 08:21) (18 - 18)  SpO2: 96% (04 Aug 2023 00:03) (96% - 96%)    Parameters below as of 04 Aug 2023 08:21  Patient On (Oxygen Delivery Method): room air        PHYSICAL EXAM:  GENERAL: NAD  HEAD:  Atraumatic, Normocephalic  EYES: EOMI  Abdomen: nontender to palpation   NERVOUS SYSTEM:  Alert & Oriented X3  CHEST/LUNG: unlabored breathing   HEART: nonedematous extremities       LABS:                          11.4   2.81  )-----------( 260      ( 04 Aug 2023 06:34 )             33.4       08-04    141  |  109  |  3<L>  ----------------------------<  103<H>  3.0<L>   |  15<L>  |  <0.5<L>    Ca    8.5      04 Aug 2023 06:34  Mg     2.0     08-04      RADIOLOGY:      ACC: [id number] EXAM:  US DPLX ABDOMEN   ORDERED BY: SHAWN CAGLE     PROCEDURE DATE:  07/21/2023      FINDINGS/  IMPRESSION:    Patent diminutive right and left portal veins.    Patent right, left and middle hepatic veins.  ______________________________________________________________    ACC: [id number] EXAM:  XR CHEST PORTABLE ROUTINE 1V   ORDERED BY: ALONSO MARIO     PROCEDURE DATE:  07/28/2023      IMPRESSION:    Increasing right basilar opacity/atelectasis. Bilateral interstitial   opacities.  ____________________________________________________________________      ACC: [id number] EXAM:  ECHO TTE WO CON COMP W DOPP                          PROCEDURE DATE:  07/18/2023      Summary:   1. Left ventricular ejection fraction, by visual estimation, is 55 to   60%.   2. Normal global left ventricular systolic function.   3. Endocardial visualization was enhanced with intravenous echo contrast.

## 2023-08-04 NOTE — PROGRESS NOTE ADULT - ASSESSMENT
57-year-old woman with a history of hypothyroidism, asthma, former alcohol use, breast cancer with diffuse bone metastasis, status post radiation 5 sessions finished 5/31/23, on letrozole daily, was on Ribociclib and was discontinued due to low magnesium with high QTc, surgical history of occiput-T2 posterior instrumentation and fusion, ORIF of C2 body with functional decline and impaired ADLs/mobility, ambulates with a walker. Following with Dr. Peña.  She presented to the emergency department complaining of decreased appetite and oral intake of 3 weeks duration.     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   - 8/3/23, beta hydroxybuyrate increased at 4.8   - finger stick 77, given 25mg dextrose 5%   - 8/4/23 Anion gap still elevated at 17  - Nutrition consult pending  - encourage po intake     # Transaminitis, improving   # Cholelithiasis  # H/o Cirrhosis  - US Abdomen Upper Quadrant Right 7/18 - Heterogeneous echotexture to liver with a lobulated contour, findings which can be seen in cirrhosis. Gallstones Small right pleural effusion  - CT Abdomen and Pelvis No Cont 7/19 - Findings may represent mild proctitis. No bowel obstruction or free air. Nodularity of the liver may be seen with cirrhosis.  - Abdominal duplex 7/21: Patent diminutive right and left portal veins. Patent right, left and middle hepatic veins.  - LFT improving, AST 23 ALT 59  - monitor LFTs   - case d/w hepatology     # H/o breast cancer with bone mets  # New T7 and L4 compression fractures on imaging probably sec to bone mets  - CT abd: New T7 and L4 compression fractures, when compared to CT from April 2023. Small right and trace left pleural effusions. Increase in diffuse osseous metastases. Stable right breast mass with nipple retraction.  - oncology following given issues with QTc and electrolyte abnormalities, ribociclib d/c'd. C/w letrozole, add Ibrance.  Also recommend medronate 90 mg IV once because of bony metastases, she can be transitioned to Zometa or Xgeva as outpatient there is a nonformulary in our institution.   - S/p medronate 90 mg IV once on 7/21 7/31/23   - c/w ibrance 125 mg daily, 3 weeks on and 1 week off.  - c/w letrozole 2.5 mg daily  - Outpatient follow up with Dr. Peña upon discharge    # Hypothyroidism  - c/w synthroid    # URI   - cough, chills, sneezing 7/27  - c/w Benzonate, Mucinex   - Parainfluenza infection  - Still with cough, no fever. Continue Mucinex.   - CXR 7/28 showed stable right lower lobe effusion/atelectasis.   - no more isolation     # DVT prophylaxis    # DNR/DNI    # Pending: nutrition consult   # Disposition:   Pt discussed in IDR, pt not eating, pending nutrition f/u.  Dispo plan: pt pending outcome of urgent expedited appeal for [facility].       57-year-old woman with a history of hypothyroidism, asthma, former alcohol use, breast cancer with diffuse bone metastasis, status post radiation 5 sessions finished 5/31/23, on letrozole daily, was on Ribociclib and was discontinued due to low magnesium with high QTc, surgical history of occiput-T2 posterior instrumentation and fusion, ORIF of C2 body with functional decline and impaired ADLs/mobility, ambulates with a walker. Following with Dr. Peña.  She presented to the emergency department complaining of decreased appetite and oral intake of 3 weeks duration.     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   - 8/3/23, beta hydroxybuyrate increased at 4.8   - finger stick 77, given 25mg dextrose 5%   - 8/4/23 Anion gap still elevated at 17  - 8/4/23 lactate wnl 0.9  - Nutrition consult pending  - encourage po intake     # Transaminitis, improving   # Cholelithiasis  # H/o Cirrhosis  - US Abdomen Upper Quadrant Right 7/18 - Heterogeneous echotexture to liver with a lobulated contour, findings which can be seen in cirrhosis. Gallstones Small right pleural effusion  - CT Abdomen and Pelvis No Cont 7/19 - Findings may represent mild proctitis. No bowel obstruction or free air. Nodularity of the liver may be seen with cirrhosis.  - Abdominal duplex 7/21: Patent diminutive right and left portal veins. Patent right, left and middle hepatic veins.  - LFT improving, AST 23 ALT 59  - monitor LFTs   - case d/w hepatology     # H/o breast cancer with bone mets  # New T7 and L4 compression fractures on imaging probably sec to bone mets  - CT abd: New T7 and L4 compression fractures, when compared to CT from April 2023. Small right and trace left pleural effusions. Increase in diffuse osseous metastases. Stable right breast mass with nipple retraction.  - oncology following given issues with QTc and electrolyte abnormalities, ribociclib d/c'd. C/w letrozole, add Ibrance.  Also recommend medronate 90 mg IV once because of bony metastases, she can be transitioned to Zometa or Xgeva as outpatient there is a nonformulary in our institution.   - S/p medronate 90 mg IV once on 7/21 7/31/23   - c/w ibrance 125 mg daily, 3 weeks on and 1 week off.  - c/w letrozole 2.5 mg daily  - Outpatient follow up with Dr. Peña upon discharge    # Hypothyroidism  - c/w synthroid    # URI   - cough, chills, sneezing 7/27  - c/w Benzonate, Mucinex   - Parainfluenza infection  - Still with cough, no fever. Continue Mucinex.   - CXR 7/28 showed stable right lower lobe effusion/atelectasis.   - no more isolation     # DVT prophylaxis    # DNR/DNI    # Pending: nutrition consult   # Disposition:   Pt discussed in IDR, pt not eating, pending nutrition f/u.  Dispo plan: pt pending outcome of urgent expedited appeal for [facility].       57-year-old woman with a history of hypothyroidism, asthma, former alcohol use, breast cancer with diffuse bone metastasis, status post radiation 5 sessions finished 5/31/23, on letrozole daily, was on Ribociclib and was discontinued due to low magnesium with high QTc, surgical history of occiput-T2 posterior instrumentation and fusion, ORIF of C2 body with functional decline and impaired ADLs/mobility, ambulates with a walker. Following with Dr. Peña.  She presented to the emergency department complaining of decreased appetite and oral intake of 3 weeks duration.     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   - 8/3/23, beta hydroxybuyrate increased at 4.8   - finger stick 77, given 25mg dextrose 5%   - 8/4/23 Anion gap still elevated at 17  - 8/4/23 lactate wnl 0.9  - Nutrition consult 8/4/23  - supplement K+  - cont PO diet as tolerated  - cont Ensure as tolerated, add magic cup once daily  - reweigh pt to clarify Q of wt loss (chris after IV hydration on admission)  - check in phos with next blood draw  - encourage po intake     # Transaminitis, improving   # Cholelithiasis  # H/o Cirrhosis  - US Abdomen Upper Quadrant Right 7/18 - Heterogeneous echotexture to liver with a lobulated contour, findings which can be seen in cirrhosis. Gallstones Small right pleural effusion  - CT Abdomen and Pelvis No Cont 7/19 - Findings may represent mild proctitis. No bowel obstruction or free air. Nodularity of the liver may be seen with cirrhosis.  - Abdominal duplex 7/21: Patent diminutive right and left portal veins. Patent right, left and middle hepatic veins.  - LFT improving, AST 23 ALT 59  - monitor LFTs   - case d/w hepatology     # H/o breast cancer with bone mets  # New T7 and L4 compression fractures on imaging probably sec to bone mets  - CT abd: New T7 and L4 compression fractures, when compared to CT from April 2023. Small right and trace left pleural effusions. Increase in diffuse osseous metastases. Stable right breast mass with nipple retraction.  - oncology following given issues with QTc and electrolyte abnormalities, ribociclib d/c'd. C/w letrozole, add Ibrance.  Also recommend medronate 90 mg IV once because of bony metastases, she can be transitioned to Zometa or Xgeva as outpatient there is a nonformulary in our institution.   - S/p medronate 90 mg IV once on 7/21 7/31/23   - c/w ibrance 125 mg daily, 3 weeks on and 1 week off.  - c/w letrozole 2.5 mg daily  - Outpatient follow up with Dr. Peña upon discharge    # Hypothyroidism  - c/w synthroid    # URI   - cough, chills, sneezing 7/27  - c/w Benzonate, Mucinex   - Parainfluenza infection  - Still with cough, no fever. Continue Mucinex.   - CXR 7/28 showed stable right lower lobe effusion/atelectasis.   - no more isolation     # DVT prophylaxis    # DNR/DNI    # Pending: nutrition consult   # Disposition:   Pt discussed in IDR, pt not eating, pending nutrition f/u.  Dispo plan: pt pending outcome of urgent expedited appeal for [facility].

## 2023-08-04 NOTE — PROGRESS NOTE ADULT - SUBJECTIVE AND OBJECTIVE BOX
24H events:    Patient is a 57y old Female who presents with a chief complaint of Decreased oral intake with electrolyte disturbances (27 Jul 2023 16:12)    Primary diagnosis of Adult failure to thrive    Today is hospital day 22d. This morning patient was seen and examined at bedside, resting comfortably in bed. Pt complains of cough, denies any CP, SOB, abdominal pain or N/V   Patient states she ate a little bit of soup and ensure.     PAST MEDICAL & SURGICAL HISTORY  Hypothyroidism    H/O cirrhosis    Breast cancer    No significant past surgical history      SOCIAL HISTORY:  Social History:  non smoker, stopped alcohol drinking 9 years ago  lives in [place] with her family (13 Jul 2023 16:53)      ALLERGIES:  No Known Allergies    MEDICATIONS:  MEDICATIONS  (STANDING):  calcium carbonate    500 mG (Tums) Chewable 1 Tablet(s) Chew three times a day  chlorhexidine 2% Cloths 1 Application(s) Topical <User Schedule>  dextrose 5%. 1000 milliLiter(s) (100 mL/Hr) IV Continuous <Continuous>  dextrose 5%. 1000 milliLiter(s) (50 mL/Hr) IV Continuous <Continuous>  dextrose 50% Injectable 25 Gram(s) IV Push once  dextrose 50% Injectable 12.5 Gram(s) IV Push once  dextrose 50% Injectable 25 Gram(s) IV Push once  enoxaparin Injectable 40 milliGRAM(s) SubCutaneous every 24 hours  famotidine    Tablet 20 milliGRAM(s) Oral two times a day  folic acid 1 milliGRAM(s) Oral daily  glucagon  Injectable 1 milliGRAM(s) IntraMuscular once  letrozole 2.5 milliGRAM(s) Oral daily  levothyroxine 50 MICROGram(s) Oral daily  loratadine 10 milliGRAM(s) Oral daily  melatonin 5 milliGRAM(s) Oral at bedtime  palbociclib 125 milliGRAM(s) Oral daily  sucralfate suspension 1 Gram(s) Oral every 6 hours  tiotropium 2.5 MICROgram(s) Inhaler 2 Puff(s) Inhalation daily    MEDICATIONS  (PRN):  albuterol    90 MICROgram(s) HFA Inhaler 2 Puff(s) Inhalation every 6 hours PRN for shortness of breath and/or wheezing  benzonatate 100 milliGRAM(s) Oral three times a day PRN Cough  dextrose Oral Gel 15 Gram(s) Oral once PRN Blood Glucose LESS THAN 70 milliGRAM(s)/deciliter  guaiFENesin Oral Liquid (Sugar-Free) 200 milliGRAM(s) Oral every 6 hours PRN Cough  ibuprofen  Tablet. 200 milliGRAM(s) Oral every 4 hours PRN Mild Pain (1 - 3), Moderate Pain (4 - 6)  methocarbamol 1000 milliGRAM(s) Oral every 6 hours PRN Muscle Spasm      VITALS:   Vital Signs Last 24 Hrs  T(C): 37.1 (04 Aug 2023 08:21), Max: 37.2 (04 Aug 2023 00:03)  T(F): 98.7 (04 Aug 2023 08:21), Max: 99 (04 Aug 2023 00:03)  HR: 103 (04 Aug 2023 08:21) (103 - 117)  BP: 117/73 (04 Aug 2023 08:21) (117/73 - 135/82)  BP(mean): --  RR: 18 (04 Aug 2023 08:21) (18 - 18)  SpO2: 96% (04 Aug 2023 00:03) (96% - 96%)    Parameters below as of 04 Aug 2023 08:21  Patient On (Oxygen Delivery Method): room air        PHYSICAL EXAM:  GENERAL: NAD  HEAD:  Atraumatic, Normocephalic  EYES: EOMI  Abdomen: nontender to palpation   NERVOUS SYSTEM:  Alert & Oriented X3  CHEST/LUNG: unlabored breathing   HEART: nonedematous extremities       LABS:                          11.4   2.81  )-----------( 260      ( 04 Aug 2023 06:34 )             33.4       08-04    141  |  109  |  3<L>  ----------------------------<  103<H>  3.0<L>   |  15<L>  |  <0.5<L>    Ca    8.5      04 Aug 2023 06:34  Mg     2.0     08-04      RADIOLOGY:      ACC: [id number] EXAM:  US DPLX ABDOMEN   ORDERED BY: SHAWN CAGLE     PROCEDURE DATE:  07/21/2023      FINDINGS/  IMPRESSION:    Patent diminutive right and left portal veins.    Patent right, left and middle hepatic veins.  ______________________________________________________________    ACC: [id number] EXAM:  XR CHEST PORTABLE ROUTINE 1V   ORDERED BY: ALONSO MARIO     PROCEDURE DATE:  07/28/2023      IMPRESSION:    Increasing right basilar opacity/atelectasis. Bilateral interstitial   opacities.  ____________________________________________________________________      ACC: [id number] EXAM:  ECHO TTE WO CON COMP W DOPP                          PROCEDURE DATE:  07/18/2023      Summary:   1. Left ventricular ejection fraction, by visual estimation, is 55 to   60%.   2. Normal global left ventricular systolic function.   3. Endocardial visualization was enhanced with intravenous echo contrast.

## 2023-08-04 NOTE — PROGRESS NOTE ADULT - ATTENDING COMMENTS
57-year-old woman with a history of hypothyroidism, asthma, former alcohol use, breast cancer with diffuse bone metastasis, status post radiation 5 sessions finished 5/31/23, on letrozole daily, was on Ribociclib and was discontinued due to low magnesium with high QTc, surgical history of occiput-T2 posterior instrumentation and fusion, ORIF of C2 body with functional decline and impaired ADLs/mobility, ambulates with a walker. Following with Dr. Peña.  She presented to the emergency department complaining of decreased appetite and oral intake of 3 weeks duration.     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  - c/w famotidine  - started Marinol for appetite but patient did not have significant improvement, and per nursing pt was sleepy throughout the day- marinol dc'd   - per nursing report pt eating poorly , also noted to have elevated AG likely 2/2 ketoacidosis,  started calorie count 8/3  - Nutrition consult placed - reccs appreciated       # Acute transaminitis: improved significantly, suspect ischemic hepatitis 2/2 hypoperfusion event  # H/O compensated alcoholic cirrhosis  # H/O elevated ALP presumed 2/2 bony metastasis  # Cholelithiasis  - US Abdomen Upper Quadrant Right 7/18 - Heterogeneous echotexture to liver with a lobulated contour, findings which can be seen in cirrhosis. Gallstones Small right pleural effusion  - CT Abdomen and Pelvis No Cont 7/19 - Findings may represent mild proctitis. No bowel obstruction or free air. Nodularity of the liver may be seen with cirrhosis.  - Abdominal duplex 7/21: Patent diminutive right and left portal veins. Patent right, left and middle hepatic veins.  - LFT improved, ALP elevated likely due to bone mets   - seen by Hepatology, OP follow up on dc     # H/o breast cancer with bone mets  # New T7 and L4 compression fractures on imaging probably sec to bone mets  - CT abd: New T7 and L4 compression fractures, when compared to CT from April 2023. Small right and trace left pleural effusions. Increase in diffuse osseous metastases. Stable right breast mass with nipple retraction.  - oncology following given issues with QTc and electrolyte abnormalities, ribociclib d/c'd. C/w letrozole, add Ibrance.  Also recommend medronate 90 mg IV once because of bony metastases, she can be transitioned to Zometa or Xgeva as outpatient there is a nonformulary in our institution.   - S/p medronate 90 mg IV once on 7/21  - c/w ibrance 125 mg daily, 3 weeks on and 1 week off.  - c/w letrozole 2.5 mg daily  - Outpatient follow up with Dr. Peña upon discharge    # Hypothyroidism  - c/w synthroid    # URI 2/2 Parainfluenza- improved   - Still with cough, no fever. Continue Mucinex.   - CXR 7/28 showed stable right lower lobe effusion/atelectasis.     # DVT prophylaxis- Lovenox     # DNR/DNI    # Disposition: calorie count and nutrition eval pending. Pending results of expedited appeal for STR       Total time spent to complete patient's bedside assessment, review medical chart, discuss medical plan of care with covering medical team was more than 35 minutes  with >50% of time spent face to face with patient, discussion with patient/family and/or coordination of care    Arianna Pang DO .

## 2023-08-04 NOTE — CHART NOTE - NSCHARTNOTEFT_GEN_A_CORE
NUTRITION SUPPORT TEAM  -  PROGRESS NOTE     Interval Events:        REVIEW OF SYSTEMS:  Negative except as noted above.     VITALS:  T(F): 98.7 (08-04 @ 08:21), Max: 98.7 (08-04 @ 08:21)  HR: 103 (08-04 @ 08:21) (103 - 103)  BP: 117/73 (08-04 @ 08:21) (117/73 - 117/73)  RR: 18 (08-04 @ 08:21) (18 - 18)  SpO2: --    HEIGHT/WEIGHT/BMI:   Height (cm): 162.6 (07-16), 162.6 (04-28), 162.6 (04-25)  Weight (kg): 72.6 (07-13), 92.3 (04-28), 92.3 (04-25)  BMI (kg/m2): 27.5 (07-16), 27.5 (07-13), 34.9 (04-28), 34.9 (04-25)    I/Os:     08-03-23 @ 07:01  -  08-04-23 @ 07:00  --------------------------------------------------------  IN:    Oral Fluid: 420 mL  Total IN: 420 mL    OUT:  Total OUT: 0 mL    Total NET: 420 mL      MEDICATIONS:   albuterol    90 MICROgram(s) HFA Inhaler 2 Puff(s) Inhalation every 6 hours PRN  benzonatate 100 milliGRAM(s) Oral three times a day PRN  calcium carbonate    500 mG (Tums) Chewable 1 Tablet(s) Chew three times a day  chlorhexidine 2% Cloths 1 Application(s) Topical <User Schedule>  dextrose 5%. 1000 milliLiter(s) (100 mL/Hr) IV Continuous <Continuous>  dextrose 5%. 1000 milliLiter(s) (50 mL/Hr) IV Continuous <Continuous>  dextrose 50% Injectable 25 Gram(s) IV Push once  dextrose 50% Injectable 12.5 Gram(s) IV Push once  dextrose 50% Injectable 25 Gram(s) IV Push once  dextrose Oral Gel 15 Gram(s) Oral once PRN  enoxaparin Injectable 40 milliGRAM(s) SubCutaneous every 24 hours  famotidine    Tablet 20 milliGRAM(s) Oral two times a day  folic acid 1 milliGRAM(s) Oral daily  glucagon  Injectable 1 milliGRAM(s) IntraMuscular once  guaiFENesin Oral Liquid (Sugar-Free) 200 milliGRAM(s) Oral every 6 hours PRN  ibuprofen  Tablet. 200 milliGRAM(s) Oral every 4 hours PRN  letrozole 2.5 milliGRAM(s) Oral daily  levothyroxine 50 MICROGram(s) Oral daily  loratadine 10 milliGRAM(s) Oral daily  melatonin 5 milliGRAM(s) Oral at bedtime  methocarbamol 1000 milliGRAM(s) Oral every 6 hours PRN  palbociclib 125 milliGRAM(s) Oral daily  sucralfate suspension 1 Gram(s) Oral every 6 hours  tiotropium 2.5 MICROgram(s) Inhaler 2 Puff(s) Inhalation daily    LABS:                         11.4   2.81  )-----------( 260      ( 04 Aug 2023 06:34 )             33.4     141  |  109  |  3<L>  ----------------------------<  103<H>          (08-04-23 @ 06:34)  3.0<L>   |  15<L>  |  <0.5<L>    Ca    8.5          (08-04-23 @ 06:34)  Mg     2.0         (08-04-23 @ 06:34)    Blood Glucose (Past 24 hours):  90 mg/dL (08-04 @ 11:11)  90 mg/dL (08-04 @ 08:42)  113 mg/dL (08-03 @ 16:46)    DIET:   Diet, Soft and Bite Sized:   Supplement Feeding Modality:  Oral  Ensure Plus High Protein Cans or Servings Per Day:  3       Frequency:  Three Times a day (07-13-23 @ 18:19) [Active]      ASSESSMENT  - breast can metastatic to bone  - loss of appetite   - hypokalemia, hypomagnesemia, hypophosphatemia      due to chemo, worsened by refeeding  - dehydration on admission, resolved      SUGGEST: NUTRITION SUPPORT TEAM  -  PROGRESS NOTE     Interval Events:    Pt awake, alert. c/o difficulty swallowing since RT with no improvement but able to tolerate liquids and soft foods well  Able to consume 2 Ensure/day in addition to water, other liquids and some food  She reports that when food was brought in by family member last week she ate lo mein, french fries and hash browns without difficulty  +loss BM X 2-3  Awaiting placement. d/w     REVIEW OF SYSTEMS:  Negative except as noted above.     VITALS:  T(F): 98.7 (08-04 @ 08:21), Max: 98.7 (08-04 @ 08:21)  HR: 103 (08-04 @ 08:21) (103 - 103)  BP: 117/73 (08-04 @ 08:21) (117/73 - 117/73)  RR: 18 (08-04 @ 08:21) (18 - 18)  SpO2: --    HEIGHT/WEIGHT/BMI:   Height (cm): 162.6 (07-16), 162.6 (04-28), 162.6 (04-25)  Weight (kg): 72.6 (07-13), 92.3 (04-28), 92.3 (04-25)  BMI (kg/m2): 27.5 (07-16), 27.5 (07-13), 34.9 (04-28), 34.9 (04-25)    I/Os:     08-03-23 @ 07:01  -  08-04-23 @ 07:00  --------------------------------------------------------  IN:    Oral Fluid: 420 mL  Total IN: 420 mL    OUT:  Total OUT: 0 mL    Total NET: 420 mL      MEDICATIONS:   albuterol    90 MICROgram(s) HFA Inhaler 2 Puff(s) Inhalation every 6 hours PRN  benzonatate 100 milliGRAM(s) Oral three times a day PRN  calcium carbonate    500 mG (Tums) Chewable 1 Tablet(s) Chew three times a day  chlorhexidine 2% Cloths 1 Application(s) Topical <User Schedule>  dextrose 5%. 1000 milliLiter(s) (100 mL/Hr) IV Continuous <Continuous>  dextrose 5%. 1000 milliLiter(s) (50 mL/Hr) IV Continuous <Continuous>  dextrose 50% Injectable 25 Gram(s) IV Push once  dextrose 50% Injectable 12.5 Gram(s) IV Push once  dextrose 50% Injectable 25 Gram(s) IV Push once  dextrose Oral Gel 15 Gram(s) Oral once PRN  enoxaparin Injectable 40 milliGRAM(s) SubCutaneous every 24 hours  famotidine    Tablet 20 milliGRAM(s) Oral two times a day  folic acid 1 milliGRAM(s) Oral daily  glucagon  Injectable 1 milliGRAM(s) IntraMuscular once  guaiFENesin Oral Liquid (Sugar-Free) 200 milliGRAM(s) Oral every 6 hours PRN  ibuprofen  Tablet. 200 milliGRAM(s) Oral every 4 hours PRN  letrozole 2.5 milliGRAM(s) Oral daily  levothyroxine 50 MICROGram(s) Oral daily  loratadine 10 milliGRAM(s) Oral daily  melatonin 5 milliGRAM(s) Oral at bedtime  methocarbamol 1000 milliGRAM(s) Oral every 6 hours PRN  palbociclib 125 milliGRAM(s) Oral daily  sucralfate suspension 1 Gram(s) Oral every 6 hours  tiotropium 2.5 MICROgram(s) Inhaler 2 Puff(s) Inhalation daily    LABS:                         11.4   2.81  )-----------( 260      ( 04 Aug 2023 06:34 )             33.4     141  |  109  |  3<L>  ----------------------------<  103<H>          (08-04-23 @ 06:34)  3.0<L>   |  15<L>  |  <0.5<L>    Ca    8.5          (08-04-23 @ 06:34)  Mg     2.0         (08-04-23 @ 06:34)    Blood Glucose (Past 24 hours):  90 mg/dL (08-04 @ 11:11)  90 mg/dL (08-04 @ 08:42)  113 mg/dL (08-03 @ 16:46)      DIET:   Diet, Soft and Bite Sized:   Supplement Feeding Modality:  Oral  Ensure Plus High Protein Cans or Servings Per Day:  3       Frequency:  Three Times a day (07-13-23 @ 18:19) [Active]      ASSESSMENT  - breast can metastatic to bone  - loss of appetite   - hypokalemia, hypomagnesemia, hypophosphatemia      due to chemo, worsened by refeeding  - dehydration on admission, resolved      PLAN  - supplement K+  - cont PO diet as tolerated  - cont Ensure as tolerated, add magic cup once daily  - reweigh pt to clarify Q of wt loss (chris after IV hydration on admission)  - check in phos with next blood draw  - ? start marinol NUTRITION SUPPORT TEAM  -  PROGRESS NOTE     Interval Events:    Pt awake, alert. c/o difficulty swallowing since RT with no improvement but able to tolerate liquids and soft foods well  Able to consume 2 Ensure/day in addition to water, other liquids and some food  She reports that when food was brought in by family member last week she ate lo mein, french fries and hash browns without difficulty  +loss BM X 2-3  Awaiting placement. d/w     REVIEW OF SYSTEMS:  Negative except as noted above.     VITALS:  T(F): 98.7 (08-04 @ 08:21), Max: 98.7 (08-04 @ 08:21)  HR: 103 (08-04 @ 08:21) (103 - 103)  BP: 117/73 (08-04 @ 08:21) (117/73 - 117/73)  RR: 18 (08-04 @ 08:21) (18 - 18)  SpO2: --    HEIGHT/WEIGHT/BMI:   Height (cm): 162.6 (07-16), 162.6 (04-28), 162.6 (04-25)  Weight (kg): 72.6 (07-13), 92.3 (04-28), 92.3 (04-25)  BMI (kg/m2): 27.5 (07-16), 27.5 (07-13), 34.9 (04-28), 34.9 (04-25)    I/Os:     08-03-23 @ 07:01  -  08-04-23 @ 07:00  --------------------------------------------------------  IN:    Oral Fluid: 420 mL  Total IN: 420 mL    OUT:  Total OUT: 0 mL    Total NET: 420 mL      MEDICATIONS:   albuterol    90 MICROgram(s) HFA Inhaler 2 Puff(s) Inhalation every 6 hours PRN  benzonatate 100 milliGRAM(s) Oral three times a day PRN  calcium carbonate    500 mG (Tums) Chewable 1 Tablet(s) Chew three times a day  chlorhexidine 2% Cloths 1 Application(s) Topical <User Schedule>  enoxaparin Injectable 40 milliGRAM(s) SubCutaneous every 24 hours  famotidine    Tablet 20 milliGRAM(s) Oral two times a day  folic acid 1 milliGRAM(s) Oral daily  guaiFENesin Oral Liquid (Sugar-Free) 200 milliGRAM(s) Oral every 6 hours PRN  ibuprofen  Tablet. 200 milliGRAM(s) Oral every 4 hours PRN  letrozole 2.5 milliGRAM(s) Oral daily  levothyroxine 50 MICROGram(s) Oral daily  loratadine 10 milliGRAM(s) Oral daily  melatonin 5 milliGRAM(s) Oral at bedtime  methocarbamol 1000 milliGRAM(s) Oral every 6 hours PRN  palbociclib 125 milliGRAM(s) Oral daily  sucralfate suspension 1 Gram(s) Oral every 6 hours  tiotropium 2.5 MICROgram(s) Inhaler 2 Puff(s) Inhalation daily    LABS:                         11.4   2.81  )-----------( 260      ( 04 Aug 2023 06:34 )             33.4     141  |  109  |  3<L>  ----------------------------<  103<H>          (08-04-23 @ 06:34)  3.0<L>   |  15<L>  |  <0.5<L>    Ca    8.5          (08-04-23 @ 06:34)  Mg     2.0         (08-04-23 @ 06:34)    Blood Glucose (Past 24 hours):  90 mg/dL (08-04 @ 11:11)  90 mg/dL (08-04 @ 08:42)  113 mg/dL (08-03 @ 16:46)      DIET:   Diet, Soft and Bite Sized:   Supplement Feeding Modality:  Oral  Ensure Plus High Protein Cans or Servings Per Day:  3       Frequency:  Three Times a day (07-13-23 @ 18:19) [Active]      ASSESSMENT  - breast can metastatic to bone  - loss of appetite   - hypokalemia, hypomagnesemia, hypophosphatemia      due to chemo, worsened by refeeding  - dehydration on admission, resolved    PLAN  - supplement K+  - cont PO diet as tolerated  - suspect protein intake inadequate  - cont Ensure as tolerated, add magic cup once daily  - reweigh pt to clarify Q of wt loss (chris after IV hydration on admission)  - check in phos with next blood draw  - ? start marinol

## 2023-08-05 NOTE — PROGRESS NOTE ADULT - SUBJECTIVE AND OBJECTIVE BOX
SUBJECTIVE:    Patient is a 57y old Female who presents with a chief complaint of Decreased oral intake with electrolyte disturbances (04 Aug 2023 13:04)    Currently admitted to medicine with the primary diagnosis of:    Today is hospital day 23d.     Overnight Events:         PAST MEDICAL & SURGICAL HISTORY  Hypothyroidism    H/O cirrhosis    Breast cancer    No significant past surgical history        SOCIAL HISTORY:  Smoking history:  Alcohol Use;  Illicit Drug Use:    ALLERGIES:  No Known Allergies    MEDICATIONS:  STANDING MEDICATIONS  calcium carbonate    500 mG (Tums) Chewable 1 Tablet(s) Chew three times a day  chlorhexidine 2% Cloths 1 Application(s) Topical <User Schedule>  dextrose 5%. 1000 milliLiter(s) IV Continuous <Continuous>  dextrose 5%. 1000 milliLiter(s) IV Continuous <Continuous>  dextrose 50% Injectable 25 Gram(s) IV Push once  dextrose 50% Injectable 12.5 Gram(s) IV Push once  dextrose 50% Injectable 25 Gram(s) IV Push once  enoxaparin Injectable 40 milliGRAM(s) SubCutaneous every 24 hours  famotidine    Tablet 20 milliGRAM(s) Oral two times a day  folic acid 1 milliGRAM(s) Oral daily  glucagon  Injectable 1 milliGRAM(s) IntraMuscular once  letrozole 2.5 milliGRAM(s) Oral daily  levothyroxine 50 MICROGram(s) Oral daily  loratadine 10 milliGRAM(s) Oral daily  melatonin 5 milliGRAM(s) Oral at bedtime  palbociclib 125 milliGRAM(s) Oral daily  sucralfate suspension 1 Gram(s) Oral every 6 hours  tiotropium 2.5 MICROgram(s) Inhaler 2 Puff(s) Inhalation daily    PRN MEDICATIONS  albuterol    90 MICROgram(s) HFA Inhaler 2 Puff(s) Inhalation every 6 hours PRN  benzonatate 100 milliGRAM(s) Oral three times a day PRN  dextrose Oral Gel 15 Gram(s) Oral once PRN  guaiFENesin Oral Liquid (Sugar-Free) 200 milliGRAM(s) Oral every 6 hours PRN  ibuprofen  Tablet. 200 milliGRAM(s) Oral every 4 hours PRN  methocarbamol 1000 milliGRAM(s) Oral every 6 hours PRN    VITALS:   ICU Vital Signs Last 24 Hrs  T(C): 36.9 (05 Aug 2023 07:16), Max: 37.4 (04 Aug 2023 15:00)  T(F): 98.4 (05 Aug 2023 07:16), Max: 99.3 (04 Aug 2023 15:00)  HR: 105 (05 Aug 2023 07:16) (104 - 106)  BP: 119/78 (05 Aug 2023 07:16) (110/- - 123/73)  RR: 18 (05 Aug 2023 07:16) (18 - 18)  SpO2: 95% (04 Aug 2023 23:32) (95% - 96%)    O2 Parameters below as of 04 Aug 2023 23:32  Patient On (Oxygen Delivery Method): room air            LABS:                        11.0   2.76  )-----------( 252      ( 04 Aug 2023 11:31 )             33.9     08-05    135  |  103  |  4<L>  ----------------------------<  90  3.4<L>   |  17  |  <0.5<L>    Ca    8.2<L>      05 Aug 2023 06:21  Phos  3.0     08-05  Mg     1.6     08-05        Urinalysis Basic - ( 05 Aug 2023 06:21 )    Color: x / Appearance: x / SG: x / pH: x  Gluc: 90 mg/dL / Ketone: x  / Bili: x / Urobili: x   Blood: x / Protein: x / Nitrite: x   Leuk Esterase: x / RBC: x / WBC x   Sq Epi: x / Non Sq Epi: x / Bacteria: x        Lactate, Blood: 0.9 mmol/L (08-04-23 @ 11:31)            RADIOLOGY:    PHYSICAL EXAM:  GEN:   LUNGS:   HEART:   ABD:   EXT:   NEURO:     Mobility (6 Click Score):      /24    Lines:  Central line:              Date placed:             Indication:   Intravenous Access:   NG tube:   Wong Catheter:          SUBJECTIVE:    Patient is a 57y old Female who presents with a chief complaint of Decreased oral intake with electrolyte disturbances (04 Aug 2023 13:04)    Currently admitted to medicine with the primary diagnosis of: Decreased po intake     Today is hospital day 23d.     Overnight Events:     Patient doing well. she feels like she is eating a little more. calorie count still ongoing     PAST MEDICAL & SURGICAL HISTORY  Hypothyroidism    H/O cirrhosis    Breast cancer    No significant past surgical history      ALLERGIES:  No Known Allergies    MEDICATIONS:  STANDING MEDICATIONS  calcium carbonate    500 mG (Tums) Chewable 1 Tablet(s) Chew three times a day  chlorhexidine 2% Cloths 1 Application(s) Topical <User Schedule>  dextrose 5%. 1000 milliLiter(s) IV Continuous <Continuous>  dextrose 5%. 1000 milliLiter(s) IV Continuous <Continuous>  dextrose 50% Injectable 25 Gram(s) IV Push once  dextrose 50% Injectable 12.5 Gram(s) IV Push once  dextrose 50% Injectable 25 Gram(s) IV Push once  enoxaparin Injectable 40 milliGRAM(s) SubCutaneous every 24 hours  famotidine    Tablet 20 milliGRAM(s) Oral two times a day  folic acid 1 milliGRAM(s) Oral daily  glucagon  Injectable 1 milliGRAM(s) IntraMuscular once  letrozole 2.5 milliGRAM(s) Oral daily  levothyroxine 50 MICROGram(s) Oral daily  loratadine 10 milliGRAM(s) Oral daily  melatonin 5 milliGRAM(s) Oral at bedtime  palbociclib 125 milliGRAM(s) Oral daily  sucralfate suspension 1 Gram(s) Oral every 6 hours  tiotropium 2.5 MICROgram(s) Inhaler 2 Puff(s) Inhalation daily    PRN MEDICATIONS  albuterol    90 MICROgram(s) HFA Inhaler 2 Puff(s) Inhalation every 6 hours PRN  benzonatate 100 milliGRAM(s) Oral three times a day PRN  dextrose Oral Gel 15 Gram(s) Oral once PRN  guaiFENesin Oral Liquid (Sugar-Free) 200 milliGRAM(s) Oral every 6 hours PRN  ibuprofen  Tablet. 200 milliGRAM(s) Oral every 4 hours PRN  methocarbamol 1000 milliGRAM(s) Oral every 6 hours PRN    VITALS:   ICU Vital Signs Last 24 Hrs  T(C): 36.9 (05 Aug 2023 07:16), Max: 37.4 (04 Aug 2023 15:00)  T(F): 98.4 (05 Aug 2023 07:16), Max: 99.3 (04 Aug 2023 15:00)  HR: 105 (05 Aug 2023 07:16) (104 - 106)  BP: 119/78 (05 Aug 2023 07:16) (110/- - 123/73)  RR: 18 (05 Aug 2023 07:16) (18 - 18)  SpO2: 95% (04 Aug 2023 23:32) (95% - 96%)    O2 Parameters below as of 04 Aug 2023 23:32  Patient On (Oxygen Delivery Method): room air            LABS:                        11.0   2.76  )-----------( 252      ( 04 Aug 2023 11:31 )             33.9     08-05    135  |  103  |  4<L>  ----------------------------<  90  3.4<L>   |  17  |  <0.5<L>    Ca    8.2<L>      05 Aug 2023 06:21  Phos  3.0     08-05  Mg     1.6     08-05        Urinalysis Basic - ( 05 Aug 2023 06:21 )    Color: x / Appearance: x / SG: x / pH: x  Gluc: 90 mg/dL / Ketone: x  / Bili: x / Urobili: x   Blood: x / Protein: x / Nitrite: x   Leuk Esterase: x / RBC: x / WBC x   Sq Epi: x / Non Sq Epi: x / Bacteria: x        Lactate, Blood: 0.9 mmol/L (08-04-23 @ 11:31)      RADIOLOGY:    PHYSICAL EXAM:    GENERAL: NAD, chronically ill appearing   CHEST/LUNG: Clear to auscultation bilaterally; No rales, rhonchi, wheezing, or rubs. Unlabored respirations  HEART: Regular rate and rhythm; No murmurs, rubs, or gallops  ABDOMEN: Bowel sounds present; Soft, Nontender, Nondistended. No hepatomegally  EXTREMITIES:  2+ Peripheral Pulses, brisk capillary refill. No clubbing, cyanosis, or edema  NERVOUS SYSTEM:  Alert & Oriented X3, speech clear. No deficits   MSK: FROM all 4 extremities, full and equal strength  SKIN: No rashes or lesions

## 2023-08-05 NOTE — PROGRESS NOTE ADULT - SUBJECTIVE AND OBJECTIVE BOX
24H events:    Patient is a 57y old Female who presents with a chief complaint of Decreased oral intake with electrolyte disturbances (27 Jul 2023 16:12)    Primary diagnosis of Adult failure to thrive    Today is hospital day 22d. This morning patient was seen and examined at bedside, resting comfortably in bed. Pt complains of cough, denies any CP, SOB, abdominal pain or N/V   Patient states she ate a little bit of soup and ensure.     PAST MEDICAL & SURGICAL HISTORY  Hypothyroidism    H/O cirrhosis    Breast cancer    No significant past surgical history      SOCIAL HISTORY:  Social History:  non smoker, stopped alcohol drinking 9 years ago  lives in [place] with her family (13 Jul 2023 16:53)      ALLERGIES:  No Known Allergies    MEDICATIONS:  MEDICATIONS  (STANDING):  calcium carbonate    500 mG (Tums) Chewable 1 Tablet(s) Chew three times a day  chlorhexidine 2% Cloths 1 Application(s) Topical <User Schedule>  dextrose 5%. 1000 milliLiter(s) (100 mL/Hr) IV Continuous <Continuous>  dextrose 5%. 1000 milliLiter(s) (50 mL/Hr) IV Continuous <Continuous>  dextrose 50% Injectable 25 Gram(s) IV Push once  dextrose 50% Injectable 12.5 Gram(s) IV Push once  dextrose 50% Injectable 25 Gram(s) IV Push once  enoxaparin Injectable 40 milliGRAM(s) SubCutaneous every 24 hours  famotidine    Tablet 20 milliGRAM(s) Oral two times a day  folic acid 1 milliGRAM(s) Oral daily  glucagon  Injectable 1 milliGRAM(s) IntraMuscular once  letrozole 2.5 milliGRAM(s) Oral daily  levothyroxine 50 MICROGram(s) Oral daily  loratadine 10 milliGRAM(s) Oral daily  megestrol Suspension 400 milliGRAM(s) Oral daily  melatonin 5 milliGRAM(s) Oral at bedtime  palbociclib 125 milliGRAM(s) Oral daily  sucralfate suspension 1 Gram(s) Oral every 6 hours  tiotropium 2.5 MICROgram(s) Inhaler 2 Puff(s) Inhalation daily    MEDICATIONS  (PRN):  albuterol    90 MICROgram(s) HFA Inhaler 2 Puff(s) Inhalation every 6 hours PRN for shortness of breath and/or wheezing  benzonatate 100 milliGRAM(s) Oral three times a day PRN Cough  dextrose Oral Gel 15 Gram(s) Oral once PRN Blood Glucose LESS THAN 70 milliGRAM(s)/deciliter  guaiFENesin Oral Liquid (Sugar-Free) 200 milliGRAM(s) Oral every 6 hours PRN Cough  ibuprofen  Tablet. 200 milliGRAM(s) Oral every 4 hours PRN Mild Pain (1 - 3), Moderate Pain (4 - 6)  methocarbamol 1000 milliGRAM(s) Oral every 6 hours PRN Muscle Spasm      VITALS:   Vital Signs Last 24 Hrs  T(C): 36.9 (05 Aug 2023 07:16), Max: 37.4 (04 Aug 2023 15:00)  T(F): 98.4 (05 Aug 2023 07:16), Max: 99.3 (04 Aug 2023 15:00)  HR: 105 (05 Aug 2023 07:16) (104 - 106)  BP: 119/78 (05 Aug 2023 07:16) (110/- - 123/73)  BP(mean): --  RR: 18 (05 Aug 2023 07:16) (18 - 18)  SpO2: 95% (04 Aug 2023 23:32) (95% - 96%)    Parameters below as of 04 Aug 2023 23:32  Patient On (Oxygen Delivery Method): room air      PHYSICAL EXAM:  GENERAL: NAD  HEAD:  Atraumatic, Normocephalic  EYES: EOMI  Abdomen: nontender to palpation   NERVOUS SYSTEM:  Alert & Oriented X3  CHEST/LUNG: unlabored breathing   HEART: nonedematous extremities       LABS:                          11.0   2.68  )-----------( 188      ( 05 Aug 2023 12:37 )             33.2       08-05    135  |  103  |  4<L>  ----------------------------<  90  3.4<L>   |  17  |  <0.5<L>    Ca    8.2<L>      05 Aug 2023 06:21  Phos  3.0     08-05  Mg     1.6     08-05            RADIOLOGY:      ACC: [id number] EXAM:  US DPLX ABDOMEN   ORDERED BY: SHAWN CAGLE     PROCEDURE DATE:  07/21/2023      FINDINGS/  IMPRESSION:    Patent diminutive right and left portal veins.    Patent right, left and middle hepatic veins.  ______________________________________________________________    ACC: [id number] EXAM:  XR CHEST PORTABLE ROUTINE 1V   ORDERED BY: ALONSO MARIO     PROCEDURE DATE:  07/28/2023      IMPRESSION:    Increasing right basilar opacity/atelectasis. Bilateral interstitial   opacities.  ____________________________________________________________________      ACC: [id number] EXAM:  ECHO TTE WO CON COMP W DOPP                          PROCEDURE DATE:  07/18/2023      Summary:   1. Left ventricular ejection fraction, by visual estimation, is 55 to   60%.   2. Normal global left ventricular systolic function.   3. Endocardial visualization was enhanced with intravenous echo contrast.

## 2023-08-05 NOTE — PROGRESS NOTE ADULT - ASSESSMENT
57-year-old woman with a history of hypothyroidism, asthma, former alcohol use, breast cancer with diffuse bone metastasis, status post radiation 5 sessions finished 5/31/23, on letrozole daily, was on Ribociclib and was discontinued due to low magnesium with high QTc, surgical history of occiput-T2 posterior instrumentation and fusion, ORIF of C2 body with functional decline and impaired ADLs/mobility, ambulates with a walker. Following with Dr. Peña.  She presented to the emergency department complaining of decreased appetite and oral intake of 3 weeks duration.     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  - c/w famotidine  - started Marinol for appetite but patient did not have significant improvement, and per nursing pt was sleepy throughout the day- marinol dc'd   - per nursing report pt eating poorly , also noted to have elevated AG likely 2/2 ketoacidosis,  started calorie count 8/3  - Nutrition consult placed - reccs appreciated       # Acute transaminitis: improved significantly, suspect ischemic hepatitis 2/2 hypoperfusion event  # H/O compensated alcoholic cirrhosis  # H/O elevated ALP presumed 2/2 bony metastasis  # Cholelithiasis  - US Abdomen Upper Quadrant Right 7/18 - Heterogeneous echotexture to liver with a lobulated contour, findings which can be seen in cirrhosis. Gallstones Small right pleural effusion  - CT Abdomen and Pelvis No Cont 7/19 - Findings may represent mild proctitis. No bowel obstruction or free air. Nodularity of the liver may be seen with cirrhosis.  - Abdominal duplex 7/21: Patent diminutive right and left portal veins. Patent right, left and middle hepatic veins.  - LFT improved, ALP elevated likely due to bone mets   - seen by Hepatology, OP follow up on dc     # H/o breast cancer with bone mets  # New T7 and L4 compression fractures on imaging probably sec to bone mets  - CT abd: New T7 and L4 compression fractures, when compared to CT from April 2023. Small right and trace left pleural effusions. Increase in diffuse osseous metastases. Stable right breast mass with nipple retraction.  - oncology following given issues with QTc and electrolyte abnormalities, ribociclib d/c'd. C/w letrozole, add Ibrance.  Also recommend medronate 90 mg IV once because of bony metastases, she can be transitioned to Zometa or Xgeva as outpatient there is a nonformulary in our institution.   - S/p medronate 90 mg IV once on 7/21  - c/w ibrance 125 mg daily, 3 weeks on and 1 week off.  - c/w letrozole 2.5 mg daily  - Outpatient follow up with Dr. Peña upon discharge    # Hypothyroidism  - c/w synthroid    # URI 2/2 Parainfluenza- improved   - Still with cough, no fever. Continue Mucinex.   - CXR 7/28 showed stable right lower lobe effusion/atelectasis.     # DVT prophylaxis- Lovenox     # DNR/DNI    # Disposition: calorie count and nutrition eval pending. Pending results of expedited appeal for STR  57-year-old woman with a history of hypothyroidism, asthma, former alcohol use, breast cancer with diffuse bone metastasis, status post radiation 5 sessions finished 5/31/23, on letrozole daily, was on Ribociclib and was discontinued due to low magnesium with high QTc, surgical history of occiput-T2 posterior instrumentation and fusion, ORIF of C2 body with functional decline and impaired ADLs/mobility, ambulates with a walker. Following with Dr. Peña.  She presented to the emergency department complaining of decreased appetite and oral intake of 3 weeks duration.     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 Gallstones Small right pleural effusion  - CT Abdomen and Pelvis No Cont 7/19 - Findings may represent mild proctitis. No bowel obstruction or free air. Nodularity of the liver may be seen with cirrhosis.  - Abdominal duplex 7/21: Patent diminutive right and left portal veins. Patent right, left and middle hepatic veins.  - LFT improved, ALP elevated likely due to bone mets   - seen by Hepatology, OP follow up on dc     # H/o breast cancer with bone mets  # New T7 and L4 compression fractures on imaging probably sec to bone mets  - CT abd: New T7 and L4 compression fractures, when compared to CT from April 2023. Small right and trace left pleural effusions. Increase in diffuse osseous metastases. Stable right breast mass with nipple retraction.  - oncology following given issues with QTc and electrolyte abnormalities, ribociclib d/c'd. C/w letrozole, add Ibrance.  Also recommend medronate 90 mg IV once because of bony metastases, she can be transitioned to Zometa or Xgeva as outpatient there is a nonformulary in our institution.   - S/p medronate 90 mg IV once on 7/21  - c/w ibrance 125 mg daily, 3 weeks on and 1 week off.  - c/w letrozole 2.5 mg daily  - Outpatient follow up with Dr. Peña upon discharge    # Hypothyroidism  - c/w synthroid    # URI 2/2 Parainfluenza- improved   - Still with cough, no fever. Continue Mucinex.   - CXR 7/28 showed stable right lower lobe effusion/atelectasis.     # DVT prophylaxis- Lovenox     # DNR/DNI    # Disposition: calorie count and nutrition eval pending. Pending results of expedited appeal for STR

## 2023-08-05 NOTE — PROGRESS NOTE ADULT - ASSESSMENT
57-year-old woman with a history of hypothyroidism, asthma, former alcohol use, breast cancer with diffuse bone metastasis, status post radiation 5 sessions finished 5/31/23, on letrozole daily, was on Ribociclib and was discontinued due to low magnesium with high QTc, surgical history of occiput-T2 posterior instrumentation and fusion, ORIF of C2 body with functional decline and impaired ADLs/mobility, ambulates with a walker. Following with Dr. Peña.  She presented to the emergency department complaining of decreased appetite and oral intake of 3 weeks duration.     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   - 8/3/23, beta hydroxybuyrate increased at 4.8   - finger stick 77, given 25mg dextrose 5%   - 8/4/23 Anion gap still elevated at 17, improving at 15 8/5/23  - 8/4/23 lactate wnl 0.9  - Nutrition consult 8/4/23  - supplement K+  - cont PO diet as tolerated  - cont Ensure as tolerated, add magic cup once daily  - reweigh pt to clarify Q of wt loss (chris after IV hydration on admission)  - check in phos with next blood draw  - encourage po intake   - Began on megestrol Suspension 400 milliGRAM(s) Oral daily for appetite     # Transaminitis, improving   # Cholelithiasis  # H/o Cirrhosis  - US Abdomen Upper Quadrant Right 7/18 - Heterogeneous echotexture to liver with a lobulated contour, findings which can be seen in cirrhosis. Gallstones Small right pleural effusion  - CT Abdomen and Pelvis No Cont 7/19 - Findings may represent mild proctitis. No bowel obstruction or free air. Nodularity of the liver may be seen with cirrhosis.  - Abdominal duplex 7/21: Patent diminutive right and left portal veins. Patent right, left and middle hepatic veins.  - LFT improving, AST 23 ALT 59  - monitor LFTs   - case d/w hepatology     # H/o breast cancer with bone mets  # New T7 and L4 compression fractures on imaging probably sec to bone mets  - CT abd: New T7 and L4 compression fractures, when compared to CT from April 2023. Small right and trace left pleural effusions. Increase in diffuse osseous metastases. Stable right breast mass with nipple retraction.  - oncology following given issues with QTc and electrolyte abnormalities, ribociclib d/c'd. C/w letrozole, add Ibrance.  Also recommend medronate 90 mg IV once because of bony metastases, she can be transitioned to Zometa or Xgeva as outpatient there is a nonformulary in our institution.   - S/p medronate 90 mg IV once on 7/21 7/31/23   - c/w ibrance 125 mg daily, 3 weeks on and 1 week off.  - c/w letrozole 2.5 mg daily  - Outpatient follow up with Dr. Peña upon discharge    # Hypothyroidism  - c/w synthroid    # URI   - cough, chills, sneezing 7/27  - c/w Benzonate, Mucinex   - Parainfluenza infection  - Still with cough, no fever. Continue Mucinex.   - CXR 7/28 showed stable right lower lobe effusion/atelectasis.   - no more isolation     # DVT prophylaxis    # DNR/DNI    # Pending:   # Disposition:   Pt discussed in IDR, pt not eating, pending nutrition f/u.  Dispo plan: pt pending outcome of urgent expedited appeal for [facility].

## 2023-08-06 NOTE — PROGRESS NOTE ADULT - SUBJECTIVE AND OBJECTIVE BOX
SUBJECTIVE:    Patient is a 57y old Female who presents with a chief complaint of Decreased oral intake with electrolyte disturbances (05 Aug 2023 13:49)    Currently admitted to medicine with the primary diagnosis of Adult failure to thrive       Today is hospital day 24d. This morning she is resting comfortably in bed and reports no new issues or overnight events.     PAST MEDICAL & SURGICAL HISTORY  Hypothyroidism    H/O cirrhosis    Breast cancer    No significant past surgical history      SOCIAL HISTORY:  Negative for smoking/alcohol/drug use.     ALLERGIES:  No Known Allergies    MEDICATIONS:  STANDING MEDICATIONS  calcium carbonate    500 mG (Tums) Chewable 1 Tablet(s) Chew three times a day  chlorhexidine 2% Cloths 1 Application(s) Topical <User Schedule>  dextrose 5%. 1000 milliLiter(s) IV Continuous <Continuous>  dextrose 5%. 1000 milliLiter(s) IV Continuous <Continuous>  dextrose 50% Injectable 25 Gram(s) IV Push once  dextrose 50% Injectable 12.5 Gram(s) IV Push once  dextrose 50% Injectable 25 Gram(s) IV Push once  enoxaparin Injectable 40 milliGRAM(s) SubCutaneous every 24 hours  famotidine    Tablet 20 milliGRAM(s) Oral two times a day  folic acid 1 milliGRAM(s) Oral daily  glucagon  Injectable 1 milliGRAM(s) IntraMuscular once  letrozole 2.5 milliGRAM(s) Oral daily  levothyroxine 50 MICROGram(s) Oral daily  loratadine 10 milliGRAM(s) Oral daily  megestrol Suspension 400 milliGRAM(s) Oral daily  melatonin 5 milliGRAM(s) Oral at bedtime  palbociclib 125 milliGRAM(s) Oral daily  sucralfate suspension 1 Gram(s) Oral every 6 hours  tiotropium 2.5 MICROgram(s) Inhaler 2 Puff(s) Inhalation daily    PRN MEDICATIONS  albuterol    90 MICROgram(s) HFA Inhaler 2 Puff(s) Inhalation every 6 hours PRN  benzonatate 100 milliGRAM(s) Oral three times a day PRN  dextrose Oral Gel 15 Gram(s) Oral once PRN  guaiFENesin Oral Liquid (Sugar-Free) 200 milliGRAM(s) Oral every 6 hours PRN  ibuprofen  Tablet. 200 milliGRAM(s) Oral every 4 hours PRN  methocarbamol 1000 milliGRAM(s) Oral every 6 hours PRN    VITALS:   T(F): 98.7  HR: 95  BP: 117/71  RR: 18  SpO2: --    LABS:                        11.0   2.68  )-----------( 188      ( 05 Aug 2023 12:37 )             33.2     08-05    135  |  103  |  4<L>  ----------------------------<  90  3.4<L>   |  17  |  <0.5<L>    Ca    8.2<L>      05 Aug 2023 06:21  Phos  3.0     08-05  Mg     1.6     08-05        Urinalysis Basic - ( 05 Aug 2023 06:21 )    Color: x / Appearance: x / SG: x / pH: x  Gluc: 90 mg/dL / Ketone: x  / Bili: x / Urobili: x   Blood: x / Protein: x / Nitrite: x   Leuk Esterase: x / RBC: x / WBC x   Sq Epi: x / Non Sq Epi: x / Bacteria: x                RADIOLOGY:

## 2023-08-06 NOTE — PROGRESS NOTE ADULT - ASSESSMENT
57-year-old woman with a history of hypothyroidism, asthma, former alcohol use, breast cancer with diffuse bone metastasis, status post radiation 5 sessions finished 5/31/23, on letrozole daily, was on Ribociclib and was discontinued due to low magnesium with high QTc, surgical history of occiput-T2 posterior instrumentation and fusion, ORIF of C2 body with functional decline and impaired ADLs/mobility, ambulates with a walker. Following with Dr. Peña.  She presented to the emergency department complaining of decreased appetite and oral intake of 3 weeks duration.     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  #hypokalemia and hypomag  - c/w famotidine  - dc marinol- patient didnt tolerate   - continue megestrol 400 mg daily  - can trial short course of steroids for apetite stimulation- dexa 4 qd 5 days   - calorie count not being updated  - Nutrition consult placed - reccs appreciated       # Acute transaminitis: improved significantly, suspect ischemic hepatitis 2/2 hypoperfusion event  # H/O compensated alcoholic cirrhosis  # H/O elevated ALP presumed 2/2 bony metastasis  # Cholelithiasis  - US Abdomen Upper Quadrant Right 7/18 - Heterogeneous echotexture to liver with a lobulated contour, findings which can be seen in cirrhosis. Gallstones Small right pleural effusion  - CT Abdomen and Pelvis No Cont 7/19 - Findings may represent mild proctitis. No bowel obstruction or free air. Nodularity of the liver may be seen with cirrhosis.  - Abdominal duplex 7/21: Patent diminutive right and left portal veins. Patent right, left and middle hepatic veins.  - LFT improved, ALP elevated likely due to bone mets   - seen by Hepatology, OP follow up on dc     # H/o breast cancer with bone mets  # New T7 and L4 compression fractures on imaging probably sec to bone mets  - CT abd: New T7 and L4 compression fractures, when compared to CT from April 2023. Small right and trace left pleural effusions. Increase in diffuse osseous metastases. Stable right breast mass with nipple retraction.  - oncology following given issues with QTc and electrolyte abnormalities, ribociclib d/c'd. C/w letrozole, add Ibrance.  Also recommend medronate 90 mg IV once because of bony metastases, she can be transitioned to Zometa or Xgeva as outpatient there is a nonformulary in our institution.   - S/p medronate 90 mg IV once on 7/21  - c/w ibrance 125 mg daily, 3 weeks on and 1 week off.  - c/w letrozole 2.5 mg daily  - Outpatient follow up with Dr. Peña upon discharge    # Hypothyroidism  - c/w synthroid    # URI 2/2 Parainfluenza- improved   - Still with cough, no fever. Continue Mucinex.   - CXR 7/28 showed stable right lower lobe effusion/atelectasis.     # DVT prophylaxis- Lovenox     # DNR/DNI    # Disposition: calorie count and nutrition eval .trial of megestrol and steroids for Apetite  Pending results of expedited appeal for STR

## 2023-08-06 NOTE — PROGRESS NOTE ADULT - SUBJECTIVE AND OBJECTIVE BOX
SUBJECTIVE:    Patient is a 57y old Female who presents with a chief complaint of Decreased oral intake with electrolyte disturbances (04 Aug 2023 13:04)    Currently admitted to medicine with the primary diagnosis of: Decreased po intake     Today is hospital day 23d.     Overnight Events:     Patient doing well. she feels like she is eating a little more. calorie count still ongoing but appears like she is only eating 30% of her food.     PAST MEDICAL & SURGICAL HISTORY  Hypothyroidism    H/O cirrhosis    Breast cancer    No significant past surgical history      ALLERGIES:  No Known Allergies    MEDICATIONS:    MEDICATIONS  (STANDING):  calcium carbonate    500 mG (Tums) Chewable 1 Tablet(s) Chew three times a day  chlorhexidine 2% Cloths 1 Application(s) Topical <User Schedule>  dextrose 5%. 1000 milliLiter(s) (100 mL/Hr) IV Continuous <Continuous>  dextrose 5%. 1000 milliLiter(s) (50 mL/Hr) IV Continuous <Continuous>  dextrose 50% Injectable 25 Gram(s) IV Push once  dextrose 50% Injectable 12.5 Gram(s) IV Push once  dextrose 50% Injectable 25 Gram(s) IV Push once  enoxaparin Injectable 40 milliGRAM(s) SubCutaneous every 24 hours  famotidine    Tablet 20 milliGRAM(s) Oral two times a day  folic acid 1 milliGRAM(s) Oral daily  glucagon  Injectable 1 milliGRAM(s) IntraMuscular once  letrozole 2.5 milliGRAM(s) Oral daily  levothyroxine 50 MICROGram(s) Oral daily  loratadine 10 milliGRAM(s) Oral daily  megestrol Suspension 400 milliGRAM(s) Oral daily  melatonin 5 milliGRAM(s) Oral at bedtime  palbociclib 125 milliGRAM(s) Oral daily  sucralfate suspension 1 Gram(s) Oral every 6 hours  tiotropium 2.5 MICROgram(s) Inhaler 2 Puff(s) Inhalation daily    MEDICATIONS  (PRN):  albuterol    90 MICROgram(s) HFA Inhaler 2 Puff(s) Inhalation every 6 hours PRN for shortness of breath and/or wheezing  benzonatate 100 milliGRAM(s) Oral three times a day PRN Cough  dextrose Oral Gel 15 Gram(s) Oral once PRN Blood Glucose LESS THAN 70 milliGRAM(s)/deciliter  guaiFENesin Oral Liquid (Sugar-Free) 200 milliGRAM(s) Oral every 6 hours PRN Cough  ibuprofen  Tablet. 200 milliGRAM(s) Oral every 4 hours PRN Mild Pain (1 - 3), Moderate Pain (4 - 6)  methocarbamol 1000 milliGRAM(s) Oral every 6 hours PRN Muscle Spasm      VITALS:     ICU Vital Signs Last 24 Hrs  T(C): 36.3 (06 Aug 2023 07:14), Max: 37.1 (05 Aug 2023 15:00)  T(F): 97.4 (06 Aug 2023 07:14), Max: 98.8 (05 Aug 2023 15:00)  HR: 97 (06 Aug 2023 07:14) (95 - 97)  BP: 126/73 (06 Aug 2023 07:14) (117/71 - 126/73)  RR: 18 (06 Aug 2023 07:14) (18 - 18)  SpO2: --        LABS:      LABS:  cret                        10.7   2.24  )-----------( 173      ( 06 Aug 2023 12:07 )             32.4     08-06    142  |  109  |  <3<L>  ----------------------------<  93  2.8<L>   |  19  |  <0.5<L>    Ca    8.4      06 Aug 2023 08:15  Phos  3.0     08-05  Mg     1.5     08-06                                11.0   2.76  )-----------( 252      ( 04 Aug 2023 11:31 )             33.9     08-05    135  |  103  |  4<L>  ----------------------------<  90  3.4<L>   |  17  |  <0.5<L>    Ca    8.2<L>      05 Aug 2023 06:21  Phos  3.0     08-05  Mg     1.6     08-05        Urinalysis Basic - ( 05 Aug 2023 06:21 )    Color: x / Appearance: x / SG: x / pH: x  Gluc: 90 mg/dL / Ketone: x  / Bili: x / Urobili: x   Blood: x / Protein: x / Nitrite: x   Leuk Esterase: x / RBC: x / WBC x   Sq Epi: x / Non Sq Epi: x / Bacteria: x        Lactate, Blood: 0.9 mmol/L (08-04-23 @ 11:31)      RADIOLOGY:    PHYSICAL EXAM:    GENERAL: NAD, chronically ill appearing   CHEST/LUNG: Clear to auscultation bilaterally; No rales, rhonchi, wheezing, or rubs. Unlabored respirations  HEART: Regular rate and rhythm; No murmurs, rubs, or gallops  ABDOMEN: Bowel sounds present; Soft, Nontender, Nondistended. No hepatomegally  EXTREMITIES:  2+ Peripheral Pulses, brisk capillary refill. No clubbing, cyanosis, or edema  NERVOUS SYSTEM:  Alert & Oriented X3, speech clear. No deficits   MSK: FROM all 4 extremities, full and equal strength  SKIN: No rashes or lesions

## 2023-08-07 NOTE — PROGRESS NOTE ADULT - SUBJECTIVE AND OBJECTIVE BOX
SUBJECTIVE:    Patient is a 57y old Female who presents with a chief complaint of Decreased oral intake with electrolyte disturbances (04 Aug 2023 13:04)    Currently admitted to medicine with the primary diagnosis of: Decreased po intake     Today is hospital day 23d.     Overnight Events:     Patient doing well. overall still poor oral intake. patoient told me she does not feel hungry     PAST MEDICAL & SURGICAL HISTORY  Hypothyroidism    H/O cirrhosis    Breast cancer    No significant past surgical history      ALLERGIES:  No Known Allergies    MEDICATIONS:    MEDICATIONS  (STANDING):  calcium carbonate    500 mG (Tums) Chewable 1 Tablet(s) Chew three times a day  chlorhexidine 2% Cloths 1 Application(s) Topical <User Schedule>  dextrose 5%. 1000 milliLiter(s) (100 mL/Hr) IV Continuous <Continuous>  dextrose 5%. 1000 milliLiter(s) (50 mL/Hr) IV Continuous <Continuous>  dextrose 50% Injectable 25 Gram(s) IV Push once  dextrose 50% Injectable 12.5 Gram(s) IV Push once  dextrose 50% Injectable 25 Gram(s) IV Push once  enoxaparin Injectable 40 milliGRAM(s) SubCutaneous every 24 hours  famotidine    Tablet 20 milliGRAM(s) Oral two times a day  folic acid 1 milliGRAM(s) Oral daily  glucagon  Injectable 1 milliGRAM(s) IntraMuscular once  letrozole 2.5 milliGRAM(s) Oral daily  levothyroxine 50 MICROGram(s) Oral daily  loratadine 10 milliGRAM(s) Oral daily  megestrol Suspension 400 milliGRAM(s) Oral daily  melatonin 5 milliGRAM(s) Oral at bedtime  palbociclib 125 milliGRAM(s) Oral daily  sucralfate suspension 1 Gram(s) Oral every 6 hours  tiotropium 2.5 MICROgram(s) Inhaler 2 Puff(s) Inhalation daily    MEDICATIONS  (PRN):  albuterol    90 MICROgram(s) HFA Inhaler 2 Puff(s) Inhalation every 6 hours PRN for shortness of breath and/or wheezing  benzonatate 100 milliGRAM(s) Oral three times a day PRN Cough  dextrose Oral Gel 15 Gram(s) Oral once PRN Blood Glucose LESS THAN 70 milliGRAM(s)/deciliter  guaiFENesin Oral Liquid (Sugar-Free) 200 milliGRAM(s) Oral every 6 hours PRN Cough  ibuprofen  Tablet. 200 milliGRAM(s) Oral every 4 hours PRN Mild Pain (1 - 3), Moderate Pain (4 - 6)  methocarbamol 1000 milliGRAM(s) Oral every 6 hours PRN Muscle Spasm      VITALS:     Vital Signs Last 24 Hrs  T(C): 36.7 (07 Aug 2023 16:00), Max: 37.1 (06 Aug 2023 23:00)  T(F): 98 (07 Aug 2023 16:00), Max: 98.8 (06 Aug 2023 23:00)  HR: 114 (07 Aug 2023 16:00) (98 - 114)  BP: 124/80 (07 Aug 2023 16:00) (111/72 - 124/80)  BP(mean): --  RR: 18 (07 Aug 2023 16:00) (18 - 18)  SpO2: --          LABS:        LABS:                        11.8   2.35  )-----------( 203      ( 07 Aug 2023 12:20 )             36.1     08-07    140  |  107  |  <3<L>  ----------------------------<  105<H>  3.4<L>   |  19  |  <0.5<L>    Ca    8.5      07 Aug 2023 07:20  Mg     1.8     08-07                                          11.0   2.76  )-----------( 252      ( 04 Aug 2023 11:31 )             33.9     08-05    135  |  103  |  4<L>  ----------------------------<  90  3.4<L>   |  17  |  <0.5<L>    Ca    8.2<L>      05 Aug 2023 06:21  Phos  3.0     08-05  Mg     1.6     08-05        Urinalysis Basic - ( 05 Aug 2023 06:21 )    Color: x / Appearance: x / SG: x / pH: x  Gluc: 90 mg/dL / Ketone: x  / Bili: x / Urobili: x   Blood: x / Protein: x / Nitrite: x   Leuk Esterase: x / RBC: x / WBC x   Sq Epi: x / Non Sq Epi: x / Bacteria: x        Lactate, Blood: 0.9 mmol/L (08-04-23 @ 11:31)      RADIOLOGY:    PHYSICAL EXAM:    GENERAL: NAD, chronically ill appearing   CHEST/LUNG: Clear to auscultation bilaterally; No rales, rhonchi, wheezing, or rubs. Unlabored respirations  HEART: Regular rate and rhythm; No murmurs, rubs, or gallops  ABDOMEN: Bowel sounds present; Soft, Nontender, Nondistended. No hepatomegally  EXTREMITIES:  2+ Peripheral Pulses, brisk capillary refill. No clubbing, cyanosis, or edema  NERVOUS SYSTEM:  Alert & Oriented X3, speech clear. No deficits   MSK: FROM all 4 extremities, full and equal strength  SKIN: No rashes or lesions

## 2023-08-07 NOTE — PROGRESS NOTE ADULT - ASSESSMENT
57-year-old woman with a history of hypothyroidism, asthma, former alcohol use, breast cancer with diffuse bone metastasis, status post radiation 5 sessions finished 5/31/23, on letrozole daily, was on Ribociclib and was discontinued due to low magnesium with high QTc, surgical history of occiput-T2 posterior instrumentation and fusion, ORIF of C2 body with functional decline and impaired ADLs/mobility, ambulates with a walker. Following with Dr. Peña.  She presented to the emergency department complaining of decreased appetite and oral intake of 3 weeks duration.     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  # hypokalemia and hypomag  - c/w famotidine  - dc marinol - patient didn't tolerate   - c/w megestrol 400 mg daily  - c/w dexa 4 qd 5 days   - calorie count not being updated - f/u nutrition  - Nutrition consult placed - recs appreciated  - replete electrolytes    # Acute transaminitis: improved significantly, suspect ischemic hepatitis 2/2 hypoperfusion event  # H/O compensated alcoholic cirrhosis  # H/O elevated ALP presumed 2/2 bony metastasis  # Cholelithiasis  - US Abdomen Upper Quadrant Right 7/18 - Heterogeneous echotexture to liver with a lobulated contour, findings which can be seen in cirrhosis. Gallstones Small right pleural effusion  - CT Abdomen and Pelvis No Cont 7/19 - Findings may represent mild proctitis. No bowel obstruction or free air. Nodularity of the liver may be seen with cirrhosis.  - Abdominal duplex 7/21: Patent diminutive right and left portal veins. Patent right, left and middle hepatic veins.  - LFT improved, ALP elevated likely due to bone mets   - seen by Hepatology, OP follow up on dc     # H/o breast cancer with bone mets  # New T7 and L4 compression fractures on imaging probably sec to bone mets  - CT abd: New T7 and L4 compression fractures, when compared to CT from April 2023. Small right and trace left pleural effusions. Increase in diffuse osseous metastases. Stable right breast mass with nipple retraction.  - oncology following given issues with QTc and electrolyte abnormalities, ribociclib d/c'd. C/w letrozole, add Ibrance.  Also recommend medronate 90 mg IV once because of bony metastases, she can be transitioned to Zometa or Xgeva as outpatient there is a nonformulary in our institution.   - S/p medronate 90 mg IV once on 7/21  - c/w ibrance 125 mg daily, 3 weeks on and 1 week off.  - c/w letrozole 2.5 mg daily  - Outpatient follow up with Dr. Peña upon discharge    # Hypothyroidism  - c/w synthroid    # URI 2/2 Parainfluenza- improved   - Still with cough, no fever. Continue Mucinex.   - CXR 7/28 showed stable right lower lobe effusion/atelectasis.     # DVT prophylaxis- Lovenox     # DNR/DNI    Disposition: Pending results of expedited appeal for STR

## 2023-08-07 NOTE — PROGRESS NOTE ADULT - ASSESSMENT
57-year-old woman with a history of hypothyroidism, asthma, former alcohol use, breast cancer with diffuse bone metastasis, status post radiation 5 sessions finished 5/31/23, on letrozole daily, was on Ribociclib and was discontinued due to low magnesium with high QTc, surgical history of occiput-T2 posterior instrumentation and fusion, ORIF of C2 body with functional decline and impaired ADLs/mobility, ambulates with a walker. Following with Dr. Peña.  She presented to the emergency department complaining of decreased appetite and oral intake of 3 weeks duration.     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  #hypokalemia and hypomag  - c/w famotidine  - dc marinol- patient didnt tolerate   - continue megestrol 400 mg daily  - can trial short course of steroids for apetite stimulation- dexa 4 qd 5 days   - calorie count pending   - Nutrition consult placed - follow reocmmendations       # Acute transaminitis: improved significantly, suspect ischemic hepatitis 2/2 hypoperfusion event  # H/O compensated alcoholic cirrhosis  # H/O elevated ALP presumed 2/2 bony metastasis  # Cholelithiasis  - US Abdomen Upper Quadrant Right 7/18 - Heterogeneous echotexture to liver with a lobulated contour, findings which can be seen in cirrhosis. Gallstones Small right pleural effusion  - CT Abdomen and Pelvis No Cont 7/19 - Findings may represent mild proctitis. No bowel obstruction or free air. Nodularity of the liver may be seen with cirrhosis.  - Abdominal duplex 7/21: Patent diminutive right and left portal veins. Patent right, left and middle hepatic veins.  - LFT improved, ALP elevated likely due to bone mets   - seen by Hepatology, OP follow up on dc     # H/o breast cancer with bone mets  # New T7 and L4 compression fractures on imaging probably sec to bone mets  - CT abd: New T7 and L4 compression fractures, when compared to CT from April 2023. Small right and trace left pleural effusions. Increase in diffuse osseous metastases. Stable right breast mass with nipple retraction.  - oncology following given issues with QTc and electrolyte abnormalities, ribociclib d/c'd. C/w letrozole, add Ibrance.  Also recommend medronate 90 mg IV once because of bony metastases, she can be transitioned to Zometa or Xgeva as outpatient there is a nonformulary in our institution.   - S/p medronate 90 mg IV once on 7/21  - c/w ibrance 125 mg daily, 3 weeks on and 1 week off.  - c/w letrozole 2.5 mg daily  - Outpatient follow up with Dr. Peña upon discharge    # Hypothyroidism  - c/w synthroid    # URI 2/2 Parainfluenza- improved   - Still with cough, no fever. Continue Mucinex.   - CXR 7/28 showed stable right lower lobe effusion/atelectasis.     # DVT prophylaxis- Lovenox     # DNR/DNI    # Disposition: calorie count and nutrition eval .trial of megestrol and steroids for Apetite  Pending results of expedited appeal for STR

## 2023-08-07 NOTE — PROGRESS NOTE ADULT - SUBJECTIVE AND OBJECTIVE BOX
SUBJECTIVE:    Patient is a 57y old Female who presents with a chief complaint of Decreased oral intake with electrolyte disturbances (06 Aug 2023 14:54)    Today is hospital day 25d. This morning she is resting comfortably in bed and reports no new issues or overnight events. Patient reported having no appetite for solid food, but is willing to try water or juice.      PAST MEDICAL & SURGICAL HISTORY  Hypothyroidism    H/O cirrhosis    Breast cancer    No significant past surgical history      SOCIAL HISTORY:  Negative for smoking/alcohol/drug use.     ALLERGIES:  No Known Allergies    MEDICATIONS:  STANDING MEDICATIONS  calcium carbonate    500 mG (Tums) Chewable 1 Tablet(s) Chew three times a day  chlorhexidine 2% Cloths 1 Application(s) Topical <User Schedule>  dexAMETHasone     Tablet 4 milliGRAM(s) Oral daily  dextrose 5%. 1000 milliLiter(s) IV Continuous <Continuous>  dextrose 5%. 1000 milliLiter(s) IV Continuous <Continuous>  dextrose 50% Injectable 25 Gram(s) IV Push once  dextrose 50% Injectable 12.5 Gram(s) IV Push once  dextrose 50% Injectable 25 Gram(s) IV Push once  enoxaparin Injectable 40 milliGRAM(s) SubCutaneous every 24 hours  famotidine    Tablet 20 milliGRAM(s) Oral two times a day  folic acid 1 milliGRAM(s) Oral daily  glucagon  Injectable 1 milliGRAM(s) IntraMuscular once  letrozole 2.5 milliGRAM(s) Oral daily  levothyroxine 50 MICROGram(s) Oral daily  loratadine 10 milliGRAM(s) Oral daily  megestrol Suspension 400 milliGRAM(s) Oral daily  melatonin 5 milliGRAM(s) Oral at bedtime  palbociclib 125 milliGRAM(s) Oral daily  potassium chloride  20 mEq/100 mL IVPB 20 milliEquivalent(s) IV Intermittent every 2 hours  sucralfate suspension 1 Gram(s) Oral every 6 hours  tiotropium 2.5 MICROgram(s) Inhaler 2 Puff(s) Inhalation daily    PRN MEDICATIONS  albuterol    90 MICROgram(s) HFA Inhaler 2 Puff(s) Inhalation every 6 hours PRN  benzonatate 100 milliGRAM(s) Oral three times a day PRN  dextrose Oral Gel 15 Gram(s) Oral once PRN  guaiFENesin Oral Liquid (Sugar-Free) 200 milliGRAM(s) Oral every 6 hours PRN  ibuprofen  Tablet. 200 milliGRAM(s) Oral every 4 hours PRN  methocarbamol 1000 milliGRAM(s) Oral every 6 hours PRN    VITALS:   T(F): 98.6  HR: 102  BP: 111/72  RR: 18  SpO2: --    LABS:                        10.7   2.24  )-----------( 173      ( 06 Aug 2023 12:07 )             32.4     08-07    140  |  107  |  <3<L>  ----------------------------<  105<H>  3.4<L>   |  19  |  <0.5<L>    Ca    8.5      07 Aug 2023 07:20  Mg     1.8     08-07        Urinalysis Basic - ( 07 Aug 2023 07:20 )    Color: x / Appearance: x / SG: x / pH: x  Gluc: 105 mg/dL / Ketone: x  / Bili: x / Urobili: x   Blood: x / Protein: x / Nitrite: x   Leuk Esterase: x / RBC: x / WBC x   Sq Epi: x / Non Sq Epi: x / Bacteria: x      RADIOLOGY:    PHYSICAL EXAM:  GEN: No acute distress  LUNGS: Clear to auscultation bilaterally   HEART: Regular  ABD: Soft, non-tender, non-distended.  EXT: NC/NC/NE/2+PP/WINTER/Skin Intact.   NEURO: [id number]

## 2023-08-08 NOTE — PROGRESS NOTE ADULT - ASSESSMENT
57-year-old woman with a history of hypothyroidism, asthma, former alcohol use, breast cancer with diffuse bone metastasis, status post radiation 5 sessions finished 5/31/23, on letrozole daily, was on Ribociclib and was discontinued due to low magnesium with high QTc, surgical history of occiput-T2 posterior instrumentation and fusion, ORIF of C2 body with functional decline and impaired ADLs/mobility, ambulates with a walker. Following with Dr. Peña.  She presented to the emergency department complaining of decreased appetite and oral intake of 3 weeks duration.     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  # hypokalemia and hypomag  - c/w famotidine  - dc marinol - patient didn't tolerate   - c/w megestrol 400 mg daily  - c/w dexa 4 qd 5 days   - calorie count not being updated - f/u nutrition  - Nutrition consult placed - recs appreciated  - replete electrolytes    # Acute transaminitis: improved significantly, suspect ischemic hepatitis 2/2 hypoperfusion event  # H/O compensated alcoholic cirrhosis  # H/O elevated ALP presumed 2/2 bony metastasis  # Cholelithiasis  - US Abdomen Upper Quadrant Right 7/18 - Heterogeneous echotexture to liver with a lobulated contour, findings which can be seen in cirrhosis. Gallstones Small right pleural effusion  - CT Abdomen and Pelvis No Cont 7/19 - Findings may represent mild proctitis. No bowel obstruction or free air. Nodularity of the liver may be seen with cirrhosis.  - Abdominal duplex 7/21: Patent diminutive right and left portal veins. Patent right, left and middle hepatic veins.  - LFT improved, ALP elevated likely due to bone mets   - seen by Hepatology, OP follow up on dc     # H/o breast cancer with bone mets  # New T7 and L4 compression fractures on imaging probably sec to bone mets  - CT abd: New T7 and L4 compression fractures, when compared to CT from April 2023. Small right and trace left pleural effusions. Increase in diffuse osseous metastases. Stable right breast mass with nipple retraction.  - oncology following given issues with QTc and electrolyte abnormalities, ribociclib d/c'd. C/w letrozole, add Ibrance.  Also recommend medronate 90 mg IV once because of bony metastases, she can be transitioned to Zometa or Xgeva as outpatient there is a nonformulary in our institution.   - S/p medronate 90 mg IV once on 7/21  - c/w ibrance 125 mg daily, 3 weeks on and 1 week off.  - c/w letrozole 2.5 mg daily  - Outpatient follow up with Dr. Peña upon discharge    # Hypothyroidism  - c/w synthroid    # URI 2/2 Parainfluenza- improved   - Still with cough, no fever. Continue Mucinex.   - CXR 7/28 showed stable right lower lobe effusion/atelectasis.     # DVT prophylaxis- Lovenox     # DNR/DNI    Disposition: Pending results of expedited appeal for STR    57-year-old woman with a history of hypothyroidism, asthma, former alcohol use, breast cancer with diffuse bone metastasis, status post radiation 5 sessions finished 5/31/23, on letrozole daily, was on Ribociclib and was discontinued due to low magnesium with high QTc, surgical history of occiput-T2 posterior instrumentation and fusion, ORIF of C2 body with functional decline and impaired ADLs/mobility, ambulates with a walker. Following with Dr. Peña.  She presented to the emergency department complaining of decreased appetite and oral intake of 3 weeks duration.     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  # hypokalemia and hypomag  - c/w famotidine  - dc marinol - patient didn't tolerate   - c/w megestrol 400 mg daily  - c/w dexa 4 qd for 5 days (8/6-8/11)  - calorie count not being updated - f/u nutrition - called twice today, left voicemail, pending response  - Nutrition consult placed - recs appreciated  - replete electrolytes    # Acute transaminitis: improved significantly, suspect ischemic hepatitis 2/2 hypoperfusion event  # H/O compensated alcoholic cirrhosis  # H/O elevated ALP presumed 2/2 bony metastasis  # Cholelithiasis  - US Abdomen Upper Quadrant Right 7/18 - Heterogeneous echotexture to liver with a lobulated contour, findings which can be seen in cirrhosis. Gallstones Small right pleural effusion  - CT Abdomen and Pelvis No Cont 7/19 - Findings may represent mild proctitis. No bowel obstruction or free air. Nodularity of the liver may be seen with cirrhosis.  - Abdominal duplex 7/21: Patent diminutive right and left portal veins. Patent right, left and middle hepatic veins.  - LFT improved, ALP elevated likely due to bone mets   - seen by Hepatology, OP follow up on dc     # H/o breast cancer with bone mets  # New T7 and L4 compression fractures on imaging probably sec to bone mets  - CT abd: New T7 and L4 compression fractures, when compared to CT from April 2023. Small right and trace left pleural effusions. Increase in diffuse osseous metastases. Stable right breast mass with nipple retraction.  - oncology following given issues with QTc and electrolyte abnormalities, ribociclib d/c'd. C/w letrozole, add Ibrance.  Also recommend medronate 90 mg IV once because of bony metastases, she can be transitioned to Zometa or Xgeva as outpatient there is a nonformulary in our institution.   - S/p medronate 90 mg IV once on 7/21  - c/w ibrance 125 mg daily, 3 weeks on and 1 week off.  - c/w letrozole 2.5 mg daily  - Outpatient follow up with Dr. Peña upon discharge    # Hypothyroidism  - c/w synthroid    # URI 2/2 Parainfluenza- improved   - Still with cough, no fever. Continue Mucinex.   - CXR 7/28 showed stable right lower lobe effusion/atelectasis.     # DVT prophylaxis- Lovenox     # DNR/DNI    Disposition: Pending results of expedited appeal for STR    57-year-old woman with a history of hypothyroidism, asthma, former alcohol use, breast cancer with diffuse bone metastasis, status post radiation 5 sessions finished 5/31/23, on letrozole daily, was on Ribociclib and was discontinued due to low magnesium with high QTc, surgical history of occiput-T2 posterior instrumentation and fusion, ORIF of C2 body with functional decline and impaired ADLs/mobility, ambulates with a walker. Following with Dr. Peña.  She presented to the emergency department complaining of decreased appetite and oral intake of 3 weeks duration.     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 Saha today, will follow up  - Nutrition consult placed - recs appreciated  - replete electrolytes    # Acute transaminitis: improved significantly, suspect ischemic hepatitis 2/2 hypoperfusion event  # H/O compensated alcoholic cirrhosis  # H/O elevated ALP presumed 2/2 bony metastasis  # Cholelithiasis  - US Abdomen Upper Quadrant Right 7/18 - Heterogeneous echotexture to liver with a lobulated contour, findings which can be seen in cirrhosis. Gallstones Small right pleural effusion  - CT Abdomen and Pelvis No Cont 7/19 - Findings may represent mild proctitis. No bowel obstruction or free air. Nodularity of the liver may be seen with cirrhosis.  - Abdominal duplex 7/21: Patent diminutive right and left portal veins. Patent right, left and middle hepatic veins.  - LFT improved, ALP elevated likely due to bone mets   - seen by Hepatology, OP follow up on dc     # H/o breast cancer with bone mets  # New T7 and L4 compression fractures on imaging probably sec to bone mets  - CT abd: New T7 and L4 compression fractures, when compared to CT from April 2023. Small right and trace left pleural effusions. Increase in diffuse osseous metastases. Stable right breast mass with nipple retraction.  - oncology following given issues with QTc and electrolyte abnormalities, ribociclib d/c'd. C/w letrozole, add Ibrance.  Also recommend medronate 90 mg IV once because of bony metastases, she can be transitioned to Zometa or Xgeva as outpatient there is a nonformulary in our institution.   - S/p medronate 90 mg IV once on 7/21  - c/w ibrance 125 mg daily, 3 weeks on and 1 week off.  - c/w letrozole 2.5 mg daily  - Outpatient follow up with Dr. Peña upon discharge    # Hypothyroidism  - c/w synthroid    # URI 2/2 Parainfluenza- improved   - Still with cough, no fever. Continue Mucinex.   - CXR 7/28 showed stable right lower lobe effusion/atelectasis.     # DVT prophylaxis- Lovenox     # DNR/DNI    Disposition: Pending results of expedited appeal for STR

## 2023-08-08 NOTE — PROGRESS NOTE ADULT - ASSESSMENT
57-year-old woman with a history of hypothyroidism, asthma, former alcohol use, breast cancer with diffuse bone metastasis, status post radiation 5 sessions finished 5/31/23, on letrozole daily, was on Ribociclib and was discontinued due to low magnesium with high QTc, surgical history of occiput-T2 posterior instrumentation and fusion, ORIF of C2 body with functional decline and impaired ADLs/mobility, ambulates with a walker. Following with Dr. Peña.  She presented to the emergency department complaining of decreased appetite and oral intake of 3 weeks duration.     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  #hypokalemia and hypomag  - c/w famotidine  - dc marinol- patient didnt tolerate   - continue megestrol 400 mg daily, may need to increase if no improvement , follow nutrition   - can trial short course of steroids for apetite stimulation- dexa 4 qd 5 days   - calorie count pending   - Nutrition consult placed - follow reocmmendations ,       # Acute transaminitis: improved significantly, suspect ischemic hepatitis 2/2 hypoperfusion event  # H/O compensated alcoholic cirrhosis  # H/O elevated ALP presumed 2/2 bony metastasis  # Cholelithiasis  - US Abdomen Upper Quadrant Right 7/18 - Heterogeneous echotexture to liver with a lobulated contour, findings which can be seen in cirrhosis. Gallstones Small right pleural effusion  - CT Abdomen and Pelvis No Cont 7/19 - Findings may represent mild proctitis. No bowel obstruction or free air. Nodularity of the liver may be seen with cirrhosis.  - Abdominal duplex 7/21: Patent diminutive right and left portal veins. Patent right, left and middle hepatic veins.  - LFT improved, ALP elevated likely due to bone mets   - seen by Hepatology, OP follow up on dc     # H/o breast cancer with bone mets  # New T7 and L4 compression fractures on imaging probably sec to bone mets  - CT abd: New T7 and L4 compression fractures, when compared to CT from April 2023. Small right and trace left pleural effusions. Increase in diffuse osseous metastases. Stable right breast mass with nipple retraction.  - oncology following given issues with QTc and electrolyte abnormalities, ribociclib d/c'd. C/w letrozole, add Ibrance.  Also recommend medronate 90 mg IV once because of bony metastases, she can be transitioned to Zometa or Xgeva as outpatient there is a nonformulary in our institution.   - S/p medronate 90 mg IV once on 7/21  - c/w ibrance 125 mg daily, 3 weeks on and 1 week off.  - c/w letrozole 2.5 mg daily  - Outpatient follow up with Dr. Peña upon discharge    # Hypothyroidism  - c/w synthroid    # URI 2/2 Parainfluenza- improved   - Still with cough, no fever. Continue Mucinex.   - CXR 7/28 showed stable right lower lobe effusion/atelectasis.     # DVT prophylaxis- Lovenox     # DNR/DNI    # Disposition: calorie count and nutrition eval pending .trial of megestrol and steroids for Apetite  Pending results of expedited appeal for STR  57-year-old woman with a history of hypothyroidism, asthma, former alcohol use, breast cancer with diffuse bone metastasis, status post radiation 5 sessions finished 5/31/23, on letrozole daily, was on Ribociclib and was discontinued due to low magnesium with high QTc, surgical history of occiput-T2 posterior instrumentation and fusion, ORIF of C2 body with functional decline and impaired ADLs/mobility, ambulates with a walker. Following with Dr. Peña.  She presented to the emergency department complaining of decreased appetite and oral intake of 3 weeks duration.     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  #hypokalemia and hypomag  - c/w famotidine  - dc marinol- patient didnt tolerate   - continue megestrol 400 mg daily, may need to increase if no improvement , follow nutrition   - can trial short course of steroids for apetite stimulation- dexa 4 qd 5 days   - calorie count pending   - Nutrition consult placed - follow reocmmendations ,       # Acute transaminitis: improved significantly, suspect ischemic hepatitis 2/2 hypoperfusion event  # H/O compensated alcoholic cirrhosis  # H/O elevated ALP presumed 2/2 bony metastasis  # Cholelithiasis  - US Abdomen Upper Quadrant Right 7/18 - Heterogeneous echotexture to liver with a lobulated contour, findings which can be seen in cirrhosis. Gallstones Small right pleural effusion  - CT Abdomen and Pelvis No Cont 7/19 - Findings may represent mild proctitis. No bowel obstruction or free air. Nodularity of the liver may be seen with cirrhosis.  - Abdominal duplex 7/21: Patent diminutive right and left portal veins. Patent right, left and middle hepatic veins.  - LFT improved, ALP elevated likely due to bone mets   - seen by Hepatology, OP follow up on dc     # H/o breast cancer with bone mets  # New T7 and L4 compression fractures on imaging probably sec to bone mets  - CT abd: New T7 and L4 compression fractures, when compared to CT from April 2023. Small right and trace left pleural effusions. Increase in diffuse osseous metastases. Stable right breast mass with nipple retraction.  - oncology following given issues with QTc and electrolyte abnormalities, ribociclib d/c'd. C/w letrozole, add Ibrance.  Also recommend medronate 90 mg IV once because of bony metastases, she can be transitioned to Zometa or Xgeva as outpatient there is a nonformulary in our institution.   - S/p medronate 90 mg IV once on 7/21  - c/w ibrance 125 mg daily, 3 weeks on and 1 week off.  - c/w letrozole 2.5 mg daily  - Outpatient follow up with Dr. Peña upon discharge    # Hypothyroidism  - c/w synthroid    # URI 2/2 Parainfluenza- improved   - Still with cough, no fever. Continue Mucinex.   - CXR 7/28 showed stable right lower lobe effusion/atelectasis.     # DVT prophylaxis- Lovenox     # DNR/DNI    # Disposition: calorie count and nutrition eval pending .trial of megestrol and steroids for Apetite  Pending results of expedited appeal for STR. monitor potassium magnesium

## 2023-08-08 NOTE — PROGRESS NOTE ADULT - SUBJECTIVE AND OBJECTIVE BOX
SUBJECTIVE:    Patient is a 57y old Female who presents with a chief complaint of Decreased oral intake with electrolyte disturbances (04 Aug 2023 13:04)    Currently admitted to medicine with the primary diagnosis of: Decreased po intake     Today is hospital day 23d.     Overnight Events:     no acute events overnight. pending nutrition follow up     PAST MEDICAL & SURGICAL HISTORY  Hypothyroidism    H/O cirrhosis    Breast cancer    No significant past surgical history      ALLERGIES:  No Known Allergies    MEDICATIONS:    MEDICATIONS  (STANDING):  calcium carbonate    500 mG (Tums) Chewable 1 Tablet(s) Chew three times a day  chlorhexidine 2% Cloths 1 Application(s) Topical <User Schedule>  dextrose 5%. 1000 milliLiter(s) (100 mL/Hr) IV Continuous <Continuous>  dextrose 5%. 1000 milliLiter(s) (50 mL/Hr) IV Continuous <Continuous>  dextrose 50% Injectable 25 Gram(s) IV Push once  dextrose 50% Injectable 12.5 Gram(s) IV Push once  dextrose 50% Injectable 25 Gram(s) IV Push once  enoxaparin Injectable 40 milliGRAM(s) SubCutaneous every 24 hours  famotidine    Tablet 20 milliGRAM(s) Oral two times a day  folic acid 1 milliGRAM(s) Oral daily  glucagon  Injectable 1 milliGRAM(s) IntraMuscular once  letrozole 2.5 milliGRAM(s) Oral daily  levothyroxine 50 MICROGram(s) Oral daily  loratadine 10 milliGRAM(s) Oral daily  megestrol Suspension 400 milliGRAM(s) Oral daily  melatonin 5 milliGRAM(s) Oral at bedtime  palbociclib 125 milliGRAM(s) Oral daily  sucralfate suspension 1 Gram(s) Oral every 6 hours  tiotropium 2.5 MICROgram(s) Inhaler 2 Puff(s) Inhalation daily    MEDICATIONS  (PRN):  albuterol    90 MICROgram(s) HFA Inhaler 2 Puff(s) Inhalation every 6 hours PRN for shortness of breath and/or wheezing  benzonatate 100 milliGRAM(s) Oral three times a day PRN Cough  dextrose Oral Gel 15 Gram(s) Oral once PRN Blood Glucose LESS THAN 70 milliGRAM(s)/deciliter  guaiFENesin Oral Liquid (Sugar-Free) 200 milliGRAM(s) Oral every 6 hours PRN Cough  ibuprofen  Tablet. 200 milliGRAM(s) Oral every 4 hours PRN Mild Pain (1 - 3), Moderate Pain (4 - 6)  methocarbamol 1000 milliGRAM(s) Oral every 6 hours PRN Muscle Spasm      VITALS:     Vital Signs Last 24 Hrs  Vital Signs Last 24 Hrs  T(C): 36.6 (08 Aug 2023 08:09), Max: 36.6 (08 Aug 2023 08:09)  T(F): 97.9 (08 Aug 2023 08:09), Max: 97.9 (08 Aug 2023 08:09)  HR: 94 (08 Aug 2023 08:09) (94 - 94)  BP: 131/81 (08 Aug 2023 08:09) (131/81 - 131/81)  BP(mean): --  RR: 18 (08 Aug 2023 08:09) (18 - 18)  SpO2: --            LABS:        LABS:                        11.0   2.86  )-----------( 168      ( 08 Aug 2023 11:34 )             33.5     08-08    143  |  110  |  5<L>  ----------------------------<  104<H>  3.3<L>   |  20  |  <0.5<L>    Ca    8.9      08 Aug 2023 05:51  Mg     1.7     08-08                                                  11.0   2.76  )-----------( 252      ( 04 Aug 2023 11:31 )             33.9     08-05    135  |  103  |  4<L>  ----------------------------<  90  3.4<L>   |  17  |  <0.5<L>    Ca    8.2<L>      05 Aug 2023 06:21  Phos  3.0     08-05  Mg     1.6     08-05        Urinalysis Basic - ( 05 Aug 2023 06:21 )    Color: x / Appearance: x / SG: x / pH: x  Gluc: 90 mg/dL / Ketone: x  / Bili: x / Urobili: x   Blood: x / Protein: x / Nitrite: x   Leuk Esterase: x / RBC: x / WBC x   Sq Epi: x / Non Sq Epi: x / Bacteria: x        Lactate, Blood: 0.9 mmol/L (08-04-23 @ 11:31)      RADIOLOGY:    PHYSICAL EXAM:    GENERAL: NAD, chronically ill appearing   CHEST/LUNG: Clear to auscultation bilaterally; No rales, rhonchi, wheezing, or rubs. Unlabored respirations  HEART: Regular rate and rhythm; No murmurs, rubs, or gallops  ABDOMEN: Bowel sounds present; Soft, Nontender, Nondistended. No hepatomegally  EXTREMITIES:  2+ Peripheral Pulses, brisk capillary refill. No clubbing, cyanosis, or edema  NERVOUS SYSTEM:  Alert & Oriented X3, speech clear. No deficits   MSK: FROM all 4 extremities, full and equal strength  SKIN: No rashes or lesions

## 2023-08-08 NOTE — PROGRESS NOTE ADULT - SUBJECTIVE AND OBJECTIVE BOX
SUBJECTIVE:    Patient is a 57y old Female who presents with a chief complaint of Decreased oral intake with electrolyte disturbances (07 Aug 2023 16:49)    Currently admitted to medicine with the primary diagnosis of Adult failure to thrive       Today is hospital day 26d. This morning she is resting comfortably in bed and reports no new issues or overnight events.     PAST MEDICAL & SURGICAL HISTORY  Hypothyroidism    H/O cirrhosis    Breast cancer    No significant past surgical history      SOCIAL HISTORY:  Negative for smoking/alcohol/drug use.     ALLERGIES:  No Known Allergies    MEDICATIONS:  STANDING MEDICATIONS  calcium carbonate    500 mG (Tums) Chewable 1 Tablet(s) Chew three times a day  chlorhexidine 2% Cloths 1 Application(s) Topical <User Schedule>  dexAMETHasone     Tablet 4 milliGRAM(s) Oral daily  dextrose 5%. 1000 milliLiter(s) IV Continuous <Continuous>  dextrose 5%. 1000 milliLiter(s) IV Continuous <Continuous>  dextrose 50% Injectable 25 Gram(s) IV Push once  dextrose 50% Injectable 12.5 Gram(s) IV Push once  dextrose 50% Injectable 25 Gram(s) IV Push once  enoxaparin Injectable 40 milliGRAM(s) SubCutaneous every 24 hours  famotidine    Tablet 20 milliGRAM(s) Oral two times a day  folic acid 1 milliGRAM(s) Oral daily  glucagon  Injectable 1 milliGRAM(s) IntraMuscular once  lactated ringers. 1000 milliLiter(s) IV Continuous <Continuous>  letrozole 2.5 milliGRAM(s) Oral daily  levothyroxine 50 MICROGram(s) Oral daily  loratadine 10 milliGRAM(s) Oral daily  megestrol Suspension 400 milliGRAM(s) Oral daily  melatonin 5 milliGRAM(s) Oral at bedtime  palbociclib 125 milliGRAM(s) Oral daily  sucralfate suspension 1 Gram(s) Oral every 6 hours  tiotropium 2.5 MICROgram(s) Inhaler 2 Puff(s) Inhalation daily    PRN MEDICATIONS  albuterol    90 MICROgram(s) HFA Inhaler 2 Puff(s) Inhalation every 6 hours PRN  benzonatate 100 milliGRAM(s) Oral three times a day PRN  dextrose Oral Gel 15 Gram(s) Oral once PRN  guaiFENesin Oral Liquid (Sugar-Free) 200 milliGRAM(s) Oral every 6 hours PRN  ibuprofen  Tablet. 200 milliGRAM(s) Oral every 4 hours PRN  methocarbamol 1000 milliGRAM(s) Oral every 6 hours PRN    VITALS:   T(F): 97.9  HR: 94  BP: 131/81  RR: 18  SpO2: --    LABS:                        11.8   2.35  )-----------( 203      ( 07 Aug 2023 12:20 )             36.1     08-08    143  |  110  |  5<L>  ----------------------------<  104<H>  3.3<L>   |  20  |  <0.5<L>    Ca    8.9      08 Aug 2023 05:51  Mg     1.7     08-08        Urinalysis Basic - ( 08 Aug 2023 05:51 )    Color: x / Appearance: x / SG: x / pH: x  Gluc: 104 mg/dL / Ketone: x  / Bili: x / Urobili: x   Blood: x / Protein: x / Nitrite: x   Leuk Esterase: x / RBC: x / WBC x   Sq Epi: x / Non Sq Epi: x / Bacteria: x      RADIOLOGY:    PHYSICAL EXAM:  GEN: No acute distress  LUNGS: Clear to auscultation bilaterally   HEART: Regular  ABD: Soft, non-tender, non-distended.  EXT: NC/NC/NE/2+PP/WINTER/Skin Intact.   NEURO: AAOX3     SUBJECTIVE:    Patient is a 57y old Female who presents with a chief complaint of Decreased oral intake with electrolyte disturbances (07 Aug 2023 16:49)    Today is hospital day 26d. NAEON. This morning patient has no acute complaints, aaox3, still no interest in po intake.       PAST MEDICAL & SURGICAL HISTORY  Hypothyroidism    H/O cirrhosis    Breast cancer    No significant past surgical history      SOCIAL HISTORY:  Negative for smoking/alcohol/drug use.     ALLERGIES:  No Known Allergies    MEDICATIONS:  STANDING MEDICATIONS  calcium carbonate    500 mG (Tums) Chewable 1 Tablet(s) Chew three times a day  chlorhexidine 2% Cloths 1 Application(s) Topical <User Schedule>  dexAMETHasone     Tablet 4 milliGRAM(s) Oral daily  dextrose 5%. 1000 milliLiter(s) IV Continuous <Continuous>  dextrose 5%. 1000 milliLiter(s) IV Continuous <Continuous>  dextrose 50% Injectable 25 Gram(s) IV Push once  dextrose 50% Injectable 12.5 Gram(s) IV Push once  dextrose 50% Injectable 25 Gram(s) IV Push once  enoxaparin Injectable 40 milliGRAM(s) SubCutaneous every 24 hours  famotidine    Tablet 20 milliGRAM(s) Oral two times a day  folic acid 1 milliGRAM(s) Oral daily  glucagon  Injectable 1 milliGRAM(s) IntraMuscular once  lactated ringers. 1000 milliLiter(s) IV Continuous <Continuous>  letrozole 2.5 milliGRAM(s) Oral daily  levothyroxine 50 MICROGram(s) Oral daily  loratadine 10 milliGRAM(s) Oral daily  megestrol Suspension 400 milliGRAM(s) Oral daily  melatonin 5 milliGRAM(s) Oral at bedtime  palbociclib 125 milliGRAM(s) Oral daily  sucralfate suspension 1 Gram(s) Oral every 6 hours  tiotropium 2.5 MICROgram(s) Inhaler 2 Puff(s) Inhalation daily    PRN MEDICATIONS  albuterol    90 MICROgram(s) HFA Inhaler 2 Puff(s) Inhalation every 6 hours PRN  benzonatate 100 milliGRAM(s) Oral three times a day PRN  dextrose Oral Gel 15 Gram(s) Oral once PRN  guaiFENesin Oral Liquid (Sugar-Free) 200 milliGRAM(s) Oral every 6 hours PRN  ibuprofen  Tablet. 200 milliGRAM(s) Oral every 4 hours PRN  methocarbamol 1000 milliGRAM(s) Oral every 6 hours PRN    VITALS:   T(F): 97.9  HR: 94  BP: 131/81  RR: 18  SpO2: --    LABS:                        11.8   2.35  )-----------( 203      ( 07 Aug 2023 12:20 )             36.1     08-08    143  |  110  |  5<L>  ----------------------------<  104<H>  3.3<L>   |  20  |  <0.5<L>    Ca    8.9      08 Aug 2023 05:51  Mg     1.7     08-08        Urinalysis Basic - ( 08 Aug 2023 05:51 )    Color: x / Appearance: x / SG: x / pH: x  Gluc: 104 mg/dL / Ketone: x  / Bili: x / Urobili: x   Blood: x / Protein: x / Nitrite: x   Leuk Esterase: x / RBC: x / WBC x   Sq Epi: x / Non Sq Epi: x / Bacteria: x      RADIOLOGY:    PHYSICAL EXAM:  GEN: No acute distress  LUNGS: Clear to auscultation bilaterally   HEART: Regular  ABD: Soft, non-tender, non-distended.  EXT: NC/NC/NE/2+PP/WINTER/Skin Intact.   NEURO: AAOX3

## 2023-08-09 NOTE — PROGRESS NOTE ADULT - SUBJECTIVE AND OBJECTIVE BOX
SUBJECTIVE:    Patient is a 57y old Female who presents with a chief complaint of Decreased oral intake with electrolyte disturbances (08 Aug 2023 16:12)    Today is hospital day 27d. NAEON. Patient still has no interest in solid food, would only take drinks and ensure. She denied nausea, headache, dizziness, fatigue, headache or muscle spasm.    PAST MEDICAL & SURGICAL HISTORY  Hypothyroidism    H/O cirrhosis    Breast cancer    No significant past surgical history      SOCIAL HISTORY:  Negative for smoking/alcohol/drug use.     ALLERGIES:  No Known Allergies    MEDICATIONS:  STANDING MEDICATIONS  calcium carbonate    500 mG (Tums) Chewable 1 Tablet(s) Chew three times a day  chlorhexidine 2% Cloths 1 Application(s) Topical <User Schedule>  dexAMETHasone     Tablet 4 milliGRAM(s) Oral daily  dextrose 5%. 1000 milliLiter(s) IV Continuous <Continuous>  dextrose 5%. 1000 milliLiter(s) IV Continuous <Continuous>  dextrose 50% Injectable 25 Gram(s) IV Push once  dextrose 50% Injectable 12.5 Gram(s) IV Push once  dextrose 50% Injectable 25 Gram(s) IV Push once  enoxaparin Injectable 40 milliGRAM(s) SubCutaneous every 24 hours  famotidine    Tablet 20 milliGRAM(s) Oral two times a day  folic acid 1 milliGRAM(s) Oral daily  glucagon  Injectable 1 milliGRAM(s) IntraMuscular once  lactated ringers. 1000 milliLiter(s) IV Continuous <Continuous>  letrozole 2.5 milliGRAM(s) Oral daily  levothyroxine 50 MICROGram(s) Oral daily  loratadine 10 milliGRAM(s) Oral daily  megestrol Suspension 400 milliGRAM(s) Oral daily  melatonin 5 milliGRAM(s) Oral at bedtime  palbociclib 125 milliGRAM(s) Oral daily  sucralfate suspension 1 Gram(s) Oral every 6 hours  tiotropium 2.5 MICROgram(s) Inhaler 2 Puff(s) Inhalation daily    PRN MEDICATIONS  albuterol    90 MICROgram(s) HFA Inhaler 2 Puff(s) Inhalation every 6 hours PRN  benzonatate 100 milliGRAM(s) Oral three times a day PRN  dextrose Oral Gel 15 Gram(s) Oral once PRN  guaiFENesin Oral Liquid (Sugar-Free) 200 milliGRAM(s) Oral every 6 hours PRN  ibuprofen  Tablet. 200 milliGRAM(s) Oral every 4 hours PRN  methocarbamol 1000 milliGRAM(s) Oral every 6 hours PRN    VITALS:   T(F): 98  HR: 85  BP: 133/85  RR: 18  SpO2: --    LABS:                        11.0   2.86  )-----------( 168      ( 08 Aug 2023 11:34 )             33.5     08-08    143  |  110  |  5<L>  ----------------------------<  104<H>  3.3<L>   |  20  |  <0.5<L>    Ca    8.9      08 Aug 2023 05:51  Mg     1.7     08-08        Urinalysis Basic - ( 08 Aug 2023 05:51 )    Color: x / Appearance: x / SG: x / pH: x  Gluc: 104 mg/dL / Ketone: x  / Bili: x / Urobili: x   Blood: x / Protein: x / Nitrite: x   Leuk Esterase: x / RBC: x / WBC x   Sq Epi: x / Non Sq Epi: x / Bacteria: x      RADIOLOGY:    PHYSICAL EXAM:  GEN: No acute distress  LUNGS: Clear to auscultation bilaterally   HEART: Regular  ABD: Soft, non-tender, non-distended.  EXT: NC/NC/NE/2+PP/WINTER/Skin Intact.   NEURO: AAOX3

## 2023-08-09 NOTE — PROGRESS NOTE ADULT - ASSESSMENT
57-year-old woman with a history of hypothyroidism, asthma, former alcohol use, breast cancer with diffuse bone metastasis, status post radiation 5 sessions finished 5/31/23, on letrozole daily, was on Ribociclib and was discontinued due to low magnesium with high QTc, surgical history of occiput-T2 posterior instrumentation and fusion, ORIF of C2 body with functional decline and impaired ADLs/mobility, ambulates with a walker. Following with Dr. Peña.  She presented to the emergency department complaining of decreased appetite and oral intake of 3 weeks duration.     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 Saha yesterday, will follow up  - Nutrition consult placed - recs appreciated  - replete electrolytes    # Acute transaminitis: improved significantly, suspect ischemic hepatitis 2/2 hypoperfusion event  # H/O compensated alcoholic cirrhosis  # H/O elevated ALP presumed 2/2 bony metastasis  # Cholelithiasis  - US Abdomen Upper Quadrant Right 7/18 - Heterogeneous echotexture to liver with a lobulated contour, findings which can be seen in cirrhosis. Gallstones Small right pleural effusion  - CT Abdomen and Pelvis No Cont 7/19 - Findings may represent mild proctitis. No bowel obstruction or free air. Nodularity of the liver may be seen with cirrhosis.  - Abdominal duplex 7/21: Patent diminutive right and left portal veins. Patent right, left and middle hepatic veins.  - LFT improved, ALP elevated likely due to bone mets   - seen by Hepatology, OP follow up on dc     # H/o breast cancer with bone mets  # New T7 and L4 compression fractures on imaging probably sec to bone mets  - CT abd: New T7 and L4 compression fractures, when compared to CT from April 2023. Small right and trace left pleural effusions. Increase in diffuse osseous metastases. Stable right breast mass with nipple retraction.  - oncology following given issues with QTc and electrolyte abnormalities, ribociclib d/c'd. C/w letrozole, add Ibrance.  Also recommend medronate 90 mg IV once because of bony metastases, she can be transitioned to Zometa or Xgeva as outpatient there is a nonformulary in our institution.   - S/p medronate 90 mg IV once on 7/21  - c/w ibrance 125 mg daily, 3 weeks on and 1 week off.  - c/w letrozole 2.5 mg daily  - Outpatient follow up with Dr. Peña upon discharge    # Hypothyroidism  - c/w synthroid    # URI 2/2 Parainfluenza- improved   - Still with cough, no fever. Continue Mucinex.   - CXR 7/28 showed stable right lower lobe effusion/atelectasis.     # DVT prophylaxis- Lovenox     # DNR/DNI    Disposition: Pending results of expedited appeal for STR

## 2023-08-09 NOTE — DISCHARGE NOTE NURSING/CASE MANAGEMENT/SOCIAL WORK - PATIENT PORTAL LINK FT
You can access the FollowMyHealth Patient Portal offered by [facility] by registering at the following website: [email or web address]. By joining [employer]’s FollowMyHealth portal, you will also be able to view your health information using other applications (apps) compatible with our system.

## 2023-08-09 NOTE — PROGRESS NOTE ADULT - SUBJECTIVE AND OBJECTIVE BOX
NUTRITION SUPPORT TEAM  -  PROGRESS NOTE   resting comfortably  on po diet  medical f/u noted    REVIEW OF SYSTEMS:  Negative except as noted above.     VITALS:  T(F): 98 (08-09 @ 07:54), Max: 98 (08-09 @ 07:54)  HR: 85 (08-09 @ 07:54) (85 - 85)  BP: 133/85 (08-09 @ 07:54) (133/85 - 133/85)  RR: 18 (08-09 @ 07:54) (18 - 18)    HEIGHT/WEIGHT/BMI:   Height (cm): 162.6 (07-16), 162.6 (04-28), 162.6 (04-25)  Weight (kg): 72.6 (07-13), 92.3 (04-28), 92.3 (04-25)  BMI (kg/m2): 27.5 (07-16), 27.5 (07-13), 34.9 (04-28), 34.9 (04-25)  08-08-23 @ 07:01  -  08-09-23 @ 07:00  --------------------------------------------------------  IN:    Lactated Ringers: 900 mL  Total IN: 900 mL    OUT:  Total OUT: 0 mL  Total NET: 900 mL    MEDICATIONS:   albuterol    90 MICROgram(s) HFA Inhaler 2 Puff(s) Inhalation every 6 hours PRN  benzonatate 100 milliGRAM(s) Oral three times a day PRN  calcium carbonate    500 mG (Tums) Chewable 1 Tablet(s) Chew three times a day  chlorhexidine 2% Cloths 1 Application(s) Topical <User Schedule>  dexAMETHasone     Tablet 4 milliGRAM(s) Oral daily  enoxaparin Injectable 40 milliGRAM(s) SubCutaneous every 24 hours  famotidine    Tablet 20 milliGRAM(s) Oral two times a day  folic acid 1 milliGRAM(s) Oral daily  glucagon  Injectable 1 milliGRAM(s) IntraMuscular once  guaiFENesin Oral Liquid (Sugar-Free) 200 milliGRAM(s) Oral every 6 hours PRN  ibuprofen  Tablet. 200 milliGRAM(s) Oral every 4 hours PRN  lactated ringers. 1000 milliLiter(s) (75 mL/Hr) IV Continuous <Continuous>  letrozole 2.5 milliGRAM(s) Oral daily  levothyroxine 50 MICROGram(s) Oral daily  loratadine 10 milliGRAM(s) Oral daily  megestrol Suspension 400 milliGRAM(s) Oral daily  melatonin 5 milliGRAM(s) Oral at bedtime  methocarbamol 1000 milliGRAM(s) Oral every 6 hours PRN  palbociclib 125 milliGRAM(s) Oral daily  sucralfate suspension 1 Gram(s) Oral every 6 hours  tiotropium 2.5 MICROgram(s) Inhaler 2 Puff(s) Inhalation daily    LABS:                         10.9   2.05  )-----------( 151      ( 09 Aug 2023 12:32 )             32.5     140  |  108  |  7<L>  ----------------------------<  138<H>          (08-09-23 @ 08:44)  3.4<L>   |  20  |  <0.5<L>    Ca    8.8          (08-09-23 @ 08:44)  Mg     1.8         (08-09-23 @ 08:44)  Blood Glucose (Past 24 hours):  151 mg/dL (08-09 @ 11:18)  111 mg/dL (08-09 @ 08:00)    DIET:   Diet, Soft and Bite Sized:   Free Water Flush Instructions:  Please add Magic Cup Daily  Supplement Feeding Modality:  Oral  Ensure Plus High Protein Cans or Servings Per Day:  3       Frequency:  Three Times a day (08-04-23 @ 15:09) [Active]   NUTRITION SUPPORT TEAM  -  PROGRESS NOTE   resting comfortably, alert  on po diet - ate fairly well this morning  medical f/u noted    REVIEW OF SYSTEMS:  Negative except as noted above.     VITALS:  T(F): 98 (08-09 @ 07:54), Max: 98 (08-09 @ 07:54)  HR: 85 (08-09 @ 07:54) (85 - 85)  BP: 133/85 (08-09 @ 07:54) (133/85 - 133/85)  RR: 18 (08-09 @ 07:54) (18 - 18)    HEIGHT/WEIGHT/BMI:   Height (cm): 162.6 (07-16), 162.6 (04-28), 162.6 (04-25)  Weight (kg): 72.6 (07-13), 92.3 (04-28), 92.3 (04-25)  BMI (kg/m2): 27.5 (07-16), 27.5 (07-13), 34.9 (04-28), 34.9 (04-25)    08-08-23 @ 07:01  -  08-09-23 @ 07:00  --------------------------------------------------------  IN:    Lactated Ringers: 900 mL  Total IN: 900 mL    OUT:  Total OUT: 0 mL  Total NET: 900 mL    MEDICATIONS:   albuterol    90 MICROgram(s) HFA Inhaler 2 Puff(s) Inhalation every 6 hours PRN  benzonatate 100 milliGRAM(s) Oral three times a day PRN  calcium carbonate    500 mG (Tums) Chewable 1 Tablet(s) Chew three times a day  chlorhexidine 2% Cloths 1 Application(s) Topical <User Schedule>  dexAMETHasone     Tablet 4 milliGRAM(s) Oral daily  enoxaparin Injectable 40 milliGRAM(s) SubCutaneous every 24 hours  famotidine    Tablet 20 milliGRAM(s) Oral two times a day  folic acid 1 milliGRAM(s) Oral daily  guaiFENesin Oral Liquid (Sugar-Free) 200 milliGRAM(s) Oral every 6 hours PRN  ibuprofen  Tablet. 200 milliGRAM(s) Oral every 4 hours PRN  lactated ringers. 1000 milliLiter(s) (75 mL/Hr) IV Continuous <Continuous>  letrozole 2.5 milliGRAM(s) Oral daily  levothyroxine 50 MICROGram(s) Oral daily  loratadine 10 milliGRAM(s) Oral daily  megestrol Suspension 400 milliGRAM(s) Oral daily  melatonin 5 milliGRAM(s) Oral at bedtime  methocarbamol 1000 milliGRAM(s) Oral every 6 hours PRN  palbociclib 125 milliGRAM(s) Oral daily  sucralfate suspension 1 Gram(s) Oral every 6 hours  tiotropium 2.5 MICROgram(s) Inhaler 2 Puff(s) Inhalation daily    LABS:                         10.9   2.05  )-----------( 151      ( 09 Aug 2023 12:32 )             32.5     140  |  108  |  7<L>  ----------------------------<  138<H>          (08-09-23 @ 08:44)  3.4<L>   |  20  |  <0.5<L>    Ca    8.8          (08-09-23 @ 08:44)  Mg     1.8         (08-09-23 @ 08:44)  Blood Glucose (Past 24 hours):  151 mg/dL (08-09 @ 11:18)  111 mg/dL (08-09 @ 08:00)    DIET:   Diet, Soft and Bite Sized:   Free Water Flush Instructions:  Please add Magic Cup Daily  Supplement Feeding Modality:  Oral  Ensure Plus High Protein Cans or Servings Per Day:  3       Frequency:  Three Times a day (08-04-23 @ 15:09) [Active]

## 2023-08-09 NOTE — PATIENT PROFILE ADULT - FUNCTIONAL ASSESSMENT - BASIC MOBILITY 6.
1-calculated by average/Not able to assess (calculate score using [facility] averaging method)

## 2023-08-09 NOTE — PATIENT PROFILE ADULT - FALL HARM RISK - HARM RISK INTERVENTIONS

## 2023-08-09 NOTE — PROGRESS NOTE ADULT - ASSESSMENT
{\rtf1\zzxutx60203\ansi\gxfluah3951\ftnbj\uc1\deff0  {\fonttbl{\f0 \fnil Segoe UI;}{\f1 \fnil \fcharset0 Segoe UI;}{\f2 \fnil Times New Jose;}}  {\colortbl ;\zet537\rkxxs209\eqeq123 ;\red0\green0\blue0 ;\red0\green0\lcbz562 ;\red0\green0\blue0 ;}  {\stylesheet{\f0\fs20 Normal;}{\cs1 Default Paragraph Font;}{\cs2\f0\fs16 Line Number;}{\cs3\f2\fs24\ul\cf3 Hyperlink;}}  {\*\revtbl{Unknown;}}  \btxmbu49095\zjiftv17011\tctdo4067\irmdi7164\nsziq1024\ltozh1158\hxvxgvv069\ghsqcdm998\nogrowautofit\suahby169\formshade\nofeaturethrottle1\dntblnsbdb\fet4\aendnotes\aftnnrlc\pgbrdrhead\pgbrdrfoot  \sectd\bjgqci43562\tbirhc69785\guttersxn0\yccegsxn1016\xossnsxs7974\mvppiakx4374\jywdkjzy3539\rhapivw348\axtwzdf770\sbkpage\pgncont\pgndec  \plain\plain\f0\fs24\ql\plain\f0\fs24\plain\f0\fs20\rjrd3195\hich\f0\dbch\f0\loch\f0\fs20 ASSESSMENT\par  - breast can metastatic to bone\par  - loss of appetite \par  - hypokalemia, hypomagnesemia, hypophosphatemia\par      due to chemo, worsened by refeeding\par  - dehydration on admission, resolved\par  -hypomagnesemia\par  PLAN\par  - supplement K+\par  - cont PO diet as tolerated\par  - suspect protein intake inadequate\par  - cont Ensure as tolerated, add magic cup once daily\par  check bmp/phos/mg \par  \plain\f1\fs20\phoe0601\hich\f1\dbch\f1\loch\f1\cf2\fs20\strike\plain\f1\fs20\jwyq8203\hich\f1\dbch\f1\loch\f1\cf2\fs20\plain\f0\fs20\dgcp1624\hich\f0\dbch\f0\loch\f0\fs20\par  }   ASSESSMENT  - breast can metastatic to bone  - loss of appetite   - hypokalemia, hypomagnesemia, hypophosphatemia      due to chemo, worsened by refeeding  - dehydration on admission, resolved  -hypomagnesemia  - inadequate protein intake    PLAN  - supplement K+  - cont PO diet as tolerated  - ingestion of supplements not documented     (calorie counts requested in order to document and assess intake - results pending)  - cont Ensure as tolerated, add magic cup once daily, add Prosurce Gelatein and vanilla yogurt if she'll take them  - if pt doesn't eat adequately, may need to consider supplemental NG feeding

## 2023-08-09 NOTE — PROGRESS NOTE ADULT - ATTENDING COMMENTS
57-year-old woman with a history of hypothyroidism, asthma, former alcohol use, breast cancer with diffuse bone metastasis, status post radiation 5 sessions finished 5/31/23, on letrozole daily, was on Ribociclib and was discontinued due to low magnesium with high QTc, surgical history of occiput-T2 posterior instrumentation and fusion, ORIF of C2 body with functional decline and impaired ADLs/mobility, ambulates with a walker. Following with Dr. Peña.  She presented to the emergency department complaining of decreased appetite and oral intake of 3 weeks duration.     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  #hypokalemia and hypomag  - c/w famotidine  - dc marinol- patient didnt tolerate   - increase megace to bid per patient request, max dose 800mg   - can trial short course of steroids for appetite stimulation- will do 7d course on discharge   - calorie count completed pt is eating 25-50% of meals and drinking ensure for supplementation   - Nutrition following       # Acute transaminitis: improved significantly, suspect ischemic hepatitis 2/2 hypoperfusion event  # H/O compensated alcoholic cirrhosis  # H/O elevated ALP presumed 2/2 bony metastasis  # Cholelithiasis  - US Abdomen Upper Quadrant Right 7/18 - Heterogeneous echotexture to liver with a lobulated contour, findings which can be seen in cirrhosis. Gallstones Small right pleural effusion  - CT Abdomen and Pelvis No Cont 7/19 - Findings may represent mild proctitis. No bowel obstruction or free air. Nodularity of the liver may be seen with cirrhosis.  - Abdominal duplex 7/21: Patent diminutive right and left portal veins. Patent right, left and middle hepatic veins.  - LFT improved, ALP elevated likely due to bone mets   - seen by Hepatology, OP follow up on dc     # H/o breast cancer with bone mets  # New T7 and L4 compression fractures on imaging probably sec to bone mets  - CT abd: New T7 and L4 compression fractures, when compared to CT from April 2023. Small right and trace left pleural effusions. Increase in diffuse osseous metastases. Stable right breast mass with nipple retraction.  - oncology following given issues with QTc and electrolyte abnormalities, ribociclib d/c'd. C/w letrozole, add Ibrance.  Also recommend medronate 90 mg IV once because of bony metastases, she can be transitioned to Zometa or Xgeva as outpatient there is a nonformulary in our institution.   - S/p medronate 90 mg IV once on 7/21  - c/w ibrance 125 mg daily, 3 weeks on and 1 week off.  - c/w letrozole 2.5 mg daily  - Outpatient follow up with Dr. Peña upon discharge    # Hypothyroidism  - c/w synthroid    # URI 2/2 Parainfluenza- improved     # DVT prophylaxis- Lovenox     # DNR/DNI    # Disposition: expedited appeal approved, pending bed at [facility] facility.     Total time spent to complete patient's bedside assessment, review medical chart, discuss medical plan of care with covering medical team was more than 35 minutes  with >50% of time spent face to face with patient, discussion with patient/family and/or coordination of care      Arianna Pang,

## 2023-08-10 NOTE — PROGRESS NOTE ADULT - ASSESSMENT
57-year-old woman with a history of hypothyroidism, asthma, former alcohol use, breast cancer with diffuse bone metastasis, status post radiation 5 sessions finished 5/31/23, on letrozole daily, was on Ribociclib and was discontinued due to low magnesium with high QTc, surgical history of occiput-T2 posterior instrumentation and fusion, ORIF of C2 body with functional decline and impaired ADLs/mobility, ambulates with a walker. Following with Dr. Peña.  She presented to the emergency department complaining of decreased appetite and oral intake of 3 weeks duration.     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 Saha yesterday, will follow up  - Nutrition consult placed - recs appreciated  - replete electrolytes    # Acute transaminitis: improved significantly, suspect ischemic hepatitis 2/2 hypoperfusion event  # H/O compensated alcoholic cirrhosis  # H/O elevated ALP presumed 2/2 bony metastasis  # Cholelithiasis  - US Abdomen Upper Quadrant Right 7/18 - Heterogeneous echotexture to liver with a lobulated contour, findings which can be seen in cirrhosis. Gallstones Small right pleural effusion  - CT Abdomen and Pelvis No Cont 7/19 - Findings may represent mild proctitis. No bowel obstruction or free air. Nodularity of the liver may be seen with cirrhosis.  - Abdominal duplex 7/21: Patent diminutive right and left portal veins. Patent right, left and middle hepatic veins.  - LFT improved, ALP elevated likely due to bone mets   - seen by Hepatology, OP follow up on dc     # H/o breast cancer with bone mets  # New T7 and L4 compression fractures on imaging probably sec to bone mets  - CT abd: New T7 and L4 compression fractures, when compared to CT from April 2023. Small right and trace left pleural effusions. Increase in diffuse osseous metastases. Stable right breast mass with nipple retraction.  - oncology following given issues with QTc and electrolyte abnormalities, ribociclib d/c'd. C/w letrozole, add Ibrance.  Also recommend medronate 90 mg IV once because of bony metastases, she can be transitioned to Zometa or Xgeva as outpatient there is a nonformulary in our institution.   - S/p medronate 90 mg IV once on 7/21  - c/w ibrance 125 mg daily, 3 weeks on and 1 week off  - c/w letrozole 2.5 mg daily  - Outpatient follow up with Dr. Peña upon discharge    # Hypothyroidism  - c/w synthroid    # URI 2/2 Parainfluenza- improved   - Still with cough, no fever. Continue Mucinex  - CXR 7/28 showed stable right lower lobe effusion/atelectasis    # DVT prophylaxis- Lovenox     # DNR/DNI    Disposition: Pending results of expedited appeal for STR

## 2023-08-10 NOTE — CHART NOTE - NSCHARTNOTESELECT_GEN_ALL_CORE
Nutrition Support/Nutrition Services
consult/Event Note
CALORIE COUNT - RD Limited Note/Event Note
calorie count/Event Note
lab follow up/Event Note

## 2023-08-10 NOTE — PROGRESS NOTE ADULT - ATTENDING COMMENTS
57-year-old woman with a history of hypothyroidism, asthma, former alcohol use, breast cancer with diffuse bone metastasis, status post radiation 5 sessions finished 5/31/23, on letrozole daily, was on Ribociclib and was discontinued due to low magnesium with high QTc, surgical history of occiput-T2 posterior instrumentation and fusion, ORIF of C2 body with functional decline and impaired ADLs/mobility, ambulates with a walker. Following with Dr. Peña.  She presented to the emergency department complaining of decreased appetite and oral intake of 3 weeks duration.     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  #hypokalemia and hypomag  - c/w famotidine  - dc marinol- patient didnt tolerate   - increase megace to bid per patient request, max dose 800mg   - can trial short course of steroids for appetite stimulation- will do 7d course on discharge   - calorie count completed pt is eating 25-50% of meals and drinking ensure for supplementation   - Nutrition following       # Acute transaminitis: improved significantly, suspect ischemic hepatitis 2/2 hypoperfusion event  # H/O compensated alcoholic cirrhosis  # H/O elevated ALP presumed 2/2 bony metastasis  # Cholelithiasis  - US Abdomen Upper Quadrant Right 7/18 - Heterogeneous echotexture to liver with a lobulated contour, findings which can be seen in cirrhosis. Gallstones Small right pleural effusion  - CT Abdomen and Pelvis No Cont 7/19 - Findings may represent mild proctitis. No bowel obstruction or free air. Nodularity of the liver may be seen with cirrhosis.  - Abdominal duplex 7/21: Patent diminutive right and left portal veins. Patent right, left and middle hepatic veins.  - LFT improved, ALP elevated likely due to bone mets   - seen by Hepatology, OP follow up on dc     # H/o breast cancer with bone mets  # New T7 and L4 compression fractures on imaging probably sec to bone mets  - CT abd: New T7 and L4 compression fractures, when compared to CT from April 2023. Small right and trace left pleural effusions. Increase in diffuse osseous metastases. Stable right breast mass with nipple retraction.  - oncology following given issues with QTc and electrolyte abnormalities, ribociclib d/c'd. C/w letrozole, add Ibrance.  Also recommend medronate 90 mg IV once because of bony metastases, she can be transitioned to Zometa or Xgeva as outpatient there is a nonformulary in our institution.   - S/p medronate 90 mg IV once on 7/21  - c/w ibrance 125 mg daily, 3 weeks on and 1 week off.  - c/w letrozole 2.5 mg daily  - Outpatient follow up with Dr. Peña upon discharge    # Hypothyroidism  - c/w synthroid    # URI 2/2 Parainfluenza- improved     # DVT prophylaxis- Lovenox     # DNR/DNI    # Disposition: expedited appeal approved, pending bed at [facility] facility.     Total time spent to complete patient's bedside assessment, review medical chart, discuss medical plan of care with covering medical team was more than 25 minutes  with >50% of time spent face to face with patient, discussion with patient/family and/or coordination of care      Arianna Pang DO .

## 2023-08-10 NOTE — CHART NOTE - NSCHARTNOTEFT_GEN_A_CORE
Consult received. Please see NST's most recent note on 8/9 for nutrition recommendations.     Shanthi Simons, RD  #[id number] or via TEAMS

## 2023-08-10 NOTE — PROGRESS NOTE ADULT - PROVIDER SPECIALTY LIST ADULT
Heme/Onc
Hospitalist
Internal Medicine
Heme/Onc
Heme/Onc
Hospitalist
Internal Medicine
Nutrition Support
Hospitalist
Hospitalist
Internal Medicine
Heme/Onc
Hepatology
Hepatology
Hospitalist
Internal Medicine

## 2023-08-10 NOTE — PROGRESS NOTE ADULT - REASON FOR ADMISSION
Decreased oral intake with electrolyte disturbances

## 2023-08-10 NOTE — PROGRESS NOTE ADULT - SUBJECTIVE AND OBJECTIVE BOX
SUBJECTIVE:    Patient is a 57y old Female who presents with a chief complaint of Decreased oral intake with electrolyte disturbances (09 Aug 2023 13:50)     Today is hospital day 28d. NAEON. This morning patient has no complaints. K and Mg low, repleted.    PAST MEDICAL & SURGICAL HISTORY  Hypothyroidism    H/O cirrhosis    Breast cancer    No significant past surgical history      SOCIAL HISTORY:  Negative for smoking/alcohol/drug use.     ALLERGIES:  No Known Allergies    MEDICATIONS:  STANDING MEDICATIONS  calcium carbonate    500 mG (Tums) Chewable 1 Tablet(s) Chew three times a day  chlorhexidine 2% Cloths 1 Application(s) Topical <User Schedule>  dexAMETHasone     Tablet 4 milliGRAM(s) Oral daily  dextrose 5%. 1000 milliLiter(s) IV Continuous <Continuous>  dextrose 5%. 1000 milliLiter(s) IV Continuous <Continuous>  dextrose 50% Injectable 12.5 Gram(s) IV Push once  dextrose 50% Injectable 25 Gram(s) IV Push once  dextrose 50% Injectable 25 Gram(s) IV Push once  enoxaparin Injectable 40 milliGRAM(s) SubCutaneous every 24 hours  famotidine    Tablet 20 milliGRAM(s) Oral two times a day  folic acid 1 milliGRAM(s) Oral daily  glucagon  Injectable 1 milliGRAM(s) IntraMuscular once  lactated ringers. 1000 milliLiter(s) IV Continuous <Continuous>  letrozole 2.5 milliGRAM(s) Oral daily  levothyroxine 50 MICROGram(s) Oral daily  loratadine 10 milliGRAM(s) Oral daily  megestrol Suspension 400 milliGRAM(s) Oral <User Schedule>  melatonin 5 milliGRAM(s) Oral at bedtime  palbociclib 125 milliGRAM(s) Oral daily  sucralfate suspension 1 Gram(s) Oral every 6 hours  tiotropium 2.5 MICROgram(s) Inhaler 2 Puff(s) Inhalation daily    PRN MEDICATIONS  albuterol    90 MICROgram(s) HFA Inhaler 2 Puff(s) Inhalation every 6 hours PRN  benzonatate 100 milliGRAM(s) Oral three times a day PRN  dextrose Oral Gel 15 Gram(s) Oral once PRN  guaiFENesin Oral Liquid (Sugar-Free) 200 milliGRAM(s) Oral every 6 hours PRN  ibuprofen  Tablet. 200 milliGRAM(s) Oral every 4 hours PRN  methocarbamol 1000 milliGRAM(s) Oral every 6 hours PRN    VITALS:   T(F): 98.3  HR: 97  BP: 120/68  RR: 18  SpO2: --    LABS:                        10.9   2.05  )-----------( 151      ( 09 Aug 2023 12:32 )             32.5     08-10    142  |  111<H>  |  12  ----------------------------<  91  3.3<L>   |  22  |  <0.5<L>    Ca    8.5      10 Aug 2023 07:13  Mg     1.6     08-10        Urinalysis Basic - ( 10 Aug 2023 07:13 )    Color: x / Appearance: x / SG: x / pH: x  Gluc: 91 mg/dL / Ketone: x  / Bili: x / Urobili: x   Blood: x / Protein: x / Nitrite: x   Leuk Esterase: x / RBC: x / WBC x   Sq Epi: x / Non Sq Epi: x / Bacteria: x      RADIOLOGY:    PHYSICAL EXAM:  GEN: No acute distress  LUNGS: Clear to auscultation bilaterally   HEART: Regular  ABD: Soft, non-tender, non-distended.  EXT: NC/NC/NE/2+PP/WINTER/Skin Intact.   NEURO: AAOX3

## 2023-08-11 NOTE — DISCHARGE NOTE NURSING/CASE MANAGEMENT/SOCIAL WORK - NSDCPEFALRISK_GEN_ALL_CORE
For information on Fall & Injury Prevention, visit: [email or web address] OR  [email or web address] OR  https://www.cdc.gov/steadi/patient.html
For information on Fall & Injury Prevention, visit: [email or web address] OR  [email or web address] OR  https://www.cdc.gov/steadi/patient.html

## 2023-08-11 NOTE — DISCHARGE NOTE NURSING/CASE MANAGEMENT/SOCIAL WORK - NSDCFUADDAPPT_GEN_ALL_CORE_FT
follow up with hepatology clinic [address] [place] [address]. Ph num: [phone]
follow up with hepatology clinic [address] [place] [address]. Ph num: [phone]

## 2023-08-11 NOTE — DISCHARGE NOTE NURSING/CASE MANAGEMENT/SOCIAL WORK - PATIENT PORTAL LINK FT
You can access the FollowMyHealth Patient Portal offered by [facility] by registering at the following website: [email or web address]. By joining [employer]’s FollowMyHealth portal, you will also be able to view your health information using other applications (apps) compatible with our system.

## 2023-08-21 PROBLEM — C50.919 MALIGNANT NEOPLASM OF UNSPECIFIED SITE OF UNSPECIFIED FEMALE BREAST: Chronic | Status: ACTIVE | Noted: 2023-01-01

## 2023-09-05 NOTE — PHYSICAL EXAM
[Normal] : grossly intact [de-identified] : neck fixed in flexed position  [de-identified] : limited ROM .  [de-identified] : further decrease in right breast mass.  [de-identified] : no focal deficits.

## 2023-09-05 NOTE — HISTORY OF PRESENT ILLNESS
[de-identified] : 57 year old female referred by Dr Suarez for suspected metastatic bone disease. PMH of alcohol abuse , cirrhosis ? sober for 9 years. Presented with worsening neck and lower back pain . MRI ( 3/21/2023 ) shows diffuse metastatic bone  disease most pronounced at C2 with bony expansion causing circumferential narrowing of central canal .\par  pain is only partially relieved with tylenol and advil . unable to move,  rotate her neck or lie flat . essentially confined to recliner. She denies weakness, numbness or sphincteric symptoms . She notes right breast lesion for 3 years , increased in size recently . [de-identified] : 5/25/2023 Patient returns for follow up after internal fixation of C2 fracture , biopsy consistent with breast cancer ER [place] 1+ positive , HER 2 negative , KI67 5%  She is on radiation and complains of mild sore throat and dry mouth , Also started on femara and is tolerating well . CT scan showed mediastinal adenopathy in addition to extensive widespread bone metastasis. SHe is on Robaxin and percocet . to start on home PT . NO leg weakness or numbness.   8/25/2023 Patient returns for follow up , she is on letrozole and Ibrance , she was admitted after radiation with esophagitis and severe weight loss, She feels better and is gaining weight on megace . she denies any pain . headaches .

## 2023-09-05 NOTE — ASSESSMENT
[FreeTextEntry1] : 57 year old female with right breast mass, diffuse bone metastasis ,severe neck pain due  epidural extension and fracture at C2 , S/P internal fixation .Biopsy breast cancer ER [place] weakly positive , HER 2 neg , low KI 67  Mediastinal adenopathy , hypercalcemia ( resolved ) . mild anemia after surgery .  S/P post operative radiation . On endocrine therapy with femara and Ibrance  Weight loss , decreased intake much improved on megace.  Breast mass reduced in size.  Plan :  continue femara Ibrance continue rehab dental clearance for Xgeva.

## 2023-12-26 PROBLEM — C79.9: Status: ACTIVE | Noted: 2023-04-23

## 2023-12-26 NOTE — ASSESSMENT
[FreeTextEntry1] : 58 year old female with breast cancer HR positive HER 2 negative with good clinical and biochemical response to  endocrine therapy for > 6 months  Tolerating treatment , mild neutropenia , no infections .  S/P cervical spine fusion , radiation .  Plan :  reviewed and discussed blood work with patient , normal calcium , vitamin D , low wbc from ibrance ,  Plan : continue Zometa monthly for now , pending PET scan in 2 months .             continue to monitor for infections , infusion reaction , ONJ from zometa.             follow up in 6 weeks.

## 2023-12-26 NOTE — HISTORY OF PRESENT ILLNESS
[de-identified] : 57 year old female referred by Dr Suarez for suspected metastatic bone disease. PMH of alcohol abuse , cirrhosis ? sober for 9 years. Presented with worsening neck and lower back pain . MRI ( 3/21/2023 ) shows diffuse metastatic bone  disease most pronounced at C2 with bony expansion causing circumferential narrowing of central canal .\par  pain is only partially relieved with tylenol and advil . unable to move,  rotate her neck or lie flat . essentially confined to recliner. She denies weakness, numbness or sphincteric symptoms . She notes right breast lesion for 3 years , increased in size recently . [de-identified] : 5/25/2023 Patient returns for follow up after internal fixation of C2 fracture , biopsy consistent with breast cancer ER [place] 1+ positive , HER 2 negative , KI67 5%  She is on radiation and complains of mild sore throat and dry mouth , Also started on femara and is tolerating well . CT scan showed mediastinal adenopathy in addition to extensive widespread bone metastasis. SHe is on Robaxin and percocet . to start on home PT . NO leg weakness or numbness.   8/25/2023 Patient returns for follow up , she is on letrozole and Ibrance , she was admitted after radiation with esophagitis and severe weight loss, She feels better and is gaining weight on megace . she denies any pain . headaches .   10/24/203 Patient returns for follow up , she feels well and os gaining weight , she denies pain and is capable of all her self care . She complains of new onset of diffuse facial rash , non itchy .   12/26/2023 Patient returns for follow up , She continues on letrozole and ibrance for > 6 months .she missed last dose of zometa after she contracted Covid 19 and  a bout of gastroenteritis. She feels well today , no pain , no fever ,  RAsh on face is better with topical steroids .  She is more active and is able to care for her grand child . Last blood work shows normal calcium , decreased CA27.29 .

## 2023-12-26 NOTE — PHYSICAL EXAM
[Normal] : grossly intact [de-identified] : neck fixed in flexed position  [de-identified] : limited ROM .  [de-identified] : further decrease in right breast mass.  [de-identified] : diffuse facial rash, no vesicular , non blanching  [de-identified] : no focal deficits.

## 2024-01-01 ENCOUNTER — APPOINTMENT (OUTPATIENT)
Age: 59
End: 2024-01-01

## 2024-01-01 ENCOUNTER — OUTPATIENT (OUTPATIENT)
Dept: OUTPATIENT SERVICES | Facility: HOSPITAL | Age: 59
LOS: 1 days | End: 2024-01-01
Payer: COMMERCIAL

## 2024-01-01 ENCOUNTER — APPOINTMENT (OUTPATIENT)
Dept: INFUSION THERAPY | Facility: CLINIC | Age: 59
End: 2024-01-01
Payer: COMMERCIAL

## 2024-01-01 ENCOUNTER — LABORATORY RESULT (OUTPATIENT)
Age: 59
End: 2024-01-01

## 2024-01-01 ENCOUNTER — APPOINTMENT (OUTPATIENT)
Dept: INFUSION THERAPY | Facility: CLINIC | Age: 59
End: 2024-01-01

## 2024-01-01 ENCOUNTER — APPOINTMENT (OUTPATIENT)
Dept: HEMATOLOGY ONCOLOGY | Facility: CLINIC | Age: 59
End: 2024-01-01
Payer: COMMERCIAL

## 2024-01-01 ENCOUNTER — INPATIENT (INPATIENT)
Facility: HOSPITAL | Age: 59
LOS: 13 days | DRG: 542 | End: 2024-05-10
Attending: STUDENT IN AN ORGANIZED HEALTH CARE EDUCATION/TRAINING PROGRAM | Admitting: INTERNAL MEDICINE
Payer: COMMERCIAL

## 2024-01-01 VITALS
TEMPERATURE: 98 F | OXYGEN SATURATION: 97 % | HEIGHT: 64 IN | SYSTOLIC BLOOD PRESSURE: 140 MMHG | HEART RATE: 125 BPM | RESPIRATION RATE: 20 BRPM | WEIGHT: 171.96 LBS | DIASTOLIC BLOOD PRESSURE: 72 MMHG

## 2024-01-01 VITALS
WEIGHT: 160 LBS | HEIGHT: 60 IN | SYSTOLIC BLOOD PRESSURE: 131 MMHG | RESPIRATION RATE: 16 BRPM | HEART RATE: 107 BPM | TEMPERATURE: 99.6 F | DIASTOLIC BLOOD PRESSURE: 89 MMHG | BODY MASS INDEX: 31.41 KG/M2

## 2024-01-01 VITALS — HEART RATE: 129 BPM | DIASTOLIC BLOOD PRESSURE: 62 MMHG | SYSTOLIC BLOOD PRESSURE: 97 MMHG

## 2024-01-01 VITALS — TEMPERATURE: 100 F | HEART RATE: 107 BPM | SYSTOLIC BLOOD PRESSURE: 131 MMHG | DIASTOLIC BLOOD PRESSURE: 89 MMHG

## 2024-01-01 VITALS — SYSTOLIC BLOOD PRESSURE: 128 MMHG | DIASTOLIC BLOOD PRESSURE: 81 MMHG

## 2024-01-01 DIAGNOSIS — E83.52 HYPERCALCEMIA: ICD-10-CM

## 2024-01-01 DIAGNOSIS — C50.911 MALIGNANT NEOPLASM OF UNSPECIFIED SITE OF RIGHT FEMALE BREAST: ICD-10-CM

## 2024-01-01 DIAGNOSIS — Z51.81 ENCOUNTER FOR THERAPEUTIC DRUG LVL MONITORING: ICD-10-CM

## 2024-01-01 DIAGNOSIS — J96.01 ACUTE RESPIRATORY FAILURE WITH HYPOXIA: ICD-10-CM

## 2024-01-01 DIAGNOSIS — C50.919 MALIGNANT NEOPLASM OF UNSPECIFIED SITE OF UNSPECIFIED FEMALE BREAST: ICD-10-CM

## 2024-01-01 DIAGNOSIS — C79.51 SECONDARY MALIGNANT NEOPLASM OF BONE: ICD-10-CM

## 2024-01-01 DIAGNOSIS — Z71.89 OTHER SPECIFIED COUNSELING: ICD-10-CM

## 2024-01-01 DIAGNOSIS — G89.3 NEOPLASM RELATED PAIN (ACUTE) (CHRONIC): ICD-10-CM

## 2024-01-01 DIAGNOSIS — Z51.5 ENCOUNTER FOR PALLIATIVE CARE: ICD-10-CM

## 2024-01-01 LAB
ALBUMIN SERPL ELPH-MCNC: 2.7 G/DL — LOW (ref 3.5–5.2)
ALBUMIN SERPL ELPH-MCNC: 3.1 G/DL — LOW (ref 3.5–5.2)
ALBUMIN SERPL ELPH-MCNC: 3.3 G/DL — LOW (ref 3.5–5.2)
ALBUMIN SERPL ELPH-MCNC: 3.4 G/DL — LOW (ref 3.5–5.2)
ALBUMIN SERPL ELPH-MCNC: 3.4 G/DL — LOW (ref 3.5–5.2)
ALBUMIN SERPL ELPH-MCNC: 3.5 G/DL — SIGNIFICANT CHANGE UP (ref 3.5–5.2)
ALBUMIN SERPL ELPH-MCNC: 3.6 G/DL — SIGNIFICANT CHANGE UP (ref 3.5–5.2)
ALBUMIN SERPL ELPH-MCNC: 3.7 G/DL — SIGNIFICANT CHANGE UP (ref 3.5–5.2)
ALBUMIN SERPL ELPH-MCNC: 3.7 G/DL — SIGNIFICANT CHANGE UP (ref 3.5–5.2)
ALBUMIN SERPL ELPH-MCNC: 4.1 G/DL — SIGNIFICANT CHANGE UP (ref 3.5–5.2)
ALBUMIN SERPL ELPH-MCNC: 4.6 G/DL
ALBUMIN SERPL ELPH-MCNC: 4.8 G/DL
ALP BLD-CCNC: 85 U/L
ALP BLD-CCNC: 91 U/L
ALP SERPL-CCNC: 192 U/L — HIGH (ref 30–115)
ALP SERPL-CCNC: 202 U/L — HIGH (ref 30–115)
ALP SERPL-CCNC: 232 U/L — HIGH (ref 30–115)
ALP SERPL-CCNC: 234 U/L — HIGH (ref 30–115)
ALP SERPL-CCNC: 238 U/L — HIGH (ref 30–115)
ALP SERPL-CCNC: 239 U/L — HIGH (ref 30–115)
ALP SERPL-CCNC: 247 U/L — HIGH (ref 30–115)
ALP SERPL-CCNC: 247 U/L — HIGH (ref 30–115)
ALP SERPL-CCNC: 255 U/L — HIGH (ref 30–115)
ALP SERPL-CCNC: 260 U/L — HIGH (ref 30–115)
ALP SERPL-CCNC: 260 U/L — HIGH (ref 30–115)
ALP SERPL-CCNC: 276 U/L — HIGH (ref 30–115)
ALP SERPL-CCNC: 277 U/L — HIGH (ref 30–115)
ALP SERPL-CCNC: 281 U/L — HIGH (ref 30–115)
ALP SERPL-CCNC: 294 U/L — HIGH (ref 30–115)
ALP SERPL-CCNC: 325 U/L — HIGH (ref 30–115)
ALP SERPL-CCNC: 330 U/L — HIGH (ref 30–115)
ALP SERPL-CCNC: 331 U/L — HIGH (ref 30–115)
ALT FLD-CCNC: 26 U/L — SIGNIFICANT CHANGE UP (ref 0–41)
ALT FLD-CCNC: 28 U/L — SIGNIFICANT CHANGE UP (ref 0–41)
ALT FLD-CCNC: 31 U/L — SIGNIFICANT CHANGE UP (ref 0–41)
ALT FLD-CCNC: 33 U/L — SIGNIFICANT CHANGE UP (ref 0–41)
ALT FLD-CCNC: 33 U/L — SIGNIFICANT CHANGE UP (ref 0–41)
ALT FLD-CCNC: 35 U/L — SIGNIFICANT CHANGE UP (ref 0–41)
ALT FLD-CCNC: 36 U/L — SIGNIFICANT CHANGE UP (ref 0–41)
ALT FLD-CCNC: 38 U/L — SIGNIFICANT CHANGE UP (ref 0–41)
ALT FLD-CCNC: 49 U/L — HIGH (ref 0–41)
ALT FLD-CCNC: 56 U/L — HIGH (ref 0–41)
ALT FLD-CCNC: 62 U/L — HIGH (ref 0–41)
ALT FLD-CCNC: 66 U/L — HIGH (ref 0–41)
ALT FLD-CCNC: 71 U/L — HIGH (ref 0–41)
ALT FLD-CCNC: 77 U/L — HIGH (ref 0–41)
ALT FLD-CCNC: 92 U/L — HIGH (ref 0–41)
ALT FLD-CCNC: 92 U/L — HIGH (ref 0–41)
ALT FLD-CCNC: 93 U/L — HIGH (ref 0–41)
ALT FLD-CCNC: 98 U/L — HIGH (ref 0–41)
ALT SERPL-CCNC: 10 U/L
ALT SERPL-CCNC: 13 U/L
ANION GAP SERPL CALC-SCNC: 12 MMOL/L — SIGNIFICANT CHANGE UP (ref 7–14)
ANION GAP SERPL CALC-SCNC: 13 MMOL/L — SIGNIFICANT CHANGE UP (ref 7–14)
ANION GAP SERPL CALC-SCNC: 14 MMOL/L
ANION GAP SERPL CALC-SCNC: 14 MMOL/L — SIGNIFICANT CHANGE UP (ref 7–14)
ANION GAP SERPL CALC-SCNC: 15 MMOL/L — HIGH (ref 7–14)
ANION GAP SERPL CALC-SCNC: 16 MMOL/L
ANION GAP SERPL CALC-SCNC: 17 MMOL/L — HIGH (ref 7–14)
ANION GAP SERPL CALC-SCNC: 18 MMOL/L — HIGH (ref 7–14)
ANION GAP SERPL CALC-SCNC: 18 MMOL/L — HIGH (ref 7–14)
ANION GAP SERPL CALC-SCNC: 20 MMOL/L — HIGH (ref 7–14)
ANION GAP SERPL CALC-SCNC: 24 MMOL/L — HIGH (ref 7–14)
ANION GAP SERPL CALC-SCNC: 8 MMOL/L — SIGNIFICANT CHANGE UP (ref 7–14)
ANION GAP SERPL CALC-SCNC: 9 MMOL/L — SIGNIFICANT CHANGE UP (ref 7–14)
APTT BLD: 25.2 SEC — LOW (ref 27–39.2)
APTT BLD: 31.7 SEC — SIGNIFICANT CHANGE UP (ref 27–39.2)
AST SERPL-CCNC: 174 U/L — HIGH (ref 0–41)
AST SERPL-CCNC: 176 U/L — HIGH (ref 0–41)
AST SERPL-CCNC: 191 U/L — HIGH (ref 0–41)
AST SERPL-CCNC: 20 U/L
AST SERPL-CCNC: 210 U/L — HIGH (ref 0–41)
AST SERPL-CCNC: 210 U/L — HIGH (ref 0–41)
AST SERPL-CCNC: 226 U/L — HIGH (ref 0–41)
AST SERPL-CCNC: 226 U/L — HIGH (ref 0–41)
AST SERPL-CCNC: 228 U/L — HIGH (ref 0–41)
AST SERPL-CCNC: 237 U/L — HIGH (ref 0–41)
AST SERPL-CCNC: 239 U/L — HIGH (ref 0–41)
AST SERPL-CCNC: 239 U/L — HIGH (ref 0–41)
AST SERPL-CCNC: 245 U/L — HIGH (ref 0–41)
AST SERPL-CCNC: 262 U/L — HIGH (ref 0–41)
AST SERPL-CCNC: 263 U/L — HIGH (ref 0–41)
AST SERPL-CCNC: 263 U/L — HIGH (ref 0–41)
AST SERPL-CCNC: 27 U/L
AST SERPL-CCNC: 272 U/L — HIGH (ref 0–41)
AST SERPL-CCNC: 304 U/L — HIGH (ref 0–41)
AST SERPL-CCNC: 316 U/L — HIGH (ref 0–41)
BASE EXCESS BLDA CALC-SCNC: 0.3 MMOL/L — SIGNIFICANT CHANGE UP (ref -2–3)
BASE EXCESS BLDV CALC-SCNC: 2.3 MMOL/L — SIGNIFICANT CHANGE UP (ref -2–3)
BASOPHILS # BLD AUTO: 0.01 K/UL — SIGNIFICANT CHANGE UP (ref 0–0.2)
BASOPHILS # BLD AUTO: 0.01 K/UL — SIGNIFICANT CHANGE UP (ref 0–0.2)
BASOPHILS # BLD AUTO: 0.02 K/UL
BASOPHILS # BLD AUTO: 0.02 K/UL — SIGNIFICANT CHANGE UP (ref 0–0.2)
BASOPHILS # BLD AUTO: 0.04 K/UL
BASOPHILS # BLD AUTO: 0.04 K/UL — SIGNIFICANT CHANGE UP (ref 0–0.2)
BASOPHILS # BLD AUTO: 0.05 K/UL — SIGNIFICANT CHANGE UP (ref 0–0.2)
BASOPHILS # BLD AUTO: 0.06 K/UL — SIGNIFICANT CHANGE UP (ref 0–0.2)
BASOPHILS # BLD AUTO: 0.06 K/UL — SIGNIFICANT CHANGE UP (ref 0–0.2)
BASOPHILS # BLD AUTO: 0.07 K/UL — SIGNIFICANT CHANGE UP (ref 0–0.2)
BASOPHILS # BLD AUTO: 0.08 K/UL — SIGNIFICANT CHANGE UP (ref 0–0.2)
BASOPHILS # BLD AUTO: 0.1 K/UL — SIGNIFICANT CHANGE UP (ref 0–0.2)
BASOPHILS NFR BLD AUTO: 0.2 % — SIGNIFICANT CHANGE UP (ref 0–1)
BASOPHILS NFR BLD AUTO: 0.2 % — SIGNIFICANT CHANGE UP (ref 0–1)
BASOPHILS NFR BLD AUTO: 0.4 % — SIGNIFICANT CHANGE UP (ref 0–1)
BASOPHILS NFR BLD AUTO: 0.6 %
BASOPHILS NFR BLD AUTO: 1 %
BASOPHILS NFR BLD AUTO: 1 % — SIGNIFICANT CHANGE UP (ref 0–1)
BASOPHILS NFR BLD AUTO: 1.1 % — HIGH (ref 0–1)
BASOPHILS NFR BLD AUTO: 1.1 % — HIGH (ref 0–1)
BASOPHILS NFR BLD AUTO: 1.3 % — HIGH (ref 0–1)
BASOPHILS NFR BLD AUTO: 1.4 % — HIGH (ref 0–1)
BASOPHILS NFR BLD AUTO: 1.4 % — HIGH (ref 0–1)
BASOPHILS NFR BLD AUTO: 1.6 % — HIGH (ref 0–1)
BASOPHILS NFR BLD AUTO: 1.7 % — HIGH (ref 0–1)
BASOPHILS NFR BLD AUTO: 2 % — HIGH (ref 0–1)
BASOPHILS NFR BLD AUTO: 2.3 % — HIGH (ref 0–1)
BASOPHILS NFR BLD AUTO: 2.6 % — HIGH (ref 0–1)
BILIRUB DIRECT SERPL-MCNC: 2 MG/DL — HIGH (ref 0–0.3)
BILIRUB SERPL-MCNC: 0.3 MG/DL
BILIRUB SERPL-MCNC: 0.4 MG/DL
BILIRUB SERPL-MCNC: 0.9 MG/DL — SIGNIFICANT CHANGE UP (ref 0.2–1.2)
BILIRUB SERPL-MCNC: 1.1 MG/DL — SIGNIFICANT CHANGE UP (ref 0.2–1.2)
BILIRUB SERPL-MCNC: 1.2 MG/DL — SIGNIFICANT CHANGE UP (ref 0.2–1.2)
BILIRUB SERPL-MCNC: 1.3 MG/DL — HIGH (ref 0.2–1.2)
BILIRUB SERPL-MCNC: 1.4 MG/DL — HIGH (ref 0.2–1.2)
BILIRUB SERPL-MCNC: 1.4 MG/DL — HIGH (ref 0.2–1.2)
BILIRUB SERPL-MCNC: 2 MG/DL — HIGH (ref 0.2–1.2)
BILIRUB SERPL-MCNC: 2.1 MG/DL — HIGH (ref 0.2–1.2)
BILIRUB SERPL-MCNC: 2.6 MG/DL — HIGH (ref 0.2–1.2)
BILIRUB SERPL-MCNC: 2.8 MG/DL — HIGH (ref 0.2–1.2)
BILIRUB SERPL-MCNC: 2.8 MG/DL — HIGH (ref 0.2–1.2)
BILIRUB SERPL-MCNC: 3 MG/DL — HIGH (ref 0.2–1.2)
BUN SERPL-MCNC: 11 MG/DL
BUN SERPL-MCNC: 13 MG/DL — SIGNIFICANT CHANGE UP (ref 10–20)
BUN SERPL-MCNC: 14 MG/DL — SIGNIFICANT CHANGE UP (ref 10–20)
BUN SERPL-MCNC: 15 MG/DL — SIGNIFICANT CHANGE UP (ref 10–20)
BUN SERPL-MCNC: 16 MG/DL — SIGNIFICANT CHANGE UP (ref 10–20)
BUN SERPL-MCNC: 17 MG/DL
BUN SERPL-MCNC: 17 MG/DL — SIGNIFICANT CHANGE UP (ref 10–20)
BUN SERPL-MCNC: 18 MG/DL — SIGNIFICANT CHANGE UP (ref 10–20)
BUN SERPL-MCNC: 18 MG/DL — SIGNIFICANT CHANGE UP (ref 10–20)
BUN SERPL-MCNC: 19 MG/DL — SIGNIFICANT CHANGE UP (ref 10–20)
BUN SERPL-MCNC: 21 MG/DL — HIGH (ref 10–20)
BUN SERPL-MCNC: 21 MG/DL — HIGH (ref 10–20)
BUN SERPL-MCNC: 22 MG/DL — HIGH (ref 10–20)
BUN SERPL-MCNC: 24 MG/DL — HIGH (ref 10–20)
BUN SERPL-MCNC: 24 MG/DL — HIGH (ref 10–20)
BUN SERPL-MCNC: 30 MG/DL — HIGH (ref 10–20)
CA-I SERPL-SCNC: 1.86 MMOL/L — CRITICAL HIGH (ref 1.15–1.33)
CALCIUM SERPL-MCNC: 10.1 MG/DL
CALCIUM SERPL-MCNC: 10.2 MG/DL — SIGNIFICANT CHANGE UP (ref 8.4–10.5)
CALCIUM SERPL-MCNC: 10.3 MG/DL
CALCIUM SERPL-MCNC: 10.7 MG/DL — HIGH (ref 8.4–10.4)
CALCIUM SERPL-MCNC: 11.2 MG/DL — HIGH (ref 8.4–10.4)
CALCIUM SERPL-MCNC: 11.3 MG/DL — HIGH (ref 8.4–10.5)
CALCIUM SERPL-MCNC: 11.4 MG/DL — HIGH (ref 8.4–10.4)
CALCIUM SERPL-MCNC: 14.2 MG/DL — CRITICAL HIGH (ref 8.4–10.5)
CALCIUM SERPL-MCNC: 7.1 MG/DL — LOW (ref 8.4–10.5)
CALCIUM SERPL-MCNC: 7.9 MG/DL — LOW (ref 8.4–10.5)
CALCIUM SERPL-MCNC: 7.9 MG/DL — LOW (ref 8.4–10.5)
CALCIUM SERPL-MCNC: 8.5 MG/DL — SIGNIFICANT CHANGE UP (ref 8.4–10.5)
CALCIUM SERPL-MCNC: 8.5 MG/DL — SIGNIFICANT CHANGE UP (ref 8.4–10.5)
CALCIUM SERPL-MCNC: 8.7 MG/DL — SIGNIFICANT CHANGE UP (ref 8.4–10.5)
CALCIUM SERPL-MCNC: 8.8 MG/DL — SIGNIFICANT CHANGE UP (ref 8.4–10.5)
CALCIUM SERPL-MCNC: 8.8 MG/DL — SIGNIFICANT CHANGE UP (ref 8.4–10.5)
CALCIUM SERPL-MCNC: 8.9 MG/DL — SIGNIFICANT CHANGE UP (ref 8.4–10.5)
CALCIUM SERPL-MCNC: 8.9 MG/DL — SIGNIFICANT CHANGE UP (ref 8.4–10.5)
CALCIUM SERPL-MCNC: 9 MG/DL — SIGNIFICANT CHANGE UP (ref 8.4–10.5)
CALCIUM SERPL-MCNC: 9.4 MG/DL — SIGNIFICANT CHANGE UP (ref 8.4–10.5)
CALCIUM SERPL-MCNC: 9.6 MG/DL — SIGNIFICANT CHANGE UP (ref 8.4–10.4)
CANCER AG27-29 SERPL-ACNC: 191.6 U/ML
CANCER AG27-29 SERPL-ACNC: 78.2 U/ML
CANCER AG27-29 SERPL-ACNC: 996.5 U/ML — HIGH (ref 0–38.6)
CHLORIDE SERPL-SCNC: 100 MMOL/L — SIGNIFICANT CHANGE UP (ref 98–110)
CHLORIDE SERPL-SCNC: 101 MMOL/L
CHLORIDE SERPL-SCNC: 102 MMOL/L — SIGNIFICANT CHANGE UP (ref 98–110)
CHLORIDE SERPL-SCNC: 103 MMOL/L — SIGNIFICANT CHANGE UP (ref 98–110)
CHLORIDE SERPL-SCNC: 104 MMOL/L — SIGNIFICANT CHANGE UP (ref 98–110)
CHLORIDE SERPL-SCNC: 105 MMOL/L — SIGNIFICANT CHANGE UP (ref 98–110)
CHLORIDE SERPL-SCNC: 107 MMOL/L
CHLORIDE SERPL-SCNC: 89 MMOL/L — LOW (ref 98–110)
CHLORIDE SERPL-SCNC: 91 MMOL/L — LOW (ref 98–110)
CHLORIDE SERPL-SCNC: 94 MMOL/L — LOW (ref 98–110)
CHLORIDE SERPL-SCNC: 95 MMOL/L — LOW (ref 98–110)
CHLORIDE SERPL-SCNC: 95 MMOL/L — LOW (ref 98–110)
CHLORIDE SERPL-SCNC: 96 MMOL/L — LOW (ref 98–110)
CHLORIDE SERPL-SCNC: 97 MMOL/L — LOW (ref 98–110)
CHLORIDE SERPL-SCNC: 99 MMOL/L — SIGNIFICANT CHANGE UP (ref 98–110)
CHLORIDE SERPL-SCNC: 99 MMOL/L — SIGNIFICANT CHANGE UP (ref 98–110)
CK MB CFR SERPL CALC: 1.3 NG/ML — SIGNIFICANT CHANGE UP (ref 0.6–6.3)
CK SERPL-CCNC: 779 U/L — HIGH (ref 0–225)
CO2 SERPL-SCNC: 15 MMOL/L — LOW (ref 17–32)
CO2 SERPL-SCNC: 19 MMOL/L — SIGNIFICANT CHANGE UP (ref 17–32)
CO2 SERPL-SCNC: 20 MMOL/L
CO2 SERPL-SCNC: 20 MMOL/L — SIGNIFICANT CHANGE UP (ref 17–32)
CO2 SERPL-SCNC: 21 MMOL/L — SIGNIFICANT CHANGE UP (ref 17–32)
CO2 SERPL-SCNC: 22 MMOL/L — SIGNIFICANT CHANGE UP (ref 17–32)
CO2 SERPL-SCNC: 23 MMOL/L — SIGNIFICANT CHANGE UP (ref 17–32)
CO2 SERPL-SCNC: 23 MMOL/L — SIGNIFICANT CHANGE UP (ref 17–32)
CO2 SERPL-SCNC: 24 MMOL/L — SIGNIFICANT CHANGE UP (ref 17–32)
CO2 SERPL-SCNC: 25 MMOL/L
CO2 SERPL-SCNC: 25 MMOL/L — SIGNIFICANT CHANGE UP (ref 17–32)
CO2 SERPL-SCNC: 25 MMOL/L — SIGNIFICANT CHANGE UP (ref 17–32)
CO2 SERPL-SCNC: 26 MMOL/L — SIGNIFICANT CHANGE UP (ref 17–32)
CO2 SERPL-SCNC: 28 MMOL/L — SIGNIFICANT CHANGE UP (ref 17–32)
CREAT SERPL-MCNC: 0.5 MG/DL — LOW (ref 0.7–1.5)
CREAT SERPL-MCNC: 0.6 MG/DL — LOW (ref 0.7–1.5)
CREAT SERPL-MCNC: 0.7 MG/DL
CREAT SERPL-MCNC: 0.8 MG/DL
CREAT SERPL-MCNC: 0.9 MG/DL — SIGNIFICANT CHANGE UP (ref 0.7–1.5)
CREAT SERPL-MCNC: <0.5 MG/DL — LOW (ref 0.7–1.5)
D DIMER BLD IA.RAPID-MCNC: HIGH NG/ML DDU
EGFR: 100 ML/MIN/1.73M2
EGFR: 103 ML/MIN/1.73M2 — SIGNIFICANT CHANGE UP
EGFR: 108 ML/MIN/1.73M2 — SIGNIFICANT CHANGE UP
EGFR: 114 ML/MIN/1.73M2 — SIGNIFICANT CHANGE UP
EGFR: 122 ML/MIN/1.73M2 — SIGNIFICANT CHANGE UP
EGFR: 74 ML/MIN/1.73M2 — SIGNIFICANT CHANGE UP
EGFR: 85 ML/MIN/1.73M2
EOSINOPHIL # BLD AUTO: 0.02 K/UL — SIGNIFICANT CHANGE UP (ref 0–0.7)
EOSINOPHIL # BLD AUTO: 0.03 K/UL — SIGNIFICANT CHANGE UP (ref 0–0.7)
EOSINOPHIL # BLD AUTO: 0.04 K/UL — SIGNIFICANT CHANGE UP (ref 0–0.7)
EOSINOPHIL # BLD AUTO: 0.05 K/UL
EOSINOPHIL # BLD AUTO: 0.06 K/UL — SIGNIFICANT CHANGE UP (ref 0–0.7)
EOSINOPHIL # BLD AUTO: 0.06 K/UL — SIGNIFICANT CHANGE UP (ref 0–0.7)
EOSINOPHIL # BLD AUTO: 0.07 K/UL
EOSINOPHIL # BLD AUTO: 0.09 K/UL — SIGNIFICANT CHANGE UP (ref 0–0.7)
EOSINOPHIL # BLD AUTO: 0.11 K/UL — SIGNIFICANT CHANGE UP (ref 0–0.7)
EOSINOPHIL # BLD AUTO: 0.15 K/UL — SIGNIFICANT CHANGE UP (ref 0–0.7)
EOSINOPHIL NFR BLD AUTO: 0.5 % — SIGNIFICANT CHANGE UP (ref 0–8)
EOSINOPHIL NFR BLD AUTO: 0.5 % — SIGNIFICANT CHANGE UP (ref 0–8)
EOSINOPHIL NFR BLD AUTO: 0.6 % — SIGNIFICANT CHANGE UP (ref 0–8)
EOSINOPHIL NFR BLD AUTO: 0.7 % — SIGNIFICANT CHANGE UP (ref 0–8)
EOSINOPHIL NFR BLD AUTO: 0.8 % — SIGNIFICANT CHANGE UP (ref 0–8)
EOSINOPHIL NFR BLD AUTO: 1.2 % — SIGNIFICANT CHANGE UP (ref 0–8)
EOSINOPHIL NFR BLD AUTO: 1.2 % — SIGNIFICANT CHANGE UP (ref 0–8)
EOSINOPHIL NFR BLD AUTO: 1.6 %
EOSINOPHIL NFR BLD AUTO: 1.7 %
EOSINOPHIL NFR BLD AUTO: 2.1 % — SIGNIFICANT CHANGE UP (ref 0–8)
EOSINOPHIL NFR BLD AUTO: 2.2 % — SIGNIFICANT CHANGE UP (ref 0–8)
EOSINOPHIL NFR BLD AUTO: 2.6 % — SIGNIFICANT CHANGE UP (ref 0–8)
EOSINOPHIL NFR BLD AUTO: 2.9 % — SIGNIFICANT CHANGE UP (ref 0–8)
FIBRINOGEN PPP-MCNC: 154 MG/DL — LOW (ref 200–435)
GAS PNL BLDA: SIGNIFICANT CHANGE UP
GAS PNL BLDA: SIGNIFICANT CHANGE UP
GAS PNL BLDV: 129 MMOL/L — LOW (ref 136–145)
GAS PNL BLDV: SIGNIFICANT CHANGE UP
GAS PNL BLDV: SIGNIFICANT CHANGE UP
GLUCOSE BLDC GLUCOMTR-MCNC: 121 MG/DL — HIGH (ref 70–99)
GLUCOSE SERPL-MCNC: 101 MG/DL — HIGH (ref 70–99)
GLUCOSE SERPL-MCNC: 107 MG/DL — HIGH (ref 70–99)
GLUCOSE SERPL-MCNC: 109 MG/DL — HIGH (ref 70–99)
GLUCOSE SERPL-MCNC: 111 MG/DL — HIGH (ref 70–99)
GLUCOSE SERPL-MCNC: 111 MG/DL — HIGH (ref 70–99)
GLUCOSE SERPL-MCNC: 113 MG/DL — HIGH (ref 70–99)
GLUCOSE SERPL-MCNC: 114 MG/DL — HIGH (ref 70–99)
GLUCOSE SERPL-MCNC: 119 MG/DL — HIGH (ref 70–99)
GLUCOSE SERPL-MCNC: 121 MG/DL — HIGH (ref 70–99)
GLUCOSE SERPL-MCNC: 123 MG/DL — HIGH (ref 70–99)
GLUCOSE SERPL-MCNC: 124 MG/DL — HIGH (ref 70–99)
GLUCOSE SERPL-MCNC: 150 MG/DL — HIGH (ref 70–99)
GLUCOSE SERPL-MCNC: 89 MG/DL — SIGNIFICANT CHANGE UP (ref 70–99)
GLUCOSE SERPL-MCNC: 92 MG/DL
GLUCOSE SERPL-MCNC: 93 MG/DL
GLUCOSE SERPL-MCNC: 93 MG/DL — SIGNIFICANT CHANGE UP (ref 70–99)
GLUCOSE SERPL-MCNC: 97 MG/DL — SIGNIFICANT CHANGE UP (ref 70–99)
GLUCOSE SERPL-MCNC: 97 MG/DL — SIGNIFICANT CHANGE UP (ref 70–99)
HCO3 BLDA-SCNC: 24 MMOL/L — SIGNIFICANT CHANGE UP (ref 21–28)
HCO3 BLDV-SCNC: 25 MMOL/L — SIGNIFICANT CHANGE UP (ref 22–29)
HCT VFR BLD CALC: 16.3 % — LOW (ref 37–47)
HCT VFR BLD CALC: 20.1 % — LOW (ref 37–47)
HCT VFR BLD CALC: 21.5 % — LOW (ref 37–47)
HCT VFR BLD CALC: 21.6 % — LOW (ref 37–47)
HCT VFR BLD CALC: 22 % — LOW (ref 37–47)
HCT VFR BLD CALC: 22.9 % — LOW (ref 37–47)
HCT VFR BLD CALC: 23.5 % — LOW (ref 37–47)
HCT VFR BLD CALC: 23.6 % — LOW (ref 37–47)
HCT VFR BLD CALC: 23.8 % — LOW (ref 37–47)
HCT VFR BLD CALC: 23.8 % — LOW (ref 37–47)
HCT VFR BLD CALC: 23.9 % — LOW (ref 37–47)
HCT VFR BLD CALC: 24.1 % — LOW (ref 37–47)
HCT VFR BLD CALC: 24.7 % — LOW (ref 37–47)
HCT VFR BLD CALC: 25.4 % — LOW (ref 37–47)
HCT VFR BLD CALC: 25.8 % — LOW (ref 37–47)
HCT VFR BLD CALC: 26.2 % — LOW (ref 37–47)
HCT VFR BLD CALC: 27.1 % — LOW (ref 37–47)
HCT VFR BLD CALC: 27.4 % — LOW (ref 37–47)
HCT VFR BLD CALC: 28 % — LOW (ref 37–47)
HCT VFR BLD CALC: 28.1 % — LOW (ref 37–47)
HCT VFR BLD CALC: 28.9 % — LOW (ref 37–47)
HCT VFR BLD CALC: 35 %
HCT VFR BLD CALC: 35.1 %
HCT VFR BLD CALC: 36.4 %
HCT VFR BLD CALC: 37.3 % — SIGNIFICANT CHANGE UP (ref 37–47)
HCT VFR BLDA CALC: 49 % — HIGH (ref 34.5–46.5)
HGB BLD CALC-MCNC: 16.4 G/DL — HIGH (ref 11.7–16.1)
HGB BLD-MCNC: 10.1 G/DL — LOW (ref 12–16)
HGB BLD-MCNC: 12.1 G/DL
HGB BLD-MCNC: 12.4 G/DL
HGB BLD-MCNC: 12.5 G/DL
HGB BLD-MCNC: 13.2 G/DL — SIGNIFICANT CHANGE UP (ref 12–16)
HGB BLD-MCNC: 5.2 G/DL — CRITICAL LOW (ref 12–16)
HGB BLD-MCNC: 7 G/DL — LOW (ref 12–16)
HGB BLD-MCNC: 7.2 G/DL — LOW (ref 12–16)
HGB BLD-MCNC: 7.2 G/DL — LOW (ref 12–16)
HGB BLD-MCNC: 7.6 G/DL — LOW (ref 12–16)
HGB BLD-MCNC: 7.9 G/DL — LOW (ref 12–16)
HGB BLD-MCNC: 8 G/DL — LOW (ref 12–16)
HGB BLD-MCNC: 8 G/DL — LOW (ref 12–16)
HGB BLD-MCNC: 8.1 G/DL — LOW (ref 12–16)
HGB BLD-MCNC: 8.1 G/DL — LOW (ref 12–16)
HGB BLD-MCNC: 8.2 G/DL — LOW (ref 12–16)
HGB BLD-MCNC: 8.2 G/DL — LOW (ref 12–16)
HGB BLD-MCNC: 8.5 G/DL — LOW (ref 12–16)
HGB BLD-MCNC: 8.7 G/DL — LOW (ref 12–16)
HGB BLD-MCNC: 8.8 G/DL — LOW (ref 12–16)
HGB BLD-MCNC: 9.1 G/DL — LOW (ref 12–16)
HGB BLD-MCNC: 9.2 G/DL — LOW (ref 12–16)
HGB BLD-MCNC: 9.2 G/DL — LOW (ref 12–16)
HGB BLD-MCNC: 9.6 G/DL — LOW (ref 12–16)
HGB BLD-MCNC: 9.6 G/DL — LOW (ref 12–16)
IMM GRANULOCYTES NFR BLD AUTO: 0.5 %
IMM GRANULOCYTES NFR BLD AUTO: 1 %
IMM GRANULOCYTES NFR BLD AUTO: 10.1 % — HIGH (ref 0.1–0.3)
IMM GRANULOCYTES NFR BLD AUTO: 10.2 % — HIGH (ref 0.1–0.3)
IMM GRANULOCYTES NFR BLD AUTO: 10.4 % — HIGH (ref 0.1–0.3)
IMM GRANULOCYTES NFR BLD AUTO: 10.4 % — HIGH (ref 0.1–0.3)
IMM GRANULOCYTES NFR BLD AUTO: 11.2 % — HIGH (ref 0.1–0.3)
IMM GRANULOCYTES NFR BLD AUTO: 11.3 % — HIGH (ref 0.1–0.3)
IMM GRANULOCYTES NFR BLD AUTO: 11.3 % — HIGH (ref 0.1–0.3)
IMM GRANULOCYTES NFR BLD AUTO: 11.5 % — HIGH (ref 0.1–0.3)
IMM GRANULOCYTES NFR BLD AUTO: 12.2 % — HIGH (ref 0.1–0.3)
IMM GRANULOCYTES NFR BLD AUTO: 12.3 % — HIGH (ref 0.1–0.3)
IMM GRANULOCYTES NFR BLD AUTO: 8.1 % — HIGH (ref 0.1–0.3)
IMM GRANULOCYTES NFR BLD AUTO: 9.3 % — HIGH (ref 0.1–0.3)
INR BLD: 1.54 RATIO — HIGH (ref 0.65–1.3)
INR BLD: 2.25 RATIO — HIGH (ref 0.65–1.3)
LACTATE BLDV-MCNC: 3.5 MMOL/L — HIGH (ref 0.5–2)
LACTATE SERPL-SCNC: 10.2 MMOL/L — CRITICAL HIGH (ref 0.7–2)
LACTATE SERPL-SCNC: 3.2 MMOL/L — HIGH (ref 0.7–2)
LACTATE SERPL-SCNC: 3.3 MMOL/L — HIGH (ref 0.7–2)
LACTATE SERPL-SCNC: 5.7 MMOL/L — CRITICAL HIGH (ref 0.7–2)
LG PLATELETS BLD QL AUTO: SLIGHT — SIGNIFICANT CHANGE UP
LYMPHOCYTES # BLD AUTO: 0.25 K/UL — LOW (ref 1.2–3.4)
LYMPHOCYTES # BLD AUTO: 0.31 K/UL — LOW (ref 1.2–3.4)
LYMPHOCYTES # BLD AUTO: 0.45 K/UL — LOW (ref 1.2–3.4)
LYMPHOCYTES # BLD AUTO: 0.55 K/UL — LOW (ref 1.2–3.4)
LYMPHOCYTES # BLD AUTO: 0.6 K/UL — LOW (ref 1.2–3.4)
LYMPHOCYTES # BLD AUTO: 0.63 K/UL — LOW (ref 1.2–3.4)
LYMPHOCYTES # BLD AUTO: 0.64 K/UL — LOW (ref 1.2–3.4)
LYMPHOCYTES # BLD AUTO: 0.64 K/UL — LOW (ref 1.2–3.4)
LYMPHOCYTES # BLD AUTO: 0.66 K/UL — LOW (ref 1.2–3.4)
LYMPHOCYTES # BLD AUTO: 0.67 K/UL — LOW (ref 1.2–3.4)
LYMPHOCYTES # BLD AUTO: 0.68 K/UL — LOW (ref 1.2–3.4)
LYMPHOCYTES # BLD AUTO: 0.73 K/UL
LYMPHOCYTES # BLD AUTO: 0.74 K/UL — LOW (ref 1.2–3.4)
LYMPHOCYTES # BLD AUTO: 0.8 K/UL — LOW (ref 1.2–3.4)
LYMPHOCYTES # BLD AUTO: 0.84 K/UL — LOW (ref 1.2–3.4)
LYMPHOCYTES # BLD AUTO: 0.99 K/UL
LYMPHOCYTES # BLD AUTO: 11.2 % — LOW (ref 20.5–51.1)
LYMPHOCYTES # BLD AUTO: 12.7 % — LOW (ref 20.5–51.1)
LYMPHOCYTES # BLD AUTO: 13 % — LOW (ref 20.5–51.1)
LYMPHOCYTES # BLD AUTO: 13.3 % — LOW (ref 20.5–51.1)
LYMPHOCYTES # BLD AUTO: 13.5 % — LOW (ref 20.5–51.1)
LYMPHOCYTES # BLD AUTO: 13.8 % — LOW (ref 20.5–51.1)
LYMPHOCYTES # BLD AUTO: 14.8 % — LOW (ref 20.5–51.1)
LYMPHOCYTES # BLD AUTO: 15.8 % — LOW (ref 20.5–51.1)
LYMPHOCYTES # BLD AUTO: 16.8 % — LOW (ref 20.5–51.1)
LYMPHOCYTES # BLD AUTO: 19.4 % — LOW (ref 20.5–51.1)
LYMPHOCYTES # BLD AUTO: 22.2 % — SIGNIFICANT CHANGE UP (ref 20.5–51.1)
LYMPHOCYTES # BLD AUTO: 26.3 % — SIGNIFICANT CHANGE UP (ref 20.5–51.1)
LYMPHOCYTES # BLD AUTO: 4.9 % — LOW (ref 20.5–51.1)
LYMPHOCYTES # BLD AUTO: 9.2 % — LOW (ref 20.5–51.1)
LYMPHOCYTES NFR BLD AUTO: 23.5 %
LYMPHOCYTES NFR BLD AUTO: 23.7 %
MAGNESIUM SERPL-MCNC: 1.3 MG/DL — LOW (ref 1.8–2.4)
MAGNESIUM SERPL-MCNC: 1.5 MG/DL — LOW (ref 1.8–2.4)
MAGNESIUM SERPL-MCNC: 1.5 MG/DL — LOW (ref 1.8–2.4)
MAGNESIUM SERPL-MCNC: 1.6 MG/DL — LOW (ref 1.8–2.4)
MAGNESIUM SERPL-MCNC: 1.7 MG/DL — LOW (ref 1.8–2.4)
MAGNESIUM SERPL-MCNC: 1.8 MG/DL — SIGNIFICANT CHANGE UP (ref 1.8–2.4)
MAGNESIUM SERPL-MCNC: 1.9 MG/DL — SIGNIFICANT CHANGE UP (ref 1.8–2.4)
MAGNESIUM SERPL-MCNC: 2 MG/DL — SIGNIFICANT CHANGE UP (ref 1.8–2.4)
MAN DIFF?: NORMAL
MAN DIFF?: NORMAL
MCHC RBC-ENTMCNC: 30.4 PG — SIGNIFICANT CHANGE UP (ref 27–31)
MCHC RBC-ENTMCNC: 30.5 PG — SIGNIFICANT CHANGE UP (ref 27–31)
MCHC RBC-ENTMCNC: 30.5 PG — SIGNIFICANT CHANGE UP (ref 27–31)
MCHC RBC-ENTMCNC: 30.6 PG — SIGNIFICANT CHANGE UP (ref 27–31)
MCHC RBC-ENTMCNC: 30.7 PG — SIGNIFICANT CHANGE UP (ref 27–31)
MCHC RBC-ENTMCNC: 30.9 PG — SIGNIFICANT CHANGE UP (ref 27–31)
MCHC RBC-ENTMCNC: 30.9 PG — SIGNIFICANT CHANGE UP (ref 27–31)
MCHC RBC-ENTMCNC: 31 PG — SIGNIFICANT CHANGE UP (ref 27–31)
MCHC RBC-ENTMCNC: 31.1 PG — HIGH (ref 27–31)
MCHC RBC-ENTMCNC: 31.2 PG — HIGH (ref 27–31)
MCHC RBC-ENTMCNC: 31.2 PG — HIGH (ref 27–31)
MCHC RBC-ENTMCNC: 31.3 PG — HIGH (ref 27–31)
MCHC RBC-ENTMCNC: 31.3 PG — HIGH (ref 27–31)
MCHC RBC-ENTMCNC: 31.4 PG — HIGH (ref 27–31)
MCHC RBC-ENTMCNC: 31.5 PG — HIGH (ref 27–31)
MCHC RBC-ENTMCNC: 31.6 PG — HIGH (ref 27–31)
MCHC RBC-ENTMCNC: 31.9 G/DL — LOW (ref 32–37)
MCHC RBC-ENTMCNC: 33.2 G/DL — SIGNIFICANT CHANGE UP (ref 32–37)
MCHC RBC-ENTMCNC: 33.2 PG
MCHC RBC-ENTMCNC: 33.3 G/DL — SIGNIFICANT CHANGE UP (ref 32–37)
MCHC RBC-ENTMCNC: 33.5 G/DL — SIGNIFICANT CHANGE UP (ref 32–37)
MCHC RBC-ENTMCNC: 33.6 G/DL — SIGNIFICANT CHANGE UP (ref 32–37)
MCHC RBC-ENTMCNC: 33.7 G/DL — SIGNIFICANT CHANGE UP (ref 32–37)
MCHC RBC-ENTMCNC: 33.9 G/DL — SIGNIFICANT CHANGE UP (ref 32–37)
MCHC RBC-ENTMCNC: 34 PG
MCHC RBC-ENTMCNC: 34 PG
MCHC RBC-ENTMCNC: 34.2 G/DL — SIGNIFICANT CHANGE UP (ref 32–37)
MCHC RBC-ENTMCNC: 34.3 G/DL
MCHC RBC-ENTMCNC: 34.3 G/DL — SIGNIFICANT CHANGE UP (ref 32–37)
MCHC RBC-ENTMCNC: 34.4 G/DL — SIGNIFICANT CHANGE UP (ref 32–37)
MCHC RBC-ENTMCNC: 34.5 G/DL — SIGNIFICANT CHANGE UP (ref 32–37)
MCHC RBC-ENTMCNC: 34.6 G/DL
MCHC RBC-ENTMCNC: 34.6 G/DL — SIGNIFICANT CHANGE UP (ref 32–37)
MCHC RBC-ENTMCNC: 34.7 G/DL — SIGNIFICANT CHANGE UP (ref 32–37)
MCHC RBC-ENTMCNC: 34.8 G/DL — SIGNIFICANT CHANGE UP (ref 32–37)
MCHC RBC-ENTMCNC: 34.9 G/DL — SIGNIFICANT CHANGE UP (ref 32–37)
MCHC RBC-ENTMCNC: 35.3 G/DL
MCHC RBC-ENTMCNC: 35.4 G/DL — SIGNIFICANT CHANGE UP (ref 32–37)
MCV RBC AUTO: 88.8 FL — SIGNIFICANT CHANGE UP (ref 81–99)
MCV RBC AUTO: 89.7 FL — SIGNIFICANT CHANGE UP (ref 81–99)
MCV RBC AUTO: 89.8 FL — SIGNIFICANT CHANGE UP (ref 81–99)
MCV RBC AUTO: 90 FL — SIGNIFICANT CHANGE UP (ref 81–99)
MCV RBC AUTO: 90.1 FL — SIGNIFICANT CHANGE UP (ref 81–99)
MCV RBC AUTO: 90.2 FL — SIGNIFICANT CHANGE UP (ref 81–99)
MCV RBC AUTO: 90.3 FL — SIGNIFICANT CHANGE UP (ref 81–99)
MCV RBC AUTO: 90.4 FL — SIGNIFICANT CHANGE UP (ref 81–99)
MCV RBC AUTO: 90.4 FL — SIGNIFICANT CHANGE UP (ref 81–99)
MCV RBC AUTO: 90.5 FL — SIGNIFICANT CHANGE UP (ref 81–99)
MCV RBC AUTO: 90.5 FL — SIGNIFICANT CHANGE UP (ref 81–99)
MCV RBC AUTO: 90.9 FL — SIGNIFICANT CHANGE UP (ref 81–99)
MCV RBC AUTO: 91.5 FL — SIGNIFICANT CHANGE UP (ref 81–99)
MCV RBC AUTO: 91.6 FL — SIGNIFICANT CHANGE UP (ref 81–99)
MCV RBC AUTO: 91.6 FL — SIGNIFICANT CHANGE UP (ref 81–99)
MCV RBC AUTO: 91.8 FL — SIGNIFICANT CHANGE UP (ref 81–99)
MCV RBC AUTO: 91.9 FL — SIGNIFICANT CHANGE UP (ref 81–99)
MCV RBC AUTO: 92 FL — SIGNIFICANT CHANGE UP (ref 81–99)
MCV RBC AUTO: 95.3 FL — SIGNIFICANT CHANGE UP (ref 81–99)
MCV RBC AUTO: 96.2 FL
MCV RBC AUTO: 96.2 FL
MCV RBC AUTO: 98.9 FL
MONOCYTES # BLD AUTO: 0.15 K/UL — SIGNIFICANT CHANGE UP (ref 0.1–0.6)
MONOCYTES # BLD AUTO: 0.19 K/UL
MONOCYTES # BLD AUTO: 0.25 K/UL
MONOCYTES # BLD AUTO: 0.27 K/UL — SIGNIFICANT CHANGE UP (ref 0.1–0.6)
MONOCYTES # BLD AUTO: 0.3 K/UL — SIGNIFICANT CHANGE UP (ref 0.1–0.6)
MONOCYTES # BLD AUTO: 0.32 K/UL — SIGNIFICANT CHANGE UP (ref 0.1–0.6)
MONOCYTES # BLD AUTO: 0.36 K/UL — SIGNIFICANT CHANGE UP (ref 0.1–0.6)
MONOCYTES # BLD AUTO: 0.4 K/UL — SIGNIFICANT CHANGE UP (ref 0.1–0.6)
MONOCYTES # BLD AUTO: 0.52 K/UL — SIGNIFICANT CHANGE UP (ref 0.1–0.6)
MONOCYTES # BLD AUTO: 0.6 K/UL — SIGNIFICANT CHANGE UP (ref 0.1–0.6)
MONOCYTES # BLD AUTO: 0.64 K/UL — HIGH (ref 0.1–0.6)
MONOCYTES # BLD AUTO: 0.68 K/UL — HIGH (ref 0.1–0.6)
MONOCYTES # BLD AUTO: 0.7 K/UL — HIGH (ref 0.1–0.6)
MONOCYTES # BLD AUTO: 0.76 K/UL — HIGH (ref 0.1–0.6)
MONOCYTES # BLD AUTO: 0.76 K/UL — HIGH (ref 0.1–0.6)
MONOCYTES # BLD AUTO: 0.86 K/UL — HIGH (ref 0.1–0.6)
MONOCYTES NFR BLD AUTO: 10.1 % — HIGH (ref 1.7–9.3)
MONOCYTES NFR BLD AUTO: 11.6 % — HIGH (ref 1.7–9.3)
MONOCYTES NFR BLD AUTO: 11.8 % — HIGH (ref 1.7–9.3)
MONOCYTES NFR BLD AUTO: 11.9 % — HIGH (ref 1.7–9.3)
MONOCYTES NFR BLD AUTO: 12 % — HIGH (ref 1.7–9.3)
MONOCYTES NFR BLD AUTO: 12.6 % — HIGH (ref 1.7–9.3)
MONOCYTES NFR BLD AUTO: 15.6 % — HIGH (ref 1.7–9.3)
MONOCYTES NFR BLD AUTO: 17.3 % — HIGH (ref 1.7–9.3)
MONOCYTES NFR BLD AUTO: 17.6 % — HIGH (ref 1.7–9.3)
MONOCYTES NFR BLD AUTO: 4.5 %
MONOCYTES NFR BLD AUTO: 6.9 % — SIGNIFICANT CHANGE UP (ref 1.7–9.3)
MONOCYTES NFR BLD AUTO: 7.9 % — SIGNIFICANT CHANGE UP (ref 1.7–9.3)
MONOCYTES NFR BLD AUTO: 8 %
MONOCYTES NFR BLD AUTO: 9.5 % — HIGH (ref 1.7–9.3)
MONOCYTES NFR BLD AUTO: 9.6 % — HIGH (ref 1.7–9.3)
MONOCYTES NFR BLD AUTO: 9.8 % — HIGH (ref 1.7–9.3)
NEUTROPHILS # BLD AUTO: 1.25 K/UL — LOW (ref 1.4–6.5)
NEUTROPHILS # BLD AUTO: 1.47 K/UL — SIGNIFICANT CHANGE UP (ref 1.4–6.5)
NEUTROPHILS # BLD AUTO: 1.53 K/UL — SIGNIFICANT CHANGE UP (ref 1.4–6.5)
NEUTROPHILS # BLD AUTO: 1.61 K/UL — SIGNIFICANT CHANGE UP (ref 1.4–6.5)
NEUTROPHILS # BLD AUTO: 2.03 K/UL
NEUTROPHILS # BLD AUTO: 2.26 K/UL — SIGNIFICANT CHANGE UP (ref 1.4–6.5)
NEUTROPHILS # BLD AUTO: 2.42 K/UL — SIGNIFICANT CHANGE UP (ref 1.4–6.5)
NEUTROPHILS # BLD AUTO: 2.73 K/UL — SIGNIFICANT CHANGE UP (ref 1.4–6.5)
NEUTROPHILS # BLD AUTO: 2.87 K/UL
NEUTROPHILS # BLD AUTO: 2.98 K/UL — SIGNIFICANT CHANGE UP (ref 1.4–6.5)
NEUTROPHILS # BLD AUTO: 3.15 K/UL — SIGNIFICANT CHANGE UP (ref 1.4–6.5)
NEUTROPHILS # BLD AUTO: 3.22 K/UL — SIGNIFICANT CHANGE UP (ref 1.4–6.5)
NEUTROPHILS # BLD AUTO: 3.39 K/UL — SIGNIFICANT CHANGE UP (ref 1.4–6.5)
NEUTROPHILS # BLD AUTO: 3.48 K/UL — SIGNIFICANT CHANGE UP (ref 1.4–6.5)
NEUTROPHILS # BLD AUTO: 3.72 K/UL — SIGNIFICANT CHANGE UP (ref 1.4–6.5)
NEUTROPHILS # BLD AUTO: 4.2 K/UL — SIGNIFICANT CHANGE UP (ref 1.4–6.5)
NEUTROPHILS NFR BLD AUTO: 48.2 % — SIGNIFICANT CHANGE UP (ref 42.2–75.2)
NEUTROPHILS NFR BLD AUTO: 54.5 % — SIGNIFICANT CHANGE UP (ref 42.2–75.2)
NEUTROPHILS NFR BLD AUTO: 54.8 % — SIGNIFICANT CHANGE UP (ref 42.2–75.2)
NEUTROPHILS NFR BLD AUTO: 56 % — SIGNIFICANT CHANGE UP (ref 42.2–75.2)
NEUTROPHILS NFR BLD AUTO: 56.7 % — SIGNIFICANT CHANGE UP (ref 42.2–75.2)
NEUTROPHILS NFR BLD AUTO: 60.3 % — SIGNIFICANT CHANGE UP (ref 42.2–75.2)
NEUTROPHILS NFR BLD AUTO: 60.5 % — SIGNIFICANT CHANGE UP (ref 42.2–75.2)
NEUTROPHILS NFR BLD AUTO: 60.8 % — SIGNIFICANT CHANGE UP (ref 42.2–75.2)
NEUTROPHILS NFR BLD AUTO: 61.3 % — SIGNIFICANT CHANGE UP (ref 42.2–75.2)
NEUTROPHILS NFR BLD AUTO: 61.8 % — SIGNIFICANT CHANGE UP (ref 42.2–75.2)
NEUTROPHILS NFR BLD AUTO: 64.7 % — SIGNIFICANT CHANGE UP (ref 42.2–75.2)
NEUTROPHILS NFR BLD AUTO: 65.3 %
NEUTROPHILS NFR BLD AUTO: 65.3 % — SIGNIFICANT CHANGE UP (ref 42.2–75.2)
NEUTROPHILS NFR BLD AUTO: 66.4 % — SIGNIFICANT CHANGE UP (ref 42.2–75.2)
NEUTROPHILS NFR BLD AUTO: 68.6 %
NEUTROPHILS NFR BLD AUTO: 69 % — SIGNIFICANT CHANGE UP (ref 42.2–75.2)
NRBC # BLD: 14 /100 WBCS — HIGH (ref 0–0)
NRBC # BLD: 19 /100 WBCS — HIGH (ref 0–0)
NRBC # BLD: 19 /100 WBCS — HIGH (ref 0–0)
NRBC # BLD: 21 /100 WBCS — HIGH (ref 0–0)
NRBC # BLD: 24 /100 WBCS — HIGH (ref 0–0)
NRBC # BLD: 26 /100 WBCS — HIGH (ref 0–0)
NRBC # BLD: 3 /100 WBCS — HIGH (ref 0–0)
NRBC # BLD: 30 /100 WBCS — HIGH (ref 0–0)
NRBC # BLD: 32 /100 WBCS — HIGH (ref 0–0)
NRBC # BLD: 33 /100 WBCS — HIGH (ref 0–0)
NRBC # BLD: 35 /100 WBCS — HIGH (ref 0–0)
NRBC # BLD: 5 /100 WBCS — HIGH (ref 0–0)
NRBC # BLD: 6 /100 WBCS — HIGH (ref 0–0)
NRBC # BLD: 6 /100 WBCS — HIGH (ref 0–0)
NRBC # BLD: 8 /100 WBCS — HIGH (ref 0–0)
NRBC # BLD: 9 /100 WBCS — HIGH (ref 0–0)
NRBC # BLD: SIGNIFICANT CHANGE UP /100 WBCS (ref 0–0)
NRBC # BLD: SIGNIFICANT CHANGE UP /100 WBCS (ref 0–0)
NT-PROBNP SERPL-SCNC: 168 PG/ML — SIGNIFICANT CHANGE UP (ref 0–300)
OVALOCYTES BLD QL SMEAR: SLIGHT — SIGNIFICANT CHANGE UP
PCO2 BLDA: 33 MMHG — SIGNIFICANT CHANGE UP (ref 32–45)
PCO2 BLDV: 34 MMHG — LOW (ref 39–42)
PH BLDA: 7.46 — HIGH (ref 7.35–7.45)
PH BLDV: 7.48 — HIGH (ref 7.32–7.43)
PHOSPHATE SERPL-MCNC: 1.4 MG/DL — LOW (ref 2.1–4.9)
PHOSPHATE SERPL-MCNC: 1.5 MG/DL — LOW (ref 2.1–4.9)
PHOSPHATE SERPL-MCNC: 2.2 MG/DL — SIGNIFICANT CHANGE UP (ref 2.1–4.9)
PLAT MORPH BLD: NORMAL — SIGNIFICANT CHANGE UP
PLATELET # BLD AUTO: 12 K/UL — CRITICAL LOW (ref 130–400)
PLATELET # BLD AUTO: 12 K/UL — CRITICAL LOW (ref 130–400)
PLATELET # BLD AUTO: 123 K/UL — LOW (ref 130–400)
PLATELET # BLD AUTO: 13 K/UL — CRITICAL LOW (ref 130–400)
PLATELET # BLD AUTO: 137 K/UL
PLATELET # BLD AUTO: 14 K/UL — CRITICAL LOW (ref 130–400)
PLATELET # BLD AUTO: 16 K/UL — CRITICAL LOW (ref 130–400)
PLATELET # BLD AUTO: 169 K/UL
PLATELET # BLD AUTO: 17 K/UL — CRITICAL LOW (ref 130–400)
PLATELET # BLD AUTO: 17 K/UL — CRITICAL LOW (ref 130–400)
PLATELET # BLD AUTO: 18 K/UL — CRITICAL LOW (ref 130–400)
PLATELET # BLD AUTO: 18 K/UL — CRITICAL LOW (ref 130–400)
PLATELET # BLD AUTO: 197 K/UL
PLATELET # BLD AUTO: 21 K/UL — LOW (ref 130–400)
PLATELET # BLD AUTO: 22 K/UL — LOW (ref 130–400)
PLATELET # BLD AUTO: 23 K/UL — LOW (ref 130–400)
PLATELET # BLD AUTO: 25 K/UL — LOW (ref 130–400)
PLATELET # BLD AUTO: 29 K/UL — LOW (ref 130–400)
PLATELET # BLD AUTO: 30 K/UL — LOW (ref 130–400)
PLATELET # BLD AUTO: 39 K/UL — LOW (ref 130–400)
PLATELET # BLD AUTO: 42 K/UL — LOW (ref 130–400)
PLATELET # BLD AUTO: 45 K/UL — LOW (ref 130–400)
PLATELET # BLD AUTO: 8 K/UL — CRITICAL LOW (ref 130–400)
PLATELET COUNT - ESTIMATE: ABNORMAL
PMV BLD AUTO: 0 /100 WBCS
PMV BLD AUTO: 0 /100 WBCS
PMV BLD: 10.1 FL
PMV BLD: 11 FL — HIGH (ref 7.4–10.4)
PMV BLD: 12 FL — HIGH (ref 7.4–10.4)
PMV BLD: 8.5 FL — SIGNIFICANT CHANGE UP (ref 7.4–10.4)
PMV BLD: 9.5 FL — SIGNIFICANT CHANGE UP (ref 7.4–10.4)
PMV BLD: SIGNIFICANT CHANGE UP (ref 7.4–10.4)
PO2 BLDA: 112 MMHG — HIGH (ref 83–108)
PO2 BLDV: 42 MMHG — SIGNIFICANT CHANGE UP (ref 25–45)
POLYCHROMASIA BLD QL SMEAR: SLIGHT — SIGNIFICANT CHANGE UP
POTASSIUM BLDV-SCNC: 3 MMOL/L — LOW (ref 3.5–5.1)
POTASSIUM SERPL-MCNC: 2.6 MMOL/L — CRITICAL LOW (ref 3.5–5)
POTASSIUM SERPL-MCNC: 3.4 MMOL/L — LOW (ref 3.5–5)
POTASSIUM SERPL-MCNC: 3.5 MMOL/L — SIGNIFICANT CHANGE UP (ref 3.5–5)
POTASSIUM SERPL-MCNC: 3.5 MMOL/L — SIGNIFICANT CHANGE UP (ref 3.5–5)
POTASSIUM SERPL-MCNC: 3.6 MMOL/L — SIGNIFICANT CHANGE UP (ref 3.5–5)
POTASSIUM SERPL-MCNC: 3.6 MMOL/L — SIGNIFICANT CHANGE UP (ref 3.5–5)
POTASSIUM SERPL-MCNC: 3.7 MMOL/L — SIGNIFICANT CHANGE UP (ref 3.5–5)
POTASSIUM SERPL-MCNC: 3.8 MMOL/L — SIGNIFICANT CHANGE UP (ref 3.5–5)
POTASSIUM SERPL-MCNC: 3.9 MMOL/L — SIGNIFICANT CHANGE UP (ref 3.5–5)
POTASSIUM SERPL-MCNC: 4 MMOL/L — SIGNIFICANT CHANGE UP (ref 3.5–5)
POTASSIUM SERPL-MCNC: 4.2 MMOL/L — SIGNIFICANT CHANGE UP (ref 3.5–5)
POTASSIUM SERPL-MCNC: 4.3 MMOL/L — SIGNIFICANT CHANGE UP (ref 3.5–5)
POTASSIUM SERPL-MCNC: 4.4 MMOL/L — SIGNIFICANT CHANGE UP (ref 3.5–5)
POTASSIUM SERPL-MCNC: 4.6 MMOL/L — SIGNIFICANT CHANGE UP (ref 3.5–5)
POTASSIUM SERPL-SCNC: 2.6 MMOL/L — CRITICAL LOW (ref 3.5–5)
POTASSIUM SERPL-SCNC: 3.4 MMOL/L — LOW (ref 3.5–5)
POTASSIUM SERPL-SCNC: 3.5 MMOL/L — SIGNIFICANT CHANGE UP (ref 3.5–5)
POTASSIUM SERPL-SCNC: 3.5 MMOL/L — SIGNIFICANT CHANGE UP (ref 3.5–5)
POTASSIUM SERPL-SCNC: 3.6 MMOL/L — SIGNIFICANT CHANGE UP (ref 3.5–5)
POTASSIUM SERPL-SCNC: 3.6 MMOL/L — SIGNIFICANT CHANGE UP (ref 3.5–5)
POTASSIUM SERPL-SCNC: 3.7 MMOL/L — SIGNIFICANT CHANGE UP (ref 3.5–5)
POTASSIUM SERPL-SCNC: 3.8 MMOL/L — SIGNIFICANT CHANGE UP (ref 3.5–5)
POTASSIUM SERPL-SCNC: 3.9 MMOL/L — SIGNIFICANT CHANGE UP (ref 3.5–5)
POTASSIUM SERPL-SCNC: 4 MMOL/L — SIGNIFICANT CHANGE UP (ref 3.5–5)
POTASSIUM SERPL-SCNC: 4.2 MMOL/L — SIGNIFICANT CHANGE UP (ref 3.5–5)
POTASSIUM SERPL-SCNC: 4.3 MMOL/L — SIGNIFICANT CHANGE UP (ref 3.5–5)
POTASSIUM SERPL-SCNC: 4.4 MMOL/L
POTASSIUM SERPL-SCNC: 4.4 MMOL/L — SIGNIFICANT CHANGE UP (ref 3.5–5)
POTASSIUM SERPL-SCNC: 4.6 MMOL/L
POTASSIUM SERPL-SCNC: 4.6 MMOL/L — SIGNIFICANT CHANGE UP (ref 3.5–5)
PROT SERPL-MCNC: 4.1 G/DL — LOW (ref 6–8)
PROT SERPL-MCNC: 5.1 G/DL — LOW (ref 6–8)
PROT SERPL-MCNC: 5.1 G/DL — LOW (ref 6–8)
PROT SERPL-MCNC: 5.2 G/DL — LOW (ref 6–8)
PROT SERPL-MCNC: 5.3 G/DL — LOW (ref 6–8)
PROT SERPL-MCNC: 5.4 G/DL — LOW (ref 6–8)
PROT SERPL-MCNC: 5.5 G/DL — LOW (ref 6–8)
PROT SERPL-MCNC: 5.6 G/DL — LOW (ref 6–8)
PROT SERPL-MCNC: 5.7 G/DL — LOW (ref 6–8)
PROT SERPL-MCNC: 5.8 G/DL — LOW (ref 6–8)
PROT SERPL-MCNC: 6.7 G/DL — SIGNIFICANT CHANGE UP (ref 6–8)
PROT SERPL-MCNC: 7.4 G/DL
PROT SERPL-MCNC: 7.5 G/DL
PROTHROM AB SERPL-ACNC: 17.6 SEC — HIGH (ref 9.95–12.87)
PROTHROM AB SERPL-ACNC: 25.9 SEC — HIGH (ref 9.95–12.87)
PTH-INTACT FLD-MCNC: 6 PG/ML — LOW (ref 15–65)
RBC # BLD: 1.71 M/UL — LOW (ref 4.2–5.4)
RBC # BLD: 2.24 M/UL — LOW (ref 4.2–5.4)
RBC # BLD: 2.35 M/UL — LOW (ref 4.2–5.4)
RBC # BLD: 2.36 M/UL — LOW (ref 4.2–5.4)
RBC # BLD: 2.44 M/UL — LOW (ref 4.2–5.4)
RBC # BLD: 2.55 M/UL — LOW (ref 4.2–5.4)
RBC # BLD: 2.56 M/UL — LOW (ref 4.2–5.4)
RBC # BLD: 2.61 M/UL — LOW (ref 4.2–5.4)
RBC # BLD: 2.61 M/UL — LOW (ref 4.2–5.4)
RBC # BLD: 2.62 M/UL — LOW (ref 4.2–5.4)
RBC # BLD: 2.63 M/UL — LOW (ref 4.2–5.4)
RBC # BLD: 2.63 M/UL — LOW (ref 4.2–5.4)
RBC # BLD: 2.74 M/UL — LOW (ref 4.2–5.4)
RBC # BLD: 2.81 M/UL — LOW (ref 4.2–5.4)
RBC # BLD: 2.82 M/UL — LOW (ref 4.2–5.4)
RBC # BLD: 2.9 M/UL — LOW (ref 4.2–5.4)
RBC # BLD: 2.96 M/UL — LOW (ref 4.2–5.4)
RBC # BLD: 2.98 M/UL — LOW (ref 4.2–5.4)
RBC # BLD: 3.06 M/UL — LOW (ref 4.2–5.4)
RBC # BLD: 3.09 M/UL — LOW (ref 4.2–5.4)
RBC # BLD: 3.21 M/UL — LOW (ref 4.2–5.4)
RBC # BLD: 3.64 M/UL
RBC # BLD: 3.65 M/UL
RBC # BLD: 3.68 M/UL
RBC # BLD: 4.2 M/UL — SIGNIFICANT CHANGE UP (ref 4.2–5.4)
RBC # FLD: 13.8 % — SIGNIFICANT CHANGE UP (ref 11.5–14.5)
RBC # FLD: 13.9 % — SIGNIFICANT CHANGE UP (ref 11.5–14.5)
RBC # FLD: 14 %
RBC # FLD: 14.4 %
RBC # FLD: 14.6 % — HIGH (ref 11.5–14.5)
RBC # FLD: 14.6 % — HIGH (ref 11.5–14.5)
RBC # FLD: 14.7 % — HIGH (ref 11.5–14.5)
RBC # FLD: 14.8 % — HIGH (ref 11.5–14.5)
RBC # FLD: 14.8 % — HIGH (ref 11.5–14.5)
RBC # FLD: 15 % — HIGH (ref 11.5–14.5)
RBC # FLD: 15.2 % — HIGH (ref 11.5–14.5)
RBC # FLD: 15.3 % — HIGH (ref 11.5–14.5)
RBC # FLD: 15.4 % — HIGH (ref 11.5–14.5)
RBC # FLD: 15.4 % — HIGH (ref 11.5–14.5)
RBC # FLD: 15.5 % — HIGH (ref 11.5–14.5)
RBC # FLD: 15.6 %
RBC # FLD: 15.8 % — HIGH (ref 11.5–14.5)
RBC # FLD: 15.9 % — HIGH (ref 11.5–14.5)
RBC # FLD: 15.9 % — HIGH (ref 11.5–14.5)
RBC # FLD: 16 % — HIGH (ref 11.5–14.5)
RBC # FLD: 16.1 % — HIGH (ref 11.5–14.5)
RBC # FLD: 16.3 % — HIGH (ref 11.5–14.5)
RBC # FLD: 16.8 % — HIGH (ref 11.5–14.5)
RBC BLD AUTO: NORMAL — SIGNIFICANT CHANGE UP
SAO2 % BLDA: 98.8 % — HIGH (ref 94–98)
SAO2 % BLDV: 73 % — SIGNIFICANT CHANGE UP (ref 67–88)
SODIUM SERPL-SCNC: 129 MMOL/L — LOW (ref 135–146)
SODIUM SERPL-SCNC: 132 MMOL/L — LOW (ref 135–146)
SODIUM SERPL-SCNC: 134 MMOL/L — LOW (ref 135–146)
SODIUM SERPL-SCNC: 134 MMOL/L — LOW (ref 135–146)
SODIUM SERPL-SCNC: 135 MMOL/L — SIGNIFICANT CHANGE UP (ref 135–146)
SODIUM SERPL-SCNC: 136 MMOL/L — SIGNIFICANT CHANGE UP (ref 135–146)
SODIUM SERPL-SCNC: 136 MMOL/L — SIGNIFICANT CHANGE UP (ref 135–146)
SODIUM SERPL-SCNC: 137 MMOL/L — SIGNIFICANT CHANGE UP (ref 135–146)
SODIUM SERPL-SCNC: 137 MMOL/L — SIGNIFICANT CHANGE UP (ref 135–146)
SODIUM SERPL-SCNC: 138 MMOL/L — SIGNIFICANT CHANGE UP (ref 135–146)
SODIUM SERPL-SCNC: 139 MMOL/L — SIGNIFICANT CHANGE UP (ref 135–146)
SODIUM SERPL-SCNC: 140 MMOL/L
SODIUM SERPL-SCNC: 141 MMOL/L — SIGNIFICANT CHANGE UP (ref 135–146)
SODIUM SERPL-SCNC: 143 MMOL/L
SPHEROCYTES BLD QL SMEAR: SLIGHT — SIGNIFICANT CHANGE UP
T4 FREE+ TSH PNL SERPL: 2.15 UIU/ML — SIGNIFICANT CHANGE UP (ref 0.27–4.2)
TROPONIN T, HIGH SENSITIVITY RESULT: 13 NG/L — SIGNIFICANT CHANGE UP (ref 6–13)
TSH SERPL-MCNC: 2.58 UIU/ML — SIGNIFICANT CHANGE UP (ref 0.27–4.2)
WBC # BLD: 2.16 K/UL — LOW (ref 4.8–10.8)
WBC # BLD: 2.22 K/UL — LOW (ref 4.8–10.8)
WBC # BLD: 2.29 K/UL — LOW (ref 4.8–10.8)
WBC # BLD: 2.59 K/UL — LOW (ref 4.8–10.8)
WBC # BLD: 2.67 K/UL — LOW (ref 4.8–10.8)
WBC # BLD: 2.7 K/UL — LOW (ref 4.8–10.8)
WBC # BLD: 2.84 K/UL — LOW (ref 4.8–10.8)
WBC # BLD: 3.56 K/UL — LOW (ref 4.8–10.8)
WBC # BLD: 3.74 K/UL — LOW (ref 4.8–10.8)
WBC # BLD: 4.32 K/UL — LOW (ref 4.8–10.8)
WBC # BLD: 4.35 K/UL — LOW (ref 4.8–10.8)
WBC # BLD: 4.79 K/UL — LOW (ref 4.8–10.8)
WBC # BLD: 4.87 K/UL — SIGNIFICANT CHANGE UP (ref 4.8–10.8)
WBC # BLD: 4.93 K/UL — SIGNIFICANT CHANGE UP (ref 4.8–10.8)
WBC # BLD: 4.98 K/UL — SIGNIFICANT CHANGE UP (ref 4.8–10.8)
WBC # BLD: 5 K/UL — SIGNIFICANT CHANGE UP (ref 4.8–10.8)
WBC # BLD: 5.09 K/UL — SIGNIFICANT CHANGE UP (ref 4.8–10.8)
WBC # BLD: 5.33 K/UL — SIGNIFICANT CHANGE UP (ref 4.8–10.8)
WBC # BLD: 5.57 K/UL — SIGNIFICANT CHANGE UP (ref 4.8–10.8)
WBC # BLD: 5.69 K/UL — SIGNIFICANT CHANGE UP (ref 4.8–10.8)
WBC # BLD: 6.32 K/UL — SIGNIFICANT CHANGE UP (ref 4.8–10.8)
WBC # BLD: 6.47 K/UL — SIGNIFICANT CHANGE UP (ref 4.8–10.8)
WBC # FLD AUTO: 2.16 K/UL — LOW (ref 4.8–10.8)
WBC # FLD AUTO: 2.22 K/UL — LOW (ref 4.8–10.8)
WBC # FLD AUTO: 2.29 K/UL — LOW (ref 4.8–10.8)
WBC # FLD AUTO: 2.59 K/UL — LOW (ref 4.8–10.8)
WBC # FLD AUTO: 2.67 K/UL — LOW (ref 4.8–10.8)
WBC # FLD AUTO: 2.7 K/UL — LOW (ref 4.8–10.8)
WBC # FLD AUTO: 2.84 K/UL — LOW (ref 4.8–10.8)
WBC # FLD AUTO: 3.11 K/UL
WBC # FLD AUTO: 3.56 K/UL — LOW (ref 4.8–10.8)
WBC # FLD AUTO: 3.74 K/UL — LOW (ref 4.8–10.8)
WBC # FLD AUTO: 4.12 K/UL
WBC # FLD AUTO: 4.18 K/UL
WBC # FLD AUTO: 4.32 K/UL — LOW (ref 4.8–10.8)
WBC # FLD AUTO: 4.35 K/UL — LOW (ref 4.8–10.8)
WBC # FLD AUTO: 4.79 K/UL — LOW (ref 4.8–10.8)
WBC # FLD AUTO: 4.87 K/UL — SIGNIFICANT CHANGE UP (ref 4.8–10.8)
WBC # FLD AUTO: 4.93 K/UL — SIGNIFICANT CHANGE UP (ref 4.8–10.8)
WBC # FLD AUTO: 4.98 K/UL — SIGNIFICANT CHANGE UP (ref 4.8–10.8)
WBC # FLD AUTO: 5 K/UL — SIGNIFICANT CHANGE UP (ref 4.8–10.8)
WBC # FLD AUTO: 5.09 K/UL — SIGNIFICANT CHANGE UP (ref 4.8–10.8)
WBC # FLD AUTO: 5.33 K/UL — SIGNIFICANT CHANGE UP (ref 4.8–10.8)
WBC # FLD AUTO: 5.57 K/UL — SIGNIFICANT CHANGE UP (ref 4.8–10.8)
WBC # FLD AUTO: 5.69 K/UL — SIGNIFICANT CHANGE UP (ref 4.8–10.8)
WBC # FLD AUTO: 6.32 K/UL — SIGNIFICANT CHANGE UP (ref 4.8–10.8)
WBC # FLD AUTO: 6.47 K/UL — SIGNIFICANT CHANGE UP (ref 4.8–10.8)

## 2024-01-01 PROCEDURE — 82330 ASSAY OF CALCIUM: CPT

## 2024-01-01 PROCEDURE — 85027 COMPLETE CBC AUTOMATED: CPT

## 2024-01-01 PROCEDURE — 72158 MRI LUMBAR SPINE W/O & W/DYE: CPT | Mod: 26

## 2024-01-01 PROCEDURE — 77263 THER RADIOLOGY TX PLNG CPLX: CPT

## 2024-01-01 PROCEDURE — 84443 ASSAY THYROID STIM HORMONE: CPT

## 2024-01-01 PROCEDURE — 99233 SBSQ HOSP IP/OBS HIGH 50: CPT | Mod: 25

## 2024-01-01 PROCEDURE — 85018 HEMOGLOBIN: CPT

## 2024-01-01 PROCEDURE — 86850 RBC ANTIBODY SCREEN: CPT

## 2024-01-01 PROCEDURE — 99232 SBSQ HOSP IP/OBS MODERATE 35: CPT

## 2024-01-01 PROCEDURE — 82248 BILIRUBIN DIRECT: CPT

## 2024-01-01 PROCEDURE — 99233 SBSQ HOSP IP/OBS HIGH 50: CPT

## 2024-01-01 PROCEDURE — 84132 ASSAY OF SERUM POTASSIUM: CPT

## 2024-01-01 PROCEDURE — 99221 1ST HOSP IP/OBS SF/LOW 40: CPT

## 2024-01-01 PROCEDURE — 85384 FIBRINOGEN ACTIVITY: CPT

## 2024-01-01 PROCEDURE — 99213 OFFICE O/P EST LOW 20 MIN: CPT

## 2024-01-01 PROCEDURE — 36430 TRANSFUSION BLD/BLD COMPNT: CPT

## 2024-01-01 PROCEDURE — 85014 HEMATOCRIT: CPT

## 2024-01-01 PROCEDURE — 99223 1ST HOSP IP/OBS HIGH 75: CPT

## 2024-01-01 PROCEDURE — 36415 COLL VENOUS BLD VENIPUNCTURE: CPT

## 2024-01-01 PROCEDURE — 74177 CT ABD & PELVIS W/CONTRAST: CPT | Mod: 26

## 2024-01-01 PROCEDURE — 74177 CT ABD & PELVIS W/CONTRAST: CPT | Mod: MC

## 2024-01-01 PROCEDURE — 84295 ASSAY OF SERUM SODIUM: CPT

## 2024-01-01 PROCEDURE — 77290 THER RAD SIMULAJ FIELD CPLX: CPT

## 2024-01-01 PROCEDURE — 85025 COMPLETE CBC W/AUTO DIFF WBC: CPT

## 2024-01-01 PROCEDURE — 99232 SBSQ HOSP IP/OBS MODERATE 35: CPT | Mod: 25

## 2024-01-01 PROCEDURE — 85730 THROMBOPLASTIN TIME PARTIAL: CPT

## 2024-01-01 PROCEDURE — 74018 RADEX ABDOMEN 1 VIEW: CPT | Mod: 26

## 2024-01-01 PROCEDURE — 77307 TELETHX ISODOSE PLAN CPLX: CPT

## 2024-01-01 PROCEDURE — 70450 CT HEAD/BRAIN W/O DYE: CPT | Mod: 26,MC

## 2024-01-01 PROCEDURE — 99497 ADVNCD CARE PLAN 30 MIN: CPT | Mod: 25

## 2024-01-01 PROCEDURE — 72158 MRI LUMBAR SPINE W/O & W/DYE: CPT | Mod: MC

## 2024-01-01 PROCEDURE — 71045 X-RAY EXAM CHEST 1 VIEW: CPT | Mod: 26

## 2024-01-01 PROCEDURE — 71275 CT ANGIOGRAPHY CHEST: CPT | Mod: 26

## 2024-01-01 PROCEDURE — 82550 ASSAY OF CK (CPK): CPT

## 2024-01-01 PROCEDURE — 77307 TELETHX ISODOSE PLAN CPLX: CPT | Mod: 26

## 2024-01-01 PROCEDURE — 99222 1ST HOSP IP/OBS MODERATE 55: CPT

## 2024-01-01 PROCEDURE — 77290 THER RAD SIMULAJ FIELD CPLX: CPT | Mod: 26

## 2024-01-01 PROCEDURE — 99213 OFFICE O/P EST LOW 20 MIN: CPT | Mod: 25

## 2024-01-01 PROCEDURE — 71275 CT ANGIOGRAPHY CHEST: CPT | Mod: 26,MC

## 2024-01-01 PROCEDURE — 77334 RADIATION TREATMENT AID(S): CPT | Mod: 26

## 2024-01-01 PROCEDURE — 77470 SPECIAL RADIATION TREATMENT: CPT | Mod: 26

## 2024-01-01 PROCEDURE — 86300 IMMUNOASSAY TUMOR CA 15-3: CPT

## 2024-01-01 PROCEDURE — 71045 X-RAY EXAM CHEST 1 VIEW: CPT

## 2024-01-01 PROCEDURE — 72156 MRI NECK SPINE W/O & W/DYE: CPT | Mod: 26

## 2024-01-01 PROCEDURE — 99291 CRITICAL CARE FIRST HOUR: CPT

## 2024-01-01 PROCEDURE — 83970 ASSAY OF PARATHORMONE: CPT

## 2024-01-01 PROCEDURE — 80053 COMPREHEN METABOLIC PANEL: CPT

## 2024-01-01 PROCEDURE — 96365 THER/PROPH/DIAG IV INF INIT: CPT

## 2024-01-01 PROCEDURE — 93010 ELECTROCARDIOGRAM REPORT: CPT

## 2024-01-01 PROCEDURE — 93970 EXTREMITY STUDY: CPT

## 2024-01-01 PROCEDURE — 83605 ASSAY OF LACTIC ACID: CPT

## 2024-01-01 PROCEDURE — 92610 EVALUATE SWALLOWING FUNCTION: CPT | Mod: GN

## 2024-01-01 PROCEDURE — P9037: CPT

## 2024-01-01 PROCEDURE — 85610 PROTHROMBIN TIME: CPT

## 2024-01-01 PROCEDURE — 82553 CREATINE MB FRACTION: CPT

## 2024-01-01 PROCEDURE — 83880 ASSAY OF NATRIURETIC PEPTIDE: CPT

## 2024-01-01 PROCEDURE — 86900 BLOOD TYPING SEROLOGIC ABO: CPT

## 2024-01-01 PROCEDURE — 85379 FIBRIN DEGRADATION QUANT: CPT

## 2024-01-01 PROCEDURE — 71045 X-RAY EXAM CHEST 1 VIEW: CPT | Mod: 26,77

## 2024-01-01 PROCEDURE — 82962 GLUCOSE BLOOD TEST: CPT

## 2024-01-01 PROCEDURE — 99498 ADVNCD CARE PLAN ADDL 30 MIN: CPT

## 2024-01-01 PROCEDURE — 74018 RADEX ABDOMEN 1 VIEW: CPT

## 2024-01-01 PROCEDURE — 99285 EMERGENCY DEPT VISIT HI MDM: CPT

## 2024-01-01 PROCEDURE — 87040 BLOOD CULTURE FOR BACTERIA: CPT

## 2024-01-01 PROCEDURE — 83735 ASSAY OF MAGNESIUM: CPT

## 2024-01-01 PROCEDURE — 70450 CT HEAD/BRAIN W/O DYE: CPT | Mod: MC

## 2024-01-01 PROCEDURE — 84484 ASSAY OF TROPONIN QUANT: CPT

## 2024-01-01 PROCEDURE — 93970 EXTREMITY STUDY: CPT | Mod: 26

## 2024-01-01 PROCEDURE — 72131 CT LUMBAR SPINE W/O DYE: CPT | Mod: 26,MC

## 2024-01-01 PROCEDURE — 82310 ASSAY OF CALCIUM: CPT

## 2024-01-01 PROCEDURE — 70450 CT HEAD/BRAIN W/O DYE: CPT | Mod: 26

## 2024-01-01 PROCEDURE — P9100: CPT

## 2024-01-01 PROCEDURE — 72157 MRI CHEST SPINE W/O & W/DYE: CPT | Mod: 26

## 2024-01-01 PROCEDURE — P9073: CPT

## 2024-01-01 PROCEDURE — 99497 ADVNCD CARE PLAN 30 MIN: CPT

## 2024-01-01 PROCEDURE — 77470 SPECIAL RADIATION TREATMENT: CPT

## 2024-01-01 PROCEDURE — 72156 MRI NECK SPINE W/O & W/DYE: CPT | Mod: MC

## 2024-01-01 PROCEDURE — 82803 BLOOD GASES ANY COMBINATION: CPT

## 2024-01-01 PROCEDURE — A9579: CPT

## 2024-01-01 PROCEDURE — 77334 RADIATION TREATMENT AID(S): CPT

## 2024-01-01 PROCEDURE — 72128 CT CHEST SPINE W/O DYE: CPT | Mod: 26,MC

## 2024-01-01 PROCEDURE — 93005 ELECTROCARDIOGRAM TRACING: CPT

## 2024-01-01 PROCEDURE — 71275 CT ANGIOGRAPHY CHEST: CPT | Mod: MC

## 2024-01-01 PROCEDURE — 72157 MRI CHEST SPINE W/O & W/DYE: CPT | Mod: MC

## 2024-01-01 PROCEDURE — 84100 ASSAY OF PHOSPHORUS: CPT

## 2024-01-01 PROCEDURE — 86901 BLOOD TYPING SEROLOGIC RH(D): CPT

## 2024-01-01 RX ORDER — MORPHINE SULFATE 50 MG/1
2 CAPSULE, EXTENDED RELEASE ORAL
Qty: 100 | Refills: 0 | Status: DISCONTINUED | OUTPATIENT
Start: 2024-01-01 | End: 2024-01-01

## 2024-01-01 RX ORDER — METRONIDAZOLE 500 MG
TABLET ORAL
Refills: 0 | Status: DISCONTINUED | OUTPATIENT
Start: 2024-01-01 | End: 2024-01-01

## 2024-01-01 RX ORDER — MAGNESIUM SULFATE 500 MG/ML
2 VIAL (ML) INJECTION
Refills: 0 | Status: COMPLETED | OUTPATIENT
Start: 2024-01-01 | End: 2024-01-01

## 2024-01-01 RX ORDER — ROBINUL 0.2 MG/ML
0.2 INJECTION INTRAMUSCULAR; INTRAVENOUS EVERY 6 HOURS
Refills: 0 | Status: DISCONTINUED | OUTPATIENT
Start: 2024-01-01 | End: 2024-01-01

## 2024-01-01 RX ORDER — SODIUM CHLORIDE 9 MG/ML
1000 INJECTION, SOLUTION INTRAVENOUS ONCE
Refills: 0 | Status: COMPLETED | OUTPATIENT
Start: 2024-01-01 | End: 2024-01-01

## 2024-01-01 RX ORDER — SODIUM CHLORIDE 9 MG/ML
1000 INJECTION INTRAMUSCULAR; INTRAVENOUS; SUBCUTANEOUS ONCE
Refills: 0 | Status: COMPLETED | OUTPATIENT
Start: 2024-01-01 | End: 2024-01-01

## 2024-01-01 RX ORDER — MAGNESIUM SULFATE 500 MG/ML
2 VIAL (ML) INJECTION ONCE
Refills: 0 | Status: COMPLETED | OUTPATIENT
Start: 2024-01-01 | End: 2024-01-01

## 2024-01-01 RX ORDER — AMPICILLIN SODIUM AND SULBACTAM SODIUM 250; 125 MG/ML; MG/ML
3 INJECTION, POWDER, FOR SUSPENSION INTRAMUSCULAR; INTRAVENOUS EVERY 6 HOURS
Refills: 0 | Status: DISCONTINUED | OUTPATIENT
Start: 2024-01-01 | End: 2024-01-01

## 2024-01-01 RX ORDER — DEXAMETHASONE 0.5 MG/5ML
1 ELIXIR ORAL
Refills: 0 | Status: DISCONTINUED | OUTPATIENT
Start: 2024-01-01 | End: 2024-01-01

## 2024-01-01 RX ORDER — SODIUM CHLORIDE 9 MG/ML
500 INJECTION INTRAMUSCULAR; INTRAVENOUS; SUBCUTANEOUS ONCE
Refills: 0 | Status: COMPLETED | OUTPATIENT
Start: 2024-01-01 | End: 2024-01-01

## 2024-01-01 RX ORDER — MORPHINE SULFATE 50 MG/1
4 CAPSULE, EXTENDED RELEASE ORAL ONCE
Refills: 0 | Status: DISCONTINUED | OUTPATIENT
Start: 2024-01-01 | End: 2024-01-01

## 2024-01-01 RX ORDER — CEFEPIME 1 G/1
INJECTION, POWDER, FOR SOLUTION INTRAMUSCULAR; INTRAVENOUS
Refills: 0 | Status: DISCONTINUED | OUTPATIENT
Start: 2024-01-01 | End: 2024-01-01

## 2024-01-01 RX ORDER — LEVOTHYROXINE SODIUM 125 MCG
50 TABLET ORAL
Refills: 0 | Status: DISCONTINUED | OUTPATIENT
Start: 2024-01-01 | End: 2024-01-01

## 2024-01-01 RX ORDER — AMPICILLIN SODIUM AND SULBACTAM SODIUM 250; 125 MG/ML; MG/ML
3 INJECTION, POWDER, FOR SUSPENSION INTRAMUSCULAR; INTRAVENOUS ONCE
Refills: 0 | Status: COMPLETED | OUTPATIENT
Start: 2024-01-01 | End: 2024-01-01

## 2024-01-01 RX ORDER — MAGNESIUM SULFATE 500 MG/ML
2 VIAL (ML) INJECTION
Refills: 0 | Status: DISCONTINUED | OUTPATIENT
Start: 2024-01-01 | End: 2024-01-01

## 2024-01-01 RX ORDER — MORPHINE SULFATE 50 MG/1
2 CAPSULE, EXTENDED RELEASE ORAL ONCE
Refills: 0 | Status: DISCONTINUED | OUTPATIENT
Start: 2024-01-01 | End: 2024-01-01

## 2024-01-01 RX ORDER — POLYETHYLENE GLYCOL 3350 17 G/17G
17 POWDER, FOR SOLUTION ORAL DAILY
Refills: 0 | Status: DISCONTINUED | OUTPATIENT
Start: 2024-01-01 | End: 2024-01-01

## 2024-01-01 RX ORDER — PANTOPRAZOLE SODIUM 20 MG/1
40 TABLET, DELAYED RELEASE ORAL
Refills: 0 | Status: DISCONTINUED | OUTPATIENT
Start: 2024-01-01 | End: 2024-01-01

## 2024-01-01 RX ORDER — METHOCARBAMOL 500 MG/1
500 TABLET, FILM COATED ORAL 4 TIMES DAILY
Qty: 240 | Refills: 3 | Status: ACTIVE | COMMUNITY
Start: 1900-01-01 | End: 1900-01-01

## 2024-01-01 RX ORDER — MORPHINE SULFATE 50 MG/1
4 CAPSULE, EXTENDED RELEASE ORAL
Refills: 0 | Status: DISCONTINUED | OUTPATIENT
Start: 2024-01-01 | End: 2024-01-01

## 2024-01-01 RX ORDER — SODIUM CHLORIDE 9 MG/ML
1000 INJECTION INTRAMUSCULAR; INTRAVENOUS; SUBCUTANEOUS
Refills: 0 | Status: DISCONTINUED | OUTPATIENT
Start: 2024-01-01 | End: 2024-01-01

## 2024-01-01 RX ORDER — ZOLEDRONIC ACID 5 MG/100ML
4 INJECTION, SOLUTION INTRAVENOUS ONCE
Refills: 0 | Status: COMPLETED | OUTPATIENT
Start: 2024-01-01 | End: 2024-01-01

## 2024-01-01 RX ORDER — ALBUTEROL 90 UG/1
2.5 AEROSOL, METERED ORAL EVERY 6 HOURS
Refills: 0 | Status: DISCONTINUED | OUTPATIENT
Start: 2024-01-01 | End: 2024-01-01

## 2024-01-01 RX ORDER — CEFEPIME 1 G/1
1000 INJECTION, POWDER, FOR SOLUTION INTRAMUSCULAR; INTRAVENOUS ONCE
Refills: 0 | Status: COMPLETED | OUTPATIENT
Start: 2024-01-01 | End: 2024-01-01

## 2024-01-01 RX ORDER — MAGNESIUM OXIDE 400 MG ORAL TABLET 241.3 MG
400 TABLET ORAL DAILY
Refills: 0 | Status: DISCONTINUED | OUTPATIENT
Start: 2024-01-01 | End: 2024-01-01

## 2024-01-01 RX ORDER — CHLORHEXIDINE GLUCONATE 213 G/1000ML
1 SOLUTION TOPICAL
Refills: 0 | Status: DISCONTINUED | OUTPATIENT
Start: 2024-01-01 | End: 2024-01-01

## 2024-01-01 RX ORDER — LETROZOLE 2.5 MG/1
2.5 TABLET, FILM COATED ORAL DAILY
Refills: 0 | Status: DISCONTINUED | OUTPATIENT
Start: 2024-01-01 | End: 2024-01-01

## 2024-01-01 RX ORDER — CEFEPIME 1 G/1
1000 INJECTION, POWDER, FOR SOLUTION INTRAMUSCULAR; INTRAVENOUS EVERY 8 HOURS
Refills: 0 | Status: DISCONTINUED | OUTPATIENT
Start: 2024-01-01 | End: 2024-01-01

## 2024-01-01 RX ORDER — FULVESTRANT 50 MG/ML
500 INJECTION INTRAMUSCULAR ONCE
Refills: 0 | Status: COMPLETED | OUTPATIENT
Start: 2024-01-01 | End: 2024-01-01

## 2024-01-01 RX ORDER — HYDROMORPHONE HYDROCHLORIDE 2 MG/ML
1 INJECTION INTRAMUSCULAR; INTRAVENOUS; SUBCUTANEOUS ONCE
Refills: 0 | Status: DISCONTINUED | OUTPATIENT
Start: 2024-01-01 | End: 2024-01-01

## 2024-01-01 RX ORDER — IBUPROFEN 200 MG
200 TABLET ORAL EVERY 6 HOURS
Refills: 0 | Status: DISCONTINUED | OUTPATIENT
Start: 2024-01-01 | End: 2024-01-01

## 2024-01-01 RX ORDER — MORPHINE SULFATE 50 MG/1
15 CAPSULE, EXTENDED RELEASE ORAL EVERY 12 HOURS
Refills: 0 | Status: DISCONTINUED | OUTPATIENT
Start: 2024-01-01 | End: 2024-01-01

## 2024-01-01 RX ORDER — METRONIDAZOLE 500 MG
500 TABLET ORAL ONCE
Refills: 0 | Status: COMPLETED | OUTPATIENT
Start: 2024-01-01 | End: 2024-01-01

## 2024-01-01 RX ORDER — MORPHINE SULFATE 50 MG/1
6 CAPSULE, EXTENDED RELEASE ORAL EVERY 6 HOURS
Refills: 0 | Status: DISCONTINUED | OUTPATIENT
Start: 2024-01-01 | End: 2024-01-01

## 2024-01-01 RX ORDER — MORPHINE SULFATE 50 MG/1
2 CAPSULE, EXTENDED RELEASE ORAL
Refills: 0 | Status: DISCONTINUED | OUTPATIENT
Start: 2024-01-01 | End: 2024-01-01

## 2024-01-01 RX ORDER — POTASSIUM CHLORIDE 20 MEQ
20 PACKET (EA) ORAL ONCE
Refills: 0 | Status: COMPLETED | OUTPATIENT
Start: 2024-01-01 | End: 2024-01-01

## 2024-01-01 RX ORDER — VANCOMYCIN HCL 1 G
1000 VIAL (EA) INTRAVENOUS ONCE
Refills: 0 | Status: COMPLETED | OUTPATIENT
Start: 2024-01-01 | End: 2024-01-01

## 2024-01-01 RX ORDER — LANOLIN ALCOHOL/MO/W.PET/CERES
5 CREAM (GRAM) TOPICAL AT BEDTIME
Refills: 0 | Status: DISCONTINUED | OUTPATIENT
Start: 2024-01-01 | End: 2024-01-01

## 2024-01-01 RX ORDER — SODIUM CHLORIDE 9 MG/ML
1000 INJECTION, SOLUTION INTRAVENOUS
Refills: 0 | Status: DISCONTINUED | OUTPATIENT
Start: 2024-01-01 | End: 2024-01-01

## 2024-01-01 RX ORDER — SODIUM CHLORIDE 9 MG/ML
500 INJECTION, SOLUTION INTRAVENOUS ONCE
Refills: 0 | Status: COMPLETED | OUTPATIENT
Start: 2024-01-01 | End: 2024-01-01

## 2024-01-01 RX ORDER — MORPHINE SULFATE 50 MG/1
30 CAPSULE, EXTENDED RELEASE ORAL EVERY 12 HOURS
Refills: 0 | Status: DISCONTINUED | OUTPATIENT
Start: 2024-01-01 | End: 2024-01-01

## 2024-01-01 RX ORDER — FUROSEMIDE 40 MG
20 TABLET ORAL ONCE
Refills: 0 | Status: COMPLETED | OUTPATIENT
Start: 2024-01-01 | End: 2024-01-01

## 2024-01-01 RX ORDER — HYDROMORPHONE HYDROCHLORIDE 2 MG/ML
2 INJECTION INTRAMUSCULAR; INTRAVENOUS; SUBCUTANEOUS ONCE
Refills: 0 | Status: DISCONTINUED | OUTPATIENT
Start: 2024-01-01 | End: 2024-01-01

## 2024-01-01 RX ORDER — POTASSIUM CHLORIDE 20 MEQ
20 PACKET (EA) ORAL
Refills: 0 | Status: COMPLETED | OUTPATIENT
Start: 2024-01-01 | End: 2024-01-01

## 2024-01-01 RX ORDER — POLYETHYLENE GLYCOL 3350 17 G/17G
17 POWDER, FOR SOLUTION ORAL EVERY 12 HOURS
Refills: 0 | Status: DISCONTINUED | OUTPATIENT
Start: 2024-01-01 | End: 2024-01-01

## 2024-01-01 RX ORDER — POTASSIUM CHLORIDE 20 MEQ
40 PACKET (EA) ORAL ONCE
Refills: 0 | Status: COMPLETED | OUTPATIENT
Start: 2024-01-01 | End: 2024-01-01

## 2024-01-01 RX ORDER — MORPHINE SULFATE 50 MG/1
6 CAPSULE, EXTENDED RELEASE ORAL
Refills: 0 | Status: DISCONTINUED | OUTPATIENT
Start: 2024-01-01 | End: 2024-01-01

## 2024-01-01 RX ORDER — HYDROCORTISONE 25 MG/G
2.5 CREAM TOPICAL
Qty: 28.35 | Refills: 1 | Status: ACTIVE | COMMUNITY
Start: 2023-01-01 | End: 1900-01-01

## 2024-01-01 RX ORDER — DEXAMETHASONE 0.5 MG/5ML
0.5 ELIXIR ORAL DAILY
Refills: 0 | Status: DISCONTINUED | OUTPATIENT
Start: 2024-01-01 | End: 2024-01-01

## 2024-01-01 RX ORDER — DEXAMETHASONE 0.5 MG/5ML
2 ELIXIR ORAL EVERY 12 HOURS
Refills: 0 | Status: DISCONTINUED | OUTPATIENT
Start: 2024-01-01 | End: 2024-01-01

## 2024-01-01 RX ORDER — MAGNESIUM SULFATE 500 MG/ML
2 VIAL (ML) INJECTION ONCE
Refills: 0 | Status: DISCONTINUED | OUTPATIENT
Start: 2024-01-01 | End: 2024-01-01

## 2024-01-01 RX ORDER — SENNA PLUS 8.6 MG/1
2 TABLET ORAL AT BEDTIME
Refills: 0 | Status: DISCONTINUED | OUTPATIENT
Start: 2024-01-01 | End: 2024-01-01

## 2024-01-01 RX ORDER — PALBOCICLIB 125 MG/1
125 TABLET, FILM COATED ORAL
Qty: 21 | Refills: 2 | Status: ACTIVE | COMMUNITY
Start: 2023-01-01 | End: 1900-01-01

## 2024-01-01 RX ORDER — NALOXEGOL OXALATE 12.5 MG/1
12.5 TABLET, FILM COATED ORAL DAILY
Refills: 0 | Status: DISCONTINUED | OUTPATIENT
Start: 2024-01-01 | End: 2024-01-01

## 2024-01-01 RX ORDER — SENNA PLUS 8.6 MG/1
2 TABLET ORAL EVERY 12 HOURS
Refills: 0 | Status: DISCONTINUED | OUTPATIENT
Start: 2024-01-01 | End: 2024-01-01

## 2024-01-01 RX ORDER — BACITRACIN ZINC 500 UNIT/G
1 OINTMENT IN PACKET (EA) TOPICAL
Refills: 0 | Status: DISCONTINUED | OUTPATIENT
Start: 2024-01-01 | End: 2024-01-01

## 2024-01-01 RX ORDER — EXEMESTANE 25 MG/1
25 TABLET, SUGAR COATED ORAL DAILY
Refills: 0 | Status: DISCONTINUED | OUTPATIENT
Start: 2024-01-01 | End: 2024-01-01

## 2024-01-01 RX ORDER — CALCITONIN SALMON 200 [IU]/ML
250 INJECTION, SOLUTION INTRAMUSCULAR EVERY 12 HOURS
Refills: 0 | Status: COMPLETED | OUTPATIENT
Start: 2024-01-01 | End: 2024-01-01

## 2024-01-01 RX ORDER — FUROSEMIDE 40 MG
40 TABLET ORAL ONCE
Refills: 0 | Status: COMPLETED | OUTPATIENT
Start: 2024-01-01 | End: 2024-01-01

## 2024-01-01 RX ORDER — DEXAMETHASONE 0.5 MG/5ML
4 ELIXIR ORAL EVERY 12 HOURS
Refills: 0 | Status: DISCONTINUED | OUTPATIENT
Start: 2024-01-01 | End: 2024-01-01

## 2024-01-01 RX ORDER — AMPICILLIN SODIUM AND SULBACTAM SODIUM 250; 125 MG/ML; MG/ML
INJECTION, POWDER, FOR SUSPENSION INTRAMUSCULAR; INTRAVENOUS
Refills: 0 | Status: DISCONTINUED | OUTPATIENT
Start: 2024-01-01 | End: 2024-01-01

## 2024-01-01 RX ORDER — METRONIDAZOLE 500 MG
500 TABLET ORAL EVERY 8 HOURS
Refills: 0 | Status: DISCONTINUED | OUTPATIENT
Start: 2024-01-01 | End: 2024-01-01

## 2024-01-01 RX ADMIN — PANTOPRAZOLE SODIUM 40 MILLIGRAM(S): 20 TABLET, DELAYED RELEASE ORAL at 06:11

## 2024-01-01 RX ADMIN — MAGNESIUM OXIDE 400 MG ORAL TABLET 400 MILLIGRAM(S): 241.3 TABLET ORAL at 11:37

## 2024-01-01 RX ADMIN — SODIUM CHLORIDE 75 MILLILITER(S): 9 INJECTION, SOLUTION INTRAVENOUS at 17:45

## 2024-01-01 RX ADMIN — Medication 1 DROP(S): at 17:24

## 2024-01-01 RX ADMIN — MORPHINE SULFATE 2 MILLIGRAM(S): 50 CAPSULE, EXTENDED RELEASE ORAL at 08:26

## 2024-01-01 RX ADMIN — MORPHINE SULFATE 4 MILLIGRAM(S): 50 CAPSULE, EXTENDED RELEASE ORAL at 10:10

## 2024-01-01 RX ADMIN — MORPHINE SULFATE 4 MILLIGRAM(S): 50 CAPSULE, EXTENDED RELEASE ORAL at 19:36

## 2024-01-01 RX ADMIN — MORPHINE SULFATE 4 MILLIGRAM(S): 50 CAPSULE, EXTENDED RELEASE ORAL at 09:15

## 2024-01-01 RX ADMIN — LETROZOLE 2.5 MILLIGRAM(S): 2.5 TABLET, FILM COATED ORAL at 12:44

## 2024-01-01 RX ADMIN — SENNA PLUS 2 TABLET(S): 8.6 TABLET ORAL at 00:00

## 2024-01-01 RX ADMIN — MORPHINE SULFATE 15 MILLIGRAM(S): 50 CAPSULE, EXTENDED RELEASE ORAL at 16:50

## 2024-01-01 RX ADMIN — CEFEPIME 100 MILLIGRAM(S): 1 INJECTION, POWDER, FOR SOLUTION INTRAMUSCULAR; INTRAVENOUS at 04:38

## 2024-01-01 RX ADMIN — PANTOPRAZOLE SODIUM 40 MILLIGRAM(S): 20 TABLET, DELAYED RELEASE ORAL at 07:50

## 2024-01-01 RX ADMIN — Medication 4 MILLIGRAM(S): at 05:32

## 2024-01-01 RX ADMIN — Medication 50 MICROGRAM(S): at 05:14

## 2024-01-01 RX ADMIN — MORPHINE SULFATE 15 MILLIGRAM(S): 50 CAPSULE, EXTENDED RELEASE ORAL at 18:15

## 2024-01-01 RX ADMIN — MORPHINE SULFATE 2 MILLIGRAM(S): 50 CAPSULE, EXTENDED RELEASE ORAL at 20:42

## 2024-01-01 RX ADMIN — MAGNESIUM OXIDE 400 MG ORAL TABLET 400 MILLIGRAM(S): 241.3 TABLET ORAL at 11:16

## 2024-01-01 RX ADMIN — Medication 1 DROP(S): at 17:17

## 2024-01-01 RX ADMIN — AMPICILLIN SODIUM AND SULBACTAM SODIUM 200 GRAM(S): 250; 125 INJECTION, POWDER, FOR SUSPENSION INTRAMUSCULAR; INTRAVENOUS at 18:35

## 2024-01-01 RX ADMIN — SODIUM CHLORIDE 100 MILLILITER(S): 9 INJECTION, SOLUTION INTRAVENOUS at 12:23

## 2024-01-01 RX ADMIN — SODIUM CHLORIDE 100 MILLILITER(S): 9 INJECTION, SOLUTION INTRAVENOUS at 01:08

## 2024-01-01 RX ADMIN — MAGNESIUM OXIDE 400 MG ORAL TABLET 400 MILLIGRAM(S): 241.3 TABLET ORAL at 12:06

## 2024-01-01 RX ADMIN — MORPHINE SULFATE 2 MILLIGRAM(S): 50 CAPSULE, EXTENDED RELEASE ORAL at 20:12

## 2024-01-01 RX ADMIN — Medication 5 MILLIGRAM(S): at 21:09

## 2024-01-01 RX ADMIN — CALCITONIN SALMON 250 INTERNATIONAL UNIT(S): 200 INJECTION, SOLUTION INTRAMUSCULAR at 05:22

## 2024-01-01 RX ADMIN — MORPHINE SULFATE 4 MILLIGRAM(S): 50 CAPSULE, EXTENDED RELEASE ORAL at 07:44

## 2024-01-01 RX ADMIN — ZOLEDRONIC ACID 4 MILLIGRAM(S): 5 INJECTION, SOLUTION INTRAVENOUS at 13:05

## 2024-01-01 RX ADMIN — SODIUM CHLORIDE 250 MILLILITER(S): 9 INJECTION, SOLUTION INTRAVENOUS at 15:48

## 2024-01-01 RX ADMIN — MORPHINE SULFATE 2 MILLIGRAM(S): 50 CAPSULE, EXTENDED RELEASE ORAL at 01:28

## 2024-01-01 RX ADMIN — MORPHINE SULFATE 2 MILLIGRAM(S): 50 CAPSULE, EXTENDED RELEASE ORAL at 09:13

## 2024-01-01 RX ADMIN — Medication 200 MILLIGRAM(S): at 01:08

## 2024-01-01 RX ADMIN — SODIUM CHLORIDE 1000 MILLILITER(S): 9 INJECTION INTRAMUSCULAR; INTRAVENOUS; SUBCUTANEOUS at 18:33

## 2024-01-01 RX ADMIN — MORPHINE SULFATE 15 MILLIGRAM(S): 50 CAPSULE, EXTENDED RELEASE ORAL at 05:32

## 2024-01-01 RX ADMIN — MORPHINE SULFATE 2 MILLIGRAM(S): 50 CAPSULE, EXTENDED RELEASE ORAL at 16:30

## 2024-01-01 RX ADMIN — Medication 1 APPLICATION(S): at 06:10

## 2024-01-01 RX ADMIN — PANTOPRAZOLE SODIUM 40 MILLIGRAM(S): 20 TABLET, DELAYED RELEASE ORAL at 05:50

## 2024-01-01 RX ADMIN — Medication 50 MILLIEQUIVALENT(S): at 21:53

## 2024-01-01 RX ADMIN — Medication 1 APPLICATION(S): at 06:11

## 2024-01-01 RX ADMIN — SODIUM CHLORIDE 1000 MILLILITER(S): 9 INJECTION INTRAMUSCULAR; INTRAVENOUS; SUBCUTANEOUS at 00:43

## 2024-01-01 RX ADMIN — LETROZOLE 2.5 MILLIGRAM(S): 2.5 TABLET, FILM COATED ORAL at 17:45

## 2024-01-01 RX ADMIN — Medication 1 APPLICATION(S): at 14:07

## 2024-01-01 RX ADMIN — Medication 100 MILLIGRAM(S): at 07:07

## 2024-01-01 RX ADMIN — MORPHINE SULFATE 2 MILLIGRAM(S): 50 CAPSULE, EXTENDED RELEASE ORAL at 17:44

## 2024-01-01 RX ADMIN — MORPHINE SULFATE 4 MILLIGRAM(S): 50 CAPSULE, EXTENDED RELEASE ORAL at 18:34

## 2024-01-01 RX ADMIN — MORPHINE SULFATE 4 MILLIGRAM(S): 50 CAPSULE, EXTENDED RELEASE ORAL at 13:00

## 2024-01-01 RX ADMIN — Medication 50 MICROGRAM(S): at 06:14

## 2024-01-01 RX ADMIN — Medication 50 MICROGRAM(S): at 05:31

## 2024-01-01 RX ADMIN — NALOXEGOL OXALATE 12.5 MILLIGRAM(S): 12.5 TABLET, FILM COATED ORAL at 11:35

## 2024-01-01 RX ADMIN — Medication 5 MILLIGRAM(S): at 21:10

## 2024-01-01 RX ADMIN — EXEMESTANE 25 MILLIGRAM(S): 25 TABLET, SUGAR COATED ORAL at 13:34

## 2024-01-01 RX ADMIN — SODIUM CHLORIDE 100 MILLILITER(S): 9 INJECTION, SOLUTION INTRAVENOUS at 09:13

## 2024-01-01 RX ADMIN — Medication 50 MICROGRAM(S): at 06:10

## 2024-01-01 RX ADMIN — PANTOPRAZOLE SODIUM 40 MILLIGRAM(S): 20 TABLET, DELAYED RELEASE ORAL at 05:14

## 2024-01-01 RX ADMIN — Medication 2 MILLIGRAM(S): at 16:51

## 2024-01-01 RX ADMIN — PANTOPRAZOLE SODIUM 40 MILLIGRAM(S): 20 TABLET, DELAYED RELEASE ORAL at 05:19

## 2024-01-01 RX ADMIN — MORPHINE SULFATE 4 MILLIGRAM(S): 50 CAPSULE, EXTENDED RELEASE ORAL at 17:01

## 2024-01-01 RX ADMIN — MORPHINE SULFATE 4 MILLIGRAM(S): 50 CAPSULE, EXTENDED RELEASE ORAL at 13:11

## 2024-01-01 RX ADMIN — POLYETHYLENE GLYCOL 3350 17 GRAM(S): 17 POWDER, FOR SOLUTION ORAL at 12:17

## 2024-01-01 RX ADMIN — CHLORHEXIDINE GLUCONATE 1 APPLICATION(S): 213 SOLUTION TOPICAL at 05:32

## 2024-01-01 RX ADMIN — MAGNESIUM OXIDE 400 MG ORAL TABLET 400 MILLIGRAM(S): 241.3 TABLET ORAL at 11:43

## 2024-01-01 RX ADMIN — CHLORHEXIDINE GLUCONATE 1 APPLICATION(S): 213 SOLUTION TOPICAL at 06:09

## 2024-01-01 RX ADMIN — Medication 1 DROP(S): at 18:36

## 2024-01-01 RX ADMIN — MORPHINE SULFATE 4 MILLIGRAM(S): 50 CAPSULE, EXTENDED RELEASE ORAL at 11:37

## 2024-01-01 RX ADMIN — CHLORHEXIDINE GLUCONATE 1 APPLICATION(S): 213 SOLUTION TOPICAL at 05:19

## 2024-01-01 RX ADMIN — Medication 25 GRAM(S): at 10:26

## 2024-01-01 RX ADMIN — MORPHINE SULFATE 2 MILLIGRAM(S): 50 CAPSULE, EXTENDED RELEASE ORAL at 04:47

## 2024-01-01 RX ADMIN — MORPHINE SULFATE 2 MILLIGRAM(S): 50 CAPSULE, EXTENDED RELEASE ORAL at 13:50

## 2024-01-01 RX ADMIN — Medication 50 MICROGRAM(S): at 05:22

## 2024-01-01 RX ADMIN — MORPHINE SULFATE 4 MILLIGRAM(S): 50 CAPSULE, EXTENDED RELEASE ORAL at 07:38

## 2024-01-01 RX ADMIN — MORPHINE SULFATE 2 MILLIGRAM(S): 50 CAPSULE, EXTENDED RELEASE ORAL at 08:50

## 2024-01-01 RX ADMIN — Medication 200 MILLIGRAM(S): at 21:20

## 2024-01-01 RX ADMIN — MORPHINE SULFATE 15 MILLIGRAM(S): 50 CAPSULE, EXTENDED RELEASE ORAL at 07:44

## 2024-01-01 RX ADMIN — MORPHINE SULFATE 4 MILLIGRAM(S): 50 CAPSULE, EXTENDED RELEASE ORAL at 11:17

## 2024-01-01 RX ADMIN — Medication 1 MILLIGRAM(S): at 14:01

## 2024-01-01 RX ADMIN — AMPICILLIN SODIUM AND SULBACTAM SODIUM 200 GRAM(S): 250; 125 INJECTION, POWDER, FOR SUSPENSION INTRAMUSCULAR; INTRAVENOUS at 02:43

## 2024-01-01 RX ADMIN — Medication 1 DROP(S): at 06:20

## 2024-01-01 RX ADMIN — Medication 1 MILLIGRAM(S): at 05:33

## 2024-01-01 RX ADMIN — POLYETHYLENE GLYCOL 3350 17 GRAM(S): 17 POWDER, FOR SOLUTION ORAL at 11:56

## 2024-01-01 RX ADMIN — MORPHINE SULFATE 4 MILLIGRAM(S): 50 CAPSULE, EXTENDED RELEASE ORAL at 17:05

## 2024-01-01 RX ADMIN — MORPHINE SULFATE 4 MILLIGRAM(S): 50 CAPSULE, EXTENDED RELEASE ORAL at 10:06

## 2024-01-01 RX ADMIN — AMPICILLIN SODIUM AND SULBACTAM SODIUM 200 GRAM(S): 250; 125 INJECTION, POWDER, FOR SUSPENSION INTRAMUSCULAR; INTRAVENOUS at 17:19

## 2024-01-01 RX ADMIN — MORPHINE SULFATE 2 MILLIGRAM(S): 50 CAPSULE, EXTENDED RELEASE ORAL at 03:47

## 2024-01-01 RX ADMIN — Medication 85 MILLIMOLE(S): at 13:24

## 2024-01-01 RX ADMIN — MORPHINE SULFATE 15 MILLIGRAM(S): 50 CAPSULE, EXTENDED RELEASE ORAL at 05:53

## 2024-01-01 RX ADMIN — Medication 4 MILLIGRAM(S): at 05:17

## 2024-01-01 RX ADMIN — MORPHINE SULFATE 4 MILLIGRAM(S): 50 CAPSULE, EXTENDED RELEASE ORAL at 08:00

## 2024-01-01 RX ADMIN — MAGNESIUM OXIDE 400 MG ORAL TABLET 400 MILLIGRAM(S): 241.3 TABLET ORAL at 12:55

## 2024-01-01 RX ADMIN — MORPHINE SULFATE 4 MILLIGRAM(S): 50 CAPSULE, EXTENDED RELEASE ORAL at 10:23

## 2024-01-01 RX ADMIN — Medication 25 GRAM(S): at 11:56

## 2024-01-01 RX ADMIN — MORPHINE SULFATE 4 MILLIGRAM(S): 50 CAPSULE, EXTENDED RELEASE ORAL at 20:36

## 2024-01-01 RX ADMIN — MORPHINE SULFATE 4 MILLIGRAM(S): 50 CAPSULE, EXTENDED RELEASE ORAL at 20:46

## 2024-01-01 RX ADMIN — Medication 50 MICROGRAM(S): at 05:19

## 2024-01-01 RX ADMIN — Medication 1 MILLIGRAM(S): at 20:46

## 2024-01-01 RX ADMIN — Medication 1 DROP(S): at 07:27

## 2024-01-01 RX ADMIN — PANTOPRAZOLE SODIUM 40 MILLIGRAM(S): 20 TABLET, DELAYED RELEASE ORAL at 05:29

## 2024-01-01 RX ADMIN — Medication 50 MICROGRAM(S): at 06:47

## 2024-01-01 RX ADMIN — SODIUM CHLORIDE 1000 MILLILITER(S): 9 INJECTION INTRAMUSCULAR; INTRAVENOUS; SUBCUTANEOUS at 00:59

## 2024-01-01 RX ADMIN — SODIUM CHLORIDE 100 MILLILITER(S): 9 INJECTION, SOLUTION INTRAVENOUS at 08:12

## 2024-01-01 RX ADMIN — Medication 2 MILLIGRAM(S): at 06:09

## 2024-01-01 RX ADMIN — MORPHINE SULFATE 15 MILLIGRAM(S): 50 CAPSULE, EXTENDED RELEASE ORAL at 18:07

## 2024-01-01 RX ADMIN — MORPHINE SULFATE 2 MILLIGRAM(S): 50 CAPSULE, EXTENDED RELEASE ORAL at 13:36

## 2024-01-01 RX ADMIN — MORPHINE SULFATE 4 MILLIGRAM(S): 50 CAPSULE, EXTENDED RELEASE ORAL at 17:20

## 2024-01-01 RX ADMIN — EXEMESTANE 25 MILLIGRAM(S): 25 TABLET, SUGAR COATED ORAL at 11:42

## 2024-01-01 RX ADMIN — AMPICILLIN SODIUM AND SULBACTAM SODIUM 200 GRAM(S): 250; 125 INJECTION, POWDER, FOR SUSPENSION INTRAMUSCULAR; INTRAVENOUS at 05:33

## 2024-01-01 RX ADMIN — Medication 1 MILLIGRAM(S): at 12:48

## 2024-01-01 RX ADMIN — MAGNESIUM OXIDE 400 MG ORAL TABLET 400 MILLIGRAM(S): 241.3 TABLET ORAL at 12:17

## 2024-01-01 RX ADMIN — PANTOPRAZOLE SODIUM 40 MILLIGRAM(S): 20 TABLET, DELAYED RELEASE ORAL at 07:28

## 2024-01-01 RX ADMIN — EXEMESTANE 25 MILLIGRAM(S): 25 TABLET, SUGAR COATED ORAL at 11:38

## 2024-01-01 RX ADMIN — MORPHINE SULFATE 4 MILLIGRAM(S): 50 CAPSULE, EXTENDED RELEASE ORAL at 00:09

## 2024-01-01 RX ADMIN — MORPHINE SULFATE 15 MILLIGRAM(S): 50 CAPSULE, EXTENDED RELEASE ORAL at 17:12

## 2024-01-01 RX ADMIN — Medication 5 MILLIGRAM(S): at 21:32

## 2024-01-01 RX ADMIN — MORPHINE SULFATE 4 MILLIGRAM(S): 50 CAPSULE, EXTENDED RELEASE ORAL at 06:17

## 2024-01-01 RX ADMIN — SODIUM CHLORIDE 200 MILLILITER(S): 9 INJECTION INTRAMUSCULAR; INTRAVENOUS; SUBCUTANEOUS at 23:43

## 2024-01-01 RX ADMIN — MORPHINE SULFATE 4 MILLIGRAM(S): 50 CAPSULE, EXTENDED RELEASE ORAL at 21:46

## 2024-01-01 RX ADMIN — SODIUM CHLORIDE 1000 MILLILITER(S): 9 INJECTION, SOLUTION INTRAVENOUS at 10:21

## 2024-01-01 RX ADMIN — CALCITONIN SALMON 250 INTERNATIONAL UNIT(S): 200 INJECTION, SOLUTION INTRAMUSCULAR at 01:49

## 2024-01-01 RX ADMIN — LETROZOLE 2.5 MILLIGRAM(S): 2.5 TABLET, FILM COATED ORAL at 12:48

## 2024-01-01 RX ADMIN — Medication 1 DROP(S): at 06:12

## 2024-01-01 RX ADMIN — MORPHINE SULFATE 2 MILLIGRAM(S): 50 CAPSULE, EXTENDED RELEASE ORAL at 15:30

## 2024-01-01 RX ADMIN — ZOLEDRONIC ACID 4 MILLIGRAM(S): 5 INJECTION, SOLUTION INTRAVENOUS at 14:58

## 2024-01-01 RX ADMIN — Medication 50 MICROGRAM(S): at 05:50

## 2024-01-01 RX ADMIN — Medication 1 DROP(S): at 05:18

## 2024-01-01 RX ADMIN — Medication 200 MILLIGRAM(S): at 13:20

## 2024-01-01 RX ADMIN — MAGNESIUM OXIDE 400 MG ORAL TABLET 400 MILLIGRAM(S): 241.3 TABLET ORAL at 12:47

## 2024-01-01 RX ADMIN — MORPHINE SULFATE 2 MILLIGRAM(S): 50 CAPSULE, EXTENDED RELEASE ORAL at 02:28

## 2024-01-01 RX ADMIN — Medication 1 DROP(S): at 17:35

## 2024-01-01 RX ADMIN — EXEMESTANE 25 MILLIGRAM(S): 25 TABLET, SUGAR COATED ORAL at 14:09

## 2024-01-01 RX ADMIN — Medication 40 MILLIGRAM(S): at 17:17

## 2024-01-01 RX ADMIN — MORPHINE SULFATE 15 MILLIGRAM(S): 50 CAPSULE, EXTENDED RELEASE ORAL at 17:23

## 2024-01-01 RX ADMIN — Medication 1 DROP(S): at 05:14

## 2024-01-01 RX ADMIN — MORPHINE SULFATE 4 MILLIGRAM(S): 50 CAPSULE, EXTENDED RELEASE ORAL at 16:07

## 2024-01-01 RX ADMIN — SENNA PLUS 2 TABLET(S): 8.6 TABLET ORAL at 21:30

## 2024-01-01 RX ADMIN — Medication 1 APPLICATION(S): at 18:34

## 2024-01-01 RX ADMIN — MORPHINE SULFATE 4 MILLIGRAM(S): 50 CAPSULE, EXTENDED RELEASE ORAL at 05:17

## 2024-01-01 RX ADMIN — CHLORHEXIDINE GLUCONATE 1 APPLICATION(S): 213 SOLUTION TOPICAL at 05:51

## 2024-01-01 RX ADMIN — MORPHINE SULFATE 4 MILLIGRAM(S): 50 CAPSULE, EXTENDED RELEASE ORAL at 19:43

## 2024-01-01 RX ADMIN — MORPHINE SULFATE 2 MILLIGRAM(S): 50 CAPSULE, EXTENDED RELEASE ORAL at 12:19

## 2024-01-01 RX ADMIN — Medication 2 MILLIGRAM(S): at 06:08

## 2024-01-01 RX ADMIN — Medication 2 MILLIGRAM(S): at 05:18

## 2024-01-01 RX ADMIN — MORPHINE SULFATE 4 MILLIGRAM(S): 50 CAPSULE, EXTENDED RELEASE ORAL at 14:44

## 2024-01-01 RX ADMIN — MORPHINE SULFATE 2 MILLIGRAM(S): 50 CAPSULE, EXTENDED RELEASE ORAL at 19:18

## 2024-01-01 RX ADMIN — Medication 1 DROP(S): at 09:45

## 2024-01-01 RX ADMIN — Medication 25 GRAM(S): at 11:58

## 2024-01-01 RX ADMIN — Medication 50 MICROGRAM(S): at 05:39

## 2024-01-01 RX ADMIN — MAGNESIUM OXIDE 400 MG ORAL TABLET 400 MILLIGRAM(S): 241.3 TABLET ORAL at 14:08

## 2024-01-01 RX ADMIN — PANTOPRAZOLE SODIUM 40 MILLIGRAM(S): 20 TABLET, DELAYED RELEASE ORAL at 06:48

## 2024-01-01 RX ADMIN — NALOXEGOL OXALATE 12.5 MILLIGRAM(S): 12.5 TABLET, FILM COATED ORAL at 13:21

## 2024-01-01 RX ADMIN — Medication 1 APPLICATION(S): at 18:39

## 2024-01-01 RX ADMIN — SENNA PLUS 2 TABLET(S): 8.6 TABLET ORAL at 21:09

## 2024-01-01 RX ADMIN — MORPHINE SULFATE 4 MILLIGRAM(S): 50 CAPSULE, EXTENDED RELEASE ORAL at 10:25

## 2024-01-01 RX ADMIN — CHLORHEXIDINE GLUCONATE 1 APPLICATION(S): 213 SOLUTION TOPICAL at 17:48

## 2024-01-01 RX ADMIN — Medication 1 APPLICATION(S): at 17:12

## 2024-01-01 RX ADMIN — MORPHINE SULFATE 4 MILLIGRAM(S): 50 CAPSULE, EXTENDED RELEASE ORAL at 16:22

## 2024-01-01 RX ADMIN — Medication 0.5 MILLIGRAM(S): at 10:34

## 2024-01-01 RX ADMIN — SODIUM CHLORIDE 1000 MILLILITER(S): 9 INJECTION INTRAMUSCULAR; INTRAVENOUS; SUBCUTANEOUS at 05:46

## 2024-01-01 RX ADMIN — AMPICILLIN SODIUM AND SULBACTAM SODIUM 200 GRAM(S): 250; 125 INJECTION, POWDER, FOR SUSPENSION INTRAMUSCULAR; INTRAVENOUS at 17:13

## 2024-01-01 RX ADMIN — MORPHINE SULFATE 4 MILLIGRAM(S): 50 CAPSULE, EXTENDED RELEASE ORAL at 13:41

## 2024-01-01 RX ADMIN — MORPHINE SULFATE 2 MILLIGRAM(S): 50 CAPSULE, EXTENDED RELEASE ORAL at 12:41

## 2024-01-01 RX ADMIN — MORPHINE SULFATE 4 MILLIGRAM(S): 50 CAPSULE, EXTENDED RELEASE ORAL at 16:26

## 2024-01-01 RX ADMIN — MORPHINE SULFATE 2 MILLIGRAM(S): 50 CAPSULE, EXTENDED RELEASE ORAL at 21:37

## 2024-01-01 RX ADMIN — MORPHINE SULFATE 6 MILLIGRAM(S): 50 CAPSULE, EXTENDED RELEASE ORAL at 22:31

## 2024-01-01 RX ADMIN — CHLORHEXIDINE GLUCONATE 1 APPLICATION(S): 213 SOLUTION TOPICAL at 05:39

## 2024-01-01 RX ADMIN — MORPHINE SULFATE 4 MILLIGRAM(S): 50 CAPSULE, EXTENDED RELEASE ORAL at 14:29

## 2024-01-01 RX ADMIN — FULVESTRANT 500 MILLIGRAM(S): 50 INJECTION INTRAMUSCULAR at 13:33

## 2024-01-01 RX ADMIN — Medication 40 MILLIEQUIVALENT(S): at 17:45

## 2024-01-01 RX ADMIN — NALOXEGOL OXALATE 12.5 MILLIGRAM(S): 12.5 TABLET, FILM COATED ORAL at 11:42

## 2024-01-01 RX ADMIN — Medication 5 MILLIGRAM(S): at 22:21

## 2024-01-01 RX ADMIN — MORPHINE SULFATE 4 MILLIGRAM(S): 50 CAPSULE, EXTENDED RELEASE ORAL at 04:46

## 2024-01-01 RX ADMIN — SENNA PLUS 2 TABLET(S): 8.6 TABLET ORAL at 21:02

## 2024-01-01 RX ADMIN — Medication 5 MILLIGRAM(S): at 23:59

## 2024-01-01 RX ADMIN — Medication 50 MILLIEQUIVALENT(S): at 14:00

## 2024-01-01 RX ADMIN — MORPHINE SULFATE 4 MILLIGRAM(S): 50 CAPSULE, EXTENDED RELEASE ORAL at 13:17

## 2024-01-01 RX ADMIN — AMPICILLIN SODIUM AND SULBACTAM SODIUM 200 GRAM(S): 250; 125 INJECTION, POWDER, FOR SUSPENSION INTRAMUSCULAR; INTRAVENOUS at 11:37

## 2024-01-01 RX ADMIN — Medication 50 MICROGRAM(S): at 05:30

## 2024-01-01 RX ADMIN — Medication 5 MILLIGRAM(S): at 21:45

## 2024-01-01 RX ADMIN — Medication 1 DROP(S): at 18:40

## 2024-01-01 RX ADMIN — MORPHINE SULFATE 15 MILLIGRAM(S): 50 CAPSULE, EXTENDED RELEASE ORAL at 07:07

## 2024-01-01 RX ADMIN — MORPHINE SULFATE 6 MILLIGRAM(S): 50 CAPSULE, EXTENDED RELEASE ORAL at 11:00

## 2024-01-01 RX ADMIN — AMPICILLIN SODIUM AND SULBACTAM SODIUM 200 GRAM(S): 250; 125 INJECTION, POWDER, FOR SUSPENSION INTRAMUSCULAR; INTRAVENOUS at 10:20

## 2024-01-01 RX ADMIN — Medication 2 MILLIGRAM(S): at 17:25

## 2024-01-01 RX ADMIN — POLYETHYLENE GLYCOL 3350 17 GRAM(S): 17 POWDER, FOR SOLUTION ORAL at 17:43

## 2024-01-01 RX ADMIN — MORPHINE SULFATE 4 MILLIGRAM(S): 50 CAPSULE, EXTENDED RELEASE ORAL at 21:13

## 2024-01-01 RX ADMIN — Medication 1 DROP(S): at 17:05

## 2024-01-01 RX ADMIN — MAGNESIUM OXIDE 400 MG ORAL TABLET 400 MILLIGRAM(S): 241.3 TABLET ORAL at 13:22

## 2024-01-01 RX ADMIN — MORPHINE SULFATE 4 MILLIGRAM(S): 50 CAPSULE, EXTENDED RELEASE ORAL at 02:48

## 2024-01-01 RX ADMIN — MORPHINE SULFATE 15 MILLIGRAM(S): 50 CAPSULE, EXTENDED RELEASE ORAL at 18:34

## 2024-01-01 RX ADMIN — NALOXEGOL OXALATE 12.5 MILLIGRAM(S): 12.5 TABLET, FILM COATED ORAL at 11:41

## 2024-01-01 RX ADMIN — NALOXEGOL OXALATE 12.5 MILLIGRAM(S): 12.5 TABLET, FILM COATED ORAL at 12:18

## 2024-01-01 RX ADMIN — MORPHINE SULFATE 6 MILLIGRAM(S): 50 CAPSULE, EXTENDED RELEASE ORAL at 19:59

## 2024-01-01 RX ADMIN — Medication 4 MILLIGRAM(S): at 05:29

## 2024-01-01 RX ADMIN — SODIUM CHLORIDE 1000 MILLILITER(S): 9 INJECTION INTRAMUSCULAR; INTRAVENOUS; SUBCUTANEOUS at 17:10

## 2024-01-01 RX ADMIN — Medication 4 MILLIGRAM(S): at 17:30

## 2024-01-01 RX ADMIN — Medication 20 MILLIGRAM(S): at 09:44

## 2024-01-01 RX ADMIN — MORPHINE SULFATE 2 MILLIGRAM(S): 50 CAPSULE, EXTENDED RELEASE ORAL at 11:20

## 2024-01-01 RX ADMIN — MAGNESIUM OXIDE 400 MG ORAL TABLET 400 MILLIGRAM(S): 241.3 TABLET ORAL at 12:22

## 2024-01-01 RX ADMIN — MORPHINE SULFATE 4 MILLIGRAM(S): 50 CAPSULE, EXTENDED RELEASE ORAL at 09:51

## 2024-01-01 RX ADMIN — Medication 1 DROP(S): at 06:08

## 2024-01-01 RX ADMIN — MORPHINE SULFATE 2 MILLIGRAM(S): 50 CAPSULE, EXTENDED RELEASE ORAL at 22:37

## 2024-01-01 RX ADMIN — Medication 1 APPLICATION(S): at 05:50

## 2024-01-01 RX ADMIN — Medication 25 GRAM(S): at 16:27

## 2024-01-01 RX ADMIN — AMPICILLIN SODIUM AND SULBACTAM SODIUM 200 GRAM(S): 250; 125 INJECTION, POWDER, FOR SUSPENSION INTRAMUSCULAR; INTRAVENOUS at 05:55

## 2024-01-01 RX ADMIN — SODIUM CHLORIDE 70 MILLILITER(S): 9 INJECTION INTRAMUSCULAR; INTRAVENOUS; SUBCUTANEOUS at 11:02

## 2024-01-01 RX ADMIN — Medication 2 MILLIGRAM(S): at 17:04

## 2024-01-01 RX ADMIN — Medication 1 MILLIGRAM(S): at 05:14

## 2024-01-01 RX ADMIN — MORPHINE SULFATE 2 MG/HR: 50 CAPSULE, EXTENDED RELEASE ORAL at 12:30

## 2024-01-01 RX ADMIN — Medication 25 GRAM(S): at 22:54

## 2024-01-01 RX ADMIN — MORPHINE SULFATE 15 MILLIGRAM(S): 50 CAPSULE, EXTENDED RELEASE ORAL at 18:10

## 2024-01-01 RX ADMIN — MORPHINE SULFATE 15 MILLIGRAM(S): 50 CAPSULE, EXTENDED RELEASE ORAL at 17:18

## 2024-01-01 RX ADMIN — ZOLEDRONIC ACID 4 MILLIGRAM(S): 5 INJECTION, SOLUTION INTRAVENOUS at 15:30

## 2024-01-01 RX ADMIN — MORPHINE SULFATE 4 MILLIGRAM(S): 50 CAPSULE, EXTENDED RELEASE ORAL at 00:13

## 2024-01-01 RX ADMIN — Medication 200 MILLIGRAM(S): at 23:43

## 2024-01-01 RX ADMIN — SODIUM CHLORIDE 75 MILLILITER(S): 9 INJECTION, SOLUTION INTRAVENOUS at 14:20

## 2024-01-01 RX ADMIN — MAGNESIUM OXIDE 400 MG ORAL TABLET 400 MILLIGRAM(S): 241.3 TABLET ORAL at 11:20

## 2024-01-01 RX ADMIN — CEFEPIME 100 MILLIGRAM(S): 1 INJECTION, POWDER, FOR SOLUTION INTRAMUSCULAR; INTRAVENOUS at 05:38

## 2024-01-01 RX ADMIN — MORPHINE SULFATE 4 MILLIGRAM(S): 50 CAPSULE, EXTENDED RELEASE ORAL at 09:45

## 2024-01-01 RX ADMIN — MORPHINE SULFATE 4 MILLIGRAM(S): 50 CAPSULE, EXTENDED RELEASE ORAL at 16:09

## 2024-01-01 RX ADMIN — Medication 1 MILLIGRAM(S): at 18:07

## 2024-01-01 RX ADMIN — Medication 50 MILLIEQUIVALENT(S): at 09:41

## 2024-01-01 RX ADMIN — NALOXEGOL OXALATE 12.5 MILLIGRAM(S): 12.5 TABLET, FILM COATED ORAL at 11:16

## 2024-01-01 RX ADMIN — MORPHINE SULFATE 4 MILLIGRAM(S): 50 CAPSULE, EXTENDED RELEASE ORAL at 17:15

## 2024-01-01 RX ADMIN — Medication 85 MILLIMOLE(S): at 17:58

## 2024-01-01 RX ADMIN — MORPHINE SULFATE 2 MILLIGRAM(S): 50 CAPSULE, EXTENDED RELEASE ORAL at 16:10

## 2024-01-01 RX ADMIN — MORPHINE SULFATE 4 MILLIGRAM(S): 50 CAPSULE, EXTENDED RELEASE ORAL at 15:02

## 2024-01-01 RX ADMIN — MORPHINE SULFATE 2 MILLIGRAM(S): 50 CAPSULE, EXTENDED RELEASE ORAL at 15:02

## 2024-01-01 RX ADMIN — Medication 5 MILLIGRAM(S): at 21:17

## 2024-01-01 RX ADMIN — Medication 5 MILLIGRAM(S): at 21:31

## 2024-01-01 RX ADMIN — Medication 1 APPLICATION(S): at 07:28

## 2024-01-01 RX ADMIN — MORPHINE SULFATE 15 MILLIGRAM(S): 50 CAPSULE, EXTENDED RELEASE ORAL at 05:18

## 2024-01-01 RX ADMIN — MORPHINE SULFATE 6 MILLIGRAM(S): 50 CAPSULE, EXTENDED RELEASE ORAL at 10:29

## 2024-01-01 RX ADMIN — MORPHINE SULFATE 4 MILLIGRAM(S): 50 CAPSULE, EXTENDED RELEASE ORAL at 12:28

## 2024-01-01 RX ADMIN — MORPHINE SULFATE 4 MILLIGRAM(S): 50 CAPSULE, EXTENDED RELEASE ORAL at 15:00

## 2024-01-01 RX ADMIN — Medication 50 MICROGRAM(S): at 05:17

## 2024-01-01 RX ADMIN — SODIUM CHLORIDE 70 MILLILITER(S): 9 INJECTION, SOLUTION INTRAVENOUS at 15:44

## 2024-01-01 RX ADMIN — Medication 4 MILLIGRAM(S): at 17:55

## 2024-01-01 RX ADMIN — Medication 0.5 MILLIGRAM(S): at 07:28

## 2024-01-01 RX ADMIN — MORPHINE SULFATE 4 MILLIGRAM(S): 50 CAPSULE, EXTENDED RELEASE ORAL at 09:50

## 2024-01-01 RX ADMIN — SENNA PLUS 2 TABLET(S): 8.6 TABLET ORAL at 21:45

## 2024-01-01 RX ADMIN — MORPHINE SULFATE 15 MILLIGRAM(S): 50 CAPSULE, EXTENDED RELEASE ORAL at 18:00

## 2024-01-01 RX ADMIN — MORPHINE SULFATE 4 MILLIGRAM(S): 50 CAPSULE, EXTENDED RELEASE ORAL at 13:07

## 2024-01-01 RX ADMIN — MORPHINE SULFATE 2 MILLIGRAM(S): 50 CAPSULE, EXTENDED RELEASE ORAL at 08:12

## 2024-01-01 RX ADMIN — Medication 1 APPLICATION(S): at 05:18

## 2024-01-01 RX ADMIN — Medication 1 APPLICATION(S): at 05:30

## 2024-01-01 RX ADMIN — PANTOPRAZOLE SODIUM 40 MILLIGRAM(S): 20 TABLET, DELAYED RELEASE ORAL at 05:31

## 2024-01-01 RX ADMIN — MORPHINE SULFATE 4 MILLIGRAM(S): 50 CAPSULE, EXTENDED RELEASE ORAL at 16:50

## 2024-01-01 RX ADMIN — MORPHINE SULFATE 6 MILLIGRAM(S): 50 CAPSULE, EXTENDED RELEASE ORAL at 21:14

## 2024-01-01 RX ADMIN — Medication 1 APPLICATION(S): at 17:15

## 2024-01-01 RX ADMIN — Medication 200 MILLIGRAM(S): at 00:40

## 2024-01-01 RX ADMIN — MORPHINE SULFATE 4 MILLIGRAM(S): 50 CAPSULE, EXTENDED RELEASE ORAL at 11:00

## 2024-01-01 RX ADMIN — Medication 1 MILLIGRAM(S): at 21:52

## 2024-01-01 RX ADMIN — MORPHINE SULFATE 4 MILLIGRAM(S): 50 CAPSULE, EXTENDED RELEASE ORAL at 02:09

## 2024-01-01 RX ADMIN — PANTOPRAZOLE SODIUM 40 MILLIGRAM(S): 20 TABLET, DELAYED RELEASE ORAL at 05:16

## 2024-01-01 RX ADMIN — Medication 1 MILLIGRAM(S): at 05:53

## 2024-01-01 RX ADMIN — LETROZOLE 2.5 MILLIGRAM(S): 2.5 TABLET, FILM COATED ORAL at 12:55

## 2024-01-01 RX ADMIN — AMPICILLIN SODIUM AND SULBACTAM SODIUM 200 GRAM(S): 250; 125 INJECTION, POWDER, FOR SUSPENSION INTRAMUSCULAR; INTRAVENOUS at 00:32

## 2024-01-01 RX ADMIN — Medication 25 GRAM(S): at 18:08

## 2024-01-01 RX ADMIN — EXEMESTANE 25 MILLIGRAM(S): 25 TABLET, SUGAR COATED ORAL at 12:18

## 2024-01-01 RX ADMIN — Medication 200 MILLIGRAM(S): at 02:28

## 2024-01-01 RX ADMIN — Medication 1 APPLICATION(S): at 17:03

## 2024-01-01 RX ADMIN — MORPHINE SULFATE 4 MILLIGRAM(S): 50 CAPSULE, EXTENDED RELEASE ORAL at 16:28

## 2024-01-01 RX ADMIN — Medication 5 MILLIGRAM(S): at 21:50

## 2024-01-01 RX ADMIN — MORPHINE SULFATE 4 MILLIGRAM(S): 50 CAPSULE, EXTENDED RELEASE ORAL at 23:56

## 2024-01-01 RX ADMIN — Medication 1 APPLICATION(S): at 17:35

## 2024-01-01 RX ADMIN — Medication 50 MILLIEQUIVALENT(S): at 11:58

## 2024-01-01 RX ADMIN — MORPHINE SULFATE 4 MILLIGRAM(S): 50 CAPSULE, EXTENDED RELEASE ORAL at 21:44

## 2024-01-01 RX ADMIN — Medication 50 MILLIEQUIVALENT(S): at 00:18

## 2024-01-01 RX ADMIN — MORPHINE SULFATE 2 MILLIGRAM(S): 50 CAPSULE, EXTENDED RELEASE ORAL at 09:05

## 2024-01-01 RX ADMIN — MORPHINE SULFATE 4 MILLIGRAM(S): 50 CAPSULE, EXTENDED RELEASE ORAL at 09:19

## 2024-01-01 RX ADMIN — MORPHINE SULFATE 4 MILLIGRAM(S): 50 CAPSULE, EXTENDED RELEASE ORAL at 03:09

## 2024-01-01 RX ADMIN — Medication 1 APPLICATION(S): at 05:14

## 2024-01-01 RX ADMIN — Medication 5 MILLIGRAM(S): at 21:53

## 2024-01-01 RX ADMIN — Medication 5 MILLIGRAM(S): at 21:02

## 2024-01-01 RX ADMIN — ZOLEDRONIC ACID 4 MILLIGRAM(S): 5 INJECTION, SOLUTION INTRAVENOUS at 17:44

## 2024-01-01 RX ADMIN — MORPHINE SULFATE 15 MILLIGRAM(S): 50 CAPSULE, EXTENDED RELEASE ORAL at 17:37

## 2024-01-01 RX ADMIN — MORPHINE SULFATE 15 MILLIGRAM(S): 50 CAPSULE, EXTENDED RELEASE ORAL at 06:09

## 2024-01-01 RX ADMIN — Medication 25 GRAM(S): at 12:21

## 2024-01-01 RX ADMIN — MORPHINE SULFATE 4 MILLIGRAM(S): 50 CAPSULE, EXTENDED RELEASE ORAL at 11:49

## 2024-01-01 RX ADMIN — MORPHINE SULFATE 4 MILLIGRAM(S): 50 CAPSULE, EXTENDED RELEASE ORAL at 13:08

## 2024-01-01 RX ADMIN — PANTOPRAZOLE SODIUM 40 MILLIGRAM(S): 20 TABLET, DELAYED RELEASE ORAL at 06:13

## 2024-01-01 RX ADMIN — Medication 25 GRAM(S): at 09:41

## 2024-01-01 RX ADMIN — Medication 1 APPLICATION(S): at 17:25

## 2024-01-01 RX ADMIN — MORPHINE SULFATE 15 MILLIGRAM(S): 50 CAPSULE, EXTENDED RELEASE ORAL at 05:14

## 2024-01-01 RX ADMIN — MORPHINE SULFATE 15 MILLIGRAM(S): 50 CAPSULE, EXTENDED RELEASE ORAL at 17:49

## 2024-01-01 RX ADMIN — SODIUM CHLORIDE 100 MILLILITER(S): 9 INJECTION, SOLUTION INTRAVENOUS at 05:29

## 2024-01-01 RX ADMIN — Medication 50 MICROGRAM(S): at 05:29

## 2024-01-01 RX ADMIN — MORPHINE SULFATE 15 MILLIGRAM(S): 50 CAPSULE, EXTENDED RELEASE ORAL at 06:10

## 2024-01-01 RX ADMIN — MORPHINE SULFATE 4 MILLIGRAM(S): 50 CAPSULE, EXTENDED RELEASE ORAL at 14:14

## 2024-01-01 RX ADMIN — Medication 250 MILLIGRAM(S): at 00:00

## 2024-01-01 RX ADMIN — MORPHINE SULFATE 2 MILLIGRAM(S): 50 CAPSULE, EXTENDED RELEASE ORAL at 18:00

## 2024-01-01 RX ADMIN — MORPHINE SULFATE 4 MILLIGRAM(S): 50 CAPSULE, EXTENDED RELEASE ORAL at 16:30

## 2024-01-01 RX ADMIN — CHLORHEXIDINE GLUCONATE 1 APPLICATION(S): 213 SOLUTION TOPICAL at 05:15

## 2024-01-01 RX ADMIN — Medication 50 MICROGRAM(S): at 05:16

## 2024-01-01 RX ADMIN — AMPICILLIN SODIUM AND SULBACTAM SODIUM 200 GRAM(S): 250; 125 INJECTION, POWDER, FOR SUSPENSION INTRAMUSCULAR; INTRAVENOUS at 12:18

## 2024-01-01 RX ADMIN — Medication 20 MILLIGRAM(S): at 14:08

## 2024-01-01 RX ADMIN — EXEMESTANE 25 MILLIGRAM(S): 25 TABLET, SUGAR COATED ORAL at 12:22

## 2024-01-01 RX ADMIN — Medication 50 MICROGRAM(S): at 06:13

## 2024-01-01 RX ADMIN — EXEMESTANE 25 MILLIGRAM(S): 25 TABLET, SUGAR COATED ORAL at 11:16

## 2024-01-01 RX ADMIN — AMPICILLIN SODIUM AND SULBACTAM SODIUM 200 GRAM(S): 250; 125 INJECTION, POWDER, FOR SUSPENSION INTRAMUSCULAR; INTRAVENOUS at 00:46

## 2024-01-01 RX ADMIN — MORPHINE SULFATE 2 MG/HR: 50 CAPSULE, EXTENDED RELEASE ORAL at 12:07

## 2024-01-01 RX ADMIN — Medication 1 DROP(S): at 17:20

## 2024-01-01 RX ADMIN — Medication 1 DROP(S): at 06:10

## 2024-01-01 RX ADMIN — MORPHINE SULFATE 4 MILLIGRAM(S): 50 CAPSULE, EXTENDED RELEASE ORAL at 12:00

## 2024-01-01 RX ADMIN — Medication 200 MILLIGRAM(S): at 12:21

## 2024-01-01 RX ADMIN — ZOLEDRONIC ACID 4 MILLIGRAM(S): 5 INJECTION, SOLUTION INTRAVENOUS at 13:26

## 2024-01-01 RX ADMIN — Medication 100 MILLIGRAM(S): at 04:38

## 2024-01-01 RX ADMIN — MORPHINE SULFATE 2 MILLIGRAM(S): 50 CAPSULE, EXTENDED RELEASE ORAL at 12:06

## 2024-01-01 RX ADMIN — LETROZOLE 2.5 MILLIGRAM(S): 2.5 TABLET, FILM COATED ORAL at 23:43

## 2024-01-01 RX ADMIN — MORPHINE SULFATE 4 MILLIGRAM(S): 50 CAPSULE, EXTENDED RELEASE ORAL at 09:36

## 2024-01-01 RX ADMIN — Medication 4 MILLIGRAM(S): at 17:42

## 2024-02-23 PROBLEM — C50.911 BREAST CANCER, RIGHT: Status: ACTIVE | Noted: 2023-04-23

## 2024-02-23 PROBLEM — Z51.81 ENCOUNTER FOR THERAPEUTIC DRUG MONITORING: Status: ACTIVE | Noted: 2023-01-01

## 2024-02-23 NOTE — ASSESSMENT
[FreeTextEntry1] : 58 year old female with breast cancer HR positive HER 2 negative with good clinical and biochemical response to  endocrine therapy for > 6 months  Tolerating treatment , mild neutropenia , no infections .  S/P cervical spine fusion , radiation .   Plan : continue Zometa monthly for now , pending PET scan in 1 month .             continue to monitor for infections , infusion reaction , ONJ from zometa.             follow up in 8 weeks.

## 2024-02-23 NOTE — HISTORY OF PRESENT ILLNESS
[de-identified] : 57 year old female referred by Dr Suarez for suspected metastatic bone disease. PMH of alcohol abuse , cirrhosis ? sober for 9 years. Presented with worsening neck and lower back pain . MRI ( 3/21/2023 ) shows diffuse metastatic bone  disease most pronounced at C2 with bony expansion causing circumferential narrowing of central canal .\par  pain is only partially relieved with tylenol and advil . unable to move,  rotate her neck or lie flat . essentially confined to recliner. She denies weakness, numbness or sphincteric symptoms . She notes right breast lesion for 3 years , increased in size recently . [de-identified] : 5/25/2023 Patient returns for follow up after internal fixation of C2 fracture , biopsy consistent with breast cancer ER [place] 1+ positive , HER 2 negative , KI67 5%  She is on radiation and complains of mild sore throat and dry mouth , Also started on femara and is tolerating well . CT scan showed mediastinal adenopathy in addition to extensive widespread bone metastasis. SHe is on Robaxin and percocet . to start on home PT . NO leg weakness or numbness.   8/25/2023 Patient returns for follow up , she is on letrozole and Ibrance , she was admitted after radiation with esophagitis and severe weight loss, She feels better and is gaining weight on megace . she denies any pain . headaches .    2/20/2024 Patient returns for follow up, she feels well and is gaining weight . she denies bone pain , no fever.  10/24/203 Patient returns for follow up , she feels well and os gaining weight , she denies pain and is capable of all her self care . She complains of new onset of diffuse facial rash , non itchy .   12/26/2023 Patient returns for follow up , She continues on letrozole and ibrance for > 6 months .she missed last dose of zometa after she contracted Covid 19 and  a bout of gastroenteritis. She feels well today , no pain , no fever ,  RAsh on face is better with topical steroids .  She is more active and is able to care for her grand child . Last blood work shows normal calcium , decreased CA27.29 .

## 2024-03-01 NOTE — PRE-OP CHECKLIST - NS PREOP CHK HIBICLENS NA
Pt reports high BP at home, is not on any medication.  [place] the [place], has been here for 18 days.  Pt also c/o HA and dry, infrequent cough.     Triage Assessment (Adult)       Row Name 02/29/24 [id number]          Triage Assessment    Airway WDL WDL        Respiratory WDL    Respiratory WDL WDL        Skin Circulation/Temperature WDL    Skin Circulation/Temperature WDL WDL        Cardiac WDL    Cardiac WDL X   hypertensive     Cardiac Rhythm ST        Peripheral/Neurovascular WDL    Peripheral Neurovascular WDL WDL        Cognitive/Neuro/Behavioral WDL    Cognitive/Neuro/Behavioral WDL WDL                     
N/A

## 2024-03-04 NOTE — DISCHARGE NOTE NURSING/CASE MANAGEMENT/SOCIAL WORK - NSDPFAC_GEN_ALL_CORE
The patient called in and stated he would liked to proceed with the angiogram.  He would like Shannan to call back to speak with his ex wife about the procedure.  I do not see her on HIPAA but I let her know that I would pass along the information and someone would be back in touch with them tomorrow.  The patient acknowledged.   
si 4a

## 2024-04-26 NOTE — H&P ADULT - HISTORY OF PRESENT ILLNESS
59yo female whose PMH  includes breast cancer metastatic to bones, presents to the ER due to back pain present for 2 weeks. Due to persistence, she was advised to come to the ER 59yo female whose PMH  includes breast cancer widely metastatic to bone, status post radiation 5 sessions finished 5/31/23, on letrozole daily, was on Ribociclib and was discontinued due to low magnesium with high QTc, surgical history of occiput-T2 posterior instrumentation and fusion, ORIF of C2 body with functional decline and impaired ADLs/mobility, ambulates with a walker, following with Dr. Peña , presents to the ER due to back pain present for 2 weeks. Due to persistence, she was advised to come to the ER 59yo female whose documented PMH  includes hypothyroid, asthma, ETOH abuse (cirrhosis) and breast cancer widely metastatic to bone, status post radiation 5 sessions finished 5/31/23, on letrozole daily, was on Ribociclib and was discontinued due to low magnesium with high QTc, surgical history of occiput-T2 posterior instrumentation and fusion, ORIF of C2 body with functional decline and impaired ADLs/mobility, ambulates with a walker, following with Dr. Peña , presents to the ER due to back pain present for 2 weeks. Due to persistence, she was advised to come to the ER 57yo female whose documented PMH  includes hypothyroid, asthma, ETOH abuse (cirrhosis) and breast cancer widely metastatic to bone, status post radiation 5 sessions finished 5/31/23, on letrozole daily, (was on Ribociclib and was discontinued due to low magnesium with high QTc), surgical history of occiput-T2 posterior instrumentation and fusion, ORIF of C2 body with functional decline and impaired ADLs/mobility, ambulates with a walker, following with Dr. Peña , presents to the ER due to back pain present for 2 weeks. Due to persistence, she was advised to come to the ER. While in the ER patient became tachycardic with pulse ox of 89% so PE study was done (no PE)  57yo female whose documented PMH  includes hypothyroid, asthma, ETOH abuse (cirrhosis) and breast cancer widely metastatic to bone, status post radiation 5 sessions finished 5/31/23, on letrozole daily, (was on Ribociclib and was discontinued due to low magnesium with high QTc), surgical history of occiput-T2 posterior instrumentation and fusion, ORIF of C2 body with functional decline and impaired ADLs/mobility, ambulates with a walker, following with Dr. Peña , presents to the ER due to back pain present for 2 weeks. Due to persistence, she was advised to come to the ER. No loss of bladder, bowel function but told ER she had generalized weakness. While in the ER patient became tachycardic with pulse ox of 89% so PE study was done (no PE) ER team spoke to neurosurgery team member requesting that [place] be [place]

## 2024-04-26 NOTE — ED PROVIDER NOTE - TOBACCO USE
Never smoker Mart-1 - Positive Histology Text: MART-1 staining demonstrates areas of higher density and clustering of melanocytes with Pagetoid spread upwards within the epidermis. The surgical margins are positive for tumor cells.

## 2024-04-26 NOTE — H&P ADULT - NSHPLABSRESULTS_GEN_ALL_CORE
< from: CT Lumbar Spine No Cont (04.26.24 @ 16:49) >      ACC: [id number] EXAM:  CT THORACIC SPINE   ORDERED BY: DENVER KIM     ACC: [id number] EXAM:  CT LUMBAR SPINE   ORDERED BY: DENVER KIM     PROCEDURE DATE:  04/26/2024      < from: CT Lumbar Spine No Cont (04.26.24 @ 16:49) >    IMPRESSION:    Increased diffuse predominantly sclerotic osseous metastasis throughout   the thoracolumbar spine, ribs, sacrum, and bilateral iliac bones compared   to the prior CT abdomen pelvis dated 7/19/2013. Multilevel compression   deformities of the thoracic spine and L4 without significant change.    Multilevel thoracolumbar neuroforaminal stenosis as above.    JULIO FRIEND MD; Attending Radiologist  This document has been electronically signed. Apr 26 2024  5:23PM    < end of copied text >                          13.2   2.70  )-----------( 25       ( 26 Apr 2024 16:10 )             37.3     04-26    135  |  89<L>  |  30<H>  ----------------------------<  93  3.6   |  22  |  0.9    Ca    14.2<HH>      26 Apr 2024 16:10  Mg     1.9     04-26    TPro  6.7  /  Alb  4.1  /  TBili  1.1  /  DBili  x   /  AST  210<H>  /  ALT  28  /  AlkPhos  232<H>  04-26          Urinalysis Basic - ( 26 Apr 2024 16:10 )    Color: x / Appearance: x / SG: x / pH: x  Gluc: 93 mg/dL / Ketone: x  / Bili: x / Urobili: x   Blood: x / Protein: x / Nitrite: x   Leuk Esterase: x / RBC: x / WBC x   Sq Epi: x / Non Sq Epi: x / Bacteria: x

## 2024-04-26 NOTE — ED PROVIDER NOTE - ATTENDING APP SHARED VISIT CONTRIBUTION OF CARE
I have personally performed a history and physical exam on this patient and personally directed the management of the patient. Patient is a 58-year-old female presents for evaluation of generalized weakness patient has known malignancy of the spine and per her is getting radiation she states that she completed 5 sessions of radiation and was doing well however over the past 24 to 48 hours became weak denies any loss of bladder or bowel control denies any saddle anesthesia denies any focal weakness to the lower extremities denies any fevers or chills    On physical exam patient is normocephalic atraumatic pupils equal round reactive light accommodation extraocular muscles intact oropharynx clear chest clear to auscultation bilaterally abdomen soft nontender nondistended bowel sounds positive extremities patient is able to move all extremities pedal pulses 2+ radial pulses 2+ inspection of the back reveals no areas of erythema or warmth    Assessment plan patient presents for evaluation of generalized weakness considering her past medical history of malignancy to the spine she does not have evidence of cauda equina at this time no saddle anesthesia no loss of bladder or bowel control no focal deficits in the lower extremities she is currently afebrile here in the emergency department given her past history and complaints we obtained CT T-spine as well as L-spine as wel I will admit patient for further monitoring as well as MRI again considering her symptoms and history I have personally performed a history and physical exam on this patient and personally directed the management of the patient. Patient is a 58-year-old female presents for evaluation of generalized weakness patient has known malignancy of the spine and per her is getting radiation she states that she completed 5 sessions of radiation and was doing well however over the past 24 to 48 hours became weak denies any loss of bladder or bowel control denies any saddle anesthesia denies any focal weakness to the lower extremities denies any fevers or chills    On physical exam patient is normocephalic atraumatic pupils equal round reactive light accommodation extraocular muscles intact oropharynx clear chest clear to auscultation bilaterally abdomen soft nontender nondistended bowel sounds positive extremities patient is able to move all extremities pedal pulses 2+ radial pulses 2+ inspection of the back reveals no areas of erythema or warmth    Assessment plan patient presents for evaluation of generalized weakness considering her past medical history of malignancy to the spine she does not have evidence of cauda equina at this time no saddle anesthesia no loss of bladder or bowel control no focal deficits in the lower extremities she is currently afebrile here in the emergency department given her past history and complaints we obtained CT T-spine as well as L-spine as wel I will admit patient for further monitoring as well as MRI again considering her symptoms and history.

## 2024-04-26 NOTE — H&P ADULT - NSHPADDITIONALINFOADULT_GEN_ALL_CORE
I advised [facility] of planned transfer to [facility] I advised [facility] of planned transfer to [facility]. I then told humble the same

## 2024-04-26 NOTE — CONSULT NOTE ADULT - SUBJECTIVE AND OBJECTIVE BOX
Patient is a 58 year old female with a PMH of metastatic cancer who presents with complaint of generalized weakness. During ED evaluation, pt became acutely tachypneic and hypoxic requiring initiation of supplemental oxygen via NC. Due to acute symptoms, the hospitalist recommended critical care evaluation. Upon evaluation, the pt was awake and alert and answering all questions appropriately. The pt was saturating 100% on 2L NC and HR was noted to be 100 bpm. Pt denies CP, palpitations, SOB, dyspnea, ABD pain, and LE swelling. No other complaints or concerns noted.     ROS: denies CP, palpitations, SOB, dyspnea, ABD pain, and LE swelling    T(C): 36.8 (04-26-24 @ 15:11), Max: 36.8 (04-26-24 @ 15:11)  HR: 100 (04-26-24 @ 20:42) (100 - 125)  BP: 118/65 (04-26-24 @ 20:42) (114/80 - 126/72)  RR: 20 (04-26-24 @ 20:42) (18 - 20)  SpO2: 98% (04-26-24 @ 20:42) (96% - 98%)    CONSTITUTIONAL: no apparent distress  EYES:  No conjunctival or scleral injection, non-icteric  ENMT: Oral mucosa with moist membranes  RESP: No respiratory distress, no use of accessory muscles; CTA b/l, no WRR  CV: RRR, +S1S2, no MRG; no JVD; no peripheral edema  GI: Soft, NT, ND  LYMPH: No cervical LAD or tenderness  MSK:  No digital clubbing or cyanosis  SKIN: No rashes or ulcers noted  NEURO: sensation intact in upper and lower extremities b/l to light touch   PSYCH: Appropriate insight/judgment; A+O x 3, mood and affect appropriate, recent/remote memory intact                          13.2   2.70  )-----------( 25       ( 26 Apr 2024 16:10 )             37.3   04-26    135  |  89<L>  |  30<H>  ----------------------------<  93  3.6   |  22  |  0.9    Ca    14.2<HH>      26 Apr 2024 16:10    TPro  6.7  /  Alb  4.1  /  TBili  1.1  /  DBili  x   /  AST  210<H>  /  ALT  28  /  AlkPhos  232<H>  04-26

## 2024-04-26 NOTE — ED PROVIDER NOTE - NSTIMEPROVIDERCAREINITIATE_GEN_ER
26-Apr-2024 15:14 Propranolol Pregnancy And Lactation Text: This medication is Pregnancy Category C and it isn't known if it is safe during pregnancy. It is excreted in breast milk.

## 2024-04-26 NOTE — ED ADULT NURSE NOTE - NSFALLHARMRISKINTERV_ED_ALL_ED

## 2024-04-26 NOTE — PATIENT PROFILE ADULT - FALL HARM RISK - HARM RISK INTERVENTIONS

## 2024-04-26 NOTE — CONSULT NOTE ADULT - ASSESSMENT
1. Acute hypoxic respiratory distress- resolved  2. Hypercalcemia in the setting of metastatic disease

## 2024-04-26 NOTE — ED PROVIDER NOTE - OBJECTIVE STATEMENT
Patient is a 58-year-old female presents for evaluation of generalized weakness patient has known malignancy of the spine and per her is getting radiation she states that she completed 5 sessions of radiation and was doing well however over the past 24 to 48 hours became weak denies any loss of bladder or bowel control denies any saddle anesthesia denies any focal weakness to the lower extremities denies any fevers or chills

## 2024-04-26 NOTE — H&P ADULT - ASSESSMENT
back pain     breast cancer metastatic to bone    history of magnesium deficiency     Hypercalcemia (cancer related)  back pain - needs further evaluation by neurosurgery    breast cancer metastatic to bone - noted     history of magnesium deficiency - on supplement    Hypercalcemia (cancer related) - as per intensivist, aggressive NS infusion for now    hgypothyroid - on synthroid     As per ER, neurosurgery is requesting that patient be transferred to [facility] to expedite evaluation and treatment  back pain - needs further evaluation by neurosurgery    breast cancer metastatic to bone - noted     history of magnesium deficiency - on supplement, has normal level     Hypercalcemia (cancer related) - as per intensivist, aggressive NS infusion for now upgrade to telemetry)     hypothyroid - on synthroid     As per ER, neurosurgery is requesting that patient be transferred to [facility] to expedite evaluation and treatment  back pain - needs further evaluation by neurosurgery    breast cancer metastatic to bone - noted     history of magnesium deficiency - on supplement, has normal level     Hypercalcemia (cancer related) - as per intensivist, aggressive NS infusion for now upgrade to telemetry)     hypothyroid - on synthroid     As per ER, neurosurgery is requesting that patient be transferred to [facility] to expedite evaluation and treatment (I discussed ordering MRI studies while patient is here at [facility] with neurosurgery PA. If patient is able to get a bed at [facility] early in the morning then they will order the imaging study. However, if patient does not get a bed at [place] early in the morning, I would contact them to discuss further)  back pain - needs further evaluation by neurosurgery    breast cancer metastatic to bone - noted     history of magnesium deficiency - on supplement, has normal level     Hypercalcemia (cancer related, severe) - as per intensivist, aggressive NS infusion for now, upgrade to telemetry -  Addendum (4/27/2024 at 0130) - will give dose of Calcitonin 250 units now    hypothyroid - on synthroid     As per ER, neurosurgery is requesting that patient be transferred to [facility] to expedite evaluation and treatment (I discussed ordering MRI studies while patient is here at [facility] with neurosurgery PA. If patient is able to get a bed at [facility] early in the morning then they will order the imaging study. However, if patient does not get a bed at [facility] early in the morning, I would contact them to discuss further)

## 2024-04-26 NOTE — ED PROVIDER NOTE - PHYSICAL EXAMINATION
On physical exam patient is normocephalic atraumatic pupils equal round reactive light accommodation extraocular muscles intact oropharynx clear chest clear to auscultation bilaterally abdomen soft nontender nondistended bowel sounds positive extremities patient is able to move all extremities pedal pulses 2+ radial pulses 2+ inspection of the back reveals no areas of erythema or warmth

## 2024-04-26 NOTE — ED PROVIDER NOTE - PROGRESS NOTE DETAILS
fs dicussed case with ns who advises admission to medical service and transfer to [facility] for further evaluation. Given ca we have initiated iv fluids JS: On cardiac monitor patient noted to be hypoxic to 89% and started on 2 L nasal cannula.  Persistently tachycardic with IV fluids. No CP, SOB. Will proceed with CTA chest to rule out PE.  Signed out to RESHMA Baptiste pending results and transfer to [place] to [facility] medicine for neurosurgery consult and heme-onc evaluation. AY: Per intensivist, patient not eligible for ICU or SDU care at this time. 58-year-old female, history of metastatic breast CA presents with generalized weakness.  Pan CT noted.  CTA chest no PE.  Seen by intensivist in the ED, not a candidate for ICU admission.  Will admit [facility] for [facility]surgical evaluation.

## 2024-04-26 NOTE — H&P ADULT - PARTICIPANTS
[FreeTextEntry1] : 63 yo F presents with a UTI\par Per pt, about 7-8 yrs ago getting recurrent UTI\par Was seeing a urologist Dr. Velazco - was on macrobid intermittently\par Was doing well until about 2 months ago, had a UTI requiring a course of macrobid\par At that time, noticed some itchy hands and feet\par Last week didn't drink all day at work and felt UTI symptoms afterwards\par Went to urgent care\par Has been taking Azo bid for 1 year\par Usually going to the bathroom only twice per day at work\par Drinks 2 bottles of water at night and is up all night urinating every hour\par chronic constipation\par dysuria and itching\par took some spare bactrim last night Patient

## 2024-04-26 NOTE — ED ADULT TRIAGE NOTE - WEIGHT METHOD
----- Message from Addy Gant Jr., RN sent at 3/6/2017 12:59 PM CST -----  Dr. Sarmiento,  The above patient is currently on the kidney transplant waiting list.  He had a repeat CXR on 3/1/17.  It shows:      Small to moderate right-sided pleural effusion with associated right basilar atelectasis/consolidation.  Findings are similar to or slightly more pronounced than on the prior examination.  Mild cardiomegaly.    Is this gentleman okay to proceed to transplant or does this effusion need further examination?  Please let me know.  Thank you,  Addy Gant Jr.  Sr Kdy Tx Coordinator        
stated

## 2024-04-26 NOTE — CONSULT NOTE ADULT - PROBLEM SELECTOR RECOMMENDATION 2
- Continue with IVF hydration - Continue with IVF hydration with NS  - Follow up repeat BMP in the AM  - Can consider Calcitonin if calcium level does not decrease with aggressive IVF hydration

## 2024-04-26 NOTE — CONSULT NOTE ADULT - NSCONSULTADDITIONALINFOA_GEN_ALL_CORE
Thank you for the opportunity to participate in the care of this patient. Plan of care discussed with the pt and pt's . All questions answered and concerns addressed. Please admit to the med-tele unit.

## 2024-04-26 NOTE — ED PROVIDER NOTE - CLINICAL SUMMARY MEDICAL DECISION MAKING FREE TEXT BOX
On physical exam patient is normocephalic atraumatic pupils equal round reactive light accommodation extraocular muscles intact oropharynx clear chest clear to auscultation bilaterally abdomen soft nontender nondistended bowel sounds positive extremities patient is able to move all extremities pedal pulses 2+ radial pulses 2+ inspection of the back reveals no areas of erythema or warmth    Assessment plan patient presents for evaluation of generalized weakness considering her past medical history of malignancy to the spine she does not have evidence of cauda equina at this time no saddle anesthesia no loss of bladder or bowel control no focal deficits in the lower extremities she is currently afebrile here in the emergency department given her past history and complaints we obtained CT T-spine as well as L-spine as wel I will admit patient for further monitoring as well as MRI again considering her symptoms and history

## 2024-04-27 NOTE — CONSULT NOTE ADULT - SUBJECTIVE AND OBJECTIVE BOX
Hematology Consult Note    HPI:  59yo female whose documented PMH  includes hypothyroid, asthma, ETOH abuse (cirrhosis) and breast cancer widely metastatic to bone, status post radiation 5 sessions finished 5/31/23, on letrozole daily, (was on Ribociclib and was discontinued due to low magnesium with high QTc), surgical history of occiput-T2 posterior instrumentation and fusion, ORIF of C2 body with functional decline and impaired ADLs/mobility, ambulates with a walker, following with Dr. Peña , presents to the ER due to back pain present for 2 weeks. Due to persistence, she was advised to come to the ER. No loss of bladder, bowel function but told ER she had generalized weakness. While in the ER patient became tachycardic with pulse ox of 89% so PE study was done (no PE) ER team spoke to neurosurgery team member requesting that [facility] be sent [facility]  (26 Apr 2024 22:23)      Allergies    No Known Allergies    Intolerances    hydromorphone (Faint; Nausea)      MEDICATIONS  (STANDING):  letrozole 2.5 milliGRAM(s) Oral daily  levothyroxine 50 MICROGram(s) Oral <User Schedule>  magnesium oxide 400 milliGRAM(s) Oral daily  melatonin 5 milliGRAM(s) Oral at bedtime  potassium chloride  20 mEq/100 mL IVPB 20 milliEquivalent(s) IV Intermittent every 2 hours    MEDICATIONS  (PRN):  albuterol    0.083% 2.5 milliGRAM(s) Nebulizer every 6 hours PRN Shortness of Breath and/or Wheezing  ibuprofen  Tablet. 200 milliGRAM(s) Oral every 6 hours PRN Mild Pain (1 - 3)      PAST MEDICAL & SURGICAL HISTORY:  Hypothyroidism      H/O cirrhosis      Breast cancer      No significant past surgical history          FAMILY HISTORY:  FHx: melanoma (Mother)    FHx: arrhythmia (Father)        SOCIAL HISTORY: No EtOH, no tobacco    REVIEW OF SYSTEMS:    CONSTITUTIONAL: No weakness, fevers or chills  EYES/ENT: No visual changes;  No vertigo or throat pain   NECK: No pain or stiffness  RESPIRATORY: No cough, wheezing, hemoptysis; No shortness of breath  CARDIOVASCULAR: No chest pain or palpitations  GASTROINTESTINAL: No abdominal or epigastric pain. No nausea, vomiting, or hematemesis; No diarrhea or constipation. No melena or hematochezia.  GENITOURINARY: No dysuria, frequency or hematuria  NEUROLOGICAL: No numbness or weakness  SKIN: No itching, burning, rashes, or lesions   All other review of systems is negative unless indicated above.    Height (cm): 162.6 (04-26 @ 22:53)  Weight (kg): 61.3 (04-26 @ 22:53)  BMI (kg/m2): 23.2 (04-26 @ 22:53)  BSA (m2): 1.66 (04-26 @ 22:53)    T(F): 97.1 (04-27-24 @ 05:07), Max: 98.3 (04-26-24 @ 15:11)  HR: 94 (04-27-24 @ 05:07)  BP: 110/68 (04-27-24 @ 05:07)  RR: 18 (04-27-24 @ 05:07)  SpO2: 100% (04-27-24 @ 05:07)  Wt(kg): --    GENERAL: NAD, well-developed  HEAD:  Atraumatic, Normocephalic  EYES: EOMI, PERRLA, conjunctiva and sclera clear  NECK: Supple, No JVD  CHEST/LUNG: Clear to auscultation bilaterally; No wheeze  HEART: Regular rate and rhythm; No murmurs, rubs, or gallops  ABDOMEN: Soft, Nontender, Nondistended; Bowel sounds present  EXTREMITIES:  2+ Peripheral Pulses, No clubbing, cyanosis, or edema  NEUROLOGY: non-focal  SKIN: No rashes or lesions                          10.1   2.16  )-----------( 18       ( 27 Apr 2024 06:22 )             28.9       04-27    138  |  100  |  19  ----------------------------<  89  2.6<LL>   |  20  |  0.5<L>    Ca    11.4<H>      27 Apr 2024 06:22  Mg     1.3     04-27    TPro  5.7<L>  /  Alb  3.6  /  TBili  1.1  /  DBili  x   /  AST  174<H>  /  ALT  26  /  AlkPhos  192<H>  04-27      Magnesium: 1.3 mg/dL (04-27 @ 06:22)  Magnesium: 1.9 mg/dL (04-26 @ 22:19)

## 2024-04-27 NOTE — PROGRESS NOTE ADULT - ATTENDING COMMENTS
Patient was seen and examined this morning  Plan of care discussed with the house staff on morning rounds  Assessment and plan as above  Edited the note  Discussed the plan with the patient

## 2024-04-27 NOTE — PROGRESS NOTE ADULT - SUBJECTIVE AND OBJECTIVE BOX
24H events:    Patient is a 58y old Female who presents with a chief complaint of   Primary diagnosis of Metastatic cancer to bone      Today is hospital day 1d. This morning patient was seen and examined at bedside, resting comfortably in bed.    No acute or major events overnight.    Family communication:  Contact date: 4/27/24  Relationship to patient: sister in law  Communication details: discussed patient history of cancer and never got PET scan and back pain of 2 weeks origin.  What matters most: management of cancer    PAST MEDICAL & SURGICAL HISTORY  Hypothyroidism    H/O cirrhosis    Breast cancer    No significant past surgical history      SOCIAL HISTORY:  Social History:  former alcohol abuse, denies tobacco use (26 Apr 2024 22:23)      ALLERGIES:  No Known Allergies    MEDICATIONS:  STANDING MEDICATIONS  letrozole 2.5 milliGRAM(s) Oral daily  levothyroxine 50 MICROGram(s) Oral <User Schedule>  magnesium oxide 400 milliGRAM(s) Oral daily  melatonin 5 milliGRAM(s) Oral at bedtime  potassium chloride  20 mEq/100 mL IVPB 20 milliEquivalent(s) IV Intermittent every 2 hours    PRN MEDICATIONS  albuterol    0.083% 2.5 milliGRAM(s) Nebulizer every 6 hours PRN  ibuprofen  Tablet. 200 milliGRAM(s) Oral every 6 hours PRN    VITALS:   T(F): 97.1  HR: 94  BP: 110/68  RR: 18  SpO2: 100%    PHYSICAL EXAM:  GENERAL:   ( + ) NAD, lying in bed comfortably     (  ) obtunded     (  ) lethargic     (  ) somnolent    HEAD:   (  ) Atraumatic     (+  ) periocular hematoma     (  ) laceration (specify location:       )     NECK:  (+  ) Supple     (  ) neck stiffness     (  ) nuchal rigidity     (  )  no JVD     (  ) JVD present ( -- cm)    HEART:  Rate -->     ( + ) normal rate     (  ) bradycardic     (  ) tachycardic  Rhythm -->     ( + ) regular     (  ) regularly irregular     (  ) irregularly irregular  Murmurs -->     (  ) normal s1s2     (  ) systolic murmur     (  ) diastolic murmur     (  ) continuous murmur      (  ) S3 present     (  ) S4 present    LUNGS:   (+  )Unlabored respirations     (  ) tachypnea  (  +) B/L air entry     (  ) decreased breath sounds in:  (location     )    (  ) no adventitious sound     (  ) crackles     (  ) wheezing      (  ) rhonchi      (specify location:       )  (  ) chest wall tenderness (specify location:       )    ABDOMEN:   (+  ) Soft     (  ) tense   |   (+  ) nondistended     (  ) distended   |   (  ) +BS     (  ) hypoactive bowel sounds     (  ) hyperactive bowel sounds  ( + ) nontender     (  ) RUQ tenderness     (  ) RLQ tenderness     (  ) LLQ tenderness     (  ) epigastric tenderness     (  ) diffuse tenderness  (  ) Splenomegaly      (  ) Hepatomegaly      (  ) Jaundice     (  ) ecchymosis     EXTREMITIES: 2+ peripheral pulses bilaterally. No clubbing, cyanosis, or edema  (  +) Normal     (  ) Rash     (  ) ecchymosis     (  ) varicose veins      (  ) pitting edema     (  ) non-pitting edema   (  ) ulceration     (  ) gangrene:     (location:     )    NERVOUS SYSTEM:    (+  ) A&Ox3     (  ) confused     (  ) lethargic  CN II-XII:     (  ) Intact     (  ) deficits found     (Specify:     )   Upper extremities:     (  ) no sensorimotor deficits     (  ) weakness     (  ) loss of proprioception/vibration     (  ) loss of touch/temperature (specify:    )  Lower extremities:     (  ) no sensorimotor deficits     (  ) weakness     (  ) loss of proprioception/vibration     (  ) loss of touch/temperature (specify:    )    SKIN:   ( + ) No rashes or lesions     (  ) maculopapular rash     (  ) pustules     (  ) vesicles     (  ) ulcer     (  ) ecchymosis     (specify location:     )    AMPAC score:    (-  ) Indwelling Wong Catheter:   Date insterted:    Reason (  ) Critical illness     (  ) urinary retention    (  ) Accurate Ins/Outs Monitoring     (  ) CMO patient    (-  ) Central Line:   Date inserted:  Location: (  ) Right IJ     (  ) Left IJ     (  ) Right Fem     (  ) Left Fem    (  ) SPC        (  ) pigtail       (  ) PEG tube       (  ) colostomy       (  ) jejunostomy  (  ) U-Dall    LABS:                        10.1   2.16  )-----------( 18       ( 27 Apr 2024 06:22 )             28.9     04-27    138  |  100  |  19  ----------------------------<  89  2.6<LL>   |  20  |  0.5<L>    Ca    11.4<H>      27 Apr 2024 06:22  Mg     1.3     04-27    TPro  5.7<L>  /  Alb  3.6  /  TBili  1.1  /  DBili  x   /  AST  174<H>  /  ALT  26  /  AlkPhos  192<H>  04-27      Urinalysis Basic - ( 27 Apr 2024 06:22 )    Color: x / Appearance: x / SG: x / pH: x  Gluc: 89 mg/dL / Ketone: x  / Bili: x / Urobili: x   Blood: x / Protein: x / Nitrite: x   Leuk Esterase: x / RBC: x / WBC x   Sq Epi: x / Non Sq Epi: x / Bacteria: x                RADIOLOGY:      < from: CT Angio Chest PE Protocol w/ IV Cont (04.26.24 @ 19:56) >  IMPRESSION: No pulmonary embolus seen.  Right breast mass consistent with carcinoma.  Diffuse metastatic bone disease.  Right adrenal mass.  Left upper lobe pulmonary nodule presumably representing metastatic lung   disease.  < end of copied text >

## 2024-04-27 NOTE — CONSULT NOTE ADULT - ATTENDING COMMENTS
Patient examined by myself  , above reviewed , still with mild encephalopathy , back pain , calcium improved with hydration and calcitonin.  low k and Mg .  severe thrombocytopenia ( myelophtesis ? )   awaiting MRI of spine , ZOmeta. hydration . Patient examined by myself  , above reviewed , still with mild encephalopathy , back pain , calcium improved with hydration and calcitonin.  low k and Mg .  severe thrombocytopenia ( myelophtesis ? ) left shift , nucleated rbcs.  awaiting MRI of spine , ZOmeta. hydration . Patient examined by myself  , above reviewed , still with mild encephalopathy , back pain , calcium improved with hydration and calcitonin.  low k and Mg .  severe thrombocytopenia , likely myelophtesis ( left shift , nucleated rbcs ) rule out DIC .  awaiting MRI of spine , ZOmeta. hydration .   will need liquid biopsy , systemic therapy soon   (Chemo v/s endocrine ) , response to letrozole/Ibrance < 12 months .   Consider bone marrow , Chemo will be challenging given thrombocytopenia . Patient examined by myself  , above reviewed , still with mild encephalopathy , back pain , calcium improved with hydration and calcitonin.  low k and Mg .  severe thrombocytopenia , likely myelophtesis ( left shift , nucleated rbcs ) rule out DIC .  awaiting MRI of spine , ZOmeta. hydration .   will need liquid biopsy , systemic therapy soon   (Chemo v/s endocrine ) , response to letrozole/Ibrance < 12 months .   Consider bone marrow , Chemo will be challenging given thrombocytopenia . Start second line endocrine therapy ?

## 2024-04-27 NOTE — PROGRESS NOTE ADULT - ASSESSMENT
58-year-old female with PMHx of breast cancer (tx w/ letrozole) with metastases to bone presents for evaluation of generalized weakness and back pain. Per patient she is getting radiation she states that she completed 5 sessions of radiation and was doing well however over the past 24 to 48 hours became weak denies any loss of bladder or bowel control denies any saddle anesthesia denies any focal weakness to the lower extremities denies any fevers or chills. Sent from [facility] for neurosurgery eval (4/27).    #Stage IV Breast Cancer HR+/HER2- , metastatic to bone  #back pain  - never received PET scan   - c/w letrozole  - neurosurgery at [facility] requested patient be transferred to [facility] to expedite evaluation and treatment  - spoke to neuroSx: MR CTL w/+w/o con ordered  - heme/onc follow up  - DC Ibrance given worsening bone disease and increasing tumor markers  - repeat CA 27-29    #history of magnesium deficiency   #Hypercalcemia of malignancy  - c/w supplement, replete PRN  - as per intensivist, aggressive NS infusion for now, upgrade to telemetry   -  Addendum (4/27/2024 at 0130) - dose of Calcitonin 250 units  - one dose Zometa 4 mg IV for hypercalcemia     #hypothyroid - on synthroid     Handoff:  - f/u neuro Sx: f/u MR w/wout contrast of cervical, thoracic, lumbar  - f/u heme/onc: f/u CA 27-29  - f/u BMP (Mag and Ca) 58-year-old female with PMHx of breast cancer (tx w/ letrozole) with metastases to bone presents for evaluation of generalized weakness and back pain. Per patient she is getting radiation she states that she completed 5 sessions of radiation and was doing well however over the past 24 to 48 hours became weak denies any loss of bladder or bowel control denies any saddle anesthesia denies any focal weakness to the lower extremities denies any fevers or chills. Sent from [facility] for neurosurgery eval (4/27).    #Stage IV Breast Cancer HR+/HER2- , metastatic to bone  #back pain  - c/w letrozole  - neurosurgery at [facility] requested patient be transferred to [facility] to expedite evaluation and treatment  - spoke to neuroSx: MR CTL w/+w/o con ordered  - heme/onc follow up  - DC Ibrance given worsening bone disease and increasing tumor markers per hem/onc   - repeat CA 27-29    # Thrombocytopenia: 25>18, hem/onc is following, SCD for DVT ppx     #Hypercalcemia of malignancy  - c/w supplement, replete PRN  - as per intensivist, aggressive NS infusion for now, upgrade to telemetry   -  Addendum (4/27/2024 at 0130) - dose of Calcitonin 250 units  - one dose Zometa 4 mg IV for hypercalcemia     # Hypokalemia: aggressive replacement, monitor tele     # magnesium deficiency: replete and repeat     #hypothyroid - on synthroid     Handoff:  - f/u neuro Sx: f/u  w/wout contrast of cervical, thoracic, lumbar  - f/u heme/onc: f/u CA 27-29  - f/u BMP (Mag and Ca)

## 2024-04-27 NOTE — CONSULT NOTE ADULT - ASSESSMENT
59yo female whose documented PMH  includes hypothyroid, asthma, ETOH abuse (cirrhosis) and breast cancer widely metastatic to bone, status post radiation 5 sessions finished 5/31/23, on letrozole daily, (was on Ribociclib and was discontinued due to low magnesium with high QTc), surgical history of occiput-T2 posterior instrumentation and fusion, ORIF of C2 body with functional decline and impaired ADLs/mobility, ambulates with a walker, following with Dr. Peña , presents to the ER due to back pain present for 2 weeks. Due to persistence, she was advised to come to the ER. No loss of bladder, bowel function but told ER she had generalized weakness. While in the ER patient became tachycardic with pulse ox of 89% so PE study was done (no PE) ER team spoke to neurosurgery team member requesting that patient be sent [place] back pain - needs further evaluation by neurosurgery    As per ER, neurosurgery is requesting that patient be transferred to [facility] to expedite evaluation and treatment (I discussed ordering MRI studies while patient is here at [facility] with neurosurgery PA. If patient is able to get a bed at [facility] early in the morning then they will order the imaging study. However, if patient does not get a bed at [facility] early in the morning, I would contact them to discuss further)     Stage IV Breast Cancer HR+/HER2-   Hypercalcemia of Malignancy   - oncologist: Dr. Peña   -  CT Angio Chest PE Protocol w/ IV Cont (04.26.24) Right breast mass consistent with carcinoma. Diffuse metastatic bone disease.Right adrenal mass. Left upper lobe pulmonary nodule presumably representing metastatic lung disease.  - CT Thoracic Spine No Cont (04.26.24) Increased diffuse predominantly sclerotic osseous metastasis throughout the thoracolumbar spine, ribs, sacrum, and bilateral iliac bones compared to the prior CT abdomen pelvis dated 7/19/2013. Multilevel compression deformities of the thoracic spine and L4 without significant change. Multilevel thoracolumbar neuroforaminal stenosis as above.  - Calcium 14.2 > 11  s/p calcitonin   - CA 27-29 elevated to 119.6, increasing since February 2024   - currently being treated with Ibrance, letrozole, and zometa    Recommendations:   - please give one dose Zometa 4 mg IV for hypercalcemia   - c/w IV fluids   - c/w letrozole  - will discontinue Ibrance given worsening bone disease and increasing tumor markers,  - please repeat CA 27-29  - please consult Neurosurgery given worsening back pain in the setting of metastatic disease to T/L spine

## 2024-04-27 NOTE — CHART NOTE - NSCHARTNOTEFT_GEN_A_CORE
Patient just received dose of calcitonin and ambulance is here to transfer patient to [facility], telemetry bed Patient just received dose of calcitonin and ambulance is here to transfer patient to [place] site, telemetry bed. Although patient now seems slightly forgetful (She asked me to tell her why she is being transferred to [place]), she says she will call her  once she arrives at [place] to tell him her new location.

## 2024-04-27 NOTE — CONSULT NOTE ADULT - SUBJECTIVE AND OBJECTIVE BOX
HPI:  57yo female whose documented PMH  includes hypothyroid, asthma, ETOH abuse (cirrhosis) and breast cancer widely metastatic to bone, status post radiation 5 sessions finished 5/31/23, on letrozole daily, (was on Ribociclib and was discontinued due to low magnesium with high QTc), surgical history of occiput-T2 posterior instrumentation and fusion, ORIF of C2 body with functional decline and impaired ADLs/mobility, ambulates with a walker, following with Dr. Peña , presents to the ER due to back pain present for 2 weeks. Due to persistence, she was advised to come to the ER. No loss of bladder, bowel function but told ER she had generalized weakness. While in the ER patient became tachycardic with pulse ox of 89% so PE study was done (no PE) ER team spoke to neurosurgery team member requesting that [place]  (26 Apr 2024 22:23)  --------------------------------------------------------------------------------------------  Neurosurgery Consulted::      PAST MEDICAL & SURGICAL HISTORY:  Hypothyroidism  H/O cirrhosis  Breast cancer  No significant past surgical history          Home Medications:  Advil 200 mg oral tablet: 1 tab(s) orally every 6 hours as needed for  mild pain (26 Apr 2024 16:00)  melatonin 5 mg oral tablet: 1 tab(s) orally once a day (at bedtime) (26 Apr 2024 16:00)      Allergies  No Known Allergies  Intolerances  hydromorphone (Faint; Nausea)      ROS:  [  ] A ten-point review of systems is negative except as noted   [  ] Due to altered mental status/intubation, subjective information were not able to be obtained from the patient. History was obtained, to the extent possible, from review of the chart and collateral sources of information    MEDICATIONS  (STANDING):  lactated ringers. 1000 milliLiter(s) (75 mL/Hr) IV Continuous <Continuous>  letrozole 2.5 milliGRAM(s) Oral daily  levothyroxine 50 MICROGram(s) Oral <User Schedule>  magnesium oxide 400 milliGRAM(s) Oral daily  melatonin 5 milliGRAM(s) Oral at bedtime  potassium chloride  20 mEq/100 mL IVPB 20 milliEquivalent(s) IV Intermittent every 2 hours    MEDICATIONS  (PRN):  albuterol    0.083% 2.5 milliGRAM(s) Nebulizer every 6 hours PRN Shortness of Breath and/or Wheezing  ibuprofen  Tablet. 200 milliGRAM(s) Oral every 6 hours PRN Mild Pain (1 - 3)      ICU Vital Signs Last 24 Hrs  T(C): 36.3 (27 Apr 2024 19:07), Max: 36.3 (27 Apr 2024 19:07)  T(F): 97.3 (27 Apr 2024 19:07), Max: 97.3 (27 Apr 2024 19:07)  HR: 113 (27 Apr 2024 19:07) (84 - 113)  BP: 123/76 (27 Apr 2024 19:07) (110/68 - 153/71)  BP(mean): 83 (27 Apr 2024 05:07) (83 - 83)  ABP: --  ABP(mean): --  RR: 18 (27 Apr 2024 19:07) (16 - 18)  SpO2: 100% (27 Apr 2024 05:07) (99% - 100%)    O2 Parameters below as of 27 Apr 2024 02:45  Patient On (Oxygen Delivery Method): nasal cannula  O2 Flow (L/min): 2          I&O's Detail    27 Apr 2024 07:01  -  27 Apr 2024 21:14  --------------------------------------------------------  IN:    Oral Fluid: 726 mL  Total IN: 726 mL    OUT:    Voided (mL): 500 mL  Total OUT: 500 mL    Total NET: 226 mL                                10.1   2.16  )-----------( 18       ( 27 Apr 2024 06:22 )             28.9     04-27    139  |  103  |  17  ----------------------------<  150<H>  3.4<L>   |  19  |  0.6<L>    Ca    11.2<H>      27 Apr 2024 19:23  Mg     2.0     04-27    TPro  5.8<L>  /  Alb  3.7  /  TBili  1.1  /  DBili  x   /  AST  210<H>  /  ALT  31  /  AlkPhos  260<H>  04-27        Urinalysis Basic - ( 27 Apr 2024 19:23 )    Color: x / Appearance: x / SG: x / pH: x  Gluc: 150 mg/dL / Ketone: x  / Bili: x / Urobili: x   Blood: x / Protein: x / Nitrite: x   Leuk Esterase: x / RBC: x / WBC x   Sq Epi: x / Non Sq Epi: x / Bacteria: x        On PE:    A&Ox3 with clear speech  PERRL  EOMI  No droop  tongue midline  No drift  Finger to nose intact  WINTER - good strength  Follows complex commands      Radiology:      Assessment:      Plan: HPI:  59yo female whose documented PMH  includes hypothyroid, asthma, ETOH abuse (cirrhosis) and breast cancer widely metastatic to bone, status post radiation 5 sessions finished 5/31/23, on letrozole daily, (was on Ribociclib and was discontinued due to low magnesium with high QTc), surgical history of occiput-T2 posterior instrumentation and fusion, ORIF of C2 body with functional decline and impaired ADLs/mobility, ambulates with a walker, following with Dr. Peña , presents to the ER due to back pain present for 2 weeks. Due to persistence, she was advised to come to the ER. No loss of bladder, bowel function but told ER she had generalized weakness. While in the ER patient became tachycardic with pulse ox of 89% so PE study was done (no PE) ER team spoke to neurosurgery team member requesting that [place] be [place]  (26 Apr 2024 22:23)  --------------------------------------------------------------------------------------------  Neurosurgery Consulted::  58 F PMH hypothyroid, asthma, ETOH abuse, cirrhosis,  w/ breast cancer mets to spine f/u with Dr Peña. Pt known to neurosurgery service as in 2023 was sent in by Dr Peña for neck pain, and underwent occiput-T2 instrumentation and fusion. Now presents to [facility] for back pain. On this admission presented to [facility] with lower back pain.     PAST MEDICAL & SURGICAL HISTORY:  Hypothyroidism  H/O cirrhosis  Breast cancer  No significant past surgical history          Home Medications:  Advil 200 mg oral tablet: 1 tab(s) orally every 6 hours as needed for  mild pain (26 Apr 2024 16:00)  melatonin 5 mg oral tablet: 1 tab(s) orally once a day (at bedtime) (26 Apr 2024 16:00)      Allergies  No Known Allergies  Intolerances  hydromorphone (Faint; Nausea)      ROS:  [  ] A ten-point review of systems is negative except as noted   [  ] Due to altered mental status/intubation, subjective information were not able to be obtained from the patient. History was obtained, to the extent possible, from review of the chart and collateral sources of information    MEDICATIONS  (STANDING):  lactated ringers. 1000 milliLiter(s) (75 mL/Hr) IV Continuous <Continuous>  letrozole 2.5 milliGRAM(s) Oral daily  levothyroxine 50 MICROGram(s) Oral <User Schedule>  magnesium oxide 400 milliGRAM(s) Oral daily  melatonin 5 milliGRAM(s) Oral at bedtime  potassium chloride  20 mEq/100 mL IVPB 20 milliEquivalent(s) IV Intermittent every 2 hours    MEDICATIONS  (PRN):  albuterol    0.083% 2.5 milliGRAM(s) Nebulizer every 6 hours PRN Shortness of Breath and/or Wheezing  ibuprofen  Tablet. 200 milliGRAM(s) Oral every 6 hours PRN Mild Pain (1 - 3)      ICU Vital Signs Last 24 Hrs  T(C): 36.3 (27 Apr 2024 19:07), Max: 36.3 (27 Apr 2024 19:07)  T(F): 97.3 (27 Apr 2024 19:07), Max: 97.3 (27 Apr 2024 19:07)  HR: 113 (27 Apr 2024 19:07) (84 - 113)  BP: 123/76 (27 Apr 2024 19:07) (110/68 - 153/71)  BP(mean): 83 (27 Apr 2024 05:07) (83 - 83)  ABP: --  ABP(mean): --  RR: 18 (27 Apr 2024 19:07) (16 - 18)  SpO2: 100% (27 Apr 2024 05:07) (99% - 100%)    O2 Parameters below as of 27 Apr 2024 02:45  Patient On (Oxygen Delivery Method): nasal cannula  O2 Flow (L/min): 2          I&O's Detail    27 Apr 2024 07:01  -  27 Apr 2024 21:14  --------------------------------------------------------  IN:    Oral Fluid: 726 mL  Total IN: 726 mL    OUT:    Voided (mL): 500 mL  Total OUT: 500 mL    Total NET: 226 mL                                10.1   2.16  )-----------( 18       ( 27 Apr 2024 06:22 )             28.9     04-27    139  |  103  |  17  ----------------------------<  150<H>  3.4<L>   |  19  |  0.6<L>    Ca    11.2<H>      27 Apr 2024 19:23  Mg     2.0     04-27    TPro  5.8<L>  /  Alb  3.7  /  TBili  1.1  /  DBili  x   /  AST  210<H>  /  ALT  31  /  AlkPhos  260<H>  04-27        Urinalysis Basic - ( 27 Apr 2024 19:23 )    Color: x / Appearance: x / SG: x / pH: x  Gluc: 150 mg/dL / Ketone: x  / Bili: x / Urobili: x   Blood: x / Protein: x / Nitrite: x   Leuk Esterase: x / RBC: x / WBC x   Sq Epi: x / Non Sq Epi: x / Bacteria: x        On PE:    A&Ox3 with clear speech  PERRL  EOMI  No droop  tongue midline  No drift  Finger to nose intact  WINTER - good strength  Follows complex commands      Radiology:      Assessment:      Plan: HPI:  59yo female whose documented PMH  includes hypothyroid, asthma, ETOH abuse (cirrhosis) and breast cancer widely metastatic to bone, status post radiation 5 sessions finished 5/31/23, on letrozole daily, (was on Ribociclib and was discontinued due to low magnesium with high QTc), surgical history of occiput-T2 posterior instrumentation and fusion, ORIF of C2 body with functional decline and impaired ADLs/mobility, ambulates with a walker, following with Dr. Peña , presents to the ER due to back pain present for 2 weeks. Due to persistence, she was advised to come to the ER. No loss of bladder, bowel function but told ER she had generalized weakness. While in the ER patient became tachycardic with pulse ox of 89% so PE study was done (no PE) ER team spoke to neurosurgery team member requesting that [place] be [facility] [place]  (26 Apr 2024 22:23)  --------------------------------------------------------------------------------------------  Neurosurgery Consulted::  58 F PMH hypothyroid, asthma, ETOH abuse, cirrhosis,  w/ breast cancer mets to spine f/u with Dr Peña. Pt known to neurosurgery service as in 2023 was sent in by Dr Peña for neck pain, and underwent occiput-T2 instrumentation and fusion. Now on this admission presented to [facility] with lower back pain. Neurosurgery consulted given back pain and hx mets to bone. Pt seen and examined bedside in 3C reports she has no back pain at this time but when asked to palpate c/t/l spine, pt refuses to cooperate as movement causes back pain. Otherwise denies weakness, numbness, paresthesias, radicular pain.     PAST MEDICAL & SURGICAL HISTORY:  Hypothyroidism  H/O cirrhosis  Breast cancer  No significant past surgical history          Home Medications:  Advil 200 mg oral tablet: 1 tab(s) orally every 6 hours as needed for  mild pain (26 Apr 2024 16:00)  melatonin 5 mg oral tablet: 1 tab(s) orally once a day (at bedtime) (26 Apr 2024 16:00)      Allergies  No Known Allergies  Intolerances  hydromorphone (Faint; Nausea)      ROS:  [ x ] A ten-point review of systems is negative except as noted   [  ] Due to altered mental status/intubation, subjective information were not able to be obtained from the patient. History was obtained, to the extent possible, from review of the chart and collateral sources of information    MEDICATIONS  (STANDING):  lactated ringers. 1000 milliLiter(s) (75 mL/Hr) IV Continuous <Continuous>  letrozole 2.5 milliGRAM(s) Oral daily  levothyroxine 50 MICROGram(s) Oral <User Schedule>  magnesium oxide 400 milliGRAM(s) Oral daily  melatonin 5 milliGRAM(s) Oral at bedtime  potassium chloride  20 mEq/100 mL IVPB 20 milliEquivalent(s) IV Intermittent every 2 hours    MEDICATIONS  (PRN):  albuterol    0.083% 2.5 milliGRAM(s) Nebulizer every 6 hours PRN Shortness of Breath and/or Wheezing  ibuprofen  Tablet. 200 milliGRAM(s) Oral every 6 hours PRN Mild Pain (1 - 3)      ICU Vital Signs Last 24 Hrs  T(C): 36.3 (27 Apr 2024 19:07), Max: 36.3 (27 Apr 2024 19:07)  T(F): 97.3 (27 Apr 2024 19:07), Max: 97.3 (27 Apr 2024 19:07)  HR: 113 (27 Apr 2024 19:07) (84 - 113)  BP: 123/76 (27 Apr 2024 19:07) (110/68 - 153/71)  BP(mean): 83 (27 Apr 2024 05:07) (83 - 83)  ABP: --  ABP(mean): --  RR: 18 (27 Apr 2024 19:07) (16 - 18)  SpO2: 100% (27 Apr 2024 05:07) (99% - 100%)    O2 Parameters below as of 27 Apr 2024 02:45  Patient On (Oxygen Delivery Method): nasal cannula  O2 Flow (L/min): 2          I&O's Detail    27 Apr 2024 07:01  -  27 Apr 2024 21:14  --------------------------------------------------------  IN:    Oral Fluid: 726 mL  Total IN: 726 mL    OUT:    Voided (mL): 500 mL  Total OUT: 500 mL    Total NET: 226 mL                                10.1   2.16  )-----------( 18       ( 27 Apr 2024 06:22 )             28.9     04-27    139  |  103  |  17  ----------------------------<  150<H>  3.4<L>   |  19  |  0.6<L>    Ca    11.2<H>      27 Apr 2024 19:23  Mg     2.0     04-27    TPro  5.8<L>  /  Alb  3.7  /  TBili  1.1  /  DBili  x   /  AST  210<H>  /  ALT  31  /  AlkPhos  260<H>  04-27        Urinalysis Basic - ( 27 Apr 2024 19:23 )    Color: x / Appearance: x / SG: x / pH: x  Gluc: 150 mg/dL / Ketone: x  / Bili: x / Urobili: x   Blood: x / Protein: x / Nitrite: x   Leuk Esterase: x / RBC: x / WBC x   Sq Epi: x / Non Sq Epi: x / Bacteria: x        On PE:  A&Ox3 at this time, reported to have intermittent confusion  following commands  EOMI  Pt refuses to allow palpation of c/t/l spine as movement causes back pain  MAEx4 - good strength  5/5 strength b/l UE  5/5 strength b/l LE  SILT in b/l UE and LE      Radiology:  < from: CT Thoracic Spine No Cont (04.26.24 @ 16:49) >  IMPRESSION:    Increased diffuse predominantly sclerotic osseous metastasis throughout   the thoracolumbar spine, ribs, sacrum, and bilateral iliac bones compared   to the prior CT abdomen pelvis dated 7/19/2013. Multilevel compression   deformities of the thoracic spine and L4 without significant change.    Multilevel thoracolumbar neuroforaminal stenosis as above.    --- End of Report ---        < end of copied text >      Assessment:  58F PMH breast ca mets w/ diffuse osseous mets p/w back pain    Plan:  - MRI C/T/L spine w/wo given no contraindications  - Mets w/u: CT C/A/P  - RadOnc/HemeOnc consults  - Pain management reccs  - Care per medicine/ primary team  - D/W attending

## 2024-04-28 NOTE — PROGRESS NOTE ADULT - ASSESSMENT
58-year-old female with PMHx of breast cancer (tx w/ letrozole) with metastases to bone presents for evaluation of generalized weakness and back pain. Per patient she is getting radiation she states that she completed 5 sessions of radiation and was doing well however over the past 24 to 48 hours became weak denies any loss of bladder or bowel control denies any saddle anesthesia denies any focal weakness to the lower extremities denies any fevers or chills. Sent from [facility] for neurosurgery eval (4/27).    #Stage IV Breast Cancer HR+/HER2- , metastatic to bone  #back pain  - c/w letrozole  - neurosurgery at [facility] requested patient be transferred to [facility] to expedite evaluation and treatment  - team spoke to neuroSx: MR CTL w/+w/o con ordered - pending   - heme/onc follow up> DC Ibrance given worsening bone disease and increasing tumor markers per hem/onc   - repeat CA 27-29 pending     # Thrombocytopenia: 25>18 > , hem/onc is following, SCD for DVT ppx     #Hypercalcemia of malignancy  ; improved   - c/w supplement, replete PRN  -  Addendum (4/27/2024 at 0130) - dose of Calcitonin 250 units  - one dose Zometa 4 mg IV for hypercalcemia     # Hypokalemia: aggressive replacement, monitor tele     # magnesium deficiency: replete and repeat     #hypothyroid - on synthroid     Handoff:  - f/u neuro Sx: f/u MR w/wout contrast of cervical, thoracic, lumbar  - f/u heme/onc: f/u CA 27-29, plt trend, further treatment plan

## 2024-04-28 NOTE — PROGRESS NOTE ADULT - SUBJECTIVE AND OBJECTIVE BOX
SUBJECTIVE:    Patient is a 58y old Female who presents with a chief complaint of back pain (27 Apr 2024 12:41)    Currently admitted to medicine with the primary diagnosis of Metastatic cancer to bone    Today is hospital day 2d. This morning she is resting comfortably in bed and reports no new issues or overnight events.     PAST MEDICAL & SURGICAL HISTORY  Hypothyroidism    H/O cirrhosis    Breast cancer    No significant past surgical history      SOCIAL HISTORY:  Negative for smoking/alcohol/drug use.     ALLERGIES:  No Known Allergies    MEDICATIONS:  STANDING MEDICATIONS  lactated ringers. 1000 milliLiter(s) IV Continuous <Continuous>  letrozole 2.5 milliGRAM(s) Oral daily  levothyroxine 50 MICROGram(s) Oral <User Schedule>  magnesium oxide 400 milliGRAM(s) Oral daily  melatonin 5 milliGRAM(s) Oral at bedtime    PRN MEDICATIONS  albuterol    0.083% 2.5 milliGRAM(s) Nebulizer every 6 hours PRN  ibuprofen  Tablet. 200 milliGRAM(s) Oral every 6 hours PRN    VITALS:   T(F): 96.6  HR: 107  BP: 112/74  RR: 18  SpO2: --    LABS:                        9.6    2.22  )-----------( 16       ( 28 Apr 2024 07:39 )             28.1     04-28    138  |  104  |  16  ----------------------------<  111<H>  3.5   |  21  |  0.5<L>    Ca    10.7<H>      28 Apr 2024 07:39  Mg     1.6     04-28    TPro  5.6<L>  /  Alb  3.5  /  TBili  1.3<H>  /  DBili  x   /  AST  226<H>  /  ALT  36  /  AlkPhos  276<H>  04-28      Urinalysis Basic - ( 28 Apr 2024 07:39 )    Color: x / Appearance: x / SG: x / pH: x  Gluc: 111 mg/dL / Ketone: x  / Bili: x / Urobili: x   Blood: x / Protein: x / Nitrite: x   Leuk Esterase: x / RBC: x / WBC x   Sq Epi: x / Non Sq Epi: x / Bacteria: x                RADIOLOGY:    < from: CT Angio Chest PE Protocol w/ IV Cont (04.26.24 @ 19:56) >    IMPRESSION: No pulmonary embolus seen.  Right breast mass consistent with carcinoma.  Diffuse metastatic bone disease.  Right adrenal mass.  Left upper lobe pulmonary nodule presumably representing metastatic lung   disease.    < end of copied text >  < from: CT Lumbar Spine No Cont (04.26.24 @ 16:49) >  IMPRESSION:    Increased diffuse predominantly sclerotic osseous metastasis throughout   the thoracolumbar spine, ribs, sacrum, and bilateral iliac bones compared   to the prior CT abdomen pelvis dated 7/19/2013. Multilevel compression   deformities of the thoracic spine and L4 without significant change.    Multilevel thoracolumbar neuroforaminal stenosis as above.    < end of copied text >      PHYSICAL EXAM:  GEN: No acute distress, left eye bruise   LUNGS: Clear to auscultation bilaterally   HEART: S1/S2 present. RRR.   ABD: Soft, non-tender, non-distended. Bowel sounds present  EXT: No le edema   NEURO: AAOX3

## 2024-04-29 NOTE — PROGRESS NOTE ADULT - SUBJECTIVE AND OBJECTIVE BOX
JEAN-PAUL GIANG 58y Female  MRN#: [id number]   Hospital Day: 3d    SUBJECTIVE  Patient is a 58y old Female who presents with a chief complaint of back pain (27 Apr 2024 12:41)  Currently admitted to medicine with the primary diagnosis of Metastatic cancer to bone      INTERVAL HPI AND OVERNIGHT EVENTS:  Patient was examined and seen at bedside. This morning she is resting comfortably in bed and reports no issues or overnight events.    REVIEW OF SYMPTOMS:  CONSTITUTIONAL: No weakness, fevers or chills; No headaches  EYES: No visual changes, eye pain, or discharge  ENT: No vertigo; No ear pain or change in hearing; No sore throat or difficulty swallowing  NECK: No pain or stiffness  RESPIRATORY: No cough, wheezing, or hemoptysis; No shortness of breath  CARDIOVASCULAR: No chest pain or palpitations  GASTROINTESTINAL: No abdominal or epigastric pain; No nausea, vomiting, or hematemesis; No diarrhea or constipation; No melena or hematochezia  GENITOURINARY: No dysuria, frequency or hematuria  MUSCULOSKELETAL: No joint pain, no muscle pain, no weakness  NEUROLOGICAL: No numbness or weakness  SKIN: No itching or rashes    OBJECTIVE  PAST MEDICAL & SURGICAL HISTORY  Hypothyroidism    H/O cirrhosis    Breast cancer    No significant past surgical history      ALLERGIES:  No Known Allergies    MEDICATIONS:  STANDING MEDICATIONS  lactated ringers. 1000 milliLiter(s) IV Continuous <Continuous>  letrozole 2.5 milliGRAM(s) Oral daily  levothyroxine 50 MICROGram(s) Oral <User Schedule>  magnesium oxide 400 milliGRAM(s) Oral daily  melatonin 5 milliGRAM(s) Oral at bedtime    PRN MEDICATIONS  albuterol    0.083% 2.5 milliGRAM(s) Nebulizer every 6 hours PRN  ibuprofen  Tablet. 200 milliGRAM(s) Oral every 6 hours PRN      VITAL SIGNS: Last 24 Hours  T(C): 35.9 (29 Apr 2024 05:50), Max: 37.6 (29 Apr 2024 05:42)  T(F): 96.6 (29 Apr 2024 05:50), Max: 99.6 (29 Apr 2024 05:42)  HR: 119 (29 Apr 2024 05:42) (110 - 119)  BP: 130/82 (29 Apr 2024 05:42) (108/67 - 130/82)  BP(mean): --  RR: 18 (29 Apr 2024 05:42) (18 - 40)  SpO2: 94% (28 Apr 2024 22:10) (94% - 94%)    LABS:                        9.1    2.29  )-----------( 14       ( 29 Apr 2024 06:20 )             26.2     04-29    139  |  102  |  16  ----------------------------<  97  3.4<L>   |  23  |  <0.5<L>    Ca    10.2      29 Apr 2024 06:20  Mg     1.6     04-28    TPro  5.4<L>  /  Alb  3.4<L>  /  TBili  1.1  /  DBili  x   /  AST  239<H>  /  ALT  38  /  AlkPhos  260<H>  04-29      Urinalysis Basic - ( 29 Apr 2024 06:20 )    Color: x / Appearance: x / SG: x / pH: x  Gluc: 97 mg/dL / Ketone: x  / Bili: x / Urobili: x   Blood: x / Protein: x / Nitrite: x   Leuk Esterase: x / RBC: x / WBC x   Sq Epi: x / Non Sq Epi: x / Bacteria: x                RADIOLOGY:      PHYSICAL EXAM:  CONSTITUTIONAL: No acute distress, well-developed, well-groomed, AAOx3  HEAD: Atraumatic, normocephalic  EYES: EOM intact, PERRLA, conjunctiva and sclera clear  ENT: Supple, no masses, no thyromegaly, no bruits, no JVD; moist mucous membranes  PULMONARY: Clear to auscultation bilaterally; no wheezes, rales, or rhonchi  CARDIOVASCULAR: Regular rate and rhythm; no murmurs, rubs, or gallops  GASTROINTESTINAL: Soft, non-tender, non-distended; bowel sounds present  MUSCULOSKELETAL: 2+ peripheral pulses; no clubbing, no cyanosis, no edema  NEUROLOGY: non-focal  SKIN: No rashes or lesions; warm and dry

## 2024-04-29 NOTE — DIETITIAN INITIAL EVALUATION ADULT - NSPROEDAREADYLEARN_GEN_A_NUR
Reviewed diet order, discussed available po supplements, discussed strategies to optimize energy intake.

## 2024-04-29 NOTE — DIETITIAN INITIAL EVALUATION ADULT - NS FNS DIET ORDER
Pureed diet with Ensure Max three times daily (150 kcal, 30 g protein) + Ensure Pudding three times daily (product now discontinued, receiving Magic Cup instead; 290 kcal, 9 g protein per serving). Poor appetite & po intake reported at this time in setting of weakness and pain. Consuming 0-25% of meals at this time.

## 2024-04-29 NOTE — DIETITIAN INITIAL EVALUATION ADULT - ORAL INTAKE PTA/DIET HISTORY
Reportedly w/ reduced appetite & po intake over past few months d/t increased pain & weakness. Consuming <50% of meals over >1 months. NKFA. No supplements reported. No food preferences reported. No dietary restrictions related to culture/Evangel[employer] per report. UBW reported to be 72.7 kg, with unintentional wt loss reported over past month. Compared to current wt 68 kg, this wt change is 6.5% over 1 month duration.

## 2024-04-29 NOTE — PROGRESS NOTE ADULT - ASSESSMENT
58-year-old female with PMHx of breast cancer (tx w/ letrozole) with metastases to bone presents for evaluation of generalized weakness and back pain. Per patient she is getting radiation she states that she completed 5 sessions of radiation and was doing well however over the past 24 to 48 hours became weak denies any loss of bladder or bowel control denies any saddle anesthesia denies any focal weakness to the lower extremities denies any fevers or chills. Sent from [facility] for neurosurgery eval (4/27).    #Stage IV Breast Cancer HR+/HER2- , metastatic to bone  #back pain  *CTA PE (4/26): No pulmonary embolus seen. Right breast mass consistent with carcinoma. Diffuse metastatic bone disease. Right adrenal mass. Left upper lobe pulmonary nodule presumably representing metastatic lung disease.  *CT Head noncon (4/26): No evidence of acute territorial infarct, intracranial hemorrhage, or midline shift. Significantly increased numerous sclerotic osseous metastasis throughout the calvarium which were predominantly lytic on the prior CT head from 4/21/2023.  *CT T/L spine noncon (4/26): Increased diffuse predominantly sclerotic osseous metastasis throughout the thoracolumbar spine, ribs, sacrum, and bilateral iliac bones compared to the prior CT abdomen pelvis dated 7/19/2013. Multilevel compression deformities of the thoracic spine and L4 without significant change. Multilevel thoracolumbar neuroforaminal stenosis  - c/w letrozole  - neurosurgery at [facility] requested patient be transferred to [facility] to expedite evaluation and treatment  - heme/onc recs (4/28): c/w IV fluids, c/w letrozole, d/c ibrance, repeat CA 27-29, consult NSGY for metastatic disease to T/L spine, rule out DIC, Pending MRI spine, will need liquid biopsy , systemic therapy soon   (Chemo v/s endocrine ) , response to letrozole/Ibrance < 12 months, Consider bone marrow, Chemo will be challenging given thrombocytopenia . Start second line endocrine therapy ?   - DC Ibrance given worsening bone disease and increasing tumor markers per hem/onc   - Pending repeat CA 27-29  - MRI C/T/L-spine discontinued as patient could not tolerate procedure  - NSGY recs (4/27): MRI C/T/L spine w/wo given no contraindications, Mets w/u: CT C/A/P, RadOnc/HemeOnc consults, Pain management recs  - Pain management consult?  - Rad onc consult?  - Order DIC panel?  - Order CT AP?    # Thrombocytopenia  - Plt 25 ->18 -> 16  - hem/onc is following, SCD for DVT ppx     #Hypercalcemia of malignancy  - Ca 14.2 -> 11.4 -> 10.7 (4/28)  - s/p 2 x calcitonin 250 units  - s/p 1 x Zometa 4 mg  - s/p 2 L NS, s/p NS @ 200 cc/hr  - c/w LR @ 75 cc/hr  - c/w telemetry, monitor for events     # Hypokalemia  - K 2.6 -> 3.5 (4/28)  - F/u AM K  - On tele, monitor for events    # Hypomag  - Mag 1.3 -> 2.0 -> 1.6 (4/28)  - repleted  - pending AM mag    #hypothyroid   - on synthroid     #MISC  - DVT PPx: SCDs  - GI PPx: NI  - Diet: Pureed  - Activity: Ambulate w/assistance    Pending: f/u CA 27-29, f/u AM Mag, K, and Ca   58-year-old female with PMHx of breast cancer (tx w/ letrozole) with metastases to bone presents for evaluation of generalized weakness and back pain. Per patient she is getting radiation she states that she completed 5 sessions of radiation and was doing well however over the past 24 to 48 hours became weak denies any loss of bladder or bowel control denies any saddle anesthesia denies any focal weakness to the lower extremities denies any fevers or chills. Sent from [facility] for neurosurgery eval (4/27).    #Stage IV Breast Cancer HR+/HER2- , metastatic to bone  #back pain  *CTA PE (4/26): No pulmonary embolus seen. Right breast mass consistent with carcinoma. Diffuse metastatic bone disease. Right adrenal mass. Left upper lobe pulmonary nodule presumably representing metastatic lung disease.  *CT Head noncon (4/26): No evidence of acute territorial infarct, intracranial hemorrhage, or midline shift. Significantly increased numerous sclerotic osseous metastasis throughout the calvarium which were predominantly lytic on the prior CT head from 4/21/2023.  *CT T/L spine noncon (4/26): Increased diffuse predominantly sclerotic osseous metastasis throughout the thoracolumbar spine, ribs, sacrum, and bilateral iliac bones compared to the prior CT abdomen pelvis dated 7/19/2013. Multilevel compression deformities of the thoracic spine and L4 without significant change. Multilevel thoracolumbar neuroforaminal stenosis  - c/w letrozole  - neurosurgery at [facility] requested patient be transferred to [facility] to expedite evaluation and treatment  - heme/onc recs (4/28): c/w IV fluids, c/w letrozole, d/c ibrance, repeat CA 27-29, consult NSGY for metastatic disease to T/L spine, rule out DIC, Pending MRI spine, will need liquid biopsy , systemic therapy soon   (Chemo v/s endocrine ) , response to letrozole/Ibrance < 12 months, Consider bone marrow, Chemo will be challenging given thrombocytopenia . Start second line endocrine therapy ?   - DC Ibrance given worsening bone disease and increasing tumor markers per hem/onc   - Pending repeat CA 27-29  - MRI C/T/L-spine discontinued as patient could not tolerate procedure  - NSGY recs (4/27): MRI C/T/L spine w/wo given no contraindications, Mets w/u: CT C/A/P, RadOnc/HemeOnc consults, Pain management recs  - Pending pain management consult  - Pending rad onc consult  - Pending DIC panel  - Pending MRI C/T/L spine  - Pending NM bone scan  - Pending CT AP IC    # Thrombocytopenia  - Plt 25 ->18 -> 16 -> 14  - hem/onc is following, SCD for DVT ppx  - Trend platelets daily    #Hypercalcemia of malignancy  - Ca 14.2 -> 11.4 -> 10.7 -> 10.2 (4/29)  - s/p 2 x calcitonin 250 units  - s/p 1 x Zometa 4 mg  - s/p 2 L NS, s/p NS @ 200 cc/hr  - c/w LR @ 75 cc/hr  - c/w telemetry, monitor for events     # Hypokalemia  - K 2.6 -> 3.5 -> 3.4 (4/29)  - Repleted K  - On tele, monitor for events    # Hypomag  - Mag 1.3 -> 2.0 -> 1.6 (4/28)  - repleted  - pending AM mag    #hypothyroid   - on synthroid     #MISC  - DVT PPx: SCDs  - GI PPx: NI  - Diet: Pureed  - Activity: Ambulate w/assistance    Pending: f/u CA 27-29, Pending pain management consult, Pending rad onc consult, Pending DIC panel, Pending MRI C/T/L spine, Pending NM bone scan, Pending CT AP IC 58-year-old female with PMHx of breast cancer (tx w/ letrozole) with metastases to bone presents for evaluation of generalized weakness and back pain. Per patient she is getting radiation she states that she completed 5 sessions of radiation and was doing well however over the past 24 to 48 hours became weak denies any loss of bladder or bowel control denies any saddle anesthesia denies any focal weakness to the lower extremities denies any fevers or chills. Sent from [facility] for neurosurgery eval (4/27).    #Stage IV Breast Cancer HR+/HER2- , metastatic to bone  #back pain  *CTA PE (4/26): No pulmonary embolus seen. Right breast mass consistent with carcinoma. Diffuse metastatic bone disease. Right adrenal mass. Left upper lobe pulmonary nodule presumably representing metastatic lung disease.  *CT Head noncon (4/26): No evidence of acute territorial infarct, intracranial hemorrhage, or midline shift. Significantly increased numerous sclerotic osseous metastasis throughout the calvarium which were predominantly lytic on the prior CT head from 4/21/2023.  *CT T/L spine noncon (4/26): Increased diffuse predominantly sclerotic osseous metastasis throughout the thoracolumbar spine, ribs, sacrum, and bilateral iliac bones compared to the prior CT abdomen pelvis dated 7/19/2013. Multilevel compression deformities of the thoracic spine and L4 without significant change. Multilevel thoracolumbar neuroforaminal stenosis  - c/w letrozole  - neurosurgery at [facility] requested patient be transferred to [facility] to expedite evaluation and treatment  - heme/onc recs (4/28): c/w IV fluids, c/w letrozole, d/c ibrance, repeat CA 27-29, consult NSGY for metastatic disease to T/L spine, rule out DIC, Pending MRI spine, will need liquid biopsy , systemic therapy soon   (Chemo v/s endocrine ) , response to letrozole/Ibrance < 12 months, Consider bone marrow, Chemo will be challenging given thrombocytopenia . Start second line endocrine therapy ?   - DC Ibrance given worsening bone disease and increasing tumor markers per hem/onc   - Pending repeat CA 27-29  - MRI C/T/L-spine discontinued as patient could not tolerate procedure  - NSGY recs (4/27): MRI C/T/L spine w/wo given no contraindications, Mets w/u: CT C/A/P, RadOnc/HemeOnc consults, Pain management recs  - Rad onc recs (4/29):  Will await the MRI results--if she has cord compression then RT may be considered more urgently, not a candidate at the moment unless platelets > 20  - Pending pain management consult  - Pending DIC panel  - Pending MRI C/T/L spine  - Pending CT AP IC    # Thrombocytopenia  - Plt 25 ->18 -> 16 -> 14  - hem/onc is following, SCD for DVT ppx  - Trend platelets daily    #Hypercalcemia of malignancy  - Ca 14.2 -> 11.4 -> 10.7 -> 10.2 (4/29)  - s/p 2 x calcitonin 250 units  - s/p 1 x Zometa 4 mg  - s/p 2 L NS, s/p NS @ 200 cc/hr  - c/w LR @ 75 cc/hr  - c/w telemetry, monitor for events     # Hypokalemia  - K 2.6 -> 3.5 -> 3.4 (4/29)  - Repleted K  - On tele, monitor for events    # Hypomag  - Mag 1.3 -> 2.0 -> 1.6 (4/28)  - repleted  - pending AM mag    #hypothyroid   - on synthroid     #MISC  - DVT PPx: SCDs  - GI PPx: NI  - Diet: Pureed  - Activity: Ambulate w/assistance    Pending: f/u CA 27-29, Pending pain management consult, Pending DIC panel, Pending MRI C/T/L spine, Pending CT AP IC,

## 2024-04-29 NOTE — CONSULT NOTE ADULT - SUBJECTIVE AND OBJECTIVE BOX
HPI:  59yo female whose documented PMH  includes hypothyroid, asthma, ETOH abuse (cirrhosis) and breast cancer widely metastatic to bone, status post radiation 5 sessions finished 5/31/23, on letrozole daily, (was on Ribociclib and was discontinued due to low magnesium with high QTc), surgical history of occiput-T2 posterior instrumentation and fusion, ORIF of C2 body with functional decline and impaired ADLs/mobility, ambulates with a walker, following with Dr. Peña , presents to the ER due to back pain present for 2 weeks. Due to persistence, she was advised to come to the ER. No loss of bladder, bowel function but told ER she had generalized weakness. While in the ER patient became tachycardic with pulse ox of 89% so PE study was done (no PE) ER team spoke to neurosurgery team member [place]  (26 Apr 2024 22:23)      Allergies    No Known Allergies    Intolerances    hydromorphone (Faint; Nausea)      ROS: [  ] Fever  [  ] Chills  [  ]Chest Pain [  ] SOB  [  ]Cough [  ] N/V  [  ] Diarrhea [  ]Constipation [  ]Other ROS:  [  ] ROS otherwise negative    PAST MEDICAL & SURGICAL HISTORY:  Hypothyroidism    H/O cirrhosis    Breast cancer    No significant past surgical history        FAMILY HISTORY:  FHx: melanoma (Mother)    FHx: arrhythmia (Father)        MEDICATIONS  (STANDING):  lactated ringers. 1000 milliLiter(s) (100 mL/Hr) IV Continuous <Continuous>  letrozole 2.5 milliGRAM(s) Oral daily  levothyroxine 50 MICROGram(s) Oral <User Schedule>  magnesium oxide 400 milliGRAM(s) Oral daily  melatonin 5 milliGRAM(s) Oral at bedtime  pantoprazole    Tablet 40 milliGRAM(s) Oral before breakfast  potassium chloride   Powder 40 milliEquivalent(s) Oral once    MEDICATIONS  (PRN):  albuterol    0.083% 2.5 milliGRAM(s) Nebulizer every 6 hours PRN Shortness of Breath and/or Wheezing  ibuprofen  Tablet. 200 milliGRAM(s) Oral every 6 hours PRN Mild Pain (1 - 3)      PHYSICAL EXAM  Vital Signs Last 24 Hrs  T(C): 35.9 (29 Apr 2024 05:50), Max: 37.6 (29 Apr 2024 05:42)  T(F): 96.6 (29 Apr 2024 05:50), Max: 99.6 (29 Apr 2024 05:42)  HR: 119 (29 Apr 2024 05:42) (110 - 119)  BP: 130/82 (29 Apr 2024 05:42) (108/67 - 130/82)  BP(mean): --  RR: 18 (29 Apr 2024 05:42) (18 - 40)  SpO2: 94% (28 Apr 2024 22:10) (94% - 94%)    Parameters below as of 28 Apr 2024 22:10  Patient On (Oxygen Delivery Method): nasal cannula  O2 Flow (L/min): 2      IMAGING/LABS/PATHOLOGY: I have personally reviewed the relevant labs, pathology, and imaging as noted in the HPI.  In addition,                          9.1    2.29  )-----------( 14       ( 29 Apr 2024 06:20 )             26.2     04-29    139  |  102  |  16  ----------------------------<  97  3.4<L>   |  23  |  <0.5<L>    Ca    10.2      29 Apr 2024 06:20  Mg     1.6     04-28    TPro  5.4<L>  /  Alb  3.4<L>  /  TBili  1.1  /  DBili  x   /  AST  239<H>  /  ALT  38  /  AlkPhos  260<H>  04-29

## 2024-04-29 NOTE — PROGRESS NOTE ADULT - ASSESSMENT
59 yo female whose documented PMH  includes hypothyroid, asthma, ETOH abuse (cirrhosis) and breast cancer widely metastatic to bone, status post radiation 5 sessions finished 5/31/23, on letrozole daily, (was on Ribociclib and was discontinued due to low magnesium with high QTc), surgical history of occiput-T2 posterior instrumentation and fusion, ORIF of C2 body with functional decline and impaired ADLs/mobility, ambulates with a walker, following with Dr. Peña , presents to the ER due to back pain present for 2 weeks. Due to persistence, she was advised to come to the ER. No loss of bladder, bowel function but told ER she had generalized weakness. While in the ER patient became tachycardic with pulse ox of 89% so PE study was done (no PE) ER team spoke to neurosurgery team member requesting that patient be sent [facility] back pain - needs further evaluation by neurosurgery    As per ER, neurosurgery is requesting that patient be transferred to [facility] to expedite evaluation and treatment (I discussed ordering MRI studies while patient is here at [facility] with neurosurgery PA. If patient is able to get a bed at [facility] early in the morning then they will order the imaging study. However, if patient does not get a bed at [facility] early in the morning, I would contact them to discuss further)     Stage IV Breast Cancer HR+/HER2-   Hypercalcemia of Malignancy   - oncologist: Dr. Peña   -  CT Angio Chest PE Protocol w/ IV Cont (04.26.24) Right breast mass consistent with carcinoma. Diffuse metastatic bone disease.Right adrenal mass. Left upper lobe pulmonary nodule presumably representing metastatic lung disease.  - CT Thoracic Spine No Cont (04.26.24) Increased diffuse predominantly sclerotic osseous metastasis throughout the thoracolumbar spine, ribs, sacrum, and bilateral iliac bones compared to the prior CT abdomen pelvis dated 7/19/2013. Multilevel compression deformities of the thoracic spine and L4 without significant change. Multilevel thoracolumbar neuroforaminal stenosis as above.  - Calcium 14.2 > 11  s/p calcitonin   - CA 27-29 elevated to 119.6, increasing since February 2024   - currently being treated with Ibrance, letrozole, and zometa  - s/p zometa with improvement in hypercalcemia     Recommendations:   *this is an incomplete note*

## 2024-04-29 NOTE — DIETITIAN INITIAL EVALUATION ADULT - OTHER INFO
Pertinent Medical Information: Presented for evaluation of generalized weakness and back pain. Pt was getting radiation she states that she completed 5 sessions of radiation and was doing well however over the past 24 to 48 hours PTP became weak denies any loss of bladder or bowel control denies any saddle anesthesia denies any focal weakness to the lower extremities denies any fevers or chills. Sent from [facility] for neurosurgery eval (4/27). Stage IV Breast Cancer HR+/HER2- noted metastatic to bone. Hypercalcemia of malignancy noted. Hypokalemia & hypomagnesemia noted this admit.    Pt followed by SLP services this admit. 4/26 SLP eval notes ulises-pharyngeal swallow is wfl for thin, puree and easy to chew solids, reported some globus sensation w/ solids which clears w/ an effortful swallow and a liquid wash. Recommends easy to chew diet with thin liquids.    PMH includes breast cancer (tx w/ letrozole) with metastases to bone.

## 2024-04-29 NOTE — PROGRESS NOTE ADULT - ATTENDING COMMENTS
***My note supersedes any discrepancies that may be above in the resident's note***    58-year-old female with PMHx of breast cancer (tx w/ letrozole) with metastases to bone presents for evaluation of generalized weakness and back pain. Per patient she is getting radiation she states that she completed 5 sessions of radiation and was doing well however over the past 24 to 48 hours became weak denies any loss of bladder or bowel control denies any saddle anesthesia denies any focal weakness to the lower extremities denies any fevers or chills. Sent from [facility] for neurosurgery eval (4/27).    #Stage IV Breast Cancer HR+/HER2- , metastatic to bone  #back pain  - CTA PE (4/26): No pulmonary embolus seen. Right breast mass consistent with carcinoma. Diffuse metastatic bone disease. Right adrenal mass. Left upper lobe pulmonary nodule presumably representing metastatic lung disease.  - CT Head noncon (4/26): No evidence of acute territorial infarct, intracranial hemorrhage, or midline shift. Significantly increased numerous sclerotic osseous metastasis throughout the calvarium which were predominantly lytic on the prior CT head from 4/21/2023.  - CT T/L spine noncon (4/26): Increased diffuse predominantly sclerotic osseous metastasis throughout the thoracolumbar spine, ribs, sacrum, and bilateral iliac bones compared to the prior CT abdomen pelvis dated 7/19/2013. Multilevel compression deformities of the thoracic spine and L4 without significant change. Multilevel thoracolumbar neuroforaminal stenosis  - c/w letrozole  - neurosurgery at [facility] requested patient be transferred to [facility] to expedite evaluation and treatment  - heme/onc recs (4/28):            c/w IV fluids, c/w letrozole, d/c ibrance           repeat CA 27-29           consult NSGY for metastatic disease to T/L spine           Pending MRI spine, will need liquid biopsy , systemic therapy soon   (Chemo v/s endocrine )           Consider bone marrow, Chemo will be challenging given thrombocytopenia. Start second line endocrine therapy ?   - MR Thoracic Spine w/wo IV Cont (04.29.24 @ 15:56) >In comparison to the previous thoracic spine MRI 4/21/2023:  1.  Significant interval progression of the numerous discrete and infiltrative bone marrow signal abnormalities consistent with extensive osseous metastatic disease.  2.  Interval development of epidural enhancing soft tissue circumferentially throughout the thoracic and lumbar spines. This is most pronounced (approx 5 mm in width) from about T1 to T9, producing diffuse thecal sac compression without obvious cord compression or cord edema.   Thin epidural disease is noted extending inferiorly to the L5 level.  3.  Interval progression of the multilevel compression deformities (severe at T7; moderate at T11 and L4; and mild at T3, T4, T5, T9, and   T10). Mild osseous retropulsion noted at T7, T10, T11 and L4.  4.  Extensive hepatic metastases. Bilateral adrenal nodules  - Follow up with NSYG  - Follow up Heme/Onc  - Follow up Pain management  - Mets w/u: CT C/A/P  , Pain management recs  - Rad onc recs (4/29):  Will await the MRI results--if she has cord compression then RT may be considered more urgently, not a candidate at the moment unless platelets > 20    # Thrombocytopenia  - Plt 25 ->18 -> 16 -> 14  - hem/onc is following, SCD for DVT ppx  - Trend platelets daily  - if plts <10, would transfuse 1u    #Hypercalcemia of malignancy  - Ca 14.2 -> 11.4 -> 10.7 -> 10.2 (4/29)  - s/p 2 x calcitonin 250 units  - s/p 1 x Zometa 4 mg  - s/p 2 L NS, s/p NS @ 200 cc/hr  - c/w LR @ 75 cc/hr  - c/w telemetry, monitor for events     # Hypokalemia  - K 2.6 -> 3.5 -> 3.4 (4/29)  - Repleted K  - On tele, monitor for events    # Hypomag  - Mag 1.3 -> 2.0 -> 1.6 (4/28)  - repleted  - pending AM mag    #hypothyroid   - on synthroid     #MISC  - DVT PPx: SCDs  - GI PPx: NI  - Diet: Pureed  - Activity: Ambulate w/assistance    #Progress Note Handoff  Pending (specify):  CT abd/pelvis, Heme/Onc follow up, Rad Onc follow up, Pain magnement, monitoring PLTs  Family discussion: updated  Disposition:  Unknown at this time________

## 2024-04-29 NOTE — DIETITIAN NUTRITION RISK NOTIFICATION - TREATMENT: THE FOLLOWING DIET HAS BEEN RECOMMENDED
Diet, Pureed:   Supplement Feeding Modality:  Oral  Ensure Pudding Cans or Servings Per Day:  3       Frequency:  Daily  Ensure Max Cans or Servings Per Day:  3       Frequency:  Daily (04-27-24 @ 12:45) [Active]

## 2024-04-29 NOTE — DIETITIAN INITIAL EVALUATION ADULT - PERTINENT MEDS FT
MEDICATIONS  (STANDING):  lactated ringers. 1000 milliLiter(s) (100 mL/Hr) IV Continuous <Continuous>  letrozole 2.5 milliGRAM(s) Oral daily  levothyroxine 50 MICROGram(s) Oral <User Schedule>  magnesium oxide 400 milliGRAM(s) Oral daily  melatonin 5 milliGRAM(s) Oral at bedtime  pantoprazole    Tablet 40 milliGRAM(s) Oral before breakfast    MEDICATIONS  (PRN):  albuterol    0.083% 2.5 milliGRAM(s) Nebulizer every 6 hours PRN Shortness of Breath and/or Wheezing  ibuprofen  Tablet. 200 milliGRAM(s) Oral every 6 hours PRN Mild Pain (1 - 3)

## 2024-04-29 NOTE — DIETITIAN INITIAL EVALUATION ADULT - PERTINENT LABORATORY DATA
04-29    139  |  102  |  16  ----------------------------<  97  3.4<L>   |  23  |  <0.5<L>    Ca    10.2      29 Apr 2024 06:20  Mg     1.5     04-29    TPro  5.4<L>  /  Alb  3.4<L>  /  TBili  1.1  /  DBili  x   /  AST  239<H>  /  ALT  38  /  AlkPhos  260<H>  04-29  A1C with Estimated Average Glucose Result: 5.3 % (08-03-23 @ 12:24)

## 2024-04-29 NOTE — DIETITIAN INITIAL EVALUATION ADULT - NUTRITIONGOAL OUTCOME1
Pt to demonstrate tolerance to diet order, with >25% of meals consumed over next 3 days.    Pt at high nutrition risk; RD to follow-up in 3 days.    Monitor: Skin, labs, BM, wt, nutrition focused physical findings, body composition, GI, swallow function, po intake.

## 2024-04-29 NOTE — PROGRESS NOTE ADULT - SUBJECTIVE AND OBJECTIVE BOX
----------Daily Progress Note----------    HISTORY OF PRESENT ILLNESS:  Patient is a 58y old Female who presents with a chief complaint of back pain (27 Apr 2024 12:41)    Currently admitted to medicine with the primary diagnosis of Metastatic cancer to bone       Today is hospital day 3d.     INTERVAL HOSPITAL COURSE / OVERNIGHT EVENTS:    Patient was examined and seen at bedside. This morning she is resting comfortably in bed and reports no new issues or overnight events.     Review of Systems: Otherwise unremarkable     <<<<<PAST MEDICAL & SURGICAL HISTORY>>>>>  Hypothyroidism    H/O cirrhosis    Breast cancer    No significant past surgical history      ALLERGIES  No Known Allergies      Home Medications:  Advil 200 mg oral tablet: 1 tab(s) orally every 6 hours as needed for  mild pain (26 Apr 2024 16:00)  melatonin 5 mg oral tablet: 1 tab(s) orally once a day (at bedtime) (26 Apr 2024 16:00)        MEDICATIONS  STANDING MEDICATIONS  lactated ringers. 1000 milliLiter(s) IV Continuous <Continuous>  letrozole 2.5 milliGRAM(s) Oral daily  levothyroxine 50 MICROGram(s) Oral <User Schedule>  magnesium oxide 400 milliGRAM(s) Oral daily  melatonin 5 milliGRAM(s) Oral at bedtime    PRN MEDICATIONS  albuterol    0.083% 2.5 milliGRAM(s) Nebulizer every 6 hours PRN  ibuprofen  Tablet. 200 milliGRAM(s) Oral every 6 hours PRN    VITALS:  T(F): 99.6  HR: 119  BP: 130/82  RR: 18  SpO2: 94%    <<<<<PHYSICAL EXAM>>>>>  GENERAL: Well developed, well nourished and in no acute distress. Resting comfortably in bed.  HEENT: Normocephalic, atraumatic, mucous membranes moist, EOMI, PERRLA, bilateral sclera anicteric, no conjunctival injection  Neck: Supple, non-tender, no lymphadenopathy.  PULMONARY: Clear to auscultation bilaterally. No rales, rhonchi, or wheezing.  CARDIOVASCULAR: Regular rate and rhythm, S1-S2, no murmurs  GASTROINTESTINAL: Soft, non-tender, non-distended, no guarding.  RENAL: No CVA tenderness.  SKIN/EXTREMITIES: No clubbing or edema  NEUROLOGIC/MUSCULOSKELETAL: AOx4, grossly moving all extremities, no focal deficits.    <<<<<LABS>>>>>                        9.6    2.22  )-----------( 16       ( 28 Apr 2024 07:39 )             28.1     04-28    138  |  104  |  16  ----------------------------<  111<H>  3.5   |  21  |  0.5<L>    Ca    10.7<H>      28 Apr 2024 07:39  Mg     1.6     04-28    TPro  5.6<L>  /  Alb  3.5  /  TBili  1.3<H>  /  DBili  x   /  AST  226<H>  /  ALT  36  /  AlkPhos  276<H>  04-28      Urinalysis Basic - ( 28 Apr 2024 07:39 )    Color: x / Appearance: x / SG: x / pH: x  Gluc: 111 mg/dL / Ketone: x  / Bili: x / Urobili: x   Blood: x / Protein: x / Nitrite: x   Leuk Esterase: x / RBC: x / WBC x   Sq Epi: x / Non Sq Epi: x / Bacteria: x          [id number]        <<<<<RADIOLOGY>>>>>          ----------------------------------------------------------------------------------------------------------------------------------------------------------------------------------------------- ----------Daily Progress Note----------    HISTORY OF PRESENT ILLNESS:  Patient is a 58y old Female who presents with a chief complaint of back pain (27 Apr 2024 12:41)    Currently admitted to medicine with the primary diagnosis of Metastatic cancer to bone       Today is hospital day 3d.     INTERVAL HOSPITAL COURSE / OVERNIGHT EVENTS:  O/N events:  None    24 hr events  None    Patient was examined and seen at bedside. Denies any dyspnea, appears tachypneic, denies any back pain this AM, endorses generalized weakness and fatigue, asked me if she was dying, endorses 1 BM yesterday, endorses decreased appetite, denies refusing imaging studies, is alert and oriented to person, place, time, and situation    Review of Systems: Otherwise unremarkable     <<<<<PAST MEDICAL & SURGICAL HISTORY>>>>>  Hypothyroidism    H/O cirrhosis    Breast cancer    No significant past surgical history      ALLERGIES  No Known Allergies      Home Medications:  Advil 200 mg oral tablet: 1 tab(s) orally every 6 hours as needed for  mild pain (26 Apr 2024 16:00)  melatonin 5 mg oral tablet: 1 tab(s) orally once a day (at bedtime) (26 Apr 2024 16:00)        MEDICATIONS  STANDING MEDICATIONS  lactated ringers. 1000 milliLiter(s) IV Continuous <Continuous>  letrozole 2.5 milliGRAM(s) Oral daily  levothyroxine 50 MICROGram(s) Oral <User Schedule>  magnesium oxide 400 milliGRAM(s) Oral daily  melatonin 5 milliGRAM(s) Oral at bedtime    PRN MEDICATIONS  albuterol    0.083% 2.5 milliGRAM(s) Nebulizer every 6 hours PRN  ibuprofen  Tablet. 200 milliGRAM(s) Oral every 6 hours PRN    VITALS:  T(F): 99.6  HR: 119  BP: 130/82  RR: 18  SpO2: 94%    <<<<<PHYSICAL EXAM>>>>>  GENERAL: NAD, appears fatigued  HEENT: Has bruising on her left eye, mucous membranes dry, lips with dried blood  PULMONARY: Clear to auscultation bilaterally on anterior respiratory exam. No wheezing or crackles  CARDIOVASCULAR: Regular rate and rhythm, S1-S2, no murmurs, rubs, or gallops  GASTROINTESTINAL: Soft, non-tender, non-distended, no guarding  SKIN/EXTREMITIES: Warm, 2+ tibialis posterior pulses, no UE or LE edema, no petichiae  NEUROLOGIC/MUSCULOSKELETAL: AOx4, able to lift both arms, has difficulty lifting legs but is able to bend both, sensation intact to light touch bilaterally in UE and LE    <<<<<LABS>>>>>                        9.6    2.22  )-----------( 16       ( 28 Apr 2024 07:39 )             28.1     04-28    138  |  104  |  16  ----------------------------<  111<H>  3.5   |  21  |  0.5<L>    Ca    10.7<H>      28 Apr 2024 07:39  Mg     1.6     04-28    TPro  5.6<L>  /  Alb  3.5  /  TBili  1.3<H>  /  DBili  x   /  AST  226<H>  /  ALT  36  /  AlkPhos  276<H>  04-28      Urinalysis Basic - ( 28 Apr 2024 07:39 )    Color: x / Appearance: x / SG: x / pH: x  Gluc: 111 mg/dL / Ketone: x  / Bili: x / Urobili: x   Blood: x / Protein: x / Nitrite: x   Leuk Esterase: x / RBC: x / WBC x   Sq Epi: x / Non Sq Epi: x / Bacteria: x          [id number]        <<<<<RADIOLOGY>>>>>          -----------------------------------------------------------------------------------------------------------------------------------------------------------------------------------------------

## 2024-04-29 NOTE — CONSULT NOTE ADULT - ASSESSMENT
59yo female whose documented PMH  includes hypothyroid, asthma, ETOH abuse (cirrhosis) and breast cancer widely metastatic to bone, status post radiation 5 sessions finished 5/31/23, on letrozole daily, (was on Ribociclib and was discontinued due to low magnesium with high QTc), surgical history of occiput-T2 posterior instrumentation and fusion, ORIF of C2 body with functional decline and impaired ADLs/mobility, ambulates with a walker, following with Dr. Peña , presents to the ER due to back pain present for 2 weeks. She developed tachycardia and hypoxia in the ED, was later transferred to [facility] for surgical evaluation.    She reports mid back pain but was unable to turn for an examination. CT shows diffuse osseous disease, MRI is pending. She is not a candidate for RT at this time due to low platelets <20. Please optimize medical management of pain.     Will await the MRI results--if she has cord compression then RT may be considered more urgently.    Please call  or kylee Ortiz on Teams for questions. I will be away on PTO starting tomorrow and Dr. Villalobos will cover

## 2024-04-29 NOTE — DIETITIAN INITIAL EVALUATION ADULT - ADD RECOMMEND
Recommendation: Diet order texture/consistency per SLP - latest SLP eval 4/26 recommends easy to chew diet with thin liquids. Continue providing Ensure Max 3 times daily (150 kcal, 30 g protein per serving). d/c Ensure Pudding as it is no longer available. Order Magic Cup three times daily (290 kcal, 9 g protein per serving). Order Prosource Gelatein Plus once daily (160 kcal, 20 g protein).

## 2024-04-30 NOTE — PROGRESS NOTE ADULT - ATTENDING COMMENTS
Patient seen and examined independently.     PAST MEDICAL & SURGICAL HISTORY:  Hypothyroidism  H/O cirrhosis  Breast cancer  No significant past surgical history    Vitals:  T(F): 97 (04-30-24 @ 16:43)  HR: 84 (04-30-24 @ 16:43)  BP: 117/74 (04-30-24 @ 16:43)  RR: 18 (04-30-24 @ 13:07)  SpO2: 98% (04-30-24 @ 16:43)    TESTS & MEASUREMENTS:                        8.8    2.67  )-----------( 13       ( 30 Apr 2024 08:32 )             25.4     PT/INR - ( 29 Apr 2024 16:00 )   PT: 17.60 sec;   INR: 1.54 ratio         PTT - ( 29 Apr 2024 16:00 )  PTT:31.7 sec  04-30    141  |  103  |  16  ----------------------------<  97  3.8   |  24  |  <0.5<L>    Ca    9.4      30 Apr 2024 08:32  Mg     1.8     04-30    TPro  5.1<L>  /  Alb  3.3<L>  /  TBili  1.1  /  DBili  x   /  AST  226<H>  /  ALT  35  /  AlkPhos  234<H>  04-30    LIVER FUNCTIONS - ( 30 Apr 2024 08:32 )  Alb: 3.3 g/dL / Pro: 5.1 g/dL / ALK PHOS: 234 U/L / ALT: 35 U/L / AST: 226 U/L / GGT: x             In summary:  HEALTH ISSUES - PROBLEM Dx:  - Stage 4, metastatic breast cancer; onc team following  - Diffuse symptomatic spinal (bone) mets without radiographic evidence of cord compression at this time   - Acute on chronic pain due to advanced malignancy   - Thrombocytopenia; at high risk for bleeding   - Hypercalcemia related to malignancy, treated and followed up closely   - Hypokalemia/ Malnutrition related to advanced malignancy and anorexia/cachexia     PLAN  - palliative care team for advanced illness and pain management   - please refer to updated Goals of Care Conversation   - no candidate for rad therapy due to severe thrombocytopenia   - Overall prognosis is ominous despite all care due to advanced malignancy and frailty

## 2024-04-30 NOTE — CONSULT NOTE ADULT - PROBLEM SELECTOR RECOMMENDATION 9
Metastatic breast cancer to bones and liver  -no cord compression on MRI  -start morphine 2mg IV q3h PRN for moderate/severe pain (4-10)  -continue dexamethasone for bone pain - consider decreasing to 2mg IV BID  -continue protonix given use of dexamethasone/ibuprofen  -if no diarrhea, recommend bowel regimen including senna 2 tabs qhs and miralax 17g daily PRN  -f/u heme onc, neurosurgery
- Acute hypoxia now resolved   - Pt remains asymptomatic  - Continue with supplemental oxygen via NC  - Follow up CTA results  - Maintain SpO2 greater than or equal to 95%  - Albuterol PRN  - Daily incentive spirometer use  - OOB to chair as tolerated

## 2024-04-30 NOTE — PROGRESS NOTE ADULT - SUBJECTIVE AND OBJECTIVE BOX
----------Daily Progress Note----------    HISTORY OF PRESENT ILLNESS:  Patient is a 58y old Female who presents with a chief complaint of Malignant neoplasm metastatic to bone     (29 Apr 2024 19:42)    Currently admitted to medicine with the primary diagnosis of Metastatic cancer to bone       Today is hospital day 4d.     INTERVAL HOSPITAL COURSE / OVERNIGHT EVENTS:  O/N events:  None    24 hr events:  None    Patient was examined and seen at bedside. Reports some back pain today, reports feeling thirsty, denies being dyspneic despite being tachypneic, on 2 L NC today, endorses generalized weakness and fatigue, endorses decreased appetite, is alert and oriented to person, place, time, and situation, had 1 BM 2 days ago    Review of Systems: Otherwise unremarkable     <<<<<PAST MEDICAL & SURGICAL HISTORY>>>>>  Hypothyroidism    H/O cirrhosis    Breast cancer    No significant past surgical history      ALLERGIES  No Known Allergies      Home Medications:  Advil 200 mg oral tablet: 1 tab(s) orally every 6 hours as needed for  mild pain (26 Apr 2024 16:00)  melatonin 5 mg oral tablet: 1 tab(s) orally once a day (at bedtime) (26 Apr 2024 16:00)        MEDICATIONS  STANDING MEDICATIONS  lactated ringers. 1000 milliLiter(s) IV Continuous <Continuous>  letrozole 2.5 milliGRAM(s) Oral daily  levothyroxine 50 MICROGram(s) Oral <User Schedule>  magnesium oxide 400 milliGRAM(s) Oral daily  melatonin 5 milliGRAM(s) Oral at bedtime  pantoprazole    Tablet 40 milliGRAM(s) Oral before breakfast    PRN MEDICATIONS  albuterol    0.083% 2.5 milliGRAM(s) Nebulizer every 6 hours PRN  ibuprofen  Tablet. 200 milliGRAM(s) Oral every 6 hours PRN    VITALS:  T(F): 98.8  HR: 121  BP: 106/61  RR: 18  SpO2: 95%    <<<<<PHYSICAL EXAM>>>>>  GENERAL: NAD, appears fatigued  HEENT: Has bruising on her left eye, mucous membranes dry, lips with dried blood  PULMONARY: Clear to auscultation bilaterally on anterior respiratory exam. No wheezing or crackles  CARDIOVASCULAR: Regular rate and rhythm, S1-S2, no murmurs, rubs, or gallops  GASTROINTESTINAL: Soft, non-tender, non-distended, no guarding  SKIN/EXTREMITIES: Warm, 2+ tibialis posterior pulses, no UE or LE edema, no petichiae  NEUROLOGIC/MUSCULOSKELETAL: AOx4, able to lift both arms, has difficulty lifting legs but is able to bend both, sensation intact to light touch bilaterally in UE and LE    <<<<<LABS>>>>>                        8.8    2.67  )-----------( 13       ( 30 Apr 2024 08:32 )             25.4     04-30    141  |  103  |  16  ----------------------------<  97  3.8   |  24  |  <0.5<L>    Ca    9.4      30 Apr 2024 08:32  Mg     1.8     04-30    TPro  5.1<L>  /  Alb  3.3<L>  /  TBili  1.1  /  DBili  x   /  AST  226<H>  /  ALT  35  /  AlkPhos  234<H>  04-30    PT/INR - ( 29 Apr 2024 16:00 )   PT: 17.60 sec;   INR: 1.54 ratio         PTT - ( 29 Apr 2024 16:00 )  PTT:31.7 sec  Urinalysis Basic - ( 30 Apr 2024 08:32 )    Color: x / Appearance: x / SG: x / pH: x  Gluc: 97 mg/dL / Ketone: x  / Bili: x / Urobili: x   Blood: x / Protein: x / Nitrite: x   Leuk Esterase: x / RBC: x / WBC x   Sq Epi: x / Non Sq Epi: x / Bacteria: x          [id number]        <<<<<RADIOLOGY>>>>>    -----------------------------------------------------------------------------------------------------------------------------------------------------------------------------------------------

## 2024-04-30 NOTE — GOALS OF CARE CONVERSATION - ADVANCED CARE PLANNING - CONVERSATION DETAILS
I spoke with the patient to confirm if she wants to remain FULL CODE as she agreed to earlier in the admission. I also explained again what it means to be DNR/DNI and what it means to be resuscitated and intubated. She reported that she would like to think about it for 24 hours but for now, she would like to remain FULL CODE. She had her health care proxy, Sandy Rao, sign the MOLST form for her.

## 2024-04-30 NOTE — CONSULT NOTE ADULT - SUBJECTIVE AND OBJECTIVE BOX
CC: back pain    HPI:  57yo female whose documented PMH  includes hypothyroid, asthma, ETOH abuse (cirrhosis) and breast cancer widely metastatic to bone, status post radiation 5 sessions finished 5/31/23, on letrozole daily, (was on Ribociclib and was discontinued due to low magnesium with high QTc), surgical history of occiput-T2 posterior instrumentation and fusion, ORIF of C2 body with functional decline and impaired ADLs/mobility, ambulates with a walker, following with Dr. Peña , presents to the ER due to back pain present for 2 weeks. Due to persistence, she was advised to come to the ER. No loss of bladder, bowel function but told ER she had generalized weakness. While in the ER patient became tachycardic with pulse ox of 89% so PE study was done (no PE) ER team spoke to neurosurgery team member requesting that patient be sent [place]  (26 Apr 2024 22:23)    PERTINENT PM/SXH:   Hypothyroidism    H/O cirrhosis    Breast cancer      No significant past surgical history      FAMILY HISTORY:  FHx: melanoma (Mother)    FHx: arrhythmia (Father)      None pertinent    ITEMS NOT CHECKED ARE NOT PRESENT    SOCIAL HISTORY:   Significant other/partner[ ]  Children[ ]  Zoroastrian/Spirituality:  Substance hx:  [ ]   Tobacco hx:  [ ]   Alcohol hx: [ ]   Living Situation: [x ]Home  [ ]Long term care  [ ]Rehab [ ]Other  Home Services: [ ] HHA [ ] Visting RN [ ] Hospice  Occupation:  Home Opioid hx:  [ ] Y [ ] N [x ] I-Stop Reference No:    Reference #: [id number] - no meds     ADVANCE DIRECTIVES:     [x ] Full Code [ ] DNR  MOLST  [ ]  Living Will  [ ]   DECISION MAKER(s):  [ ] Health Care Proxy(s)  [ x] Surrogate(s)  [ ] Guardian           Name(s): Phone Number(s):  Pankaj Lugo      BASELINE (I)ADL(s) (prior to admission):    [place]: [ ]Total  [ ] Moderate [ ]Dependent  Palliative Performance Status Version 2:         %    http://npcrc.org/files/news/palliative_performance_scale_ppsv2.pdf    Allergies    No Known Allergies    Intolerances    hydromorphone (Faint; Nausea)  MEDICATIONS  (STANDING):  lactated ringers. 1000 milliLiter(s) (100 mL/Hr) IV Continuous <Continuous>  letrozole 2.5 milliGRAM(s) Oral daily  levothyroxine 50 MICROGram(s) Oral <User Schedule>  magnesium oxide 400 milliGRAM(s) Oral daily  melatonin 5 milliGRAM(s) Oral at bedtime  pantoprazole    Tablet 40 milliGRAM(s) Oral before breakfast    MEDICATIONS  (PRN):  albuterol    0.083% 2.5 milliGRAM(s) Nebulizer every 6 hours PRN Shortness of Breath and/or Wheezing  ibuprofen  Tablet. 200 milliGRAM(s) Oral every 6 hours PRN Mild Pain (1 - 3)    PRESENT SYMPTOMS: [ ]Unable to obtain due to poor mentation   Source if other than patient:  [ ]Family   [ ]Team     Pain: [ ]yes [ ]no  ALL PAIN ASSESSMENT COMPONENTS WERE ASKED ABOUT UNLESS OTHERWISE NOTED  QOL impact -   Location -                    Aggravating factors -  Quality -  Radiation -  Timing-  Severity (0-10 scale):  Minimal acceptable level (0-10 scale):     CPOT:    https://www.[facility].org/getattachment/[email or web address]/Critical-Care-Pain-Observation-Tool-(CPOT)    PAIN AD Score:   http://geriatrictoolkit.[facility]/cog/painad.pdf (press ctrl +  left click to view)    Dyspnea:                           [ ]None[ ]Mild [ ]Moderate [ ]Severe     Respiratory Distress Observation Scale (RDOS):   A score of 0 to 2 signifies little or no respiratory distress, 3 signifies mild distress, scores 4 to 6 indicate moderate distress, and scores greater than 7 signify severe distress  https://www.[facility].ca/sites/default/files/PDFS/908926-eayylejrxms-qjfqpldi-agnloyuthdr-kfnqp.pdf    Anxiety:                             [ ]None[ ]Mild [ ]Moderate [ ]Severe   Fatigue:                             [ ]None[ ]Mild [ ]Moderate [ ]Severe   Nausea:                             [ ]None[ ]Mild [ ]Moderate [ ]Severe   Loss of appetite:              [ ]None[ ]Mild [ ]Moderate [ ]Severe   Constipation:                    [ ]None[ ]Mild [ ]Moderate [ ]Severe    Other Symptoms:  [ ]All other review of systems negative     Palliative Performance Status Version 2:         %    http://npcrc.org/files/news/palliative_performance_scale_ppsv2.pdf    PHYSICAL EXAM:  Vital Signs Last 24 Hrs  T(C): 37.1 (30 Apr 2024 05:30), Max: 37.7 (30 Apr 2024 04:00)  T(F): 98.8 (30 Apr 2024 05:30), Max: 99.9 (30 Apr 2024 04:00)  HR: 121 (30 Apr 2024 04:00) (118 - 124)  BP: 106/61 (30 Apr 2024 04:00) (106/61 - 176/74)  BP(mean): --  RR: 18 (30 Apr 2024 04:00) (18 - 18)  SpO2: 95% (30 Apr 2024 07:56) (95% - 95%)    Parameters below as of 30 Apr 2024 07:56  Patient On (Oxygen Delivery Method): nasal cannula  O2 Flow (L/min): 2   I&O's Summary    29 Apr 2024 07:01  -  30 Apr 2024 07:00  --------------------------------------------------------  IN: 320 mL / OUT: 2500 mL / NET: -2180 mL        GENERAL:  [ ] No acute distress [ ]Lethargic  [ ]Unarousable  [ ]Verbal  [ ]Non-Verbal [ ]Cachexia    BEHAVIORAL/PSYCH:  [ ]Alert and Oriented x  [ ] Anxiety [ ] Delirium [ ] Agitation [ ] Calm   EYES: [ ] No scleral icterus [ ] Scleral icterus [ ] Closed  ENMT:  [ ]Dry mouth  [ ]No external oral lesions [ ] No external ear or nose lesions  CARDIOVASCULAR:  [ ]Regular [ ]Irregular [ ]Tachy [ ]Not Tachy  [ ]Ahmet [ ] Edema [ ] No edema  PULMONARY:  [ ]Tachypnea  [ ]Audible excessive secretions [ ] No labored breathing [ ] labored breathing  GASTROINTESTINAL: [ ]Soft  [ ]Distended  [ ]Not distended [ ]Non tender [ ]Tender  MUSCULOSKELETAL: [ ]No clubbing [ ] clubbing  [ ] No cyanosis [ ] cyanosis  NEUROLOGIC: [ ]No focal deficits  [ ]Follows commands  [ ]Does not follow commands  [ ]Cognitive impairment  [ ]Dysphagia  [ ]Dysarthria  [ ]Paresis   SKIN: [ ] Jaundiced [ ] Non-jaundiced [ ]Rash [ ]No Rash [ ] Warm [ ] Dry  MISC/LINES: [ ] ET tube [ ] Trach [ ]NGT/OGT [ ]PEG [ ]Wong    LABS: reviewed by me                        8.8    2.67  )-----------( 13       ( 30 Apr 2024 08:32 )             25.4   04-30    141  |  103  |  16  ----------------------------<  97  3.8   |  24  |  <0.5<L>    Ca    9.4      30 Apr 2024 08:32  Mg     1.8     04-30    TPro  5.1<L>  /  Alb  3.3<L>  /  TBili  1.1  /  DBili  x   /  AST  226<H>  /  ALT  35  /  AlkPhos  234<H>  04-30  PT/INR - ( 29 Apr 2024 16:00 )   PT: 17.60 sec;   INR: 1.54 ratio         PTT - ( 29 Apr 2024 16:00 )  PTT:31.7 sec    Urinalysis Basic - ( 30 Apr 2024 08:32 )    Color: x / Appearance: x / SG: x / pH: x  Gluc: 97 mg/dL / Ketone: x  / Bili: x / Urobili: x   Blood: x / Protein: x / Nitrite: x   Leuk Esterase: x / RBC: x / WBC x   Sq Epi: x / Non Sq Epi: x / Bacteria: x      RADIOLOGY & ADDITIONAL STUDIES: reviewed by me    EKG: reviewed by me      PROTEIN CALORIE MALNUTRITION PRESENT: [ ]mild [ ]moderate [ ]severe [ ]underweight [ ]morbid obesity  https://www.andeal.org/vault/2440/web/files/ONC/Table_Clinical%20Characteristics%20to%20Document%20Malnutrition-White%20JV%20et%20al%202012.pdf    Height (cm): 162.6 (04-29-24 @ 19:42), 162.6 (07-16-23 @ 17:21)  Weight (kg): 61.3 (04-26-24 @ 22:53), 72.6 (07-13-23 @ 13:00)  BMI (kg/m2): 23.2 (04-29-24 @ 19:42), 23.2 (04-26-24 @ 22:53), 27.5 (07-16-23 @ 17:21)  [ ]PPSV2 < or = to 30% [ ]significant weight loss  [ ]poor nutritional intake  [ ]anasarca      [ ]Artificial Nutrition      Palliative Care Spiritual/Emotional Screening Tool Question  Severity (0-4):                    OR                    [ x] Unable to determine. Will assess at later time if appropriate.  Score of 2 or greater indicates recommendation of Chaplaincy and/or SW referral  Chaplaincy Referral: [ ] Yes [ ] Refused [ ] Following     Caregiver [place]:  [ ] Yes [ ] No    OR    [x ] Unable to determine. Will assess at later time if appropriate.  Social Work Referral [ ]  Patient and Family Centered Care Referral [ ]    Anticipatory Grief Present: [ ] Yes [ ] No    OR     [ x] Unable to determine. Will assess at later time if appropriate.  Social Work Referral [ ]  Patient and Family Centered Care Referral [ ]    Patient discussed with primary medical team MD  Palliative care education provided to patient and/or family   CC: back pain    HPI:  57yo female whose documented PMH  includes hypothyroid, asthma, ETOH abuse (cirrhosis) and breast cancer widely metastatic to bone, status post radiation 5 sessions finished 5/31/23, on letrozole daily, (was on Ribociclib and was discontinued due to low magnesium with high QTc), surgical history of occiput-T2 posterior instrumentation and fusion, ORIF of C2 body with functional decline and impaired ADLs/mobility, ambulates with a walker, following with Dr. Peña , presents to the ER due to back pain present for 2 weeks. Due to persistence, she was advised to come to the ER. No loss of bladder, bowel function but told ER she had generalized weakness. While in the ER patient became tachycardic with pulse ox of 89% so PE study was done (no PE) ER team spoke to neurosurgery team member requesting that patient be sent [place]  (26 Apr 2024 22:23)    PERTINENT PM/SXH:   Hypothyroidism    H/O cirrhosis    Breast cancer      No significant past surgical history      FAMILY HISTORY:  FHx: melanoma (Mother)    FHx: arrhythmia (Father)      None pertinent    ITEMS NOT CHECKED ARE NOT PRESENT    SOCIAL HISTORY:   Significant other/partner[ ]  Children[ ]  Sikh/Spirituality:  Substance hx:  [ ]   Tobacco hx:  [ ]   Alcohol hx: [ ]   Living Situation: [x ]Home  [ ]Long term care  [ ]Rehab [ ]Other  Home Services: [ ] HHA [ ] Visting RN [ ] Hospice  Occupation:  Home Opioid hx:  [ ] Y [ ] N [x ] I-Stop Reference No:    Reference #: [id number] - no meds     ADVANCE DIRECTIVES:     [x ] Full Code [ ] DNR  MOLST  [ ]  Living Will  [ ]   DECISION MAKER(s):  [ ] Health Care Proxy(s)  [ x] Surrogate(s)  [ ] Guardian           Name(s): Phone Number(s):  Pankaj Lugo      BASELINE (I)ADL(s) (prior to admission):    [place]: [ ]Total  [ ] Moderate [ ]Dependent  Palliative Performance Status Version 2:         %    http://npcrc.org/files/news/palliative_performance_scale_ppsv2.pdf    Allergies    No Known Allergies    Intolerances    hydromorphone (Faint; Nausea)    MEDICATIONS  (STANDING):  dexAMETHasone  Injectable 4 milliGRAM(s) IV Push every 12 hours  lactated ringers. 1000 milliLiter(s) (100 mL/Hr) IV Continuous <Continuous>  letrozole 2.5 milliGRAM(s) Oral daily  levothyroxine 50 MICROGram(s) Oral <User Schedule>  magnesium oxide 400 milliGRAM(s) Oral daily  melatonin 5 milliGRAM(s) Oral at bedtime  pantoprazole    Tablet 40 milliGRAM(s) Oral before breakfast    MEDICATIONS  (PRN):  albuterol    0.083% 2.5 milliGRAM(s) Nebulizer every 6 hours PRN Shortness of Breath and/or Wheezing  ibuprofen  Tablet. 200 milliGRAM(s) Oral every 6 hours PRN Mild Pain (1 - 3)  morphine  - Injectable 2 milliGRAM(s) IV Push every 3 hours PRN Severe Pain (7 - 10)  )    PRESENT SYMPTOMS: [ ]Unable to obtain due to poor mentation   Source if other than patient:  [ ]Family   [ ]Team     Pain: [x ]yes [ ]no  ALL PAIN ASSESSMENT COMPONENTS WERE ASKED ABOUT UNLESS OTHERWISE NOTED  QOL impact -  significant  Location -  back      Aggravating factors -  movement   Quality - sharp  Radiation - none given  Timing- none given  Severity (0-10 scale): 9/10  Minimal acceptable level (0-10 scale):     CPOT:    https://www.[facility].org/getattachment/[email or web address]/Critical-Care-Pain-Observation-Tool-(CPOT)    PAIN AD Score:   http://geriatrictoolkit.[facility]/cog/painad.pdf (press ctrl +  left click to view)    Dyspnea:                           [ ]None[ x]Mild [ ]Moderate [ ]Severe     Respiratory Distress Observation Scale (RDOS):   A score of 0 to 2 signifies little or no respiratory distress, 3 signifies mild distress, scores 4 to 6 indicate moderate distress, and scores greater than 7 signify severe distress  https://www.[facility].ca/sites/default/files/PDFS/090740-mvtunxxzfux-xkhbcxds-axliqeqqvgi-sxnar.pdf    Anxiety:                             [ x]None[ ]Mild [ ]Moderate [ ]Severe   Fatigue:                             [ x]None[ ]Mild [ ]Moderate [ ]Severe   Nausea:                             [ ]None[ x]Mild [ ]Moderate [ ]Severe   Loss of appetite:              [ x]None[ ]Mild [ ]Moderate [ ]Severe   Constipation:                    [x ]None[ ]Mild [ ]Moderate [ ]Severe    Other Symptoms:  [x ]All other review of systems negative     Palliative Performance Status Version 2:         30%    http://[facility].org/files/news/palliative_performance_scale_ppsv2.pdf    PHYSICAL EXAM:  Vital Signs Last 24 Hrs  T(C): 37.1 (30 Apr 2024 05:30), Max: 37.7 (30 Apr 2024 04:00)  T(F): 98.8 (30 Apr 2024 05:30), Max: 99.9 (30 Apr 2024 04:00)  HR: 121 (30 Apr 2024 04:00) (118 - 124)  BP: 106/61 (30 Apr 2024 04:00) (106/61 - 176/74)  BP(mean): --  RR: 18 (30 Apr 2024 04:00) (18 - 18)  SpO2: 95% (30 Apr 2024 07:56) (95% - 95%)    Parameters below as of 30 Apr 2024 07:56  Patient On (Oxygen Delivery Method): nasal cannula  O2 Flow (L/min): 2   I&O's Summary    29 Apr 2024 07:01  -  30 Apr 2024 07:00  --------------------------------------------------------  IN: 320 mL / OUT: 2500 mL / NET: -2180 mL        GENERAL:  [ ] No acute distress [ ]Lethargic  [ ]Unarousable  [x ]Verbal  [ ]Non-Verbal [ ]Cachexia    BEHAVIORAL/PSYCH:  [x ]Alert and Oriented x2-3  [ ] Anxiety [ ] Delirium [ ] Agitation [x ] Calm   EYES: [ x] No scleral icterus [ ] Scleral icterus [ ] Closed  ENMT:  [ ]Dry mouth  x[ ]No external oral lesions [ ] No external ear or nose lesions  CARDIOVASCULAR:  [ ]Regular [ ]Irregular [ ]Tachy [ x]Not Tachy  [ ]Ahmet [ ] Edema [ ] No edema  PULMONARY:  [ ]Tachypnea  [ ]Audible excessive secretions [x ] No labored breathing [ ] labored breathing  GASTROINTESTINAL: [ ]Soft  [ ]Distended  [ x]Not distended [ ]Non tender [ ]Tender  MUSCULOSKELETAL: [ ]No clubbing [ ] clubbing  [x ] No cyanosis [ ] cyanosis  NEUROLOGIC: [ ]No focal deficits  [x ]Follows commands  [ ]Does not follow commands  [ ]Cognitive impairment  [ ]Dysphagia  [ ]Dysarthria  [ ]Paresis   SKIN: [ ] Jaundiced [x ] Non-jaundiced [ ]Rash [ ]No Rash [ ] Warm [ ] Dry  MISC/LINES: [ ] ET tube [ ] Trach [ ]NGT/OGT [ ]PEG [ ]Wong    LABS: reviewed by me                        8.8    2.67  )-----------( 13       ( 30 Apr 2024 08:32 )             25.4   04-30    141  |  103  |  16  ----------------------------<  97  3.8   |  24  |  <0.5<L>    Ca    9.4      30 Apr 2024 08:32  Mg     1.8     04-30    TPro  5.1<L>  /  Alb  3.3<L>  /  TBili  1.1  /  DBili  x   /  AST  226<H>  /  ALT  35  /  AlkPhos  234<H>  04-30  PT/INR - ( 29 Apr 2024 16:00 )   PT: 17.60 sec;   INR: 1.54 ratio         PTT - ( 29 Apr 2024 16:00 )  PTT:31.7 sec    Urinalysis Basic - ( 30 Apr 2024 08:32 )    Color: x / Appearance: x / SG: x / pH: x  Gluc: 97 mg/dL / Ketone: x  / Bili: x / Urobili: x   Blood: x / Protein: x / Nitrite: x   Leuk Esterase: x / RBC: x / WBC x   Sq Epi: x / Non Sq Epi: x / Bacteria: x      RADIOLOGY & ADDITIONAL STUDIES: reviewed by me    < from: CT Abdomen and Pelvis w/ IV Cont (04.29.24 @ 20:36) >  IMPRESSION:    Innumerable hepatic metastases. New bilateral adrenal nodules likely   metastases.    Diffuse widespread osseous metastases, increased from prior CT abdomen   pelvis. See recent spinal MRIs and CTs for detailed evaluation of the   spine.    Large amount of hyperdense material in the bladder. Correlate clinically   including with urinalysis.    < end of copied text >      EKG: reviewed by me    < from: 12 Lead ECG (04.26.24 @ 18:04) >  Ventricular Rate 109 BPM    Atrial Rate 109 BPM    P-R Interval 132 ms    QRS Duration 86 ms    Q-T Interval 338 ms    QTC Calculation(Bazett) 455 ms    P Axis 18 degrees    R Axis 118 degrees    T Axis -18 degrees    Diagnosis Line Sinus tachycardia  Right ventricular hypertrophy with repolarization abnormality  Possible Inferior infarct , age undetermined  Anterior infarct , age undetermined  Abnormal ECG    < end of copied text >      PROTEIN CALORIE MALNUTRITION PRESENT: [ ]mild [ ]moderate [ ]severe [ ]underweight [ ]morbid obesity  https://www.andeal.org/vault/2440/web/files/ONC/Table_Clinical%20Characteristics%20to%20Document%20Malnutrition-White%20JV%20et%20al%202012.pdf    Height (cm): 162.6 (04-29-24 @ 19:42), 162.6 (07-16-23 @ 17:21)  Weight (kg): 61.3 (04-26-24 @ 22:53), 72.6 (07-13-23 @ 13:00)  BMI (kg/m2): 23.2 (04-29-24 @ 19:42), 23.2 (04-26-24 @ 22:53), 27.5 (07-16-23 @ 17:21)  [ ]PPSV2 < or = to 30% [ ]significant weight loss  [ ]poor nutritional intake  [ ]anasarca      [ ]Artificial Nutrition      Palliative Care Spiritual/Emotional Screening Tool Question  Severity (0-4):                    OR                    [ x] Unable to determine. Will assess at later time if appropriate.  Score of 2 or greater indicates recommendation of Chaplaincy and/or SW referral  Chaplaincy Referral: [ ] Yes [ ] Refused [ ] Following     Caregiver [place]:  [ ] Yes [ ] No    OR    [x ] Unable to determine. Will assess at later time if appropriate.  Social Work Referral [ ]  Patient and Family Centered Care Referral [ ]    Anticipatory Grief Present: [ ] Yes [ ] No    OR     [ x] Unable to determine. Will assess at later time if appropriate.  Social Work Referral [ ]  Patient and Family Centered Care Referral [ ]    Patient discussed with primary medical team MD  Palliative care education provided to patient and/or family

## 2024-04-30 NOTE — PROGRESS NOTE ADULT - SUBJECTIVE AND OBJECTIVE BOX
Hematology Consult Note    HPI:  59yo female whose documented PMH  includes hypothyroid, asthma, ETOH abuse (cirrhosis) and breast cancer widely metastatic to bone, status post radiation 5 sessions finished 5/31/23, on letrozole daily, (was on Ribociclib and was discontinued due to low magnesium with high QTc), surgical history of occiput-T2 posterior instrumentation and fusion, ORIF of C2 body with functional decline and impaired ADLs/mobility, ambulates with a walker, following with Dr. Peña , presents to the ER due to back pain present for 2 weeks. Due to persistence, she was advised to come to the ER. No loss of bladder, bowel function but told ER she had generalized weakness. While in the ER patient became tachycardic with pulse ox of 89% so PE study was done (no PE) ER team spoke to neurosurgery team member requesting that patient be sent [place]  (26 Apr 2024 22:23)      Allergies    No Known Allergies    Intolerances    hydromorphone (Faint; Nausea)      MEDICATIONS  (STANDING):  dexAMETHasone  Injectable 4 milliGRAM(s) IV Push every 12 hours  lactated ringers. 1000 milliLiter(s) (100 mL/Hr) IV Continuous <Continuous>  letrozole 2.5 milliGRAM(s) Oral daily  levothyroxine 50 MICROGram(s) Oral <User Schedule>  magnesium oxide 400 milliGRAM(s) Oral daily  melatonin 5 milliGRAM(s) Oral at bedtime  pantoprazole    Tablet 40 milliGRAM(s) Oral before breakfast    MEDICATIONS  (PRN):  albuterol    0.083% 2.5 milliGRAM(s) Nebulizer every 6 hours PRN Shortness of Breath and/or Wheezing  ibuprofen  Tablet. 200 milliGRAM(s) Oral every 6 hours PRN Mild Pain (1 - 3)  morphine  - Injectable 2 milliGRAM(s) IV Push every 3 hours PRN Severe Pain (7 - 10)      PAST MEDICAL & SURGICAL HISTORY:  Hypothyroidism      H/O cirrhosis      Breast cancer      No significant past surgical history          FAMILY HISTORY:  FHx: melanoma (Mother)    FHx: arrhythmia (Father)        SOCIAL HISTORY: No EtOH, no tobacco    REVIEW OF SYSTEMS:    CONSTITUTIONAL: No weakness, fevers or chills  EYES/ENT: No visual changes;  No vertigo or throat pain   NECK: No pain or stiffness  RESPIRATORY: No cough, wheezing, hemoptysis; No shortness of breath  CARDIOVASCULAR: No chest pain or palpitations  GASTROINTESTINAL: No abdominal or epigastric pain. No nausea, vomiting, or hematemesis; No diarrhea or constipation. No melena or hematochezia.  GENITOURINARY: No dysuria, frequency or hematuria  NEUROLOGICAL: No numbness or weakness  SKIN: No itching, burning, rashes, or lesions   All other review of systems is negative unless indicated above.    Height (cm): 162.6 (04-29 @ 19:42)    T(F): 97 (04-30-24 @ 16:43), Max: 99.9 (04-30-24 @ 04:00)  HR: 84 (04-30-24 @ 16:43)  BP: 117/74 (04-30-24 @ 16:43)  RR: 18 (04-30-24 @ 13:07)  SpO2: 98% (04-30-24 @ 16:43)  Wt(kg): --    GENERAL: NAD, well-developed  HEAD:  Atraumatic, Normocephalic  EYES: EOMI, PERRLA, conjunctiva and sclera clear  NECK: Supple, No JVD  CHEST/LUNG: Clear to auscultation bilaterally; No wheeze  HEART: Regular rate and rhythm; No murmurs, rubs, or gallops  ABDOMEN: Soft, Nontender, Nondistended; Bowel sounds present  EXTREMITIES:  no edema                          8.8    2.67  )-----------( 13       ( 30 Apr 2024 08:32 )             25.4       04-30    141  |  103  |  16  ----------------------------<  97  3.8   |  24  |  <0.5<L>    Ca    9.4      30 Apr 2024 08:32  Mg     1.8     04-30    TPro  5.1<L>  /  Alb  3.3<L>  /  TBili  1.1  /  DBili  x   /  AST  226<H>  /  ALT  35  /  AlkPhos  234<H>  04-30      Magnesium: 1.8 mg/dL (04-30 @ 08:32)

## 2024-04-30 NOTE — CONSULT NOTE ADULT - ASSESSMENT
58yFemale with history of metastatic breast cancer to bones presents with weakness and back pain.  Palliative care consulted for pain management and GOC.    Spoke with patient,  and sister at bedside. Palliative care introduced. Patient noted IV morphine helped relieve pain for a few hours.        MEDD (morphine equivalent daily dose):  12mg    Education about palliative care provided to patient/family.  See Recs below.    Please call [phone] with questions or concerns 24/7.   We will continue to follow.

## 2024-04-30 NOTE — PROGRESS NOTE ADULT - ASSESSMENT
58-year-old female with PMHx of breast cancer (tx w/ letrozole) with metastases to bone presents for evaluation of generalized weakness and back pain. Per patient she is getting radiation she states that she completed 5 sessions of radiation and was doing well however over the past 24 to 48 hours became weak denies any loss of bladder or bowel control denies any saddle anesthesia denies any focal weakness to the lower extremities denies any fevers or chills. Sent from [facility] for neurosurgery eval (4/27).    #Stage IV Breast Cancer HR+/HER2- , metastatic to bone  #back pain  *CTA PE (4/26): No pulmonary embolus seen. Right breast mass consistent with carcinoma. Diffuse metastatic bone disease. Right adrenal mass. Left upper lobe pulmonary nodule presumably representing metastatic lung disease.  *CT Head noncon (4/26): No evidence of acute territorial infarct, intracranial hemorrhage, or midline shift. Significantly increased numerous sclerotic osseous metastasis throughout the calvarium which were predominantly lytic on the prior CT head from 4/21/2023.  *CT T/L spine noncon (4/26): Increased diffuse predominantly sclerotic osseous metastasis throughout the thoracolumbar spine, ribs, sacrum, and bilateral iliac bones compared to the prior CT abdomen pelvis dated 7/19/2013. Multilevel compression deformities of the thoracic spine and L4 without significant change. Multilevel thoracolumbar neuroforaminal stenosis  *MR C-spine IC (4/29):  Interval progression of extensive enhancing osseous metastatic disease. No obvious cervical epidural disease demonstrated. (The previously seen epidural disease at C2/C3 is no longer visualized), No evidence of cervical cord compression or cord signal abnormality. Partially visualized enhancing lesion in the region of the pineal gland, presumably reflecting metastatic disease. Right lateral neck/supraclavicular lymph node measuring 1.5 cm.  *MR T/L-spine IC (4/29): Significant interval progression of the numerous discrete and infiltrative bone marrow signal abnormalities consistent with extensive osseous metastatic disease. Interval development of epidural enhancing soft tissue circumferentially throughout the thoracic and lumbar spines. This is most pronounced (approx 5 mm in width) from about T1 to T9, producing diffuse thecal sac compression without obvious cord compression or cord edema. Thin epidural disease is noted extending inferiorly to the L5 level.  Interval progression of the multilevel compression deformities (severe at T7; moderate at T11 and L4; and mild at T3, T4, T5, T9, and T10). Mild osseous retropulsion noted at T7, T10, T11 and L4. Extensive hepatic metastases. Bilateral adrenal nodules  *CT AP (4/29): Innumerable hepatic metastases. New bilateral adrenal nodules likely metastases. Diffuse widespread osseous metastases, increased from prior CT abdomen pelvis. Large amount of hyperdense material in the bladder. Correlate clinically including with urinalysis.  - c/w letrozole  - heme/onc recs (4/28): c/w IV fluids, c/w letrozole, d/c ibrance, repeat CA 27-29, consult NSGY for metastatic disease to T/L spine, rule out DIC, Pending MRI spine, will need liquid biopsy , systemic therapy soon   (Chemo v/s endocrine ) , response to letrozole/Ibrance < 12 months, Consider bone marrow, Chemo will be challenging given thrombocytopenia . Start second line endocrine therapy ?   - DC Ibrance given worsening bone disease and increasing tumor markers per hem/onc   - CA 27-29 (4/27): 996.5  - NSGY recs (4/27): MRI C/T/L spine w/wo given no contraindications, Mets w/u: CT C/A/P, RadOnc/HemeOnc consults, Pain management recs  - Rad onc recs (4/29):  Will await the MRI results--if she has cord compression then RT may be considered more urgently, not a candidate at the moment unless platelets > 20  - No cord compression on MRI of her spine, f/u NSGY and rad onc notes  - Pending pain management consult  - F/u DIC panel and CA 27-29 results with hem onc  - Start morphine 2 mg IV q6h PRN for severe pain    # Thrombocytopenia  - Plt 25 ->18 -> 16 -> 14  - hem/onc is following, SCD for DVT ppx  - Trend platelets daily    #Hypercalcemia of malignancy (resolved)  - Ca 14.2 -> 11.4 -> 10.7 -> 10.2 -> 9.4 (4/30)  - s/p 2 x calcitonin 250 units  - s/p 1 x Zometa 4 mg  - s/p 2 L NS, s/p NS @ 200 cc/hr  - c/w LR @ 75 cc/hr  - d/c tele     # Hypokalemia (resolved(  - K 2.6 -> 3.5 -> 3.4 -> 3.8 (4/30)  - Repleted K  - d/c tele    # Hypomag (resolved)  - Mag 1.3 -> 2.0 -> 1.6 -> 1.8 (4/30)  - repleted  - d/c tele    #hypothyroid   - on synthroid     #MISC  - DVT PPx: SCDs  - GI PPx: NI  - Diet: Pureed  - Activity: Ambulate w/assistance  - Dispo: D/c tele    Pending: F/u hem onc, rad onc, and NSGY note, f/u pain management note, d/c tele     58-year-old female with PMHx of breast cancer (tx w/ letrozole) with metastases to bone presents for evaluation of generalized weakness and back pain. Per patient she is getting radiation she states that she completed 5 sessions of radiation and was doing well however over the past 24 to 48 hours became weak denies any loss of bladder or bowel control denies any saddle anesthesia denies any focal weakness to the lower extremities denies any fevers or chills. Sent from [facility] for neurosurgery eval (4/27).    #Stage IV Breast Cancer HR+/HER2- , metastatic to bone  #back pain  *CTA PE (4/26): No pulmonary embolus seen. Right breast mass consistent with carcinoma. Diffuse metastatic bone disease. Right adrenal mass. Left upper lobe pulmonary nodule presumably representing metastatic lung disease.  *CT Head noncon (4/26): No evidence of acute territorial infarct, intracranial hemorrhage, or midline shift. Significantly increased numerous sclerotic osseous metastasis throughout the calvarium which were predominantly lytic on the prior CT head from 4/21/2023.  *CT T/L spine noncon (4/26): Increased diffuse predominantly sclerotic osseous metastasis throughout the thoracolumbar spine, ribs, sacrum, and bilateral iliac bones compared to the prior CT abdomen pelvis dated 7/19/2013. Multilevel compression deformities of the thoracic spine and L4 without significant change. Multilevel thoracolumbar neuroforaminal stenosis  *MR C-spine IC (4/29):  Interval progression of extensive enhancing osseous metastatic disease. No obvious cervical epidural disease demonstrated. (The previously seen epidural disease at C2/C3 is no longer visualized), No evidence of cervical cord compression or cord signal abnormality. Partially visualized enhancing lesion in the region of the pineal gland, presumably reflecting metastatic disease. Right lateral neck/supraclavicular lymph node measuring 1.5 cm.  *MR T/L-spine IC (4/29): Significant interval progression of the numerous discrete and infiltrative bone marrow signal abnormalities consistent with extensive osseous metastatic disease. Interval development of epidural enhancing soft tissue circumferentially throughout the thoracic and lumbar spines. This is most pronounced (approx 5 mm in width) from about T1 to T9, producing diffuse thecal sac compression without obvious cord compression or cord edema. Thin epidural disease is noted extending inferiorly to the L5 level.  Interval progression of the multilevel compression deformities (severe at T7; moderate at T11 and L4; and mild at T3, T4, T5, T9, and T10). Mild osseous retropulsion noted at T7, T10, T11 and L4. Extensive hepatic metastases. Bilateral adrenal nodules  *CT AP (4/29): Innumerable hepatic metastases. New bilateral adrenal nodules likely metastases. Diffuse widespread osseous metastases, increased from prior CT abdomen pelvis. Large amount of hyperdense material in the bladder. Correlate clinically including with urinalysis.  - c/w letrozole  - heme/onc recs (4/28): c/w IV fluids, c/w letrozole, d/c ibrance, repeat CA 27-29, consult NSGY for metastatic disease to T/L spine, rule out DIC, Pending MRI spine, will need liquid biopsy , systemic therapy soon   (Chemo v/s endocrine ) , response to letrozole/Ibrance < 12 months, Consider bone marrow, Chemo will be challenging given thrombocytopenia . Start second line endocrine therapy ?   - DC Ibrance given worsening bone disease and increasing tumor markers per hem/onc   - CA 27-29 (4/27): 996.5  - NSGY recs (4/27): MRI C/T/L spine w/wo given no contraindications, Mets w/u: CT C/A/P, RadOnc/HemeOnc consults, Pain management recs  - Rad onc recs (4/29):  Will await the MRI results--if she has cord compression then RT may be considered more urgently, not a candidate at the moment unless platelets > 20  - No cord compression on MRI of her spine, f/u NSGY and rad onc notes  - Pending pain management consult  - F/u DIC panel and CA 27-29 results with hem onc  - Start morphine 2 mg IV q6h PRN for severe pain    # Thrombocytopenia  - Plt 25 ->18 -> 16 -> 14  - hem/onc is following, SCD for DVT ppx  - Trend platelets daily    #Hypercalcemia of malignancy (resolved)  - Ca 14.2 -> 11.4 -> 10.7 -> 10.2 -> 9.4 (4/30)  - s/p 2 x calcitonin 250 units  - s/p 1 x Zometa 4 mg  - s/p 2 L NS, s/p NS @ 200 cc/hr  - c/w LR @ 75 cc/hr  - d/c tele     # Hypokalemia (resolved)  - K 2.6 -> 3.5 -> 3.4 -> 3.8 (4/30)  - Repleted K  - d/c tele    # Hypomag (resolved)  - Mag 1.3 -> 2.0 -> 1.6 -> 1.8 (4/30)  - repleted  - d/c tele    #hypothyroid   - on synthroid     #MISC  - DVT PPx: SCDs  - GI PPx: NI  - Diet: Pureed  - Activity: Ambulate w/assistance  - Dispo: D/c tele    Pending: F/u hem onc, rad onc, and NSGY note, f/u pain management note, d/c tele

## 2024-04-30 NOTE — CONSULT NOTE ADULT - PROBLEM SELECTOR RECOMMENDATION 2
With hypercalcemia  -discontinued Ibrance given worsening disease  -follow up heme onc - family notes they're waiting for next steps from heme onc  -GOC as appropriate

## 2024-04-30 NOTE — PROGRESS NOTE ADULT - ASSESSMENT
57yo female whose documented PMH  includes hypothyroid, asthma, ETOH abuse (cirrhosis) and breast cancer widely metastatic to bone, status post radiation 5 sessions finished 5/31/23, on letrozole daily, (was on Ribociclib and was discontinued due to low magnesium with high QTc), surgical history of occiput-T2 posterior instrumentation and fusion, ORIF of C2 body with functional decline and impaired ADLs/mobility, ambulates with a walker, following with Dr. Peña , presents to the ER due to back pain present for 2 weeks. Due to persistence, she was advised to come to the ER. No loss of bladder, bowel function but told ER she had generalized weakness. While in the ER patient became tachycardic with pulse ox of 89% so PE study was done (no PE) ER team spoke to neurosurgery team member requesting that patient be sent [place] back pain - needs further evaluation by neurosurgery    As per ER, neurosurgery is requesting that patient be transferred to [facility] to expedite evaluation and treatment (I discussed ordering MRI studies while patient is here at [facility] with neurosurgery PA. If patient is able to get a bed at [facility] early in the morning then they will order the imaging study. However, if patient does not get a bed at [place] early in the morning, I would contact them to discuss further)     Stage IV Breast Cancer HR+/HER2-   Hypercalcemia of Malignancy   - oncologist: Dr. Peña   -  CT Angio Chest PE Protocol w/ IV Cont (04.26.24) Right breast mass consistent with carcinoma. Diffuse metastatic bone disease.Right adrenal mass. Left upper lobe pulmonary nodule presumably representing metastatic lung disease.  - CT Thoracic Spine No Cont (04.26.24) Increased diffuse predominantly sclerotic osseous metastasis throughout the thoracolumbar spine, ribs, sacrum, and bilateral iliac bones compared to the prior CT abdomen pelvis dated 7/19/2013. Multilevel compression deformities of the thoracic spine and L4 without significant change. Multilevel thoracolumbar neuroforaminal stenosis as above.  - Calcium 14.2 > 11  s/p calcitonin   - CA 27-29 elevated to 119.6, increasing since February 2024, now > 900   - currently being treated with Ibrance, letrozole, and zometa   -  MR Thoracic Spine w/wo IV Cont (04.29.24 @ 15:56) > Significant interval progression of the numerous discrete and infiltrative bone marrow signal abnormalities consistent with extensive   osseous metastatic disease. 2.  Interval development of epidural enhancing soft tissue circumferentially throughout the thoracic and lumbar spines. This is most pronounced (approx 5 mm in width) from about T1 to T9, producing diffuse thecal sac compression without obvious cord compression or cord edema. Thin epidural disease is noted extending inferiorly to the L5 level. 3.  Interval progression of the multilevel compression deformities (severe at T7; moderate at T11 and L4; and mild at T3, T4, T5, T9, and T10). Mild osseous retropulsion noted at T7, T10, T11 and L4. 4.  Extensive hepatic metastases. Bilateral adrenal nodules        Recommendations:   - pancytopenia possibly 2/2 to ibrance toxicity vs bone marrow involvement from breast cancer  - discontinue Ibrance and letrozole  - plan for inpatient Faslodex injection on 5/1/24, pending pharmacy approval   - please start exemestane 25 mg QD if available in the hospital   - Follow up [facility] recommendations for radiation to spine  - transfuse to keep platelet count > 20 K  - once radiation therapy is complete, will discuss systemic chemotherapy, likely to be given inpatient

## 2024-05-01 NOTE — PROGRESS NOTE ADULT - ASSESSMENT
59yo female whose documented PMH  includes hypothyroid, asthma, ETOH abuse (cirrhosis) and breast cancer widely metastatic to bone, status post radiation 5 sessions finished 5/31/23, on letrozole daily, (was on Ribociclib and was discontinued due to low magnesium with high QTc), surgical history of occiput-T2 posterior instrumentation and fusion, ORIF of C2 body with functional decline and impaired ADLs/mobility, ambulates with a walker, following with Dr. Peña , presented to the ER due to back pain present for 2 weeks. Palliative consulted for pain control and GOC.     Patient's pain is tolerable on current regimen, Encouraged her to continue with PRN dosing and that we can place her on long acting regimen if needed based on her use of opioids.     Discussed code status with patient. She is full code for now.     Education about palliative care provided to patient/family.  See Recs below.    Please call [phone] with questions or concerns 24/7.   We will continue to follow.

## 2024-05-01 NOTE — PROGRESS NOTE ADULT - SUBJECTIVE AND OBJECTIVE BOX
24H events:    Patient is a 58y old Female who presents with a chief complaint of Malignant neoplasm metastatic to bone     (29 Apr 2024 19:42)    Primary diagnosis of Metastatic cancer to bone    Today is hospital day 5d. This morning patient was seen and examined at bedside, resting comfortably in bed.    No acute or major events overnight.    Family communication:  Contact date:  Name of person contacted:  Relationship to patient:  Communication details:  What matters most:    PAST MEDICAL & SURGICAL HISTORY  Hypothyroidism    H/O cirrhosis    Breast cancer    No significant past surgical history      SOCIAL HISTORY:  Social History:  former alcohol abuse, denies tobacco use (26 Apr 2024 22:23)      ALLERGIES:  No Known Allergies    MEDICATIONS:  STANDING MEDICATIONS  dexAMETHasone  Injectable 4 milliGRAM(s) IV Push every 12 hours  exemestane 25 milliGRAM(s) Oral daily  fulvestrant Injectable (eMAR) 500 milliGRAM(s) IntraMuscular once  lactated ringers. 1000 milliLiter(s) IV Continuous <Continuous>  levothyroxine 50 MICROGram(s) Oral <User Schedule>  magnesium oxide 400 milliGRAM(s) Oral daily  melatonin 5 milliGRAM(s) Oral at bedtime  pantoprazole    Tablet 40 milliGRAM(s) Oral before breakfast    PRN MEDICATIONS  albuterol    0.083% 2.5 milliGRAM(s) Nebulizer every 6 hours PRN  ibuprofen  Tablet. 200 milliGRAM(s) Oral every 6 hours PRN  morphine  - Injectable 2 milliGRAM(s) IV Push every 3 hours PRN    VITALS:   T(F): 97.4  HR: 99  BP: 115/80  RR: 18  SpO2: 95%    PHYSICAL EXAM:  GENERAL: NAD, appears fatigued  HEENT: Has bruising on her left eye, mucous membranes dry, lips with dried blood  PULMONARY: Clear to auscultation bilaterally on anterior respiratory exam. No wheezing or crackles  CARDIOVASCULAR: Regular rate and rhythm, S1-S2, no murmurs, rubs, or gallops  GASTROINTESTINAL: Soft, non-tender, non-distended, no guarding  SKIN/EXTREMITIES: Warm, 2+ tibialis posterior pulses, no UE or LE edema, no petichiae  NEUROLOGIC/MUSCULOSKELETAL: AOx4, able to lift both arms, has difficulty lifting legs but is able to bend both, sensation intact to light touch bilaterally in UE and LE    LABS:                        8.2    2.84  )-----------( 39       ( 01 May 2024 06:38 )             23.8     05-01    136  |  100  |  16  ----------------------------<  123<H>  4.0   |  22  |  <0.5<L>    Ca    9.6      01 May 2024 06:38  Mg     1.5     05-01    TPro  5.5<L>  /  Alb  3.4<L>  /  TBili  0.9  /  DBili  x   /  AST  191<H>  /  ALT  33  /  AlkPhos  238<H>  05-01    PT/INR - ( 29 Apr 2024 16:00 )   PT: 17.60 sec;   INR: 1.54 ratio         PTT - ( 29 Apr 2024 16:00 )  PTT:31.7 sec  Urinalysis Basic - ( 01 May 2024 06:38 )    Color: x / Appearance: x / SG: x / pH: x  Gluc: 123 mg/dL / Ketone: x  / Bili: x / Urobili: x   Blood: x / Protein: x / Nitrite: x   Leuk Esterase: x / RBC: x / WBC x   Sq Epi: x / Non Sq Epi: x / Bacteria: x

## 2024-05-01 NOTE — PROGRESS NOTE ADULT - SUBJECTIVE AND OBJECTIVE BOX
HPI:    57yo female whose documented PMH  includes hypothyroid, asthma, ETOH abuse (cirrhosis) and breast cancer widely metastatic to bone, status post radiation 5 sessions finished 5/31/23, on letrozole daily, (was on Ribociclib and was discontinued due to low magnesium with high QTc), surgical history of occiput-T2 posterior instrumentation and fusion, ORIF of C2 body with functional decline and impaired ADLs/mobility, ambulates with a walker, following with Dr. Peña , presents to the ER due to back pain present for 2 weeks. Due to persistence, she was advised to come to the ER. No loss of bladder, bowel function but told ER she had generalized weakness. While in the ER patient became tachycardic with pulse ox of 89% so PE study was done (no PE) ER team spoke to neurosurgery team member requesting that [place] be [place]  (26 Apr 2024 22:23)      Interval history:     5/1: patient seen at bedside. sister Sandy is present. patient reports the back pain is there but becomes much more tolerable after taking the morphine. the relief lasts about 2/5-3 hours. she reports having BMs about 2-3 times/week. She denies any other discomfort.      ADVANCE DIRECTIVES:     [X ] Full Code [ ] DNR  MOLST  [ ]  Living Will  [ ]   DECISION MAKER(s): Patient   [ ] Health Care Proxy(s)  [ X] Surrogate(s)  [ ] Guardian           Name(s): Phone Number(s): - Pankaj       BASELINE (I)ADL(s) (prior to admission):    [place]: [ ]Total  [ X ] Moderate [ ]Dependent  Palliative Performance Status Version 2:         %    http://npcrc.org/files/news/palliative_performance_scale_ppsv2.pdf    Allergies    No Known Allergies    Intolerances    hydromorphone (Faint; Nausea)  MEDICATIONS  (STANDING):  chlorhexidine 2% Cloths 1 Application(s) Topical <User Schedule>  dexAMETHasone  Injectable 4 milliGRAM(s) IV Push every 12 hours  exemestane 25 milliGRAM(s) Oral daily  lactated ringers. 1000 milliLiter(s) (100 mL/Hr) IV Continuous <Continuous>  levothyroxine 50 MICROGram(s) Oral <User Schedule>  magnesium oxide 400 milliGRAM(s) Oral daily  melatonin 5 milliGRAM(s) Oral at bedtime  pantoprazole    Tablet 40 milliGRAM(s) Oral before breakfast    MEDICATIONS  (PRN):  albuterol    0.083% 2.5 milliGRAM(s) Nebulizer every 6 hours PRN Shortness of Breath and/or Wheezing  ibuprofen  Tablet. 200 milliGRAM(s) Oral every 6 hours PRN Mild Pain (1 - 3)  morphine  - Injectable 2 milliGRAM(s) IV Push every 3 hours PRN Severe Pain (7 - 10)    PRESENT SYMPTOMS: [ ]Unable to obtain due to poor mentation   Source if other than patient:  [ ]Family   [ ]Team     Pain: [ X]yes [ ]no  QOL impact -  significant  Location -                  back  Aggravating factors - movement  Quality - sharp  Radiation -  Timing-  Severity (0-10 scale): at worst 9/10   Minimal acceptable level (0-10 scale):     CPOT:    https://www.[email or web address]/getattachment/[email or web address]/Critical-Care-Pain-Observation-Tool-(CPOT)    PAIN AD Score:   http://geriatrictoolkit.[facility]/cog/painad.pdf (press ctrl +  left click to view)    Dyspnea:                           [ X ]None[ ]Mild [ ]Moderate [ ]Severe     Respiratory Distress Observation Scale (RDOS):   A score of 0 to 2 signifies little or no respiratory distress, 3 signifies mild distress, scores 4 to 6 indicate moderate distress, and scores greater than 7 signify severe distress  [email or web address]://[email or web address]/sites/default/files/PDFS/883779-jikryptccfu-tjrxifqa-jnbqrlbskah-irbfa.pdf    Anxiety:                             [ ]None[ ]Mild [ ]Moderate [ ]Severe   Fatigue:                             [ ]None[ ]Mild [ ]Moderate [ ]Severe   Nausea:                             [ ]None[ ]Mild [ ]Moderate [ ]Severe   Loss of appetite:              [ ]None[ ]Mild [ ]Moderate [ ]Severe   Constipation:                    [ ]None[ X]Mild [ ]Moderate [ ]Severe    Other Symptoms:  [ ]All other review of systems negative     Palliative Performance Status Version 2:         %    http://[facility].org/files/news/palliative_performance_scale_[email or web address].pdf    PHYSICAL EXAM:  Vital Signs Last 24 Hrs  T(C): 36 (01 May 2024 13:56), Max: 36.3 (01 May 2024 05:12)  T(F): 96.8 (01 May 2024 13:56), Max: 97.4 (01 May 2024 05:12)  HR: 110 (01 May 2024 13:56) (84 - 110)  BP: 112/72 (01 May 2024 13:56) (112/72 - 133/76)  RR: 18 (01 May 2024 05:12) (16 - 18)  SpO2: 95% (01 May 2024 07:40) (93% - 99%)    GENERAL:  [X ] No acute distress [ ]Lethargic  [ ]Unarousable  [ X]Verbal  [ ]Non-Verbal [ ]Cachexia    BEHAVIORAL/PSYCH:  [X ]Alert and Oriented x  4 [ ] Anxiety [ ] Delirium [ ] Agitation [X ] Calm   EYES: [ X] No scleral icterus [ ] Scleral icterus [ ] Closed  ENMT:  [ ]Dry mouth  [X ]No external oral lesions [ X] No external ear or nose lesions  CARDIOVASCULAR:  [ ]Regular [ ]Irregular [ ]Tachy [ ]Not Tachy  [ ]Ahmet [X ] Edema - Rt hand [ ] No edema  PULMONARY:  [ ]Tachypnea  [ ]Audible excessive secretions [X ] No labored breathing [ ] labored breathing  GASTROINTESTINAL: [ ]Soft  [ ]Distended  [X ]Not distended [ ]Non tender [ ]Tender  MUSCULOSKELETAL: [ XNo clubbing [ ] clubbing  [ ] No cyanosis [ ] cyanosis  NEUROLOGIC: [X ]No focal deficits  [ ]Follows commands  [ ]Does not follow commands  [ ]Cognitive impairment  [ ]Dysphagia  [ ]Dysarthria  [ ]Paresis   SKIN: [ ] Jaundiced [X ] Non-jaundiced [ ]Rash [ ]No Rash [ ] Warm [ ] Dry  MISC/LINES: [ ] ET tube [ ] Trach [ ]NGT/OGT [ ]PEG [ ]Wong    LABS: reviewed by me                        8.2    2.84  )-----------( 39       ( 01 May 2024 06:38 )             23.8   05-01    136  |  100  |  16  ----------------------------<  123<H>  4.0   |  22  |  <0.5<L>    Ca    9.6      01 May 2024 06:38  Mg     1.5     05-01    TPro  5.5<L>  /  Alb  3.4<L>  /  TBili  0.9  /  DBili  x   /  AST  191<H>  /  ALT  33  /  AlkPhos  238<H>  05-01  PT/INR - ( 29 Apr 2024 16:00 )   PT: 17.60 sec;   INR: 1.54 ratio         PTT - ( 29 Apr 2024 16:00 )  PTT:31.7 sec    Urinalysis Basic - ( 01 May 2024 06:38 )    Color: x / Appearance: x / SG: x / pH: x  Gluc: 123 mg/dL / Ketone: x  / Bili: x / Urobili: x   Blood: x / Protein: x / Nitrite: x   Leuk Esterase: x / RBC: x / WBC x   Sq Epi: x / Non Sq Epi: x / Bacteria: x      RADIOLOGY & ADDITIONAL STUDIES: reviewed by me    < from: MR Lumbar Spine w/wo IV Cont (04.29.24 @ 15:55) >    IMPRESSION:  In comparison to the previous thoracic spine MRI 4/21/2023:    1.  Significant interval progression of the numerous discrete and   infiltrative bone marrow signal abnormalities consistent with extensive   osseous metastatic disease.    2.  Interval development of epidural enhancing soft tissue   circumferentially throughout the thoracic and lumbar spines. This is most   pronounced (approx 5 mm in width) from about T1 to T9, producing diffuse   thecal sac compression without obvious cord compression or cord edema.   Thin epidural disease is noted extending inferiorly to the L5 level.    3.  Interval progression of the multilevel compression deformities   (severe at T7; moderate at T11 and L4; and mild at T3, T4, T5, T9, and   T10). Mild osseous retropulsion noted at T7, T10, T11 and L4.    4.  Extensive hepatic metastases. Bilateral adrenal nodules    --- End of Report ---      < end of copied text >    EKG: reviewed by me    < from: 12 Lead ECG (04.26.24 @ 18:04) >  Ventricular Rate 109 BPM    Atrial Rate 109 BPM    P-R Interval 132 ms    QRS Duration 86 ms    Q-T Interval 338 ms    QTC Calculation(Bazett) 455 ms    P Axis 18 degrees    R Axis 118 degrees    T Axis -18 degrees    Diagnosis Line Sinus tachycardia  Right ventricular hypertrophy with repolarization abnormality  Possible Inferior infarct , age undetermined  Anterior infarct , age undetermined  Abnormal ECG    Confirmed by Home Stephens ([id number]) on 4/26/2024 6:21:31 PM    < end of copied text >      Patient discussed with primary medical team MD  Palliative care education provided to patient and/or family

## 2024-05-01 NOTE — PROGRESS NOTE ADULT - PROBLEM SELECTOR PLAN 2
Metastatic breast cancer to bones and liver  -no cord compression on MRI  -continue morphine 2mg IV q3h PRN for moderate/severe pain (4-10) - 4 doses/24 H  -continue dexamethasone for bone pain - consider decreasing to 2mg IV BID  -continue protonix given use of dexamethasone/ibuprofen  -recommend bowel regimen including senna 2 tabs qhs and miralax 17g daily PRN   -f/u heme onc, neurosurgery

## 2024-05-01 NOTE — PROGRESS NOTE ADULT - ASSESSMENT
58-year-old female with PMHx of breast cancer (tx w/ letrozole) with metastases to bone presents for evaluation of generalized weakness and back pain. Per patient she is getting radiation she states that she completed 5 sessions of radiation and was doing well however over the past 24 to 48 hours became weak denies any loss of bladder or bowel control denies any saddle anesthesia denies any focal weakness to the lower extremities denies any fevers or chills. Sent from [facility] for neurosurgery eval (4/27).    #Stage IV Breast Cancer HR+/HER2-, metastatic to bone  #back pain  *CTA PE (4/26): No pulmonary embolus seen. Right breast mass consistent with carcinoma. Diffuse metastatic bone disease. Right adrenal mass. Left upper lobe pulmonary nodule presumably representing metastatic lung disease.  *CT Head noncon (4/26): No evidence of acute territorial infarct, intracranial hemorrhage, or midline shift. Significantly increased numerous sclerotic osseous metastasis throughout the calvarium which were predominantly lytic on the prior CT head from 4/21/2023.  *CT T/L spine noncon (4/26): Increased diffuse predominantly sclerotic osseous metastasis throughout the thoracolumbar spine, ribs, sacrum, and bilateral iliac bones compared to the prior CT abdomen pelvis dated 7/19/2013. Multilevel compression deformities of the thoracic spine and L4 without significant change. Multilevel thoracolumbar neuroforaminal stenosis  *MR C-spine IC (4/29):  Interval progression of extensive enhancing osseous metastatic disease. No obvious cervical epidural disease demonstrated. (The previously seen epidural disease at C2/C3 is no longer visualized), No evidence of cervical cord compression or cord signal abnormality. Partially visualized enhancing lesion in the region of the pineal gland, presumably reflecting metastatic disease. Right lateral neck/supraclavicular lymph node measuring 1.5 cm.  *MR T/L-spine IC (4/29): Significant interval progression of the numerous discrete and infiltrative bone marrow signal abnormalities consistent with extensive osseous metastatic disease. Interval development of epidural enhancing soft tissue circumferentially throughout the thoracic and lumbar spines. This is most pronounced (approx 5 mm in width) from about T1 to T9, producing diffuse thecal sac compression without obvious cord compression or cord edema. Thin epidural disease is noted extending inferiorly to the L5 level.  Interval progression of the multilevel compression deformities (severe at T7; moderate at T11 and L4; and mild at T3, T4, T5, T9, and T10). Mild osseous retropulsion noted at T7, T10, T11 and L4. Extensive hepatic metastases. Bilateral adrenal nodules  *CT AP (4/29): Innumerable hepatic metastases. New bilateral adrenal nodules likely metastases. Diffuse widespread osseous metastases, increased from prior CT abdomen pelvis. Large amount of hyperdense material in the bladder. Correlate clinically including with urinalysis.  - heme/onc recs (4/28): c/w IV fluids, c/w letrozole, d/c ibrance, repeat CA 27-29, consult NSGY for metastatic disease to T/L spine, rule out DIC, Pending MRI spine, will need liquid biopsy , systemic therapy soon   (Chemo v/s endocrine ) , response to letrozole/Ibrance < 12 months, Consider bone marrow, Chemo will be challenging given thrombocytopenia . Start second line endocrine therapy ?   - DC Ibrance given worsening bone disease and increasing tumor markers per hem/onc   - CA 27-29 (4/27): 996.5  - NSGY recs (4/27): MRI C/T/L spine w/wo given no contraindications, Mets w/u: CT C/A/P, RadOnc/HemeOnc consults, Pain management recs  - Rad onc recs (4/29):  Will await the MRI results--if she has cord compression then RT may be considered more urgently, not a candidate at the moment unless platelets > 20  - No cord compression on MRI of her spine, f/u NSGY and rad onc notes  - Pending pain management consult  - Start morphine 2 mg IV q6h PRN for severe pain  - discontinued letrozole per Heme/onc  - started Exemestane 25mg daily per Heme/Onc  - Faslodex injection today (5/1)    #Thrombocytopenia  - Plt 25 ->18 -> 16 -> 14 -> 39  - hem/onc is following, SCD for DVT ppx  - Trend platelets daily    #Hypercalcemia of malignancy (resolved)  - Ca 14.2 -> 11.4 -> 10.7 -> 10.2 -> 9.4 (4/30)  - s/p 2 x calcitonin 250 units  - s/p 1 x Zometa 4 mg  - s/p 2 L NS, s/p NS @ 200 cc/hr  - c/w LR @ 75 cc/hr  - d/c tele     #Hypokalemia (resolved)  - K 2.6 -> 3.5 -> 3.4 -> 3.8 -> 4 (5/1)  - Repleted K  - d/c tele    #Hypomag   - Mag 1.3 -> 2.0 -> 1.6 -> 1.8 -> 1.5 (5/1)  - Continue to replete     #hypothyroid   - on synthroid     #MISC  - DVT PPx: SCDs  - GI PPx: NI  - Diet: Pureed  - Activity: Ambulate w/assistance  - Dispo: D/c tele    Pending: F/u hem onc, rad onc, and NSGY note, f/u pain management note

## 2024-05-01 NOTE — PROGRESS NOTE ADULT - ATTENDING COMMENTS
Patient seen and examined independently. In relatively good spirit today and noting that her back pain is a bit better.     PAST MEDICAL & SURGICAL HISTORY:  Hypothyroidism  H/O cirrhosis  Breast cancer  No significant past surgical history    Vitals:  T(F): 96.8 (05-01-24 @ 13:56)  HR: 110 (05-01-24 @ 13:56)  BP: 112/72 (05-01-24 @ 13:56)  RR: 18 (05-01-24 @ 13:45)      TESTS & MEASUREMENTS:                        8.2    2.84  )-----------( 39       ( 01 May 2024 06:38 )             23.8       05-01    136  |  100  |  16  ----------------------------<  123<H>  4.0   |  22  |  <0.5<L>    Ca    9.6      01 May 2024 06:38  Mg     1.5     05-01    TPro  5.5<L>  /  Alb  3.4<L>  /  TBili  0.9  /  DBili  x   /  AST  191<H>  /  ALT  33  /  AlkPhos  238<H>  05-01    LIVER FUNCTIONS - ( 01 May 2024 06:38 )  Alb: 3.4 g/dL / Pro: 5.5 g/dL / ALK PHOS: 238 U/L / ALT: 33 U/L / AST: 191 U/L / GGT: x             In summary:  HEALTH ISSUES - PROBLEM Dx:  - Advanced Stage 4, metastatic breast cancer; onc team following  - Diffuse symptomatic spinal (bone) mets without radiographic evidence of cord compression at this time   - Acute on chronic pain due to advanced malignancy   - Thrombocytopenia; at high risk for bleeding ; s/p transfusion   - Hypercalcemia related to malignancy, treated and followed up closely   - Hypokalemia/ Malnutrition related to advanced malignancy and anorexia/cachexia     PLAN  - palliative care team following for advanced illness and pain management   - please refer to updated Goals of Care Conversation (updated today/ refer to palliative care note)   - Rad Onc therapy re-evaluating for therapy with improved PLT values today   - Overall prognosis is ominous despite all care due to advanced malignancy and frailty.     Time-based billing (NON-critical care).     40 minutes spent on total encounter. The necessity of the time spent during the encounter on this date of service was due to:     Direct clinical care, patient counseling regarding chronic pain management,  coordination with consultants, nursing and case management/social work teams, review of tests and scheduled medications,  and resident supervision.

## 2024-05-02 NOTE — ADVANCED PRACTICE NURSE CONSULT - RECOMMEDATIONS
Cleanse wound to right abdominal fold with soap and water.   Pat dry apply triad twice a day and prn for soiling.     Maintain pressure injury prevention.   Keep skin clean.   Maintain incontinence care.   Monitor wound for changes and notify provider

## 2024-05-02 NOTE — PROGRESS NOTE ADULT - SUBJECTIVE AND OBJECTIVE BOX
HPI:    57yo female whose documented PMH  includes hypothyroid, asthma, ETOH abuse (cirrhosis) and breast cancer widely metastatic to bone, status post radiation 5 sessions finished 5/31/23, on letrozole daily, (was on Ribociclib and was discontinued due to low magnesium with high QTc), surgical history of occiput-T2 posterior instrumentation and fusion, ORIF of C2 body with functional decline and impaired ADLs/mobility, ambulates with a walker, following with Dr. Peña , presents to the ER due to back pain present for 2 weeks. Due to persistence, she was advised to come to the ER. No loss of bladder, bowel function but told ER she had generalized weakness. While in the ER patient became tachycardic with pulse ox of 89% so PE study was done (no PE) ER team spoke to neurosurgery team member requesting that patient be sent [place]  (26 Apr 2024 22:23)    Interval history:     -Patient seen at bedside  -States pain 8.5/10, meds last about 1.5 hours     ADVANCE DIRECTIVES:     [X ] Full Code [ ] DNR  MOLST  [ ]  Living Will  [ ]   DECISION MAKER(s): Patient   [ ] Health Care Proxy(s)  [ X] Surrogate(s)  [ ] Guardian           Name(s): Phone Number(s): - Pankaj       BASELINE (I)ADL(s) (prior to admission):    [place]: [ ]Total  [ X ] Moderate [ ]Dependent  Palliative Performance Status Version 2:         %    http://npcrc.org/files/news/palliative_performance_scale_ppsv2.pdf    Allergies    No Known Allergies    Intolerances    MEDICATIONS  (STANDING):  chlorhexidine 2% Cloths 1 Application(s) Topical <User Schedule>  dexAMETHasone  Injectable 4 milliGRAM(s) IV Push every 12 hours  exemestane 25 milliGRAM(s) Oral daily  levothyroxine 50 MICROGram(s) Oral <User Schedule>  magnesium oxide 400 milliGRAM(s) Oral daily  melatonin 5 milliGRAM(s) Oral at bedtime  pantoprazole    Tablet 40 milliGRAM(s) Oral before breakfast  polyethylene glycol 3350 17 Gram(s) Oral daily  senna 2 Tablet(s) Oral at bedtime    MEDICATIONS  (PRN):  albuterol    0.083% 2.5 milliGRAM(s) Nebulizer every 6 hours PRN Shortness of Breath and/or Wheezing  ibuprofen  Tablet. 200 milliGRAM(s) Oral every 6 hours PRN Mild Pain (1 - 3)  morphine  - Injectable 2 milliGRAM(s) IV Push every 3 hours PRN Severe Pain (7 - 10)      PRESENT SYMPTOMS: [ ]Unable to obtain due to poor mentation   Source if other than patient:  [ ]Family   [ ]Team     Pain: [ X]yes [ ]no  QOL impact -  significant  Location -                  back  Aggravating factors - movement  Quality - sharp  Radiation -  Timing-  Severity (0-10 scale): 8.5/10   Minimal acceptable level (0-10 scale):     CPOT:    https://www.[facility].org/getattachment/[email or web address]/Critical-Care-Pain-Observation-Tool-(CPOT)    PAIN AD Score:   http://[email or web address]/painad.pdf (press ctrl +  left click to view)    Dyspnea:                           [ X ]None[ ]Mild [ ]Moderate [ ]Severe     Respiratory Distress Observation Scale (RDOS):   A score of 0 to 2 signifies little or no respiratory distress, 3 signifies mild distress, scores 4 to 6 indicate moderate distress, and scores greater than 7 signify severe distress  [email or web address]://[email or web address].ca/sites/default/files/PDFS/[email or web address]-bvxvvzkutzw-xvexgpso-[email or web address]-[email or web address].pdf    Anxiety:                             [ ]None[ ]Mild [ ]Moderate [ ]Severe   Fatigue:                             [ ]None[ ]Mild [ ]Moderate [ ]Severe   Nausea:                             [ ]None[ ]Mild [ ]Moderate [ ]Severe   Loss of appetite:              [ ]None[ ]Mild [ ]Moderate [ ]Severe   Constipation:                    [ ]None[ X]Mild [ ]Moderate [ ]Severe    Other Symptoms:  [ ]All other review of systems negative     Palliative Performance Status Version 2:         30%    http://npcrc.org/files/news/palliative_performance_scale_ppsv2.pdf    PHYSICAL EXAM:  Vital Signs Last 24 Hrs  T(C): 36.4 (02 May 2024 13:21), Max: 36.8 (01 May 2024 20:08)  T(F): 97.6 (02 May 2024 13:21), Max: 98.2 (01 May 2024 20:08)  HR: 68 (02 May 2024 13:21) (68 - 109)  BP: 128/72 (02 May 2024 13:21) (121/69 - 128/72)  BP(mean): 104 (02 May 2024 04:55) (104 - 104)  RR: 18 (02 May 2024 13:21) (18 - 18)  SpO2: 95% (02 May 2024 08:25) (95% - 97%)    Parameters below as of 02 May 2024 08:25  Patient On (Oxygen Delivery Method): nasal cannula  O2 Flow (L/min): 3      GENERAL:  [X ] No acute distress [ ]Lethargic  [ ]Unarousable  [ X]Verbal  [ ]Non-Verbal [ ]Cachexia    BEHAVIORAL/PSYCH:  [X ]Alert and Oriented x  4 [ ] Anxiety [ ] Delirium [ ] Agitation [X ] Calm   EYES: [ X] No scleral icterus [ ] Scleral icterus [ ] Closed  ENMT:  [ ]Dry mouth  [X ]No external oral lesions [ X] No external ear or nose lesions  CARDIOVASCULAR:  [ ]Regular [ ]Irregular [ ]Tachy [ ]Not Tachy  [ ]Ahmet [X ] Edema - Rt hand [ ] No edema  PULMONARY:  [ ]Tachypnea  [ ]Audible excessive secretions [X ] No labored breathing [ ] labored breathing  GASTROINTESTINAL: [ ]Soft  [ ]Distended  [X ]Not distended [ ]Non tender [ ]Tender  MUSCULOSKELETAL: [ XNo clubbing [ ] clubbing  [ ] No cyanosis [ ] cyanosis  NEUROLOGIC: [X ]No focal deficits  [ x]Follows commands  [ ]Does not follow commands  [ ]Cognitive impairment  [ ]Dysphagia  [ ]Dysarthria  [ ]Paresis   SKIN: [ ] Jaundiced [X ] Non-jaundiced [ ]Rash [ ]No Rash [ ] Warm [ ] Dry  MISC/LINES: [ ] ET tube [ ] Trach [ ]NGT/OGT [ ]PEG [ ]Wong    LABS: reviewed by me             CC:     HPI:  57yo female whose documented PMH  includes hypothyroid, asthma, ETOH abuse (cirrhosis) and breast cancer widely metastatic to bone, status post radiation 5 sessions finished 5/31/23, on letrozole daily, (was on Ribociclib and was discontinued due to low magnesium with high QTc), surgical history of occiput-T2 posterior instrumentation and fusion, ORIF of C2 body with functional decline and impaired ADLs/mobility, ambulates with a walker, following with Dr. Peña , presents to the ER due to back pain present for 2 weeks. Due to persistence, she was advised to come to the ER. No loss of bladder, bowel function but told ER she had generalized weakness. While in the ER patient became tachycardic with pulse ox of 89% so PE study was done (no PE) ER team spoke to neurosurgery team member requesting that patient be sent [place]  (26 Apr 2024 22:23)    PERTINENT PM/SXH:   Hypothyroidism    H/O cirrhosis    Breast cancer      No significant past surgical history      FAMILY HISTORY:  FHx: melanoma (Mother)    FHx: arrhythmia (Father)      None pertinent    ITEMS NOT CHECKED ARE NOT PRESENT    SOCIAL HISTORY:   Significant other/partner[ ]  Children[ ]  Rastafarian/Spirituality:  Substance hx:  [ ]   Tobacco hx:  [ ]   Alcohol hx: [ ]   Living Situation: [ ]Home  [ ]Long term care  [ ]Rehab [ ]Other  Home Services: [ ] HHA [ ] Visting RN [ ] Hospice  Occupation:  Home Opioid hx:  [ ] Y [ ] N [ ] I-Stop Reference No:     ADVANCE DIRECTIVES:     [ ] Full Code [ ] DNR  MOLST  [ ]  Living Will  [ ]   DECISION MAKER(s):  [ ] Health Care Proxy(s)  [ ] Surrogate(s)  [ ] Guardian           Name(s): Phone Number(s):      BASELINE (I)ADL(s) (prior to admission):    [place]: [ ]Total  [ ] Moderate [ ]Dependent  Palliative Performance Status Version 2:         %    http://npcrc.org/files/news/palliative_performance_scale_ppsv2.pdf    Allergies    No Known Allergies    Intolerances    hydromorphone (Faint; Nausea)  MEDICATIONS  (STANDING):  chlorhexidine 2% Cloths 1 Application(s) Topical <User Schedule>  dexAMETHasone  Injectable 4 milliGRAM(s) IV Push every 12 hours  exemestane 25 milliGRAM(s) Oral daily  levothyroxine 50 MICROGram(s) Oral <User Schedule>  magnesium oxide 400 milliGRAM(s) Oral daily  melatonin 5 milliGRAM(s) Oral at bedtime  pantoprazole    Tablet 40 milliGRAM(s) Oral before breakfast  polyethylene glycol 3350 17 Gram(s) Oral daily  senna 2 Tablet(s) Oral at bedtime    MEDICATIONS  (PRN):  albuterol    0.083% 2.5 milliGRAM(s) Nebulizer every 6 hours PRN Shortness of Breath and/or Wheezing  ibuprofen  Tablet. 200 milliGRAM(s) Oral every 6 hours PRN Mild Pain (1 - 3)  morphine  - Injectable 2 milliGRAM(s) IV Push every 3 hours PRN Severe Pain (7 - 10)    PRESENT SYMPTOMS: [ ]Unable to obtain due to poor mentation   Source if other than patient:  [ ]Family   [ ]Team     Pain: [ ]yes [ ]no  ALL PAIN ASSESSMENT COMPONENTS WERE ASKED ABOUT UNLESS OTHERWISE NOTED  QOL impact -   Location -                    Aggravating factors -  Quality -  Radiation -  Timing-  Severity (0-10 scale):  Minimal acceptable level (0-10 scale):     CPOT:    https://www.[facility].org/getattachment/[email or web address]/Critical-Care-Pain-Observation-Tool-(CPOT)    PAIN AD Score:   http://geriatrictoolkit.[email or web address]/cog/painad.pdf (press ctrl +  left click to view)    Dyspnea:                           [ ]None[ ]Mild [ ]Moderate [ ]Severe     Respiratory Distress Observation Scale (RDOS):   A score of 0 to 2 signifies little or no respiratory distress, 3 signifies mild distress, scores 4 to 6 indicate moderate distress, and scores greater than 7 signify severe distress  [email or web address]://www.[email or web address]/sites/default/files/[email or web address]-eyjacbgqzvs-thwqvvsv-[email or web address].pdf    Anxiety:                             [ ]None[ ]Mild [ ]Moderate [ ]Severe   Fatigue:                             [ ]None[ ]Mild [ ]Moderate [ ]Severe   Nausea:                             [ ]None[ ]Mild [ ]Moderate [ ]Severe   Loss of appetite:              [ ]None[ ]Mild [ ]Moderate [ ]Severe   Constipation:                    [ ]None[ ]Mild [ ]Moderate [ ]Severe    Other Symptoms:  [ ]All other review of systems negative     Palliative Performance Status Version 2:         %    http://npcrc.org/files/news/palliative_performance_scale_ppsv2.pdf    PHYSICAL EXAM:  Vital Signs Last 24 Hrs  T(C): 36.4 (02 May 2024 13:21), Max: 36.8 (01 May 2024 20:08)  T(F): 97.6 (02 May 2024 13:21), Max: 98.2 (01 May 2024 20:08)  HR: 68 (02 May 2024 13:21) (68 - 109)  BP: 128/72 (02 May 2024 13:21) (121/69 - 128/72)  BP(mean): 104 (02 May 2024 04:55) (104 - 104)  RR: 18 (02 May 2024 13:21) (18 - 18)  SpO2: 95% (02 May 2024 08:25) (95% - 97%)    Parameters below as of 02 May 2024 08:25  Patient On (Oxygen Delivery Method): nasal cannula  O2 Flow (L/min): 3   I&O's Summary    02 May 2024 07:01  -  02 May 2024 14:30  --------------------------------------------------------  IN: 250 mL / OUT: 0 mL / NET: 250 mL        GENERAL:  [ ] No acute distress [ ]Lethargic  [ ]Unarousable  [ ]Verbal  [ ]Non-Verbal [ ]Cachexia    BEHAVIORAL/PSYCH:  [ ]Alert and Oriented x  [ ] Anxiety [ ] Delirium [ ] Agitation [ ] Calm   EYES: [ ] No scleral icterus [ ] Scleral icterus [ ] Closed  ENMT:  [ ]Dry mouth  [ ]No external oral lesions [ ] No external ear or nose lesions  CARDIOVASCULAR:  [ ]Regular [ ]Irregular [ ]Tachy [ ]Not Tachy  [ ]Ahmet [ ] Edema [ ] No edema  PULMONARY:  [ ]Tachypnea  [ ]Audible excessive secretions [ ] No labored breathing [ ] labored breathing  GASTROINTESTINAL: [ ]Soft  [ ]Distended  [ ]Not distended [ ]Non tender [ ]Tender  MUSCULOSKELETAL: [ ]No clubbing [ ] clubbing  [ ] No cyanosis [ ] cyanosis  NEUROLOGIC: [ ]No focal deficits  [ ]Follows commands  [ ]Does not follow commands  [ ]Cognitive impairment  [ ]Dysphagia  [ ]Dysarthria  [ ]Paresis   SKIN: [ ] Jaundiced [ ] Non-jaundiced [ ]Rash [ ]No Rash [ ] Warm [ ] Dry  MISC/LINES: [ ] ET tube [ ] Trach [ ]NGT/OGT [ ]PEG [ ]Wong    LABS: reviewed by me                        8.0    3.74  )-----------( 30       ( 02 May 2024 08:51 )             23.6   05-02    137  |  105  |  15  ----------------------------<  119<H>  4.0   |  24  |  <0.5<L>    Ca    8.8      02 May 2024 08:51  Mg     1.7     05-02    TPro  5.3<L>  /  Alb  3.3<L>  /  TBili  0.9  /  DBili  x   /  AST  176<H>  /  ALT  33  /  AlkPhos  247<H>  05-02      Urinalysis Basic - ( 02 May 2024 08:51 )    Color: x / Appearance: x / SG: x / pH: x  Gluc: 119 mg/dL / Ketone: x  / Bili: x / Urobili: x   Blood: x / Protein: x / Nitrite: x   Leuk Esterase: x / RBC: x / WBC x   Sq Epi: x / Non Sq Epi: x / Bacteria: x      RADIOLOGY & ADDITIONAL STUDIES: reviewed by me    < from: Xray Chest 1 View AP/PA (05.02.24 @ 10:21) >  Impression:    Bilateral opacifications, likelynodular.    Please see concurrent CT scan of the abdomen and pelvis.    < end of copied text >      EKG: reviewed by me    < from: 12 Lead ECG (04.26.24 @ 18:04) >  Ventricular Rate 109 BPM    Atrial Rate 109 BPM    P-R Interval 132 ms    QRS Duration 86 ms    Q-T Interval 338 ms    QTC Calculation(Bazett) 455 ms    P Axis 18 degrees    R Axis 118 degrees    T Axis -18 degrees    Diagnosis Line Sinus tachycardia  Right ventricular hypertrophy with repolarization abnormality  Possible Inferior infarct , age undetermined  Anterior infarct , age undetermined  Abnormal ECG    < end of copied text >      PROTEIN CALORIE MALNUTRITION PRESENT: [ ]mild [ ]moderate [ ]severe [ ]underweight [ ]morbid obesity  https://www.andeal.org/vault/6070/web/files/ONC/Table_Clinical%20Characteristics%20to%20Document%20Malnutrition-White%20JV%20et%20al%202012.pdf    Height (cm): 162.6 (04-29-24 @ 19:42), 162.6 (07-16-23 @ 17:21)  Weight (kg): 61.3 (04-26-24 @ 22:53), 72.6 (07-13-23 @ 13:00)  BMI (kg/m2): 23.2 (04-29-24 @ 19:42), 23.2 (04-26-24 @ 22:53), 27.5 (07-16-23 @ 17:21)  [ ]PPSV2 < or = to 30% [ ]significant weight loss  [ ]poor nutritional intake  [ ]anasarca      [ ]Artificial Nutrition      Palliative Care Spiritual/Emotional Screening Tool Question  Severity (0-4):                    OR                    [ x] Unable to determine. Will assess at later time if appropriate.  Score of 2 or greater indicates recommendation of Chaplaincy and/or SW referral  Chaplaincy Referral: [ ] Yes [ ] Refused [ ] Following     Caregiver [place]:  [ ] Yes [ ] No    OR    [x ] Unable to determine. Will assess at later time if appropriate.  Social Work Referral [ ]  Patient and Family Centered Care Referral [ ]    Anticipatory Grief Present: [ ] Yes [ ] No    OR     [ x] Unable to determine. Will assess at later time if appropriate.  Social Work Referral [ ]  Patient and Family Centered Care Referral [ ]    Patient discussed with primary medical team MD  Palliative care education provided to patient and/or family    RADIOLOGY & ADDITIONAL STUDIES: reviewed by me    < from: MR Lumbar Spine w/wo IV Cont (04.29.24 @ 15:55) >    IMPRESSION:  In comparison to the previous thoracic spine MRI 4/21/2023:    1.  Significant interval progression of the numerous discrete and   infiltrative bone marrow signal abnormalities consistent with extensive   osseous metastatic disease.    2.  Interval development of epidural enhancing soft tissue   circumferentially throughout the thoracic and lumbar spines. This is most   pronounced (approx 5 mm in width) from about T1 to T9, producing diffuse   thecal sac compression without obvious cord compression or cord edema.   Thin epidural disease is noted extending inferiorly to the L5 level.    3.  Interval progression of the multilevel compression deformities   (severe at T7; moderate at T11 and L4; and mild at T3, T4, T5, T9, and   T10). Mild osseous retropulsion noted at T7, T10, T11 and L4.    4.  Extensive hepatic metastases. Bilateral adrenal nodules    --- End of Report ---      < end of copied text >    EKG: reviewed by me    < from: 12 Lead ECG (04.26.24 @ 18:04) >  Ventricular Rate 109 BPM    Atrial Rate 109 BPM    P-R Interval 132 ms    QRS Duration 86 ms    Q-T Interval 338 ms    QTC Calculation(Bazett) 455 ms    P Axis 18 degrees    R Axis 118 degrees    T Axis -18 degrees    Diagnosis Line Sinus tachycardia  Right ventricular hypertrophy with repolarization abnormality  Possible Inferior infarct , age undetermined  Anterior infarct , age undetermined  Abnormal ECG    Confirmed by Home Stephens ([id number]) on 4/26/2024 6:21:31 PM    < end of copied text >      Patient discussed with primary medical team MD  Palliative care education provided to patient and/or family

## 2024-05-02 NOTE — PROGRESS NOTE ADULT - PROBLEM SELECTOR PLAN 4
Full code  Continue current med mgmt  Patient does not have a HCP so surrogate decision maker would be    Will follow
Full code  Continue current med mgmt  Patient does not have a HCP so surrogate decision maker would be    Will follow

## 2024-05-02 NOTE — PROGRESS NOTE ADULT - ATTENDING COMMENTS
58-year-old female with PMHx of breast cancer (tx w/ letrozole) with metastases to bone presents for evaluation of generalized weakness and back pain. Per patient she is getting radiation she states that she completed 5 sessions of radiation and was doing well however over the past 24 to 48 hours became weak denies any loss of bladder or bowel control denies any saddle anesthesia denies any focal weakness to the lower extremities denies any fevers or chills. Sent from [facility] for neurosurgery eval (4/27).      #Stage IV Breast Cancer HR+/HER2-, metastatic to bone  #Malignancy related pain   *CTA PE (4/26): No pulmonary embolus seen. Right breast mass consistent with carcinoma. Diffuse metastatic bone disease. Right adrenal mass. Left upper lobe pulmonary nodule presumably representing metastatic lung disease.  *CT Head noncon (4/26): No evidence of acute territorial infarct, intracranial hemorrhage, or midline shift. Significantly increased numerous sclerotic osseous metastasis throughout the calvarium which were predominantly lytic on the prior CT head from 4/21/2023.  *MR C-spine IC (4/29):  Interval progression of extensive enhancing osseous metastatic disease. No obvious cervical epidural disease demonstrated. No evidence of cervical cord compression or cord signal abnormality. Partially visualized enhancing lesion in the region of the pineal gland, presumably reflecting metastatic disease. Right lateral neck/supraclavicular lymph node measuring 1.5 cm.  *MR T/L-spine IC (4/29): Significant interval progression of the numerous discrete and infiltrative bone marrow signal abnormalities consistent with extensive osseous metastatic disease. Interval development of epidural enhancing soft tissue circumferentially throughout the thoracic and lumbar spines. This is most pronounced (approx 5 mm in width) from about T1 to T9, producing diffuse thecal sac compression without obvious cord compression or cord edema. Thin epidural disease is noted extending inferiorly to the L5 level.  Interval progression of the multilevel compression deformities (severe at T7; moderate at T11 and L4; and mild at T3, T4, T5, T9, and T10). Mild osseous retropulsion noted at T7, T10, T11 and L4. Extensive hepatic metastases. Bilateral adrenal nodules  *CT AP (4/29): Innumerable hepatic metastases. New bilateral adrenal nodules likely metastases. Diffuse widespread osseous metastases, increased from prior CT abdomen pelvis.    - DC Ibrance given worsening bone disease and increasing tumor markers per hem/onc   - CA 27-29 (4/27): 996.5  - NSGY recs (4/27): MRI C/T/L spine w/wo given no contraindications   - No cord compression on MRI of her spine, f/u NSGY and rad onc   - Palliative following   - c/w morphine 2 mg IV q6h PRN for severe pain  - discontinued letrozole per Heme/onc  - started Exemestane 25mg daily per Heme/Onc  - Faslodex injection (5/2)  - Started on radiation therapy     #Pancytopenia   - secondary to Ibrance toxicity vs BM infiltration of malignancy   - hem/onc is following, SCD for DVT ppx  - Trend platelets daily    #Hypercalcemia of malignancy (resolved)  - s/p 2 x calcitonin 250 units  - s/p 1 x Zometa 4 mg    #Hypothyroid   - on synthroid        DVT PPx: SCDs given thrombocytopenia      Plan of care d/w pt

## 2024-05-02 NOTE — PROGRESS NOTE ADULT - PROBLEM SELECTOR PLAN 2
Metastatic breast cancer to bones and liver  -no cord compression on MRI  -increase morphine to 4mg IV q3h PRN for moderate/severe pain (4-10)  -continue dexamethasone for bone pain - consider decreasing to 2mg IV BID  -continue protonix given use of dexamethasone/ibuprofen  -recommend bowel regimen including senna 2 tabs qhs and miralax 17g daily PRN   -f/u heme onc, neurosurgery

## 2024-05-02 NOTE — PROGRESS NOTE ADULT - ASSESSMENT
59yo female whose documented PMH  includes hypothyroid, asthma, ETOH abuse (cirrhosis) and breast cancer widely metastatic to bone, status post radiation 5 sessions finished 5/31/23, on letrozole daily, (was on Ribociclib and was discontinued due to low magnesium with high QTc), surgical history of occiput-T2 posterior instrumentation and fusion, ORIF of C2 body with functional decline and impaired ADLs/mobility, ambulates with a walker, following with Dr. Peña , presented to the ER due to back pain present for 2 weeks. Palliative consulted for pain control and GOC.     Patient's pain is tolerable on current regimen, but notes pain control only lasting about 1.5 hours. Will increase PRN morphine dose and plan to transition to long acting opioids as appropriate tomorrow.    Discussed code status and HCP with patient. She will discuss further with her  and sister today.    Education about palliative care provided to patient/family.  See Recs below.    Please call [phone] with questions or concerns 24/7.   We will continue to follow.

## 2024-05-02 NOTE — PROGRESS NOTE ADULT - SUBJECTIVE AND OBJECTIVE BOX
24H events:    Patient is a 58y old Female who presents with a chief complaint of back pain (01 May 2024 14:02)    Primary diagnosis of Metastatic cancer to bone    Today is hospital day 6d. This morning patient was seen and examined at bedside, resting comfortably in bed.    No acute or major events overnight.    Family communication:  Contact date:  Name of person contacted:  Relationship to patient:  Communication details:  What matters most:    PAST MEDICAL & SURGICAL HISTORY  Hypothyroidism    H/O cirrhosis    Breast cancer    No significant past surgical history      SOCIAL HISTORY:  Social History:  former alcohol abuse, denies tobacco use (26 Apr 2024 22:23)      ALLERGIES:  No Known Allergies    MEDICATIONS:  STANDING MEDICATIONS  chlorhexidine 2% Cloths 1 Application(s) Topical <User Schedule>  dexAMETHasone  Injectable 4 milliGRAM(s) IV Push every 12 hours  exemestane 25 milliGRAM(s) Oral daily  levothyroxine 50 MICROGram(s) Oral <User Schedule>  magnesium oxide 400 milliGRAM(s) Oral daily  melatonin 5 milliGRAM(s) Oral at bedtime  pantoprazole    Tablet 40 milliGRAM(s) Oral before breakfast  polyethylene glycol 3350 17 Gram(s) Oral daily  senna 2 Tablet(s) Oral at bedtime    PRN MEDICATIONS  albuterol    0.083% 2.5 milliGRAM(s) Nebulizer every 6 hours PRN  ibuprofen  Tablet. 200 milliGRAM(s) Oral every 6 hours PRN  morphine  - Injectable 2 milliGRAM(s) IV Push every 3 hours PRN    VITALS:   T(F): 96.7  HR: 102  BP: 122/88  RR: 18  SpO2: 95%    PHYSICAL EXAM:  GENERAL: NAD, appears fatigued  HEENT: Has bruising on her left eye, mucous membranes dry, lips with dried blood  PULMONARY: Clear to auscultation bilaterally on anterior respiratory exam. No wheezing or crackles  CARDIOVASCULAR: Regular rate and rhythm, S1-S2, no murmurs, rubs, or gallops  GASTROINTESTINAL: Soft, non-tender, non-distended, no guarding  SKIN/EXTREMITIES: Warm, 2+ tibialis posterior pulses, no UE or LE edema, no petichiae  NEUROLOGIC/MUSCULOSKELETAL: AOx4, able to lift both arms, has difficulty lifting legs but is able to bend both, sensation intact to light touch bilaterally in UE and LE    LABS:                        8.2    3.56  )-----------( 29       ( 01 May 2024 18:29 )             23.8     05-01    136  |  100  |  16  ----------------------------<  123<H>  4.0   |  22  |  <0.5<L>    Ca    9.6      01 May 2024 06:38  Mg     1.5     05-01    TPro  5.5<L>  /  Alb  3.4<L>  /  TBili  0.9  /  DBili  x   /  AST  191<H>  /  ALT  33  /  AlkPhos  238<H>  05-01      Urinalysis Basic - ( 01 May 2024 06:38 )    Color: x / Appearance: x / SG: x / pH: x  Gluc: 123 mg/dL / Ketone: x  / Bili: x / Urobili: x   Blood: x / Protein: x / Nitrite: x   Leuk Esterase: x / RBC: x / WBC x   Sq Epi: x / Non Sq Epi: x / Bacteria: x

## 2024-05-02 NOTE — ADVANCED PRACTICE NURSE CONSULT - ASSESSMENT
History of Present Illness:   59yo female whose documented PMH  includes hypothyroid, asthma, ETOH abuse (cirrhosis) and breast cancer widely metastatic to bone, status post radiation 5 sessions finished 5/31/23, on letrozole daily, (was on Ribociclib and was discontinued due to low magnesium with high QTc), surgical history of occiput-T2 posterior instrumentation and fusion, ORIF of C2 body with functional decline and impaired ADLs/mobility, ambulates with a walker, following with Dr. Peña , presents to the ER due to back pain present for 2 weeks. Due to persistence, she was advised to come to the ER. No loss of bladder, bowel function but told ER she had generalized weakness. While in the ER patient became tachycardic with pulse ox of 89% so PE study was done (no PE) ER team spoke to neurosurgery team member requesting that [place] be [place]     Allergies and Intolerances:        Allergies:  	No Known Allergies:        Intolerances:  	hydromorphone: Drug, Faint, Nausea    Home Medications:   * Patient Currently Takes Medications as of 26-Apr-2024 16:00 documented in Structured Notes  · 	magnesium oxide 400 mg oral tablet: Last Dose Taken:  , 1 tab(s) orally once a day  · 	Advil 200 mg oral tablet: Last Dose Taken:  , 1 tab(s) orally every 6 hours as needed for  mild pain  · 	melatonin 5 mg oral tablet: Last Dose Taken:  , 1 tab(s) orally once a day (at bedtime)  · 	levothyroxine 50 mcg (0.05 mg) oral tablet: Last Dose Taken:  , 1 tab(s) orally once a day (in the morning) take one hour before breakfast and other medications  · 	letrozole 2.5 mg oral tablet: Last Dose Taken:  , 1 tab(s) orally once a day    Patient History:    Past Medical, Past Surgical, and Family History:  PAST MEDICAL HISTORY:  Breast cancer   H/O cirrhosis   Hypothyroidism.     PAST SURGICAL HISTORY:  No significant past surgical history.     FAMILY HISTORY:  Father  Still living? Unknown  FHx: arrhythmia, Age at diagnosis: Age Unknown    Mother  Still living? Unknown  FHx: melanoma, Age at diagnosis: Age Unknown.     Social History:  · Social History (marital status, living situation, occupation, and sexual history)	former alcohol abuse, denies tobacco use    Patient received lying in bed. Alert and oriented. Limited mobility. High risk for pressure injury.    Type of Wound: Intertriginous Dermatitis  Location: Right abdominal fold  Tunneling/ Undermining: No  Wound bed: Pink partial thickness skin loss  Wound edges: Intact  Periwound: Intact  Wound exudate: None  Wound odor: No  Induration, erythema, warmth: No  Wound pain: No

## 2024-05-02 NOTE — PROGRESS NOTE ADULT - ASSESSMENT
58-year-old female with PMHx of breast cancer (tx w/ letrozole) with metastases to bone presents for evaluation of generalized weakness and back pain. Per patient she is getting radiation she states that she completed 5 sessions of radiation and was doing well however over the past 24 to 48 hours became weak denies any loss of bladder or bowel control denies any saddle anesthesia denies any focal weakness to the lower extremities denies any fevers or chills. Sent from [facility] for neurosurgery eval (4/27).    #Stage IV Breast Cancer HR+/HER2-, metastatic to bone  #back pain  *CTA PE (4/26): No pulmonary embolus seen. Right breast mass consistent with carcinoma. Diffuse metastatic bone disease. Right adrenal mass. Left upper lobe pulmonary nodule presumably representing metastatic lung disease.  *CT Head noncon (4/26): No evidence of acute territorial infarct, intracranial hemorrhage, or midline shift. Significantly increased numerous sclerotic osseous metastasis throughout the calvarium which were predominantly lytic on the prior CT head from 4/21/2023.  *CT T/L spine noncon (4/26): Increased diffuse predominantly sclerotic osseous metastasis throughout the thoracolumbar spine, ribs, sacrum, and bilateral iliac bones compared to the prior CT abdomen pelvis dated 7/19/2013. Multilevel compression deformities of the thoracic spine and L4 without significant change. Multilevel thoracolumbar neuroforaminal stenosis  *MR C-spine IC (4/29):  Interval progression of extensive enhancing osseous metastatic disease. No obvious cervical epidural disease demonstrated. (The previously seen epidural disease at C2/C3 is no longer visualized), No evidence of cervical cord compression or cord signal abnormality. Partially visualized enhancing lesion in the region of the pineal gland, presumably reflecting metastatic disease. Right lateral neck/supraclavicular lymph node measuring 1.5 cm.  *MR T/L-spine IC (4/29): Significant interval progression of the numerous discrete and infiltrative bone marrow signal abnormalities consistent with extensive osseous metastatic disease. Interval development of epidural enhancing soft tissue circumferentially throughout the thoracic and lumbar spines. This is most pronounced (approx 5 mm in width) from about T1 to T9, producing diffuse thecal sac compression without obvious cord compression or cord edema. Thin epidural disease is noted extending inferiorly to the L5 level.  Interval progression of the multilevel compression deformities (severe at T7; moderate at T11 and L4; and mild at T3, T4, T5, T9, and T10). Mild osseous retropulsion noted at T7, T10, T11 and L4. Extensive hepatic metastases. Bilateral adrenal nodules  *CT AP (4/29): Innumerable hepatic metastases. New bilateral adrenal nodules likely metastases. Diffuse widespread osseous metastases, increased from prior CT abdomen pelvis. Large amount of hyperdense material in the bladder. Correlate clinically including with urinalysis.  - heme/onc recs (4/28): c/w IV fluids, c/w letrozole, d/c ibrance, repeat CA 27-29, consult NSGY for metastatic disease to T/L spine, rule out DIC, Pending MRI spine, will need liquid biopsy , systemic therapy soon   (Chemo v/s endocrine ) , response to letrozole/Ibrance < 12 months, Consider bone marrow, Chemo will be challenging given thrombocytopenia . Start second line endocrine therapy ?   - DC Ibrance given worsening bone disease and increasing tumor markers per hem/onc   - CA 27-29 (4/27): 996.5  - NSGY recs (4/27): MRI C/T/L spine w/wo given no contraindications, Mets w/u: CT C/A/P, RadOnc/HemeOnc consults, Pain management recs  - Rad onc recs (4/29):  Will await the MRI results--if she has cord compression then RT may be considered more urgently, not a candidate at the moment unless platelets > 20  - No cord compression on MRI of her spine, f/u NSGY and rad onc   - Pending pain management consult  - Start morphine 2 mg IV q6h PRN for severe pain  - discontinued letrozole per Heme/onc  - started Exemestane 25mg daily per Heme/Onc  - Faslodex injection (5/2)  - Starting radiation therapy     #Thrombocytopenia  - Plt 25 ->18 -> 16 -> 14 -> 39 -> 29  - hem/onc is following, SCD for DVT ppx  - Trend platelets daily    #Hypercalcemia of malignancy (resolved)  - Ca 14.2 -> 11.4 -> 10.7 -> 10.2 -> 9.4 (4/30)  - s/p 2 x calcitonin 250 units  - s/p 1 x Zometa 4 mg  - s/p 2 L NS, s/p NS @ 200 cc/hr  - d/c tele     #Hypokalemia (resolved)  - K 2.6 -> 3.5 -> 3.4 -> 3.8 -> 4 (5/1)  - Repleted K  - d/c tele    #Hypomag   - Mag 1.3 -> 2.0 -> 1.6 -> 1.8 -> 1.5 (5/1)  - Continue to replete     #hypothyroid   - on synthroid     #MISC  - DVT PPx: SCDs  - GI PPx: NI  - Diet: Pureed  - Activity: Ambulate w/assistance  - Dispo: D/c tele    Pending: F/u hem onc, rad onc, and NSGY note, f/u pain management note

## 2024-05-03 NOTE — PROGRESS NOTE ADULT - ATTENDING COMMENTS
58-year-old female with PMHx of breast cancer (tx w/ letrozole) with metastases to bone presents for evaluation of generalized weakness and back pain. Per patient she is getting radiation she states that she completed 5 sessions of radiation and was doing well however over the past 24 to 48 hours became weak denies any loss of bladder or bowel control denies any saddle anesthesia denies any focal weakness to the lower extremities denies any fevers or chills. Sent from [facility] for neurosurgery eval (4/27).        #Stage IV Breast Cancer HR+/HER2-, metastatic to bone  #Malignancy related pain   *CTA PE (4/26): No pulmonary embolus seen. Right breast mass consistent with carcinoma. Diffuse metastatic bone disease. Right adrenal mass. Left upper lobe pulmonary nodule presumably representing metastatic lung disease.  *CT Head noncon (4/26): No evidence of acute territorial infarct, intracranial hemorrhage, or midline shift. Significantly increased numerous sclerotic osseous metastasis throughout the calvarium which were predominantly lytic on the prior CT head from 4/21/2023.  *MR C-spine IC (4/29):  Interval progression of extensive enhancing osseous metastatic disease. No obvious cervical epidural disease demonstrated. No evidence of cervical cord compression or cord signal abnormality. Partially visualized enhancing lesion in the region of the pineal gland, presumably reflecting metastatic disease. Right lateral neck/supraclavicular lymph node measuring 1.5 cm.  *MR T/L-spine IC (4/29): Significant interval progression of the numerous discrete and infiltrative bone marrow signal abnormalities consistent with extensive osseous metastatic disease. Interval development of epidural enhancing soft tissue circumferentially throughout the thoracic and lumbar spines. This is most pronounced (approx 5 mm in width) from about T1 to T9, producing diffuse thecal sac compression without obvious cord compression or cord edema. Thin epidural disease is noted extending inferiorly to the L5 level.  Interval progression of the multilevel compression deformities (severe at T7; moderate at T11 and L4; and mild at T3, T4, T5, T9, and T10). Mild osseous retropulsion noted at T7, T10, T11 and L4. Extensive hepatic metastases. Bilateral adrenal nodules.   *CT AP (4/29): Innumerable hepatic metastases. New bilateral adrenal nodules likely metastases. Diffuse widespread osseous metastases, increased from prior CT abdomen pelvis.    - DC Ibrance given worsening bone disease and increasing tumor markers per hem/onc   - CA 27-29 (4/27): 996.5  - NSGY recs (4/27): MRI C/T/L spine w/wo given no contraindications   - No cord compression on MRI of her spine  - Taper decadron off    - Palliative following - c/w morphine ER 15mg BID and Morphine 4 mg IV q3h PRN for severe pain  - discontinued letrozole and started Exemestane 25mg daily per Heme/Onc  - s/p Faslodex (5/1)  - Started on radiation therapy 1/5  - planned for inpatient chemo after radiation     #Pancytopenia   - secondary to Ibrance toxicity vs BM infiltration of malignancy   - hem/onc is following, SCD for DVT ppx  - Trend platelets daily, keep above 20k     #Hypercalcemia of malignancy (resolved)  - s/p 2 x calcitonin 250 units  - s/p 1 x Zometa 4 mg    #Hypothyroid   - on synthroid        DVT PPx: SCDs given severe thrombocytopenia     Grave prognosis       Pending: radiation day 1/5, chemotherapy   Plan of care d/w pt   Dispo: TBD 58-year-old female with PMHx of breast cancer (tx w/ letrozole) with metastases to bone presents for evaluation of generalized weakness and back pain. Per patient she is getting radiation she states that she completed 5 sessions of radiation and was doing well however over the past 24 to 48 hours became weak denies any loss of bladder or bowel control denies any saddle anesthesia denies any focal weakness to the lower extremities denies any fevers or chills. Sent from [facility] for neurosurgery eval (4/27).        #Stage IV Breast Cancer HR+/HER2-, metastatic to bone  #Malignancy related pain   *CTA PE (4/26): No pulmonary embolus seen. Left upper lobe pulmonary nodule presumably representing metastatic lung disease.  *CT Head noncon (4/26): No evidence of acute territorial infarct, intracranial hemorrhage, or midline shift. Significantly increased numerous sclerotic osseous metastasis throughout the calvarium which were predominantly lytic on the prior CT head from 4/21/2023.  *MR C-spine IC (4/29):  Interval progression of extensive enhancing osseous metastatic disease. No evidence of cervical cord compression or cord signal abnormality. Partially visualized enhancing lesion in the region of the pineal gland, presumably reflecting metastatic disease.    *MR T/L-spine IC (4/29): extensive osseous metastatic disease. Interval development of epidural enhancing soft tissue circumferentially throughout the thoracic and lumbar spines. No obvious cord compression or cord edema.    *CT AP (4/29): Innumerable hepatic metastases. New bilateral adrenal nodules likely metastases. Diffuse widespread osseous metastases, increased from prior CT abdomen pelvis.    - DC Ibrance given worsening bone disease and increasing tumor markers per hem/onc   - CA 27-29 (4/27): 996.5  - NSGY recs (4/27): MRI C/T/L spine w/wo given no contraindications   - No cord compression on MRI of her spine  - Taper decadron off    - Palliative following - c/w morphine ER 15mg BID and Morphine 4 mg IV q3h PRN for severe pain  - discontinued letrozole and started Exemestane 25mg daily per Heme/Onc  - s/p Faslodex (5/1)  - Started on radiation therapy 1/5  - planned for inpatient chemo after radiation     #Pancytopenia   - secondary to Ibrance toxicity vs BM infiltration of malignancy   - hem/onc is following, SCD for DVT ppx  - Trend platelets daily, keep above 20k     #Acute hypoxemic respiratory failure 2/2 fluid overload   - appears hypervolemic on exam   - On 2 L via nasal cannula   - check BNP   - one dose of IV Lasix 20mg     #Transaminitis 2/2 liver mets   *CT AP (4/29): Innumerable hepatic metastases.    - monitor     #Hypercalcemia of malignancy (resolved)  - s/p 2 x calcitonin 250 units  - s/p 1 x Zometa 4 mg    #Hypothyroid   - on synthroid        DVT PPx: SCDs given severe thrombocytopenia   GI PPx: PPI       Grave prognosis       Pending: radiation day 1/5, chemotherapy   Plan of care d/w pt   Dispo: TBD

## 2024-05-03 NOTE — PROGRESS NOTE ADULT - ASSESSMENT
58-year-old female with PMHx of breast cancer (tx w/ letrozole) with metastases to bone presents for evaluation of generalized weakness and back pain. Per patient she is getting radiation she states that she completed 5 sessions of radiation and was doing well however over the past 24 to 48 hours became weak denies any loss of bladder or bowel control denies any saddle anesthesia denies any focal weakness to the lower extremities denies any fevers or chills. Sent from [facility] for neurosurgery eval (4/27).    #Stage IV Breast Cancer HR+/HER2-, metastatic to bone  #back pain  *CTA PE (4/26): No pulmonary embolus seen. Right breast mass consistent with carcinoma. Diffuse metastatic bone disease. Right adrenal mass. Left upper lobe pulmonary nodule presumably representing metastatic lung disease.  *CT Head noncon (4/26): No evidence of acute territorial infarct, intracranial hemorrhage, or midline shift. Significantly increased numerous sclerotic osseous metastasis throughout the calvarium which were predominantly lytic on the prior CT head from 4/21/2023.  *CT T/L spine noncon (4/26): Increased diffuse predominantly sclerotic osseous metastasis throughout the thoracolumbar spine, ribs, sacrum, and bilateral iliac bones compared to the prior CT abdomen pelvis dated 7/19/2013. Multilevel compression deformities of the thoracic spine and L4 without significant change. Multilevel thoracolumbar neuroforaminal stenosis  *MR C-spine IC (4/29):  Interval progression of extensive enhancing osseous metastatic disease. No obvious cervical epidural disease demonstrated. (The previously seen epidural disease at C2/C3 is no longer visualized), No evidence of cervical cord compression or cord signal abnormality. Partially visualized enhancing lesion in the region of the pineal gland, presumably reflecting metastatic disease. Right lateral neck/supraclavicular lymph node measuring 1.5 cm.  *MR T/L-spine IC (4/29): Significant interval progression of the numerous discrete and infiltrative bone marrow signal abnormalities consistent with extensive osseous metastatic disease. Interval development of epidural enhancing soft tissue circumferentially throughout the thoracic and lumbar spines. This is most pronounced (approx 5 mm in width) from about T1 to T9, producing diffuse thecal sac compression without obvious cord compression or cord edema. Thin epidural disease is noted extending inferiorly to the L5 level.  Interval progression of the multilevel compression deformities (severe at T7; moderate at T11 and L4; and mild at T3, T4, T5, T9, and T10). Mild osseous retropulsion noted at T7, T10, T11 and L4. Extensive hepatic metastases. Bilateral adrenal nodules  *CT AP (4/29): Innumerable hepatic metastases. New bilateral adrenal nodules likely metastases. Diffuse widespread osseous metastases, increased from prior CT abdomen pelvis. Large amount of hyperdense material in the bladder. Correlate clinically including with urinalysis.  - heme/onc recs (4/28): c/w IV fluids, d/c ibrance, repeat CA 27-29, consult NSGY for metastatic disease to T/L spine, rule out DIC, pending MRI spine, will need liquid biopsy, systemic therapy soon (Chemo v/s endocrine ), response to letrozole/Ibrance < 12 months, consider bone marrow, chemo will be challenging given thrombocytopenia. Start second line endocrine therapy?   - DC Ibrance given worsening bone disease and increasing tumor markers per hem/onc   - CA 27-29 (4/27): 996.5  - NSGY recs (4/27): MRI C/T/L spine w/wo given no contraindications, Mets w/u: CT C/A/P, RadOnc/HemeOnc consults, Pain management recs  - No cord compression on MRI of her spine  - Per NSGY, taper decadron   - Pain management   - discontinued letrozole per Heme/onc  - started Exemestane 25mg daily per Heme/Onc  - Faslodex injection (5/2)  - Starting radiation therapy (today is second treatment out of 5 days of treatment)     #Pancytopenia 2/2 Ibrance Toxicity vs BM infiltration of malignancy   - Plt 25 ->18 -> 16 -> 14 -> 39 -> 29  - hem/onc is following, SCD for DVT ppx  - Trend platelets daily    #Hypercalcemia of malignancy (resolved)  - s/p 2 x calcitonin 250 units  - s/p 1 x Zometa 4 mg    #hypothyroid   - on synthroid     #MISC  - DVT PPx: SCDs  - GI PPx: NI  - Diet: Pureed  - Activity: Ambulate w/assistance   58-year-old female with PMHx of breast cancer (tx w/ letrozole) with metastases to bone presents for evaluation of generalized weakness and back pain. Per patient she is getting radiation she states that she completed 5 sessions of radiation and was doing well however over the past 24 to 48 hours became weak denies any loss of bladder or bowel control denies any saddle anesthesia denies any focal weakness to the lower extremities denies any fevers or chills. Sent from [facility] for neurosurgery eval (4/27).    #Stage IV Breast Cancer HR+/HER2-, metastatic to bone  #back pain  *CTA PE (4/26): No pulmonary embolus seen. Right breast mass consistent with carcinoma. Diffuse metastatic bone disease. Right adrenal mass. Left upper lobe pulmonary nodule presumably representing metastatic lung disease.  *CT Head noncon (4/26): No evidence of acute territorial infarct, intracranial hemorrhage, or midline shift. Significantly increased numerous sclerotic osseous metastasis throughout the calvarium which were predominantly lytic on the prior CT head from 4/21/2023.  *CT T/L spine noncon (4/26): Increased diffuse predominantly sclerotic osseous metastasis throughout the thoracolumbar spine, ribs, sacrum, and bilateral iliac bones compared to the prior CT abdomen pelvis dated 7/19/2013. Multilevel compression deformities of the thoracic spine and L4 without significant change. Multilevel thoracolumbar neuroforaminal stenosis  *MR C-spine IC (4/29):  Interval progression of extensive enhancing osseous metastatic disease. No obvious cervical epidural disease demonstrated. (The previously seen epidural disease at C2/C3 is no longer visualized), No evidence of cervical cord compression or cord signal abnormality. Partially visualized enhancing lesion in the region of the pineal gland, presumably reflecting metastatic disease. Right lateral neck/supraclavicular lymph node measuring 1.5 cm.  *MR T/L-spine IC (4/29): Significant interval progression of the numerous discrete and infiltrative bone marrow signal abnormalities consistent with extensive osseous metastatic disease. Interval development of epidural enhancing soft tissue circumferentially throughout the thoracic and lumbar spines. This is most pronounced (approx 5 mm in width) from about T1 to T9, producing diffuse thecal sac compression without obvious cord compression or cord edema. Thin epidural disease is noted extending inferiorly to the L5 level.  Interval progression of the multilevel compression deformities (severe at T7; moderate at T11 and L4; and mild at T3, T4, T5, T9, and T10). Mild osseous retropulsion noted at T7, T10, T11 and L4. Extensive hepatic metastases. Bilateral adrenal nodules  *CT AP (4/29): Innumerable hepatic metastases. New bilateral adrenal nodules likely metastases. Diffuse widespread osseous metastases, increased from prior CT abdomen pelvis. Large amount of hyperdense material in the bladder. Correlate clinically including with urinalysis.  - heme/onc recs (4/28): c/w IV fluids, d/c ibrance, repeat CA 27-29, consult NSGY for metastatic disease to T/L spine, rule out DIC, pending MRI spine, will need liquid biopsy, systemic therapy soon (Chemo v/s endocrine ), response to letrozole/Ibrance < 12 months, consider bone marrow, chemo will be challenging given thrombocytopenia. Start second line endocrine therapy?   - DC Ibrance given worsening bone disease and increasing tumor markers per hem/onc   - CA 27-29 (4/27): 996.5  - NSGY recs (4/27): MRI C/T/L spine w/wo given no contraindications, Mets w/u: CT C/A/P, RadOnc/HemeOnc consults, Pain management recs  - No cord compression on MRI of her spine  - Per NSGY, taper decadron   - Pain management   - discontinued letrozole per Heme/onc  - started Exemestane 25mg daily per Heme/Onc  - Faslodex injection (5/2)  - Continue Radiation Therapy     #Pancytopenia 2/2 Ibrance Toxicity vs BM infiltration of malignancy   - Plt 25 ->18 -> 16 -> 14 -> 39 -> 29 -> 17  - hem/onc is following, SCD for DVT ppx  - Trend platelets daily, transfuse as needed     #Hypercalcemia of malignancy (resolved)  - s/p 2 x calcitonin 250 units  - s/p 1 x Zometa 4 mg    #hypothyroid   - on synthroid     #MISC  - DVT PPx: SCDs  - GI PPx: NI  - Diet: Pureed  - Activity: Ambulate w/assistance

## 2024-05-03 NOTE — PROGRESS NOTE ADULT - SUBJECTIVE AND OBJECTIVE BOX
HPI:    57yo female whose documented PMH  includes hypothyroid, asthma, ETOH abuse (cirrhosis) and breast cancer widely metastatic to bone, status post radiation 5 sessions finished 5/31/23, on letrozole daily, (was on Ribociclib and was discontinued due to low magnesium with high QTc), surgical history of occiput-T2 posterior instrumentation and fusion, ORIF of C2 body with functional decline and impaired ADLs/mobility, ambulates with a walker, following with Dr. Peña , presents to the ER due to back pain present for 2 weeks. Due to persistence, she was advised to come to the ER. No loss of bladder, bowel function but told ER she had generalized weakness. While in the ER patient became tachycardic with pulse ox of 89% so PE study was done (no PE) ER team spoke to neurosurgery team member requesting that patient be sent [place]  (26 Apr 2024 22:23)    Interval history:     -Patient seen at bedside  -States pain 8.5/10, meds last about 3-4 hours     ADVANCE DIRECTIVES:     [X ] Full Code [ ] DNR  MOLST  [ ]  Living Will  [ ]   DECISION MAKER(s): Patient   [ ] Health Care Proxy(s)  [ X] Surrogate(s)  [ ] Guardian           Name(s): Phone Number(s): - Pankaj       BASELINE (I)ADL(s) (prior to admission):    [place]: [ ]Total  [ X ] Moderate [ ]Dependent  Palliative Performance Status Version 2:         %    http://npcrc.org/files/news/palliative_performance_scale_ppsv2.pdf    Allergies    No Known Allergies    Intolerances    MEDICATIONS  (STANDING):  artificial tears (preservative free) Ophthalmic Solution 1 Drop(s) Both EYES two times a day  bacitracin   Ointment 1 Application(s) Topical two times a day  chlorhexidine 2% Cloths 1 Application(s) Topical <User Schedule>  dexAMETHasone  Injectable 2 milliGRAM(s) IV Push every 12 hours  exemestane 25 milliGRAM(s) Oral daily  furosemide   Injectable 20 milliGRAM(s) IV Push once  levothyroxine 50 MICROGram(s) Oral <User Schedule>  magnesium oxide 400 milliGRAM(s) Oral daily  melatonin 5 milliGRAM(s) Oral at bedtime  morphine ER Tablet 15 milliGRAM(s) Oral every 12 hours  pantoprazole    Tablet 40 milliGRAM(s) Oral before breakfast  polyethylene glycol 3350 17 Gram(s) Oral every 12 hours  senna 2 Tablet(s) Oral every 12 hours    MEDICATIONS  (PRN):  albuterol    0.083% 2.5 milliGRAM(s) Nebulizer every 6 hours PRN Shortness of Breath and/or Wheezing  morphine  - Injectable 4 milliGRAM(s) IV Push every 3 hours PRN moderate/severe pain (4-10)        PRESENT SYMPTOMS: [ ]Unable to obtain due to poor mentation   Source if other than patient:  [ ]Family   [ ]Team     Pain: [ X]yes [ ]no  QOL impact -  significant  Location -                  back  Aggravating factors - movement  Quality - sharp  Radiation -  Timing-  Severity (0-10 scale): 8.5/10   Minimal acceptable level (0-10 scale):     CPOT:    https://www.scc.org/getattachment/dnb20y67-9o6j-4a0c-0j7q-1954o7622c8d/Critical-Care-Pain-Observation-Tool-(CPOT)    PAIN AD Score:   http://geriatrictoolkit.[facility]/cog/painad.pdf (press ctrl +  left click to view)    Dyspnea:                           [ X ]None[ ]Mild [ ]Moderate [ ]Severe     Respiratory Distress Observation Scale (RDOS):   A score of 0 to 2 signifies little or no respiratory distress, 3 signifies mild distress, scores 4 to 6 indicate moderate distress, and scores greater than 7 signify severe distress  https://www.[facility].ca/sites/default/files/PDFS/044980-mpscglnldls-ahmbainw-drjyasooycp-ekijl.pdf    Anxiety:                             [ ]None[ ]Mild [ ]Moderate [ ]Severe   Fatigue:                             [ ]None[ ]Mild [ ]Moderate [ ]Severe   Nausea:                             [ ]None[ ]Mild [ ]Moderate [ ]Severe   Loss of appetite:              [ ]None[ ]Mild [ ]Moderate [ ]Severe   Constipation:                    [ ]None[ ]Mild [ x]Moderate [ ]Severe    Other Symptoms:  [ ]All other review of systems negative     Palliative Performance Status Version 2:         30%    http://[facility].org/files/news/palliative_performance_scale_ppsv2.pdf    PHYSICAL EXAM:  Vital Signs Last 24 Hrs  T(C): 36.4 (02 May 2024 13:21), Max: 36.8 (01 May 2024 20:08)  T(F): 97.6 (02 May 2024 13:21), Max: 98.2 (01 May 2024 20:08)  HR: 68 (02 May 2024 13:21) (68 - 109)  BP: 128/72 (02 May 2024 13:21) (121/69 - 128/72)  BP(mean): 104 (02 May 2024 04:55) (104 - 104)  RR: 18 (02 May 2024 13:21) (18 - 18)  SpO2: 95% (02 May 2024 08:25) (95% - 97%)    Parameters below as of 02 May 2024 08:25  Patient On (Oxygen Delivery Method): nasal cannula  O2 Flow (L/min): 3      GENERAL:  [X ] No acute distress [ ]Lethargic  [ ]Unarousable  [ X]Verbal  [ ]Non-Verbal [ ]Cachexia    BEHAVIORAL/PSYCH:  [X ]Alert and Oriented x  4 [ ] Anxiety [ ] Delirium [ ] Agitation [X ] Calm   EYES: [ X] No scleral icterus [ ] Scleral icterus [ ] Closed  ENMT:  [ ]Dry mouth  [X ]No external oral lesions [ X] No external ear or nose lesions  CARDIOVASCULAR:  [ ]Regular [ ]Irregular [ ]Tachy [ ]Not Tachy  [ ]Ahmet [X ] Edema - Rt hand [ ] No edema  PULMONARY:  [ ]Tachypnea  [ ]Audible excessive secretions [X ] No labored breathing [ ] labored breathing  GASTROINTESTINAL: [ ]Soft  [ ]Distended  [X ]Not distended [ ]Non tender [ ]Tender  MUSCULOSKELETAL: [ XNo clubbing [ ] clubbing  [ ] No cyanosis [ ] cyanosis  NEUROLOGIC: [X ]No focal deficits  [ x]Follows commands  [ ]Does not follow commands  [ ]Cognitive impairment  [ ]Dysphagia  [ ]Dysarthria  [ ]Paresis   SKIN: [ ] Jaundiced [X ] Non-jaundiced [ ]Rash [ ]No Rash [ ] Warm [ ] Dry  MISC/LINES: [ ] ET tube [ ] Trach [ ]NGT/OGT [ ]PEG [ ]Wong    LABS: reviewed by me                          8.7    5.09  )-----------( 17       ( 03 May 2024 08:04 )             25.8       05-03    139  |  103  |  21<H>  ----------------------------<  111<H>  4.2   |  24  |  <0.5<L>    Ca    8.9      03 May 2024 08:04  Mg     1.9     05-03    TPro  5.6<L>  /  Alb  3.5  /  TBili  0.9  /  DBili  x   /  AST  228<H>  /  ALT  49<H>  /  AlkPhos  281<H>  05-03              Urinalysis Basic - ( 03 May 2024 08:04 )    Color: x / Appearance: x / SG: x / pH: x  Gluc: 111 mg/dL / Ketone: x  / Bili: x / Urobili: x   Blood: x / Protein: x / Nitrite: x   Leuk Esterase: x / RBC: x / WBC x   Sq Epi: x / Non Sq Epi: x / Bacteria: x                  CAPILLARY BLOOD GLUCOSE                  RADIOLOGY & ADDITIONAL STUDIES: reviewed by me    < from: Xray Chest 1 View AP/PA (05.02.24 @ 10:21) >  Impression:    Bilateral opacifications, likelynodular.    Please see concurrent CT scan of the abdomen and pelvis.    < end of copied text >        EKG: reviewed by me    < from: 12 Lead ECG (04.26.24 @ 18:04) >  Ventricular Rate 109 BPM    Atrial Rate 109 BPM    P-R Interval 132 ms    QRS Duration 86 ms    Q-T Interval 338 ms    QTC Calculation(Bazett) 455 ms    P Axis 18 degrees    R Axis 118 degrees    T Axis -18 degrees    Diagnosis Line Sinus tachycardia  Right ventricular hypertrophy with repolarization abnormality  Possible Inferior infarct , age undetermined  Anterior infarct , age undetermined  Abnormal ECG    < end of copied text >      PROTEIN CALORIE MALNUTRITION PRESENT: [ ]mild [ ]moderate [ ]severe [ ]underweight [ ]morbid obesity  https://www.andeal.org/vault/2440/web/files/ONC/Table_Clinical%20Characteristics%20to%20Document%20Malnutrition-White%20JV%20et%20al%202012.pdf    Height (cm): 162.6 (04-29-24 @ 19:42), 162.6 (07-16-23 @ 17:21)  Weight (kg): 61.3 (04-26-24 @ 22:53), 72.6 (07-13-23 @ 13:00)  BMI (kg/m2): 23.2 (04-29-24 @ 19:42), 23.2 (04-26-24 @ 22:53), 27.5 (07-16-23 @ 17:21)  [ ]PPSV2 < or = to 30% [ ]significant weight loss  [ ]poor nutritional intake  [ ]anasarca      [ ]Artificial Nutrition      Palliative Care Spiritual/Emotional Screening Tool Question  Severity (0-4):                    OR                    [ x] Unable to determine. Will assess at later time if appropriate.  Score of 2 or greater indicates recommendation of Chaplaincy and/or SW referral  Chaplaincy Referral: [ ] Yes [ ] Refused [ ] Following     Caregiver [place]:  [ ] Yes [ ] No    OR    [x ] Unable to determine. Will assess at later time if appropriate.  Social Work Referral [ ]  Patient and Family Centered Care Referral [ ]    Anticipatory Grief Present: [ ] Yes [ ] No    OR     [ x] Unable to determine. Will assess at later time if appropriate.  Social Work Referral [ ]  Patient and Family Centered Care Referral [ ]    Patient discussed with primary medical team MD  Palliative care education provided to patient and/or family    RADIOLOGY & ADDITIONAL STUDIES: reviewed by me    < from: MR Lumbar Spine w/wo IV Cont (04.29.24 @ 15:55) >    IMPRESSION:  In comparison to the previous thoracic spine MRI 4/21/2023:    1.  Significant interval progression of the numerous discrete and   infiltrative bone marrow signal abnormalities consistent with extensive   osseous metastatic disease.    2.  Interval development of epidural enhancing soft tissue   circumferentially throughout the thoracic and lumbar spines. This is most   pronounced (approx 5 mm in width) from about T1 to T9, producing diffuse   thecal sac compression without obvious cord compression or cord edema.   Thin epidural disease is noted extending inferiorly to the L5 level.    3.  Interval progression of the multilevel compression deformities   (severe at T7; moderate at T11 and L4; and mild at T3, T4, T5, T9, and   T10). Mild osseous retropulsion noted at T7, T10, T11 and L4.    4.  Extensive hepatic metastases. Bilateral adrenal nodules    --- End of Report ---      < end of copied text >    EKG: reviewed by me    < from: 12 Lead ECG (04.26.24 @ 18:04) >  Ventricular Rate 109 BPM    Atrial Rate 109 BPM    P-R Interval 132 ms    QRS Duration 86 ms    Q-T Interval 338 ms    QTC Calculation(Bazett) 455 ms    P Axis 18 degrees    R Axis 118 degrees    T Axis -18 degrees    Diagnosis Line Sinus tachycardia  Right ventricular hypertrophy with repolarization abnormality  Possible Inferior infarct , age undetermined  Anterior infarct , age undetermined  Abnormal ECG    Confirmed by Home Stephens ([id number]) on 4/26/2024 6:21:31 PM    < end of copied text >      Patient discussed with primary medical team MD  Palliative care education provided to patient and/or family

## 2024-05-03 NOTE — PROGRESS NOTE ADULT - SUBJECTIVE AND OBJECTIVE BOX
24H events:    Patient is a 58y old Female who presents with a chief complaint of back pain (01 May 2024 14:02)    Primary diagnosis of Metastatic cancer to bone    Today is hospital day 7d. This morning patient was seen and examined at bedside, resting comfortably in bed.    No acute or major events overnight.    Family communication:  Contact date:  Name of person contacted:  Relationship to patient:  Communication details:  What matters most:    PAST MEDICAL & SURGICAL HISTORY  Hypothyroidism    H/O cirrhosis    Breast cancer    No significant past surgical history      SOCIAL HISTORY:  Social History:  former alcohol abuse, denies tobacco use (26 Apr 2024 22:23)      ALLERGIES:  No Known Allergies    MEDICATIONS:  STANDING MEDICATIONS  chlorhexidine 2% Cloths 1 Application(s) Topical <User Schedule>  dexAMETHasone  Injectable 4 milliGRAM(s) IV Push every 12 hours  exemestane 25 milliGRAM(s) Oral daily  furosemide   Injectable 20 milliGRAM(s) IV Push once  levothyroxine 50 MICROGram(s) Oral <User Schedule>  magnesium oxide 400 milliGRAM(s) Oral daily  melatonin 5 milliGRAM(s) Oral at bedtime  pantoprazole    Tablet 40 milliGRAM(s) Oral before breakfast  polyethylene glycol 3350 17 Gram(s) Oral daily  senna 2 Tablet(s) Oral at bedtime    PRN MEDICATIONS  albuterol    0.083% 2.5 milliGRAM(s) Nebulizer every 6 hours PRN  ibuprofen  Tablet. 200 milliGRAM(s) Oral every 6 hours PRN  morphine  - Injectable 4 milliGRAM(s) IV Push every 3 hours PRN    VITALS:   T(F): 97.3  HR: 109  BP: 134/83  RR: 18  SpO2: 97%    PHYSICAL EXAM:  GENERAL: NAD, appears fatigued  HEENT: Has bruising on her left eye, mucous membranes dry, lips with dried blood  PULMONARY: Clear to auscultation bilaterally on anterior respiratory exam. No wheezing or crackles  CARDIOVASCULAR: Regular rate and rhythm, S1-S2, no murmurs, rubs, or gallops  GASTROINTESTINAL: Soft, non-tender, non-distended, no guarding  SKIN/EXTREMITIES: Warm, 2+ tibialis posterior pulses, no UE or LE edema, no petichiae  NEUROLOGIC/MUSCULOSKELETAL: AOx4, able to lift both arms, has difficulty lifting legs but is able to bend both, sensation intact to light touch bilaterally in UE and LE    LABS:                        8.7    5.09  )-----------( x        ( 03 May 2024 08:04 )             25.8     05-02    137  |  105  |  15  ----------------------------<  119<H>  4.0   |  24  |  <0.5<L>    Ca    8.8      02 May 2024 08:51  Mg     1.7     05-02    TPro  5.3<L>  /  Alb  3.3<L>  /  TBili  0.9  /  DBili  x   /  AST  176<H>  /  ALT  33  /  AlkPhos  247<H>  05-02      Urinalysis Basic - ( 02 May 2024 08:51 )    Color: x / Appearance: x / SG: x / pH: x  Gluc: 119 mg/dL / Ketone: x  / Bili: x / Urobili: x   Blood: x / Protein: x / Nitrite: x   Leuk Esterase: x / RBC: x / WBC x   Sq Epi: x / Non Sq Epi: x / Bacteria: x

## 2024-05-03 NOTE — PROGRESS NOTE ADULT - ASSESSMENT
59yo female whose documented PMH  includes hypothyroid, asthma, ETOH abuse (cirrhosis) and breast cancer widely metastatic to bone, status post radiation 5 sessions finished 5/31/23, on letrozole daily, (was on Ribociclib and was discontinued due to low magnesium with high QTc), surgical history of occiput-T2 posterior instrumentation and fusion, ORIF of C2 body with functional decline and impaired ADLs/mobility, ambulates with a walker, following with Dr. Peña , presented to the ER due to back pain present for 2 weeks. Palliative consulted for pain control and GOC.     She has not discussed GOC with family yet.    MEDD: 54mg    Education about palliative care provided to patient/family.  See Recs below.    Please call [phone] with questions or concerns 24/7.   We will continue to follow.

## 2024-05-03 NOTE — PROGRESS NOTE ADULT - PROBLEM SELECTOR PLAN 2
Metastatic breast cancer to bones and liver  -no cord compression on MRI  -start MS Contin 15mg BID  -continue morphine 4mg IV q3h PRN for moderate/severe pain (4-10)  -continue dexamethasone for bone pain  -continue protonix given use of dexamethasone/ibuprofen  -no recent BM per patient - increase senna to 2 tabs BID and miralax to 17g BID  -primary team to get KUB  -if no BM today and KUB without evidence of obstruction or clinical contraindication, recommend bisacodyl suppository; consider tap water enema if no BM after suppository  -f/u heme onc, neurosurgery

## 2024-05-03 NOTE — PHARMACOTHERAPY INTERVENTION NOTE - COMMENTS
Spoke with MD. Recommended MD before Movantik can be started all of the Laxatives should be discontinued, and may reintroduce laxatives as needed if suboptimal response to naloxegol after 3 days. MD okay with discontinuing other laxatives.

## 2024-05-04 NOTE — PROGRESS NOTE ADULT - ATTENDING COMMENTS
58-year-old female with PMHx of breast cancer (tx w/ letrozole) with metastases to bone presents for evaluation of generalized weakness and back pain. Per patient she is getting radiation she states that she completed 5 sessions of radiation and was doing well however over the past 24 to 48 hours became weak denies any loss of bladder or bowel control denies any saddle anesthesia denies any focal weakness to the lower extremities denies any fevers or chills. Sent from [facility] for neurosurgery eval (4/27).        #Stage IV Breast Cancer HR+/HER2-, metastatic to bone  #Malignancy related pain   *CTA PE (4/26): No pulmonary embolus seen. Left upper lobe pulmonary nodule presumably representing metastatic lung disease.  *CT Head noncon (4/26): No evidence of acute territorial infarct, intracranial hemorrhage, or midline shift. Significantly increased numerous sclerotic osseous metastasis throughout the calvarium which were predominantly lytic on the prior CT head from 4/21/2023.  *MR C-spine IC (4/29):  Interval progression of extensive enhancing osseous metastatic disease. No evidence of cervical cord compression or cord signal abnormality. Partially visualized enhancing lesion in the region of the pineal gland, presumably reflecting metastatic disease.    *MR T/L-spine IC (4/29): extensive osseous metastatic disease. Interval development of epidural enhancing soft tissue circumferentially throughout the thoracic and lumbar spines. No obvious cord compression or cord edema.    *CT AP (4/29): Innumerable hepatic metastases. New bilateral adrenal nodules likely metastases. Diffuse widespread osseous metastases, increased from prior CT abdomen pelvis.    - DC Ibrance given worsening bone disease and increasing tumor markers per hem/onc   - CA 27-29 (4/27): 996.5  - NSGY recs (4/27): MRI C/T/L spine w/wo given no contraindications   - No cord compression on MRI of her spine  - Taper decadron off by 50% every 3 days   - Palliative following - c/w morphine ER 15mg BID and Morphine 4 mg IV q3h PRN for severe pain  - discontinued letrozole and started Exemestane 25mg daily per Heme/Onc  - s/p Faslodex (5/1)  - Starting palliative radiation therapy on Monday - will need platelets above 20k   - planned for inpatient chemo after radiation     #Pancytopenia   - secondary to Ibrance toxicity vs BM infiltration of malignancy   - hem/onc is following, SCD for DVT ppx  - Trend platelets daily, keep above 20k     #Acute hypoxemic respiratory failure   - appears hypervolemic on exam   - On 2 L via nasal cannula   - BNP wnl   - CTA chest negative for PE     #Transaminitis 2/2 liver mets   *CT AP (4/29): Innumerable hepatic metastases.    - monitor     #Hypercalcemia of malignancy (resolved)  - s/p 2 x calcitonin 250 units  - s/p 1 x Zometa 4 mg    #Hypothyroid   - on synthroid        DVT PPx: SCDs given severe thrombocytopenia   GI PPx: PPI       Grave prognosis       Pending: radiation, chemotherapy   Plan of care d/w pt   Dispo: TBD

## 2024-05-04 NOTE — PROGRESS NOTE ADULT - SUBJECTIVE AND OBJECTIVE BOX
24H events:    Patient is a 58y old Female who presents with a chief complaint of back pain (01 May 2024 14:02)    Primary diagnosis of Metastatic cancer to bone    Today is hospital day 8d. This morning patient was seen and examined at bedside, resting comfortably in bed.    No acute or major events overnight.    Family communication:  Contact date:  Name of person contacted:  Relationship to patient:  Communication details:  What matters most:    PAST MEDICAL & SURGICAL HISTORY  Hypothyroidism    H/O cirrhosis    Breast cancer    No significant past surgical history      SOCIAL HISTORY:  Social History:  former alcohol abuse, denies tobacco use (26 Apr 2024 22:23)      ALLERGIES:  No Known Allergies    MEDICATIONS:  STANDING MEDICATIONS  artificial tears (preservative free) Ophthalmic Solution 1 Drop(s) Both EYES two times a day  bacitracin   Ointment 1 Application(s) Topical two times a day  chlorhexidine 2% Cloths 1 Application(s) Topical <User Schedule>  dexAMETHasone  Injectable 2 milliGRAM(s) IV Push every 12 hours  exemestane 25 milliGRAM(s) Oral daily  levothyroxine 50 MICROGram(s) Oral <User Schedule>  magnesium oxide 400 milliGRAM(s) Oral daily  melatonin 5 milliGRAM(s) Oral at bedtime  morphine ER Tablet 15 milliGRAM(s) Oral every 12 hours  naloxegol 12.5 milliGRAM(s) Oral daily  pantoprazole    Tablet 40 milliGRAM(s) Oral before breakfast    PRN MEDICATIONS  albuterol    0.083% 2.5 milliGRAM(s) Nebulizer every 6 hours PRN  morphine  - Injectable 4 milliGRAM(s) IV Push every 3 hours PRN    VITALS:   T(F): 97.5  HR: 111  BP: 122/86  RR: 19  SpO2: 97%    PHYSICAL EXAM:  GENERAL: NAD, appears fatigued  HEENT: Has bruising on her left eye, mucous membranes dry, lips with dried blood  PULMONARY: Clear to auscultation bilaterally on anterior respiratory exam. No wheezing or crackles  CARDIOVASCULAR: Regular rate and rhythm, S1-S2, no murmurs, rubs, or gallops  GASTROINTESTINAL: Soft, non-tender, non-distended, no guarding  SKIN/EXTREMITIES: Warm, 2+ tibialis posterior pulses, no UE or LE edema, no petichiae  NEUROLOGIC/MUSCULOSKELETAL: AOx4, able to lift both arms, has difficulty lifting legs but is able to bend both, sensation intact to light touch bilaterally in UE and LE    LABS:                        8.7    5.09  )-----------( 17       ( 03 May 2024 08:04 )             25.8     05-03    139  |  103  |  21<H>  ----------------------------<  111<H>  4.2   |  24  |  <0.5<L>    Ca    8.9      03 May 2024 08:04  Mg     1.9     05-03    TPro  5.6<L>  /  Alb  3.5  /  TBili  0.9  /  DBili  x   /  AST  228<H>  /  ALT  49<H>  /  AlkPhos  281<H>  05-03      Urinalysis Basic - ( 03 May 2024 08:04 )    Color: x / Appearance: x / SG: x / pH: x  Gluc: 111 mg/dL / Ketone: x  / Bili: x / Urobili: x   Blood: x / Protein: x / Nitrite: x   Leuk Esterase: x / RBC: x / WBC x   Sq Epi: x / Non Sq Epi: x / Bacteria: x

## 2024-05-04 NOTE — PROGRESS NOTE ADULT - ASSESSMENT
58-year-old female with PMHx of breast cancer (tx w/ letrozole) with metastases to bone presents for evaluation of generalized weakness and back pain. Per patient she is getting radiation she states that she completed 5 sessions of radiation and was doing well however over the past 24 to 48 hours became weak denies any loss of bladder or bowel control denies any saddle anesthesia denies any focal weakness to the lower extremities denies any fevers or chills. Sent from [facility] for neurosurgery eval (4/27).    #Stage IV Breast Cancer HR+/HER2-, metastatic to bone  #back pain  *CTA PE (4/26): No pulmonary embolus seen. Right breast mass consistent with carcinoma. Diffuse metastatic bone disease. Right adrenal mass. Left upper lobe pulmonary nodule presumably representing metastatic lung disease.  *CT Head noncon (4/26): No evidence of acute territorial infarct, intracranial hemorrhage, or midline shift. Significantly increased numerous sclerotic osseous metastasis throughout the calvarium which were predominantly lytic on the prior CT head from 4/21/2023.  *CT T/L spine noncon (4/26): Increased diffuse predominantly sclerotic osseous metastasis throughout the thoracolumbar spine, ribs, sacrum, and bilateral iliac bones compared to the prior CT abdomen pelvis dated 7/19/2013. Multilevel compression deformities of the thoracic spine and L4 without significant change. Multilevel thoracolumbar neuroforaminal stenosis  *MR C-spine IC (4/29):  Interval progression of extensive enhancing osseous metastatic disease. No obvious cervical epidural disease demonstrated. (The previously seen epidural disease at C2/C3 is no longer visualized), No evidence of cervical cord compression or cord signal abnormality. Partially visualized enhancing lesion in the region of the pineal gland, presumably reflecting metastatic disease. Right lateral neck/supraclavicular lymph node measuring 1.5 cm.  *MR T/L-spine IC (4/29): Significant interval progression of the numerous discrete and infiltrative bone marrow signal abnormalities consistent with extensive osseous metastatic disease. Interval development of epidural enhancing soft tissue circumferentially throughout the thoracic and lumbar spines. This is most pronounced (approx 5 mm in width) from about T1 to T9, producing diffuse thecal sac compression without obvious cord compression or cord edema. Thin epidural disease is noted extending inferiorly to the L5 level.  Interval progression of the multilevel compression deformities (severe at T7; moderate at T11 and L4; and mild at T3, T4, T5, T9, and T10). Mild osseous retropulsion noted at T7, T10, T11 and L4. Extensive hepatic metastases. Bilateral adrenal nodules  *CT AP (4/29): Innumerable hepatic metastases. New bilateral adrenal nodules likely metastases. Diffuse widespread osseous metastases, increased from prior CT abdomen pelvis. Large amount of hyperdense material in the bladder. Correlate clinically including with urinalysis.  - heme/onc recs (4/28): c/w IV fluids, d/c ibrance, repeat CA 27-29, consult NSGY for metastatic disease to T/L spine, rule out DIC, pending MRI spine, will need liquid biopsy, systemic therapy soon (Chemo v/s endocrine ), response to letrozole/Ibrance < 12 months, consider bone marrow, chemo will be challenging given thrombocytopenia. Start second line endocrine therapy?   - DC Ibrance given worsening bone disease and increasing tumor markers per hem/onc   - CA 27-29 (4/27): 996.5  - NSGY recs (4/27): MRI C/T/L spine w/wo given no contraindications, Mets w/u: CT C/A/P, RadOnc/HemeOnc consults, Pain management recs  - No cord compression on MRI of her spine  - Per NSGY, taper decadron   - Pain management   - discontinued letrozole per Heme/onc  - started Exemestane 25mg daily per Heme/Onc  - Faslodex injection (5/2)  - Continue Radiation Therapy     #Acute hypoxemic respiratory failure 2/2 fluid overload   - appears hypervolemic on exam   - On 2 L via nasal cannula   - BNP WNL  - s/p IV lasix   - CT Angio negative for PE    #Pancytopenia 2/2 Ibrance Toxicity vs BM infiltration of malignancy   - Plt 25 ->18 -> 16 -> 14 -> 39 -> 29 -> 17  - hem/onc is following, SCD for DVT ppx  - Trend platelets daily, transfuse as needed     #Hypercalcemia of malignancy (resolved)  - s/p 2 x calcitonin 250 units  - s/p 1 x Zometa 4 mg    #hypothyroid   - on synthroid     #MISC  - DVT PPx: SCDs  - GI PPx: NI  - Diet: Pureed  - Activity: Ambulate w/assistance

## 2024-05-05 NOTE — PROGRESS NOTE ADULT - SUBJECTIVE AND OBJECTIVE BOX
Pt seen and examined at bedside. Continue         VITAL SIGNS (Last 24 hrs):  T(C): 36.4 (05-05-24 @ 04:47), Max: 36.6 (05-04-24 @ 12:43)  HR: 114 (05-05-24 @ 04:47) (106 - 114)  BP: 113/78 (05-05-24 @ 04:47) (113/78 - 119/71)  RR: 17 (05-05-24 @ 04:47) (17 - 18)  SpO2: 94% (05-05-24 @ 08:05) (94% - 97%)        I&O's Summary    04 May 2024 07:01  -  05 May 2024 07:00  -------------------------------------------------   IN: 450 mL / OUT: 0 mL / NET: 450 mL        PHYSICAL EXAM:  GENERAL: NAD, chronically ill appearing female    HEAD:  Atraumatic, Normocephalic  EYES:  conjunctiva and sclera clear  NECK: Supple, No JVD  CHEST/LUNG: Clear to auscultation bilaterally   HEART: Regular rate and rhythm; No murmurs, rubs, or gallops  ABDOMEN: Soft, Nontender, Nondistended   EXTREMITIES:  2+ Peripheral Pulses, No clubbing, cyanosis   SKIN: No rashes or lesions       Labs Reviewed       CBC Full  -  ( 05 May 2024 09:58 )  WBC Count : x  Hemoglobin : x  Hematocrit : x  Platelet Count - Automated : x  Mean Cell Volume : x  Mean Cell Hemoglobin : x  Mean Cell Hemoglobin Concentration : x  Auto Neutrophil # : 4.20 K/uL  Auto Lymphocyte # : 0.80 K/uL  Auto Monocyte # : 0.64 K/uL  Auto Eosinophil # : 0.03 K/uL  Auto Basophil # : 0.01 K/uL  Auto Neutrophil % : 66.4 %  Auto Lymphocyte % : 12.7 %  Auto Monocyte % : 10.1 %  Auto Eosinophil % : 0.5 %  Auto Basophil % : 0.2 %    BMP:    05-04 @ 04:30    Blood Urea Nitrogen - 24  Calcium - 9.0  Carbon Dioxide - 24  Chloride - 99  Creatinine - <0.5  Glucose - 109  Potassium - 3.9  Sodium - 136           MEDICATIONS  (STANDING):  artificial tears (preservative free) Ophthalmic Solution 1 Drop(s) Both EYES two times a day  bacitracin   Ointment 1 Application(s) Topical two times a day  chlorhexidine 2% Cloths 1 Application(s) Topical <User Schedule>  dexAMETHasone  Injectable 2 milliGRAM(s) IV Push every 12 hours  exemestane 25 milliGRAM(s) Oral daily  lactated ringers. 1000 milliLiter(s) (75 mL/Hr) IV Continuous <Continuous>  levothyroxine 50 MICROGram(s) Oral <User Schedule>  magnesium oxide 400 milliGRAM(s) Oral daily  melatonin 5 milliGRAM(s) Oral at bedtime  morphine ER Tablet 15 milliGRAM(s) Oral every 12 hours  naloxegol 12.5 milliGRAM(s) Oral daily  pantoprazole    Tablet 40 milliGRAM(s) Oral before breakfast    MEDICATIONS  (PRN):  albuterol    0.083% 2.5 milliGRAM(s) Nebulizer every 6 hours PRN Shortness of Breath and/or Wheezing  morphine  - Injectable 4 milliGRAM(s) IV Push every 3 hours PRN moderate/severe pain (4-10)

## 2024-05-05 NOTE — PROGRESS NOTE ADULT - ASSESSMENT
58-year-old female with PMHx of breast cancer (tx w/ letrozole) with metastases to bone presents for evaluation of generalized weakness and back pain. Per patient she is getting radiation she states that she completed 5 sessions of radiation and was doing well however over the past 24 to 48 hours became weak denies any loss of bladder or bowel control denies any saddle anesthesia denies any focal weakness to the lower extremities denies any fevers or chills. Sent from [facility] for neurosurgery eval (4/27).        #Stage IV Breast Cancer HR+/HER2-, metastatic to bone  #Malignancy related pain   *CTA PE (4/26): No pulmonary embolus seen. Left upper lobe pulmonary nodule presumably representing metastatic lung disease.  *CT Head noncon (4/26): No evidence of acute territorial infarct, intracranial hemorrhage, or midline shift. Significantly increased numerous sclerotic osseous metastasis throughout the calvarium which were predominantly lytic on the prior CT head from 4/21/2023.  *MR C-spine IC (4/29):  Interval progression of extensive enhancing osseous metastatic disease. No evidence of cervical cord compression or cord signal abnormality. Partially visualized enhancing lesion in the region of the pineal gland, presumably reflecting metastatic disease.    *MR T/L-spine IC (4/29): extensive osseous metastatic disease. Interval development of epidural enhancing soft tissue circumferentially throughout the thoracic and lumbar spines. No obvious cord compression or cord edema.    *CT AP (4/29): Innumerable hepatic metastases. New bilateral adrenal nodules likely metastases. Diffuse widespread osseous metastases, increased from prior CT abdomen pelvis.    - DC Ibrance given worsening bone disease and increasing tumor markers per hem/onc   - CA 27-29 (4/27): 996.5  - NSGY recs (4/27): MRI C/T/L spine w/wo given no contraindications   - No cord compression on MRI of her spine  - Taper decadron off by 50% every 3 days   - Palliative following - c/w morphine ER 15mg BID and Morphine 4 mg IV q3h PRN for severe pain  - discontinued letrozole and started Exemestane 25mg daily per Heme/Onc  - s/p Faslodex (5/1)   - Starting palliative radiation therapy on Monday - will need platelets above 20k   - planned for inpatient chemo after radiation     #Pancytopenia   - secondary to Ibrance toxicity vs BM infiltration of malignancy   - hem/onc is following, SCD for DVT ppx  - Trend platelets daily, keep above 20k     #Acute hypoxemic respiratory failure   - On 2 L via nasal cannula   - BNP wnl   - CTA chest negative for PE   - LE duplex negative     #Transaminitis 2/2 liver mets   *CT AP (4/29): Innumerable hepatic metastases.    - monitor     #Hypercalcemia of malignancy (resolved)  - s/p 2 x calcitonin 250 units  - s/p 1 x Zometa 4 mg    #Hypothyroid   - on synthroid        DVT PPx: SCDs given severe thrombocytopenia   GI PPx: PPI       Grave prognosis       Pending: radiation, chemotherapy   Plan of care d/w pt   Dispo: TBD

## 2024-05-06 NOTE — PROGRESS NOTE ADULT - ASSESSMENT
57yo female whose documented PMH  includes hypothyroid, asthma, ETOH abuse (cirrhosis) and breast cancer widely metastatic to bone, status post radiation 5 sessions finished 5/31/23, on letrozole daily, (was on Ribociclib and was discontinued due to low magnesium with high QTc), surgical history of occiput-T2 posterior instrumentation and fusion, ORIF of C2 body with functional decline and impaired ADLs/mobility, ambulates with a walker, following with Dr. Peña , presented to the ER due to back pain present for 2 weeks. Palliative consulted for pain control and GOC.     Spoke with patient at bedside alone. She had not discussed GOC and HCP with family. Called to speak with patient's sister Sandy. Discussed the plan to talk about GOC, code status, and HCP. She noted that they had discussed code status and DNR/DNI, but more discussion was needed with the patient. Went bedside again and discussed code with the patient and her  at bedside, and sister in law over the phone. Discussed DNR/DNI at length.  Patient is considering DNR/DNI but wishes to speak with her sister and daughter further. All questions answered. Patient is full code.    MEDD: 78mg    Education about palliative care provided to patient/family.  See Recs below.    Please call [phone] with questions or concerns 24/7.   We will continue to follow.

## 2024-05-06 NOTE — PROGRESS NOTE ADULT - SUBJECTIVE AND OBJECTIVE BOX
24H events:    Today is hospital day 10d. This morning patient was seen and examined at bedside, resting comfortably in bed.    No acute or major events overnight.    PAST MEDICAL & SURGICAL HISTORY  Hypothyroidism    H/O cirrhosis    Breast cancer    No significant past surgical history        SOCIAL HISTORY:  Social History:  former alcohol abuse, denies tobacco use (26 Apr 2024 22:23)      ALLERGIES:  No Known Allergies      MEDICATIONS:  STANDING MEDICATIONS  artificial tears (preservative free) Ophthalmic Solution 1 Drop(s) Both EYES two times a day  bacitracin   Ointment 1 Application(s) Topical two times a day  chlorhexidine 2% Cloths 1 Application(s) Topical <User Schedule>  dexAMETHasone  Injectable 2 milliGRAM(s) IV Push every 12 hours  exemestane 25 milliGRAM(s) Oral daily  lactated ringers. 1000 milliLiter(s) IV Continuous <Continuous>  levothyroxine 50 MICROGram(s) Oral <User Schedule>  magnesium oxide 400 milliGRAM(s) Oral daily  melatonin 5 milliGRAM(s) Oral at bedtime  morphine ER Tablet 15 milliGRAM(s) Oral every 12 hours  naloxegol 12.5 milliGRAM(s) Oral daily  pantoprazole    Tablet 40 milliGRAM(s) Oral before breakfast    PRN MEDICATIONS  albuterol    0.083% 2.5 milliGRAM(s) Nebulizer every 6 hours PRN  morphine  - Injectable 4 milliGRAM(s) IV Push every 3 hours PRN      VITALS:   T(C): 36.4 (05-06-24 @ 04:12), Max: 36.7 (05-05-24 @ 14:03)  HR: 108 (05-06-24 @ 04:12) (103 - 134)  BP: 125/88 (05-06-24 @ 04:12) (125/88 - 130/87)  RR: 17 (05-06-24 @ 04:12) (17 - 18)  SpO2: 94% (05-06-24 @ 04:12) (94% - 94%)  I&O's Summary    04 May 2024 07:01  -  05 May 2024 07:00  --------------------------------------------------------  IN: 450 mL / OUT: 0 mL / NET: 450 mL          PHYSICAL EXAM:      LABS:                        9.2    6.32  )-----------( 12       ( 05 May 2024 09:58 )             27.4     05-05    132<L>  |  96<L>  |  24<H>  ----------------------------<  101<H>  4.3   |  24  |  <0.5<L>    Ca    8.9      05 May 2024 09:58  Mg     1.8     05-05    TPro  5.8<L>  /  Alb  3.6  /  TBili  1.4<H>  /  DBili  x   /  AST  263<H>  /  ALT  62<H>  /  AlkPhos  331<H>  05-05      Urinalysis Basic - ( 05 May 2024 09:58 )    Color: x / Appearance: x / SG: x / pH: x  Gluc: 101 mg/dL / Ketone: x  / Bili: x / Urobili: x   Blood: x / Protein: x / Nitrite: x   Leuk Esterase: x / RBC: x / WBC x   Sq Epi: x / Non Sq Epi: x / Bacteria: x      ABG - ( 04 May 2024 13:43 )  pH, Arterial: 7.46  pH, Blood: x     /  pCO2: 33    /  pO2: 112   / HCO3: 24    / Base Excess: 0.3   /  SaO2: 98.8              Lactate, Blood: 3.2 mmol/L *H* (05-05-24 @ 09:58)               24H events:    Today is hospital day 10d. This morning patient was seen and examined at bedside, resting comfortably in bed.    No acute or major events overnight. seen on NC , pain is controlled. no complaints, is not SOB.     PAST MEDICAL & SURGICAL HISTORY  Hypothyroidism    H/O cirrhosis    Breast cancer    No significant past surgical history        SOCIAL HISTORY:  Social History:  former alcohol abuse, denies tobacco use (26 Apr 2024 22:23)      ALLERGIES:  No Known Allergies      MEDICATIONS:  STANDING MEDICATIONS  artificial tears (preservative free) Ophthalmic Solution 1 Drop(s) Both EYES two times a day  bacitracin   Ointment 1 Application(s) Topical two times a day  chlorhexidine 2% Cloths 1 Application(s) Topical <User Schedule>  dexAMETHasone  Injectable 2 milliGRAM(s) IV Push every 12 hours  exemestane 25 milliGRAM(s) Oral daily  lactated ringers. 1000 milliLiter(s) IV Continuous <Continuous>  levothyroxine 50 MICROGram(s) Oral <User Schedule>  magnesium oxide 400 milliGRAM(s) Oral daily  melatonin 5 milliGRAM(s) Oral at bedtime  morphine ER Tablet 15 milliGRAM(s) Oral every 12 hours  naloxegol 12.5 milliGRAM(s) Oral daily  pantoprazole    Tablet 40 milliGRAM(s) Oral before breakfast    PRN MEDICATIONS  albuterol    0.083% 2.5 milliGRAM(s) Nebulizer every 6 hours PRN  morphine  - Injectable 4 milliGRAM(s) IV Push every 3 hours PRN      VITALS:   T(C): 36.4 (05-06-24 @ 04:12), Max: 36.7 (05-05-24 @ 14:03)  HR: 108 (05-06-24 @ 04:12) (103 - 134)  BP: 125/88 (05-06-24 @ 04:12) (125/88 - 130/87)  RR: 17 (05-06-24 @ 04:12) (17 - 18)  SpO2: 94% (05-06-24 @ 04:12) (94% - 94%)  I&O's Summary    04 May 2024 07:01  -  05 May 2024 07:00  --------------------------------------------------------  IN: 450 mL / OUT: 0 mL / NET: 450 mL          PHYSICAL EXAM:     GENERAL: NAD, appears fatigued  HEENT:  dry, lips and skin   PULMONARY: Clear to auscultation bilaterally on anterior respiratory exam. No wheezing or crackles  CARDIOVASCULAR: Regular rate and rhythm, S1-S2, no murmurs, rubs, or gallops  GASTROINTESTINAL: Soft, non-tender, non-distended, no guarding  SKIN/EXTREMITIES: Warm, 2+ tibialis posterior pulses, no UE or LE edema, no petichiae  NEUROLOGIC/MUSCULOSKELETAL: AOx4, able to lift both arms, has difficulty lifting legs but is able to bend both, sensation intact to light touch bilaterally in UE and LE      LABS:                        9.2    6.32  )-----------( 12       ( 05 May 2024 09:58 )             27.4     05-05    132<L>  |  96<L>  |  24<H>  ----------------------------<  101<H>  4.3   |  24  |  <0.5<L>    Ca    8.9      05 May 2024 09:58  Mg     1.8     05-05    TPro  5.8<L>  /  Alb  3.6  /  TBili  1.4<H>  /  DBili  x   /  AST  263<H>  /  ALT  62<H>  /  AlkPhos  331<H>  05-05      Urinalysis Basic - ( 05 May 2024 09:58 )    Color: x / Appearance: x / SG: x / pH: x  Gluc: 101 mg/dL / Ketone: x  / Bili: x / Urobili: x   Blood: x / Protein: x / Nitrite: x   Leuk Esterase: x / RBC: x / WBC x   Sq Epi: x / Non Sq Epi: x / Bacteria: x      ABG - ( 04 May 2024 13:43 )  pH, Arterial: 7.46  pH, Blood: x     /  pCO2: 33    /  pO2: 112   / HCO3: 24    / Base Excess: 0.3   /  SaO2: 98.8              Lactate, Blood: 3.2 mmol/L *H* (05-05-24 @ 09:58)

## 2024-05-06 NOTE — PROGRESS NOTE ADULT - PROBLEM SELECTOR PLAN 2
Metastatic breast cancer to bones and liver  -no cord compression on MRI  -MS Contin 15mg BID  -continue morphine 4mg IV q3h PRN for moderate/severe pain (4-10)  -continue dexamethasone for bone pain  -continue protonix given use of dexamethasone/ibuprofen  -continue senna, miralax; naloxegol per primary team  -f/u heme onc, neurosurgery

## 2024-05-06 NOTE — PROGRESS NOTE ADULT - ASSESSMENT
58-year-old female with PMHx of breast cancer (tx w/ letrozole) with metastases to bone presents for evaluation of generalized weakness and back pain. Per patient she is getting radiation she states that she completed 5 sessions of radiation and was doing well however over the past 24 to 48 hours became weak denies any loss of bladder or bowel control denies any saddle anesthesia denies any focal weakness to the lower extremities denies any fevers or chills. Sent from [facility] for neurosurgery eval (4/27).        #Stage IV Breast Cancer HR+/HER2-, metastatic to bone  #Malignancy related pain   *CTA PE (4/26): No pulmonary embolus seen. Left upper lobe pulmonary nodule presumably representing metastatic lung disease.  *CT Head noncon (4/26): No evidence of acute territorial infarct, intracranial hemorrhage, or midline shift. Significantly increased numerous sclerotic osseous metastasis throughout the calvarium which were predominantly lytic on the prior CT head from 4/21/2023.  *MR C-spine IC (4/29):  Interval progression of extensive enhancing osseous metastatic disease. No evidence of cervical cord compression or cord signal abnormality. Partially visualized enhancing lesion in the region of the pineal gland, presumably reflecting metastatic disease.    *MR T/L-spine IC (4/29): extensive osseous metastatic disease. Interval development of epidural enhancing soft tissue circumferentially throughout the thoracic and lumbar spines. No obvious cord compression or cord edema.    *CT AP (4/29): Innumerable hepatic metastases. New bilateral adrenal nodules likely metastases. Diffuse widespread osseous metastases, increased from prior CT abdomen pelvis.    - DC Ibrance given worsening bone disease and increasing tumor markers per hem/onc   - CA 27-29 (4/27): 996.5  - NSGY recs (4/27): MRI C/T/L spine w/wo given no contraindications   - No cord compression on MRI of her spine  - Taper decadron off by 50% every 3 days   - Palliative following - c/w morphine ER 15mg BID and Morphine 4 mg IV q3h PRN for severe pain  - discontinued letrozole and started Exemestane 25mg daily per Heme/Onc  - s/p Faslodex (5/1)   - Starting palliative radiation therapy on Monday - will need platelets above 20k   - planned for inpatient chemo after radiation     #Pancytopenia   - secondary to Ibrance toxicity vs BM infiltration of malignancy   - hem/onc is following, SCD for DVT ppx  - Trend platelets daily, keep above 20k     #Acute hypoxemic respiratory failure   - On 2 L via nasal cannula   - BNP wnl   - CTA chest negative for PE   - LE duplex negative     #Transaminitis 2/2 liver mets   *CT AP (4/29): Innumerable hepatic metastases.    - monitor     #Hypercalcemia of malignancy (resolved)  - s/p 2 x calcitonin 250 units  - s/p 1 x Zometa 4 mg    #Hypothyroid   - on synthroid        DVT PPx: SCDs given severe thrombocytopenia   GI PPx: PPI       Grave prognosis       Pending: radiation, chemotherapy   Plan of care d/w pt   Dispo: TBD    58-year-old female with PMHx of breast cancer (tx w/ letrozole) with metastases to bone presents for evaluation of generalized weakness and back pain. Per patient she is getting radiation she states that she completed 5 sessions of radiation and was doing well however over the past 24 to 48 hours became weak denies any loss of bladder or bowel control denies any saddle anesthesia denies any focal weakness to the lower extremities denies any fevers or chills. Sent from [facility] for neurosurgery eval (4/27).    #Stage IV Breast Cancer HR+/HER2-, metastatic to bone  #Malignancy related pain   *CTA PE (4/26): No pulmonary embolus seen. Left upper lobe pulmonary nodule presumably representing metastatic lung disease.  *CT Head noncon (4/26): No evidence of acute territorial infarct, intracranial hemorrhage, or midline shift. Significantly increased numerous sclerotic osseous metastasis throughout the calvarium which were predominantly lytic on the prior CT head from 4/21/2023.  *MR C-spine IC (4/29):  Interval progression of extensive enhancing osseous metastatic disease. No evidence of cervical cord compression or cord signal abnormality. Partially visualized enhancing lesion in the region of the pineal gland, presumably reflecting metastatic disease.    *MR T/L-spine IC (4/29): extensive osseous metastatic disease. Interval development of epidural enhancing soft tissue circumferentially throughout the thoracic and lumbar spines. No obvious cord compression or cord edema.    *CT AP (4/29): Innumerable hepatic metastases. New bilateral adrenal nodules likely metastases. Diffuse widespread osseous metastases, increased from prior CT abdomen pelvis.    - DC Ibrance given worsening bone disease and increasing tumor markers per hem/onc   - CA 27-29 (4/27): 996.5  - NSGY recs (4/27): MRI C/T/L spine w/wo given no contraindications   - No cord compression on MRI of her spine  - Taper decadron off by 50% every 3 days > today decreased to dex 1mg iv daily   - Palliative following - c/w morphine ER 15mg BID and Morphine 4 mg IV q3h PRN for severe pain- Bowel regimen started   - discontinued letrozole and started Exemestane 25mg daily per Heme/Onc  - s/p Faslodex (5/1)   - Starting palliative radiation therapy today  - will need platelets above 20k   - planned for inpatient chemo after radiation     #Pancytopenia   - secondary to Ibrance toxicity vs BM infiltration of malignancy   - hem/onc is following, SCD for DVT ppx  - Trend platelets daily, keep above 20k , sp one plt yest     #Acute hypoxemic respiratory failure   - On 2 L via nasal cannula   - BNP wnl   - CTA chest negative for PE ( tachycardiac)  - LE duplex negative   -incentive jose miguel ordered     #Transaminitis 2/2 liver mets   *CT AP (4/29): Innumerable hepatic metastases.    - monitor     #Hypercalcemia of malignancy (resolved)  - s/p 2 x calcitonin 250 units  - s/p 1 x Zometa 4 mg  -monitor ca     #Hypothyroid   - on synthroid     #right upper extre swelling  -duplex ordered       DVT PPx: SCDs given severe thrombocytopenia   GI PPx: PPI   Grave prognosis - full code  Pending: radiation, chemotherapy   Dispo: TBD

## 2024-05-06 NOTE — PROGRESS NOTE ADULT - SUBJECTIVE AND OBJECTIVE BOX
Recent events noted.  Radiation plan has been ready since last week and patient has declined to come down to start treatment on 2 different occasions.    Please let us know if she changes her mind regarding radiation.    Will need platelets at >30 to treat.

## 2024-05-06 NOTE — PROGRESS NOTE ADULT - SUBJECTIVE AND OBJECTIVE BOX
HPI:  59yo female whose documented PMH  includes hypothyroid, asthma, ETOH abuse (cirrhosis) and breast cancer widely metastatic to bone, status post radiation 5 sessions finished 5/31/23, on letrozole daily, (was on Ribociclib and was discontinued due to low magnesium with high QTc), surgical history of occiput-T2 posterior instrumentation and fusion, ORIF of C2 body with functional decline and impaired ADLs/mobility, ambulates with a walker, following with Dr. Peña , presents to the ER due to back pain present for 2 weeks. Due to persistence, she was advised to come to the ER. No loss of bladder, bowel function but told ER she had generalized weakness. While in the ER patient became tachycardic with pulse ox of 89% so PE study was done (no PE) ER team spoke to neurosurgery team member requesting that [place] be sent [place]  (26 Apr 2024 22:23)    Interval history:     -Patient seen at bedside, first by self, and later with   -States pain relatively well controlked     ADVANCE DIRECTIVES:     [X ] Full Code [ ] DNR  MOLST  [ ]  Living Will  [ ]   DECISION MAKER(s): Patient   [ ] Health Care Proxy(s)  [ X] Surrogate(s)  [ ] Guardian           Name(s): Phone Number(s): - Pankaj       BASELINE (I)ADL(s) (prior to admission):    [place]: [ ]Total  [ X ] Moderate [ ]Dependent  Palliative Performance Status Version 2:         %    http://npcrc.org/files/news/palliative_performance_scale_[email or web address].pdf    Allergies    No Known Allergies    Intolerances    MEDICATIONS  (STANDING):  artificial tears (preservative free) Ophthalmic Solution 1 Drop(s) Both EYES two times a day  bacitracin   Ointment 1 Application(s) Topical two times a day  chlorhexidine 2% Cloths 1 Application(s) Topical <User Schedule>  dexAMETHasone  Injectable 1 milliGRAM(s) IV Push <User Schedule>  exemestane 25 milliGRAM(s) Oral daily  lactated ringers. 1000 milliLiter(s) (75 mL/Hr) IV Continuous <Continuous>  lactated ringers. 1000 milliLiter(s) (70 mL/Hr) IV Continuous <Continuous>  levothyroxine 50 MICROGram(s) Oral <User Schedule>  magnesium oxide 400 milliGRAM(s) Oral daily  melatonin 5 milliGRAM(s) Oral at bedtime  morphine ER Tablet 15 milliGRAM(s) Oral every 12 hours  naloxegol 12.5 milliGRAM(s) Oral daily  pantoprazole    Tablet 40 milliGRAM(s) Oral before breakfast  polyethylene glycol 3350 17 Gram(s) Oral daily  senna 2 Tablet(s) Oral at bedtime    MEDICATIONS  (PRN):  albuterol    0.083% 2.5 milliGRAM(s) Nebulizer every 6 hours PRN Shortness of Breath and/or Wheezing  morphine  - Injectable 4 milliGRAM(s) IV Push every 3 hours PRN moderate/severe pain (4-10)      PRESENT SYMPTOMS: [ ]Unable to obtain due to poor mentation   Source if other than patient:  [ ]Family   [ ]Team     Pain: [ X]yes [ ]no  QOL impact -  significant  Location -                  back  Aggravating factors - movement  Quality - sharp  Radiation - none noted  Timing- none noted  Severity (0-10 scale): no number given  Minimal acceptable level (0-10 scale):     CPOT:    https://[email or web address]/getattachment/[email or web address]/Critical-Care-Pain-Observation-Tool-(CPOT)    PAIN AD Score:   http://geriatrictoolkit.[place].[email or web address]/cog/painad.pdf (press ctrl +  left click to view)    Dyspnea:                           [ X ]None[ ]Mild [ ]Moderate [ ]Severe     Respiratory Distress Observation Scale (RDOS):   A score of 0 to 2 signifies little or no respiratory distress, 3 signifies mild distress, scores 4 to 6 indicate moderate distress, and scores greater than 7 signify severe distress  [email or web address]://[email or web address]/sites/default/files/PDFS/332501-mekjtketjhl-npywzeyi-sbaiqavccel-pdgmo.pdf    Anxiety:                             [ x]None[ ]Mild [ ]Moderate [ ]Severe   Fatigue:                             [x ]None[ ]Mild [ ]Moderate [ ]Severe   Nausea:                             [x ]None[ ]Mild [ ]Moderate [ ]Severe   Loss of appetite:              [x ]None[ ]Mild [ ]Moderate [ ]Severe   Constipation:                    [ x]None[ ]Mild [ x]Moderate [ ]Severe    Other Symptoms:  [x ]All other review of systems negative     Palliative Performance Status Version 2:         30%    http://[facility].org/files/news/palliative_performance_scale_[email or web address].pdf    PHYSICAL EXAM:  Vital Signs Last 24 Hrs  T(C): 36.1 (06 May 2024 15:50), Max: 37 (06 May 2024 13:15)  T(F): 97 (06 May 2024 15:50), Max: 98.6 (06 May 2024 13:15)  HR: 106 (06 May 2024 15:50) (106 - 116)  BP: 125/86 (06 May 2024 15:50) (125/86 - 129/88)  BP(mean): --  RR: 20 (06 May 2024 15:50) (16 - 20)  SpO2: 96% (06 May 2024 15:50) (94% - 96%)    Parameters below as of 06 May 2024 15:50  Patient On (Oxygen Delivery Method): nasal cannula  O2 Flow (L/min): 3      GENERAL:  [X ] No acute distress [ ]Lethargic  [ ]Unarousable  [ X]Verbal  [ ]Non-Verbal [ ]Cachexia    BEHAVIORAL/PSYCH:  [X ]Alert and Oriented x2-3[ ] Anxiety [ ] Delirium [ ] Agitation [X ] Calm   EYES: [ X] No scleral icterus [ ] Scleral icterus [ ] Closed  ENMT:  [ ]Dry mouth  [X ]No external oral lesions [ X] No external ear or nose lesions  CARDIOVASCULAR:  [ ]Regular [ ]Irregular [ ]Tachy [ ]Not Tachy  [ ]Ahmet [ ] Edema  [ ] No edema  PULMONARY:  [ ]Tachypnea  [ ]Audible excessive secretions [X ] No labored breathing [ ] labored breathing  GASTROINTESTINAL: [ ]Soft  [ ]Distended  [X ]Not distended [ ]Non tender [ ]Tender  MUSCULOSKELETAL: [ XNo clubbing [ ] clubbing  [ ] No cyanosis [ ] cyanosis  NEUROLOGIC: [X ]No focal deficits  [ x]Follows commands  [ ]Does not follow commands  [ ]Cognitive impairment  [ ]Dysphagia  [ ]Dysarthria  [ ]Paresis   SKIN: [ ] Jaundiced [X ] Non-jaundiced [ ]Rash [ ]No Rash [ ] Warm [ ] Dry  MISC/LINES: [ ] ET tube [ ] Trach [ ]NGT/OGT [ ]PEG [ ]Wong    LABS: reviewed by me                          7.6    5.57  )-----------( 123      ( 06 May 2024 15:49 )             22.0       05-06    137  |  99  |  22<H>  ----------------------------<  109<H>  4.6   |  25  |  <0.5<L>    Ca    8.8      06 May 2024 08:55  Mg     1.9     05-06    TPro  5.6<L>  /  Alb  3.5  /  TBili  1.4<H>  /  DBili  x   /  AST  262<H>  /  ALT  66<H>  /  AlkPhos  325<H>  05-06              Urinalysis Basic - ( 06 May 2024 08:55 )    Color: x / Appearance: x / SG: x / pH: x  Gluc: 109 mg/dL / Ketone: x  / Bili: x / Urobili: x   Blood: x / Protein: x / Nitrite: x   Leuk Esterase: x / RBC: x / WBC x   Sq Epi: x / Non Sq Epi: x / Bacteria: x                  CAPILLARY BLOOD GLUCOSE                  RADIOLOGY & ADDITIONAL STUDIES: reviewed by me    < from: [facility] Duplex Lower Ext Vein Scan, Bilat (05.04.24 @ 09:41) >  IMPRESSION:  No evidence of deep venous thrombosis in either lower extremity.      < end of copied text >          EKG: reviewed by me    < from: 12 Lead ECG (05.04.24 @ 07:46) >    Ventricular Rate 110 BPM    Atrial Rate 110 BPM    P-R Interval 124 ms    QRS Duration 78 ms    Q-T Interval 326 ms    QTC Calculation(Bazett) 441 ms    P Axis 32 degrees    R Axis 25 degrees    T Axis 0 degrees    Diagnosis Line Sinus tachycardia  Cannot rule out Inferior infarct , age undetermined  Abnormal ECG    < end of copied text >        PROTEIN CALORIE MALNUTRITION PRESENT: [ ]mild [ ]moderate [ ]severe [ ]underweight [ ]morbid obesity  https://www.andeal.org/vault/1450/web/files/ONC/Table_Clinical%20Characteristics%20to%20Document%20Malnutrition-White%20JV%20et%20al%202012.pdf    Height (cm): 162.6 (04-29-24 @ 19:42), 162.6 (07-16-23 @ 17:21)  Weight (kg): 61.3 (04-26-24 @ 22:53), 72.6 (07-13-23 @ 13:00)  BMI (kg/m2): 23.2 (04-29-24 @ 19:42), 23.2 (04-26-24 @ 22:53), 27.5 (07-16-23 @ 17:21)  [ ]PPSV2 < or = to 30% [ ]significant weight loss  [ ]poor nutritional intake  [ ]anasarca      [ ]Artificial Nutrition      Palliative Care Spiritual/Emotional Screening Tool Question  Severity (0-4):                    OR                    [ x] Unable to determine. Will assess at later time if appropriate.  Score of 2 or greater indicates recommendation of Chaplaincy and/or SW referral  Chaplaincy Referral: [ ] Yes [ ] Refused [ ] Following     Caregiver [place]:  [ ] Yes [ ] No    OR    [x ] Unable to determine. Will assess at later time if appropriate.  Social Work Referral [ ]  Patient and Family Centered Care Referral [ ]    Anticipatory Grief Present: [ ] Yes [ ] No    OR     [ x] Unable to determine. Will assess at later time if appropriate.  Social Work Referral [ ]  Patient and Family Centered Care Referral [ ]    Patient discussed with primary medical team MD  Palliative care education provided to patient and/or family    RADIOLOGY & ADDITIONAL STUDIES: reviewed by me    < from: MR Lumbar Spine w/wo IV Cont (04.29.24 @ 15:55) >    IMPRESSION:  In comparison to the previous thoracic spine MRI 4/21/2023:    1.  Significant interval progression of the numerous discrete and   infiltrative bone marrow signal abnormalities consistent with extensive   osseous metastatic disease.    2.  Interval development of epidural enhancing soft tissue   circumferentially throughout the thoracic and lumbar spines. This is most   pronounced (approx 5 mm in width) from about T1 to T9, producing diffuse   thecal sac compression without obvious cord compression or cord edema.   Thin epidural disease is noted extending inferiorly to the L5 level.    3.  Interval progression of the multilevel compression deformities   (severe at T7; moderate at T11 and L4; and mild at T3, T4, T5, T9, and   T10). Mild osseous retropulsion noted at T7, T10, T11 and L4.    4.  Extensive hepatic metastases. Bilateral adrenal nodules    --- End of Report ---      < end of copied text >    EKG: reviewed by me    < from: 12 Lead ECG (04.26.24 @ 18:04) >  Ventricular Rate 109 BPM    Atrial Rate 109 BPM    P-R Interval 132 ms    QRS Duration 86 ms    Q-T Interval 338 ms    QTC Calculation(Bazett) 455 ms    P Axis 18 degrees    R Axis 118 degrees    T Axis -18 degrees    Diagnosis Line Sinus tachycardia  Right ventricular hypertrophy with repolarization abnormality  Possible Inferior infarct , age undetermined  Anterior infarct , age undetermined  Abnormal ECG    Confirmed by Home Stephens ([id number]) on 4/26/2024 6:21:31 PM    < end of copied text >      Patient discussed with primary medical team MD  Palliative care education provided to patient and/or family

## 2024-05-06 NOTE — PROGRESS NOTE ADULT - ATTENDING COMMENTS
58-year-old female with PMHx of breast cancer (tx w/ letrozole) with metastases to bone presents for evaluation of generalized weakness and back pain. Per patient she is getting radiation she states that she completed 5 sessions of radiation and was doing well however over the past 24 to 48 hours became weak denies any loss of bladder or bowel control denies any saddle anesthesia denies any focal weakness to the lower extremities denies any fevers or chills. Sent from [facility] for neurosurgery eval (4/27).        #Stage IV Breast Cancer HR+/HER2-, metastatic to bone  #Malignancy related pain   *CTA PE (4/26): No pulmonary embolus seen. Left upper lobe pulmonary nodule presumably representing metastatic lung disease.  *CT Head noncon (4/26): No evidence of acute territorial infarct, intracranial hemorrhage, or midline shift. Significantly increased numerous sclerotic osseous metastasis throughout the calvarium which were predominantly lytic on the prior CT head from 4/21/2023.  *MR C-spine IC (4/29):  Interval progression of extensive enhancing osseous metastatic disease. No evidence of cervical cord compression or cord signal abnormality. Partially visualized enhancing lesion in the region of the pineal gland, presumably reflecting metastatic disease.    *MR T/L-spine IC (4/29): extensive osseous metastatic disease. Interval development of epidural enhancing soft tissue circumferentially throughout the thoracic and lumbar spines. No obvious cord compression or cord edema.    *CT AP (4/29): Innumerable hepatic metastases. New bilateral adrenal nodules likely metastases. Diffuse widespread osseous metastases, increased from prior CT abdomen pelvis.    - DC Ibrance given worsening bone disease and increasing tumor markers per hem/onc   - CA 27-29 (4/27): 996.5  - NSGY recs (4/27): MRI C/T/L spine w/wo given no contraindications   - No cord compression on MRI of her spine  - Taper decadron off by 50% every 3 days   - Palliative following - c/w morphine ER 15mg BID and Morphine 4 mg IV q3h PRN for severe pain  - discontinued letrozole and started Exemestane 25mg daily per Heme/Onc  - s/p Faslodex (5/1)   - Starting palliative radiation therapy on Monday - will need platelets above 20k   - planned for inpatient chemo after radiation     #Pancytopenia   - secondary to Ibrance toxicity vs BM infiltration of malignancy   - hem/onc is following, SCD for DVT ppx  - Trend platelets daily, keep above 20k     #Covid exposure 5/6   - isolation precautions     #Acute hypoxemic respiratory failure   - On 2 L via nasal cannula   - BNP wnl   - CTA chest negative for PE   - LE duplex negative     #Transaminitis 2/2 liver mets   *CT AP (4/29): Innumerable hepatic metastases.    - monitor     #Hypercalcemia of malignancy (resolved)  - s/p 2 x calcitonin 250 units  - s/p 1 x Zometa 4 mg    #Hypothyroid   - on synthroid        DVT PPx: SCDs given severe thrombocytopenia   GI PPx: PPI       Grave prognosis       Pending: radiation, chemotherapy   Plan of care d/w pt   Dispo: TBD

## 2024-05-07 NOTE — PROGRESS NOTE ADULT - ATTENDING COMMENTS
58-year-old female with PMHx of breast cancer (tx w/ letrozole) with metastases to bone presents for evaluation of generalized weakness and back pain. Per patient she is getting radiation she states that she completed 5 sessions of radiation and was doing well however over the past 24 to 48 hours became weak denies any loss of bladder or bowel control denies any saddle anesthesia denies any focal weakness to the lower extremities denies any fevers or chills. Sent from [facility] for neurosurgery eval (4/27).        #Stage IV Breast Cancer HR+/HER2-, metastatic to bone  #Malignancy related pain   *CTA PE (4/26): No pulmonary embolus seen. Left upper lobe pulmonary nodule presumably representing metastatic lung disease.  *CT Head noncon (4/26): No evidence of acute territorial infarct, intracranial hemorrhage, or midline shift. Significantly increased numerous sclerotic osseous metastasis throughout the calvarium which were predominantly lytic on the prior CT head from 4/21/2023.  *MR C-spine IC (4/29):  Interval progression of extensive enhancing osseous metastatic disease. No evidence of cervical cord compression or cord signal abnormality. Partially visualized enhancing lesion in the region of the pineal gland, presumably reflecting metastatic disease.    *MR T/L-spine IC (4/29): extensive osseous metastatic disease. Interval development of epidural enhancing soft tissue circumferentially throughout the thoracic and lumbar spines. No obvious cord compression or cord edema.    *CT AP (4/29): Innumerable hepatic metastases. New bilateral adrenal nodules likely metastases. Diffuse widespread osseous metastases, increased from prior CT abdomen pelvis.    - DC Ibrance given worsening bone disease and increasing tumor markers per hem/onc   - CA 27-29 (4/27): 996.5  - NSGY recs (4/27): MRI C/T/L spine w/wo given no contraindications   - No cord compression on MRI of her spine  - Taper decadron off by 50% every 3 days   - Palliative following - c/w morphine ER 15mg BID and Morphine 4 mg IV q3h PRN for severe pain  - discontinued letrozole and started Exemestane 25mg daily per Heme/Onc  - s/p Faslodex (5/1)   - Patient refusing palliative radiation   - planned for inpatient chemo      #Pancytopenia   - secondary to Ibrance toxicity vs BM infiltration of malignancy   - hem/onc is following, SCD for DVT ppx  - Trend platelets daily, keep above 20k     #Acute hypoxemic respiratory failure   #Productive cough, possible asp PNA   - On 2 L via nasal cannula   - BNP wnl   - CTA chest negative for PE   - LE duplex negative   - Unasyn x 5 days     #Transaminitis 2/2 liver mets   *CT AP (4/29): Innumerable hepatic metastases.    - monitor     #Hypercalcemia of malignancy (resolved)  - s/p 2 x calcitonin 250 units  - s/p 1 x Zometa 4 mg    #Hypothyroid   - on synthroid        DVT PPx: SCDs given severe thrombocytopenia   GI PPx: PPI       Grave prognosis       Pending:  chemotherapy, wean off oxygen   Family discussion: yes, spouse Pankaj Lugo   Dispo: TBD

## 2024-05-07 NOTE — PROGRESS NOTE ADULT - ASSESSMENT
59yo female whose documented PMH  includes hypothyroid, asthma, ETOH abuse (cirrhosis) and breast cancer widely metastatic to bone, status post radiation 5 sessions finished 5/31/23, on letrozole daily, (was on Ribociclib and was discontinued due to low magnesium with high QTc), surgical history of occiput-T2 posterior instrumentation and fusion, ORIF of C2 body with functional decline and impaired ADLs/mobility, ambulates with a walker, following with Dr. Peña , presented to the ER due to back pain present for 2 weeks. Palliative consulted for pain control and GOC.     Spoke with patient at bedside with sister. We had discussed DNR/DNI previously and she wanted time to speak with her family about next steps. We discussed code status and DNR/DNI again, and patient wanted to be DNR/DNI. BRAIN completed.    Later spoke with patient and  at bedside. The primary team had spoken with them about prognosis and hospice.  Pankaj noted it seemed like she no longer wanted treatment. We discussed hospice at length and he was interested in speaking with hospice about next steps.  He wishes for me to speak with patient's sister Sandy. Called and spoke with Sandy about hospice. She is also interested in a hospice consult.  Will place hospice consult and have them call Sandy.  All questions answered.    MEDD: 66mg    Education about palliative care provided to patient/family.  See Recs below.    Please call [phone] with questions or concerns 24/7.   We will continue to follow.

## 2024-05-07 NOTE — PROGRESS NOTE ADULT - SUBJECTIVE AND OBJECTIVE BOX
24H events:    Today is hospital day 11d. This morning patient was seen and examined at bedside, resting comfortably in bed.    No acute or major events overnight.    PAST MEDICAL & SURGICAL HISTORY  Hypothyroidism    H/O cirrhosis    Breast cancer    No significant past surgical history        SOCIAL HISTORY:  Social History:  former alcohol abuse, denies tobacco use (26 Apr 2024 22:23)      ALLERGIES:  No Known Allergies      MEDICATIONS:  STANDING MEDICATIONS  artificial tears (preservative free) Ophthalmic Solution 1 Drop(s) Both EYES two times a day  bacitracin   Ointment 1 Application(s) Topical two times a day  chlorhexidine 2% Cloths 1 Application(s) Topical <User Schedule>  dexAMETHasone  Injectable 1 milliGRAM(s) IV Push <User Schedule>  exemestane 25 milliGRAM(s) Oral daily  lactated ringers. 1000 milliLiter(s) IV Continuous <Continuous>  lactated ringers. 1000 milliLiter(s) IV Continuous <Continuous>  levothyroxine 50 MICROGram(s) Oral <User Schedule>  magnesium oxide 400 milliGRAM(s) Oral daily  melatonin 5 milliGRAM(s) Oral at bedtime  morphine ER Tablet 15 milliGRAM(s) Oral every 12 hours  naloxegol 12.5 milliGRAM(s) Oral daily  pantoprazole    Tablet 40 milliGRAM(s) Oral before breakfast  polyethylene glycol 3350 17 Gram(s) Oral daily  senna 2 Tablet(s) Oral at bedtime    PRN MEDICATIONS  albuterol    0.083% 2.5 milliGRAM(s) Nebulizer every 6 hours PRN  morphine  - Injectable 4 milliGRAM(s) IV Push every 3 hours PRN      VITALS:   T(C): 36.3 (05-07-24 @ 05:26), Max: 37 (05-06-24 @ 13:15)  HR: 120 (05-07-24 @ 05:26) (106 - 120)  BP: 117/82 (05-07-24 @ 05:26) (117/82 - 129/88)  RR: 18 (05-07-24 @ 05:26) (16 - 20)  SpO2: 94% (05-06-24 @ 21:07) (94% - 96%)  I&O's Summary        PHYSICAL EXAM:      LABS:                        7.6    5.57  )-----------( 123      ( 06 May 2024 15:49 )             22.0     05-06    137  |  99  |  22<H>  ----------------------------<  109<H>  4.6   |  25  |  <0.5<L>    Ca    8.8      06 May 2024 08:55  Mg     1.9     05-06    TPro  5.6<L>  /  Alb  3.5  /  TBili  1.4<H>  /  DBili  x   /  AST  262<H>  /  ALT  66<H>  /  AlkPhos  325<H>  05-06      Urinalysis Basic - ( 06 May 2024 08:55 )    Color: x / Appearance: x / SG: x / pH: x  Gluc: 109 mg/dL / Ketone: x  / Bili: x / Urobili: x   Blood: x / Protein: x / Nitrite: x   Leuk Esterase: x / RBC: x / WBC x   Sq Epi: x / Non Sq Epi: x / Bacteria: x        Lactate, Blood: 3.3 mmol/L *H* (05-06-24 @ 12:17)               24H events:    Today is hospital day 11d. This morning patient was seen and examined at bedside, resting comfortably in bed.    No acute or major events overnight.Patient refused rad tx yest. Discussion w/ palliative done. patient is considering dni dnr.     PAST MEDICAL & SURGICAL HISTORY  Hypothyroidism    H/O cirrhosis    Breast cancer    No significant past surgical history        SOCIAL HISTORY:  Social History:  former alcohol abuse, denies tobacco use (26 Apr 2024 22:23)      ALLERGIES:  No Known Allergies      MEDICATIONS:  STANDING MEDICATIONS  artificial tears (preservative free) Ophthalmic Solution 1 Drop(s) Both EYES two times a day  bacitracin   Ointment 1 Application(s) Topical two times a day  chlorhexidine 2% Cloths 1 Application(s) Topical <User Schedule>  dexAMETHasone  Injectable 1 milliGRAM(s) IV Push <User Schedule>  exemestane 25 milliGRAM(s) Oral daily  lactated ringers. 1000 milliLiter(s) IV Continuous <Continuous>  lactated ringers. 1000 milliLiter(s) IV Continuous <Continuous>  levothyroxine 50 MICROGram(s) Oral <User Schedule>  magnesium oxide 400 milliGRAM(s) Oral daily  melatonin 5 milliGRAM(s) Oral at bedtime  morphine ER Tablet 15 milliGRAM(s) Oral every 12 hours  naloxegol 12.5 milliGRAM(s) Oral daily  pantoprazole    Tablet 40 milliGRAM(s) Oral before breakfast  polyethylene glycol 3350 17 Gram(s) Oral daily  senna 2 Tablet(s) Oral at bedtime    PRN MEDICATIONS  albuterol    0.083% 2.5 milliGRAM(s) Nebulizer every 6 hours PRN  morphine  - Injectable 4 milliGRAM(s) IV Push every 3 hours PRN      VITALS:   T(C): 36.3 (05-07-24 @ 05:26), Max: 37 (05-06-24 @ 13:15)  HR: 120 (05-07-24 @ 05:26) (106 - 120)  BP: 117/82 (05-07-24 @ 05:26) (117/82 - 129/88)  RR: 18 (05-07-24 @ 05:26) (16 - 20)  SpO2: 94% (05-06-24 @ 21:07) (94% - 96%)  I&O's Summary        PHYSICAL EXAM:     GENERAL: NAD, appears fatigued  HEENT:  dry, lips and skin , bruise under left eye   PULMONARY: Clear to auscultation bilaterally on anterior respiratory exam. No wheezing or crackles  CARDIOVASCULAR: Regular rate and rhythm, S1-S2, no murmurs, rubs, or gallops  GASTROINTESTINAL: Soft, non-tender, non-distended, no guarding  SKIN/EXTREMITIES: Warm, 2+ tibialis posterior pulses, no UE or LE edema, no petichiae  NEUROLOGIC/MUSCULOSKELETAL: AOx4, able to lift both arms, has difficulty lifting legs but is able to bend both, sensation intact to light touch bilaterally in UE and LE    LABS:                        7.6    5.57  )-----------( 123      ( 06 May 2024 15:49 )             22.0     05-06    137  |  99  |  22<H>  ----------------------------<  109<H>  4.6   |  25  |  <0.5<L>    Ca    8.8      06 May 2024 08:55  Mg     1.9     05-06    TPro  5.6<L>  /  Alb  3.5  /  TBili  1.4<H>  /  DBili  x   /  AST  262<H>  /  ALT  66<H>  /  AlkPhos  325<H>  05-06      Urinalysis Basic - ( 06 May 2024 08:55 )    Color: x / Appearance: x / SG: x / pH: x  Gluc: 109 mg/dL / Ketone: x  / Bili: x / Urobili: x   Blood: x / Protein: x / Nitrite: x   Leuk Esterase: x / RBC: x / WBC x   Sq Epi: x / Non Sq Epi: x / Bacteria: x        Lactate, Blood: 3.3 mmol/L *H* (05-06-24 @ 12:17)

## 2024-05-07 NOTE — PROGRESS NOTE ADULT - ASSESSMENT
58-year-old female with PMHx of breast cancer (tx w/ letrozole) with metastases to bone presents for evaluation of generalized weakness and back pain. Per patient she is getting radiation she states that she completed 5 sessions of radiation and was doing well however over the past 24 to 48 hours became weak denies any loss of bladder or bowel control denies any saddle anesthesia denies any focal weakness to the lower extremities denies any fevers or chills. Sent from [facility] for neurosurgery eval (4/27).    #Stage IV Breast Cancer HR+/HER2-, metastatic to bone  #Malignancy related pain   *CTA PE (4/26): No pulmonary embolus seen. Left upper lobe pulmonary nodule presumably representing metastatic lung disease.  *CT Head noncon (4/26): No evidence of acute territorial infarct, intracranial hemorrhage, or midline shift. Significantly increased numerous sclerotic osseous metastasis throughout the calvarium which were predominantly lytic on the prior CT head from 4/21/2023.  *MR C-spine IC (4/29):  Interval progression of extensive enhancing osseous metastatic disease. No evidence of cervical cord compression or cord signal abnormality. Partially visualized enhancing lesion in the region of the pineal gland, presumably reflecting metastatic disease.    *MR T/L-spine IC (4/29): extensive osseous metastatic disease. Interval development of epidural enhancing soft tissue circumferentially throughout the thoracic and lumbar spines. No obvious cord compression or cord edema.    *CT AP (4/29): Innumerable hepatic metastases. New bilateral adrenal nodules likely metastases. Diffuse widespread osseous metastases, increased from prior CT abdomen pelvis.    - DC Ibrance given worsening bone disease and increasing tumor markers per hem/onc   - CA 27-29 (4/27): 996.5  - NSGY recs (4/27): MRI C/T/L spine w/wo given no contraindications   - No cord compression on MRI of her spine  - Taper decadron off by 50% every 3 days > today decreased to dex 1mg iv daily   - Palliative following - c/w morphine ER 15mg BID and Morphine 4 mg IV q3h PRN for severe pain- Bowel regimen started   - discontinued letrozole and started Exemestane 25mg daily per Heme/Onc  - s/p Faslodex (5/1)   - Starting palliative radiation therapy today  - will need platelets above 20k   - planned for inpatient chemo after radiation     #Pancytopenia   - secondary to Ibrance toxicity vs BM infiltration of malignancy   - hem/onc is following, SCD for DVT ppx  - Trend platelets daily, keep above 20k , sp one plt yest     #Acute hypoxemic respiratory failure   - On 2 L via nasal cannula   - BNP wnl   - CTA chest negative for PE ( tachycardiac)  - LE duplex negative   -incentive jose miguel ordered     #Transaminitis 2/2 liver mets   *CT AP (4/29): Innumerable hepatic metastases.    - monitor     #Hypercalcemia of malignancy (resolved)  - s/p 2 x calcitonin 250 units  - s/p 1 x Zometa 4 mg  -monitor ca     #Hypothyroid   - on synthroid     #right upper extre swelling  -duplex ordered       DVT PPx: SCDs given severe thrombocytopenia   GI PPx: PPI   Grave prognosis - full code  Pending: radiation, chemotherapy   Dispo: TBD    58-year-old female with PMHx of breast cancer (tx w/ letrozole) with metastases to bone presents for evaluation of generalized weakness and back pain. Per patient she is getting radiation she states that she completed 5 sessions of radiation and was doing well however over the past 24 to 48 hours became weak denies any loss of bladder or bowel control denies any saddle anesthesia denies any focal weakness to the lower extremities denies any fevers or chills. Sent from [facility] for neurosurgery eval (4/27).    #Stage IV Breast Cancer HR+/HER2-, metastatic to bone  #Malignancy related pain   *CTA PE (4/26): No pulmonary embolus seen. Left upper lobe pulmonary nodule presumably representing metastatic lung disease.  *CT Head noncon (4/26): No evidence of acute territorial infarct, intracranial hemorrhage, or midline shift. Significantly increased numerous sclerotic osseous metastasis throughout the calvarium which were predominantly lytic on the prior CT head from 4/21/2023.  *MR C-spine IC (4/29):  Interval progression of extensive enhancing osseous metastatic disease. No evidence of cervical cord compression or cord signal abnormality. Partially visualized enhancing lesion in the region of the pineal gland, presumably reflecting metastatic disease.    *MR T/L-spine IC (4/29): extensive osseous metastatic disease. Interval development of epidural enhancing soft tissue circumferentially throughout the thoracic and lumbar spines. No obvious cord compression or cord edema.    *CT AP (4/29): Innumerable hepatic metastases. New bilateral adrenal nodules likely metastases. Diffuse widespread osseous metastases, increased from prior CT abdomen pelvis.    - DC Ibrance given worsening bone disease and increasing tumor markers per hem/onc   - CA 27-29 (4/27): 996.5  - NSGY recs (4/27): MRI C/T/L spine w/wo given no contraindications   - No cord compression on MRI of her spine  - Taper decadron off by 50% every 3 days > decreased to dex 1mg iv daily day 2 today   - Palliative following - c/w morphine ER 15mg BID and Morphine 4 mg IV q3h PRN for severe pain- Bowel regimen started   - discontinued letrozole and started Exemestane 25mg daily per Heme/Onc  - s/p Faslodex (5/1)   - refused palliative radiation therapy   - GOC yest , will continue GOC today w/  bedside     #Pancytopenia - improving   - secondary to Ibrance toxicity vs BM infiltration of malignancy   - hem/onc is following, SCD for DVT ppx  - Trend platelets daily, keep above 20k , sp one plt yest     #Acute hypoxemic respiratory failure   - On 2 L via nasal cannula   - BNP wnl   - CTA chest negative for PE ( tachycardiac)  - LE duplex negative   -incentive jose miguel ordered     #Transaminitis 2/2 liver mets   *CT AP (4/29): Innumerable hepatic metastases.    - monitor     #Hypercalcemia of malignancy (resolved)  - s/p 2 x calcitonin 250 units  - s/p 1 x Zometa 4 mg  -monitor ca     #Hypothyroid   - on synthroid     #right upper extre swelling  -duplex ordered       DVT PPx: SCDs given severe thrombocytopenia   GI PPx: PPI   Grave prognosis - full code for now   Pending: [facility]   Dispo: TBD

## 2024-05-07 NOTE — PROGRESS NOTE ADULT - PROBLEM SELECTOR PLAN 2
Metastatic breast cancer to bones and liver  -no cord compression on MRI  -increase MS Contin to 30mg BID  -change PRN morphine to 4mg IV q2h PRN for moderate/severe pain (4-10)  -continue dexamethasone for bone pain  -continue protonix given use of dexamethasone/ibuprofen  -continue senna, miralax; naloxegol per primary team  -f/u heme onc Metastatic breast cancer to bones and liver  -no cord compression on MRI  -initial plan to increase opioids, but given change in mental status following visit, will hold off on increase  -change PRN morphine to 4mg IV q2h PRN for moderate/severe pain (4-10) (hold for sedation, confusion, RR<10)  -continue dexamethasone for bone pain  -continue protonix given use of dexamethasone/ibuprofen  -continue senna, miralax; naloxegol per primary team  -f/u heme onc

## 2024-05-07 NOTE — PROGRESS NOTE ADULT - SUBJECTIVE AND OBJECTIVE BOX
HPI:  57yo female whose documented PMH  includes hypothyroid, asthma, ETOH abuse (cirrhosis) and breast cancer widely metastatic to bone, status post radiation 5 sessions finished 5/31/23, on letrozole daily, (was on Ribociclib and was discontinued due to low magnesium with high QTc), surgical history of occiput-T2 posterior instrumentation and fusion, ORIF of C2 body with functional decline and impaired ADLs/mobility, ambulates with a walker, following with Dr. Peña , presents to the ER due to back pain present for 2 weeks. Due to persistence, she was advised to come to the ER. No loss of bladder, bowel function but told ER she had generalized weakness. While in the ER patient became tachycardic with pulse ox of 89% so PE study was done (no PE) ER team spoke to neurosurgery team member requesting that patient be sent [place]  (26 Apr 2024 22:23)    Interval history:     -Patient seen at bedside, first with sister, and later with   -She endorses some pain     ADVANCE DIRECTIVES:     [X ] Full Code [ ] DNR  MOLST  [ ]  Living Will  [ ]   DECISION MAKER(s): Patient   [ ] Health Care Proxy(s)  [ X] Surrogate(s)  [ ] Guardian           Name(s): Phone Number(s): - Pankaj       BASELINE (I)ADL(s) (prior to admission):    [place]: [ ]Total  [ X ] Moderate [ ]Dependent  Palliative Performance Status Version 2:         %    http://npcrc.org/files/news/palliative_performance_scale_[email or web address].pdf    Allergies    No Known Allergies    Intolerances    MEDICATIONS  (STANDING):  ampicillin/sulbactam  IVPB      ampicillin/sulbactam  IVPB 3 Gram(s) IV Intermittent every 6 hours  artificial tears (preservative free) Ophthalmic Solution 1 Drop(s) Both EYES two times a day  bacitracin   Ointment 1 Application(s) Topical two times a day  chlorhexidine 2% Cloths 1 Application(s) Topical <User Schedule>  dexAMETHasone  Injectable 1 milliGRAM(s) IV Push <User Schedule>  exemestane 25 milliGRAM(s) Oral daily  levothyroxine 50 MICROGram(s) Oral <User Schedule>  magnesium oxide 400 milliGRAM(s) Oral daily  magnesium sulfate  IVPB 2 Gram(s) IV Intermittent once  melatonin 5 milliGRAM(s) Oral at bedtime  morphine ER Tablet 30 milliGRAM(s) Oral every 12 hours  naloxegol 12.5 milliGRAM(s) Oral daily  pantoprazole    Tablet 40 milliGRAM(s) Oral before breakfast  polyethylene glycol 3350 17 Gram(s) Oral daily  senna 2 Tablet(s) Oral at bedtime  sodium phosphate 30 milliMole(s)/500 mL IVPB 30 milliMole(s) IV Intermittent once    MEDICATIONS  (PRN):  albuterol    0.083% 2.5 milliGRAM(s) Nebulizer every 6 hours PRN Shortness of Breath and/or Wheezing  morphine  - Injectable 4 milliGRAM(s) IV Push every 2 hours PRN moderate/severe pain (7-10)    PRESENT SYMPTOMS: [ ]Unable to obtain due to poor mentation   Source if other than patient:  [ ]Family   [ ]Team     Pain: [ X]yes [ ]no  QOL impact -  significant  Location -                  back  Aggravating factors - movement  Quality - sharp  Radiation - none noted  Timing- none noted  Severity (0-10 scale): no number given  Minimal acceptable level (0-10 scale):     CPOT:    https://www.sccm.org/getattachment/[email or web address]-[email or web address]-[email or web address]-5387y4723p2u/Critical-Care-Pain-Observation-Tool-(CPOT)    PAIN AD Score:   http://geriatrictoolkit.[email or web address]/painad.pdf (press ctrl +  left click to view)    Dyspnea:                           [ X ]None[ ]Mild [ ]Moderate [ ]Severe     Respiratory Distress Observation Scale (RDOS):   A score of 0 to 2 signifies little or no respiratory distress, 3 signifies mild distress, scores 4 to 6 indicate moderate distress, and scores greater than 7 signify severe distress  [email or web address]://[email or web address]/sites/default/files/PDFS/[email or web address]-qpauerxkyfb-urpzaxas-wnqqqgesnzs-ndsso.pdf    Anxiety:                             [ x]None[ ]Mild [ ]Moderate [ ]Severe   Fatigue:                             [x ]None[ ]Mild [ ]Moderate [ ]Severe   Nausea:                             [x ]None[ ]Mild [ ]Moderate [ ]Severe   Loss of appetite:              [x ]None[ ]Mild [ ]Moderate [ ]Severe   Constipation:                    [ x]None[ ]Mild [ x]Moderate [ ]Severe    Other Symptoms:  [x ]All other review of systems negative     Palliative Performance Status Version 2:         30%    [email or web address]://[email or web address]/news/palliative_performance_scale_[email or web address].pdf    PHYSICAL EXAM:  Vital Signs Last 24 Hrs  T(C): 36.6 (07 May 2024 12:16), Max: 36.6 (07 May 2024 12:16)  T(F): 97.9 (07 May 2024 12:16), Max: 97.9 (07 May 2024 12:16)  HR: 97 (07 May 2024 12:16) (97 - 120)  BP: 122/82 (07 May 2024 12:16) (117/82 - 125/86)  BP(mean): --  RR: 18 (07 May 2024 12:16) (18 - 20)  SpO2: 93% (07 May 2024 12:16) (93% - 96%)    Parameters below as of 06 May 2024 21:07  Patient On (Oxygen Delivery Method): nasal cannula        GENERAL:  [X ] No acute distress [ ]Lethargic  [ ]Unarousable  [ X]Verbal  [ ]Non-Verbal [ ]Cachexia    BEHAVIORAL/PSYCH:  [X ]Alert and Oriented x2-3[ ] Anxiety [ ] Delirium [ ] Agitation [X ] Calm   EYES: [ X] No scleral icterus [ ] Scleral icterus [ ] Closed  ENMT:  [ ]Dry mouth  [X ]No external oral lesions [ X] No external ear or nose lesions  CARDIOVASCULAR:  [ ]Regular [ ]Irregular [ ]Tachy [ ]Not Tachy  [ ]Ahmet [ ] Edema  [ ] No edema  PULMONARY:  [ ]Tachypnea  [ ]Audible excessive secretions [X ] No labored breathing [ ] labored breathing  GASTROINTESTINAL: [ ]Soft  [ ]Distended  [X ]Not distended [ ]Non tender [ ]Tender  MUSCULOSKELETAL: [ XNo clubbing [ ] clubbing  [ ] No cyanosis [ ] cyanosis  NEUROLOGIC: [X ]No focal deficits  [ x]Follows commands  [ ]Does not follow commands  [ ]Cognitive impairment  [ ]Dysphagia  [ ]Dysarthria  [ ]Paresis   SKIN: [ ] Jaundiced [X ] Non-jaundiced [ ]Rash [ ]No Rash [ ] Warm [ ] Dry  MISC/LINES: [ ] ET tube [ ] Trach [ ]NGT/OGT [ ]PEG [ ]Wong    LABS: reviewed by me                          8.1    4.87  )-----------( 42       ( 07 May 2024 11:54 )             23.5       05-07    138  |  100  |  18  ----------------------------<  107<H>  4.4   |  23  |  <0.5<L>    Ca    8.7      07 May 2024 11:54  Phos  1.4     05-07  Mg     1.7     05-07    TPro  5.4<L>  /  Alb  3.5  /  TBili  2.0<H>  /  DBili  x   /  AST  245<H>  /  ALT  71<H>  /  AlkPhos  277<H>  05-07              Urinalysis Basic - ( 07 May 2024 11:54 )    Color: x / Appearance: x / SG: x / pH: x  Gluc: 107 mg/dL / Ketone: x  / Bili: x / Urobili: x   Blood: x / Protein: x / Nitrite: x   Leuk Esterase: x / RBC: x / WBC x   Sq Epi: x / Non Sq Epi: x / Bacteria: x                  CAPILLARY BLOOD GLUCOSE                  RADIOLOGY & ADDITIONAL STUDIES: reviewed by me    < from: Xray Chest 1 View- PORTABLE-Urgent (Xray Chest 1 View- PORTABLE-Urgent .) (05.07.24 @ 11:04) >  IMPRESSION:    Stable pulmonary vascular congestion.    < end of copied text >          EKG: reviewed by me    < from: 12 Lead ECG (05.04.24 @ 07:46) >  Ventricular Rate 110 BPM    Atrial Rate 110 BPM    P-R Interval 124 ms    QRS Duration 78 ms    Q-T Interval 326 ms    QTC Calculation(Bazett) 441 ms    P Axis 32 degrees    R Axis 25 degrees    T Axis 0 degrees    Diagnosis Line Sinus tachycardia  Cannot rule out Inferior infarct , age undetermined  Abnormal ECG    < end of copied text >        PROTEIN CALORIE MALNUTRITION PRESENT: [ ]mild [ ]moderate [ ]severe [ ]underweight [ ]morbid obesity  https://www.andeal.org/vault/2440/web/files/ONC/Table_Clinical%20Characteristics%20to%20Document%20Malnutrition-White%20JV%20et%20al%202012.pdf    Height (cm): 162.6 (04-29-24 @ 19:42), 162.6 (07-16-23 @ 17:21)  Weight (kg): 61.3 (04-26-24 @ 22:53), 72.6 (07-13-23 @ 13:00)  BMI (kg/m2): 23.2 (04-29-24 @ 19:42), 23.2 (04-26-24 @ 22:53), 27.5 (07-16-23 @ 17:21)  [ ]PPSV2 < or = to 30% [ ]significant weight loss  [ ]poor nutritional intake  [ ]anasarca      [ ]Artificial Nutrition      Palliative Care Spiritual/Emotional Screening Tool Question  Severity (0-4):                    OR                    [ x] Unable to determine. Will assess at later time if appropriate.  Score of 2 or greater indicates recommendation of Chaplaincy and/or SW referral  Chaplaincy Referral: [ ] Yes [ ] Refused [ ] Following     Caregiver [place]:  [ ] Yes [ ] No    OR    [x ] Unable to determine. Will assess at later time if appropriate.  Social Work Referral [ ]  Patient and Family Centered Care Referral [ ]    Anticipatory Grief Present: [ ] Yes [ ] No    OR     [ x] Unable to determine. Will assess at later time if appropriate.  Social Work Referral [ ]  Patient and Family Centered Care Referral [ ]    Patient discussed with primary medical team MD  Palliative care education provided to patient and/or family    RADIOLOGY & ADDITIONAL STUDIES: reviewed by me    < from: MR Lumbar Spine w/wo IV Cont (04.29.24 @ 15:55) >    IMPRESSION:  In comparison to the previous thoracic spine MRI 4/21/2023:    1.  Significant interval progression of the numerous discrete and   infiltrative bone marrow signal abnormalities consistent with extensive   osseous metastatic disease.    2.  Interval development of epidural enhancing soft tissue   circumferentially throughout the thoracic and lumbar spines. This is most   pronounced (approx 5 mm in width) from about T1 to T9, producing diffuse   thecal sac compression without obvious cord compression or cord edema.   Thin epidural disease is noted extending inferiorly to the L5 level.    3.  Interval progression of the multilevel compression deformities   (severe at T7; moderate at T11 and L4; and mild at T3, T4, T5, T9, and   T10). Mild osseous retropulsion noted at T7, T10, T11 and L4.    4.  Extensive hepatic metastases. Bilateral adrenal nodules    --- End of Report ---      < end of copied text >    EKG: reviewed by me    < from: 12 Lead ECG (04.26.24 @ 18:04) >  Ventricular Rate 109 BPM    Atrial Rate 109 BPM    P-R Interval 132 ms    QRS Duration 86 ms    Q-T Interval 338 ms    QTC Calculation(Bazett) 455 ms    P Axis 18 degrees    R Axis 118 degrees    T Axis -18 degrees    Diagnosis Line Sinus tachycardia  Right ventricular hypertrophy with repolarization abnormality  Possible Inferior infarct , age undetermined  Anterior infarct , age undetermined  Abnormal ECG    Confirmed by Home Stephens ([id number]) on 4/26/2024 6:21:31 PM    < end of copied text >      Patient discussed with primary medical team MD  Palliative care education provided to patient and/or family

## 2024-05-07 NOTE — CHART NOTE - NSCHARTNOTEFT_GEN_A_CORE
Registered Dietitian Follow-Up     Patient Profile Reviewed                           Yes [X]   No []     Nutrition History Previously Obtained        Yes [X]  No []       Pertinent Subjective Information: Patient preoccupied with wanting to be discharged. Breakfast tray untouched at bedside.      Pertinent Medical Interventions:   #Stage IV Breast Cancer HR+/HER2-, metastatic to bone  #Malignancy related pain   - GOC pending as of 5/6      Diet order:   Diet, Pureed:      Qty per Day:  Magic Cup 3x daily  Prosource Gelatein Plus     Qty per Day:  once daily  Supplement Feeding Modality:  Oral  Ensure Max Cans or Servings Per Day:  1       Frequency:  Three Times a day (04-30-24 @ 00:03) [Active]    Anthropometrics:  Drug Dosing Weight  Height (cm): 162.6 (29 Apr 2024 19:42)  Weight (kg): 61.3 (26 Apr 2024 22:53)  BMI (kg/m2): 23.2 (29 Apr 2024 19:42)  IBW: 54.5 kg  UBW: 72.7 kg    MEDICATIONS  (STANDING):  ampicillin/sulbactam  IVPB 3 Gram(s) IV Intermittent every 6 hours  dexAMETHasone  Injectable 1 milliGRAM(s) IV Push <User Schedule>  exemestane 25 milliGRAM(s) Oral daily  levothyroxine 50 MICROGram(s) Oral <User Schedule>  magnesium oxide 400 milliGRAM(s) Oral daily  melatonin 5 milliGRAM(s) Oral at bedtime  morphine ER Tablet 15 milliGRAM(s) Oral every 12 hours  naloxegol 12.5 milliGRAM(s) Oral daily  pantoprazole    Tablet 40 milliGRAM(s) Oral before breakfast  polyethylene glycol 3350 17 Gram(s) Oral daily  senna 2 Tablet(s) Oral at bedtime    MEDICATIONS  (PRN):  albuterol    0.083% 2.5 milliGRAM(s) Nebulizer every 6 hours PRN Shortness of Breath and/or Wheezing  morphine  - Injectable 4 milliGRAM(s) IV Push every 3 hours PRN moderate/severe pain (4-10)    Pertinent Labs: 05-06    137  |  99  |  22<H>  ----------------------------<  109<H>  4.6   |  25  |  <0.5<L>    Ca    8.8      06 May 2024 08:55  Mg     1.9     05-06    TPro  5.6<L>  /  Alb  3.5  /  TBili  1.4<H>  /  DBili  x   /  AST  262<H>  /  ALT  66<H>  /  AlkPhos  325<H>  05-06    Finger Sticks: N/A     Physical Findings:  - Appearance: AAOx4; mild muscle wasting and fat loss observed   - GI function: +BM 5/4   - Tubes: N/A  - Oral/Mouth cavity: pureed diet  - Skin: R groin wound  - Edema: +1 right arm; right wrist; right hand     Nutrition Requirements: calculated at time of initial nutrition assessment   Weight Used: 68 kg      Estimated Energy Needs    Continue [X]  Adjust []  3526-2756 kcal/day (MSJ x 1.3-1.5 stress factor)     Estimated Protein Needs    Continue [X]  Adjust []   g/day (1.2-1.5 g/kg)     Estimated Fluid Needs        Continue []  Adjust [X]  1 mL/kcal     Nutrient Intake: Pt consuming 50-75% meals per RN; 1-2 ensure shakes daily    [X] Previous Nutrition Diagnosis: Malnutrition            [X] Ongoing          [] Resolved     Nutrition Intervention: Meals and Snacks, Medical Food Supplements, Coordination of Care    Nutrition Education: deferred      Goal/Expected Outcome: Patient to meet >/= 50% estimated needs in 3-5 days      Indicator/Monitoring: RD to monitor PO/nutrition supplement intake, GOC, GI function, Nutrition Labs, Electrolytes, NFPF, Weights    Recommendation:   - Continue Pureed Diet, safest diet consistency per SLP recommendations  - Discontinue Prosource Gelatein and Magic Cup per patient preference  - Encourage PO intake  - Continue Ensure Max 3x Daily (150 kcal, 30 g protein per serving)  - Monitor GOC    Will continue to follow at high nutrition risk; f/u in 3-5 days or PRN.    Nica Amaro, MS, RDN  Spectra [phone] or via Teams

## 2024-05-07 NOTE — PROGRESS NOTE ADULT - CONVERSATION DETAILS
Spoke with patient at bedside with sister. We had discussed DNR/DNI previously and she wanted time to speak with her family about next steps. We discussed code status and DNR/DNI again, and patient wanted to be DNR/DNI. BRAIN completed.    Later spoke with patient and  at bedside. The primary team had spoken with them about prognosis and hospice.  Pankaj noted it seemed like she no longer wanted treatment. We discussed hospice at length and he was interested in speaking with hospice about next steps.  He wishes for me to speak with patient's sister Sandy. Called and spoke with Sandy about hospice. She is also interested in a hospice consult.  Will place hospice consult and have them call Sandy.  All questions answered.
Spoke with patient at bedside alone. She had not discussed GOC and HCP with family. Called to speak with patient's sister Sandy. Discussed the plan to talk about GOC, code status, and HCP. She noted that they had discussed code status and DNR/DNI, but more discussion was needed with the patient. Went bedside again and discussed code with the patient and her  at bedside, and sister in law over the phone. Discussed DNR/DNI at length.  Patient is considering DNR/DNI but wishes to speak with her sister and daughter further. All questions answered. Patient is full code.
Spoke with patient and sister at bedside re: code status. She remembers placing a DNR/DNI order last year however now she is not sure what she wants. She understands that currently she is a full code. Discussed that in patients with advanced cancer, CPR and intubation can be more burdensome than beneficial and can result in prolonged suffering at end of life. She is going to think this over and talk to her family about this. For now she remains full code.

## 2024-05-08 NOTE — CHART NOTE - NSCHARTNOTEFT_GEN_A_CORE
PALLIATIVE MEDICINE INTERDISCIPLINARY TEAM NOTE    Provider:                                             Met with: [  x ] Patient  [ x  ] Family  [   ] Other:    Primary Language: [  x ] English [   ] Other*:                      *Interpretation provided by:    SUPPORT DIAGNOSES            (Check all that apply)    [   ] EOL issues  [ x  ] Advanced Illness  [   ] Cultural / spiritual concerns  [x   ] Pain / suffering  [   ] Dementia / AMS  [   ] Other:  [   ] AD issues  [   ] Grief / loss / sadness  [   ] Discharge issues  [  x ] Distress / coping    PSYCHOSOCIAL ASSESSMENT OF PATIENT         (Check all that apply)    [  x ] Initial Assessment            [   ] Reassessment          [   ] Not Applicable this visit    Pain/suffering acuity:  [  x ] None to mild (0-3)           [   ] Moderate (4-6)        [   ] High (7-10)    Mental Status:  [ x  ] Alert/oriented (x3)          [   ] Confused/Altered(x2/x1)         [   ] Non-resp    Functional status:  [   ] Independent w ADLs      [ x  ] Needs Assistance             [   ] Bedbound/Full Care    Coping:  [ x  ] Coping well                     [   ] Coping w/difficulty            [   ] Poor coping    Support system:  [   ] Strong                              [ x  ] Adequate                        [   ] Inadequate      Past history and medications for:     [ ] Anxiety       [ ] Depression    [ ] Sleep disorders       SERVICE PROVIDED  [   ]Discharge support / facilitation  [   ]AD / goals of care counseling                                  [   ]EOL / death / bereavement counseling  [  x ]Counseling / support                                                [   ] Family meeting  [   ]Prayer / sacrament / ritual                                      [   ] Referral   [   ]Other                                                                       NOTE and Plan of Care (PoC):    patient is a 59 y/o F with pmhx of hypothyroid, asthma, ETOH, breast cancer  - widely metastatic to bone, presenting with c/o back pain. visited patient earlier today. patient encountered resting in bed. awake and alert, her  - Valente at bedside. Reviewed role of palliative SW with them. provided them with my contact info. psychosocial support provided. will follow. [phone]

## 2024-05-08 NOTE — PROGRESS NOTE ADULT - ATTENDING COMMENTS
58-year-old female with PMHx of breast cancer (tx w/ letrozole) with metastases to bone presents for evaluation of generalized weakness and back pain. Per patient she is getting radiation she states that she completed 5 sessions of radiation and was doing well however over the past 24 to 48 hours became weak denies any loss of bladder or bowel control denies any saddle anesthesia denies any focal weakness to the lower extremities denies any fevers or chills. Sent from [facility] for neurosurgery eval (4/27).    #Stage IV Breast Cancer HR+/HER2-, metastatic to bone  #Malignancy related pain   *CTA PE (4/26): No pulmonary embolus seen. Left upper lobe pulmonary nodule presumably representing metastatic lung disease.  *CT Head noncon (4/26): No evidence of acute territorial infarct, intracranial hemorrhage, or midline shift. Significantly increased numerous sclerotic osseous metastasis throughout the calvarium which were predominantly lytic on the prior CT head from 4/21/2023.  *MR C-spine IC (4/29):  Interval progression of extensive enhancing osseous metastatic disease. No evidence of cervical cord compression or cord signal abnormality. Partially visualized enhancing lesion in the region of the pineal gland, presumably reflecting metastatic disease.    *MR T/L-spine IC (4/29): extensive osseous metastatic disease. Interval development of epidural enhancing soft tissue circumferentially throughout the thoracic and lumbar spines. No obvious cord compression or cord edema.    *CT AP (4/29): Innumerable hepatic metastases. New bilateral adrenal nodules likely metastases. Diffuse widespread osseous metastases, increased from prior CT abdomen pelvis.    - DC Ibrance given worsening bone disease and increasing tumor markers per hem/onc   - CA 27-29 (4/27): 996.5  - NSGY recs (4/27): MRI C/T/L spine w/wo given no contraindications   - No cord compression on MRI of her spine  - Taper decadron off by 50% every 3 days > decreased to dex 1mg iv daily day 3 today   - Palliative following - DNI DNR , hospice cs , pain tx - Bowel regimen started   - discontinued letrozole and started Exemestane 25mg daily per Heme/Onc  - s/p Faslodex (5/1)       #Pancytopenia   - secondary to Ibrance toxicity vs BM infiltration of malignancy   - hem/onc is following, SCD for DVT ppx  - Trend platelets daily,     #Acute hypoxemic respiratory failure   - On 2 L via nasal cannula   - BNP wnl   - CTA chest negative for PE ( tachycardiac)  - LE duplex negative   -incentive jose miguel ordered   -sp lasix     #Transaminitis 2/2 liver mets   *CT AP (4/29): Innumerable hepatic metastases.    - monitor     #Hypercalcemia of malignancy (resolved)  - s/p 2 x calcitonin 250 units  - s/p 1 x Zometa 4 mg  -monitor ca     #Hypothyroid   - on synthroid     #right upper extre swelling  -duplex ordered but it was cancelled   -will not order, resolution of edema , likely dependant and also some iv infiltration,        DVT PPx: SCDs given severe thrombocytopenia   GI PPx: PPI   Grave prognosis - DNI DNR , pending hospice       Total time spent to complete patient's bedside assessment, review medical chart, discuss medical plan of care with covering medical team was more than 35 minutes  with >50% of time spent face to face with patient, discussion with patient/family and/or coordination of care.

## 2024-05-08 NOTE — PROGRESS NOTE ADULT - SUBJECTIVE AND OBJECTIVE BOX
HPI:  59yo female whose documented PMH  includes hypothyroid, asthma, ETOH abuse (cirrhosis) and breast cancer widely metastatic to bone, status post radiation 5 sessions finished 5/31/23, on letrozole daily, (was on Ribociclib and was discontinued due to low magnesium with high QTc), surgical history of occiput-T2 posterior instrumentation and fusion, ORIF of C2 body with functional decline and impaired ADLs/mobility, ambulates with a walker, following with Dr. Peña , presents to the ER due to back pain present for 2 weeks. Due to persistence, she was advised to come to the ER. No loss of bladder, bowel function but told ER she had generalized weakness. While in the ER patient became tachycardic with pulse ox of 89% so PE study was done (no PE) ER team spoke to neurosurgery team member requesting that [place] be [place]  (26 Apr 2024 22:23)    Interval history:     -Patient seen at bedside with  and daughter on phone  -She endorses some pain, but states is relatively well controlled     ADVANCE DIRECTIVES:     [ ] Full Code [x ] DNR  MOLST  [ ]  Living Will  [ ]   DECISION MAKER(s): Patient   [ ] Health Care Proxy(s)  [ X] Surrogate(s)  [ ] Guardian           Name(s): Phone Number(s): - Pankaj       BASELINE (I)ADL(s) (prior to admission):    [place]: [ ]Total  [ X ] Moderate [ ]Dependent  Palliative Performance Status Version 2:         %    http://npcrc.org/files/news/palliative_performance_scale_[email or web address].pdf    Allergies    No Known Allergies    Intolerances    MEDICATIONS  (STANDING):  ampicillin/sulbactam  IVPB 3 Gram(s) IV Intermittent every 6 hours  ampicillin/sulbactam  IVPB      artificial tears (preservative free) Ophthalmic Solution 1 Drop(s) Both EYES two times a day  bacitracin   Ointment 1 Application(s) Topical two times a day  chlorhexidine 2% Cloths 1 Application(s) Topical <User Schedule>  dexAMETHasone  Injectable 1 milliGRAM(s) IV Push <User Schedule>  exemestane 25 milliGRAM(s) Oral daily  levothyroxine 50 MICROGram(s) Oral <User Schedule>  magnesium oxide 400 milliGRAM(s) Oral daily  magnesium sulfate  IVPB 2 Gram(s) IV Intermittent once  melatonin 5 milliGRAM(s) Oral at bedtime  morphine ER Tablet 15 milliGRAM(s) Oral every 12 hours  naloxegol 12.5 milliGRAM(s) Oral daily  pantoprazole    Tablet 40 milliGRAM(s) Oral before breakfast  polyethylene glycol 3350 17 Gram(s) Oral daily  senna 2 Tablet(s) Oral at bedtime    MEDICATIONS  (PRN):  albuterol    0.083% 2.5 milliGRAM(s) Nebulizer every 6 hours PRN Shortness of Breath and/or Wheezing  morphine  - Injectable 4 milliGRAM(s) IV Push every 2 hours PRN moderate/severe pain (7-10)      PRESENT SYMPTOMS: [ ]Unable to obtain due to poor mentation   Source if other than patient:  [ ]Family   [ ]Team     Pain: [ X]yes [ ]no  QOL impact -  significant  Location -                  back  Aggravating factors - movement  Quality - sharp  Radiation - none noted  Timing- none noted  Severity (0-10 scale): no number given  Minimal acceptable level (0-10 scale):     CPOT:    https://www.[email or web address]/getattachment/[email or web address]/Critical-Care-Pain-Observation-Tool-(CPOT)    PAIN AD Score:   http://geriatrictoolkit.[email or web address]/painad.pdf (press ctrl +  left click to view)    Dyspnea:                           [ X ]None[ ]Mild [ ]Moderate [ ]Severe     Respiratory Distress Observation Scale (RDOS):   A score of 0 to 2 signifies little or no respiratory distress, 3 signifies mild distress, scores 4 to 6 indicate moderate distress, and scores greater than 7 signify severe distress  [email or web address]://[email or web address]/sites/default/files/PDFS/724961-icqlmvdzllf-hdfqzcoc-jdxajpaishq-yfkjw.pdf    Anxiety:                             [ x]None[ ]Mild [ ]Moderate [ ]Severe   Fatigue:                             [x ]None[ ]Mild [ ]Moderate [ ]Severe   Nausea:                             [x ]None[ ]Mild [ ]Moderate [ ]Severe   Loss of appetite:              [x ]None[ ]Mild [ ]Moderate [ ]Severe   Constipation:                    [ x]None[ ]Mild [ x]Moderate [ ]Severe    Other Symptoms:  [x ]All other review of systems negative     Palliative Performance Status Version 2:         30%    http://npcrc.org/files/news/palliative_performance_scale_[email or web address].pdf    PHYSICAL EXAM:  Vital Signs Last 24 Hrs  T(C): 36.4 (08 May 2024 12:11), Max: 36.9 (07 May 2024 21:33)  T(F): 97.5 (08 May 2024 12:11), Max: 98.4 (07 May 2024 21:33)  HR: 119 (08 May 2024 12:11) (83 - 123)  BP: 120/88 (08 May 2024 12:11) (120/88 - 127/87)  BP(mean): --  RR: 18 (08 May 2024 12:11) (18 - 18)  SpO2: 96% (08 May 2024 12:11) (96% - 97%)    Parameters below as of 07 May 2024 21:33  Patient On (Oxygen Delivery Method): room air        GENERAL:  [X ] No acute distress [ ]Lethargic  [ ]Unarousable  [ X]Verbal  [ ]Non-Verbal [ ]Cachexia    BEHAVIORAL/PSYCH:  [X ]Alert and Oriented x2-3[ ] Anxiety [ ] Delirium [ ] Agitation [X ] Calm   EYES: [ X] No scleral icterus [ ] Scleral icterus [ ] Closed  ENMT:  [ ]Dry mouth  [X ]No external oral lesions [ X] No external ear or nose lesions  CARDIOVASCULAR:  [ ]Regular [ ]Irregular [ ]Tachy [ ]Not Tachy  [ ]Ahmet [ ] Edema  [ ] No edema  PULMONARY:  [ ]Tachypnea  [ ]Audible excessive secretions [X ] No labored breathing [ ] labored breathing  GASTROINTESTINAL: [ ]Soft  [ ]Distended  [X ]Not distended [ ]Non tender [ ]Tender  MUSCULOSKELETAL: [ XNo clubbing [ ] clubbing  [ ] No cyanosis [ ] cyanosis  NEUROLOGIC: [X ]No focal deficits  [ x]Follows commands  [ ]Does not follow commands  [ ]Cognitive impairment  [ ]Dysphagia  [ ]Dysarthria  [ ]Paresis   SKIN: [ ] Jaundiced [X ] Non-jaundiced [ ]Rash [ ]No Rash [ ] Warm [ ] Dry  MISC/LINES: [ ] ET tube [ ] Trach [ ]NGT/OGT [ ]PEG [ ]Wong    LABS: reviewed by me                          8.0    4.79  )-----------( 8        ( 08 May 2024 18:23 )             24.1       05-08    134<L>  |  95<L>  |  21<H>  ----------------------------<  121<H>  4.0   |  25  |  0.5<L>    Ca    8.5      08 May 2024 08:25  Phos  2.2     05-08  Mg     1.7     05-08    TPro  5.4<L>  /  Alb  3.7  /  TBili  2.1<H>  /  DBili  x   /  AST  304<H>  /  ALT  92<H>  /  AlkPhos  294<H>  05-08              Urinalysis Basic - ( 08 May 2024 08:25 )    Color: x / Appearance: x / SG: x / pH: x  Gluc: 121 mg/dL / Ketone: x  / Bili: x / Urobili: x   Blood: x / Protein: x / Nitrite: x   Leuk Esterase: x / RBC: x / WBC x   Sq Epi: x / Non Sq Epi: x / Bacteria: x                  CAPILLARY BLOOD GLUCOSE                    RADIOLOGY & ADDITIONAL STUDIES: reviewed by me    < from: Xray Chest 1 View-PORTABLE IMMEDIATE (Xray Chest 1 View-PORTABLE IMMEDIATE .) (05.07.24 @ 16:25) >  Findings/  impression:    Mandible overlies lung apices obscuring anatomy    Support devices: None    Cardiac/ Mediastinum: Stable    Lungs/ Pleura: Low lung volumes. Patchy opacities both lung bases    < end of copied text >          EKG: reviewed by me    < from: 12 Lead ECG (05.04.24 @ 07:46) >    Ventricular Rate 110 BPM    Atrial Rate 110 BPM    P-R Interval 124 ms    QRS Duration 78 ms    Q-T Interval 326 ms    QTC Calculation(Bazett) 441 ms    P Axis 32 degrees    R Axis 25 degrees    T Axis 0 degrees    Diagnosis Line Sinus tachycardia  Cannot rule out Inferior infarct , age undetermined  Abnormal ECG    < end of copied text >        PROTEIN CALORIE MALNUTRITION PRESENT: [ ]mild [ ]moderate [ ]severe [ ]underweight [ ]morbid obesity  https://www.andeal.org/vault/7880/web/files/ONC/Table_Clinical%20Characteristics%20to%20Document%20Malnutrition-White%20JV%20et%20al%202012.pdf    Height (cm): 162.6 (04-29-24 @ 19:42), 162.6 (07-16-23 @ 17:21)  Weight (kg): 61.3 (04-26-24 @ 22:53), 72.6 (07-13-23 @ 13:00)  BMI (kg/m2): 23.2 (04-29-24 @ 19:42), 23.2 (04-26-24 @ 22:53), 27.5 (07-16-23 @ 17:21)  [ ]PPSV2 < or = to 30% [ ]significant weight loss  [ ]poor nutritional intake  [ ]anasarca      [ ]Artificial Nutrition      Palliative Care Spiritual/Emotional Screening Tool Question  Severity (0-4):                    OR                    [ x] Unable to determine. Will assess at later time if appropriate.  Score of 2 or greater indicates recommendation of Chaplaincy and/or SW referral  Chaplaincy Referral: [ ] Yes [ ] Refused [ ] Following     Caregiver [place]:  [ ] Yes [ ] No    OR    [x ] Unable to determine. Will assess at later time if appropriate.  Social Work Referral [ ]  Patient and Family Centered Care Referral [ ]    Anticipatory Grief Present: [ ] Yes [ ] No    OR     [ x] Unable to determine. Will assess at later time if appropriate.  Social Work Referral [ ]  Patient and Family Centered Care Referral [ ]    Patient discussed with primary medical team MD  Palliative care education provided to patient and/or family

## 2024-05-08 NOTE — PROGRESS NOTE ADULT - ASSESSMENT
58-year-old female with PMHx of breast cancer (tx w/ letrozole) with metastases to bone presents for evaluation of generalized weakness and back pain. Per patient she is getting radiation she states that she completed 5 sessions of radiation and was doing well however over the past 24 to 48 hours became weak denies any loss of bladder or bowel control denies any saddle anesthesia denies any focal weakness to the lower extremities denies any fevers or chills. Sent from [facility] for neurosurgery eval (4/27).    #Stage IV Breast Cancer HR+/HER2-, metastatic to bone  #Malignancy related pain   *CTA PE (4/26): No pulmonary embolus seen. Left upper lobe pulmonary nodule presumably representing metastatic lung disease.  *CT Head noncon (4/26): No evidence of acute territorial infarct, intracranial hemorrhage, or midline shift. Significantly increased numerous sclerotic osseous metastasis throughout the calvarium which were predominantly lytic on the prior CT head from 4/21/2023.  *MR C-spine IC (4/29):  Interval progression of extensive enhancing osseous metastatic disease. No evidence of cervical cord compression or cord signal abnormality. Partially visualized enhancing lesion in the region of the pineal gland, presumably reflecting metastatic disease.    *MR T/L-spine IC (4/29): extensive osseous metastatic disease. Interval development of epidural enhancing soft tissue circumferentially throughout the thoracic and lumbar spines. No obvious cord compression or cord edema.    *CT AP (4/29): Innumerable hepatic metastases. New bilateral adrenal nodules likely metastases. Diffuse widespread osseous metastases, increased from prior CT abdomen pelvis.    - DC Ibrance given worsening bone disease and increasing tumor markers per hem/onc   - CA 27-29 (4/27): 996.5  - NSGY recs (4/27): MRI C/T/L spine w/wo given no contraindications   - No cord compression on MRI of her spine  - Taper decadron off by 50% every 3 days > decreased to dex 1mg iv daily day 2 today   - Palliative following - c/w morphine ER 15mg BID and Morphine 4 mg IV q3h PRN for severe pain- Bowel regimen started   - discontinued letrozole and started Exemestane 25mg daily per Heme/Onc  - s/p Faslodex (5/1)   - refused palliative radiation therapy   - GOC yest , will continue GOC today w/  bedside     #Pancytopenia - improving   - secondary to Ibrance toxicity vs BM infiltration of malignancy   - hem/onc is following, SCD for DVT ppx  - Trend platelets daily, keep above 20k , sp one plt yest     #Acute hypoxemic respiratory failure   - On 2 L via nasal cannula   - BNP wnl   - CTA chest negative for PE ( tachycardiac)  - LE duplex negative   -incentive jose miguel ordered     #Transaminitis 2/2 liver mets   *CT AP (4/29): Innumerable hepatic metastases.    - monitor     #Hypercalcemia of malignancy (resolved)  - s/p 2 x calcitonin 250 units  - s/p 1 x Zometa 4 mg  -monitor ca     #Hypothyroid   - on synthroid     #right upper extre swelling  -duplex ordered       DVT PPx: SCDs given severe thrombocytopenia   GI PPx: PPI   Grave prognosis - full code for now   Pending: [facility]   Dispo: TBD    58-year-old female with PMHx of breast cancer (tx w/ letrozole) with metastases to bone presents for evaluation of generalized weakness and back pain. Per patient she is getting radiation she states that she completed 5 sessions of radiation and was doing well however over the past 24 to 48 hours became weak denies any loss of bladder or bowel control denies any saddle anesthesia denies any focal weakness to the lower extremities denies any fevers or chills. Sent from [facility] for neurosurgery eval (4/27).    #Stage IV Breast Cancer HR+/HER2-, metastatic to bone  #Malignancy related pain   *CTA PE (4/26): No pulmonary embolus seen. Left upper lobe pulmonary nodule presumably representing metastatic lung disease.  *CT Head noncon (4/26): No evidence of acute territorial infarct, intracranial hemorrhage, or midline shift. Significantly increased numerous sclerotic osseous metastasis throughout the calvarium which were predominantly lytic on the prior CT head from 4/21/2023.  *MR C-spine IC (4/29):  Interval progression of extensive enhancing osseous metastatic disease. No evidence of cervical cord compression or cord signal abnormality. Partially visualized enhancing lesion in the region of the pineal gland, presumably reflecting metastatic disease.    *MR T/L-spine IC (4/29): extensive osseous metastatic disease. Interval development of epidural enhancing soft tissue circumferentially throughout the thoracic and lumbar spines. No obvious cord compression or cord edema.    *CT AP (4/29): Innumerable hepatic metastases. New bilateral adrenal nodules likely metastases. Diffuse widespread osseous metastases, increased from prior CT abdomen pelvis.    - DC Ibrance given worsening bone disease and increasing tumor markers per hem/onc   - CA 27-29 (4/27): 996.5  - NSGY recs (4/27): MRI C/T/L spine w/wo given no contraindications   - No cord compression on MRI of her spine  - Taper decadron off by 50% every 3 days > decreased to dex 1mg iv daily day 3 today   - Palliative following - DNI DNR , hospice cs , pain tx - Bowel regimen started   - discontinued letrozole and started Exemestane 25mg daily per Heme/Onc  - s/p Faslodex (5/1)       #Pancytopenia - improving   - secondary to Ibrance toxicity vs BM infiltration of malignancy   - hem/onc is following, SCD for DVT ppx  - Trend platelets daily, keep above 20k     #Acute hypoxemic respiratory failure   - On 2 L via nasal cannula   - BNP wnl   - CTA chest negative for PE ( tachycardiac)  - LE duplex negative   -incentive jose miguel ordered   -sp lasix     #Transaminitis 2/2 liver mets   *CT AP (4/29): Innumerable hepatic metastases.    - monitor     #Hypercalcemia of malignancy (resolved)  - s/p 2 x calcitonin 250 units  - s/p 1 x Zometa 4 mg  -monitor ca     #Hypothyroid   - on synthroid     #right upper extre swelling  -duplex ordered but it was cancelled   -will not order, resolution of edema , likely dependant and also some iv infiltration,        DVT PPx: SCDs given severe thrombocytopenia   GI PPx: PPI   Grave prognosis - DNI DNR , [facility]      58-year-old female with PMHx of breast cancer (tx w/ letrozole) with metastases to bone presents for evaluation of generalized weakness and back pain. Per patient she is getting radiation she states that she completed 5 sessions of radiation and was doing well however over the past 24 to 48 hours became weak denies any loss of bladder or bowel control denies any saddle anesthesia denies any focal weakness to the lower extremities denies any fevers or chills. Sent from [facility] for neurosurgery eval (4/27).    #Stage IV Breast Cancer HR+/HER2-, metastatic to bone  #Malignancy related pain   *CTA PE (4/26): No pulmonary embolus seen. Left upper lobe pulmonary nodule presumably representing metastatic lung disease.  *CT Head noncon (4/26): No evidence of acute territorial infarct, intracranial hemorrhage, or midline shift. Significantly increased numerous sclerotic osseous metastasis throughout the calvarium which were predominantly lytic on the prior CT head from 4/21/2023.  *MR C-spine IC (4/29):  Interval progression of extensive enhancing osseous metastatic disease. No evidence of cervical cord compression or cord signal abnormality. Partially visualized enhancing lesion in the region of the pineal gland, presumably reflecting metastatic disease.    *MR T/L-spine IC (4/29): extensive osseous metastatic disease. Interval development of epidural enhancing soft tissue circumferentially throughout the thoracic and lumbar spines. No obvious cord compression or cord edema.    *CT AP (4/29): Innumerable hepatic metastases. New bilateral adrenal nodules likely metastases. Diffuse widespread osseous metastases, increased from prior CT abdomen pelvis.    - DC Ibrance given worsening bone disease and increasing tumor markers per hem/onc   - CA 27-29 (4/27): 996.5  - NSGY recs (4/27): MRI C/T/L spine w/wo given no contraindications   - No cord compression on MRI of her spine  - Taper decadron off by 50% every 3 days > decreased to dex 1mg iv daily day 3 today   - Palliative following - DNI DNR , hospice cs , pain tx - Bowel regimen started   - discontinued letrozole and started Exemestane 25mg daily per Heme/Onc  - s/p Faslodex (5/1)       #Pancytopenia   - secondary to Ibrance toxicity vs BM infiltration of malignancy   - hem/onc is following, SCD for DVT ppx  - Trend platelets daily,     #Acute hypoxemic respiratory failure   - On 2 L via nasal cannula   - BNP wnl   - CTA chest negative for PE ( tachycardiac)  - LE duplex negative   -incentive jose miguel ordered   -sp lasix     #Transaminitis 2/2 liver mets   *CT AP (4/29): Innumerable hepatic metastases.    - monitor     #Hypercalcemia of malignancy (resolved)  - s/p 2 x calcitonin 250 units  - s/p 1 x Zometa 4 mg  -monitor ca     #Hypothyroid   - on synthroid     #right upper extre swelling  -duplex ordered but it was cancelled   -will not order, resolution of edema , likely dependant and also some iv infiltration,        DVT PPx: SCDs given severe thrombocytopenia   GI PPx: PPI   Grave prognosis - DNI DNR , [facility]      58-year-old female with PMHx of breast cancer (tx w/ letrozole) with metastases to bone presents for evaluation of generalized weakness and back pain. Per patient she is getting radiation she states that she completed 5 sessions of radiation and was doing well however over the past 24 to 48 hours became weak denies any loss of bladder or bowel control denies any saddle anesthesia denies any focal weakness to the lower extremities denies any fevers or chills. Sent from [facility] for neurosurgery eval (4/27).    #Stage IV Breast Cancer HR+/HER2-, metastatic to bone  #Malignancy related pain   *CTA PE (4/26): No pulmonary embolus seen. Left upper lobe pulmonary nodule presumably representing metastatic lung disease.  *CT Head noncon (4/26): No evidence of acute territorial infarct, intracranial hemorrhage, or midline shift. Significantly increased numerous sclerotic osseous metastasis throughout the calvarium which were predominantly lytic on the prior CT head from 4/21/2023.  *MR C-spine IC (4/29):  Interval progression of extensive enhancing osseous metastatic disease. No evidence of cervical cord compression or cord signal abnormality. Partially visualized enhancing lesion in the region of the pineal gland, presumably reflecting metastatic disease.    *MR T/L-spine IC (4/29): extensive osseous metastatic disease. Interval development of epidural enhancing soft tissue circumferentially throughout the thoracic and lumbar spines. No obvious cord compression or cord edema.    *CT AP (4/29): Innumerable hepatic metastases. New bilateral adrenal nodules likely metastases. Diffuse widespread osseous metastases, increased from prior CT abdomen pelvis.    - DC Ibrance given worsening bone disease and increasing tumor markers per hem/onc   - CA 27-29 (4/27): 996.5  - NSGY recs (4/27): MRI C/T/L spine w/wo given no contraindications   - No cord compression on MRI of her spine  - Taper decadron off by 50% every 3 days > decreased to dex 1mg iv daily day 3 today   - Palliative following - DNI DNR , hospice cs , pain tx - Bowel regimen started   - discontinued letrozole and started Exemestane 25mg daily per Heme/Onc  - s/p Faslodex (5/1)       #Pancytopenia   - secondary to Ibrance toxicity vs BM infiltration of malignancy   - hem/onc is following, SCD for DVT ppx  - Trend platelets daily,     #Acute hypoxemic respiratory failure   - On 2 L via nasal cannula   - BNP wnl   - CTA chest negative for PE ( tachycardiac)  - LE duplex negative   -incentive jose miguel ordered   -sp lasix     #Transaminitis 2/2 liver mets   *CT AP (4/29): Innumerable hepatic metastases.    - monitor     #Hypercalcemia of malignancy (resolved)  - s/p 2 x calcitonin 250 units  - s/p 1 x Zometa 4 mg  -monitor ca     #Hypothyroid   - on synthroid     #right upper extre swelling  -duplex ordered but it was cancelled   -will not order, resolution of edema , likely dependant and also some iv infiltration,        DVT PPx: SCDs given severe thrombocytopenia   GI PPx: PPI   Grave prognosis - DNI DNR , pending hospice

## 2024-05-08 NOTE — PROGRESS NOTE ADULT - TIME BILLING
Direct clinical care, patient counseling regarding chronic pain management,  coordination with consultants, nursing and case management/social work teams, review of tests and scheduled medications,  and resident supervision.
Time above includes time spent preparing to see the patient, obtaining and/or reviewing separately obtained history, performing a medically appropriate examination and/or evaluation, counseling and educating the patient/family/caregiver, referring and communicating with other health care professionals (when not separately reported), documenting clinical information in the electronic or other health record, independently interpreting results (not separately reported) and communicating results to the patient/family/caregiver, and/or care coordination (not separately reported).

## 2024-05-08 NOTE — PROGRESS NOTE ADULT - PROBLEM SELECTOR PLAN 2
Metastatic breast cancer to bones and liver  -no cord compression on MRI  -continue MS contin 15mg BID  -change PRN morphine to 4mg IV q2h PRN for moderate/severe pain (4-10) (hold for sedation, confusion, RR<10)  -continue dexamethasone for bone pain  -continue protonix given use of dexamethasone/ibuprofen  -continue senna, miralax; naloxegol per primary team  -f/u heme onc

## 2024-05-08 NOTE — HOSPICE CARE NOTE - CONVESATION DETAILS
Hospice called spouse who deferred to patient's sister to make decisions regarding patient's care. Spoke with sister Sandy [phone], reviewed hospice services and philosophy. As per Sandy the patient's spouse will not be able to care for the patient at home. Sandy is going to check to see if patient has medicaid since she was a patient at [facility] last year for a few months. Patient will need to go to a SNF with hospice care.

## 2024-05-08 NOTE — PROGRESS NOTE ADULT - SUBJECTIVE AND OBJECTIVE BOX
24H events:    Today is hospital day 12d. This morning patient was seen and examined at bedside, resting comfortably in bed.    No acute or major events overnight.    PAST MEDICAL & SURGICAL HISTORY  Hypothyroidism    H/O cirrhosis    Breast cancer    No significant past surgical history        SOCIAL HISTORY:  Social History:  former alcohol abuse, denies tobacco use (26 Apr 2024 22:23)      ALLERGIES:  No Known Allergies      MEDICATIONS:  STANDING MEDICATIONS  ampicillin/sulbactam  IVPB 3 Gram(s) IV Intermittent every 6 hours  ampicillin/sulbactam  IVPB      artificial tears (preservative free) Ophthalmic Solution 1 Drop(s) Both EYES two times a day  bacitracin   Ointment 1 Application(s) Topical two times a day  chlorhexidine 2% Cloths 1 Application(s) Topical <User Schedule>  dexAMETHasone  Injectable 1 milliGRAM(s) IV Push <User Schedule>  exemestane 25 milliGRAM(s) Oral daily  levothyroxine 50 MICROGram(s) Oral <User Schedule>  magnesium oxide 400 milliGRAM(s) Oral daily  magnesium sulfate  IVPB 2 Gram(s) IV Intermittent once  melatonin 5 milliGRAM(s) Oral at bedtime  morphine ER Tablet 15 milliGRAM(s) Oral every 12 hours  naloxegol 12.5 milliGRAM(s) Oral daily  pantoprazole    Tablet 40 milliGRAM(s) Oral before breakfast  polyethylene glycol 3350 17 Gram(s) Oral daily  senna 2 Tablet(s) Oral at bedtime    PRN MEDICATIONS  albuterol    0.083% 2.5 milliGRAM(s) Nebulizer every 6 hours PRN  morphine  - Injectable 4 milliGRAM(s) IV Push every 2 hours PRN      VITALS:   T(C): 36.5 (05-08-24 @ 05:16), Max: 36.9 (05-07-24 @ 21:33)  HR: 83 (05-08-24 @ 05:16) (83 - 123)  BP: 122/85 (05-08-24 @ 05:16) (122/82 - 127/90)  RR: 18 (05-08-24 @ 05:16) (18 - 18)  SpO2: 97% (05-07-24 @ 21:33) (93% - 97%)  I&O's Summary        PHYSICAL EXAM:    LABS:                        8.1    4.87  )-----------( 42       ( 07 May 2024 11:54 )             23.5     05-07    138  |  100  |  18  ----------------------------<  107<H>  4.4   |  23  |  <0.5<L>    Ca    8.7      07 May 2024 11:54  Phos  1.4     05-07  Mg     1.7     05-07    TPro  5.4<L>  /  Alb  3.5  /  TBili  2.0<H>  /  DBili  x   /  AST  245<H>  /  ALT  71<H>  /  AlkPhos  277<H>  05-07      Urinalysis Basic - ( 07 May 2024 11:54 )    Color: x / Appearance: x / SG: x / pH: x  Gluc: 107 mg/dL / Ketone: x  / Bili: x / Urobili: x   Blood: x / Protein: x / Nitrite: x   Leuk Esterase: x / RBC: x / WBC x   Sq Epi: x / Non Sq Epi: x / Bacteria: x                     24H events:    Today is hospital day 12d. This morning patient was seen and examined at bedside, resting comfortably in bed.    Lasix given yesterday for sob and congestion. Pain is noted.     PAST MEDICAL & SURGICAL HISTORY  Hypothyroidism    H/O cirrhosis    Breast cancer    No significant past surgical history        SOCIAL HISTORY:  Social History:  former alcohol abuse, denies tobacco use (26 Apr 2024 22:23)      ALLERGIES:  No Known Allergies      MEDICATIONS:  STANDING MEDICATIONS  ampicillin/sulbactam  IVPB 3 Gram(s) IV Intermittent every 6 hours  ampicillin/sulbactam  IVPB      artificial tears (preservative free) Ophthalmic Solution 1 Drop(s) Both EYES two times a day  bacitracin   Ointment 1 Application(s) Topical two times a day  chlorhexidine 2% Cloths 1 Application(s) Topical <User Schedule>  dexAMETHasone  Injectable 1 milliGRAM(s) IV Push <User Schedule>  exemestane 25 milliGRAM(s) Oral daily  levothyroxine 50 MICROGram(s) Oral <User Schedule>  magnesium oxide 400 milliGRAM(s) Oral daily  magnesium sulfate  IVPB 2 Gram(s) IV Intermittent once  melatonin 5 milliGRAM(s) Oral at bedtime  morphine ER Tablet 15 milliGRAM(s) Oral every 12 hours  naloxegol 12.5 milliGRAM(s) Oral daily  pantoprazole    Tablet 40 milliGRAM(s) Oral before breakfast  polyethylene glycol 3350 17 Gram(s) Oral daily  senna 2 Tablet(s) Oral at bedtime    PRN MEDICATIONS  albuterol    0.083% 2.5 milliGRAM(s) Nebulizer every 6 hours PRN  morphine  - Injectable 4 milliGRAM(s) IV Push every 2 hours PRN      VITALS:   T(C): 36.5 (05-08-24 @ 05:16), Max: 36.9 (05-07-24 @ 21:33)  HR: 83 (05-08-24 @ 05:16) (83 - 123)  BP: 122/85 (05-08-24 @ 05:16) (122/82 - 127/90)  RR: 18 (05-08-24 @ 05:16) (18 - 18)  SpO2: 97% (05-07-24 @ 21:33) (93% - 97%)  I&O's Summary        PHYSICAL EXAM:    LABS:                        8.1    4.87  )-----------( 42       ( 07 May 2024 11:54 )             23.5     05-07    138  |  100  |  18  ----------------------------<  107<H>  4.4   |  23  |  <0.5<L>    Ca    8.7      07 May 2024 11:54  Phos  1.4     05-07  Mg     1.7     05-07    TPro  5.4<L>  /  Alb  3.5  /  TBili  2.0<H>  /  DBili  x   /  AST  245<H>  /  ALT  71<H>  /  AlkPhos  277<H>  05-07      Urinalysis Basic - ( 07 May 2024 11:54 )    Color: x / Appearance: x / SG: x / pH: x  Gluc: 107 mg/dL / Ketone: x  / Bili: x / Urobili: x   Blood: x / Protein: x / Nitrite: x   Leuk Esterase: x / RBC: x / WBC x   Sq Epi: x / Non Sq Epi: x / Bacteria: x                     24H events:    Today is hospital day 12d. This morning patient was seen and examined at bedside, resting comfortably in bed.    Lasix given yesterday for sob and congestion. Pain is noted.     PAST MEDICAL & SURGICAL HISTORY  Hypothyroidism    H/O cirrhosis    Breast cancer    No significant past surgical history        SOCIAL HISTORY:  Social History:  former alcohol abuse, denies tobacco use (26 Apr 2024 22:23)      ALLERGIES:  No Known Allergies      MEDICATIONS:  STANDING MEDICATIONS  ampicillin/sulbactam  IVPB 3 Gram(s) IV Intermittent every 6 hours  ampicillin/sulbactam  IVPB      artificial tears (preservative free) Ophthalmic Solution 1 Drop(s) Both EYES two times a day  bacitracin   Ointment 1 Application(s) Topical two times a day  chlorhexidine 2% Cloths 1 Application(s) Topical <User Schedule>  dexAMETHasone  Injectable 1 milliGRAM(s) IV Push <User Schedule>  exemestane 25 milliGRAM(s) Oral daily  levothyroxine 50 MICROGram(s) Oral <User Schedule>  magnesium oxide 400 milliGRAM(s) Oral daily  magnesium sulfate  IVPB 2 Gram(s) IV Intermittent once  melatonin 5 milliGRAM(s) Oral at bedtime  morphine ER Tablet 15 milliGRAM(s) Oral every 12 hours  naloxegol 12.5 milliGRAM(s) Oral daily  pantoprazole    Tablet 40 milliGRAM(s) Oral before breakfast  polyethylene glycol 3350 17 Gram(s) Oral daily  senna 2 Tablet(s) Oral at bedtime    PRN MEDICATIONS  albuterol    0.083% 2.5 milliGRAM(s) Nebulizer every 6 hours PRN  morphine  - Injectable 4 milliGRAM(s) IV Push every 2 hours PRN      VITALS:   T(C): 36.5 (05-08-24 @ 05:16), Max: 36.9 (05-07-24 @ 21:33)  HR: 83 (05-08-24 @ 05:16) (83 - 123)  BP: 122/85 (05-08-24 @ 05:16) (122/82 - 127/90)  RR: 18 (05-08-24 @ 05:16) (18 - 18)  SpO2: 97% (05-07-24 @ 21:33) (93% - 97%)  I&O's Summary        PHYSICAL EXAM:    LABS:                        8.1    4.87  )-----------( 42       ( 07 May 2024 11:54 )             23.5     05-07    138  |  100  |  18  ----------------------------<  107<H>  4.4   |  23  |  <0.5<L>    Ca    8.7      07 May 2024 11:54  Phos  1.4     05-07  Mg     1.7     05-07    TPro  5.4<L>  /  Alb  3.5  /  TBili  2.0<H>  /  DBili  x   /  AST  245<H>  /  ALT  71<H>  /  AlkPhos  277<H>  05-07      Urinalysis Basic - ( 07 May 2024 11:54 )    Color: x / Appearance: x / SG: x / pH: x  Gluc: 107 mg/dL / Ketone: x  / Bili: x / Urobili: x   Blood: x / Protein: x / Nitrite: x   Leuk Esterase: x / RBC: x / WBC x   Sq Epi: x / Non Sq Epi: x / Bacteria: x      RADIOLOGY:

## 2024-05-08 NOTE — PROGRESS NOTE ADULT - ASSESSMENT
57yo female whose documented PMH  includes hypothyroid, asthma, ETOH abuse (cirrhosis) and breast cancer widely metastatic to bone, status post radiation 5 sessions finished 5/31/23, on letrozole daily, (was on Ribociclib and was discontinued due to low magnesium with high QTc), surgical history of occiput-T2 posterior instrumentation and fusion, ORIF of C2 body with functional decline and impaired ADLs/mobility, ambulates with a walker, following with Dr. Peña , presented to the ER due to back pain present for 2 weeks. Palliative consulted for pain control and GOC.     Spoke with patient at bedside with  and daughter over the phone. Palliative care introduced to daughter.  We discussed the conversation from yesterday about hospice. ALl questions answered. Hospice will call patient's sister and they will discuss hospice moving forward.    Education about palliative care provided to patient/family.  See Recs below.    Please call [phone] with questions or concerns 24/7.   We will continue to follow.

## 2024-05-09 NOTE — RAPID RESPONSE TEAM SUMMARY - NSMEDICATIONSRRT_GEN_ALL_CORE
None - patient is already Unasyn, will give 1 dose of vancomycin to broaden abx.    - patient is already Unasyn, will broaden abx as patient is more tachy than baseline with oral temp 99.4 during RRT.   - Will also do sepsis w/u.

## 2024-05-09 NOTE — CHART NOTE - NSCHARTNOTEFT_GEN_A_CORE
visited patient earlier today. her  and sister at bedside. patient reports feeling better today. psychosocial support provided. will continue to follow for on going support. [phone]

## 2024-05-09 NOTE — PROGRESS NOTE ADULT - PROBLEM SELECTOR PLAN 2
Metastatic breast cancer to bones and liver  -no cord compression on MRI  -continue MS contin 15mg BID  -morphine  4mg IV q2h PRN for moderate/severe pain (4-10) (hold for sedation, confusion, RR<10)  -continue dexamethasone for bone pain  -continue protonix given use of dexamethasone/ibuprofen  -continue senna, miralax; naloxegol per primary team  -f/u heme onc

## 2024-05-09 NOTE — HOSPICE CARE NOTE - CONVESATION DETAILS
Spoke with patient's sister Sandy who is requesting that her sister go to a SNF with hospice services. sister states that someone from [facility] came to see the patient today. [facility] hospice does not have a contract with [facility]. Sandy requesting [facility] hospice provide hospice care. Please send out BEATRICE, sister requesting [facility] as first choice. Left message on SYL Reid's voicemail.

## 2024-05-09 NOTE — PROGRESS NOTE ADULT - ASSESSMENT
59yo female whose documented PMH  includes hypothyroid, asthma, ETOH abuse (cirrhosis) and breast cancer widely metastatic to bone, status post radiation 5 sessions finished 5/31/23, on letrozole daily, (was on Ribociclib and was discontinued due to low magnesium with high QTc), surgical history of occiput-T2 posterior instrumentation and fusion, ORIF of C2 body with functional decline and impaired ADLs/mobility, ambulates with a walker, following with Dr. Peña , presented to the ER due to back pain present for 2 weeks. Palliative consulted for pain control and GOC.     Spoke with patient and sister at bedside. Pain better controlled. Ongoing discussions with hospice. Ongoing medical management.    Education about palliative care provided to patient/family.  See Recs below.    Please call [phone] with questions or concerns 24/7.   We will continue to follow.

## 2024-05-09 NOTE — PROGRESS NOTE ADULT - ASSESSMENT
58-year-old female with PMHx of breast cancer (tx w/ letrozole) with metastases to bone presents for evaluation of generalized weakness and back pain. Per patient she is getting radiation she states that she completed 5 sessions of radiation and was doing well however over the past 24 to 48 hours became weak denies any loss of bladder or bowel control denies any saddle anesthesia denies any focal weakness to the lower extremities denies any fevers or chills. Sent from [facility] for neurosurgery eval (4/27).    #Stage IV Breast Cancer HR+/HER2-, metastatic to bone  #Malignancy related pain   *CTA PE (4/26): No pulmonary embolus seen. Left upper lobe pulmonary nodule presumably representing metastatic lung disease.  *CT Head noncon (4/26): No evidence of acute territorial infarct, intracranial hemorrhage, or midline shift. Significantly increased numerous sclerotic osseous metastasis throughout the calvarium which were predominantly lytic on the prior CT head from 4/21/2023.  *MR C-spine IC (4/29):  Interval progression of extensive enhancing osseous metastatic disease. No evidence of cervical cord compression or cord signal abnormality. Partially visualized enhancing lesion in the region of the pineal gland, presumably reflecting metastatic disease.    *MR T/L-spine IC (4/29): extensive osseous metastatic disease. Interval development of epidural enhancing soft tissue circumferentially throughout the thoracic and lumbar spines. No obvious cord compression or cord edema.    *CT AP (4/29): Innumerable hepatic metastases. New bilateral adrenal nodules likely metastases. Diffuse widespread osseous metastases, increased from prior CT abdomen pelvis.    - DC Ibrance given worsening bone disease and increasing tumor markers per hem/onc   - CA 27-29 (4/27): 996.5  - NSGY recs (4/27): MRI C/T/L spine w/wo given no contraindications   - No cord compression on MRI of her spine  - Taper decadron off by 50% every 3 days > decreased to dex 1mg iv daily day 3 today   - Palliative following - DNI DNR , hospice cs , pain tx - Bowel regimen started   - discontinued letrozole and started Exemestane 25mg daily per Heme/Onc  - s/p Faslodex (5/1)       #Pancytopenia   - secondary to Ibrance toxicity vs BM infiltration of malignancy   - hem/onc is following, SCD for DVT ppx  - Trend platelets daily,     #Acute hypoxemic respiratory failure   - On 2 L via nasal cannula   - BNP wnl   - CTA chest negative for PE ( tachycardiac)  - LE duplex negative   -incentive jose miguel ordered   -sp lasix     #Transaminitis 2/2 liver mets   *CT AP (4/29): Innumerable hepatic metastases.    - monitor     #Hypercalcemia of malignancy (resolved)  - s/p 2 x calcitonin 250 units  - s/p 1 x Zometa 4 mg  -monitor ca     #Hypothyroid   - on synthroid     #right upper extre swelling  -duplex ordered but it was cancelled   -will not order, resolution of edema , likely dependant and also some iv infiltration,        DVT PPx: SCDs given severe thrombocytopenia   GI PPx: PPI   Grave prognosis - DNI DNR , pending hospice          58-year-old female with PMHx of breast cancer (tx w/ letrozole) with metastases to bone presents for evaluation of generalized weakness and back pain. Per patient she is getting radiation she states that she completed 5 sessions of radiation and was doing well however over the past 24 to 48 hours became weak denies any loss of bladder or bowel control denies any saddle anesthesia denies any focal weakness to the lower extremities denies any fevers or chills. Sent from [facility] for neurosurgery eval (4/27).    #Stage IV Breast Cancer HR+/HER2-, metastatic to bone  #Malignancy related pain   *CTA PE (4/26): No pulmonary embolus seen. Left upper lobe pulmonary nodule presumably representing metastatic lung disease.  *CT Head noncon (4/26): No evidence of acute territorial infarct, intracranial hemorrhage, or midline shift. Significantly increased numerous sclerotic osseous metastasis throughout the calvarium which were predominantly lytic on the prior CT head from 4/21/2023.  *MR C-spine IC (4/29):  Interval progression of extensive enhancing osseous metastatic disease. No evidence of cervical cord compression or cord signal abnormality. Partially visualized enhancing lesion in the region of the pineal gland, presumably reflecting metastatic disease.    *MR T/L-spine IC (4/29): extensive osseous metastatic disease. Interval development of epidural enhancing soft tissue circumferentially throughout the thoracic and lumbar spines. No obvious cord compression or cord edema.    *CT AP (4/29): Innumerable hepatic metastases. New bilateral adrenal nodules likely metastases. Diffuse widespread osseous metastases, increased from prior CT abdomen pelvis.    - DC Ibrance given worsening bone disease and increasing tumor markers per hem/onc   - CA 27-29 (4/27): 996.5  - NSGY recs (4/27): MRI C/T/L spine w/wo given no contraindications   - No cord compression on MRI of her spine  - Taper decadron off by 50% every 3 days  - Palliative following - DNI DNR , hospice cs , pain tx - Bowel regimen   - discontinued letrozole and started Exemestane 25mg daily per Heme/Onc  - s/p Faslodex (5/1)   -family and pt does not want radiation and chemo anymore      #Pancytopenia   - secondary to Ibrance toxicity vs BM infiltration of malignancy   - hem/onc is following, SCD for DVT ppx  - cbc bid  -target plts greater than 10 and hg greater than 7    #Acute hypoxemic respiratory failure   - On 2 L via nasal cannula   - BNP wnl   - CTA chest negative for PE ( tachycardiac)  - LE duplex negative   -incentive jose miguel ordered   -sp lasix   -unasyn for 5 days total     #hyponatremia  #hyposphosphatemia  -likely from decrease oral intake  replenish prn  -gentle hydration for 12 hrs     #Transaminitis 2/2 liver mets   *CT AP (4/29): Innumerable hepatic metastases.    - monitor     #Hypercalcemia of malignancy (resolved)  - s/p 2 x calcitonin 250 units  - s/p 1 x Zometa 4 mg  -monitor ca     #Hypothyroid   - on synthroid     #right upper extre swelling  -duplex ordered but it was cancelled   -will not order, resolution of edema , likely dependant and also some iv infiltration,        DVT PPx: SCDs given severe thrombocytopenia   GI PPx: PPI   Grave prognosis - DNI DNR , SNF hospice          58-year-old female with PMHx of breast cancer (tx w/ letrozole) with metastases to bone presents for evaluation of generalized weakness and back pain. Per patient she is getting radiation she states that she completed 5 sessions of radiation and was doing well however over the past 24 to 48 hours became weak denies any loss of bladder or bowel control denies any saddle anesthesia denies any focal weakness to the lower extremities denies any fevers or chills. Sent from [facility] for neurosurgery eval (4/27).    #Stage IV Breast Cancer HR+/HER2-, metastatic to bone  #Malignancy related pain   *CTA PE (4/26): No pulmonary embolus seen. Left upper lobe pulmonary nodule presumably representing metastatic lung disease.  *CT Head noncon (4/26): No evidence of acute territorial infarct, intracranial hemorrhage, or midline shift. Significantly increased numerous sclerotic osseous metastasis throughout the calvarium which were predominantly lytic on the prior CT head from 4/21/2023.  *MR C-spine IC (4/29):  Interval progression of extensive enhancing osseous metastatic disease. No evidence of cervical cord compression or cord signal abnormality. Partially visualized enhancing lesion in the region of the pineal gland, presumably reflecting metastatic disease.    *MR T/L-spine IC (4/29): extensive osseous metastatic disease. Interval development of epidural enhancing soft tissue circumferentially throughout the thoracic and lumbar spines. No obvious cord compression or cord edema.    *CT AP (4/29): Innumerable hepatic metastases. New bilateral adrenal nodules likely metastases. Diffuse widespread osseous metastases, increased from prior CT abdomen pelvis.    - DC Ibrance given worsening bone disease and increasing tumor markers per hem/onc   - CA 27-29 (4/27): 996.5  - NSGY recs (4/27): MRI C/T/L spine w/wo given no contraindications   - No cord compression on MRI of her spine  - Taper decadron off by 50% every 3 days  - Palliative following - DNI DNR , hospice cs , pain tx - Bowel regimen   - discontinued letrozole and started Exemestane 25mg daily per Heme/Onc  - s/p Faslodex (5/1)   -family and pt does not want radiation and chemo anymore      #Pancytopenia   - secondary to Ibrance toxicity vs BM infiltration of malignancy   - hem/onc is following, SCD for DVT ppx  - cbc bid  -target plts greater than 10 and hg greater than 7    #Acute hypoxemic respiratory failure   - On 2 L via nasal cannula   - BNP wnl   - CTA chest negative for PE ( tachycardiac)  - LE duplex negative   -incentive jose miguel ordered   -sp lasix   -unasyn for 5 days total     #hyponatremia  #hyposphosphatemia  -likely from decrease oral intake   -replenish prn  -gentle hydration with NS at 50 cc/hr for 12 hrs     #Transaminitis 2/2 liver mets   *CT AP (4/29): Innumerable hepatic metastases.    - monitor     #Hypercalcemia of malignancy (resolved)  - s/p 2 x calcitonin 250 units  - s/p 1 x Zometa 4 mg  -monitor ca     #Hypothyroid   - on synthroid     #right upper extre swelling  -duplex ordered but it was cancelled   -will not order, resolution of edema , likely dependant and also some iv infiltration,        DVT PPx: SCDs given severe thrombocytopenia   GI PPx: PPI   Grave prognosis - DNI DNR , SNF hospice

## 2024-05-09 NOTE — PROGRESS NOTE ADULT - ATTENDING COMMENTS
58-year-old female with PMHx of breast cancer (tx w/ letrozole) with metastases to bone presents for evaluation of generalized weakness and back pain. Per patient she is getting radiation she states that she completed 5 sessions of radiation and was doing well however over the past 24 to 48 hours became weak denies any loss of bladder or bowel control denies any saddle anesthesia denies any focal weakness to the lower extremities denies any fevers or chills. Sent from [facility] for neurosurgery eval (4/27).    #Stage IV Breast Cancer HR+/HER2-, metastatic to bone  #Malignancy related pain   *CTA PE (4/26): No pulmonary embolus seen. Left upper lobe pulmonary nodule presumably representing metastatic lung disease.  *CT Head noncon (4/26): No evidence of acute territorial infarct, intracranial hemorrhage, or midline shift. Significantly increased numerous sclerotic osseous metastasis throughout the calvarium which were predominantly lytic on the prior CT head from 4/21/2023.  *MR C-spine IC (4/29):  Interval progression of extensive enhancing osseous metastatic disease. No evidence of cervical cord compression or cord signal abnormality. Partially visualized enhancing lesion in the region of the pineal gland, presumably reflecting metastatic disease.    *MR T/L-spine IC (4/29): extensive osseous metastatic disease. Interval development of epidural enhancing soft tissue circumferentially throughout the thoracic and lumbar spines. No obvious cord compression or cord edema.    *CT AP (4/29): Innumerable hepatic metastases. New bilateral adrenal nodules likely metastases. Diffuse widespread osseous metastases, increased from prior CT abdomen pelvis.    - DC Ibrance given worsening bone disease and increasing tumor markers per hem/onc   - CA 27-29 (4/27): 996.5  - NSGY recs (4/27): MRI C/T/L spine w/wo given no contraindications   - No cord compression on MRI of her spine  - Taper decadron off by 50% every 3 days  - Palliative following - DNI DNR , hospice cs , pain tx - Bowel regimen   - discontinued letrozole and started Exemestane 25mg daily per Heme/Onc  - s/p Faslodex (5/1)   -family and pt does not want radiation and chemo anymore      #Pancytopenia   - secondary to Ibrance toxicity vs BM infiltration of malignancy   - hem/onc is following, SCD for DVT ppx  - cbc bid  -target plts greater than 10 and hg greater than 7    #Acute hypoxemic respiratory failure   - On 2 L via nasal cannula   - BNP wnl   - CTA chest negative for PE ( tachycardiac)  - LE duplex negative   -incentive jose miguel ordered   -sp lasix   -unasyn for 5 days total     #hyponatremia  #hyposphosphatemia  -likely from decrease oral intake   -replenish prn  -gentle hydration with NS at 50 cc/hr for 12 hrs     #Transaminitis 2/2 liver mets   *CT AP (4/29): Innumerable hepatic metastases.    - monitor     #Hypercalcemia of malignancy (resolved)  - s/p 2 x calcitonin 250 units  - s/p 1 x Zometa 4 mg  -monitor ca     #Hypothyroid   - on synthroid     #right upper extre swelling  -duplex ordered but it was cancelled   -will not order, resolution of edema , likely dependant and also some iv infiltration,        DVT PPx: SCDs given severe thrombocytopenia   GI PPx: PPI   Grave prognosis - DNI DNR , SNF hospice         Total time spent to complete patient's bedside assessment, review medical chart, discuss medical plan of care with covering medical team was more than 35 minutes  with >50% of time spent face to face with patient, discussion with patient/family and/or coordination of care.

## 2024-05-09 NOTE — STROKE CODE NOTE - NSMDCONSULT QTN_Y FT
Pt with AMS, Hgb 7, thrombocytopenic, tachycardic, and AMS, medicine team did not deem stroke code necessary, Pt with AMS, Hgb 7, thrombocytopenic, tachycardic, and AMS, medicine team did not deem stroke code necessary. Medicine team decided to proceed with CTH and order rEEG and will contact neurology team if any abnormalities.

## 2024-05-09 NOTE — PROGRESS NOTE ADULT - SUBJECTIVE AND OBJECTIVE BOX
HPI:  59yo female whose documented PMH  includes hypothyroid, asthma, ETOH abuse (cirrhosis) and breast cancer widely metastatic to bone, status post radiation 5 sessions finished 5/31/23, on letrozole daily, (was on Ribociclib and was discontinued due to low magnesium with high QTc), surgical history of occiput-T2 posterior instrumentation and fusion, ORIF of C2 body with functional decline and impaired ADLs/mobility, ambulates with a walker, following with Dr. Peña , presents to the ER due to back pain present for 2 weeks. Due to persistence, she was advised to come to the ER. No loss of bladder, bowel function but told ER she had generalized weakness. While in the ER patient became tachycardic with pulse ox of 89% so PE study was done (no PE) ER team spoke to neurosurgery team member requesting that patient be sent [place]  (26 Apr 2024 22:23)    Interval history:     -Patient seen at bedside with sister Sandy  -She notes pain better controlled today     ADVANCE DIRECTIVES:     [ ] Full Code [x ] DNR  MOLST  [ ]  Living Will  [ ]   DECISION MAKER(s): Patient   [ ] Health Care Proxy(s)  [ X] Surrogate(s)  [ ] Guardian           Name(s): Phone Number(s): - Pankaj       BASELINE (I)ADL(s) (prior to admission):    [place]: [ ]Total  [ X ] Moderate [ ]Dependent  Palliative Performance Status Version 2:         %    http://npcrc.org/files/news/palliative_performance_scale_[email or web address].pdf    Allergies    No Known Allergies    Intolerances    MEDICATIONS  (STANDING):  ampicillin/sulbactam  IVPB      ampicillin/sulbactam  IVPB 3 Gram(s) IV Intermittent every 6 hours  artificial tears (preservative free) Ophthalmic Solution 1 Drop(s) Both EYES two times a day  bacitracin   Ointment 1 Application(s) Topical two times a day  chlorhexidine 2% Cloths 1 Application(s) Topical <User Schedule>  dexAMETHasone     Tablet 0.5 milliGRAM(s) Oral daily  exemestane 25 milliGRAM(s) Oral daily  levothyroxine 50 MICROGram(s) Oral <User Schedule>  magnesium oxide 400 milliGRAM(s) Oral daily  magnesium sulfate  IVPB 2 Gram(s) IV Intermittent once  melatonin 5 milliGRAM(s) Oral at bedtime  morphine ER Tablet 15 milliGRAM(s) Oral every 12 hours  naloxegol 12.5 milliGRAM(s) Oral daily  pantoprazole    Tablet 40 milliGRAM(s) Oral before breakfast  polyethylene glycol 3350 17 Gram(s) Oral daily  senna 2 Tablet(s) Oral at bedtime  sodium chloride 0.9%. 1000 milliLiter(s) (70 mL/Hr) IV Continuous <Continuous>    MEDICATIONS  (PRN):  albuterol    0.083% 2.5 milliGRAM(s) Nebulizer every 6 hours PRN Shortness of Breath and/or Wheezing  morphine  - Injectable 4 milliGRAM(s) IV Push every 2 hours PRN moderate/severe pain (7-10)        PRESENT SYMPTOMS: [ ]Unable to obtain due to poor mentation   Source if other than patient:  [ ]Family   [ ]Team     Pain: [ X]yes [ ]no  QOL impact -  moderate  Location -                  back  Aggravating factors - movement  Quality - sharp  Radiation - none noted  Timing- none noted  Severity (0-10 scale): mild  Minimal acceptable level (0-10 scale):     CPOT:    https://www.sccm.org/getattachment/vdz01r47-5s7g-[email or web address]-7374a7233q9k/Critical-Care-Pain-Observation-Tool-(CPOT)    PAIN AD Score:   [email or web address]/[email or web address] (press ctrl +  left click to view)    Dyspnea:                           [ X ]None[ ]Mild [ ]Moderate [ ]Severe     Respiratory Distress Observation Scale (RDOS):   A score of 0 to 2 signifies little or no respiratory distress, 3 signifies mild distress, scores 4 to 6 indicate moderate distress, and scores greater than 7 signify severe distress  [email or web address]/[email or web address]-zynrvtcx-[email or web address]-[email or web address]    Anxiety:                             [ x]None[ ]Mild [ ]Moderate [ ]Severe   Fatigue:                             [x ]None[ ]Mild [ ]Moderate [ ]Severe   Nausea:                             [x ]None[ ]Mild [ ]Moderate [ ]Severe   Loss of appetite:              [x ]None[ ]Mild [ ]Moderate [ ]Severe   Constipation:                    [ ]None[x ]Mild [ ]Moderate [ ]Severe    Other Symptoms:  [x ]All other review of systems negative     Palliative Performance Status Version 2:         30%    http://npcrc.org/files/news/palliative_performance_scale_ppsv2.pdf    PHYSICAL EXAM:  Vital Signs Last 24 Hrs  T(C): 36.6 (09 May 2024 13:56), Max: 36.6 (08 May 2024 21:33)  T(F): 97.9 (09 May 2024 13:56), Max: 97.9 (08 May 2024 21:33)  HR: 116 (09 May 2024 13:56) (116 - 121)  BP: 115/80 (09 May 2024 13:56) (109/76 - 115/80)  BP(mean): --  RR: 18 (09 May 2024 13:56) (18 - 18)  SpO2: 91% (09 May 2024 13:56) (91% - 97%)    Parameters below as of 09 May 2024 07:44  Patient On (Oxygen Delivery Method): nasal cannula  O2 Flow (L/min): 5      GENERAL:  [X ] No acute distress [ ]Lethargic  [ ]Unarousable  [ X]Verbal  [ ]Non-Verbal [ ]Cachexia    BEHAVIORAL/PSYCH:  [X ]Alert and Oriented x2-3[ ] Anxiety [ ] Delirium [ ] Agitation [X ] Calm   EYES: [ X] No scleral icterus [ ] Scleral icterus [ ] Closed  ENMT:  [ ]Dry mouth  [X ]No external oral lesions [ X] No external ear or nose lesions  CARDIOVASCULAR:  [ ]Regular [ ]Irregular [ ]Tachy [ ]Not Tachy  [ ]Ahmet [ ] Edema  [ ] No edema  PULMONARY:  [ ]Tachypnea  [ ]Audible excessive secretions [X ] No labored breathing [ ] labored breathing  GASTROINTESTINAL: [ ]Soft  [ ]Distended  [X ]Not distended [ ]Non tender [ ]Tender  MUSCULOSKELETAL: [ XNo clubbing [ ] clubbing  [ ] No cyanosis [ ] cyanosis  NEUROLOGIC: [X ]No focal deficits  [ x]Follows commands  [ ]Does not follow commands  [ ]Cognitive impairment  [ ]Dysphagia  [ ]Dysarthria  [ ]Paresis   SKIN: [ ] Jaundiced [X ] Non-jaundiced [ ]Rash [ ]No Rash [ ] Warm [ ] Dry  MISC/LINES: [ ] ET tube [ ] Trach [ ]NGT/OGT [ ]PEG [ ]Wong    LABS: reviewed by me                          7.2    4.32  )-----------( 45       ( 09 May 2024 08:32 )             21.6       05-09    129<L>  |  91<L>  |  18  ----------------------------<  109<H>  3.7   |  26  |  <0.5<L>    Ca    8.5      09 May 2024 08:32  Phos  1.5     05-09  Mg     1.9     05-09    TPro  5.4<L>  /  Alb  3.5  /  TBili  2.6<H>  /  DBili  x   /  AST  316<H>  /  ALT  98<H>  /  AlkPhos  255<H>  05-09              Urinalysis Basic - ( 09 May 2024 08:32 )    Color: x / Appearance: x / SG: x / pH: x  Gluc: 109 mg/dL / Ketone: x  / Bili: x / Urobili: x   Blood: x / Protein: x / Nitrite: x   Leuk Esterase: x / RBC: x / WBC x   Sq Epi: x / Non Sq Epi: x / Bacteria: x                  CAPILLARY BLOOD GLUCOSE                  RADIOLOGY & ADDITIONAL STUDIES: reviewed by me    < from: Xray Chest 1 View-PORTABLE IMMEDIATE (Xray Chest 1 View-PORTABLE IMMEDIATE .) (05.07.24 @ 16:25) >  impression:    Mandible overlies lung apices obscuring anatomy    Support devices: None    Cardiac/ Mediastinum: Stable    Lungs/ Pleura: Low lung volumes. Patchy opacities both lung bases    Skeletal/ soft tissues: Multiple diffuse blastic osseous metastasis.            < end of copied text >            EKG: reviewed by me    < from: 12 Lead ECG (05.04.24 @ 07:46) >  Ventricular Rate 110 BPM    Atrial Rate 110 BPM    P-R Interval 124 ms    QRS Duration 78 ms    Q-T Interval 326 ms    QTC Calculation(Bazett) 441 ms    P Axis 32 degrees    R Axis 25 degrees    T Axis 0 degrees    Diagnosis Line Sinus tachycardia  Cannot rule out Inferior infarct , age undetermined  Abnormal ECG    < end of copied text >        PROTEIN CALORIE MALNUTRITION PRESENT: [ ]mild [ ]moderate [ ]severe [ ]underweight [ ]morbid obesity  https://www.andeal.org/vault/5141/web/files/ONC/Table_Clinical%20Characteristics%20to%20Document%20Malnutrition-White%20JV%20et%20al%624603.pdf    Height (cm): 162.6 (04-29-24 @ 19:42), 162.6 (07-16-23 @ 17:21)  Weight (kg): 61.3 (04-26-24 @ 22:53), 72.6 (07-13-23 @ 13:00)  BMI (kg/m2): 23.2 (04-29-24 @ 19:42), 23.2 (04-26-24 @ 22:53), 27.5 (07-16-23 @ 17:21)  [ ]PPSV2 < or = to 30% [ ]significant weight loss  [ ]poor nutritional intake  [ ]anasarca      [ ]Artificial Nutrition      Palliative Care Spiritual/Emotional Screening Tool Question  Severity (0-4):                    OR                    [ x] Unable to determine. Will assess at later time if appropriate.  Score of 2 or greater indicates recommendation of Chaplaincy and/or SW referral  Chaplaincy Referral: [ ] Yes [ ] Refused [ ] Following     Caregiver Donna:  [ ] Yes [ ] No    OR    [x ] Unable to determine. Will assess at later time if appropriate.  Social Work Referral [ ]  Patient and Family Centered Care Referral [ ]    Anticipatory Grief Present: [ ] Yes [ ] No    OR     [ x] Unable to determine. Will assess at later time if appropriate.  Social Work Referral [ ]  Patient and Family Centered Care Referral [ ]    Patient discussed with primary medical team MD  Palliative care education provided to patient and/or family

## 2024-05-09 NOTE — PROGRESS NOTE ADULT - SUBJECTIVE AND OBJECTIVE BOX
24H events:    Today is hospital day 13d. This morning patient was seen and examined at bedside, resting comfortably in bed.    No acute or major events overnight.    PAST MEDICAL & SURGICAL HISTORY  Hypothyroidism    H/O cirrhosis    Breast cancer    No significant past surgical history        SOCIAL HISTORY:  Social History:  former alcohol abuse, denies tobacco use (26 Apr 2024 22:23)      ALLERGIES:  No Known Allergies      MEDICATIONS:  STANDING MEDICATIONS  ampicillin/sulbactam  IVPB      ampicillin/sulbactam  IVPB 3 Gram(s) IV Intermittent every 6 hours  artificial tears (preservative free) Ophthalmic Solution 1 Drop(s) Both EYES two times a day  bacitracin   Ointment 1 Application(s) Topical two times a day  chlorhexidine 2% Cloths 1 Application(s) Topical <User Schedule>  dexAMETHasone  Injectable 1 milliGRAM(s) IV Push <User Schedule>  exemestane 25 milliGRAM(s) Oral daily  levothyroxine 50 MICROGram(s) Oral <User Schedule>  magnesium oxide 400 milliGRAM(s) Oral daily  magnesium sulfate  IVPB 2 Gram(s) IV Intermittent once  melatonin 5 milliGRAM(s) Oral at bedtime  morphine ER Tablet 15 milliGRAM(s) Oral every 12 hours  naloxegol 12.5 milliGRAM(s) Oral daily  pantoprazole    Tablet 40 milliGRAM(s) Oral before breakfast  polyethylene glycol 3350 17 Gram(s) Oral daily  senna 2 Tablet(s) Oral at bedtime    PRN MEDICATIONS  albuterol    0.083% 2.5 milliGRAM(s) Nebulizer every 6 hours PRN  morphine  - Injectable 4 milliGRAM(s) IV Push every 2 hours PRN      VITALS:   T(C): 36.1 (05-09-24 @ 06:15), Max: 36.6 (05-08-24 @ 21:33)  HR: 117 (05-09-24 @ 06:15) (117 - 121)  BP: 113/81 (05-09-24 @ 06:15) (109/76 - 120/88)  RR: 18 (05-09-24 @ 06:15) (18 - 18)  SpO2: 95% (05-09-24 @ 06:15) (95% - 97%)  I&O's Summary        PHYSICAL EXAM:      LABS:                        7.9    4.93  )-----------( 23       ( 08 May 2024 23:30 )             22.9     05-08    134<L>  |  95<L>  |  21<H>  ----------------------------<  121<H>  4.0   |  25  |  0.5<L>    Ca    8.5      08 May 2024 08:25  Phos  2.2     05-08  Mg     1.7     05-08    TPro  5.4<L>  /  Alb  3.7  /  TBili  2.1<H>  /  DBili  x   /  AST  304<H>  /  ALT  92<H>  /  AlkPhos  294<H>  05-08      Urinalysis Basic - ( 08 May 2024 08:25 )    Color: x / Appearance: x / SG: x / pH: x  Gluc: 121 mg/dL / Ketone: x  / Bili: x / Urobili: x   Blood: x / Protein: x / Nitrite: x   Leuk Esterase: x / RBC: x / WBC x   Sq Epi: x / Non Sq Epi: x / Bacteria: x                     24H events:    Today is hospital day 13d. This morning patient was seen and examined at bedside, resting comfortably in bed.    No acute or major events overnight.    PAST MEDICAL & SURGICAL HISTORY  Hypothyroidism    H/O cirrhosis    Breast cancer    No significant past surgical history        SOCIAL HISTORY:  Social History:  former alcohol abuse, denies tobacco use (26 Apr 2024 22:23)      ALLERGIES:  No Known Allergies      MEDICATIONS:  STANDING MEDICATIONS  ampicillin/sulbactam  IVPB      ampicillin/sulbactam  IVPB 3 Gram(s) IV Intermittent every 6 hours  artificial tears (preservative free) Ophthalmic Solution 1 Drop(s) Both EYES two times a day  bacitracin   Ointment 1 Application(s) Topical two times a day  chlorhexidine 2% Cloths 1 Application(s) Topical <User Schedule>  dexAMETHasone  Injectable 1 milliGRAM(s) IV Push <User Schedule>  exemestane 25 milliGRAM(s) Oral daily  levothyroxine 50 MICROGram(s) Oral <User Schedule>  magnesium oxide 400 milliGRAM(s) Oral daily  magnesium sulfate  IVPB 2 Gram(s) IV Intermittent once  melatonin 5 milliGRAM(s) Oral at bedtime  morphine ER Tablet 15 milliGRAM(s) Oral every 12 hours  naloxegol 12.5 milliGRAM(s) Oral daily  pantoprazole    Tablet 40 milliGRAM(s) Oral before breakfast  polyethylene glycol 3350 17 Gram(s) Oral daily  senna 2 Tablet(s) Oral at bedtime    PRN MEDICATIONS  albuterol    0.083% 2.5 milliGRAM(s) Nebulizer every 6 hours PRN  morphine  - Injectable 4 milliGRAM(s) IV Push every 2 hours PRN      VITALS:   T(C): 36.1 (05-09-24 @ 06:15), Max: 36.6 (05-08-24 @ 21:33)  HR: 117 (05-09-24 @ 06:15) (117 - 121)  BP: 113/81 (05-09-24 @ 06:15) (109/76 - 120/88)  RR: 18 (05-09-24 @ 06:15) (18 - 18)  SpO2: 95% (05-09-24 @ 06:15) (95% - 97%)  I&O's Summary        PHYSICAL EXAM:     GENERAL: NAD, appears fatigued  HEENT:  dry, lips and skin , bruise under left eye improving   PULMONARY: Clear to auscultation bilaterally on anterior respiratory exam. No wheezing or crackles  CARDIOVASCULAR: Regular rate and rhythm, S1-S2, no murmurs, rubs, or gallops  GASTROINTESTINAL: Soft, non-tender, non-distended, no guarding  SKIN/EXTREMITIES: Warm, + tibialis posterior pulses, no UE or LE edema, no petichiae  NEUROLOGIC/MUSCULOSKELETAL: AOx4, able to lift both arms, has difficulty lifting legs but is able to bend both, sensation intact to light touch bilaterally in UE and LE     LABS:                        7.9    4.93  )-----------( 23       ( 08 May 2024 23:30 )             22.9     05-08    134<L>  |  95<L>  |  21<H>  ----------------------------<  121<H>  4.0   |  25  |  0.5<L>    Ca    8.5      08 May 2024 08:25  Phos  2.2     05-08  Mg     1.7     05-08    TPro  5.4<L>  /  Alb  3.7  /  TBili  2.1<H>  /  DBili  x   /  AST  304<H>  /  ALT  92<H>  /  AlkPhos  294<H>  05-08      Urinalysis Basic - ( 08 May 2024 08:25 )    Color: x / Appearance: x / SG: x / pH: x  Gluc: 121 mg/dL / Ketone: x  / Bili: x / Urobili: x   Blood: x / Protein: x / Nitrite: x   Leuk Esterase: x / RBC: x / WBC x   Sq Epi: x / Non Sq Epi: x / Bacteria: x                     24H events:    Today is hospital day 13d. This morning patient was seen and examined at bedside, resting comfortably in bed.    No acute or major events overnight.    PAST MEDICAL & SURGICAL HISTORY  Hypothyroidism    H/O cirrhosis    Breast cancer    No significant past surgical history        SOCIAL HISTORY:  Social History:  former alcohol abuse, denies tobacco use (26 Apr 2024 22:23)      ALLERGIES:  No Known Allergies      MEDICATIONS:  STANDING MEDICATIONS  ampicillin/sulbactam  IVPB      ampicillin/sulbactam  IVPB 3 Gram(s) IV Intermittent every 6 hours  artificial tears (preservative free) Ophthalmic Solution 1 Drop(s) Both EYES two times a day  bacitracin   Ointment 1 Application(s) Topical two times a day  chlorhexidine 2% Cloths 1 Application(s) Topical <User Schedule>  dexAMETHasone  Injectable 1 milliGRAM(s) IV Push <User Schedule>  exemestane 25 milliGRAM(s) Oral daily  levothyroxine 50 MICROGram(s) Oral <User Schedule>  magnesium oxide 400 milliGRAM(s) Oral daily  magnesium sulfate  IVPB 2 Gram(s) IV Intermittent once  melatonin 5 milliGRAM(s) Oral at bedtime  morphine ER Tablet 15 milliGRAM(s) Oral every 12 hours  naloxegol 12.5 milliGRAM(s) Oral daily  pantoprazole    Tablet 40 milliGRAM(s) Oral before breakfast  polyethylene glycol 3350 17 Gram(s) Oral daily  senna 2 Tablet(s) Oral at bedtime    PRN MEDICATIONS  albuterol    0.083% 2.5 milliGRAM(s) Nebulizer every 6 hours PRN  morphine  - Injectable 4 milliGRAM(s) IV Push every 2 hours PRN      VITALS:   T(C): 36.1 (05-09-24 @ 06:15), Max: 36.6 (05-08-24 @ 21:33)  HR: 117 (05-09-24 @ 06:15) (117 - 121)  BP: 113/81 (05-09-24 @ 06:15) (109/76 - 120/88)  RR: 18 (05-09-24 @ 06:15) (18 - 18)  SpO2: 95% (05-09-24 @ 06:15) (95% - 97%)  I&O's Summary        PHYSICAL EXAM:     GENERAL: NAD, appears fatigued  HEENT:  dry, lips and skin , bruise under left eye improving   PULMONARY: Clear to auscultation bilaterally on anterior respiratory exam. No wheezing or crackles  CARDIOVASCULAR: Regular rate and rhythm, S1-S2, no murmurs, rubs, or gallops  GASTROINTESTINAL: Soft, non-tender, non-distended, no guarding  SKIN/EXTREMITIES: Warm, + tibialis posterior pulses, no UE or LE edema, no petichiae  NEUROLOGIC/MUSCULOSKELETAL: AOx4, able to lift both arms, has difficulty lifting legs but is able to bend both, sensation intact to light touch bilaterally in UE and LE         LABS:                        7.2    4.32  )-----------( 45       ( 09 May 2024 08:32 )             21.6     WBC trend: 4.32 <--, 4.93 <--, 4.79 <--, 4.98 <--, 4.87 <--, 5.57 <--, 5.33 <--  Hgb: 7.2 [05-09-24 @ 08:32]<--, 7.9 [05-08-24 @ 23:30]<--, 8.0 [05-08-24 @ 18:23]<--, 8.5 [05-08-24 @ 08:25]<--, 8.1 [05-07-24 @ 11:54]<--    05-09    129<L>  |  91<L>  |  18  ----------------------------<  109<H>  3.7   |  26  |  <0.5<L>    Ca    8.5      09 May 2024 08:32  Phos  1.5     05-09  Mg     1.9     05-09    TPro  5.4<L>  /  Alb  3.5  /  TBili  2.6<H>  /  DBili  x   /  AST  316<H>  /  ALT  98<H>  /  AlkPhos  255<H>  05-09    Creatinine trend: Creatinine Trend: <0.5<--, 0.5<--, <0.5<--, <0.5<--, <0.5<--, <0.5<--  SODIUM TREND: Sodium 129 [05-09 @ 08:32]<--, Sodium 134 [05-08 @ 08:25]<--, Sodium 138 [05-07 @ 11:54]<--, Sodium 137 [05-06 @ 08:55]<--, Sodium 132 [05-05 @ 09:58]<--  <0.5 [05-09-24 @ 08:32]<--    POC Glucose:    D-Dimer Assay, Quantitative: 87444 ng/mL DDU (04-29-24 @ 16:00)    Urinalysis Basic - ( 09 May 2024 08:32 )    Color: x / Appearance: x / SG: x / pH: x  Gluc: 109 mg/dL / Ketone: x  / Bili: x / Urobili: x   Blood: x / Protein: x / Nitrite: x   Leuk Esterase: x / RBC: x / WBC x   Sq Epi: x / Non Sq Epi: x / Bacteria: x                                             -------------------------------------------------

## 2024-05-09 NOTE — RAPID RESPONSE TEAM SUMMARY - NSOTHERINTERVENTIONSRRT_GEN_ALL_CORE
STAT orders:   labs- lactate, CMP, CBC, APTT, PT/INR, CK, CKMB, trops, BCx ordered.   Imaging- CXR, CT Head non con ordered  ECG ordered. STAT orders:   labs- lactate, CMP, CBC, APTT, PT/INR, CK, CKMB, trops, BCx, MRSA nares ordered.   Imaging- CXR, CT Head non con ordered  ECG ordered.      Attending Attestation:    I have personally and independently provided 60 minutes of critical care services. This excludes any time spent on separate procedures or teaching.        STAT orders:   - Lab: LA, CMP, CBC, APTT, PT/INR, CK, CKMB, trops, BCx, MRSA nares ordered.   Imaging- CXR, STAT CTH non con ordered  ECG ordered.      Attending Attestation:    I have personally and independently provided 60 minutes of critical care services. This excludes any time spent on separate procedures or teaching.

## 2024-05-09 NOTE — RAPID RESPONSE TEAM SUMMARY - NSSITUATIONBACKGROUNDRRT_GEN_ALL_CORE
2133 RN to provider: Patient is altered.    Patient seen and examined bedside. Patient was lethargic and would respond to all questions with "May 9th". Per nurse, the patient was conversational and alert at the start of her shift. Vitals signs showed tachycardia with HR in 130s (baseline HR of ~120 past 2 days). Otherwise within normal limits on 3L O2. No focal deficits were note on neuro exam, and patient demonstrating understanding by following commands such as raise your arm, smile etc. However, patient continued to answer all questions with "may 9th" for a period of 5-10minutes. CODE stroke was called for new-onset dysarthria. Clinical utility of full stroke protocol was discussed with Neurology. Due to limited treatment options and limited clinical utility, CT head non-con will be assessed first, w/ rEEG per Neuro.  2132 RN to provider: Patient is altered.    Patient seen and examined bedside. Patient was lethargic and would respond to all questions with "May 9th". Per nurse, the patient was conversational and alert at the start of her shift. Vitals signs showed tachycardia with HR in 130s (baseline HR of ~110-120 past 2 days). Otherwise within normal limits on 3L O2. No focal deficits were note on neuro exam, and patient demonstrating understanding by following commands such as raise your arm, smile etc. However, patient continued to answer all questions with "may 9th" for a period of 5 minutes. CODE stroke was called for new-onset dysarthria. However, patient's mental status and speech back to baseline with A & O x 4 after few mins into RRT. Clinical utility of full stroke protocol was discussed with Neurology. Due to limited treatment options and limited clinical utility, STAT CT head non-con will be assessed first, w/ rEEG per Neuro.  2132 RN to provider: Patient is altered.    Patient seen and examined bedside. Patient was lethargic and would respond to all questions with "May 9th". Per nurse, the patient was conversational and alert at the start of her shift. Vitals signs showed tachycardia with HR in 130s (baseline HR of ~110-120 past 2 days). Otherwise within normal limits on 3L O2. No focal deficits were note on neuro exam, and patient demonstrating understanding by following commands such as raise your arm, smile etc. However, patient continued to answer all questions with "may 9th" for a period of 5 minutes. CODE stroke was called for new-onset dysarthria. However, patient's mental status and speech back to baseline with A & O x 4 after few mins into RRT. /70, HR ~ 135, Temp 99.4 (oral). Clinical utility of full stroke protocol was discussed with Neurology. Due to limited treatment options and limited clinical utility, STAT CT head non-con will be assessed first, w/ rEEG per Neuro.

## 2024-05-10 NOTE — CHART NOTE - NSCHARTNOTESELECT_GEN_ALL_CORE
GOC/Event Note
Nutrition Services
Palliative Care - Social Work/Event Note
Event Note
Palliative Care - Social Work/Event Note

## 2024-05-10 NOTE — PROGRESS NOTE ADULT - ASSESSMENT
58-year-old female with PMHx of breast cancer (tx w/ letrozole) with metastases to bone presents for evaluation of generalized weakness and back pain. Per patient she is getting radiation she states that she completed 5 sessions of radiation and was doing well however over the past 24 to 48 hours became weak denies any loss of bladder or bowel control denies any saddle anesthesia denies any focal weakness to the lower extremities denies any fevers or chills. Sent from [facility] for neurosurgery eval (4/27).    #Stage IV Breast Cancer HR+/HER2-, metastatic to bone  #Malignancy related pain   *CTA PE (4/26): No pulmonary embolus seen. Left upper lobe pulmonary nodule presumably representing metastatic lung disease.  *CT Head noncon (4/26): No evidence of acute territorial infarct, intracranial hemorrhage, or midline shift. Significantly increased numerous sclerotic osseous metastasis throughout the calvarium which were predominantly lytic on the prior CT head from 4/21/2023.  *MR C-spine IC (4/29):  Interval progression of extensive enhancing osseous metastatic disease. No evidence of cervical cord compression or cord signal abnormality. Partially visualized enhancing lesion in the region of the pineal gland, presumably reflecting metastatic disease.    *MR T/L-spine IC (4/29): extensive osseous metastatic disease. Interval development of epidural enhancing soft tissue circumferentially throughout the thoracic and lumbar spines. No obvious cord compression or cord edema.    *CT AP (4/29): Innumerable hepatic metastases. New bilateral adrenal nodules likely metastases. Diffuse widespread osseous metastases, increased from prior CT abdomen pelvis.    - DC Ibrance given worsening bone disease and increasing tumor markers per hem/onc   - CA 27-29 (4/27): 996.5  - NSGY recs (4/27): MRI C/T/L spine w/wo given no contraindications   - No cord compression on MRI of her spine  - Taper decadron off by 50% every 3 days  - Palliative following - DNI DNR , hospice cs , pain tx - Bowel regimen   - discontinued letrozole and started Exemestane 25mg daily per Heme/Onc  - s/p Faslodex (5/1)   -family and pt does not want radiation and chemo anymore      #Pancytopenia   - secondary to Ibrance toxicity vs BM infiltration of malignancy   - hem/onc is following, SCD for DVT ppx  - cbc bid  -target plts greater than 10 and hg greater than 7    #Acute hypoxemic respiratory failure   - On 2 L via nasal cannula   - BNP wnl   - CTA chest negative for PE ( tachycardiac)  - LE duplex negative   -incentive jose miguel ordered   -sp lasix   -unasyn for 5 days total     #hyponatremia  #hyposphosphatemia  -likely from decrease oral intake   -replenish prn  -gentle hydration with NS at 50 cc/hr for 12 hrs     #Transaminitis 2/2 liver mets   *CT AP (4/29): Innumerable hepatic metastases.    - monitor     #Hypercalcemia of malignancy (resolved)  - s/p 2 x calcitonin 250 units  - s/p 1 x Zometa 4 mg  -monitor ca     #Hypothyroid   - on synthroid     #right upper extre swelling  -duplex ordered but it was cancelled   -will not order, resolution of edema , likely dependant and also some iv infiltration,        DVT PPx: SCDs given severe thrombocytopenia   GI PPx: PPI   Grave prognosis - DNI DNR , SNF hospice    58-year-old female with PMHx of breast cancer (tx w/ letrozole) with metastases to bone presents for evaluation of generalized weakness and back pain. Per patient she is getting radiation she states that she completed 5 sessions of radiation and was doing well however over the past 24 to 48 hours became weak denies any loss of bladder or bowel control denies any saddle anesthesia denies any focal weakness to the lower extremities denies any fevers or chills. Sent from [facility] for neurosurgery eval (4/27).    #Stage IV Breast Cancer HR+/HER2-, metastatic to bone  #Malignancy related pain   #AHRF  #pancytopenia  #lactic acidosis   #transaminitis from liver mets  #electrolytes disturbances   *CTA PE (4/26): No pulmonary embolus seen. Left upper lobe pulmonary nodule presumably representing metastatic lung disease.  *CT Head noncon (4/26): No evidence of acute territorial infarct, intracranial hemorrhage, or midline shift. Significantly increased numerous sclerotic osseous metastasis throughout the calvarium which were predominantly lytic on the prior CT head from 4/21/2023.  *MR C-spine IC (4/29):  Interval progression of extensive enhancing osseous metastatic disease. No evidence of cervical cord compression or cord signal abnormality. Partially visualized enhancing lesion in the region of the pineal gland, presumably reflecting metastatic disease.    *MR T/L-spine IC (4/29): extensive osseous metastatic disease. Interval development of epidural enhancing soft tissue circumferentially throughout the thoracic and lumbar spines. No obvious cord compression or cord edema.    *CT AP (4/29): Innumerable hepatic metastases. New bilateral adrenal nodules likely metastases. Diffuse widespread osseous metastases, increased from prior CT abdomen pelvis.    - DC Ibrance given worsening bone disease and increasing tumor markers per hem/onc   - CA 27-29 (4/27): 996.5  - NSGY recs (4/27): MRI C/T/L spine w/wo given no contraindications   - No cord compression on MRI of her spine  - Taper decadron off by 50% every 3 days  - Palliative following - DNI DNR , hospice cs , pain tx - Bowel regimen   - discontinued letrozole and started Exemestane 25mg daily per Heme/Onc  - s/p Faslodex (5/1)   -family and pt does not want radiation and chemo anymore> CMO today               58-year-old female with PMHx of breast cancer (tx w/ letrozole) with metastases to bone presents for evaluation of generalized weakness and back pain. Per patient she is getting radiation she states that she completed 5 sessions of radiation and was doing well however over the past 24 to 48 hours became weak denies any loss of bladder or bowel control denies any saddle anesthesia denies any focal weakness to the lower extremities denies any fevers or chills. Sent from [facility] for neurosurgery eval (4/27).    #Stage IV Breast Cancer HR+/HER2-, metastatic to bone  #Malignancy related pain   #AHRF  #pancytopenia  #lactic acidosis   #transaminitis from liver mets  #electrolytes disturbances   *CTA PE (4/26): No pulmonary embolus seen. Left upper lobe pulmonary nodule presumably representing metastatic lung disease.  *CT Head noncon (4/26): No evidence of acute territorial infarct, intracranial hemorrhage, or midline shift. Significantly increased numerous sclerotic osseous metastasis throughout the calvarium which were predominantly lytic on the prior CT head from 4/21/2023.  *MR C-spine IC (4/29):  Interval progression of extensive enhancing osseous metastatic disease. No evidence of cervical cord compression or cord signal abnormality. Partially visualized enhancing lesion in the region of the pineal gland, presumably reflecting metastatic disease.    *MR T/L-spine IC (4/29): extensive osseous metastatic disease. Interval development of epidural enhancing soft tissue circumferentially throughout the thoracic and lumbar spines. No obvious cord compression or cord edema.    *CT AP (4/29): Innumerable hepatic metastases. New bilateral adrenal nodules likely metastases. Diffuse widespread osseous metastases, increased from prior CT abdomen pelvis.    - DC Ibrance given worsening bone disease and increasing tumor markers per hem/onc   - CA 27-29 (4/27): 996.5  - NSGY recs (4/27): MRI C/T/L spine w/wo given no contraindications   - No cord compression on MRI of her spine  - Taper decadron off by 50% every 3 days  - Palliative following - DNI DNR , hospice cs , pain tx - Bowel regimen. meds for comfort adjusted by palliative care  - discontinued letrozole and started Exemestane 25mg daily per Heme/Onc  - s/p Faslodex (5/1)   -family and pt does not want radiation and chemo anymore> CMO today

## 2024-05-10 NOTE — PROGRESS NOTE ADULT - NUTRITIONAL ASSESSMENT
This patient has been assessed with a concern for Malnutrition and has been determined to have a diagnosis/diagnoses of Severe protein-calorie malnutrition.    This patient is being managed with:   Diet Pureed-     Qty per Day:  Magic Cup 3x daily  Prosource Gelatein Plus     Qty per Day:  once daily  Supplement Feeding Modality:  Oral  Ensure Max Cans or Servings Per Day:  1       Frequency:  Three Times a day  Entered: Apr 30 2024 12:02AM  
This patient has been assessed with a concern for Malnutrition and has been determined to have a diagnosis/diagnoses of Severe protein-calorie malnutrition.    This patient is being managed with:   Diet Pureed-     Qty per Day:  Magic Cup 3x daily  Prosource Gelatein Plus     Qty per Day:  once daily  Supplement Feeding Modality:  Oral  Ensure Max Cans or Servings Per Day:  1       Frequency:  Three Times a day  Entered: Apr 30 2024 12:02AM    Diet Pureed-  Supplement Feeding Modality:  Oral  Ensure Pudding Cans or Servings Per Day:  3       Frequency:  Daily  Ensure Max Cans or Servings Per Day:  3       Frequency:  Daily  Entered: Apr 27 2024 12:44PM    The following pending diet order is being considered for treatment of Severe protein-calorie malnutrition:null
This patient has been assessed with a concern for Malnutrition and has been determined to have a diagnosis/diagnoses of Severe protein-calorie malnutrition.    This patient is being managed with:   Diet NPO-     Special Instructions for Nursing:  CODE STROKE; NPO until Dysphagia screen or Speech Bedside Swallow Evaluation completed and passed  Entered: May  9 2024  9:52PM  
This patient has been assessed with a concern for Malnutrition and has been determined to have a diagnosis/diagnoses of Severe protein-calorie malnutrition.    This patient is being managed with:   Diet Pureed-     Qty per Day:  Magic Cup 3x daily  Prosource Gelatein Plus     Qty per Day:  once daily  Supplement Feeding Modality:  Oral  Ensure Max Cans or Servings Per Day:  1       Frequency:  Three Times a day  Entered: Apr 30 2024 12:02AM  
This patient has been assessed with a concern for Malnutrition and has been determined to have a diagnosis/diagnoses of Severe protein-calorie malnutrition.    This patient is being managed with:   Diet Pureed-     Qty per Day:  Magic Cup 3x daily  Prosource Gelatein Plus     Qty per Day:  once daily  Supplement Feeding Modality:  Oral  Ensure Max Cans or Servings Per Day:  1       Frequency:  Three Times a day  Entered: Apr 30 2024 12:02AM    Diet Pureed-  Supplement Feeding Modality:  Oral  Ensure Pudding Cans or Servings Per Day:  3       Frequency:  Daily  Ensure Max Cans or Servings Per Day:  3       Frequency:  Daily  Entered: Apr 27 2024 12:44PM    The following pending diet order is being considered for treatment of Severe protein-calorie malnutrition:null
This patient has been assessed with a concern for Malnutrition and has been determined to have a diagnosis/diagnoses of Severe protein-calorie malnutrition.    This patient is being managed with:   Diet Pureed-     Qty per Day:  Magic Cup 3x daily  Prosource Gelatein Plus     Qty per Day:  once daily  Supplement Feeding Modality:  Oral  Ensure Max Cans or Servings Per Day:  1       Frequency:  Three Times a day  Entered: Apr 30 2024 12:02AM  
This patient has been assessed with a concern for Malnutrition and has been determined to have a diagnosis/diagnoses of Severe protein-calorie malnutrition.    This patient is being managed with:   Diet Pureed-     Qty per Day:  Magic Cup 3x daily  Prosource Gelatein Plus     Qty per Day:  once daily  Supplement Feeding Modality:  Oral  Ensure Max Cans or Servings Per Day:  1       Frequency:  Three Times a day  Entered: Apr 30 2024 12:02AM    Diet Pureed-  Supplement Feeding Modality:  Oral  Ensure Pudding Cans or Servings Per Day:  3       Frequency:  Daily  Ensure Max Cans or Servings Per Day:  3       Frequency:  Daily  Entered: Apr 27 2024 12:44PM    The following pending diet order is being considered for treatment of Severe protein-calorie malnutrition:null
This patient has been assessed with a concern for Malnutrition and has been determined to have a diagnosis/diagnoses of Severe protein-calorie malnutrition.    This patient is being managed with:   Diet Pureed-  Supplement Feeding Modality:  Oral  Ensure Max Cans or Servings Per Day:  1       Frequency:  Three Times a day  Entered: May  7 2024 12:19PM  
This patient has been assessed with a concern for Malnutrition and has been determined to have a diagnosis/diagnoses of Severe protein-calorie malnutrition.    This patient is being managed with:   Diet Pureed-     Qty per Day:  Magic Cup 3x daily  Prosource Gelatein Plus     Qty per Day:  once daily  Supplement Feeding Modality:  Oral  Ensure Max Cans or Servings Per Day:  1       Frequency:  Three Times a day  Entered: Apr 30 2024 12:02AM  
This patient has been assessed with a concern for Malnutrition and has been determined to have a diagnosis/diagnoses of Severe protein-calorie malnutrition.    This patient is being managed with:   Diet Pureed-     Qty per Day:  Magic Cup 3x daily  Prosource Gelatein Plus     Qty per Day:  once daily  Supplement Feeding Modality:  Oral  Ensure Max Cans or Servings Per Day:  1       Frequency:  Three Times a day  Entered: Apr 30 2024 12:02AM    Diet Pureed-  Supplement Feeding Modality:  Oral  Ensure Pudding Cans or Servings Per Day:  3       Frequency:  Daily  Ensure Max Cans or Servings Per Day:  3       Frequency:  Daily  Entered: Apr 27 2024 12:44PM    The following pending diet order is being considered for treatment of Severe protein-calorie malnutrition:null
This patient has been assessed with a concern for Malnutrition and has been determined to have a diagnosis/diagnoses of Severe protein-calorie malnutrition.    This patient is being managed with:   Diet Pureed-     Qty per Day:  Magic Cup 3x daily  Prosource Gelatein Plus     Qty per Day:  once daily  Supplement Feeding Modality:  Oral  Ensure Max Cans or Servings Per Day:  1       Frequency:  Three Times a day  Entered: Apr 30 2024 12:02AM    Diet Pureed-  Supplement Feeding Modality:  Oral  Ensure Pudding Cans or Servings Per Day:  3       Frequency:  Daily  Ensure Max Cans or Servings Per Day:  3       Frequency:  Daily  Entered: Apr 27 2024 12:44PM    The following pending diet order is being considered for treatment of Severe protein-calorie malnutrition:null

## 2024-05-10 NOTE — PROGRESS NOTE ADULT - PROVIDER SPECIALTY LIST ADULT
Internal Medicine
Palliative Care
Rad Onc
Heme/Onc
Heme/Onc
Hospitalist
Internal Medicine
Palliative Care

## 2024-05-10 NOTE — PROGRESS NOTE ADULT - SUBJECTIVE AND OBJECTIVE BOX
HPI:  59yo female whose documented PMH  includes hypothyroid, asthma, ETOH abuse (cirrhosis) and breast cancer widely metastatic to bone, status post radiation 5 sessions finished 5/31/23, on letrozole daily, (was on Ribociclib and was discontinued due to low magnesium with high QTc), surgical history of occiput-T2 posterior instrumentation and fusion, ORIF of C2 body with functional decline and impaired ADLs/mobility, ambulates with a walker, following with Dr. Peña , presents to the ER due to back pain present for 2 weeks. Due to persistence, she was advised to come to the ER. No loss of bladder, bowel function but told ER she had generalized weakness. While in the ER patient became tachycardic with pulse ox of 89% so PE study was done (no PE) ER team spoke to neurosurgery team member requesting that patient be sent [place]  (26 Apr 2024 22:23)    Interval history:     -Patient seen at bedside with , sister, and daughter  -Patient with clinical decline overnight, now comfort measures only     ADVANCE DIRECTIVES:     [ ] Full Code [x ] DNR  MOLST  [ ]  Living Will  [ ]   DECISION MAKER(s): Patient   [ ] Health Care Proxy(s)  [ X] Surrogate(s)  [ ] Guardian           Name(s): Phone Number(s): - Pankaj       BASELINE (I)ADL(s) (prior to admission):    [place]: [ ]Total  [ X ] Moderate [ ]Dependent  Palliative Performance Status Version 2:         %    http://npcrc.org/files/news/palliative_performance_scale_[email or web address].pdf    Allergies    No Known Allergies    Intolerances    MEDICATIONS  (STANDING):  artificial tears (preservative free) Ophthalmic Solution 1 Drop(s) Both EYES two times a day  dexAMETHasone     Tablet 0.5 milliGRAM(s) Oral daily  levothyroxine 50 MICROGram(s) Oral <User Schedule>  magnesium oxide 400 milliGRAM(s) Oral daily  magnesium sulfate  IVPB 2 Gram(s) IV Intermittent once  melatonin 5 milliGRAM(s) Oral at bedtime  morphine  Infusion 2 mG/Hr (2 mL/Hr) IV Continuous <Continuous>  naloxegol 12.5 milliGRAM(s) Oral daily  pantoprazole    Tablet 40 milliGRAM(s) Oral before breakfast  polyethylene glycol 3350 17 Gram(s) Oral daily  senna 2 Tablet(s) Oral at bedtime    MEDICATIONS  (PRN):  albuterol    0.083% 2.5 milliGRAM(s) Nebulizer every 6 hours PRN Shortness of Breath and/or Wheezing  glycopyrrolate Injectable 0.2 milliGRAM(s) IV Push every 6 hours PRN secretions  LORazepam   Injectable 1 milliGRAM(s) IV Push every 1 hour PRN anxiety/atgitation  morphine  - Injectable 6 milliGRAM(s) IV Push every 1 hour PRN pain/dyspnea      PRESENT SYMPTOMS: [x ]Unable to obtain due to poor mentation   Source if other than patient:  [ ]Family   [ ]Team     Pain: [ ]yes [ ]no  QOL impact -  moderate  Location -                  back  Aggravating factors - movement  Quality - sharp  Radiation - none noted  Timing- none noted  Severity (0-10 scale): mild  Minimal acceptable level (0-10 scale):     CPOT:  2  https://[email or web address]/Critical-Care-[email or web address]-Tool-(CPOT)    PAIN AD Score:   [email or web address] (press ctrl +  left click to view)    Dyspnea:                           [  ]None[ ]Mild [ ]Moderate [ ]Severe     Respiratory Distress Observation Scale (RDOS): 4  A score of 0 to 2 signifies little or no respiratory distress, 3 signifies mild distress, scores 4 to 6 indicate moderate distress, and scores greater than 7 signify severe distress  [email or web address]-[email or web address]-[email or web address]    Anxiety:                             [ x]None[ ]Mild [ ]Moderate [ ]Severe   Fatigue:                             [x ]None[ ]Mild [ ]Moderate [ ]Severe   Nausea:                             [x ]None[ ]Mild [ ]Moderate [ ]Severe   Loss of appetite:              [x ]None[ ]Mild [ ]Moderate [ ]Severe   Constipation:                    [ ]None[x ]Mild [ ]Moderate [ ]Severe    Other Symptoms:  [x ]All other review of systems negative     Palliative Performance Status Version 2:         30%    [email or web address]/[email or web address]_performance_[email or web address]_[email or web address].pdf    PHYSICAL EXAM:  Vital Signs Last 24 Hrs  T(C): 37.1 (10 May 2024 06:50), Max: 37.4 (09 May 2024 21:32)  T(F): 98.7 (10 May 2024 06:50), Max: 99.4 (09 May 2024 21:32)  HR: 129 (10 May 2024 08:01) (116 - 138)  BP: 97/62 (10 May 2024 08:01) (87/61 - 118/84)  BP(mean): --  RR: 18 (10 May 2024 06:50) (18 - 18)  SpO2: 92% (10 May 2024 07:31) (90% - 95%)    Parameters below as of 10 May 2024 07:31  Patient On (Oxygen Delivery Method): nasal cannula  O2 Flow (L/min): 3      GENERAL:  [ ] No acute distress [ x]Lethargic  [ ]Unarousable  [ ]Verbal  [ ]Non-Verbal [ ]Cachexia    BEHAVIORAL/PSYCH:  [ ]Alert and Oriented x2-3[ ] Anxiety [ ] Delirium [ ] Agitation [X ] Calm   EYES: [ ] No scleral icterus [ ] Scleral icterus [x ] Closed  ENMT:  [ ]Dry mouth  [X ]No external oral lesions [ X] No external ear or nose lesions  CARDIOVASCULAR:  [ ]Regular [ ]Irregular [ ]Tachy [ ]Not Tachy  [ ]Ahmet [ ] Edema  [ ] No edema  PULMONARY:  [ ]Tachypnea  [ ]Audible excessive secretions [ ] No labored breathing [x ] labored breathing  GASTROINTESTINAL: [ ]Soft  [ ]Distended  [X ]Not distended [ ]Non tender [ ]Tender  MUSCULOSKELETAL: [ XNo clubbing [ ] clubbing  [ ] No cyanosis [ ] cyanosis  NEUROLOGIC: [X ]No focal deficits  [ ]Follows commands  [ x]Does not follow commands  [x ]Cognitive impairment  [ ]Dysphagia  [ ]Dysarthria  [ ]Paresis   SKIN: [ ] Jaundiced [X ] Non-jaundiced [ ]Rash [ ]No Rash [ ] Warm [ ] Dry  MISC/LINES: [ ] ET tube [ ] Trach [ ]NGT/OGT [ ]PEG [ ]Wong    LABS: reviewed by me                          5.2    6.47  )-----------( 12       ( 10 May 2024 08:10 )             16.3       05-10    132<L>  |  97<L>  |  16  ----------------------------<  124<H>  3.4<L>   |  15<L>  |  <0.5<L>    Ca    7.1<L>      10 May 2024 08:10  Phos  1.5     05-09  Mg     1.9     05-09    TPro  4.1<L>  /  Alb  2.7<L>  /  TBili  2.8<H>  /  DBili  x   /  AST  239<H>  /  ALT  77<H>  /  AlkPhos  202<H>  05-10          ABG - ( 10 May 2024 06:59 )  pH, Arterial: 7.46  pH, Blood: x     /  pCO2: 24    /  pO2: 59    / HCO3: 17    / Base Excess: -6.2  /  SaO2: 95.4                Urinalysis Basic - ( 10 May 2024 08:10 )    Color: x / Appearance: x / SG: x / pH: x  Gluc: 124 mg/dL / Ketone: x  / Bili: x / Urobili: x   Blood: x / Protein: x / Nitrite: x   Leuk Esterase: x / RBC: x / WBC x   Sq Epi: x / Non Sq Epi: x / Bacteria: x        PT/INR - ( 09 May 2024 21:52 )   PT: 25.90 sec;   INR: 2.25 ratio         PTT - ( 09 May 2024 21:52 )  PTT:25.2 sec    CARDIAC MARKERS ( 09 May 2024 21:52 )  x     / x     / 779 U/L / x     / 1.3 ng/mL        CAPILLARY BLOOD GLUCOSE      POCT Blood Glucose.: 121 mg/dL (09 May 2024 21:49)              RADIOLOGY & ADDITIONAL STUDIES: reviewed by me    < from: Xray Chest 1 View-PORTABLE IMMEDIATE (Xray Chest 1 View-PORTABLE IMMEDIATE .) (05.07.24 @ 16:25) >  impression:    Mandible overlies lung apices obscuring anatomy    Support devices: None    Cardiac/ Mediastinum: Stable    Lungs/ Pleura: Low lung volumes. Patchy opacities both lung bases    Skeletal/ soft tissues: Multiple diffuse blastic osseous metastasis.            < end of copied text >            EKG: reviewed by me    < from: 12 Lead ECG (05.04.24 @ 07:46) >  Ventricular Rate 110 BPM    Atrial Rate 110 BPM    P-R Interval 124 ms    QRS Duration 78 ms    Q-T Interval 326 ms    QTC Calculation(Bazett) 441 ms    P Axis 32 degrees    R Axis 25 degrees    T Axis 0 degrees    Diagnosis Line Sinus tachycardia  Cannot rule out Inferior infarct , age undetermined  Abnormal ECG    < end of copied text >        PROTEIN CALORIE MALNUTRITION PRESENT: [ ]mild [ ]moderate [ ]severe [ ]underweight [ ]morbid obesity  https://www.andeal.org/vault/2440/web/files/ONC/Table_Clinical%20Characteristics%20to%20Document%20Malnutrition-White%20JV%20et%20al%202012.pdf    Height (cm): 162.6 (04-29-24 @ 19:42), 162.6 (07-16-23 @ 17:21)  Weight (kg): 61.3 (04-26-24 @ 22:53), 72.6 (07-13-23 @ 13:00)  BMI (kg/m2): 23.2 (04-29-24 @ 19:42), 23.2 (04-26-24 @ 22:53), 27.5 (07-16-23 @ 17:21)  [ ]PPSV2 < or = to 30% [ ]significant weight loss  [ ]poor nutritional intake  [ ]anasarca      [ ]Artificial Nutrition      Palliative Care Spiritual/Emotional Screening Tool Question  Severity (0-4):                    OR                    [ x] Unable to determine. Will assess at later time if appropriate.  Score of 2 or greater indicates recommendation of Chaplaincy and/or SW referral  Chaplaincy Referral: [ ] Yes [ ] Refused [ ] Following     Caregiver [place]:  [ ] Yes [ ] No    OR    [x ] Unable to determine. Will assess at later time if appropriate.  Social Work Referral [ ]  Patient and Family Centered Care Referral [ ]    Anticipatory Grief Present: [ ] Yes [ ] No    OR     [ x] Unable to determine. Will assess at later time if appropriate.  Social Work Referral [ ]  Patient and Family Centered Care Referral [ ]    Patient discussed with primary medical team MD  Palliative care education provided to patient and/or family

## 2024-05-10 NOTE — CONSULT NOTE ADULT - SUBJECTIVE AND OBJECTIVE BOX
NEUROLOGY CONSULT    HPI: 57yo female whose documented PMH  includes hypothyroid, asthma, ETOH abuse (cirrhosis) and breast cancer widely metastatic to bone, status post radiation 5 sessions finished 5/31/23, on letrozole daily, (was on Ribociclib and was discontinued due to low magnesium with high QTc), surgical history of occiput-T2 posterior instrumentation and fusion, ORIF of C2 body with functional decline and impaired ADLs/mobility, ambulates with a walker, following with Dr. Peña , presents to the ER due to back pain present for 2 weeks. Due to persistence, she was advised to come to the ER. No loss of bladder, bowel function but told ER she had generalized weakness. While in the ER patient became tachycardic with pulse ox of 89% so PE study was done (no PE) ER team spoke to neurosurgery team member requesting that patient be sent [place].     Internal medicine activate Stroke Code at 21:51. When neurology does the evaluation around 21:55, patient is awake. He was found 10 minutes before repeating to all questions with "May 9th" as per primary team. Per nurse, the patient was conversational and alert at the start of her shift around 7:30 pm. Vitals signs showed tachycardia with HR in 130-150s (baseline HR of ~110-120 past 2 days). Otherwise within normal limits on 3L O2. No focal deficits were note on neuro exam, and patient demonstrating understanding by following commands such as raise your arm, smile, patient able to repeat and read, but answering questions wrongly when asked about his age and the month. Patient disoriented, but no perseverant as described anymore, patient is not dysarthric. Code Stroke is cancelled per primary team around 22:20, since patient's mental status and speech back to baseline with A & O x 4 after few mins into RRT. /70, HR ~ 135, Temp 99.4 (oral). Primary team attending believes there is not clinical utility of full stroke code protocol at this time, due to limited treatment options and limited clinical utility. Primary team believes a urgent CT head non-con can be done assessed first, study will be completed with labs and rEEG. They manifest, they will contact neurology if any of the tests come back negative. Previous labs showed Hb 7.2. CTH showed since the prior head CT there has been development of a left frontoparietal extra-axial fluid collection consistent with subdural hygroma with minimal rightward midline shift. EEG pending    Right parietal scalp soft tissue swelling noted without underlying calvarial fracture.    Redemonstration of numerous calvarial sclerotic metastases.    mRankin score 5 Severe disability; bedridden, incontinent and requiring constant nursing care and attention at baseline as per primary team     MEDICATIONS  Home Medications:  Advil 200 mg oral tablet: 1 tab(s) orally every 6 hours as needed for  mild pain (26 Apr 2024 16:00)  melatonin 5 mg oral tablet: 1 tab(s) orally once a day (at bedtime) (26 Apr 2024 16:00)    MEDICATIONS  (STANDING):  artificial tears (preservative free) Ophthalmic Solution 1 Drop(s) Both EYES two times a day  bacitracin   Ointment 1 Application(s) Topical two times a day  cefepime   IVPB 1000 milliGRAM(s) IV Intermittent every 8 hours  cefepime   IVPB      chlorhexidine 2% Cloths 1 Application(s) Topical <User Schedule>  dexAMETHasone     Tablet 0.5 milliGRAM(s) Oral daily  exemestane 25 milliGRAM(s) Oral daily  lactated ringers Bolus 1000 milliLiter(s) IV Bolus once  levothyroxine 50 MICROGram(s) Oral <User Schedule>  magnesium oxide 400 milliGRAM(s) Oral daily  magnesium sulfate  IVPB 2 Gram(s) IV Intermittent once  melatonin 5 milliGRAM(s) Oral at bedtime  metroNIDAZOLE  IVPB      metroNIDAZOLE  IVPB 500 milliGRAM(s) IV Intermittent every 8 hours  morphine ER Tablet 15 milliGRAM(s) Oral every 12 hours  naloxegol 12.5 milliGRAM(s) Oral daily  pantoprazole    Tablet 40 milliGRAM(s) Oral before breakfast  polyethylene glycol 3350 17 Gram(s) Oral daily  senna 2 Tablet(s) Oral at bedtime  sodium chloride 0.9%. 1000 milliLiter(s) (70 mL/Hr) IV Continuous <Continuous>    MEDICATIONS  (PRN):  albuterol    0.083% 2.5 milliGRAM(s) Nebulizer every 6 hours PRN Shortness of Breath and/or Wheezing  morphine  - Injectable 4 milliGRAM(s) IV Push every 2 hours PRN moderate/severe pain (7-10)      FAMILY HISTORY:  FHx: melanoma (Mother)    FHx: arrhythmia (Father)      SOCIAL HISTORY: negative for tobacco, alcohol, or ilicit drug use.    Allergies    No Known Allergies    Intolerances    hydromorphone (Faint; Nausea)      GEN: NAD, pleasant, cooperative    NEURO:   MENTAL STATUS: Awake. AAOx1, disoriented in time and place, perseverant  LANG/SPEECH: Fluency reduced, intact naming, repetition, comprehension limited understanding simple motor commands but being wrong with verbal commands  CRANIAL NERVES:  II: Pupils equal and reactive, no RAPD, normal visual field in threaten to blink maniuver  III, IV, VI: EOM intact, no gaze preference or deviation  V: normal  VII: no facial asymmetry  VIII: normal hearing to speech  MOTOR: 5/5 in both upper extremities, 3/5 in both lower extremities  REFLEXES: 1/4 throughout, bilateral flexor plantars  SENSORY: Normal responding to patient, no collaboration for full exam  COORD: Normal finger to nose normal, heel to shin unable to collaborate, no tremor, no dysmetria  BALANCE/GATE: No assessed     LABS:                        5.2    6.47  )-----------( 12       ( 10 May 2024 08:10 )             16.3     05-10    132<L>  |  97<L>  |  16  ----------------------------<  124<H>  3.4<L>   |  15<L>  |  <0.5<L>    Ca    7.1<L>      10 May 2024 08:10  Phos  1.5     05-09  Mg     1.9     05-09    TPro  4.1<L>  /  Alb  2.7<L>  /  TBili  2.8<H>  /  DBili  x   /  AST  239<H>  /  ALT  77<H>  /  AlkPhos  202<H>  05-10    Hemoglobin A1C:   Vitamin B12   PT/INR - ( 09 May 2024 21:52 )   PT: 25.90 sec;   INR: 2.25 ratio         PTT - ( 09 May 2024 21:52 )  PTT:25.2 sec  CAPILLARY BLOOD GLUCOSE      POCT Blood Glucose.: 121 mg/dL (09 May 2024 21:49)      Urinalysis Basic - ( 10 May 2024 08:10 )    Color: x / Appearance: x / SG: x / pH: x  Gluc: 124 mg/dL / Ketone: x  / Bili: x / Urobili: x   Blood: x / Protein: x / Nitrite: x   Leuk Esterase: x / RBC: x / WBC x   Sq Epi: x / Non Sq Epi: x / Bacteria: x        ABG - ( 10 May 2024 06:59 )  pH, Arterial: 7.46  pH, Blood: x     /  pCO2: 24    /  pO2: 59    / HCO3: 17    / Base Excess: -6.2  /  SaO2: 95.4                Microbiology:      RADIOLOGY, EKG AND ADDITIONAL TESTS: Reviewed.           NEUROLOGY CONSULT    HPI: 57yo female whose documented PMH  includes hypothyroid, asthma, ETOH abuse (cirrhosis) and breast cancer widely metastatic to bone, status post radiation 5 sessions finished 5/31/23, on letrozole daily, (was on Ribociclib and was discontinued due to low magnesium with high QTc), surgical history of occiput-T2 posterior instrumentation and fusion, ORIF of C2 body with functional decline and impaired ADLs/mobility, ambulates with a walker, following with Dr. Peña , presents to the ER due to back pain present for 2 weeks. Due to persistence, she was advised to come to the ER. No loss of bladder, bowel function but told ER she had generalized weakness. While in the ER patient became tachycardic with pulse ox of 89% so PE study was done (no PE) ER team spoke to neurosurgery team member requesting that patient be sent [place].     Internal medicine activate Stroke Code at 21:51. When neurology does the evaluation around 21:55, patient is awake. He was found 10 minutes before repeating to all questions with "May 9th" as per primary team. Per nurse, the patient was conversational and alert at the start of her shift around 7:30 pm. Vitals signs showed tachycardia with HR in 130-150s (baseline HR of ~110-120 past 2 days). Otherwise within normal limits on 3L O2. No focal deficits were note on neuro exam, and patient demonstrating understanding by following commands such as raise your arm, smile, patient able to repeat and read, but answering questions wrongly when asked about his age and the month. Patient disoriented, but no perseverant as described anymore, patient is not dysarthric. Code Stroke is cancelled per primary team around 22:20, since patient's mental status and speech back to baseline with A & O x 4 after few mins into RRT. /70, HR ~ 135, Temp 99.4 (oral). Primary team attending believes there is not clinical utility of full stroke code protocol at this time, due to limited treatment options and limited clinical utility. Primary team believes a urgent CT head non-con can be done assessed first, study will be completed with labs and rEEG. They manifest, they will contact neurology if any of the tests come back negative. Previous labs showed Hb 7.2. CTH showed since the prior head CT there has been development of a left frontoparietal extra-axial fluid collection consistent with subdural hygroma with minimal rightward midline shift. EEG pending    Right parietal scalp soft tissue swelling noted without underlying calvarial fracture.    Redemonstration of numerous calvarial sclerotic metastases.    mRankin score 5 Severe disability; bedridden, incontinent and requiring constant nursing care and attention at baseline as per primary team     MEDICATIONS  Home Medications:  Advil 200 mg oral tablet: 1 tab(s) orally every 6 hours as needed for  mild pain (26 Apr 2024 16:00)  melatonin 5 mg oral tablet: 1 tab(s) orally once a day (at bedtime) (26 Apr 2024 16:00)    MEDICATIONS  (STANDING):  artificial tears (preservative free) Ophthalmic Solution 1 Drop(s) Both EYES two times a day  bacitracin   Ointment 1 Application(s) Topical two times a day  cefepime   IVPB 1000 milliGRAM(s) IV Intermittent every 8 hours  cefepime   IVPB      chlorhexidine 2% Cloths 1 Application(s) Topical <User Schedule>  dexAMETHasone     Tablet 0.5 milliGRAM(s) Oral daily  exemestane 25 milliGRAM(s) Oral daily  lactated ringers Bolus 1000 milliLiter(s) IV Bolus once  levothyroxine 50 MICROGram(s) Oral <User Schedule>  magnesium oxide 400 milliGRAM(s) Oral daily  magnesium sulfate  IVPB 2 Gram(s) IV Intermittent once  melatonin 5 milliGRAM(s) Oral at bedtime  metroNIDAZOLE  IVPB      metroNIDAZOLE  IVPB 500 milliGRAM(s) IV Intermittent every 8 hours  morphine ER Tablet 15 milliGRAM(s) Oral every 12 hours  naloxegol 12.5 milliGRAM(s) Oral daily  pantoprazole    Tablet 40 milliGRAM(s) Oral before breakfast  polyethylene glycol 3350 17 Gram(s) Oral daily  senna 2 Tablet(s) Oral at bedtime  sodium chloride 0.9%. 1000 milliLiter(s) (70 mL/Hr) IV Continuous <Continuous>    MEDICATIONS  (PRN):  albuterol    0.083% 2.5 milliGRAM(s) Nebulizer every 6 hours PRN Shortness of Breath and/or Wheezing  morphine  - Injectable 4 milliGRAM(s) IV Push every 2 hours PRN moderate/severe pain (7-10)      FAMILY HISTORY:  FHx: melanoma (Mother)    FHx: arrhythmia (Father)      SOCIAL HISTORY: negative for tobacco, alcohol, or ilicit drug use.    Allergies    No Known Allergies    Intolerances    hydromorphone (Faint; Nausea)      GEN: NAD, pleasant, cooperative    NEURO:   MENTAL STATUS: Awake. AAOx1, disoriented in time and place, perseverant  LANG/SPEECH: Fluency reduced, intact naming, repetition, comprehension limited understanding simple motor commands but being wrong with verbal commands  CRANIAL NERVES:  II: Pupils equal and reactive, no RAPD, normal visual field in threaten to blink maniuver  III, IV, VI: EOM intact, no gaze preference or deviation  V: normal  VII: no facial asymmetry  VIII: normal hearing to speech  MOTOR: 5/5 in both upper extremities, 3/5 in both lower extremities  REFLEXES: 1/4 throughout, bilateral flexor plantars  SENSORY: Normal responding to patient, no collaboration for full exam  COORD: Normal finger to nose normal, heel to shin unable to collaborate, no tremor, no dysmetria  BALANCE/GATE: No assessed     LABS:                        5.2    6.47  )-----------( 12       ( 10 May 2024 08:10 )             16.3     05-10    132<L>  |  97<L>  |  16  ----------------------------<  124<H>  3.4<L>   |  15<L>  |  <0.5<L>    Ca    7.1<L>      10 May 2024 08:10  Phos  1.5     05-09  Mg     1.9     05-09    TPro  4.1<L>  /  Alb  2.7<L>  /  TBili  2.8<H>  /  DBili  x   /  AST  239<H>  /  ALT  77<H>  /  AlkPhos  202<H>  05-10    Hemoglobin A1C:   Vitamin B12   PT/INR - ( 09 May 2024 21:52 )   PT: 25.90 sec;   INR: 2.25 ratio         PTT - ( 09 May 2024 21:52 )  PTT:25.2 sec  CAPILLARY BLOOD GLUCOSE    POCT Blood Glucose.: 121 mg/dL (09 May 2024 21:49)    Urinalysis Basic - ( 10 May 2024 08:10 )    Color: x / Appearance: x / SG: x / pH: x  Gluc: 124 mg/dL / Ketone: x  / Bili: x / Urobili: x   Blood: x / Protein: x / Nitrite: x   Leuk Esterase: x / RBC: x / WBC x   Sq Epi: x / Non Sq Epi: x / Bacteria: x    ABG - ( 10 May 2024 06:59 )  pH, Arterial: 7.46  pH, Blood: x     /  pCO2: 24    /  pO2: 59    / HCO3: 17    / Base Excess: -6.2  /  SaO2: 95.4      RADIOLOGY, EKG AND ADDITIONAL TESTS: Reviewed.

## 2024-05-10 NOTE — CONSULT NOTE ADULT - ASSESSMENT
57yo female whose documented PMH  includes hypothyroid, asthma, ETOH abuse (cirrhosis) and breast cancer widely metastatic to bone, status post radiation 5 sessions finished 5/31/23, on letrozole daily, (was on Ribociclib and was discontinued due to low magnesium with high QTc), surgical history of occiput-T2 posterior instrumentation and fusion, ORIF of C2 body with functional decline and impaired ADLs/mobility, ambulates with a walker, following with Dr. Peña , presents to the ER due to back pain present for 2 weeks. Due to persistence, she was advised to come to the ER. No loss of bladder, bowel function but told ER she had generalized weakness. While in the ER patient became tachycardic with pulse ox of 89% so PE study was done (no PE) ER team spoke to neurosurgery team member requesting that patient be sent [place].     Internal medicine activate Stroke Code at 21:51. When neurology does the evaluation around 21:55, patient is awake. He was found 10 minutes before repeating to all questions with "May 9th" as per primary team. Per nurse, the patient was conversational and alert at the start of her shift around 7:30 pm. Vitals signs showed tachycardia with HR in 130-150s (baseline HR of ~110-120 past 2 days). Otherwise within normal limits on 3L O2. No focal deficits were note on neuro exam, and patient demonstrating understanding by following commands such as raise your arm, smile, patient able to repeat and read, but answering questions wrongly when asked about his age and the month. Patient disoriented, but no perseverant as described anymore, patient is not dysarthric. Code Stroke is cancelled per primary team around 22:20, since patient's mental status and speech back to baseline. Primary team attending believes there is not clinical utility of full stroke code protocol at this time, due to limited treatment options. Primary team believes a urgent CT head non-con can be done assessed first, study will be completed with labs and rEEG. Labs showed Hb 7.2->5.2. Increasing lactate from 5.7 to 10.2 now. Multiple electrolyte derangements. CTH showed since the prior head CT there has been development of a left frontoparietal extra-axial fluid collection consistent with subdural hygroma with minimal rightward midline shift.     Impression: Multifactorial metabolic encephalopathy in patient with severe anemia, anion gap metabolic acidosis (elevated lactic acid) and electrolyte derangements with PMHx of asthma, ETOH abuse (cirrhosis) and breast cancer widely metastatic to bone, status post radiation. R/o out seizure    PLAN:  - Management by primary of severe anemia, anion gap metabolic acidosis (elevated lactic acid) and electrolyte derangements  - F/u routine EEG  - If any neurological worsening or any further questions, please contact back neurology    Case discussed with Neurology attending Dr. Haddad     57yo female whose documented PMH  includes hypothyroid, asthma, ETOH abuse (cirrhosis) and breast cancer widely metastatic to bone, status post radiation 5 sessions finished 5/31/23, on letrozole daily, surgical history of occiput-T2 posterior instrumentation and fusion, ORIF of C2 body with functional decline and impaired ADLs/mobility, ambulated before with a walker, following with Dr. Peña , presents to the ER due to back pain present for 2 weeks with generalized weakness. While in the ER patient became tachycardic, team spoke to neurosurgery team member requesting that patient be sent [place].     On 5/9/2024, primary internal medicine team activate Stroke Code at 21:51. When neurology does the evaluation around 21:55, patient is awake. He was found 10 minutes before repeating to all questions with "May 9th" as per primary team. Per nurse, the patient was conversational and alert at the start of her shift around 7:30 pm. Vitals signs showed tachycardia with HR in 130-150. Neuro exam demonstrated understanding by following commands such as raise your arm, smile, patient able to repeat and read, but answering questions wrongly when asked about his age and the month. Patient disoriented, but no perseverant as described.. Code Stroke is cancelled per primary team around 22:20, since patient's mental status and speech back to baseline. Primary team attending believes there is not clinical utility of full stroke code protocol at this time, due to limited treatment options. Primary team believes a urgent CT head non-con can be done assessed first, study will be completed with labs and rEEG. Labs showed Hb 7.2->5.2. Increasing lactate from 5.7 to 10.2 now. Multiple electrolyte derangements. CTH showed since the prior head CT there has been development of a left frontoparietal extra-axial fluid collection consistent with subdural hygroma with minimal rightward midline shift.     Impression: Multifactorial metabolic encephalopathy in patient with severe anemia, anion gap metabolic acidosis (elevated lactic acid) and electrolyte derangements with PMHx of asthma, ETOH abuse (cirrhosis) and breast cancer widely metastatic to bone, status post radiation. R/o out seizure    PLAN:  - Management by primary of severe anemia, anion gap metabolic acidosis (elevated lactic acid) and electrolyte derangements  - F/u routine EEG  - If any neurological worsening or any further questions, please contact back neurology    Case discussed with Neurology attending Dr. Haddad

## 2024-05-10 NOTE — PROGRESS NOTE ADULT - SUBJECTIVE AND OBJECTIVE BOX
24H events:    Today is hospital day 14d. This morning patient was seen and examined at bedside, resting comfortably in bed.    No acute or major events overnight.    PAST MEDICAL & SURGICAL HISTORY  Hypothyroidism    H/O cirrhosis    Breast cancer    No significant past surgical history        SOCIAL HISTORY:  Social History:  former alcohol abuse, denies tobacco use (26 Apr 2024 22:23)      ALLERGIES:  No Known Allergies      MEDICATIONS:  STANDING MEDICATIONS  artificial tears (preservative free) Ophthalmic Solution 1 Drop(s) Both EYES two times a day  bacitracin   Ointment 1 Application(s) Topical two times a day  cefepime   IVPB      cefepime   IVPB 1000 milliGRAM(s) IV Intermittent every 8 hours  chlorhexidine 2% Cloths 1 Application(s) Topical <User Schedule>  dexAMETHasone     Tablet 0.5 milliGRAM(s) Oral daily  exemestane 25 milliGRAM(s) Oral daily  levothyroxine 50 MICROGram(s) Oral <User Schedule>  magnesium oxide 400 milliGRAM(s) Oral daily  magnesium sulfate  IVPB 2 Gram(s) IV Intermittent once  melatonin 5 milliGRAM(s) Oral at bedtime  metroNIDAZOLE  IVPB      metroNIDAZOLE  IVPB 500 milliGRAM(s) IV Intermittent every 8 hours  morphine ER Tablet 15 milliGRAM(s) Oral every 12 hours  naloxegol 12.5 milliGRAM(s) Oral daily  pantoprazole    Tablet 40 milliGRAM(s) Oral before breakfast  polyethylene glycol 3350 17 Gram(s) Oral daily  senna 2 Tablet(s) Oral at bedtime  sodium chloride 0.9% Bolus 500 milliLiter(s) IV Bolus once  sodium chloride 0.9%. 1000 milliLiter(s) IV Continuous <Continuous>    PRN MEDICATIONS  albuterol    0.083% 2.5 milliGRAM(s) Nebulizer every 6 hours PRN  morphine  - Injectable 4 milliGRAM(s) IV Push every 2 hours PRN      VITALS:   T(C): 36.8 (05-10-24 @ 05:48), Max: 37.3 (05-09-24 @ 21:04)  HR: 138 (05-10-24 @ 05:48) (116 - 138)  BP: 87/61 (05-10-24 @ 05:48) (87/61 - 117/80)  RR: 18 (05-10-24 @ 05:48) (18 - 18)  SpO2: 93% (05-10-24 @ 05:48) (90% - 95%)  I&O's Summary        PHYSICAL EXAM:  G    LABS:                        7.0    5.00  )-----------( 17       ( 09 May 2024 21:52 )             20.1     05-09    134<L>  |  94<L>  |  14  ----------------------------<  114<H>  3.5   |  22  |  <0.5<L>    Ca    7.9<L>      09 May 2024 21:52  Phos  1.5     05-09  Mg     1.9     05-09    TPro  x   /  Alb  x   /  TBili  x   /  DBili  2.0<H>  /  AST  x   /  ALT  x   /  AlkPhos  x   05-10    PT/INR - ( 09 May 2024 21:52 )   PT: 25.90 sec;   INR: 2.25 ratio         PTT - ( 09 May 2024 21:52 )  PTT:25.2 sec  Urinalysis Basic - ( 09 May 2024 21:52 )    Color: x / Appearance: x / SG: x / pH: x  Gluc: 114 mg/dL / Ketone: x  / Bili: x / Urobili: x   Blood: x / Protein: x / Nitrite: x   Leuk Esterase: x / RBC: x / WBC x   Sq Epi: x / Non Sq Epi: x / Bacteria: x      ABG - ( 10 May 2024 06:59 )  pH, Arterial: 7.46  pH, Blood: x     /  pCO2: 24    /  pO2: 59    / HCO3: 17    / Base Excess: -6.2  /  SaO2: 95.4              Lactate, Blood: 5.7 mmol/L *HH* (05-09-24 @ 22:11)  Creatine Kinase, Serum: 779 U/L *H* (05-09-24 @ 21:52)      CARDIAC MARKERS ( 09 May 2024 21:52 )  x     / x     / 779 U/L / x     / 1.3 ng/mL           24H events:    Today is hospital day 14d. This morning patient was seen and examined at bedside, resting comfortably in bed.      Writer called  in am  explained the situation and updated him about the overnight events. Writer told  about the neurological worsening, respiratory worsening, lactic acidosis, pancytopenia. Writer informed that  that patient may not make it through this decompensation.  asked writer to update the sister Sandy regarding. Writer called sister also. Plan is to keep patient comfortable, DNI DNR, keep on floor and family to bedside while we continue medical management. When family was present bedside discussion made and pt cMO.      PAST MEDICAL & SURGICAL HISTORY  Hypothyroidism    H/O cirrhosis    Breast cancer    No significant past surgical history        SOCIAL HISTORY:  Social History:  former alcohol abuse, denies tobacco use (26 Apr 2024 22:23)      ALLERGIES:  No Known Allergies      MEDICATIONS:  STANDING MEDICATIONS  artificial tears (preservative free) Ophthalmic Solution 1 Drop(s) Both EYES two times a day  bacitracin   Ointment 1 Application(s) Topical two times a day  cefepime   IVPB      cefepime   IVPB 1000 milliGRAM(s) IV Intermittent every 8 hours  chlorhexidine 2% Cloths 1 Application(s) Topical <User Schedule>  dexAMETHasone     Tablet 0.5 milliGRAM(s) Oral daily  exemestane 25 milliGRAM(s) Oral daily  levothyroxine 50 MICROGram(s) Oral <User Schedule>  magnesium oxide 400 milliGRAM(s) Oral daily  magnesium sulfate  IVPB 2 Gram(s) IV Intermittent once  melatonin 5 milliGRAM(s) Oral at bedtime  metroNIDAZOLE  IVPB      metroNIDAZOLE  IVPB 500 milliGRAM(s) IV Intermittent every 8 hours  morphine ER Tablet 15 milliGRAM(s) Oral every 12 hours  naloxegol 12.5 milliGRAM(s) Oral daily  pantoprazole    Tablet 40 milliGRAM(s) Oral before breakfast  polyethylene glycol 3350 17 Gram(s) Oral daily  senna 2 Tablet(s) Oral at bedtime  sodium chloride 0.9% Bolus 500 milliLiter(s) IV Bolus once  sodium chloride 0.9%. 1000 milliLiter(s) IV Continuous <Continuous>    PRN MEDICATIONS  albuterol    0.083% 2.5 milliGRAM(s) Nebulizer every 6 hours PRN  morphine  - Injectable 4 milliGRAM(s) IV Push every 2 hours PRN      VITALS:   T(C): 36.8 (05-10-24 @ 05:48), Max: 37.3 (05-09-24 @ 21:04)  HR: 138 (05-10-24 @ 05:48) (116 - 138)  BP: 87/61 (05-10-24 @ 05:48) (87/61 - 117/80)  RR: 18 (05-10-24 @ 05:48) (18 - 18)  SpO2: 93% (05-10-24 @ 05:48) (90% - 95%)  I&O's Summary            LABS:                        7.0    5.00  )-----------( 17       ( 09 May 2024 21:52 )             20.1     05-09    134<L>  |  94<L>  |  14  ----------------------------<  114<H>  3.5   |  22  |  <0.5<L>    Ca    7.9<L>      09 May 2024 21:52  Phos  1.5     05-09  Mg     1.9     05-09    TPro  x   /  Alb  x   /  TBili  x   /  DBili  2.0<H>  /  AST  x   /  ALT  x   /  AlkPhos  x   05-10    PT/INR - ( 09 May 2024 21:52 )   PT: 25.90 sec;   INR: 2.25 ratio         PTT - ( 09 May 2024 21:52 )  PTT:25.2 sec  Urinalysis Basic - ( 09 May 2024 21:52 )    Color: x / Appearance: x / SG: x / pH: x  Gluc: 114 mg/dL / Ketone: x  / Bili: x / Urobili: x   Blood: x / Protein: x / Nitrite: x   Leuk Esterase: x / RBC: x / WBC x   Sq Epi: x / Non Sq Epi: x / Bacteria: x      ABG - ( 10 May 2024 06:59 )  pH, Arterial: 7.46  pH, Blood: x     /  pCO2: 24    /  pO2: 59    / HCO3: 17    / Base Excess: -6.2  /  SaO2: 95.4              Lactate, Blood: 5.7 mmol/L *HH* (05-09-24 @ 22:11)  Creatine Kinase, Serum: 779 U/L *H* (05-09-24 @ 21:52)      CARDIAC MARKERS ( 09 May 2024 21:52 )  x     / x     / 779 U/L / x     / 1.3 ng/mL           24H events:    Today is hospital day 14d. This morning patient was seen and examined at bedside, resting comfortably in bed.      Writer called  in am  explained the situation and updated him about the overnight events. Writer told  about the neurological worsening, respiratory worsening, lactic acidosis, pancytopenia. Writer informed that  that patient may not make it through this decompensation.  asked writer to update the sister Sandy regarding. Writer called sister also. Plan is to keep patient comfortable, DNI DNR, keep on floor and family to bedside while we continue medical management. When family was present bedside discussion made and pt cMO.      PAST MEDICAL & SURGICAL HISTORY  Hypothyroidism    H/O cirrhosis    Breast cancer    No significant past surgical history        SOCIAL HISTORY:  Social History:  former alcohol abuse, denies tobacco use (26 Apr 2024 22:23)      ALLERGIES:  No Known Allergies      MEDICATIONS:  STANDING MEDICATIONS  artificial tears (preservative free) Ophthalmic Solution 1 Drop(s) Both EYES two times a day  bacitracin   Ointment 1 Application(s) Topical two times a day  cefepime   IVPB      cefepime   IVPB 1000 milliGRAM(s) IV Intermittent every 8 hours  chlorhexidine 2% Cloths 1 Application(s) Topical <User Schedule>  dexAMETHasone     Tablet 0.5 milliGRAM(s) Oral daily  exemestane 25 milliGRAM(s) Oral daily  levothyroxine 50 MICROGram(s) Oral <User Schedule>  magnesium oxide 400 milliGRAM(s) Oral daily  magnesium sulfate  IVPB 2 Gram(s) IV Intermittent once  melatonin 5 milliGRAM(s) Oral at bedtime  metroNIDAZOLE  IVPB      metroNIDAZOLE  IVPB 500 milliGRAM(s) IV Intermittent every 8 hours  morphine ER Tablet 15 milliGRAM(s) Oral every 12 hours  naloxegol 12.5 milliGRAM(s) Oral daily  pantoprazole    Tablet 40 milliGRAM(s) Oral before breakfast  polyethylene glycol 3350 17 Gram(s) Oral daily  senna 2 Tablet(s) Oral at bedtime  sodium chloride 0.9% Bolus 500 milliLiter(s) IV Bolus once  sodium chloride 0.9%. 1000 milliLiter(s) IV Continuous <Continuous>    PRN MEDICATIONS  albuterol    0.083% 2.5 milliGRAM(s) Nebulizer every 6 hours PRN  morphine  - Injectable 4 milliGRAM(s) IV Push every 2 hours PRN      VITALS:   T(C): 36.8 (05-10-24 @ 05:48), Max: 37.3 (05-09-24 @ 21:04)  HR: 138 (05-10-24 @ 05:48) (116 - 138)  BP: 87/61 (05-10-24 @ 05:48) (87/61 - 117/80)  RR: 18 (05-10-24 @ 05:48) (18 - 18)  SpO2: 93% (05-10-24 @ 05:48) (90% - 95%)  I&O's Summary            LABS:                        7.0    5.00  )-----------( 17       ( 09 May 2024 21:52 )             20.1     05-09    134<L>  |  94<L>  |  14  ----------------------------<  114<H>  3.5   |  22  |  <0.5<L>    Ca    7.9<L>      09 May 2024 21:52  Phos  1.5     05-09  Mg     1.9     05-09    TPro  x   /  Alb  x   /  TBili  x   /  DBili  2.0<H>  /  AST  x   /  ALT  x   /  AlkPhos  x   05-10    PT/INR - ( 09 May 2024 21:52 )   PT: 25.90 sec;   INR: 2.25 ratio         PTT - ( 09 May 2024 21:52 )  PTT:25.2 sec  Urinalysis Basic - ( 09 May 2024 21:52 )    Color: x / Appearance: x / SG: x / pH: x  Gluc: 114 mg/dL / Ketone: x  / Bili: x / Urobili: x   Blood: x / Protein: x / Nitrite: x   Leuk Esterase: x / RBC: x / WBC x   Sq Epi: x / Non Sq Epi: x / Bacteria: x      ABG - ( 10 May 2024 06:59 )  pH, Arterial: 7.46  pH, Blood: x     /  pCO2: 24    /  pO2: 59    / HCO3: 17    / Base Excess: -6.2  /  SaO2: 95.4        Lactate, Blood: 5.7 mmol/L *HH* (05-09-24 @ 22:11)  Creatine Kinase, Serum: 779 U/L *H* (05-09-24 @ 21:52)      CARDIAC MARKERS ( 09 May 2024 21:52 )  x     / x     / 779 U/L / x     / 1.3 ng/mL

## 2024-05-10 NOTE — PROGRESS NOTE ADULT - ATTENDING COMMENTS
58-year-old female with PMHx of breast cancer (tx w/ letrozole) with metastases to bone presents for evaluation of generalized weakness and back pain. Per patient she is getting radiation she states that she completed 5 sessions of radiation and was doing well however over the past 24 to 48 hours became weak denies any loss of bladder or bowel control denies any saddle anesthesia denies any focal weakness to the lower extremities denies any fevers or chills. Sent from [facility] for neurosurgery eval (4/27).    #Stage IV Breast Cancer HR+/HER2-, metastatic to bone  #Malignancy related pain   #AHRF  #pancytopenia  #lactic acidosis   #transaminitis from liver mets  #electrolytes disturbances   *CTA PE (4/26): No pulmonary embolus seen. Left upper lobe pulmonary nodule presumably representing metastatic lung disease.  *CT Head noncon (4/26): No evidence of acute territorial infarct, intracranial hemorrhage, or midline shift. Significantly increased numerous sclerotic osseous metastasis throughout the calvarium which were predominantly lytic on the prior CT head from 4/21/2023.  *MR C-spine IC (4/29):  Interval progression of extensive enhancing osseous metastatic disease. No evidence of cervical cord compression or cord signal abnormality. Partially visualized enhancing lesion in the region of the pineal gland, presumably reflecting metastatic disease.    *MR T/L-spine IC (4/29): extensive osseous metastatic disease. Interval development of epidural enhancing soft tissue circumferentially throughout the thoracic and lumbar spines. No obvious cord compression or cord edema.    *CT AP (4/29): Innumerable hepatic metastases. New bilateral adrenal nodules likely metastases. Diffuse widespread osseous metastases, increased from prior CT abdomen pelvis.    - DC Ibrance given worsening bone disease and increasing tumor markers per hem/onc   - CA 27-29 (4/27): 996.5  - NSGY recs (4/27): MRI C/T/L spine w/wo given no contraindications   - No cord compression on MRI of her spine  - Taper decadron off by 50% every 3 days  - Palliative following - DNI DNR , hospice cs , pain tx - Bowel regimen. meds for comfort adjusted by palliative care  - discontinued letrozole and started Exemestane 25mg daily per Heme/Onc  - s/p Faslodex (5/1)   -family and pt does not want radiation and chemo anymore> CMO today         Total time spent to complete patient's bedside assessment, review medical chart, discuss medical plan of care with covering medical team was more than 35 minutes  with >50% of time spent face to face with patient, discussion with patient/family and/or coordination of care.

## 2024-05-10 NOTE — PROGRESS NOTE ADULT - ATTENDING SUPERVISION STATEMENT
Fellow
Resident
Resident/Fellow
Resident

## 2024-05-10 NOTE — CHART NOTE - NSCHARTNOTEFT_GEN_A_CORE
Writer called  explained the situation and updated him about the overnight events. Writer told  about the neurological worsening, respiratory worsening, lactic acidosis, pancytopenia. Writer informed that  that patient may not make it through this decompensation.  asked writer to update the sister Sandy regarding. Writer called sister also. Plan is to keep patient comfortable, DNI DNR, keep on floor and family to bedside while we continue medical management.

## 2024-05-10 NOTE — PROGRESS NOTE ADULT - PROBLEM SELECTOR PROBLEM 2
Neoplasm related pain
Neoplasm related pain
Breast cancer
Neoplasm related pain

## 2024-05-10 NOTE — PROGRESS NOTE ADULT - PROBLEM SELECTOR PLAN 3
DNR/DNI  Continue current med mgmt  hospice consult placed  ongoing GOC  Will follow
DNR/DNI  Continue current med mgmt  hospice consult placed  ongoing GOC  Will follow
Full code  Continue current med mgmt  Patient does not have a HCP so surrogate decision maker would be    Will follow
-medications as above for pain and symptom management
Full code  Continue current med mgmt  Patient does not have a HCP so surrogate decision maker would be    ongoing GOC  Will follow
see above
see above
DNR/DNI  Continue current med mgmt  hospice consult placed  ongoing GOC  Will follow

## 2024-05-10 NOTE — DISCHARGE NOTE FOR THE EXPIRED PATIENT - HOSPITAL COURSE
58-year-old female with PMHx of breast cancer (tx w/ letrozole) with metastases to bone presents for evaluation of generalized weakness and back pain. Per patient she is getting radiation she states that she completed 5 sessions of radiation and was doing well however over the past 24 to 48 hours became weak denies any loss of bladder or bowel control denies any saddle anesthesia denies any focal weakness to the lower extremities denies any fevers or chills. Sent from [facility] for neurosurgery eval ().    #Stage IV Breast Cancer HR+/HER2-, metastatic to bone  #Malignancy related pain   #AHRF  #pancytopenia  #lactic acidosis   #transaminitis from liver mets  #electrolytes disturbances   *CTA PE (): No pulmonary embolus seen. Left upper lobe pulmonary nodule presumably representing metastatic lung disease.  *CT Head noncon (): No evidence of acute territorial infarct, intracranial hemorrhage, or midline shift. Significantly increased numerous sclerotic osseous metastasis throughout the calvarium which were predominantly lytic on the prior CT head from 2023.  *MR C-spine IC ():  Interval progression of extensive enhancing osseous metastatic disease. No evidence of cervical cord compression or cord signal abnormality. Partially visualized enhancing lesion in the region of the pineal gland, presumably reflecting metastatic disease.    *MR T/L-spine IC (): extensive osseous metastatic disease. Interval development of epidural enhancing soft tissue circumferentially throughout the thoracic and lumbar spines. No obvious cord compression or cord edema.    *CT AP (): Innumerable hepatic metastases. New bilateral adrenal nodules likely metastases. Diffuse widespread osseous metastases, increased from prior CT abdomen pelvis.    - DC Ibrance given worsening bone disease and increasing tumor markers per hem/onc   - CA 27-29 (): 996.5  - NSGY recs (): MRI C/T/L spine w/wo given no contraindications   - No cord compression on MRI of her spine  - Taper decadron off by 50% every 3 days  - Palliative following - DNI DNR , hospice cs , pain tx - Bowel regimen   - discontinued letrozole and started Exemestane 25mg daily per Heme/Onc  - s/p Faslodex ()   -family and pt does not want radiation and chemo anymore. Patient made CMO 05/10/24     Patient was worsened with notable decline in neurological exam, respiratory status. Patient also had pancytopenia with acute worsening of lactic acidosis. Patient likely  due to complications of stage IV Breast Cancer

## 2024-05-10 NOTE — PROGRESS NOTE ADULT - PROBLEM SELECTOR PROBLEM 1
Hypercalcemia
Hypercalcemia
Palliative care by specialist
Hypercalcemia
Hypercalcemia
Breast cancer
Hypercalcemia
Hypercalcemia

## 2024-05-10 NOTE — PROGRESS NOTE ADULT - ASSESSMENT
59yo female whose documented PMH  includes hypothyroid, asthma, ETOH abuse (cirrhosis) and breast cancer widely metastatic to bone, status post radiation 5 sessions finished 5/31/23, on letrozole daily, (was on Ribociclib and was discontinued due to low magnesium with high QTc), surgical history of occiput-T2 posterior instrumentation and fusion, ORIF of C2 body with functional decline and impaired ADLs/mobility, ambulates with a walker, following with Dr. Peña , presented to the ER due to back pain present for 2 weeks. Palliative consulted for pain control and GOC.     Spoke with patient's family at bedside. Palliative care introduced to family who I had not met previously.  Family able to provide information about patient's clinical decline and decision for comfort measures only. Discussed comfort measures only at length and discussed that given the patient's clinical status, continuing things like antibiotics and IVF would not provide more comfort for the patient, and IVF may lead to increased symptom burden.  They wish to discontinue antibiotics and IVF and wish for full comfort measures. All questions answered.    Patient seen multiple times during day with increased symptom burden and work of breathing. Initially transition MS contin to standing IV morphine, but given significant symptom burden and multiple PRNs, will initiate morphine infusion.      Education about palliative care provided to patient/family.  See Recs below.    Please call [phone] with questions or concerns 24/7.   We will continue to follow.

## 2024-05-10 NOTE — CONSULT NOTE ADULT - CONSULT REQUESTED DATE/TIME
10-May-2024 09:28
26-Apr-2024 20:00
27-Apr-2024 12:33
27-Apr-2024 21:14
29-Apr-2024 12:21
30-Apr-2024 10:55

## 2024-05-10 NOTE — CONSULT NOTE ADULT - CONSULT REASON
Osseous mets
AMS, dysarthria, elevated lactate. suspicion for seizure
Hypoxia
Pain and GOC
Stage IV Breast cancer
Metastatic breast cancer w/osseous mets

## 2024-05-10 NOTE — PROGRESS NOTE ADULT - PROBLEM SELECTOR PLAN 1
2/2 metastatic breast cancer  awaiting recommendations from heme-onc  for next steps
-Patient refused radiation multiple times this admission  -patient/family interested in hospice  -awaiting hospice disposotion  -may discuss CMO as appropriate with patient/family
2/2 metastatic breast cancer  awaiting recommendations from heme-onc  for next steps
2/2 metastatic breast cancer  awaiting recommendations from heme-onc  for next steps
2/2 metastatic breast cancer  -follow up heme onc  -family/patient considering hospice
-DNR/DNI  -Comfort measures only  -MOLST filled out and placed in chart  -No additional IVF, artificial nutrition, blood draws, vital signs, pressors, antibiotics, escalation of oxygen, escalation of care  -Comfort feeds OK  -Anticipate patient will die in hospital    -morphine infusion 2mg/hour  -morphine 6mg IV q1h PRN for pain/dyspnea  -ativan 1mg IV q1h PRN for agitation/anxiety  -glycopyrrolate 0.2mg IV q6h PRN for secretions
2/2 metastatic breast cancer  -follow up heme onc  -GOC as appropriate
2/2 metastatic breast cancer  -follow up heme onc

## 2024-05-10 NOTE — PROGRESS NOTE ADULT - NS ATTEST RISK PROBLEM GEN_ALL_CORE FT
1.   [ ] 1 or more chronic illnesses with severe exacerbation, progression, or side effects of treatment  [ ] 1 acute or chronic illnesses or injuries that may pose a threat to life or bodily function    2.  [ x] Personally review and interpretation of  image or testing   [ x] Discussion of management or test interpretation with external physician/other qualified health care professional\appropriate source (not separately reported)    3. [ ] Drug therapy requiring intensive monitoring for toxicity   [ ] Decision regarding elective major surgery, treatment, or procedure with identified patient or procedure risk factors   [ ] Decision regarding emergency major surgery, treatment, or procedure   [ ] Decision regarding hospitalization or escalation of hospital-level of care  [ x] Decision not to resuscitate, not to intubate, or to de-escalate care because of poor prognosis   [ ] Decision to proceed or not with artificial nutrition   [ x] Parenteral controlled substance
1.   [ ] 1 or more chronic illnesses with severe exacerbation, progression, or side effects of treatment  [ ] 1 acute or chronic illnesses or injuries that may pose a threat to life or bodily function    2.  [x ] Personally review and interpretation of  image or testing   [x ] Discussion of management or test interpretation with external physician/other qualified health care professional\appropriate source (not separately reported)    3. [ ] Drug therapy requiring intensive monitoring for toxicity   [ ] Decision regarding elective major surgery, treatment, or procedure with identified patient or procedure risk factors   [ ] Decision regarding emergency major surgery, treatment, or procedure   [ ] Decision regarding hospitalization or escalation of hospital-level of care  [ ] Decision not to resuscitate, not to intubate, or to de-escalate care because of poor prognosis   [ ] Decision to proceed or not with artificial nutrition   [ x] Parenteral controlled substance
1.   [ ] 1 or more chronic illnesses with severe exacerbation, progression, or side effects of treatment  [ ] 1 acute or chronic illnesses or injuries that may pose a threat to life or bodily function    2.  [x ] Personally review and interpretation of  image or testing   [x ] Discussion of management or test interpretation with external physician/other qualified health care professional\appropriate source (not separately reported)    3. [ ] Drug therapy requiring intensive monitoring for toxicity   [ ] Decision regarding elective major surgery, treatment, or procedure with identified patient or procedure risk factors   [ ] Decision regarding emergency major surgery, treatment, or procedure   [ ] Decision regarding hospitalization or escalation of hospital-level of care  [ ] Decision not to resuscitate, not to intubate, or to de-escalate care because of poor prognosis   [ ] Decision to proceed or not with artificial nutrition   [ x] Parenteral controlled substance

## 2024-05-10 NOTE — PROGRESS NOTE ADULT - PROBLEM SELECTOR PROBLEM 3
Palliative care by specialist
Neoplasm related pain
Counseling regarding goals of care
Palliative care by specialist
Counseling regarding goals of care
Palliative care by specialist

## 2024-05-14 DIAGNOSIS — E83.52 HYPERCALCEMIA: ICD-10-CM
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
## 2024-05-14 DIAGNOSIS — Z92.3 PERSONAL HISTORY OF IRRADIATION: ICD-10-CM

## 2024-05-14 DIAGNOSIS — Z66 DO NOT RESUSCITATE: ICD-10-CM

## 2024-05-14 DIAGNOSIS — E43 UNSPECIFIED SEVERE PROTEIN-CALORIE MALNUTRITION: ICD-10-CM

## 2024-05-14 DIAGNOSIS — C78.7 SECONDARY MALIGNANT NEOPLASM OF LIVER AND INTRAHEPATIC BILE DUCT: ICD-10-CM

## 2024-05-14 DIAGNOSIS — C50.911 MALIGNANT NEOPLASM OF UNSPECIFIED SITE OF RIGHT FEMALE BREAST: ICD-10-CM

## 2024-05-14 DIAGNOSIS — Z51.5 ENCOUNTER FOR PALLIATIVE CARE: ICD-10-CM

## 2024-05-14 DIAGNOSIS — J96.01 ACUTE RESPIRATORY FAILURE WITH HYPOXIA: ICD-10-CM

## 2024-05-14 DIAGNOSIS — C79.51 SECONDARY MALIGNANT NEOPLASM OF BONE: ICD-10-CM

## 2024-05-14 DIAGNOSIS — G89.3 NEOPLASM RELATED PAIN (ACUTE) (CHRONIC): ICD-10-CM

## 2024-05-14 DIAGNOSIS — E87.6 HYPOKALEMIA: ICD-10-CM

## 2024-05-14 DIAGNOSIS — G93.41 METABOLIC ENCEPHALOPATHY: ICD-10-CM

## 2024-05-14 DIAGNOSIS — M54.9 DORSALGIA, UNSPECIFIED: ICD-10-CM

## 2024-05-14 DIAGNOSIS — E61.2 MAGNESIUM DEFICIENCY: ICD-10-CM

## 2024-05-14 DIAGNOSIS — E87.20 ACIDOSIS, UNSPECIFIED: ICD-10-CM

## 2024-05-14 DIAGNOSIS — Z79.890 HORMONE REPLACEMENT THERAPY: ICD-10-CM

## 2024-05-14 DIAGNOSIS — Z17.1 ESTROGEN RECEPTOR NEGATIVE STATUS [ER-]: ICD-10-CM

## 2024-05-14 DIAGNOSIS — E03.9 HYPOTHYROIDISM, UNSPECIFIED: ICD-10-CM

## 2024-05-14 DIAGNOSIS — J45.909 UNSPECIFIED ASTHMA, UNCOMPLICATED: ICD-10-CM

## 2024-05-14 DIAGNOSIS — T50.995A ADVERSE EFFECT OF OTHER DRUGS, MEDICAMENTS AND BIOLOGICAL SUBSTANCES, INITIAL ENCOUNTER: ICD-10-CM

## 2024-05-14 DIAGNOSIS — D61.811 OTHER DRUG-INDUCED PANCYTOPENIA: ICD-10-CM

## 2024-05-14 DIAGNOSIS — K70.30 ALCOHOLIC CIRRHOSIS OF LIVER WITHOUT ASCITES: ICD-10-CM

## 2024-05-15 LAB
CULTURE RESULTS: SIGNIFICANT CHANGE UP
SPECIMEN SOURCE: SIGNIFICANT CHANGE UP

## 2024-07-01 NOTE — DISCHARGE NOTE PROVIDER - CARE PROVIDER_API CALL
Please sign and close.      Onur Gupta)  Radiation Oncology  [address] [place] [address]  Phone: [phone]  Fax: [phone]  Established Patient  Follow Up Time: 1 week    Scott Suarez)  Internal Medicine  [address] [place] [address]  Phone: [phone]  Fax: [phone]  Established Patient  Follow Up Time: 2 weeks

## 2024-09-08 NOTE — PHYSICAL THERAPY INITIAL EVALUATION ADULT - DIAGNOSIS, PT EVAL
Pt discharged in stable condition.  Pt taken to second floor of garage in wheelchair holding baby.  Pt accompanied by significant other and RN.     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

## 2024-12-20 NOTE — PROGRESS NOTE ADULT - ASSESSMENT
57F. PMH: hypothyroidism and ? alcoholic cirrhosis (stopped drinking over 9 yrs ago),   Presented 4/20 for neck pain w/ limited movement /2 pain x4 months; Denies ataxia, imbalance, or difficulty with ambulation. Denies any numbness, tingling, paresthesias, weakness, saddle anesthesia, or bowel / bladder incontinence. Saw her chiropractor, MRI was done - reported concern for lesions. Pt referred to Dr. Peña's- who she saw 4/20. Pt reports Dr. Peña gave dx of possible breast cancer with metastasis and referred pt to ED.     #Severe neck pain 2/2 likely metastatic lesions at Cervical, Thoracic, Lumbar with multiple metastatic lesions confirmed w MRI   #s/p Occiput-T2 instrumentation and fusion with ORIF of C2  #Suspected breast cancer - no biopsy to confirm    *PE shows R breast with nipple retraction and a firm nodule close proximity to nipple at 3 o'clock   *pt never had mammogram or routine cancer screening   - CT Chest/AP  Right breast mass,  Multiple enlarged mediastinal lymph.   - multiple bony lesion throughout the body  - Neurosurgery Eval'd 4/22: Needs Hard collar Ellis J when OOB; Stabilization surgery; no steroids at this time; no PT until after stabilization  - Oncology Consulted: will need biopsy before additional tx plan recs, also MRI brain w contrast to r/u Mets   - Radonc consulted: She likely will need surgical stabilization in her neck. Please obtain MRI first and discuss possible surgical intervention w/ neurosurgery. If she is not a surgical candidate and there is signs of cord compression, then it is possible to do RT urgently without tissue biopsy. Otherwise, please obtain tissue diagnosis and complete CT C/A/P first; Can start decadron  - Biopsy - possibly consult surgery team for bedside breast biopsy   - No PT until after stabilization surgery  - plans per NeuroSx     #Hypercalcemia likely 2/2 malignancy  #Elevated AlkPhos likely 2/2 malignancy  - resolved s/p IVF     #Sinus tachycardia likely 2/ dehydration and pain  #Hypertensive urgency likely 2/ pain      #Rt eye redness likley conjunctivitis   - start Polytrim     # hypothyroidism   - TSH (4/21) WNL   - c/w levothyroxine 50mcg OD    DVT ppx  Verbal communication